# Patient Record
Sex: MALE | Race: WHITE | NOT HISPANIC OR LATINO | URBAN - METROPOLITAN AREA
[De-identification: names, ages, dates, MRNs, and addresses within clinical notes are randomized per-mention and may not be internally consistent; named-entity substitution may affect disease eponyms.]

---

## 2022-01-01 ENCOUNTER — INPATIENT (INPATIENT)
Facility: HOSPITAL | Age: 82
LOS: 15 days | End: 2022-11-08
Attending: INTERNAL MEDICINE | Admitting: INTERNAL MEDICINE

## 2022-01-01 ENCOUNTER — TRANSCRIPTION ENCOUNTER (OUTPATIENT)
Age: 82
End: 2022-01-01

## 2022-01-01 ENCOUNTER — INPATIENT (INPATIENT)
Facility: HOSPITAL | Age: 82
LOS: 51 days | Discharge: NURSING, REHAB OR LONG-TERM CARE | End: 2022-03-08
Attending: INTERNAL MEDICINE | Admitting: INTERNAL MEDICINE
Payer: MEDICARE

## 2022-01-01 VITALS
TEMPERATURE: 99 F | WEIGHT: 164.91 LBS | OXYGEN SATURATION: 100 % | DIASTOLIC BLOOD PRESSURE: 72 MMHG | SYSTOLIC BLOOD PRESSURE: 110 MMHG | HEART RATE: 100 BPM | HEIGHT: 68 IN | RESPIRATION RATE: 24 BRPM

## 2022-01-01 VITALS — SYSTOLIC BLOOD PRESSURE: 169 MMHG | HEART RATE: 113 BPM | DIASTOLIC BLOOD PRESSURE: 109 MMHG

## 2022-01-01 VITALS — OXYGEN SATURATION: 97 % | HEART RATE: 82 BPM

## 2022-01-01 DIAGNOSIS — A41.9 SEPSIS, UNSPECIFIED ORGANISM: ICD-10-CM

## 2022-01-01 DIAGNOSIS — J96.90 RESPIRATORY FAILURE, UNSPECIFIED, UNSPECIFIED WHETHER WITH HYPOXIA OR HYPERCAPNIA: ICD-10-CM

## 2022-01-01 DIAGNOSIS — J12.82 PNEUMONIA DUE TO CORONAVIRUS DISEASE 2019: ICD-10-CM

## 2022-01-01 DIAGNOSIS — Z66 DO NOT RESUSCITATE: ICD-10-CM

## 2022-01-01 DIAGNOSIS — U07.1 COVID-19: ICD-10-CM

## 2022-01-01 DIAGNOSIS — J15.1 PNEUMONIA DUE TO PSEUDOMONAS: ICD-10-CM

## 2022-01-01 DIAGNOSIS — E87.5 HYPERKALEMIA: ICD-10-CM
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
## 2022-01-01 DIAGNOSIS — G93.1 ANOXIC BRAIN DAMAGE, NOT ELSEWHERE CLASSIFIED: ICD-10-CM

## 2022-01-01 DIAGNOSIS — I21.A1 MYOCARDIAL INFARCTION TYPE 2: ICD-10-CM

## 2022-01-01 DIAGNOSIS — E43 UNSPECIFIED SEVERE PROTEIN-CALORIE MALNUTRITION: ICD-10-CM

## 2022-01-01 DIAGNOSIS — E87.2 ACIDOSIS: ICD-10-CM

## 2022-01-01 DIAGNOSIS — N40.0 BENIGN PROSTATIC HYPERPLASIA WITHOUT LOWER URINARY TRACT SYMPTOMS: ICD-10-CM

## 2022-01-01 DIAGNOSIS — R62.7 ADULT FAILURE TO THRIVE: ICD-10-CM

## 2022-01-01 DIAGNOSIS — I45.19 OTHER RIGHT BUNDLE-BRANCH BLOCK: ICD-10-CM

## 2022-01-01 DIAGNOSIS — L89.150 PRESSURE ULCER OF SACRAL REGION, UNSTAGEABLE: ICD-10-CM

## 2022-01-01 DIAGNOSIS — I46.9 CARDIAC ARREST, CAUSE UNSPECIFIED: ICD-10-CM

## 2022-01-01 DIAGNOSIS — Z51.5 ENCOUNTER FOR PALLIATIVE CARE: ICD-10-CM

## 2022-01-01 DIAGNOSIS — K29.70 GASTRITIS, UNSPECIFIED, WITHOUT BLEEDING: ICD-10-CM

## 2022-01-01 DIAGNOSIS — Z99.11 DEPENDENCE ON RESPIRATOR [VENTILATOR] STATUS: ICD-10-CM

## 2022-01-01 DIAGNOSIS — I49.3 VENTRICULAR PREMATURE DEPOLARIZATION: ICD-10-CM

## 2022-01-01 DIAGNOSIS — R06.03 ACUTE RESPIRATORY DISTRESS: ICD-10-CM

## 2022-01-01 DIAGNOSIS — Z71.89 OTHER SPECIFIED COUNSELING: ICD-10-CM

## 2022-01-01 DIAGNOSIS — J80 ACUTE RESPIRATORY DISTRESS SYNDROME: ICD-10-CM

## 2022-01-01 DIAGNOSIS — J93.9 PNEUMOTHORAX, UNSPECIFIED: ICD-10-CM

## 2022-01-01 DIAGNOSIS — N17.9 ACUTE KIDNEY FAILURE, UNSPECIFIED: ICD-10-CM

## 2022-01-01 DIAGNOSIS — E87.0 HYPEROSMOLALITY AND HYPERNATREMIA: ICD-10-CM

## 2022-01-01 DIAGNOSIS — R94.31 ABNORMAL ELECTROCARDIOGRAM [ECG] [EKG]: ICD-10-CM

## 2022-01-01 DIAGNOSIS — D64.9 ANEMIA, UNSPECIFIED: ICD-10-CM

## 2022-01-01 DIAGNOSIS — Z79.82 LONG TERM (CURRENT) USE OF ASPIRIN: ICD-10-CM

## 2022-01-01 DIAGNOSIS — R41.82 ALTERED MENTAL STATUS, UNSPECIFIED: ICD-10-CM

## 2022-01-01 DIAGNOSIS — I10 ESSENTIAL (PRIMARY) HYPERTENSION: ICD-10-CM

## 2022-01-01 DIAGNOSIS — I49.1 ATRIAL PREMATURE DEPOLARIZATION: ICD-10-CM

## 2022-01-01 DIAGNOSIS — I21.4 NON-ST ELEVATION (NSTEMI) MYOCARDIAL INFARCTION: ICD-10-CM

## 2022-01-01 LAB
-  AMIKACIN: SIGNIFICANT CHANGE UP
-  AMIKACIN: SIGNIFICANT CHANGE UP
-  AMPICILLIN/SULBACTAM: SIGNIFICANT CHANGE UP
-  AZTREONAM: SIGNIFICANT CHANGE UP
-  CEFEPIME: SIGNIFICANT CHANGE UP
-  CEFEPIME: SIGNIFICANT CHANGE UP
-  CEFIDEROCOL: SIGNIFICANT CHANGE UP
-  CEFTAZIDIME: SIGNIFICANT CHANGE UP
-  CEFTAZIDIME: SIGNIFICANT CHANGE UP
-  CEFTRIAXONE: SIGNIFICANT CHANGE UP
-  CIPROFLOXACIN: SIGNIFICANT CHANGE UP
-  CIPROFLOXACIN: SIGNIFICANT CHANGE UP
-  COAGULASE NEGATIVE STAPHYLOCOCCUS: SIGNIFICANT CHANGE UP
-  GENTAMICIN: SIGNIFICANT CHANGE UP
-  GENTAMICIN: SIGNIFICANT CHANGE UP
-  IMIPENEM: SIGNIFICANT CHANGE UP
-  IMIPENEM: SIGNIFICANT CHANGE UP
-  LEVOFLOXACIN: SIGNIFICANT CHANGE UP
-  LEVOFLOXACIN: SIGNIFICANT CHANGE UP
-  MEROPENEM: SIGNIFICANT CHANGE UP
-  MEROPENEM: SIGNIFICANT CHANGE UP
-  PIPERACILLIN/TAZOBACTAM: SIGNIFICANT CHANGE UP
-  PIPERACILLIN/TAZOBACTAM: SIGNIFICANT CHANGE UP
-  POLYMYXIN B: SIGNIFICANT CHANGE UP
-  TIGECYCLINE: <=2 — SIGNIFICANT CHANGE UP
-  TOBRAMYCIN: SIGNIFICANT CHANGE UP
-  TOBRAMYCIN: SIGNIFICANT CHANGE UP
-  TRIMETHOPRIM/SULFAMETHOXAZOLE: SIGNIFICANT CHANGE UP
A1C WITH ESTIMATED AVERAGE GLUCOSE RESULT: 4.7 % — SIGNIFICANT CHANGE UP (ref 4–5.6)
A1C WITH ESTIMATED AVERAGE GLUCOSE RESULT: 6.2 % — HIGH (ref 4–5.6)
ALBUMIN SERPL ELPH-MCNC: 1.8 G/DL — LOW (ref 3.5–5.2)
ALBUMIN SERPL ELPH-MCNC: 1.8 G/DL — LOW (ref 3.5–5.2)
ALBUMIN SERPL ELPH-MCNC: 1.9 G/DL — LOW (ref 3.5–5.2)
ALBUMIN SERPL ELPH-MCNC: 2 G/DL — LOW (ref 3.5–5.2)
ALBUMIN SERPL ELPH-MCNC: 2.3 G/DL — LOW (ref 3.5–5.2)
ALBUMIN SERPL ELPH-MCNC: 2.3 G/DL — LOW (ref 3.5–5.2)
ALBUMIN SERPL ELPH-MCNC: 2.4 G/DL — LOW (ref 3.5–5.2)
ALBUMIN SERPL ELPH-MCNC: 2.5 G/DL — LOW (ref 3.5–5.2)
ALBUMIN SERPL ELPH-MCNC: 2.6 G/DL — LOW (ref 3.5–5.2)
ALBUMIN SERPL ELPH-MCNC: 2.7 G/DL — LOW (ref 3.5–5.2)
ALBUMIN SERPL ELPH-MCNC: 2.8 G/DL — LOW (ref 3.5–5.2)
ALBUMIN SERPL ELPH-MCNC: 2.9 G/DL — LOW (ref 3.5–5.2)
ALBUMIN SERPL ELPH-MCNC: 3 G/DL — LOW (ref 3.5–5.2)
ALBUMIN SERPL ELPH-MCNC: 3.1 G/DL — LOW (ref 3.5–5.2)
ALBUMIN SERPL ELPH-MCNC: 3.2 G/DL — LOW (ref 3.5–5.2)
ALBUMIN SERPL ELPH-MCNC: 3.3 G/DL — LOW (ref 3.5–5.2)
ALP SERPL-CCNC: 101 U/L — SIGNIFICANT CHANGE UP (ref 30–115)
ALP SERPL-CCNC: 102 U/L — SIGNIFICANT CHANGE UP (ref 30–115)
ALP SERPL-CCNC: 102 U/L — SIGNIFICANT CHANGE UP (ref 30–115)
ALP SERPL-CCNC: 103 U/L — SIGNIFICANT CHANGE UP (ref 30–115)
ALP SERPL-CCNC: 104 U/L — SIGNIFICANT CHANGE UP (ref 30–115)
ALP SERPL-CCNC: 107 U/L — SIGNIFICANT CHANGE UP (ref 30–115)
ALP SERPL-CCNC: 109 U/L — SIGNIFICANT CHANGE UP (ref 30–115)
ALP SERPL-CCNC: 109 U/L — SIGNIFICANT CHANGE UP (ref 30–115)
ALP SERPL-CCNC: 110 U/L — SIGNIFICANT CHANGE UP (ref 30–115)
ALP SERPL-CCNC: 112 U/L — SIGNIFICANT CHANGE UP (ref 30–115)
ALP SERPL-CCNC: 117 U/L — HIGH (ref 30–115)
ALP SERPL-CCNC: 117 U/L — HIGH (ref 30–115)
ALP SERPL-CCNC: 118 U/L — HIGH (ref 30–115)
ALP SERPL-CCNC: 125 U/L — HIGH (ref 30–115)
ALP SERPL-CCNC: 126 U/L — HIGH (ref 30–115)
ALP SERPL-CCNC: 129 U/L — HIGH (ref 30–115)
ALP SERPL-CCNC: 51 U/L — SIGNIFICANT CHANGE UP (ref 30–115)
ALP SERPL-CCNC: 55 U/L — SIGNIFICANT CHANGE UP (ref 30–115)
ALP SERPL-CCNC: 62 U/L — SIGNIFICANT CHANGE UP (ref 30–115)
ALP SERPL-CCNC: 64 U/L — SIGNIFICANT CHANGE UP (ref 30–115)
ALP SERPL-CCNC: 66 U/L — SIGNIFICANT CHANGE UP (ref 30–115)
ALP SERPL-CCNC: 67 U/L — SIGNIFICANT CHANGE UP (ref 30–115)
ALP SERPL-CCNC: 67 U/L — SIGNIFICANT CHANGE UP (ref 30–115)
ALP SERPL-CCNC: 68 U/L — SIGNIFICANT CHANGE UP (ref 30–115)
ALP SERPL-CCNC: 68 U/L — SIGNIFICANT CHANGE UP (ref 30–115)
ALP SERPL-CCNC: 71 U/L — SIGNIFICANT CHANGE UP (ref 30–115)
ALP SERPL-CCNC: 71 U/L — SIGNIFICANT CHANGE UP (ref 30–115)
ALP SERPL-CCNC: 72 U/L — SIGNIFICANT CHANGE UP (ref 30–115)
ALP SERPL-CCNC: 72 U/L — SIGNIFICANT CHANGE UP (ref 30–115)
ALP SERPL-CCNC: 74 U/L — SIGNIFICANT CHANGE UP (ref 30–115)
ALP SERPL-CCNC: 75 U/L — SIGNIFICANT CHANGE UP (ref 30–115)
ALP SERPL-CCNC: 75 U/L — SIGNIFICANT CHANGE UP (ref 30–115)
ALP SERPL-CCNC: 76 U/L — SIGNIFICANT CHANGE UP (ref 30–115)
ALP SERPL-CCNC: 76 U/L — SIGNIFICANT CHANGE UP (ref 30–115)
ALP SERPL-CCNC: 77 U/L — SIGNIFICANT CHANGE UP (ref 30–115)
ALP SERPL-CCNC: 78 U/L — SIGNIFICANT CHANGE UP (ref 30–115)
ALP SERPL-CCNC: 79 U/L — SIGNIFICANT CHANGE UP (ref 30–115)
ALP SERPL-CCNC: 80 U/L — SIGNIFICANT CHANGE UP (ref 30–115)
ALP SERPL-CCNC: 80 U/L — SIGNIFICANT CHANGE UP (ref 30–115)
ALP SERPL-CCNC: 81 U/L — SIGNIFICANT CHANGE UP (ref 30–115)
ALP SERPL-CCNC: 82 U/L — SIGNIFICANT CHANGE UP (ref 30–115)
ALP SERPL-CCNC: 82 U/L — SIGNIFICANT CHANGE UP (ref 30–115)
ALP SERPL-CCNC: 84 U/L — SIGNIFICANT CHANGE UP (ref 30–115)
ALP SERPL-CCNC: 94 U/L — SIGNIFICANT CHANGE UP (ref 30–115)
ALP SERPL-CCNC: 97 U/L — SIGNIFICANT CHANGE UP (ref 30–115)
ALP SERPL-CCNC: 98 U/L — SIGNIFICANT CHANGE UP (ref 30–115)
ALT FLD-CCNC: 101 U/L — HIGH (ref 0–41)
ALT FLD-CCNC: 114 U/L — HIGH (ref 0–41)
ALT FLD-CCNC: 13 U/L — SIGNIFICANT CHANGE UP (ref 0–41)
ALT FLD-CCNC: 16 U/L — SIGNIFICANT CHANGE UP (ref 0–41)
ALT FLD-CCNC: 23 U/L — SIGNIFICANT CHANGE UP (ref 0–41)
ALT FLD-CCNC: 23 U/L — SIGNIFICANT CHANGE UP (ref 0–41)
ALT FLD-CCNC: 25 U/L — SIGNIFICANT CHANGE UP (ref 0–41)
ALT FLD-CCNC: 25 U/L — SIGNIFICANT CHANGE UP (ref 0–41)
ALT FLD-CCNC: 26 U/L — SIGNIFICANT CHANGE UP (ref 0–41)
ALT FLD-CCNC: 27 U/L — SIGNIFICANT CHANGE UP (ref 0–41)
ALT FLD-CCNC: 28 U/L — SIGNIFICANT CHANGE UP (ref 0–41)
ALT FLD-CCNC: 30 U/L — SIGNIFICANT CHANGE UP (ref 0–41)
ALT FLD-CCNC: 31 U/L — SIGNIFICANT CHANGE UP (ref 0–41)
ALT FLD-CCNC: 31 U/L — SIGNIFICANT CHANGE UP (ref 0–41)
ALT FLD-CCNC: 32 U/L — SIGNIFICANT CHANGE UP (ref 0–41)
ALT FLD-CCNC: 33 U/L — SIGNIFICANT CHANGE UP (ref 0–41)
ALT FLD-CCNC: 35 U/L — SIGNIFICANT CHANGE UP (ref 0–41)
ALT FLD-CCNC: 37 U/L — SIGNIFICANT CHANGE UP (ref 0–41)
ALT FLD-CCNC: 40 U/L — SIGNIFICANT CHANGE UP (ref 0–41)
ALT FLD-CCNC: 40 U/L — SIGNIFICANT CHANGE UP (ref 0–41)
ALT FLD-CCNC: 45 U/L — HIGH (ref 0–41)
ALT FLD-CCNC: 47 U/L — HIGH (ref 0–41)
ALT FLD-CCNC: 50 U/L — HIGH (ref 0–41)
ALT FLD-CCNC: 50 U/L — HIGH (ref 0–41)
ALT FLD-CCNC: 51 U/L — HIGH (ref 0–41)
ALT FLD-CCNC: 51 U/L — HIGH (ref 0–41)
ALT FLD-CCNC: 56 U/L — HIGH (ref 0–41)
ALT FLD-CCNC: 56 U/L — HIGH (ref 0–41)
ALT FLD-CCNC: 59 U/L — HIGH (ref 0–41)
ALT FLD-CCNC: 62 U/L — HIGH (ref 0–41)
ALT FLD-CCNC: 64 U/L — HIGH (ref 0–41)
ALT FLD-CCNC: 64 U/L — HIGH (ref 0–41)
ALT FLD-CCNC: 65 U/L — HIGH (ref 0–41)
ALT FLD-CCNC: 66 U/L — HIGH (ref 0–41)
ALT FLD-CCNC: 7 U/L — SIGNIFICANT CHANGE UP (ref 0–41)
ALT FLD-CCNC: 76 U/L — HIGH (ref 0–41)
ALT FLD-CCNC: 78 U/L — HIGH (ref 0–41)
ALT FLD-CCNC: 8 U/L — SIGNIFICANT CHANGE UP (ref 0–41)
ALT FLD-CCNC: 9 U/L — SIGNIFICANT CHANGE UP (ref 0–41)
ALT FLD-CCNC: 9 U/L — SIGNIFICANT CHANGE UP (ref 0–41)
AMIKACIN SERPL-MCNC: 26.6 UG/ML — HIGH (ref 0–10)
ANION GAP SERPL CALC-SCNC: 10 MMOL/L — SIGNIFICANT CHANGE UP (ref 7–14)
ANION GAP SERPL CALC-SCNC: 11 MMOL/L — SIGNIFICANT CHANGE UP (ref 7–14)
ANION GAP SERPL CALC-SCNC: 12 MMOL/L — SIGNIFICANT CHANGE UP (ref 7–14)
ANION GAP SERPL CALC-SCNC: 13 MMOL/L — SIGNIFICANT CHANGE UP (ref 7–14)
ANION GAP SERPL CALC-SCNC: 14 MMOL/L — SIGNIFICANT CHANGE UP (ref 7–14)
ANION GAP SERPL CALC-SCNC: 14 MMOL/L — SIGNIFICANT CHANGE UP (ref 7–14)
ANION GAP SERPL CALC-SCNC: 15 MMOL/L — HIGH (ref 7–14)
ANION GAP SERPL CALC-SCNC: 16 MMOL/L — HIGH (ref 7–14)
ANION GAP SERPL CALC-SCNC: 17 MMOL/L — HIGH (ref 7–14)
ANION GAP SERPL CALC-SCNC: 19 MMOL/L — HIGH (ref 7–14)
ANION GAP SERPL CALC-SCNC: 20 MMOL/L — HIGH (ref 7–14)
ANION GAP SERPL CALC-SCNC: 26 MMOL/L — HIGH (ref 7–14)
ANION GAP SERPL CALC-SCNC: 4 MMOL/L — LOW (ref 7–14)
ANION GAP SERPL CALC-SCNC: 5 MMOL/L — LOW (ref 7–14)
ANION GAP SERPL CALC-SCNC: 7 MMOL/L — SIGNIFICANT CHANGE UP (ref 7–14)
ANION GAP SERPL CALC-SCNC: 7 MMOL/L — SIGNIFICANT CHANGE UP (ref 7–14)
ANION GAP SERPL CALC-SCNC: 8 MMOL/L — SIGNIFICANT CHANGE UP (ref 7–14)
ANION GAP SERPL CALC-SCNC: 9 MMOL/L — SIGNIFICANT CHANGE UP (ref 7–14)
ANISOCYTOSIS BLD QL: SIGNIFICANT CHANGE UP
ANISOCYTOSIS BLD QL: SIGNIFICANT CHANGE UP
ANISOCYTOSIS BLD QL: SLIGHT — SIGNIFICANT CHANGE UP
APPEARANCE UR: ABNORMAL
APPEARANCE UR: ABNORMAL
APPEARANCE UR: CLEAR — SIGNIFICANT CHANGE UP
APTT BLD: 100.7 SEC — CRITICAL HIGH (ref 27–39.2)
APTT BLD: 127.5 SEC — CRITICAL HIGH (ref 27–39.2)
APTT BLD: 29 SEC — SIGNIFICANT CHANGE UP (ref 27–39.2)
APTT BLD: 29.3 SEC — SIGNIFICANT CHANGE UP (ref 27–39.2)
APTT BLD: 29.6 SEC — SIGNIFICANT CHANGE UP (ref 27–39.2)
APTT BLD: 30.5 SEC — SIGNIFICANT CHANGE UP (ref 27–39.2)
APTT BLD: 30.7 SEC — SIGNIFICANT CHANGE UP (ref 27–39.2)
APTT BLD: 33.3 SEC — SIGNIFICANT CHANGE UP (ref 27–39.2)
APTT BLD: 53.9 SEC — HIGH (ref 27–39.2)
APTT BLD: 60 SEC — HIGH (ref 27–39.2)
APTT BLD: 61.7 SEC — HIGH (ref 27–39.2)
APTT BLD: 63.1 SEC — HIGH (ref 27–39.2)
APTT BLD: 77.6 SEC — CRITICAL HIGH (ref 27–39.2)
APTT BLD: 94.6 SEC — CRITICAL HIGH (ref 27–39.2)
AST SERPL-CCNC: 11 U/L — SIGNIFICANT CHANGE UP (ref 0–41)
AST SERPL-CCNC: 12 U/L — SIGNIFICANT CHANGE UP (ref 0–41)
AST SERPL-CCNC: 12 U/L — SIGNIFICANT CHANGE UP (ref 0–41)
AST SERPL-CCNC: 15 U/L — SIGNIFICANT CHANGE UP (ref 0–41)
AST SERPL-CCNC: 16 U/L — SIGNIFICANT CHANGE UP (ref 0–41)
AST SERPL-CCNC: 17 U/L — SIGNIFICANT CHANGE UP (ref 0–41)
AST SERPL-CCNC: 18 U/L — SIGNIFICANT CHANGE UP (ref 0–41)
AST SERPL-CCNC: 19 U/L — SIGNIFICANT CHANGE UP (ref 0–41)
AST SERPL-CCNC: 19 U/L — SIGNIFICANT CHANGE UP (ref 0–41)
AST SERPL-CCNC: 20 U/L — SIGNIFICANT CHANGE UP (ref 0–41)
AST SERPL-CCNC: 21 U/L — SIGNIFICANT CHANGE UP (ref 0–41)
AST SERPL-CCNC: 22 U/L — SIGNIFICANT CHANGE UP (ref 0–41)
AST SERPL-CCNC: 23 U/L — SIGNIFICANT CHANGE UP (ref 0–41)
AST SERPL-CCNC: 23 U/L — SIGNIFICANT CHANGE UP (ref 0–41)
AST SERPL-CCNC: 24 U/L — SIGNIFICANT CHANGE UP (ref 0–41)
AST SERPL-CCNC: 25 U/L — SIGNIFICANT CHANGE UP (ref 0–41)
AST SERPL-CCNC: 26 U/L — SIGNIFICANT CHANGE UP (ref 0–41)
AST SERPL-CCNC: 27 U/L — SIGNIFICANT CHANGE UP (ref 0–41)
AST SERPL-CCNC: 28 U/L — SIGNIFICANT CHANGE UP (ref 0–41)
AST SERPL-CCNC: 29 U/L — SIGNIFICANT CHANGE UP (ref 0–41)
AST SERPL-CCNC: 32 U/L — SIGNIFICANT CHANGE UP (ref 0–41)
AST SERPL-CCNC: 33 U/L — SIGNIFICANT CHANGE UP (ref 0–41)
AST SERPL-CCNC: 34 U/L — SIGNIFICANT CHANGE UP (ref 0–41)
AST SERPL-CCNC: 34 U/L — SIGNIFICANT CHANGE UP (ref 0–41)
AST SERPL-CCNC: 35 U/L — SIGNIFICANT CHANGE UP (ref 0–41)
AST SERPL-CCNC: 45 U/L — HIGH (ref 0–41)
AST SERPL-CCNC: 46 U/L — HIGH (ref 0–41)
AST SERPL-CCNC: 46 U/L — HIGH (ref 0–41)
AST SERPL-CCNC: 53 U/L — HIGH (ref 0–41)
AST SERPL-CCNC: 60 U/L — HIGH (ref 0–41)
AST SERPL-CCNC: 94 U/L — HIGH (ref 0–41)
AST SERPL-CCNC: 95 U/L — HIGH (ref 0–41)
B-OH-BUTYR SERPL-SCNC: 0.3 MMOL/L — SIGNIFICANT CHANGE UP
BACTERIA # UR AUTO: ABNORMAL
BACTERIA # UR AUTO: ABNORMAL
BACTERIA # UR AUTO: NEGATIVE — SIGNIFICANT CHANGE UP
BASE EXCESS BLDA CALC-SCNC: -12.9 MMOL/L — LOW (ref -2–3)
BASE EXCESS BLDA CALC-SCNC: 14.9 MMOL/L — HIGH (ref -2–3)
BASE EXCESS BLDA CALC-SCNC: 22.5 MMOL/L — HIGH (ref -2–3)
BASE EXCESS BLDA CALC-SCNC: 3.6 MMOL/L — HIGH (ref -2–3)
BASE EXCESS BLDA CALC-SCNC: 5.5 MMOL/L — HIGH (ref -2–3)
BASO STIPL BLD QL SMEAR: PRESENT — SIGNIFICANT CHANGE UP
BASOPHILS # BLD AUTO: 0 K/UL — SIGNIFICANT CHANGE UP (ref 0–0.2)
BASOPHILS # BLD AUTO: 0.01 K/UL — SIGNIFICANT CHANGE UP (ref 0–0.2)
BASOPHILS # BLD AUTO: 0.02 K/UL — SIGNIFICANT CHANGE UP (ref 0–0.2)
BASOPHILS # BLD AUTO: 0.03 K/UL — SIGNIFICANT CHANGE UP (ref 0–0.2)
BASOPHILS # BLD AUTO: 0.04 K/UL — SIGNIFICANT CHANGE UP (ref 0–0.2)
BASOPHILS # BLD AUTO: 0.05 K/UL — SIGNIFICANT CHANGE UP (ref 0–0.2)
BASOPHILS # BLD AUTO: 0.06 K/UL — SIGNIFICANT CHANGE UP (ref 0–0.2)
BASOPHILS # BLD AUTO: 0.09 K/UL — SIGNIFICANT CHANGE UP (ref 0–0.2)
BASOPHILS # BLD AUTO: 0.09 K/UL — SIGNIFICANT CHANGE UP (ref 0–0.2)
BASOPHILS # BLD AUTO: 0.1 K/UL — SIGNIFICANT CHANGE UP (ref 0–0.2)
BASOPHILS # BLD AUTO: 0.53 K/UL — HIGH (ref 0–0.2)
BASOPHILS NFR BLD AUTO: 0 % — SIGNIFICANT CHANGE UP (ref 0–1)
BASOPHILS NFR BLD AUTO: 0.1 % — SIGNIFICANT CHANGE UP (ref 0–1)
BASOPHILS NFR BLD AUTO: 0.2 % — SIGNIFICANT CHANGE UP (ref 0–1)
BASOPHILS NFR BLD AUTO: 0.3 % — SIGNIFICANT CHANGE UP (ref 0–1)
BASOPHILS NFR BLD AUTO: 0.4 % — SIGNIFICANT CHANGE UP (ref 0–1)
BASOPHILS NFR BLD AUTO: 0.5 % — SIGNIFICANT CHANGE UP (ref 0–1)
BASOPHILS NFR BLD AUTO: 0.7 % — SIGNIFICANT CHANGE UP (ref 0–1)
BASOPHILS NFR BLD AUTO: 3.5 % — HIGH (ref 0–1)
BILIRUB DIRECT SERPL-MCNC: 0.2 MG/DL — SIGNIFICANT CHANGE UP (ref 0–0.3)
BILIRUB INDIRECT FLD-MCNC: 0.2 MG/DL — SIGNIFICANT CHANGE UP (ref 0.2–1.2)
BILIRUB SERPL-MCNC: 0.2 MG/DL — SIGNIFICANT CHANGE UP (ref 0.2–1.2)
BILIRUB SERPL-MCNC: 0.3 MG/DL — SIGNIFICANT CHANGE UP (ref 0.2–1.2)
BILIRUB SERPL-MCNC: 0.4 MG/DL — SIGNIFICANT CHANGE UP (ref 0.2–1.2)
BILIRUB SERPL-MCNC: 0.5 MG/DL — SIGNIFICANT CHANGE UP (ref 0.2–1.2)
BILIRUB SERPL-MCNC: 0.6 MG/DL — SIGNIFICANT CHANGE UP (ref 0.2–1.2)
BILIRUB SERPL-MCNC: 0.7 MG/DL — SIGNIFICANT CHANGE UP (ref 0.2–1.2)
BILIRUB SERPL-MCNC: 0.8 MG/DL — SIGNIFICANT CHANGE UP (ref 0.2–1.2)
BILIRUB SERPL-MCNC: 0.9 MG/DL — SIGNIFICANT CHANGE UP (ref 0.2–1.2)
BILIRUB SERPL-MCNC: 1 MG/DL — SIGNIFICANT CHANGE UP (ref 0.2–1.2)
BILIRUB SERPL-MCNC: 1.1 MG/DL — SIGNIFICANT CHANGE UP (ref 0.2–1.2)
BILIRUB SERPL-MCNC: 1.2 MG/DL — SIGNIFICANT CHANGE UP (ref 0.2–1.2)
BILIRUB SERPL-MCNC: 1.7 MG/DL — HIGH (ref 0.2–1.2)
BILIRUB UR-MCNC: NEGATIVE — SIGNIFICANT CHANGE UP
BLD GP AB SCN SERPL QL: SIGNIFICANT CHANGE UP
BUN SERPL-MCNC: 12 MG/DL — SIGNIFICANT CHANGE UP (ref 10–20)
BUN SERPL-MCNC: 12 MG/DL — SIGNIFICANT CHANGE UP (ref 10–20)
BUN SERPL-MCNC: 14 MG/DL — SIGNIFICANT CHANGE UP (ref 10–20)
BUN SERPL-MCNC: 14 MG/DL — SIGNIFICANT CHANGE UP (ref 10–20)
BUN SERPL-MCNC: 16 MG/DL — SIGNIFICANT CHANGE UP (ref 10–20)
BUN SERPL-MCNC: 17 MG/DL — SIGNIFICANT CHANGE UP (ref 10–20)
BUN SERPL-MCNC: 18 MG/DL — SIGNIFICANT CHANGE UP (ref 10–20)
BUN SERPL-MCNC: 21 MG/DL — HIGH (ref 10–20)
BUN SERPL-MCNC: 22 MG/DL — HIGH (ref 10–20)
BUN SERPL-MCNC: 22 MG/DL — HIGH (ref 10–20)
BUN SERPL-MCNC: 23 MG/DL — HIGH (ref 10–20)
BUN SERPL-MCNC: 24 MG/DL — HIGH (ref 10–20)
BUN SERPL-MCNC: 29 MG/DL — HIGH (ref 10–20)
BUN SERPL-MCNC: 29 MG/DL — HIGH (ref 10–20)
BUN SERPL-MCNC: 31 MG/DL — HIGH (ref 10–20)
BUN SERPL-MCNC: 31 MG/DL — HIGH (ref 10–20)
BUN SERPL-MCNC: 32 MG/DL — HIGH (ref 10–20)
BUN SERPL-MCNC: 32 MG/DL — HIGH (ref 10–20)
BUN SERPL-MCNC: 33 MG/DL — HIGH (ref 10–20)
BUN SERPL-MCNC: 34 MG/DL — HIGH (ref 10–20)
BUN SERPL-MCNC: 34 MG/DL — HIGH (ref 10–20)
BUN SERPL-MCNC: 35 MG/DL — HIGH (ref 10–20)
BUN SERPL-MCNC: 36 MG/DL — HIGH (ref 10–20)
BUN SERPL-MCNC: 36 MG/DL — HIGH (ref 10–20)
BUN SERPL-MCNC: 37 MG/DL — HIGH (ref 10–20)
BUN SERPL-MCNC: 37 MG/DL — HIGH (ref 10–20)
BUN SERPL-MCNC: 39 MG/DL — HIGH (ref 10–20)
BUN SERPL-MCNC: 41 MG/DL — HIGH (ref 10–20)
BUN SERPL-MCNC: 42 MG/DL — HIGH (ref 10–20)
BUN SERPL-MCNC: 43 MG/DL — HIGH (ref 10–20)
BUN SERPL-MCNC: 43 MG/DL — HIGH (ref 10–20)
BUN SERPL-MCNC: 46 MG/DL — HIGH (ref 10–20)
BUN SERPL-MCNC: 47 MG/DL — HIGH (ref 10–20)
BUN SERPL-MCNC: 49 MG/DL — HIGH (ref 10–20)
BUN SERPL-MCNC: 50 MG/DL — HIGH (ref 10–20)
BUN SERPL-MCNC: 51 MG/DL — HIGH (ref 10–20)
BUN SERPL-MCNC: 52 MG/DL — HIGH (ref 10–20)
BUN SERPL-MCNC: 54 MG/DL — HIGH (ref 10–20)
BUN SERPL-MCNC: 54 MG/DL — HIGH (ref 10–20)
BUN SERPL-MCNC: 58 MG/DL — HIGH (ref 10–20)
BUN SERPL-MCNC: 58 MG/DL — HIGH (ref 10–20)
BUN SERPL-MCNC: 59 MG/DL — HIGH (ref 10–20)
BUN SERPL-MCNC: 63 MG/DL — CRITICAL HIGH (ref 10–20)
BUN SERPL-MCNC: 63 MG/DL — CRITICAL HIGH (ref 10–20)
BUN SERPL-MCNC: 67 MG/DL — CRITICAL HIGH (ref 10–20)
BUN SERPL-MCNC: 67 MG/DL — CRITICAL HIGH (ref 10–20)
BUN SERPL-MCNC: 68 MG/DL — CRITICAL HIGH (ref 10–20)
BUN SERPL-MCNC: 70 MG/DL — CRITICAL HIGH (ref 10–20)
BUN SERPL-MCNC: 73 MG/DL — CRITICAL HIGH (ref 10–20)
BUN SERPL-MCNC: 76 MG/DL — CRITICAL HIGH (ref 10–20)
BUN SERPL-MCNC: 80 MG/DL — CRITICAL HIGH (ref 10–20)
BUN SERPL-MCNC: 81 MG/DL — CRITICAL HIGH (ref 10–20)
BUN SERPL-MCNC: 82 MG/DL — CRITICAL HIGH (ref 10–20)
BUN SERPL-MCNC: 94 MG/DL — CRITICAL HIGH (ref 10–20)
CALCIUM SERPL-MCNC: 6.9 MG/DL — LOW (ref 8.5–10.1)
CALCIUM SERPL-MCNC: 6.9 MG/DL — LOW (ref 8.5–10.1)
CALCIUM SERPL-MCNC: 7.1 MG/DL — LOW (ref 8.5–10.1)
CALCIUM SERPL-MCNC: 7.2 MG/DL — LOW (ref 8.4–10.5)
CALCIUM SERPL-MCNC: 7.2 MG/DL — LOW (ref 8.5–10.1)
CALCIUM SERPL-MCNC: 7.3 MG/DL — LOW (ref 8.5–10.1)
CALCIUM SERPL-MCNC: 7.4 MG/DL — LOW (ref 8.4–10.4)
CALCIUM SERPL-MCNC: 7.4 MG/DL — LOW (ref 8.4–10.5)
CALCIUM SERPL-MCNC: 7.4 MG/DL — LOW (ref 8.5–10.1)
CALCIUM SERPL-MCNC: 7.5 MG/DL — LOW (ref 8.4–10.5)
CALCIUM SERPL-MCNC: 7.6 MG/DL — LOW (ref 8.4–10.4)
CALCIUM SERPL-MCNC: 7.7 MG/DL — LOW (ref 8.4–10.5)
CALCIUM SERPL-MCNC: 7.7 MG/DL — LOW (ref 8.5–10.1)
CALCIUM SERPL-MCNC: 7.8 MG/DL — LOW (ref 8.4–10.5)
CALCIUM SERPL-MCNC: 7.8 MG/DL — LOW (ref 8.5–10.1)
CALCIUM SERPL-MCNC: 7.9 MG/DL — LOW (ref 8.4–10.5)
CALCIUM SERPL-MCNC: 7.9 MG/DL — LOW (ref 8.5–10.1)
CALCIUM SERPL-MCNC: 7.9 MG/DL — LOW (ref 8.5–10.1)
CALCIUM SERPL-MCNC: 8 MG/DL — LOW (ref 8.4–10.4)
CALCIUM SERPL-MCNC: 8 MG/DL — LOW (ref 8.5–10.1)
CALCIUM SERPL-MCNC: 8.1 MG/DL — LOW (ref 8.4–10.4)
CALCIUM SERPL-MCNC: 8.1 MG/DL — LOW (ref 8.5–10.1)
CALCIUM SERPL-MCNC: 8.2 MG/DL — LOW (ref 8.4–10.4)
CALCIUM SERPL-MCNC: 8.2 MG/DL — LOW (ref 8.4–10.5)
CALCIUM SERPL-MCNC: 8.2 MG/DL — LOW (ref 8.5–10.1)
CALCIUM SERPL-MCNC: 8.2 MG/DL — LOW (ref 8.5–10.1)
CALCIUM SERPL-MCNC: 8.3 MG/DL — LOW (ref 8.5–10.1)
CALCIUM SERPL-MCNC: 8.4 MG/DL — LOW (ref 8.5–10.1)
CALCIUM SERPL-MCNC: 8.5 MG/DL — SIGNIFICANT CHANGE UP (ref 8.5–10.1)
CALCIUM SERPL-MCNC: 8.6 MG/DL — SIGNIFICANT CHANGE UP (ref 8.5–10.1)
CALCIUM SERPL-MCNC: 8.7 MG/DL — SIGNIFICANT CHANGE UP (ref 8.5–10.1)
CHLORIDE SERPL-SCNC: 100 MMOL/L — SIGNIFICANT CHANGE UP (ref 98–110)
CHLORIDE SERPL-SCNC: 101 MMOL/L — SIGNIFICANT CHANGE UP (ref 98–110)
CHLORIDE SERPL-SCNC: 102 MMOL/L — SIGNIFICANT CHANGE UP (ref 98–110)
CHLORIDE SERPL-SCNC: 103 MMOL/L — SIGNIFICANT CHANGE UP (ref 98–110)
CHLORIDE SERPL-SCNC: 104 MMOL/L — SIGNIFICANT CHANGE UP (ref 98–110)
CHLORIDE SERPL-SCNC: 105 MMOL/L — SIGNIFICANT CHANGE UP (ref 98–110)
CHLORIDE SERPL-SCNC: 106 MMOL/L — SIGNIFICANT CHANGE UP (ref 98–110)
CHLORIDE SERPL-SCNC: 107 MMOL/L — SIGNIFICANT CHANGE UP (ref 98–110)
CHLORIDE SERPL-SCNC: 108 MMOL/L — SIGNIFICANT CHANGE UP (ref 98–110)
CHLORIDE SERPL-SCNC: 109 MMOL/L — SIGNIFICANT CHANGE UP (ref 98–110)
CHLORIDE SERPL-SCNC: 110 MMOL/L — SIGNIFICANT CHANGE UP (ref 98–110)
CHLORIDE SERPL-SCNC: 111 MMOL/L — HIGH (ref 98–110)
CHLORIDE SERPL-SCNC: 113 MMOL/L — HIGH (ref 98–110)
CHLORIDE SERPL-SCNC: 84 MMOL/L — LOW (ref 98–110)
CHLORIDE SERPL-SCNC: 85 MMOL/L — LOW (ref 98–110)
CHLORIDE SERPL-SCNC: 86 MMOL/L — LOW (ref 98–110)
CHLORIDE SERPL-SCNC: 87 MMOL/L — LOW (ref 98–110)
CHLORIDE SERPL-SCNC: 88 MMOL/L — LOW (ref 98–110)
CHLORIDE SERPL-SCNC: 90 MMOL/L — LOW (ref 98–110)
CHLORIDE SERPL-SCNC: 94 MMOL/L — LOW (ref 98–110)
CHLORIDE SERPL-SCNC: 95 MMOL/L — LOW (ref 98–110)
CHLORIDE SERPL-SCNC: 95 MMOL/L — LOW (ref 98–110)
CHLORIDE SERPL-SCNC: 96 MMOL/L — LOW (ref 98–110)
CHLORIDE SERPL-SCNC: 97 MMOL/L — LOW (ref 98–110)
CHLORIDE SERPL-SCNC: 97 MMOL/L — LOW (ref 98–110)
CHLORIDE SERPL-SCNC: 99 MMOL/L — SIGNIFICANT CHANGE UP (ref 98–110)
CHLORIDE UR-SCNC: 20 — SIGNIFICANT CHANGE UP
CHOLEST SERPL-MCNC: 80 MG/DL — SIGNIFICANT CHANGE UP
CK MB CFR SERPL CALC: 28.5 NG/ML — HIGH (ref 0.6–6.3)
CK MB CFR SERPL CALC: 38.1 NG/ML — HIGH (ref 0.6–6.3)
CK SERPL-CCNC: 1231 U/L — HIGH (ref 0–225)
CO2 SERPL-SCNC: 12 MMOL/L — LOW (ref 17–32)
CO2 SERPL-SCNC: 23 MMOL/L — SIGNIFICANT CHANGE UP (ref 17–32)
CO2 SERPL-SCNC: 24 MMOL/L — SIGNIFICANT CHANGE UP (ref 17–32)
CO2 SERPL-SCNC: 24 MMOL/L — SIGNIFICANT CHANGE UP (ref 17–32)
CO2 SERPL-SCNC: 25 MMOL/L — SIGNIFICANT CHANGE UP (ref 17–32)
CO2 SERPL-SCNC: 26 MMOL/L — SIGNIFICANT CHANGE UP (ref 17–32)
CO2 SERPL-SCNC: 27 MMOL/L — SIGNIFICANT CHANGE UP (ref 17–32)
CO2 SERPL-SCNC: 28 MMOL/L — SIGNIFICANT CHANGE UP (ref 17–32)
CO2 SERPL-SCNC: 29 MMOL/L — SIGNIFICANT CHANGE UP (ref 17–32)
CO2 SERPL-SCNC: 30 MMOL/L — SIGNIFICANT CHANGE UP (ref 17–32)
CO2 SERPL-SCNC: 31 MMOL/L — SIGNIFICANT CHANGE UP (ref 17–32)
CO2 SERPL-SCNC: 32 MMOL/L — SIGNIFICANT CHANGE UP (ref 17–32)
CO2 SERPL-SCNC: 33 MMOL/L — HIGH (ref 17–32)
CO2 SERPL-SCNC: 34 MMOL/L — HIGH (ref 17–32)
CO2 SERPL-SCNC: 35 MMOL/L — HIGH (ref 17–32)
CO2 SERPL-SCNC: 36 MMOL/L — HIGH (ref 17–32)
CO2 SERPL-SCNC: 37 MMOL/L — HIGH (ref 17–32)
CO2 SERPL-SCNC: 39 MMOL/L — HIGH (ref 17–32)
CO2 SERPL-SCNC: 40 MMOL/L — HIGH (ref 17–32)
CO2 SERPL-SCNC: 40 MMOL/L — HIGH (ref 17–32)
CO2 SERPL-SCNC: 43 MMOL/L — CRITICAL HIGH (ref 17–32)
COLOR SPEC: YELLOW — SIGNIFICANT CHANGE UP
CORTIS AM PEAK SERPL-MCNC: 9.9 UG/DL — SIGNIFICANT CHANGE UP (ref 6–18.4)
CREAT ?TM UR-MCNC: 147 MG/DL — SIGNIFICANT CHANGE UP
CREAT SERPL-MCNC: 0.5 MG/DL — LOW (ref 0.7–1.5)
CREAT SERPL-MCNC: 0.6 MG/DL — LOW (ref 0.7–1.5)
CREAT SERPL-MCNC: 0.7 MG/DL — SIGNIFICANT CHANGE UP (ref 0.7–1.5)
CREAT SERPL-MCNC: 0.8 MG/DL — SIGNIFICANT CHANGE UP (ref 0.7–1.5)
CREAT SERPL-MCNC: 0.9 MG/DL — SIGNIFICANT CHANGE UP (ref 0.7–1.5)
CREAT SERPL-MCNC: 1 MG/DL — SIGNIFICANT CHANGE UP (ref 0.7–1.5)
CREAT SERPL-MCNC: 1 MG/DL — SIGNIFICANT CHANGE UP (ref 0.7–1.5)
CREAT SERPL-MCNC: 1.1 MG/DL — SIGNIFICANT CHANGE UP (ref 0.7–1.5)
CREAT SERPL-MCNC: 1.1 MG/DL — SIGNIFICANT CHANGE UP (ref 0.7–1.5)
CREAT SERPL-MCNC: 1.2 MG/DL — SIGNIFICANT CHANGE UP (ref 0.7–1.5)
CREAT SERPL-MCNC: 1.3 MG/DL — SIGNIFICANT CHANGE UP (ref 0.7–1.5)
CREAT SERPL-MCNC: 1.6 MG/DL — HIGH (ref 0.7–1.5)
CREAT SERPL-MCNC: 1.6 MG/DL — HIGH (ref 0.7–1.5)
CREAT SERPL-MCNC: <0.5 MG/DL — LOW (ref 0.7–1.5)
CRP SERPL-MCNC: 50 MG/L — HIGH
CRP SERPL-MCNC: 80.3 MG/L — HIGH
CRP SERPL-MCNC: <3 MG/L — SIGNIFICANT CHANGE UP
CRP SERPL-MCNC: <3 MG/L — SIGNIFICANT CHANGE UP
CULTURE RESULTS: NO GROWTH — SIGNIFICANT CHANGE UP
CULTURE RESULTS: SIGNIFICANT CHANGE UP
D DIMER BLD IA.RAPID-MCNC: 1131 NG/ML DDU — HIGH (ref 0–230)
D DIMER BLD IA.RAPID-MCNC: 3903 NG/ML DDU — HIGH (ref 0–230)
D DIMER BLD IA.RAPID-MCNC: 612 NG/ML DDU — HIGH (ref 0–230)
D DIMER BLD IA.RAPID-MCNC: 7368 NG/ML DDU — HIGH (ref 0–230)
D DIMER BLD IA.RAPID-MCNC: 783 NG/ML DDU — HIGH (ref 0–230)
DIFF PNL FLD: ABNORMAL
EGFR: 42 ML/MIN/1.73M2 — LOW
EGFR: 42 ML/MIN/1.73M2 — LOW
EGFR: 55 ML/MIN/1.73M2 — LOW
EGFR: 60 ML/MIN/1.73M2 — SIGNIFICANT CHANGE UP
EGFR: 75 ML/MIN/1.73M2 — SIGNIFICANT CHANGE UP
EGFR: 75 ML/MIN/1.73M2 — SIGNIFICANT CHANGE UP
EGFR: 85 ML/MIN/1.73M2 — SIGNIFICANT CHANGE UP
EGFR: 85 ML/MIN/1.73M2 — SIGNIFICANT CHANGE UP
EGFR: 88 ML/MIN/1.73M2 — SIGNIFICANT CHANGE UP
EGFR: 91 ML/MIN/1.73M2 — SIGNIFICANT CHANGE UP
EOSINOPHIL # BLD AUTO: 0 K/UL — SIGNIFICANT CHANGE UP (ref 0–0.7)
EOSINOPHIL # BLD AUTO: 0.01 K/UL — SIGNIFICANT CHANGE UP (ref 0–0.7)
EOSINOPHIL # BLD AUTO: 0.02 K/UL — SIGNIFICANT CHANGE UP (ref 0–0.7)
EOSINOPHIL # BLD AUTO: 0.02 K/UL — SIGNIFICANT CHANGE UP (ref 0–0.7)
EOSINOPHIL # BLD AUTO: 0.03 K/UL — SIGNIFICANT CHANGE UP (ref 0–0.7)
EOSINOPHIL # BLD AUTO: 0.04 K/UL — SIGNIFICANT CHANGE UP (ref 0–0.7)
EOSINOPHIL # BLD AUTO: 0.04 K/UL — SIGNIFICANT CHANGE UP (ref 0–0.7)
EOSINOPHIL # BLD AUTO: 0.06 K/UL — SIGNIFICANT CHANGE UP (ref 0–0.7)
EOSINOPHIL # BLD AUTO: 0.07 K/UL — SIGNIFICANT CHANGE UP (ref 0–0.7)
EOSINOPHIL # BLD AUTO: 0.07 K/UL — SIGNIFICANT CHANGE UP (ref 0–0.7)
EOSINOPHIL # BLD AUTO: 0.08 K/UL — SIGNIFICANT CHANGE UP (ref 0–0.7)
EOSINOPHIL # BLD AUTO: 0.08 K/UL — SIGNIFICANT CHANGE UP (ref 0–0.7)
EOSINOPHIL # BLD AUTO: 0.09 K/UL — SIGNIFICANT CHANGE UP (ref 0–0.7)
EOSINOPHIL # BLD AUTO: 0.1 K/UL — SIGNIFICANT CHANGE UP (ref 0–0.7)
EOSINOPHIL # BLD AUTO: 0.1 K/UL — SIGNIFICANT CHANGE UP (ref 0–0.7)
EOSINOPHIL # BLD AUTO: 0.11 K/UL — SIGNIFICANT CHANGE UP (ref 0–0.7)
EOSINOPHIL # BLD AUTO: 0.11 K/UL — SIGNIFICANT CHANGE UP (ref 0–0.7)
EOSINOPHIL # BLD AUTO: 0.13 K/UL — SIGNIFICANT CHANGE UP (ref 0–0.7)
EOSINOPHIL # BLD AUTO: 0.13 K/UL — SIGNIFICANT CHANGE UP (ref 0–0.7)
EOSINOPHIL # BLD AUTO: 0.14 K/UL — SIGNIFICANT CHANGE UP (ref 0–0.7)
EOSINOPHIL # BLD AUTO: 0.22 K/UL — SIGNIFICANT CHANGE UP (ref 0–0.7)
EOSINOPHIL # BLD AUTO: 0.24 K/UL — SIGNIFICANT CHANGE UP (ref 0–0.7)
EOSINOPHIL # BLD AUTO: 0.31 K/UL — SIGNIFICANT CHANGE UP (ref 0–0.7)
EOSINOPHIL # BLD AUTO: 0.33 K/UL — SIGNIFICANT CHANGE UP (ref 0–0.7)
EOSINOPHIL # BLD AUTO: 0.34 K/UL — SIGNIFICANT CHANGE UP (ref 0–0.7)
EOSINOPHIL # BLD AUTO: 0.38 K/UL — SIGNIFICANT CHANGE UP (ref 0–0.7)
EOSINOPHIL # BLD AUTO: 0.39 K/UL — SIGNIFICANT CHANGE UP (ref 0–0.7)
EOSINOPHIL # BLD AUTO: 0.42 K/UL — SIGNIFICANT CHANGE UP (ref 0–0.7)
EOSINOPHIL # BLD AUTO: 0.53 K/UL — SIGNIFICANT CHANGE UP (ref 0–0.7)
EOSINOPHIL NFR BLD AUTO: 0 % — SIGNIFICANT CHANGE UP (ref 0–8)
EOSINOPHIL NFR BLD AUTO: 0.1 % — SIGNIFICANT CHANGE UP (ref 0–8)
EOSINOPHIL NFR BLD AUTO: 0.2 % — SIGNIFICANT CHANGE UP (ref 0–8)
EOSINOPHIL NFR BLD AUTO: 0.4 % — SIGNIFICANT CHANGE UP (ref 0–8)
EOSINOPHIL NFR BLD AUTO: 0.5 % — SIGNIFICANT CHANGE UP (ref 0–8)
EOSINOPHIL NFR BLD AUTO: 0.6 % — SIGNIFICANT CHANGE UP (ref 0–8)
EOSINOPHIL NFR BLD AUTO: 0.7 % — SIGNIFICANT CHANGE UP (ref 0–8)
EOSINOPHIL NFR BLD AUTO: 0.8 % — SIGNIFICANT CHANGE UP (ref 0–8)
EOSINOPHIL NFR BLD AUTO: 0.9 % — SIGNIFICANT CHANGE UP (ref 0–8)
EOSINOPHIL NFR BLD AUTO: 1 % — SIGNIFICANT CHANGE UP (ref 0–8)
EOSINOPHIL NFR BLD AUTO: 1.1 % — SIGNIFICANT CHANGE UP (ref 0–8)
EOSINOPHIL NFR BLD AUTO: 1.4 % — SIGNIFICANT CHANGE UP (ref 0–8)
EOSINOPHIL NFR BLD AUTO: 1.8 % — SIGNIFICANT CHANGE UP (ref 0–8)
EOSINOPHIL NFR BLD AUTO: 2 % — SIGNIFICANT CHANGE UP (ref 0–8)
EOSINOPHIL NFR BLD AUTO: 2.1 % — SIGNIFICANT CHANGE UP (ref 0–8)
EOSINOPHIL NFR BLD AUTO: 2.1 % — SIGNIFICANT CHANGE UP (ref 0–8)
EOSINOPHIL NFR BLD AUTO: 2.9 % — SIGNIFICANT CHANGE UP (ref 0–8)
EOSINOPHIL NFR BLD AUTO: 3.3 % — SIGNIFICANT CHANGE UP (ref 0–8)
EOSINOPHIL NFR BLD AUTO: 3.5 % — SIGNIFICANT CHANGE UP (ref 0–8)
EOSINOPHIL NFR BLD AUTO: 3.5 % — SIGNIFICANT CHANGE UP (ref 0–8)
EPI CELLS # UR: 0 /HPF — SIGNIFICANT CHANGE UP (ref 0–5)
EPI CELLS # UR: ABNORMAL /HPF
EPI CELLS # UR: ABNORMAL /HPF
ESTIMATED AVERAGE GLUCOSE: 131 MG/DL — HIGH (ref 68–114)
ESTIMATED AVERAGE GLUCOSE: 88 MG/DL — SIGNIFICANT CHANGE UP (ref 68–114)
FERRITIN SERPL-MCNC: 482 NG/ML — HIGH (ref 30–400)
FERRITIN SERPL-MCNC: 577 NG/ML — HIGH (ref 30–400)
FERRITIN SERPL-MCNC: 577 NG/ML — HIGH (ref 30–400)
FERRITIN SERPL-MCNC: 780 NG/ML — HIGH (ref 30–400)
FERRITIN SERPL-MCNC: 853 NG/ML — HIGH (ref 30–400)
FIBRINOGEN PPP-MCNC: 178 MG/DL — LOW (ref 204.4–570.6)
FOLATE SERPL-MCNC: >20 NG/ML — SIGNIFICANT CHANGE UP
GAS PNL BLDA: SIGNIFICANT CHANGE UP
GGT SERPL-CCNC: 46 U/L — HIGH (ref 1–40)
GIANT PLATELETS BLD QL SMEAR: PRESENT — SIGNIFICANT CHANGE UP
GLUCOSE BLDC GLUCOMTR-MCNC: 100 MG/DL — HIGH (ref 70–99)
GLUCOSE BLDC GLUCOMTR-MCNC: 101 MG/DL — HIGH (ref 70–99)
GLUCOSE BLDC GLUCOMTR-MCNC: 102 MG/DL — HIGH (ref 70–99)
GLUCOSE BLDC GLUCOMTR-MCNC: 102 MG/DL — HIGH (ref 70–99)
GLUCOSE BLDC GLUCOMTR-MCNC: 103 MG/DL — HIGH (ref 70–99)
GLUCOSE BLDC GLUCOMTR-MCNC: 104 MG/DL — HIGH (ref 70–99)
GLUCOSE BLDC GLUCOMTR-MCNC: 106 MG/DL — HIGH (ref 70–99)
GLUCOSE BLDC GLUCOMTR-MCNC: 106 MG/DL — HIGH (ref 70–99)
GLUCOSE BLDC GLUCOMTR-MCNC: 107 MG/DL — HIGH (ref 70–99)
GLUCOSE BLDC GLUCOMTR-MCNC: 108 MG/DL — HIGH (ref 70–99)
GLUCOSE BLDC GLUCOMTR-MCNC: 109 MG/DL — HIGH (ref 70–99)
GLUCOSE BLDC GLUCOMTR-MCNC: 110 MG/DL — HIGH (ref 70–99)
GLUCOSE BLDC GLUCOMTR-MCNC: 110 MG/DL — HIGH (ref 70–99)
GLUCOSE BLDC GLUCOMTR-MCNC: 111 MG/DL — HIGH (ref 70–99)
GLUCOSE BLDC GLUCOMTR-MCNC: 112 MG/DL — HIGH (ref 70–99)
GLUCOSE BLDC GLUCOMTR-MCNC: 113 MG/DL — HIGH (ref 70–99)
GLUCOSE BLDC GLUCOMTR-MCNC: 114 MG/DL — HIGH (ref 70–99)
GLUCOSE BLDC GLUCOMTR-MCNC: 114 MG/DL — HIGH (ref 70–99)
GLUCOSE BLDC GLUCOMTR-MCNC: 115 MG/DL — HIGH (ref 70–99)
GLUCOSE BLDC GLUCOMTR-MCNC: 115 MG/DL — HIGH (ref 70–99)
GLUCOSE BLDC GLUCOMTR-MCNC: 117 MG/DL — HIGH (ref 70–99)
GLUCOSE BLDC GLUCOMTR-MCNC: 117 MG/DL — HIGH (ref 70–99)
GLUCOSE BLDC GLUCOMTR-MCNC: 118 MG/DL — HIGH (ref 70–99)
GLUCOSE BLDC GLUCOMTR-MCNC: 119 MG/DL — HIGH (ref 70–99)
GLUCOSE BLDC GLUCOMTR-MCNC: 119 MG/DL — HIGH (ref 70–99)
GLUCOSE BLDC GLUCOMTR-MCNC: 120 MG/DL — HIGH (ref 70–99)
GLUCOSE BLDC GLUCOMTR-MCNC: 121 MG/DL — HIGH (ref 70–99)
GLUCOSE BLDC GLUCOMTR-MCNC: 122 MG/DL — HIGH (ref 70–99)
GLUCOSE BLDC GLUCOMTR-MCNC: 123 MG/DL — HIGH (ref 70–99)
GLUCOSE BLDC GLUCOMTR-MCNC: 123 MG/DL — HIGH (ref 70–99)
GLUCOSE BLDC GLUCOMTR-MCNC: 124 MG/DL — HIGH (ref 70–99)
GLUCOSE BLDC GLUCOMTR-MCNC: 124 MG/DL — HIGH (ref 70–99)
GLUCOSE BLDC GLUCOMTR-MCNC: 125 MG/DL — HIGH (ref 70–99)
GLUCOSE BLDC GLUCOMTR-MCNC: 125 MG/DL — HIGH (ref 70–99)
GLUCOSE BLDC GLUCOMTR-MCNC: 126 MG/DL — HIGH (ref 70–99)
GLUCOSE BLDC GLUCOMTR-MCNC: 126 MG/DL — HIGH (ref 70–99)
GLUCOSE BLDC GLUCOMTR-MCNC: 127 MG/DL — HIGH (ref 70–99)
GLUCOSE BLDC GLUCOMTR-MCNC: 128 MG/DL — HIGH (ref 70–99)
GLUCOSE BLDC GLUCOMTR-MCNC: 129 MG/DL — HIGH (ref 70–99)
GLUCOSE BLDC GLUCOMTR-MCNC: 130 MG/DL — HIGH (ref 70–99)
GLUCOSE BLDC GLUCOMTR-MCNC: 131 MG/DL — HIGH (ref 70–99)
GLUCOSE BLDC GLUCOMTR-MCNC: 131 MG/DL — HIGH (ref 70–99)
GLUCOSE BLDC GLUCOMTR-MCNC: 132 MG/DL — HIGH (ref 70–99)
GLUCOSE BLDC GLUCOMTR-MCNC: 133 MG/DL — HIGH (ref 70–99)
GLUCOSE BLDC GLUCOMTR-MCNC: 134 MG/DL — HIGH (ref 70–99)
GLUCOSE BLDC GLUCOMTR-MCNC: 135 MG/DL — HIGH (ref 70–99)
GLUCOSE BLDC GLUCOMTR-MCNC: 136 MG/DL — HIGH (ref 70–99)
GLUCOSE BLDC GLUCOMTR-MCNC: 136 MG/DL — HIGH (ref 70–99)
GLUCOSE BLDC GLUCOMTR-MCNC: 137 MG/DL — HIGH (ref 70–99)
GLUCOSE BLDC GLUCOMTR-MCNC: 137 MG/DL — HIGH (ref 70–99)
GLUCOSE BLDC GLUCOMTR-MCNC: 138 MG/DL — HIGH (ref 70–99)
GLUCOSE BLDC GLUCOMTR-MCNC: 138 MG/DL — HIGH (ref 70–99)
GLUCOSE BLDC GLUCOMTR-MCNC: 139 MG/DL — HIGH (ref 70–99)
GLUCOSE BLDC GLUCOMTR-MCNC: 140 MG/DL — HIGH (ref 70–99)
GLUCOSE BLDC GLUCOMTR-MCNC: 140 MG/DL — HIGH (ref 70–99)
GLUCOSE BLDC GLUCOMTR-MCNC: 141 MG/DL — HIGH (ref 70–99)
GLUCOSE BLDC GLUCOMTR-MCNC: 142 MG/DL — HIGH (ref 70–99)
GLUCOSE BLDC GLUCOMTR-MCNC: 142 MG/DL — HIGH (ref 70–99)
GLUCOSE BLDC GLUCOMTR-MCNC: 144 MG/DL — HIGH (ref 70–99)
GLUCOSE BLDC GLUCOMTR-MCNC: 145 MG/DL — HIGH (ref 70–99)
GLUCOSE BLDC GLUCOMTR-MCNC: 146 MG/DL — HIGH (ref 70–99)
GLUCOSE BLDC GLUCOMTR-MCNC: 147 MG/DL — HIGH (ref 70–99)
GLUCOSE BLDC GLUCOMTR-MCNC: 148 MG/DL — HIGH (ref 70–99)
GLUCOSE BLDC GLUCOMTR-MCNC: 149 MG/DL — HIGH (ref 70–99)
GLUCOSE BLDC GLUCOMTR-MCNC: 149 MG/DL — HIGH (ref 70–99)
GLUCOSE BLDC GLUCOMTR-MCNC: 150 MG/DL — HIGH (ref 70–99)
GLUCOSE BLDC GLUCOMTR-MCNC: 151 MG/DL — HIGH (ref 70–99)
GLUCOSE BLDC GLUCOMTR-MCNC: 151 MG/DL — HIGH (ref 70–99)
GLUCOSE BLDC GLUCOMTR-MCNC: 152 MG/DL — HIGH (ref 70–99)
GLUCOSE BLDC GLUCOMTR-MCNC: 153 MG/DL — HIGH (ref 70–99)
GLUCOSE BLDC GLUCOMTR-MCNC: 153 MG/DL — HIGH (ref 70–99)
GLUCOSE BLDC GLUCOMTR-MCNC: 154 MG/DL — HIGH (ref 70–99)
GLUCOSE BLDC GLUCOMTR-MCNC: 155 MG/DL — HIGH (ref 70–99)
GLUCOSE BLDC GLUCOMTR-MCNC: 156 MG/DL — HIGH (ref 70–99)
GLUCOSE BLDC GLUCOMTR-MCNC: 157 MG/DL — HIGH (ref 70–99)
GLUCOSE BLDC GLUCOMTR-MCNC: 158 MG/DL — HIGH (ref 70–99)
GLUCOSE BLDC GLUCOMTR-MCNC: 158 MG/DL — HIGH (ref 70–99)
GLUCOSE BLDC GLUCOMTR-MCNC: 159 MG/DL — HIGH (ref 70–99)
GLUCOSE BLDC GLUCOMTR-MCNC: 159 MG/DL — HIGH (ref 70–99)
GLUCOSE BLDC GLUCOMTR-MCNC: 160 MG/DL — HIGH (ref 70–99)
GLUCOSE BLDC GLUCOMTR-MCNC: 161 MG/DL — HIGH (ref 70–99)
GLUCOSE BLDC GLUCOMTR-MCNC: 162 MG/DL — HIGH (ref 70–99)
GLUCOSE BLDC GLUCOMTR-MCNC: 163 MG/DL — HIGH (ref 70–99)
GLUCOSE BLDC GLUCOMTR-MCNC: 163 MG/DL — HIGH (ref 70–99)
GLUCOSE BLDC GLUCOMTR-MCNC: 165 MG/DL — HIGH (ref 70–99)
GLUCOSE BLDC GLUCOMTR-MCNC: 166 MG/DL — HIGH (ref 70–99)
GLUCOSE BLDC GLUCOMTR-MCNC: 167 MG/DL — HIGH (ref 70–99)
GLUCOSE BLDC GLUCOMTR-MCNC: 168 MG/DL — HIGH (ref 70–99)
GLUCOSE BLDC GLUCOMTR-MCNC: 169 MG/DL — HIGH (ref 70–99)
GLUCOSE BLDC GLUCOMTR-MCNC: 170 MG/DL — HIGH (ref 70–99)
GLUCOSE BLDC GLUCOMTR-MCNC: 172 MG/DL — HIGH (ref 70–99)
GLUCOSE BLDC GLUCOMTR-MCNC: 173 MG/DL — HIGH (ref 70–99)
GLUCOSE BLDC GLUCOMTR-MCNC: 174 MG/DL — HIGH (ref 70–99)
GLUCOSE BLDC GLUCOMTR-MCNC: 175 MG/DL — HIGH (ref 70–99)
GLUCOSE BLDC GLUCOMTR-MCNC: 176 MG/DL — HIGH (ref 70–99)
GLUCOSE BLDC GLUCOMTR-MCNC: 176 MG/DL — HIGH (ref 70–99)
GLUCOSE BLDC GLUCOMTR-MCNC: 177 MG/DL — HIGH (ref 70–99)
GLUCOSE BLDC GLUCOMTR-MCNC: 178 MG/DL — HIGH (ref 70–99)
GLUCOSE BLDC GLUCOMTR-MCNC: 179 MG/DL — HIGH (ref 70–99)
GLUCOSE BLDC GLUCOMTR-MCNC: 179 MG/DL — HIGH (ref 70–99)
GLUCOSE BLDC GLUCOMTR-MCNC: 180 MG/DL — HIGH (ref 70–99)
GLUCOSE BLDC GLUCOMTR-MCNC: 181 MG/DL — HIGH (ref 70–99)
GLUCOSE BLDC GLUCOMTR-MCNC: 182 MG/DL — HIGH (ref 70–99)
GLUCOSE BLDC GLUCOMTR-MCNC: 182 MG/DL — HIGH (ref 70–99)
GLUCOSE BLDC GLUCOMTR-MCNC: 183 MG/DL — HIGH (ref 70–99)
GLUCOSE BLDC GLUCOMTR-MCNC: 184 MG/DL — HIGH (ref 70–99)
GLUCOSE BLDC GLUCOMTR-MCNC: 184 MG/DL — HIGH (ref 70–99)
GLUCOSE BLDC GLUCOMTR-MCNC: 185 MG/DL — HIGH (ref 70–99)
GLUCOSE BLDC GLUCOMTR-MCNC: 187 MG/DL — HIGH (ref 70–99)
GLUCOSE BLDC GLUCOMTR-MCNC: 189 MG/DL — HIGH (ref 70–99)
GLUCOSE BLDC GLUCOMTR-MCNC: 190 MG/DL — HIGH (ref 70–99)
GLUCOSE BLDC GLUCOMTR-MCNC: 190 MG/DL — HIGH (ref 70–99)
GLUCOSE BLDC GLUCOMTR-MCNC: 192 MG/DL — HIGH (ref 70–99)
GLUCOSE BLDC GLUCOMTR-MCNC: 193 MG/DL — HIGH (ref 70–99)
GLUCOSE BLDC GLUCOMTR-MCNC: 194 MG/DL — HIGH (ref 70–99)
GLUCOSE BLDC GLUCOMTR-MCNC: 195 MG/DL — HIGH (ref 70–99)
GLUCOSE BLDC GLUCOMTR-MCNC: 196 MG/DL — HIGH (ref 70–99)
GLUCOSE BLDC GLUCOMTR-MCNC: 198 MG/DL — HIGH (ref 70–99)
GLUCOSE BLDC GLUCOMTR-MCNC: 201 MG/DL — HIGH (ref 70–99)
GLUCOSE BLDC GLUCOMTR-MCNC: 203 MG/DL — HIGH (ref 70–99)
GLUCOSE BLDC GLUCOMTR-MCNC: 205 MG/DL — HIGH (ref 70–99)
GLUCOSE BLDC GLUCOMTR-MCNC: 206 MG/DL — HIGH (ref 70–99)
GLUCOSE BLDC GLUCOMTR-MCNC: 206 MG/DL — HIGH (ref 70–99)
GLUCOSE BLDC GLUCOMTR-MCNC: 207 MG/DL — HIGH (ref 70–99)
GLUCOSE BLDC GLUCOMTR-MCNC: 208 MG/DL — HIGH (ref 70–99)
GLUCOSE BLDC GLUCOMTR-MCNC: 210 MG/DL — HIGH (ref 70–99)
GLUCOSE BLDC GLUCOMTR-MCNC: 214 MG/DL — HIGH (ref 70–99)
GLUCOSE BLDC GLUCOMTR-MCNC: 216 MG/DL — HIGH (ref 70–99)
GLUCOSE BLDC GLUCOMTR-MCNC: 217 MG/DL — HIGH (ref 70–99)
GLUCOSE BLDC GLUCOMTR-MCNC: 217 MG/DL — HIGH (ref 70–99)
GLUCOSE BLDC GLUCOMTR-MCNC: 218 MG/DL — HIGH (ref 70–99)
GLUCOSE BLDC GLUCOMTR-MCNC: 221 MG/DL — HIGH (ref 70–99)
GLUCOSE BLDC GLUCOMTR-MCNC: 223 MG/DL — HIGH (ref 70–99)
GLUCOSE BLDC GLUCOMTR-MCNC: 226 MG/DL — HIGH (ref 70–99)
GLUCOSE BLDC GLUCOMTR-MCNC: 227 MG/DL — HIGH (ref 70–99)
GLUCOSE BLDC GLUCOMTR-MCNC: 228 MG/DL — HIGH (ref 70–99)
GLUCOSE BLDC GLUCOMTR-MCNC: 229 MG/DL — HIGH (ref 70–99)
GLUCOSE BLDC GLUCOMTR-MCNC: 229 MG/DL — HIGH (ref 70–99)
GLUCOSE BLDC GLUCOMTR-MCNC: 232 MG/DL — HIGH (ref 70–99)
GLUCOSE BLDC GLUCOMTR-MCNC: 233 MG/DL — HIGH (ref 70–99)
GLUCOSE BLDC GLUCOMTR-MCNC: 236 MG/DL — HIGH (ref 70–99)
GLUCOSE BLDC GLUCOMTR-MCNC: 250 MG/DL — HIGH (ref 70–99)
GLUCOSE BLDC GLUCOMTR-MCNC: 256 MG/DL — HIGH (ref 70–99)
GLUCOSE BLDC GLUCOMTR-MCNC: 265 MG/DL — HIGH (ref 70–99)
GLUCOSE BLDC GLUCOMTR-MCNC: 276 MG/DL — HIGH (ref 70–99)
GLUCOSE BLDC GLUCOMTR-MCNC: 277 MG/DL — HIGH (ref 70–99)
GLUCOSE BLDC GLUCOMTR-MCNC: 278 MG/DL — HIGH (ref 70–99)
GLUCOSE BLDC GLUCOMTR-MCNC: 282 MG/DL — HIGH (ref 70–99)
GLUCOSE BLDC GLUCOMTR-MCNC: 286 MG/DL — HIGH (ref 70–99)
GLUCOSE BLDC GLUCOMTR-MCNC: 304 MG/DL — HIGH (ref 70–99)
GLUCOSE BLDC GLUCOMTR-MCNC: 309 MG/DL — HIGH (ref 70–99)
GLUCOSE BLDC GLUCOMTR-MCNC: 318 MG/DL — HIGH (ref 70–99)
GLUCOSE BLDC GLUCOMTR-MCNC: 329 MG/DL — HIGH (ref 70–99)
GLUCOSE BLDC GLUCOMTR-MCNC: 348 MG/DL — HIGH (ref 70–99)
GLUCOSE BLDC GLUCOMTR-MCNC: 369 MG/DL — HIGH (ref 70–99)
GLUCOSE BLDC GLUCOMTR-MCNC: 385 MG/DL — HIGH (ref 70–99)
GLUCOSE BLDC GLUCOMTR-MCNC: 91 MG/DL — SIGNIFICANT CHANGE UP (ref 70–99)
GLUCOSE BLDC GLUCOMTR-MCNC: 91 MG/DL — SIGNIFICANT CHANGE UP (ref 70–99)
GLUCOSE BLDC GLUCOMTR-MCNC: 97 MG/DL — SIGNIFICANT CHANGE UP (ref 70–99)
GLUCOSE SERPL-MCNC: 102 MG/DL — HIGH (ref 70–99)
GLUCOSE SERPL-MCNC: 108 MG/DL — HIGH (ref 70–99)
GLUCOSE SERPL-MCNC: 108 MG/DL — HIGH (ref 70–99)
GLUCOSE SERPL-MCNC: 109 MG/DL — HIGH (ref 70–99)
GLUCOSE SERPL-MCNC: 110 MG/DL — HIGH (ref 70–99)
GLUCOSE SERPL-MCNC: 112 MG/DL — HIGH (ref 70–99)
GLUCOSE SERPL-MCNC: 113 MG/DL — HIGH (ref 70–99)
GLUCOSE SERPL-MCNC: 114 MG/DL — HIGH (ref 70–99)
GLUCOSE SERPL-MCNC: 114 MG/DL — HIGH (ref 70–99)
GLUCOSE SERPL-MCNC: 115 MG/DL — HIGH (ref 70–99)
GLUCOSE SERPL-MCNC: 117 MG/DL — HIGH (ref 70–99)
GLUCOSE SERPL-MCNC: 120 MG/DL — HIGH (ref 70–99)
GLUCOSE SERPL-MCNC: 120 MG/DL — HIGH (ref 70–99)
GLUCOSE SERPL-MCNC: 121 MG/DL — HIGH (ref 70–99)
GLUCOSE SERPL-MCNC: 123 MG/DL — HIGH (ref 70–99)
GLUCOSE SERPL-MCNC: 131 MG/DL — HIGH (ref 70–99)
GLUCOSE SERPL-MCNC: 131 MG/DL — HIGH (ref 70–99)
GLUCOSE SERPL-MCNC: 133 MG/DL — HIGH (ref 70–99)
GLUCOSE SERPL-MCNC: 134 MG/DL — HIGH (ref 70–99)
GLUCOSE SERPL-MCNC: 134 MG/DL — HIGH (ref 70–99)
GLUCOSE SERPL-MCNC: 139 MG/DL — HIGH (ref 70–99)
GLUCOSE SERPL-MCNC: 140 MG/DL — HIGH (ref 70–99)
GLUCOSE SERPL-MCNC: 143 MG/DL — HIGH (ref 70–99)
GLUCOSE SERPL-MCNC: 143 MG/DL — HIGH (ref 70–99)
GLUCOSE SERPL-MCNC: 144 MG/DL — HIGH (ref 70–99)
GLUCOSE SERPL-MCNC: 146 MG/DL — HIGH (ref 70–99)
GLUCOSE SERPL-MCNC: 148 MG/DL — HIGH (ref 70–99)
GLUCOSE SERPL-MCNC: 149 MG/DL — HIGH (ref 70–99)
GLUCOSE SERPL-MCNC: 151 MG/DL — HIGH (ref 70–99)
GLUCOSE SERPL-MCNC: 153 MG/DL — HIGH (ref 70–99)
GLUCOSE SERPL-MCNC: 153 MG/DL — HIGH (ref 70–99)
GLUCOSE SERPL-MCNC: 154 MG/DL — HIGH (ref 70–99)
GLUCOSE SERPL-MCNC: 154 MG/DL — HIGH (ref 70–99)
GLUCOSE SERPL-MCNC: 159 MG/DL — HIGH (ref 70–99)
GLUCOSE SERPL-MCNC: 159 MG/DL — HIGH (ref 70–99)
GLUCOSE SERPL-MCNC: 160 MG/DL — HIGH (ref 70–99)
GLUCOSE SERPL-MCNC: 163 MG/DL — HIGH (ref 70–99)
GLUCOSE SERPL-MCNC: 164 MG/DL — HIGH (ref 70–99)
GLUCOSE SERPL-MCNC: 166 MG/DL — HIGH (ref 70–99)
GLUCOSE SERPL-MCNC: 166 MG/DL — HIGH (ref 70–99)
GLUCOSE SERPL-MCNC: 167 MG/DL — HIGH (ref 70–99)
GLUCOSE SERPL-MCNC: 172 MG/DL — HIGH (ref 70–99)
GLUCOSE SERPL-MCNC: 173 MG/DL — HIGH (ref 70–99)
GLUCOSE SERPL-MCNC: 186 MG/DL — HIGH (ref 70–99)
GLUCOSE SERPL-MCNC: 188 MG/DL — HIGH (ref 70–99)
GLUCOSE SERPL-MCNC: 190 MG/DL — HIGH (ref 70–99)
GLUCOSE SERPL-MCNC: 193 MG/DL — HIGH (ref 70–99)
GLUCOSE SERPL-MCNC: 195 MG/DL — HIGH (ref 70–99)
GLUCOSE SERPL-MCNC: 206 MG/DL — HIGH (ref 70–99)
GLUCOSE SERPL-MCNC: 209 MG/DL — HIGH (ref 70–99)
GLUCOSE SERPL-MCNC: 212 MG/DL — HIGH (ref 70–99)
GLUCOSE SERPL-MCNC: 222 MG/DL — HIGH (ref 70–99)
GLUCOSE SERPL-MCNC: 224 MG/DL — HIGH (ref 70–99)
GLUCOSE SERPL-MCNC: 242 MG/DL — HIGH (ref 70–99)
GLUCOSE SERPL-MCNC: 243 MG/DL — HIGH (ref 70–99)
GLUCOSE SERPL-MCNC: 253 MG/DL — HIGH (ref 70–99)
GLUCOSE SERPL-MCNC: 259 MG/DL — HIGH (ref 70–99)
GLUCOSE SERPL-MCNC: 292 MG/DL — HIGH (ref 70–99)
GLUCOSE SERPL-MCNC: 329 MG/DL — HIGH (ref 70–99)
GLUCOSE SERPL-MCNC: 333 MG/DL — HIGH (ref 70–99)
GLUCOSE SERPL-MCNC: 383 MG/DL — HIGH (ref 70–99)
GLUCOSE SERPL-MCNC: 82 MG/DL — SIGNIFICANT CHANGE UP (ref 70–99)
GLUCOSE SERPL-MCNC: 93 MG/DL — SIGNIFICANT CHANGE UP (ref 70–99)
GLUCOSE SERPL-MCNC: 96 MG/DL — SIGNIFICANT CHANGE UP (ref 70–99)
GLUCOSE SERPL-MCNC: 96 MG/DL — SIGNIFICANT CHANGE UP (ref 70–99)
GLUCOSE SERPL-MCNC: 97 MG/DL — SIGNIFICANT CHANGE UP (ref 70–99)
GLUCOSE UR QL: >=1000 MG/DL
GLUCOSE UR QL: NEGATIVE MG/DL — SIGNIFICANT CHANGE UP
GLUCOSE UR QL: NEGATIVE — SIGNIFICANT CHANGE UP
GRAM STN FLD: SIGNIFICANT CHANGE UP
GRAM STN FLD: SIGNIFICANT CHANGE UP
HCO3 BLDA-SCNC: 16 MMOL/L — LOW (ref 21–28)
HCO3 BLDA-SCNC: 31 MMOL/L — HIGH (ref 21–28)
HCO3 BLDA-SCNC: 32 MMOL/L — HIGH (ref 21–28)
HCO3 BLDA-SCNC: 40 MMOL/L — HIGH (ref 21–28)
HCO3 BLDA-SCNC: 48 MMOL/L — CRITICAL HIGH (ref 21–28)
HCT VFR BLD CALC: 21.8 % — LOW (ref 42–52)
HCT VFR BLD CALC: 23.7 % — LOW (ref 42–52)
HCT VFR BLD CALC: 25.5 % — LOW (ref 42–52)
HCT VFR BLD CALC: 28.3 % — LOW (ref 42–52)
HCT VFR BLD CALC: 28.4 % — LOW (ref 42–52)
HCT VFR BLD CALC: 28.4 % — LOW (ref 42–52)
HCT VFR BLD CALC: 28.5 % — LOW (ref 42–52)
HCT VFR BLD CALC: 29.2 % — LOW (ref 42–52)
HCT VFR BLD CALC: 29.6 % — LOW (ref 42–52)
HCT VFR BLD CALC: 29.8 % — LOW (ref 42–52)
HCT VFR BLD CALC: 29.9 % — LOW (ref 42–52)
HCT VFR BLD CALC: 30 % — LOW (ref 42–52)
HCT VFR BLD CALC: 30.5 % — LOW (ref 42–52)
HCT VFR BLD CALC: 30.6 % — LOW (ref 42–52)
HCT VFR BLD CALC: 30.6 % — LOW (ref 42–52)
HCT VFR BLD CALC: 30.9 % — LOW (ref 42–52)
HCT VFR BLD CALC: 31.1 % — LOW (ref 42–52)
HCT VFR BLD CALC: 31.2 % — LOW (ref 42–52)
HCT VFR BLD CALC: 31.4 % — LOW (ref 42–52)
HCT VFR BLD CALC: 31.6 % — LOW (ref 42–52)
HCT VFR BLD CALC: 31.8 % — LOW (ref 42–52)
HCT VFR BLD CALC: 31.9 % — LOW (ref 42–52)
HCT VFR BLD CALC: 32.2 % — LOW (ref 42–52)
HCT VFR BLD CALC: 32.5 % — LOW (ref 42–52)
HCT VFR BLD CALC: 32.8 % — LOW (ref 42–52)
HCT VFR BLD CALC: 32.8 % — LOW (ref 42–52)
HCT VFR BLD CALC: 33 % — LOW (ref 42–52)
HCT VFR BLD CALC: 33.2 % — LOW (ref 42–52)
HCT VFR BLD CALC: 33.2 % — LOW (ref 42–52)
HCT VFR BLD CALC: 33.3 % — LOW (ref 42–52)
HCT VFR BLD CALC: 33.4 % — LOW (ref 42–52)
HCT VFR BLD CALC: 33.5 % — LOW (ref 42–52)
HCT VFR BLD CALC: 33.8 % — LOW (ref 42–52)
HCT VFR BLD CALC: 33.9 % — LOW (ref 42–52)
HCT VFR BLD CALC: 33.9 % — LOW (ref 42–52)
HCT VFR BLD CALC: 34 % — LOW (ref 42–52)
HCT VFR BLD CALC: 34 % — LOW (ref 42–52)
HCT VFR BLD CALC: 34.1 % — LOW (ref 42–52)
HCT VFR BLD CALC: 34.2 % — LOW (ref 42–52)
HCT VFR BLD CALC: 34.5 % — LOW (ref 42–52)
HCT VFR BLD CALC: 34.9 % — LOW (ref 42–52)
HCT VFR BLD CALC: 34.9 % — LOW (ref 42–52)
HCT VFR BLD CALC: 35.1 % — LOW (ref 42–52)
HCT VFR BLD CALC: 35.2 % — LOW (ref 42–52)
HCT VFR BLD CALC: 35.3 % — LOW (ref 42–52)
HCT VFR BLD CALC: 35.3 % — LOW (ref 42–52)
HCT VFR BLD CALC: 35.4 % — LOW (ref 42–52)
HCT VFR BLD CALC: 35.5 % — LOW (ref 42–52)
HCT VFR BLD CALC: 35.6 % — LOW (ref 42–52)
HCT VFR BLD CALC: 35.7 % — LOW (ref 42–52)
HCT VFR BLD CALC: 35.9 % — LOW (ref 42–52)
HCT VFR BLD CALC: 36.2 % — LOW (ref 42–52)
HCT VFR BLD CALC: 36.4 % — LOW (ref 42–52)
HCT VFR BLD CALC: 36.7 % — LOW (ref 42–52)
HCT VFR BLD CALC: 37.3 % — LOW (ref 42–52)
HCT VFR BLD CALC: 37.5 % — LOW (ref 42–52)
HCT VFR BLD CALC: 38.6 % — LOW (ref 42–52)
HCT VFR BLD CALC: 39 % — LOW (ref 42–52)
HCT VFR BLD CALC: 41.5 % — LOW (ref 42–52)
HCT VFR BLD CALC: 43.8 % — SIGNIFICANT CHANGE UP (ref 42–52)
HCT VFR BLD CALC: 50.3 % — SIGNIFICANT CHANGE UP (ref 42–52)
HDLC SERPL-MCNC: 25 MG/DL — LOW
HEPARIN-PF4 AB RESULT: <0.6 U/ML — SIGNIFICANT CHANGE UP (ref 0–0.9)
HGB BLD-MCNC: 10 G/DL — LOW (ref 14–18)
HGB BLD-MCNC: 10.1 G/DL — LOW (ref 14–18)
HGB BLD-MCNC: 10.2 G/DL — LOW (ref 14–18)
HGB BLD-MCNC: 10.3 G/DL — LOW (ref 14–18)
HGB BLD-MCNC: 10.3 G/DL — LOW (ref 14–18)
HGB BLD-MCNC: 10.4 G/DL — LOW (ref 14–18)
HGB BLD-MCNC: 10.4 G/DL — LOW (ref 14–18)
HGB BLD-MCNC: 10.5 G/DL — LOW (ref 14–18)
HGB BLD-MCNC: 10.6 G/DL — LOW (ref 14–18)
HGB BLD-MCNC: 10.7 G/DL — LOW (ref 14–18)
HGB BLD-MCNC: 10.8 G/DL — LOW (ref 14–18)
HGB BLD-MCNC: 10.9 G/DL — LOW (ref 14–18)
HGB BLD-MCNC: 11 G/DL — LOW (ref 14–18)
HGB BLD-MCNC: 11.1 G/DL — LOW (ref 14–18)
HGB BLD-MCNC: 11.3 G/DL — LOW (ref 14–18)
HGB BLD-MCNC: 11.5 G/DL — LOW (ref 14–18)
HGB BLD-MCNC: 12 G/DL — LOW (ref 14–18)
HGB BLD-MCNC: 12 G/DL — LOW (ref 14–18)
HGB BLD-MCNC: 13.1 G/DL — LOW (ref 14–18)
HGB BLD-MCNC: 14 G/DL — SIGNIFICANT CHANGE UP (ref 14–18)
HGB BLD-MCNC: 15.4 G/DL — SIGNIFICANT CHANGE UP (ref 14–18)
HGB BLD-MCNC: 6.4 G/DL — CRITICAL LOW (ref 14–18)
HGB BLD-MCNC: 6.7 G/DL — CRITICAL LOW (ref 14–18)
HGB BLD-MCNC: 7 G/DL — LOW (ref 14–18)
HGB BLD-MCNC: 8.3 G/DL — LOW (ref 14–18)
HGB BLD-MCNC: 8.6 G/DL — LOW (ref 14–18)
HGB BLD-MCNC: 8.6 G/DL — LOW (ref 14–18)
HGB BLD-MCNC: 8.8 G/DL — LOW (ref 14–18)
HGB BLD-MCNC: 9 G/DL — LOW (ref 14–18)
HGB BLD-MCNC: 9 G/DL — LOW (ref 14–18)
HGB BLD-MCNC: 9.1 G/DL — LOW (ref 14–18)
HGB BLD-MCNC: 9.2 G/DL — LOW (ref 14–18)
HGB BLD-MCNC: 9.2 G/DL — LOW (ref 14–18)
HGB BLD-MCNC: 9.3 G/DL — LOW (ref 14–18)
HGB BLD-MCNC: 9.5 G/DL — LOW (ref 14–18)
HGB BLD-MCNC: 9.6 G/DL — LOW (ref 14–18)
HGB BLD-MCNC: 9.7 G/DL — LOW (ref 14–18)
HGB BLD-MCNC: 9.8 G/DL — LOW (ref 14–18)
HOROWITZ INDEX BLDA+IHG-RTO: 45 — SIGNIFICANT CHANGE UP
HOROWITZ INDEX BLDA+IHG-RTO: 60 — SIGNIFICANT CHANGE UP
HOROWITZ INDEX BLDA+IHG-RTO: 80 — SIGNIFICANT CHANGE UP
HYALINE CASTS # UR AUTO: 1 /LPF — SIGNIFICANT CHANGE UP (ref 0–7)
HYPOCHROMIA BLD QL: SIGNIFICANT CHANGE UP
HYPOCHROMIA BLD QL: SLIGHT — SIGNIFICANT CHANGE UP
IMM GRANULOCYTES NFR BLD AUTO: 0.4 % — HIGH (ref 0.1–0.3)
IMM GRANULOCYTES NFR BLD AUTO: 0.4 % — HIGH (ref 0.1–0.3)
IMM GRANULOCYTES NFR BLD AUTO: 0.5 % — HIGH (ref 0.1–0.3)
IMM GRANULOCYTES NFR BLD AUTO: 0.6 % — HIGH (ref 0.1–0.3)
IMM GRANULOCYTES NFR BLD AUTO: 0.7 % — HIGH (ref 0.1–0.3)
IMM GRANULOCYTES NFR BLD AUTO: 0.8 % — HIGH (ref 0.1–0.3)
IMM GRANULOCYTES NFR BLD AUTO: 0.9 % — HIGH (ref 0.1–0.3)
IMM GRANULOCYTES NFR BLD AUTO: 1 % — HIGH (ref 0.1–0.3)
IMM GRANULOCYTES NFR BLD AUTO: 1 % — HIGH (ref 0.1–0.3)
IMM GRANULOCYTES NFR BLD AUTO: 1.1 % — HIGH (ref 0.1–0.3)
IMM GRANULOCYTES NFR BLD AUTO: 1.3 % — HIGH (ref 0.1–0.3)
IMM GRANULOCYTES NFR BLD AUTO: 1.4 % — HIGH (ref 0.1–0.3)
IMM GRANULOCYTES NFR BLD AUTO: 1.5 % — HIGH (ref 0.1–0.3)
IMM GRANULOCYTES NFR BLD AUTO: 1.8 % — HIGH (ref 0.1–0.3)
IMM GRANULOCYTES NFR BLD AUTO: 1.8 % — HIGH (ref 0.1–0.3)
IMM GRANULOCYTES NFR BLD AUTO: 1.9 % — HIGH (ref 0.1–0.3)
IMM GRANULOCYTES NFR BLD AUTO: 10.3 % — HIGH (ref 0.1–0.3)
IMM GRANULOCYTES NFR BLD AUTO: 2 % — HIGH (ref 0.1–0.3)
IMM GRANULOCYTES NFR BLD AUTO: 2.4 % — HIGH (ref 0.1–0.3)
IMM GRANULOCYTES NFR BLD AUTO: 2.8 % — HIGH (ref 0.1–0.3)
IMM GRANULOCYTES NFR BLD AUTO: 3.1 % — HIGH (ref 0.1–0.3)
IMM GRANULOCYTES NFR BLD AUTO: 3.7 % — HIGH (ref 0.1–0.3)
IMM GRANULOCYTES NFR BLD AUTO: 4.2 % — HIGH (ref 0.1–0.3)
IMM GRANULOCYTES NFR BLD AUTO: 4.2 % — HIGH (ref 0.1–0.3)
IMM GRANULOCYTES NFR BLD AUTO: 4.3 % — HIGH (ref 0.1–0.3)
IMM GRANULOCYTES NFR BLD AUTO: 4.4 % — HIGH (ref 0.1–0.3)
IMM GRANULOCYTES NFR BLD AUTO: 5.1 % — HIGH (ref 0.1–0.3)
IMM GRANULOCYTES NFR BLD AUTO: 5.7 % — HIGH (ref 0.1–0.3)
IMM GRANULOCYTES NFR BLD AUTO: 6.5 % — HIGH (ref 0.1–0.3)
IMM GRANULOCYTES NFR BLD AUTO: 6.8 % — HIGH (ref 0.1–0.3)
IMM GRANULOCYTES NFR BLD AUTO: 7.1 % — HIGH (ref 0.1–0.3)
IMM GRANULOCYTES NFR BLD AUTO: 7.8 % — HIGH (ref 0.1–0.3)
INR BLD: 1.11 RATIO — SIGNIFICANT CHANGE UP (ref 0.65–1.3)
INR BLD: 1.12 RATIO — SIGNIFICANT CHANGE UP (ref 0.65–1.3)
INR BLD: 1.17 RATIO — SIGNIFICANT CHANGE UP (ref 0.65–1.3)
INR BLD: 1.17 RATIO — SIGNIFICANT CHANGE UP (ref 0.65–1.3)
INR BLD: 1.18 RATIO — SIGNIFICANT CHANGE UP (ref 0.65–1.3)
INR BLD: 1.22 RATIO — SIGNIFICANT CHANGE UP (ref 0.65–1.3)
INR BLD: 1.31 RATIO — HIGH (ref 0.65–1.3)
INR BLD: 1.4 RATIO — HIGH (ref 0.65–1.3)
IRON SATN MFR SERPL: 22 % — SIGNIFICANT CHANGE UP (ref 15–50)
IRON SATN MFR SERPL: 42 UG/DL — SIGNIFICANT CHANGE UP (ref 35–150)
KETONES UR-MCNC: ABNORMAL
KETONES UR-MCNC: NEGATIVE — SIGNIFICANT CHANGE UP
KETONES UR-MCNC: NEGATIVE — SIGNIFICANT CHANGE UP
LACTATE SERPL-SCNC: 1 MMOL/L — SIGNIFICANT CHANGE UP (ref 0.7–2)
LACTATE SERPL-SCNC: 1.7 MMOL/L — SIGNIFICANT CHANGE UP (ref 0.7–2)
LEGIONELLA AG UR QL: NEGATIVE — SIGNIFICANT CHANGE UP
LEUKOCYTE ESTERASE UR-ACNC: ABNORMAL
LEUKOCYTE ESTERASE UR-ACNC: ABNORMAL
LEUKOCYTE ESTERASE UR-ACNC: NEGATIVE — SIGNIFICANT CHANGE UP
LG PLATELETS BLD QL AUTO: SLIGHT — SIGNIFICANT CHANGE UP
LIPID PNL WITH DIRECT LDL SERPL: 26 MG/DL — SIGNIFICANT CHANGE UP
LYMPHOCYTES # BLD AUTO: 0.4 K/UL — LOW (ref 1.2–3.4)
LYMPHOCYTES # BLD AUTO: 0.41 K/UL — LOW (ref 1.2–3.4)
LYMPHOCYTES # BLD AUTO: 0.47 K/UL — LOW (ref 1.2–3.4)
LYMPHOCYTES # BLD AUTO: 0.48 K/UL — LOW (ref 1.2–3.4)
LYMPHOCYTES # BLD AUTO: 0.49 K/UL — LOW (ref 1.2–3.4)
LYMPHOCYTES # BLD AUTO: 0.49 K/UL — LOW (ref 1.2–3.4)
LYMPHOCYTES # BLD AUTO: 0.5 K/UL — LOW (ref 1.2–3.4)
LYMPHOCYTES # BLD AUTO: 0.51 K/UL — LOW (ref 1.2–3.4)
LYMPHOCYTES # BLD AUTO: 0.52 K/UL — LOW (ref 1.2–3.4)
LYMPHOCYTES # BLD AUTO: 0.53 K/UL — LOW (ref 1.2–3.4)
LYMPHOCYTES # BLD AUTO: 0.54 K/UL — LOW (ref 1.2–3.4)
LYMPHOCYTES # BLD AUTO: 0.54 K/UL — LOW (ref 1.2–3.4)
LYMPHOCYTES # BLD AUTO: 0.55 K/UL — LOW (ref 1.2–3.4)
LYMPHOCYTES # BLD AUTO: 0.57 K/UL — LOW (ref 1.2–3.4)
LYMPHOCYTES # BLD AUTO: 0.58 K/UL — LOW (ref 1.2–3.4)
LYMPHOCYTES # BLD AUTO: 0.6 K/UL — LOW (ref 1.2–3.4)
LYMPHOCYTES # BLD AUTO: 0.6 K/UL — LOW (ref 1.2–3.4)
LYMPHOCYTES # BLD AUTO: 0.61 K/UL — LOW (ref 1.2–3.4)
LYMPHOCYTES # BLD AUTO: 0.61 K/UL — LOW (ref 1.2–3.4)
LYMPHOCYTES # BLD AUTO: 0.64 K/UL — LOW (ref 1.2–3.4)
LYMPHOCYTES # BLD AUTO: 0.65 K/UL — LOW (ref 1.2–3.4)
LYMPHOCYTES # BLD AUTO: 0.65 K/UL — LOW (ref 1.2–3.4)
LYMPHOCYTES # BLD AUTO: 0.66 K/UL — LOW (ref 1.2–3.4)
LYMPHOCYTES # BLD AUTO: 0.68 K/UL — LOW (ref 1.2–3.4)
LYMPHOCYTES # BLD AUTO: 0.71 K/UL — LOW (ref 1.2–3.4)
LYMPHOCYTES # BLD AUTO: 0.76 K/UL — LOW (ref 1.2–3.4)
LYMPHOCYTES # BLD AUTO: 0.84 K/UL — LOW (ref 1.2–3.4)
LYMPHOCYTES # BLD AUTO: 0.88 K/UL — LOW (ref 1.2–3.4)
LYMPHOCYTES # BLD AUTO: 1.02 K/UL — LOW (ref 1.2–3.4)
LYMPHOCYTES # BLD AUTO: 1.09 K/UL — LOW (ref 1.2–3.4)
LYMPHOCYTES # BLD AUTO: 1.11 K/UL — LOW (ref 1.2–3.4)
LYMPHOCYTES # BLD AUTO: 1.21 K/UL — SIGNIFICANT CHANGE UP (ref 1.2–3.4)
LYMPHOCYTES # BLD AUTO: 1.28 K/UL — SIGNIFICANT CHANGE UP (ref 1.2–3.4)
LYMPHOCYTES # BLD AUTO: 1.29 K/UL — SIGNIFICANT CHANGE UP (ref 1.2–3.4)
LYMPHOCYTES # BLD AUTO: 1.35 K/UL — SIGNIFICANT CHANGE UP (ref 1.2–3.4)
LYMPHOCYTES # BLD AUTO: 1.36 K/UL — SIGNIFICANT CHANGE UP (ref 1.2–3.4)
LYMPHOCYTES # BLD AUTO: 1.42 K/UL — SIGNIFICANT CHANGE UP (ref 1.2–3.4)
LYMPHOCYTES # BLD AUTO: 1.46 K/UL — SIGNIFICANT CHANGE UP (ref 1.2–3.4)
LYMPHOCYTES # BLD AUTO: 1.48 K/UL — SIGNIFICANT CHANGE UP (ref 1.2–3.4)
LYMPHOCYTES # BLD AUTO: 1.82 K/UL — SIGNIFICANT CHANGE UP (ref 1.2–3.4)
LYMPHOCYTES # BLD AUTO: 1.99 K/UL — SIGNIFICANT CHANGE UP (ref 1.2–3.4)
LYMPHOCYTES # BLD AUTO: 10.8 % — LOW (ref 20.5–51.1)
LYMPHOCYTES # BLD AUTO: 11.2 % — LOW (ref 20.5–51.1)
LYMPHOCYTES # BLD AUTO: 11.4 % — LOW (ref 20.5–51.1)
LYMPHOCYTES # BLD AUTO: 12.9 % — LOW (ref 20.5–51.1)
LYMPHOCYTES # BLD AUTO: 18.2 % — LOW (ref 20.5–51.1)
LYMPHOCYTES # BLD AUTO: 18.4 % — LOW (ref 20.5–51.1)
LYMPHOCYTES # BLD AUTO: 2.2 % — LOW (ref 20.5–51.1)
LYMPHOCYTES # BLD AUTO: 2.6 % — LOW (ref 20.5–51.1)
LYMPHOCYTES # BLD AUTO: 2.6 % — LOW (ref 20.5–51.1)
LYMPHOCYTES # BLD AUTO: 2.9 % — LOW (ref 20.5–51.1)
LYMPHOCYTES # BLD AUTO: 3.31 K/UL — SIGNIFICANT CHANGE UP (ref 1.2–3.4)
LYMPHOCYTES # BLD AUTO: 3.4 % — LOW (ref 20.5–51.1)
LYMPHOCYTES # BLD AUTO: 3.4 % — LOW (ref 20.5–51.1)
LYMPHOCYTES # BLD AUTO: 3.5 % — LOW (ref 20.5–51.1)
LYMPHOCYTES # BLD AUTO: 3.5 % — LOW (ref 20.5–51.1)
LYMPHOCYTES # BLD AUTO: 3.8 % — LOW (ref 20.5–51.1)
LYMPHOCYTES # BLD AUTO: 3.8 % — LOW (ref 20.5–51.1)
LYMPHOCYTES # BLD AUTO: 4 % — LOW (ref 20.5–51.1)
LYMPHOCYTES # BLD AUTO: 4.1 % — LOW (ref 20.5–51.1)
LYMPHOCYTES # BLD AUTO: 4.1 % — LOW (ref 20.5–51.1)
LYMPHOCYTES # BLD AUTO: 4.3 % — LOW (ref 20.5–51.1)
LYMPHOCYTES # BLD AUTO: 4.4 % — LOW (ref 20.5–51.1)
LYMPHOCYTES # BLD AUTO: 4.5 % — LOW (ref 20.5–51.1)
LYMPHOCYTES # BLD AUTO: 4.6 % — LOW (ref 20.5–51.1)
LYMPHOCYTES # BLD AUTO: 4.7 % — LOW (ref 20.5–51.1)
LYMPHOCYTES # BLD AUTO: 4.7 % — LOW (ref 20.5–51.1)
LYMPHOCYTES # BLD AUTO: 4.8 % — LOW (ref 20.5–51.1)
LYMPHOCYTES # BLD AUTO: 4.9 % — LOW (ref 20.5–51.1)
LYMPHOCYTES # BLD AUTO: 5.2 % — LOW (ref 20.5–51.1)
LYMPHOCYTES # BLD AUTO: 5.3 % — LOW (ref 20.5–51.1)
LYMPHOCYTES # BLD AUTO: 5.4 % — LOW (ref 20.5–51.1)
LYMPHOCYTES # BLD AUTO: 5.6 % — LOW (ref 20.5–51.1)
LYMPHOCYTES # BLD AUTO: 5.6 % — LOW (ref 20.5–51.1)
LYMPHOCYTES # BLD AUTO: 5.7 % — LOW (ref 20.5–51.1)
LYMPHOCYTES # BLD AUTO: 5.8 % — LOW (ref 20.5–51.1)
LYMPHOCYTES # BLD AUTO: 6 % — LOW (ref 20.5–51.1)
LYMPHOCYTES # BLD AUTO: 6.1 % — LOW (ref 20.5–51.1)
LYMPHOCYTES # BLD AUTO: 6.1 % — LOW (ref 20.5–51.1)
LYMPHOCYTES # BLD AUTO: 7.1 % — LOW (ref 20.5–51.1)
LYMPHOCYTES # BLD AUTO: 7.1 % — LOW (ref 20.5–51.1)
LYMPHOCYTES # BLD AUTO: 7.8 % — LOW (ref 20.5–51.1)
LYMPHOCYTES # BLD AUTO: 9 % — LOW (ref 20.5–51.1)
LYMPHOCYTES # BLD AUTO: 9.2 % — LOW (ref 20.5–51.1)
LYMPHOCYTES # BLD AUTO: 9.3 % — LOW (ref 20.5–51.1)
LYMPHOCYTES # BLD AUTO: 9.4 % — LOW (ref 20.5–51.1)
LYMPHOCYTES # BLD AUTO: 9.5 % — LOW (ref 20.5–51.1)
LYMPHOCYTES # BLD AUTO: 9.8 % — LOW (ref 20.5–51.1)
MACROCYTES BLD QL: SLIGHT — SIGNIFICANT CHANGE UP
MAGNESIUM SERPL-MCNC: 1.9 MG/DL — SIGNIFICANT CHANGE UP (ref 1.8–2.4)
MAGNESIUM SERPL-MCNC: 2 MG/DL — SIGNIFICANT CHANGE UP (ref 1.8–2.4)
MAGNESIUM SERPL-MCNC: 2.1 MG/DL — SIGNIFICANT CHANGE UP (ref 1.8–2.4)
MAGNESIUM SERPL-MCNC: 2.2 MG/DL — SIGNIFICANT CHANGE UP (ref 1.8–2.4)
MAGNESIUM SERPL-MCNC: 2.3 MG/DL — SIGNIFICANT CHANGE UP (ref 1.8–2.4)
MAGNESIUM SERPL-MCNC: 2.4 MG/DL — SIGNIFICANT CHANGE UP (ref 1.8–2.4)
MAGNESIUM SERPL-MCNC: 2.5 MG/DL — HIGH (ref 1.8–2.4)
MAGNESIUM SERPL-MCNC: 2.6 MG/DL — HIGH (ref 1.8–2.4)
MAGNESIUM SERPL-MCNC: 2.7 MG/DL — HIGH (ref 1.8–2.4)
MAGNESIUM SERPL-MCNC: 2.7 MG/DL — HIGH (ref 1.8–2.4)
MAGNESIUM SERPL-MCNC: 2.8 MG/DL — HIGH (ref 1.8–2.4)
MAGNESIUM SERPL-MCNC: 2.8 MG/DL — HIGH (ref 1.8–2.4)
MAGNESIUM SERPL-MCNC: 2.9 MG/DL — HIGH (ref 1.8–2.4)
MAGNESIUM SERPL-MCNC: 2.9 MG/DL — HIGH (ref 1.8–2.4)
MANUAL SMEAR VERIFICATION: SIGNIFICANT CHANGE UP
MCHC RBC-ENTMCNC: 26.8 PG — LOW (ref 27–31)
MCHC RBC-ENTMCNC: 27 G/DL — LOW (ref 32–37)
MCHC RBC-ENTMCNC: 27.5 G/DL — LOW (ref 32–37)
MCHC RBC-ENTMCNC: 27.5 PG — SIGNIFICANT CHANGE UP (ref 27–31)
MCHC RBC-ENTMCNC: 27.8 PG — SIGNIFICANT CHANGE UP (ref 27–31)
MCHC RBC-ENTMCNC: 27.9 PG — SIGNIFICANT CHANGE UP (ref 27–31)
MCHC RBC-ENTMCNC: 28 PG — SIGNIFICANT CHANGE UP (ref 27–31)
MCHC RBC-ENTMCNC: 28.1 PG — SIGNIFICANT CHANGE UP (ref 27–31)
MCHC RBC-ENTMCNC: 28.2 PG — SIGNIFICANT CHANGE UP (ref 27–31)
MCHC RBC-ENTMCNC: 28.3 PG — SIGNIFICANT CHANGE UP (ref 27–31)
MCHC RBC-ENTMCNC: 28.4 PG — SIGNIFICANT CHANGE UP (ref 27–31)
MCHC RBC-ENTMCNC: 28.5 PG — SIGNIFICANT CHANGE UP (ref 27–31)
MCHC RBC-ENTMCNC: 28.6 PG — SIGNIFICANT CHANGE UP (ref 27–31)
MCHC RBC-ENTMCNC: 28.7 G/DL — LOW (ref 32–37)
MCHC RBC-ENTMCNC: 28.7 PG — SIGNIFICANT CHANGE UP (ref 27–31)
MCHC RBC-ENTMCNC: 28.8 G/DL — LOW (ref 32–37)
MCHC RBC-ENTMCNC: 28.8 PG — SIGNIFICANT CHANGE UP (ref 27–31)
MCHC RBC-ENTMCNC: 28.9 G/DL — LOW (ref 32–37)
MCHC RBC-ENTMCNC: 28.9 G/DL — LOW (ref 32–37)
MCHC RBC-ENTMCNC: 28.9 PG — SIGNIFICANT CHANGE UP (ref 27–31)
MCHC RBC-ENTMCNC: 29 G/DL — LOW (ref 32–37)
MCHC RBC-ENTMCNC: 29 PG — SIGNIFICANT CHANGE UP (ref 27–31)
MCHC RBC-ENTMCNC: 29.1 G/DL — LOW (ref 32–37)
MCHC RBC-ENTMCNC: 29.1 PG — SIGNIFICANT CHANGE UP (ref 27–31)
MCHC RBC-ENTMCNC: 29.2 G/DL — LOW (ref 32–37)
MCHC RBC-ENTMCNC: 29.2 PG — SIGNIFICANT CHANGE UP (ref 27–31)
MCHC RBC-ENTMCNC: 29.3 G/DL — LOW (ref 32–37)
MCHC RBC-ENTMCNC: 29.3 G/DL — LOW (ref 32–37)
MCHC RBC-ENTMCNC: 29.3 PG — SIGNIFICANT CHANGE UP (ref 27–31)
MCHC RBC-ENTMCNC: 29.4 G/DL — LOW (ref 32–37)
MCHC RBC-ENTMCNC: 29.4 PG — SIGNIFICANT CHANGE UP (ref 27–31)
MCHC RBC-ENTMCNC: 29.5 G/DL — LOW (ref 32–37)
MCHC RBC-ENTMCNC: 29.5 PG — SIGNIFICANT CHANGE UP (ref 27–31)
MCHC RBC-ENTMCNC: 29.6 G/DL — LOW (ref 32–37)
MCHC RBC-ENTMCNC: 29.6 PG — SIGNIFICANT CHANGE UP (ref 27–31)
MCHC RBC-ENTMCNC: 29.7 PG — SIGNIFICANT CHANGE UP (ref 27–31)
MCHC RBC-ENTMCNC: 29.7 PG — SIGNIFICANT CHANGE UP (ref 27–31)
MCHC RBC-ENTMCNC: 29.8 G/DL — LOW (ref 32–37)
MCHC RBC-ENTMCNC: 29.8 PG — SIGNIFICANT CHANGE UP (ref 27–31)
MCHC RBC-ENTMCNC: 29.9 G/DL — LOW (ref 32–37)
MCHC RBC-ENTMCNC: 29.9 G/DL — LOW (ref 32–37)
MCHC RBC-ENTMCNC: 29.9 PG — SIGNIFICANT CHANGE UP (ref 27–31)
MCHC RBC-ENTMCNC: 30 G/DL — LOW (ref 32–37)
MCHC RBC-ENTMCNC: 30 PG — SIGNIFICANT CHANGE UP (ref 27–31)
MCHC RBC-ENTMCNC: 30.1 G/DL — LOW (ref 32–37)
MCHC RBC-ENTMCNC: 30.2 G/DL — LOW (ref 32–37)
MCHC RBC-ENTMCNC: 30.2 G/DL — LOW (ref 32–37)
MCHC RBC-ENTMCNC: 30.2 PG — SIGNIFICANT CHANGE UP (ref 27–31)
MCHC RBC-ENTMCNC: 30.2 PG — SIGNIFICANT CHANGE UP (ref 27–31)
MCHC RBC-ENTMCNC: 30.3 G/DL — LOW (ref 32–37)
MCHC RBC-ENTMCNC: 30.4 G/DL — LOW (ref 32–37)
MCHC RBC-ENTMCNC: 30.5 G/DL — LOW (ref 32–37)
MCHC RBC-ENTMCNC: 30.5 G/DL — LOW (ref 32–37)
MCHC RBC-ENTMCNC: 30.6 G/DL — LOW (ref 32–37)
MCHC RBC-ENTMCNC: 30.7 G/DL — LOW (ref 32–37)
MCHC RBC-ENTMCNC: 30.8 G/DL — LOW (ref 32–37)
MCHC RBC-ENTMCNC: 30.9 G/DL — LOW (ref 32–37)
MCHC RBC-ENTMCNC: 31.1 G/DL — LOW (ref 32–37)
MCHC RBC-ENTMCNC: 31.2 G/DL — LOW (ref 32–37)
MCHC RBC-ENTMCNC: 31.6 G/DL — LOW (ref 32–37)
MCHC RBC-ENTMCNC: 31.6 G/DL — LOW (ref 32–37)
MCHC RBC-ENTMCNC: 31.8 G/DL — LOW (ref 32–37)
MCHC RBC-ENTMCNC: 32 G/DL — SIGNIFICANT CHANGE UP (ref 32–37)
MCV RBC AUTO: 100 FL — HIGH (ref 80–94)
MCV RBC AUTO: 100.7 FL — HIGH (ref 80–94)
MCV RBC AUTO: 101.3 FL — HIGH (ref 80–94)
MCV RBC AUTO: 101.4 FL — HIGH (ref 80–94)
MCV RBC AUTO: 101.6 FL — HIGH (ref 80–94)
MCV RBC AUTO: 87.8 FL — SIGNIFICANT CHANGE UP (ref 80–94)
MCV RBC AUTO: 89.8 FL — SIGNIFICANT CHANGE UP (ref 80–94)
MCV RBC AUTO: 91 FL — SIGNIFICANT CHANGE UP (ref 80–94)
MCV RBC AUTO: 91.3 FL — SIGNIFICANT CHANGE UP (ref 80–94)
MCV RBC AUTO: 91.4 FL — SIGNIFICANT CHANGE UP (ref 80–94)
MCV RBC AUTO: 92.4 FL — SIGNIFICANT CHANGE UP (ref 80–94)
MCV RBC AUTO: 92.6 FL — SIGNIFICANT CHANGE UP (ref 80–94)
MCV RBC AUTO: 93.9 FL — SIGNIFICANT CHANGE UP (ref 80–94)
MCV RBC AUTO: 94 FL — SIGNIFICANT CHANGE UP (ref 80–94)
MCV RBC AUTO: 94.2 FL — HIGH (ref 80–94)
MCV RBC AUTO: 94.3 FL — HIGH (ref 80–94)
MCV RBC AUTO: 94.3 FL — HIGH (ref 80–94)
MCV RBC AUTO: 94.6 FL — HIGH (ref 80–94)
MCV RBC AUTO: 94.8 FL — HIGH (ref 80–94)
MCV RBC AUTO: 94.9 FL — HIGH (ref 80–94)
MCV RBC AUTO: 95.3 FL — HIGH (ref 80–94)
MCV RBC AUTO: 95.5 FL — HIGH (ref 80–94)
MCV RBC AUTO: 95.6 FL — HIGH (ref 80–94)
MCV RBC AUTO: 95.8 FL — HIGH (ref 80–94)
MCV RBC AUTO: 95.8 FL — HIGH (ref 80–94)
MCV RBC AUTO: 96 FL — HIGH (ref 80–94)
MCV RBC AUTO: 96.2 FL — HIGH (ref 80–94)
MCV RBC AUTO: 96.2 FL — HIGH (ref 80–94)
MCV RBC AUTO: 96.3 FL — HIGH (ref 80–94)
MCV RBC AUTO: 96.5 FL — HIGH (ref 80–94)
MCV RBC AUTO: 96.6 FL — HIGH (ref 80–94)
MCV RBC AUTO: 96.7 FL — HIGH (ref 80–94)
MCV RBC AUTO: 96.8 FL — HIGH (ref 80–94)
MCV RBC AUTO: 96.8 FL — HIGH (ref 80–94)
MCV RBC AUTO: 97 FL — HIGH (ref 80–94)
MCV RBC AUTO: 97.1 FL — HIGH (ref 80–94)
MCV RBC AUTO: 97.2 FL — HIGH (ref 80–94)
MCV RBC AUTO: 97.3 FL — HIGH (ref 80–94)
MCV RBC AUTO: 97.4 FL — HIGH (ref 80–94)
MCV RBC AUTO: 97.5 FL — HIGH (ref 80–94)
MCV RBC AUTO: 97.5 FL — HIGH (ref 80–94)
MCV RBC AUTO: 97.8 FL — HIGH (ref 80–94)
MCV RBC AUTO: 97.8 FL — HIGH (ref 80–94)
MCV RBC AUTO: 98 FL — HIGH (ref 80–94)
MCV RBC AUTO: 98 FL — HIGH (ref 80–94)
MCV RBC AUTO: 98.1 FL — HIGH (ref 80–94)
MCV RBC AUTO: 98.1 FL — HIGH (ref 80–94)
MCV RBC AUTO: 98.2 FL — HIGH (ref 80–94)
MCV RBC AUTO: 98.3 FL — HIGH (ref 80–94)
MCV RBC AUTO: 98.3 FL — HIGH (ref 80–94)
MCV RBC AUTO: 98.5 FL — HIGH (ref 80–94)
MCV RBC AUTO: 98.6 FL — HIGH (ref 80–94)
MCV RBC AUTO: 98.9 FL — HIGH (ref 80–94)
MCV RBC AUTO: 98.9 FL — HIGH (ref 80–94)
MCV RBC AUTO: 99.1 FL — HIGH (ref 80–94)
MCV RBC AUTO: 99.2 FL — HIGH (ref 80–94)
MCV RBC AUTO: 99.4 FL — HIGH (ref 80–94)
MCV RBC AUTO: 99.4 FL — HIGH (ref 80–94)
MCV RBC AUTO: 99.7 FL — HIGH (ref 80–94)
MCV RBC AUTO: 99.7 FL — HIGH (ref 80–94)
METAMYELOCYTES # FLD: 1 % — HIGH (ref 0–0)
METAMYELOCYTES # FLD: 2 % — HIGH (ref 0–0)
METAMYELOCYTES # FLD: 3 % — HIGH (ref 0–0)
METHOD TYPE: SIGNIFICANT CHANGE UP
MICROCYTES BLD QL: SIGNIFICANT CHANGE UP
MICROCYTES BLD QL: SLIGHT — SIGNIFICANT CHANGE UP
MICROCYTES BLD QL: SLIGHT — SIGNIFICANT CHANGE UP
MONOCYTES # BLD AUTO: 0.48 K/UL — SIGNIFICANT CHANGE UP (ref 0.1–0.6)
MONOCYTES # BLD AUTO: 0.48 K/UL — SIGNIFICANT CHANGE UP (ref 0.1–0.6)
MONOCYTES # BLD AUTO: 0.49 K/UL — SIGNIFICANT CHANGE UP (ref 0.1–0.6)
MONOCYTES # BLD AUTO: 0.5 K/UL — SIGNIFICANT CHANGE UP (ref 0.1–0.6)
MONOCYTES # BLD AUTO: 0.52 K/UL — SIGNIFICANT CHANGE UP (ref 0.1–0.6)
MONOCYTES # BLD AUTO: 0.55 K/UL — SIGNIFICANT CHANGE UP (ref 0.1–0.6)
MONOCYTES # BLD AUTO: 0.55 K/UL — SIGNIFICANT CHANGE UP (ref 0.1–0.6)
MONOCYTES # BLD AUTO: 0.56 K/UL — SIGNIFICANT CHANGE UP (ref 0.1–0.6)
MONOCYTES # BLD AUTO: 0.57 K/UL — SIGNIFICANT CHANGE UP (ref 0.1–0.6)
MONOCYTES # BLD AUTO: 0.58 K/UL — SIGNIFICANT CHANGE UP (ref 0.1–0.6)
MONOCYTES # BLD AUTO: 0.6 K/UL — SIGNIFICANT CHANGE UP (ref 0.1–0.6)
MONOCYTES # BLD AUTO: 0.62 K/UL — HIGH (ref 0.1–0.6)
MONOCYTES # BLD AUTO: 0.63 K/UL — HIGH (ref 0.1–0.6)
MONOCYTES # BLD AUTO: 0.64 K/UL — HIGH (ref 0.1–0.6)
MONOCYTES # BLD AUTO: 0.64 K/UL — HIGH (ref 0.1–0.6)
MONOCYTES # BLD AUTO: 0.65 K/UL — HIGH (ref 0.1–0.6)
MONOCYTES # BLD AUTO: 0.66 K/UL — HIGH (ref 0.1–0.6)
MONOCYTES # BLD AUTO: 0.66 K/UL — HIGH (ref 0.1–0.6)
MONOCYTES # BLD AUTO: 0.68 K/UL — HIGH (ref 0.1–0.6)
MONOCYTES # BLD AUTO: 0.69 K/UL — HIGH (ref 0.1–0.6)
MONOCYTES # BLD AUTO: 0.69 K/UL — HIGH (ref 0.1–0.6)
MONOCYTES # BLD AUTO: 0.71 K/UL — HIGH (ref 0.1–0.6)
MONOCYTES # BLD AUTO: 0.71 K/UL — HIGH (ref 0.1–0.6)
MONOCYTES # BLD AUTO: 0.73 K/UL — HIGH (ref 0.1–0.6)
MONOCYTES # BLD AUTO: 0.75 K/UL — HIGH (ref 0.1–0.6)
MONOCYTES # BLD AUTO: 0.75 K/UL — HIGH (ref 0.1–0.6)
MONOCYTES # BLD AUTO: 0.77 K/UL — HIGH (ref 0.1–0.6)
MONOCYTES # BLD AUTO: 0.8 K/UL — HIGH (ref 0.1–0.6)
MONOCYTES # BLD AUTO: 0.82 K/UL — HIGH (ref 0.1–0.6)
MONOCYTES # BLD AUTO: 0.85 K/UL — HIGH (ref 0.1–0.6)
MONOCYTES # BLD AUTO: 0.86 K/UL — HIGH (ref 0.1–0.6)
MONOCYTES # BLD AUTO: 0.86 K/UL — HIGH (ref 0.1–0.6)
MONOCYTES # BLD AUTO: 0.9 K/UL — HIGH (ref 0.1–0.6)
MONOCYTES # BLD AUTO: 0.91 K/UL — HIGH (ref 0.1–0.6)
MONOCYTES # BLD AUTO: 0.92 K/UL — HIGH (ref 0.1–0.6)
MONOCYTES # BLD AUTO: 0.96 K/UL — HIGH (ref 0.1–0.6)
MONOCYTES # BLD AUTO: 0.97 K/UL — HIGH (ref 0.1–0.6)
MONOCYTES # BLD AUTO: 0.99 K/UL — HIGH (ref 0.1–0.6)
MONOCYTES # BLD AUTO: 1.01 K/UL — HIGH (ref 0.1–0.6)
MONOCYTES # BLD AUTO: 1.04 K/UL — HIGH (ref 0.1–0.6)
MONOCYTES # BLD AUTO: 1.24 K/UL — HIGH (ref 0.1–0.6)
MONOCYTES # BLD AUTO: 1.27 K/UL — HIGH (ref 0.1–0.6)
MONOCYTES # BLD AUTO: 1.3 K/UL — HIGH (ref 0.1–0.6)
MONOCYTES NFR BLD AUTO: 3.1 % — SIGNIFICANT CHANGE UP (ref 1.7–9.3)
MONOCYTES NFR BLD AUTO: 3.3 % — SIGNIFICANT CHANGE UP (ref 1.7–9.3)
MONOCYTES NFR BLD AUTO: 4 % — SIGNIFICANT CHANGE UP (ref 1.7–9.3)
MONOCYTES NFR BLD AUTO: 4.3 % — SIGNIFICANT CHANGE UP (ref 1.7–9.3)
MONOCYTES NFR BLD AUTO: 4.3 % — SIGNIFICANT CHANGE UP (ref 1.7–9.3)
MONOCYTES NFR BLD AUTO: 4.4 % — SIGNIFICANT CHANGE UP (ref 1.7–9.3)
MONOCYTES NFR BLD AUTO: 4.5 % — SIGNIFICANT CHANGE UP (ref 1.7–9.3)
MONOCYTES NFR BLD AUTO: 4.7 % — SIGNIFICANT CHANGE UP (ref 1.7–9.3)
MONOCYTES NFR BLD AUTO: 4.9 % — SIGNIFICANT CHANGE UP (ref 1.7–9.3)
MONOCYTES NFR BLD AUTO: 4.9 % — SIGNIFICANT CHANGE UP (ref 1.7–9.3)
MONOCYTES NFR BLD AUTO: 5 % — SIGNIFICANT CHANGE UP (ref 1.7–9.3)
MONOCYTES NFR BLD AUTO: 5.1 % — SIGNIFICANT CHANGE UP (ref 1.7–9.3)
MONOCYTES NFR BLD AUTO: 5.2 % — SIGNIFICANT CHANGE UP (ref 1.7–9.3)
MONOCYTES NFR BLD AUTO: 5.3 % — SIGNIFICANT CHANGE UP (ref 1.7–9.3)
MONOCYTES NFR BLD AUTO: 5.3 % — SIGNIFICANT CHANGE UP (ref 1.7–9.3)
MONOCYTES NFR BLD AUTO: 5.4 % — SIGNIFICANT CHANGE UP (ref 1.7–9.3)
MONOCYTES NFR BLD AUTO: 5.4 % — SIGNIFICANT CHANGE UP (ref 1.7–9.3)
MONOCYTES NFR BLD AUTO: 5.5 % — SIGNIFICANT CHANGE UP (ref 1.7–9.3)
MONOCYTES NFR BLD AUTO: 5.5 % — SIGNIFICANT CHANGE UP (ref 1.7–9.3)
MONOCYTES NFR BLD AUTO: 5.6 % — SIGNIFICANT CHANGE UP (ref 1.7–9.3)
MONOCYTES NFR BLD AUTO: 5.7 % — SIGNIFICANT CHANGE UP (ref 1.7–9.3)
MONOCYTES NFR BLD AUTO: 5.9 % — SIGNIFICANT CHANGE UP (ref 1.7–9.3)
MONOCYTES NFR BLD AUTO: 5.9 % — SIGNIFICANT CHANGE UP (ref 1.7–9.3)
MONOCYTES NFR BLD AUTO: 6 % — SIGNIFICANT CHANGE UP (ref 1.7–9.3)
MONOCYTES NFR BLD AUTO: 6.1 % — SIGNIFICANT CHANGE UP (ref 1.7–9.3)
MONOCYTES NFR BLD AUTO: 6.3 % — SIGNIFICANT CHANGE UP (ref 1.7–9.3)
MONOCYTES NFR BLD AUTO: 6.4 % — SIGNIFICANT CHANGE UP (ref 1.7–9.3)
MONOCYTES NFR BLD AUTO: 6.6 % — SIGNIFICANT CHANGE UP (ref 1.7–9.3)
MONOCYTES NFR BLD AUTO: 6.7 % — SIGNIFICANT CHANGE UP (ref 1.7–9.3)
MONOCYTES NFR BLD AUTO: 6.8 % — SIGNIFICANT CHANGE UP (ref 1.7–9.3)
MONOCYTES NFR BLD AUTO: 6.9 % — SIGNIFICANT CHANGE UP (ref 1.7–9.3)
MONOCYTES NFR BLD AUTO: 7 % — SIGNIFICANT CHANGE UP (ref 1.7–9.3)
MONOCYTES NFR BLD AUTO: 7.2 % — SIGNIFICANT CHANGE UP (ref 1.7–9.3)
MONOCYTES NFR BLD AUTO: 9.2 % — SIGNIFICANT CHANGE UP (ref 1.7–9.3)
MRSA PCR RESULT.: NEGATIVE — SIGNIFICANT CHANGE UP
MRSA PCR RESULT.: NEGATIVE — SIGNIFICANT CHANGE UP
MYELOCYTES NFR BLD: 1 % — HIGH (ref 0–0)
MYELOCYTES NFR BLD: 1 % — HIGH (ref 0–0)
MYELOCYTES NFR BLD: 2 % — HIGH (ref 0–0)
NEUTROPHILS # BLD AUTO: 10.07 K/UL — HIGH (ref 1.4–6.5)
NEUTROPHILS # BLD AUTO: 10.16 K/UL — HIGH (ref 1.4–6.5)
NEUTROPHILS # BLD AUTO: 10.28 K/UL — HIGH (ref 1.4–6.5)
NEUTROPHILS # BLD AUTO: 10.35 K/UL — HIGH (ref 1.4–6.5)
NEUTROPHILS # BLD AUTO: 10.46 K/UL — HIGH (ref 1.4–6.5)
NEUTROPHILS # BLD AUTO: 10.46 K/UL — HIGH (ref 1.4–6.5)
NEUTROPHILS # BLD AUTO: 10.47 K/UL — HIGH (ref 1.4–6.5)
NEUTROPHILS # BLD AUTO: 10.48 K/UL — HIGH (ref 1.4–6.5)
NEUTROPHILS # BLD AUTO: 10.6 K/UL — HIGH (ref 1.4–6.5)
NEUTROPHILS # BLD AUTO: 10.65 K/UL — HIGH (ref 1.4–6.5)
NEUTROPHILS # BLD AUTO: 10.68 K/UL — HIGH (ref 1.4–6.5)
NEUTROPHILS # BLD AUTO: 10.95 K/UL — HIGH (ref 1.4–6.5)
NEUTROPHILS # BLD AUTO: 11.02 K/UL — HIGH (ref 1.4–6.5)
NEUTROPHILS # BLD AUTO: 11.03 K/UL — HIGH (ref 1.4–6.5)
NEUTROPHILS # BLD AUTO: 11.77 K/UL — HIGH (ref 1.4–6.5)
NEUTROPHILS # BLD AUTO: 11.86 K/UL — HIGH (ref 1.4–6.5)
NEUTROPHILS # BLD AUTO: 12.18 K/UL — HIGH (ref 1.4–6.5)
NEUTROPHILS # BLD AUTO: 12.19 K/UL — HIGH (ref 1.4–6.5)
NEUTROPHILS # BLD AUTO: 12.37 K/UL — HIGH (ref 1.4–6.5)
NEUTROPHILS # BLD AUTO: 12.78 K/UL — HIGH (ref 1.4–6.5)
NEUTROPHILS # BLD AUTO: 13.14 K/UL — HIGH (ref 1.4–6.5)
NEUTROPHILS # BLD AUTO: 13.31 K/UL — HIGH (ref 1.4–6.5)
NEUTROPHILS # BLD AUTO: 13.4 K/UL — HIGH (ref 1.4–6.5)
NEUTROPHILS # BLD AUTO: 13.59 K/UL — HIGH (ref 1.4–6.5)
NEUTROPHILS # BLD AUTO: 13.84 K/UL — HIGH (ref 1.4–6.5)
NEUTROPHILS # BLD AUTO: 13.87 K/UL — HIGH (ref 1.4–6.5)
NEUTROPHILS # BLD AUTO: 14.11 K/UL — HIGH (ref 1.4–6.5)
NEUTROPHILS # BLD AUTO: 15.47 K/UL — HIGH (ref 1.4–6.5)
NEUTROPHILS # BLD AUTO: 15.71 K/UL — HIGH (ref 1.4–6.5)
NEUTROPHILS # BLD AUTO: 16.53 K/UL — HIGH (ref 1.4–6.5)
NEUTROPHILS # BLD AUTO: 17.92 K/UL — HIGH (ref 1.4–6.5)
NEUTROPHILS # BLD AUTO: 21.59 K/UL — HIGH (ref 1.4–6.5)
NEUTROPHILS # BLD AUTO: 7.35 K/UL — HIGH (ref 1.4–6.5)
NEUTROPHILS # BLD AUTO: 7.82 K/UL — HIGH (ref 1.4–6.5)
NEUTROPHILS # BLD AUTO: 8.35 K/UL — HIGH (ref 1.4–6.5)
NEUTROPHILS # BLD AUTO: 8.48 K/UL — HIGH (ref 1.4–6.5)
NEUTROPHILS # BLD AUTO: 8.7 K/UL — HIGH (ref 1.4–6.5)
NEUTROPHILS # BLD AUTO: 8.91 K/UL — HIGH (ref 1.4–6.5)
NEUTROPHILS # BLD AUTO: 8.92 K/UL — HIGH (ref 1.4–6.5)
NEUTROPHILS # BLD AUTO: 8.92 K/UL — HIGH (ref 1.4–6.5)
NEUTROPHILS # BLD AUTO: 9.02 K/UL — HIGH (ref 1.4–6.5)
NEUTROPHILS # BLD AUTO: 9.17 K/UL — HIGH (ref 1.4–6.5)
NEUTROPHILS # BLD AUTO: 9.39 K/UL — HIGH (ref 1.4–6.5)
NEUTROPHILS # BLD AUTO: 9.43 K/UL — HIGH (ref 1.4–6.5)
NEUTROPHILS # BLD AUTO: 9.65 K/UL — HIGH (ref 1.4–6.5)
NEUTROPHILS # BLD AUTO: 9.67 K/UL — HIGH (ref 1.4–6.5)
NEUTROPHILS # BLD AUTO: 9.72 K/UL — HIGH (ref 1.4–6.5)
NEUTROPHILS # BLD AUTO: 9.8 K/UL — HIGH (ref 1.4–6.5)
NEUTROPHILS # BLD AUTO: 9.81 K/UL — HIGH (ref 1.4–6.5)
NEUTROPHILS # BLD AUTO: 9.83 K/UL — HIGH (ref 1.4–6.5)
NEUTROPHILS # BLD AUTO: 9.93 K/UL — HIGH (ref 1.4–6.5)
NEUTROPHILS NFR BLD AUTO: 67.3 % — SIGNIFICANT CHANGE UP (ref 42.2–75.2)
NEUTROPHILS NFR BLD AUTO: 68.4 % — SIGNIFICANT CHANGE UP (ref 42.2–75.2)
NEUTROPHILS NFR BLD AUTO: 71.2 % — SIGNIFICANT CHANGE UP (ref 42.2–75.2)
NEUTROPHILS NFR BLD AUTO: 73.8 % — SIGNIFICANT CHANGE UP (ref 42.2–75.2)
NEUTROPHILS NFR BLD AUTO: 74.6 % — SIGNIFICANT CHANGE UP (ref 42.2–75.2)
NEUTROPHILS NFR BLD AUTO: 77.3 % — HIGH (ref 42.2–75.2)
NEUTROPHILS NFR BLD AUTO: 79.1 % — HIGH (ref 42.2–75.2)
NEUTROPHILS NFR BLD AUTO: 80.4 % — HIGH (ref 42.2–75.2)
NEUTROPHILS NFR BLD AUTO: 81.3 % — HIGH (ref 42.2–75.2)
NEUTROPHILS NFR BLD AUTO: 82.1 % — HIGH (ref 42.2–75.2)
NEUTROPHILS NFR BLD AUTO: 82.1 % — HIGH (ref 42.2–75.2)
NEUTROPHILS NFR BLD AUTO: 82.4 % — HIGH (ref 42.2–75.2)
NEUTROPHILS NFR BLD AUTO: 82.7 % — HIGH (ref 42.2–75.2)
NEUTROPHILS NFR BLD AUTO: 83.7 % — HIGH (ref 42.2–75.2)
NEUTROPHILS NFR BLD AUTO: 84 % — HIGH (ref 42.2–75.2)
NEUTROPHILS NFR BLD AUTO: 84.1 % — HIGH (ref 42.2–75.2)
NEUTROPHILS NFR BLD AUTO: 84.6 % — HIGH (ref 42.2–75.2)
NEUTROPHILS NFR BLD AUTO: 84.8 % — HIGH (ref 42.2–75.2)
NEUTROPHILS NFR BLD AUTO: 84.9 % — HIGH (ref 42.2–75.2)
NEUTROPHILS NFR BLD AUTO: 85.4 % — HIGH (ref 42.2–75.2)
NEUTROPHILS NFR BLD AUTO: 86.1 % — HIGH (ref 42.2–75.2)
NEUTROPHILS NFR BLD AUTO: 86.5 % — HIGH (ref 42.2–75.2)
NEUTROPHILS NFR BLD AUTO: 86.5 % — HIGH (ref 42.2–75.2)
NEUTROPHILS NFR BLD AUTO: 86.7 % — HIGH (ref 42.2–75.2)
NEUTROPHILS NFR BLD AUTO: 87 % — HIGH (ref 42.2–75.2)
NEUTROPHILS NFR BLD AUTO: 87 % — HIGH (ref 42.2–75.2)
NEUTROPHILS NFR BLD AUTO: 87.1 % — HIGH (ref 42.2–75.2)
NEUTROPHILS NFR BLD AUTO: 87.3 % — HIGH (ref 42.2–75.2)
NEUTROPHILS NFR BLD AUTO: 87.6 % — HIGH (ref 42.2–75.2)
NEUTROPHILS NFR BLD AUTO: 87.7 % — HIGH (ref 42.2–75.2)
NEUTROPHILS NFR BLD AUTO: 87.7 % — HIGH (ref 42.2–75.2)
NEUTROPHILS NFR BLD AUTO: 88 % — HIGH (ref 42.2–75.2)
NEUTROPHILS NFR BLD AUTO: 88 % — HIGH (ref 42.2–75.2)
NEUTROPHILS NFR BLD AUTO: 88.2 % — HIGH (ref 42.2–75.2)
NEUTROPHILS NFR BLD AUTO: 88.2 % — HIGH (ref 42.2–75.2)
NEUTROPHILS NFR BLD AUTO: 88.3 % — HIGH (ref 42.2–75.2)
NEUTROPHILS NFR BLD AUTO: 88.4 % — HIGH (ref 42.2–75.2)
NEUTROPHILS NFR BLD AUTO: 88.5 % — HIGH (ref 42.2–75.2)
NEUTROPHILS NFR BLD AUTO: 88.6 % — HIGH (ref 42.2–75.2)
NEUTROPHILS NFR BLD AUTO: 88.8 % — HIGH (ref 42.2–75.2)
NEUTROPHILS NFR BLD AUTO: 88.9 % — HIGH (ref 42.2–75.2)
NEUTROPHILS NFR BLD AUTO: 89.2 % — HIGH (ref 42.2–75.2)
NEUTROPHILS NFR BLD AUTO: 89.4 % — HIGH (ref 42.2–75.2)
NEUTROPHILS NFR BLD AUTO: 89.4 % — HIGH (ref 42.2–75.2)
NEUTROPHILS NFR BLD AUTO: 90.3 % — HIGH (ref 42.2–75.2)
NEUTROPHILS NFR BLD AUTO: 90.5 % — HIGH (ref 42.2–75.2)
NEUTROPHILS NFR BLD AUTO: 90.6 % — HIGH (ref 42.2–75.2)
NEUTROPHILS NFR BLD AUTO: 91 % — HIGH (ref 42.2–75.2)
NEUTROPHILS NFR BLD AUTO: 92 % — HIGH (ref 42.2–75.2)
NEUTS BAND # BLD: 11 % — HIGH (ref 0–6)
NEUTS BAND # BLD: 3 % — SIGNIFICANT CHANGE UP (ref 0–6)
NEUTS BAND # BLD: 5 % — SIGNIFICANT CHANGE UP (ref 0–6)
NIGHT BLUE STAIN TISS: SIGNIFICANT CHANGE UP
NITRITE UR-MCNC: NEGATIVE — SIGNIFICANT CHANGE UP
NITRITE UR-MCNC: NEGATIVE — SIGNIFICANT CHANGE UP
NITRITE UR-MCNC: POSITIVE
NON HDL CHOLESTEROL: 55 MG/DL — SIGNIFICANT CHANGE UP
NRBC # BLD: 0 /100 WBCS — SIGNIFICANT CHANGE UP (ref 0–0)
NRBC # BLD: 0 /100 — SIGNIFICANT CHANGE UP (ref 0–0)
NRBC # BLD: 0 /100 — SIGNIFICANT CHANGE UP (ref 0–0)
NRBC # BLD: 2 /100 — HIGH (ref 0–0)
NRBC # BLD: 2 /100 — HIGH (ref 0–0)
NRBC # BLD: SIGNIFICANT CHANGE UP /100 WBCS (ref 0–0)
NSE FLD-MCNC: 26.1 NG/ML — HIGH (ref 0–17.6)
NT-PROBNP SERPL-SCNC: 5499 PG/ML — HIGH (ref 0–300)
NT-PROBNP SERPL-SCNC: 8071 PG/ML — HIGH (ref 0–300)
NT-PROBNP SERPL-SCNC: HIGH PG/ML (ref 0–300)
ORGANISM # SPEC MICROSCOPIC CNT: SIGNIFICANT CHANGE UP
OSMOLALITY SERPL: 337 MOS/KG — HIGH (ref 280–301)
OSMOLALITY UR: 500 MOS/KG — SIGNIFICANT CHANGE UP (ref 50–1200)
OVALOCYTES BLD QL SMEAR: SLIGHT — SIGNIFICANT CHANGE UP
PCO2 BLDA: 45 MMHG — SIGNIFICANT CHANGE UP (ref 35–48)
PCO2 BLDA: 49 MMHG — HIGH (ref 35–48)
PCO2 BLDA: 54 MMHG — HIGH (ref 35–48)
PCO2 BLDA: 56 MMHG — HIGH (ref 35–48)
PCO2 BLDA: 59 MMHG — HIGH (ref 35–48)
PF4 HEPARIN CMPLX AB SER-ACNC: NEGATIVE — SIGNIFICANT CHANGE UP
PH BLDA: 7.15 — CRITICAL LOW (ref 7.35–7.45)
PH BLDA: 7.33 — LOW (ref 7.35–7.45)
PH BLDA: 7.37 — SIGNIFICANT CHANGE UP (ref 7.35–7.45)
PH BLDA: 7.52 — HIGH (ref 7.35–7.45)
PH BLDA: 7.56 — HIGH (ref 7.35–7.45)
PH UR: 5.5 — SIGNIFICANT CHANGE UP (ref 5–8)
PH UR: 6 — SIGNIFICANT CHANGE UP (ref 5–8)
PH UR: 7 — SIGNIFICANT CHANGE UP (ref 5–8)
PHOSPHATE SERPL-MCNC: 3.2 MG/DL — SIGNIFICANT CHANGE UP (ref 2.1–4.9)
PHOSPHATE SERPL-MCNC: 3.6 MG/DL — SIGNIFICANT CHANGE UP (ref 2.1–4.9)
PHOSPHATE SERPL-MCNC: 3.8 MG/DL — SIGNIFICANT CHANGE UP (ref 2.1–4.9)
PHOSPHATE SERPL-MCNC: 3.8 MG/DL — SIGNIFICANT CHANGE UP (ref 2.1–4.9)
PLAT MORPH BLD: NORMAL — SIGNIFICANT CHANGE UP
PLATELET # BLD AUTO: 100 K/UL — LOW (ref 130–400)
PLATELET # BLD AUTO: 100 K/UL — LOW (ref 130–400)
PLATELET # BLD AUTO: 102 K/UL — LOW (ref 130–400)
PLATELET # BLD AUTO: 104 K/UL — LOW (ref 130–400)
PLATELET # BLD AUTO: 106 K/UL — LOW (ref 130–400)
PLATELET # BLD AUTO: 107 K/UL — LOW (ref 130–400)
PLATELET # BLD AUTO: 107 K/UL — LOW (ref 130–400)
PLATELET # BLD AUTO: 110 K/UL — LOW (ref 130–400)
PLATELET # BLD AUTO: 110 K/UL — LOW (ref 130–400)
PLATELET # BLD AUTO: 120 K/UL — LOW (ref 130–400)
PLATELET # BLD AUTO: 121 K/UL — LOW (ref 130–400)
PLATELET # BLD AUTO: 123 K/UL — LOW (ref 130–400)
PLATELET # BLD AUTO: 124 K/UL — LOW (ref 130–400)
PLATELET # BLD AUTO: 129 K/UL — LOW (ref 130–400)
PLATELET # BLD AUTO: 143 K/UL — SIGNIFICANT CHANGE UP (ref 130–400)
PLATELET # BLD AUTO: 143 K/UL — SIGNIFICANT CHANGE UP (ref 130–400)
PLATELET # BLD AUTO: 148 K/UL — SIGNIFICANT CHANGE UP (ref 130–400)
PLATELET # BLD AUTO: 149 K/UL — SIGNIFICANT CHANGE UP (ref 130–400)
PLATELET # BLD AUTO: 149 K/UL — SIGNIFICANT CHANGE UP (ref 130–400)
PLATELET # BLD AUTO: 152 K/UL — SIGNIFICANT CHANGE UP (ref 130–400)
PLATELET # BLD AUTO: 155 K/UL — SIGNIFICANT CHANGE UP (ref 130–400)
PLATELET # BLD AUTO: 156 K/UL — SIGNIFICANT CHANGE UP (ref 130–400)
PLATELET # BLD AUTO: 157 K/UL — SIGNIFICANT CHANGE UP (ref 130–400)
PLATELET # BLD AUTO: 158 K/UL — SIGNIFICANT CHANGE UP (ref 130–400)
PLATELET # BLD AUTO: 162 K/UL — SIGNIFICANT CHANGE UP (ref 130–400)
PLATELET # BLD AUTO: 166 K/UL — SIGNIFICANT CHANGE UP (ref 130–400)
PLATELET # BLD AUTO: 168 K/UL — SIGNIFICANT CHANGE UP (ref 130–400)
PLATELET # BLD AUTO: 168 K/UL — SIGNIFICANT CHANGE UP (ref 130–400)
PLATELET # BLD AUTO: 171 K/UL — SIGNIFICANT CHANGE UP (ref 130–400)
PLATELET # BLD AUTO: 171 K/UL — SIGNIFICANT CHANGE UP (ref 130–400)
PLATELET # BLD AUTO: 172 K/UL — SIGNIFICANT CHANGE UP (ref 130–400)
PLATELET # BLD AUTO: 173 K/UL — SIGNIFICANT CHANGE UP (ref 130–400)
PLATELET # BLD AUTO: 175 K/UL — SIGNIFICANT CHANGE UP (ref 130–400)
PLATELET # BLD AUTO: 175 K/UL — SIGNIFICANT CHANGE UP (ref 130–400)
PLATELET # BLD AUTO: 176 K/UL — SIGNIFICANT CHANGE UP (ref 130–400)
PLATELET # BLD AUTO: 177 K/UL — SIGNIFICANT CHANGE UP (ref 130–400)
PLATELET # BLD AUTO: 177 K/UL — SIGNIFICANT CHANGE UP (ref 130–400)
PLATELET # BLD AUTO: 183 K/UL — SIGNIFICANT CHANGE UP (ref 130–400)
PLATELET # BLD AUTO: 185 K/UL — SIGNIFICANT CHANGE UP (ref 130–400)
PLATELET # BLD AUTO: 189 K/UL — SIGNIFICANT CHANGE UP (ref 130–400)
PLATELET # BLD AUTO: 191 K/UL — SIGNIFICANT CHANGE UP (ref 130–400)
PLATELET # BLD AUTO: 192 K/UL — SIGNIFICANT CHANGE UP (ref 130–400)
PLATELET # BLD AUTO: 195 K/UL — SIGNIFICANT CHANGE UP (ref 130–400)
PLATELET # BLD AUTO: 196 K/UL — SIGNIFICANT CHANGE UP (ref 130–400)
PLATELET # BLD AUTO: 201 K/UL — SIGNIFICANT CHANGE UP (ref 130–400)
PLATELET # BLD AUTO: 202 K/UL — SIGNIFICANT CHANGE UP (ref 130–400)
PLATELET # BLD AUTO: 204 K/UL — SIGNIFICANT CHANGE UP (ref 130–400)
PLATELET # BLD AUTO: 206 K/UL — SIGNIFICANT CHANGE UP (ref 130–400)
PLATELET # BLD AUTO: 211 K/UL — SIGNIFICANT CHANGE UP (ref 130–400)
PLATELET # BLD AUTO: 218 K/UL — SIGNIFICANT CHANGE UP (ref 130–400)
PLATELET # BLD AUTO: 223 K/UL — SIGNIFICANT CHANGE UP (ref 130–400)
PLATELET # BLD AUTO: 225 K/UL — SIGNIFICANT CHANGE UP (ref 130–400)
PLATELET # BLD AUTO: 232 K/UL — SIGNIFICANT CHANGE UP (ref 130–400)
PLATELET # BLD AUTO: 245 K/UL — SIGNIFICANT CHANGE UP (ref 130–400)
PLATELET # BLD AUTO: 249 K/UL — SIGNIFICANT CHANGE UP (ref 130–400)
PLATELET # BLD AUTO: 257 K/UL — SIGNIFICANT CHANGE UP (ref 130–400)
PLATELET # BLD AUTO: 259 K/UL — SIGNIFICANT CHANGE UP (ref 130–400)
PLATELET # BLD AUTO: 296 K/UL — SIGNIFICANT CHANGE UP (ref 130–400)
PLATELET # BLD AUTO: 78 K/UL — LOW (ref 130–400)
PLATELET # BLD AUTO: 80 K/UL — LOW (ref 130–400)
PLATELET # BLD AUTO: 81 K/UL — LOW (ref 130–400)
PLATELET # BLD AUTO: 84 K/UL — LOW (ref 130–400)
PLATELET # BLD AUTO: 95 K/UL — LOW (ref 130–400)
PLATELET CLUMP BLD QL SMEAR: ABNORMAL
PLATELET CLUMP BLD QL SMEAR: SLIGHT
PLATELET COUNT - ESTIMATE: ABNORMAL
PO2 BLDA: 106 MMHG — SIGNIFICANT CHANGE UP (ref 83–108)
PO2 BLDA: 109 MMHG — HIGH (ref 83–108)
PO2 BLDA: 132 MMHG — HIGH (ref 83–108)
PO2 BLDA: 173 MMHG — HIGH (ref 83–108)
PO2 BLDA: 66 MMHG — LOW (ref 83–108)
POIKILOCYTOSIS BLD QL AUTO: SLIGHT — SIGNIFICANT CHANGE UP
POIKILOCYTOSIS BLD QL AUTO: SLIGHT — SIGNIFICANT CHANGE UP
POLYCHROMASIA BLD QL SMEAR: SIGNIFICANT CHANGE UP
POLYCHROMASIA BLD QL SMEAR: SLIGHT — SIGNIFICANT CHANGE UP
POLYCHROMASIA BLD QL SMEAR: SLIGHT — SIGNIFICANT CHANGE UP
POTASSIUM SERPL-MCNC: 3.4 MMOL/L — LOW (ref 3.5–5)
POTASSIUM SERPL-MCNC: 3.4 MMOL/L — LOW (ref 3.5–5)
POTASSIUM SERPL-MCNC: 3.5 MMOL/L — SIGNIFICANT CHANGE UP (ref 3.5–5)
POTASSIUM SERPL-MCNC: 3.6 MMOL/L — SIGNIFICANT CHANGE UP (ref 3.5–5)
POTASSIUM SERPL-MCNC: 3.6 MMOL/L — SIGNIFICANT CHANGE UP (ref 3.5–5)
POTASSIUM SERPL-MCNC: 3.7 MMOL/L — SIGNIFICANT CHANGE UP (ref 3.5–5)
POTASSIUM SERPL-MCNC: 3.8 MMOL/L — SIGNIFICANT CHANGE UP (ref 3.5–5)
POTASSIUM SERPL-MCNC: 3.9 MMOL/L — SIGNIFICANT CHANGE UP (ref 3.5–5)
POTASSIUM SERPL-MCNC: 4 MMOL/L — SIGNIFICANT CHANGE UP (ref 3.5–5)
POTASSIUM SERPL-MCNC: 4.1 MMOL/L — SIGNIFICANT CHANGE UP (ref 3.5–5)
POTASSIUM SERPL-MCNC: 4.2 MMOL/L — SIGNIFICANT CHANGE UP (ref 3.5–5)
POTASSIUM SERPL-MCNC: 4.3 MMOL/L — SIGNIFICANT CHANGE UP (ref 3.5–5)
POTASSIUM SERPL-MCNC: 4.3 MMOL/L — SIGNIFICANT CHANGE UP (ref 3.5–5)
POTASSIUM SERPL-MCNC: 4.4 MMOL/L — SIGNIFICANT CHANGE UP (ref 3.5–5)
POTASSIUM SERPL-MCNC: 4.5 MMOL/L — SIGNIFICANT CHANGE UP (ref 3.5–5)
POTASSIUM SERPL-MCNC: 4.6 MMOL/L — SIGNIFICANT CHANGE UP (ref 3.5–5)
POTASSIUM SERPL-MCNC: 4.7 MMOL/L — SIGNIFICANT CHANGE UP (ref 3.5–5)
POTASSIUM SERPL-MCNC: 4.7 MMOL/L — SIGNIFICANT CHANGE UP (ref 3.5–5)
POTASSIUM SERPL-MCNC: 4.8 MMOL/L — SIGNIFICANT CHANGE UP (ref 3.5–5)
POTASSIUM SERPL-MCNC: 4.8 MMOL/L — SIGNIFICANT CHANGE UP (ref 3.5–5)
POTASSIUM SERPL-MCNC: 4.9 MMOL/L — SIGNIFICANT CHANGE UP (ref 3.5–5)
POTASSIUM SERPL-MCNC: 5 MMOL/L — SIGNIFICANT CHANGE UP (ref 3.5–5)
POTASSIUM SERPL-MCNC: 5.1 MMOL/L — HIGH (ref 3.5–5)
POTASSIUM SERPL-MCNC: 5.2 MMOL/L — HIGH (ref 3.5–5)
POTASSIUM SERPL-MCNC: 5.3 MMOL/L — HIGH (ref 3.5–5)
POTASSIUM SERPL-MCNC: 5.4 MMOL/L — HIGH (ref 3.5–5)
POTASSIUM SERPL-MCNC: 5.7 MMOL/L — HIGH (ref 3.5–5)
POTASSIUM SERPL-MCNC: 5.8 MMOL/L — HIGH (ref 3.5–5)
POTASSIUM SERPL-MCNC: 5.9 MMOL/L — HIGH (ref 3.5–5)
POTASSIUM SERPL-SCNC: 3.4 MMOL/L — LOW (ref 3.5–5)
POTASSIUM SERPL-SCNC: 3.4 MMOL/L — LOW (ref 3.5–5)
POTASSIUM SERPL-SCNC: 3.5 MMOL/L — SIGNIFICANT CHANGE UP (ref 3.5–5)
POTASSIUM SERPL-SCNC: 3.6 MMOL/L — SIGNIFICANT CHANGE UP (ref 3.5–5)
POTASSIUM SERPL-SCNC: 3.6 MMOL/L — SIGNIFICANT CHANGE UP (ref 3.5–5)
POTASSIUM SERPL-SCNC: 3.7 MMOL/L — SIGNIFICANT CHANGE UP (ref 3.5–5)
POTASSIUM SERPL-SCNC: 3.8 MMOL/L — SIGNIFICANT CHANGE UP (ref 3.5–5)
POTASSIUM SERPL-SCNC: 3.9 MMOL/L — SIGNIFICANT CHANGE UP (ref 3.5–5)
POTASSIUM SERPL-SCNC: 4 MMOL/L — SIGNIFICANT CHANGE UP (ref 3.5–5)
POTASSIUM SERPL-SCNC: 4.1 MMOL/L — SIGNIFICANT CHANGE UP (ref 3.5–5)
POTASSIUM SERPL-SCNC: 4.2 MMOL/L — SIGNIFICANT CHANGE UP (ref 3.5–5)
POTASSIUM SERPL-SCNC: 4.3 MMOL/L — SIGNIFICANT CHANGE UP (ref 3.5–5)
POTASSIUM SERPL-SCNC: 4.3 MMOL/L — SIGNIFICANT CHANGE UP (ref 3.5–5)
POTASSIUM SERPL-SCNC: 4.4 MMOL/L — SIGNIFICANT CHANGE UP (ref 3.5–5)
POTASSIUM SERPL-SCNC: 4.5 MMOL/L — SIGNIFICANT CHANGE UP (ref 3.5–5)
POTASSIUM SERPL-SCNC: 4.6 MMOL/L — SIGNIFICANT CHANGE UP (ref 3.5–5)
POTASSIUM SERPL-SCNC: 4.7 MMOL/L — SIGNIFICANT CHANGE UP (ref 3.5–5)
POTASSIUM SERPL-SCNC: 4.7 MMOL/L — SIGNIFICANT CHANGE UP (ref 3.5–5)
POTASSIUM SERPL-SCNC: 4.8 MMOL/L — SIGNIFICANT CHANGE UP (ref 3.5–5)
POTASSIUM SERPL-SCNC: 4.8 MMOL/L — SIGNIFICANT CHANGE UP (ref 3.5–5)
POTASSIUM SERPL-SCNC: 4.9 MMOL/L — SIGNIFICANT CHANGE UP (ref 3.5–5)
POTASSIUM SERPL-SCNC: 5 MMOL/L — SIGNIFICANT CHANGE UP (ref 3.5–5)
POTASSIUM SERPL-SCNC: 5.1 MMOL/L — HIGH (ref 3.5–5)
POTASSIUM SERPL-SCNC: 5.2 MMOL/L — HIGH (ref 3.5–5)
POTASSIUM SERPL-SCNC: 5.3 MMOL/L — HIGH (ref 3.5–5)
POTASSIUM SERPL-SCNC: 5.4 MMOL/L — HIGH (ref 3.5–5)
POTASSIUM SERPL-SCNC: 5.7 MMOL/L — HIGH (ref 3.5–5)
POTASSIUM SERPL-SCNC: 5.8 MMOL/L — HIGH (ref 3.5–5)
POTASSIUM SERPL-SCNC: 5.9 MMOL/L — HIGH (ref 3.5–5)
POTASSIUM UR-SCNC: 61 MMOL/L — SIGNIFICANT CHANGE UP
PROCALCITONIN SERPL-MCNC: 0.03 NG/ML — SIGNIFICANT CHANGE UP (ref 0.02–0.1)
PROCALCITONIN SERPL-MCNC: 0.04 NG/ML — SIGNIFICANT CHANGE UP (ref 0.02–0.1)
PROCALCITONIN SERPL-MCNC: 0.07 NG/ML — SIGNIFICANT CHANGE UP (ref 0.02–0.1)
PROCALCITONIN SERPL-MCNC: 0.08 NG/ML — SIGNIFICANT CHANGE UP (ref 0.02–0.1)
PROCALCITONIN SERPL-MCNC: 0.09 NG/ML — SIGNIFICANT CHANGE UP (ref 0.02–0.1)
PROCALCITONIN SERPL-MCNC: 0.09 NG/ML — SIGNIFICANT CHANGE UP (ref 0.02–0.1)
PROCALCITONIN SERPL-MCNC: 0.1 NG/ML — SIGNIFICANT CHANGE UP (ref 0.02–0.1)
PROCALCITONIN SERPL-MCNC: 0.1 NG/ML — SIGNIFICANT CHANGE UP (ref 0.02–0.1)
PROCALCITONIN SERPL-MCNC: 0.11 NG/ML — HIGH (ref 0.02–0.1)
PROCALCITONIN SERPL-MCNC: 0.11 NG/ML — HIGH (ref 0.02–0.1)
PROCALCITONIN SERPL-MCNC: 0.27 NG/ML — HIGH (ref 0.02–0.1)
PROCALCITONIN SERPL-MCNC: 0.44 NG/ML — HIGH (ref 0.02–0.1)
PROCALCITONIN SERPL-MCNC: 0.51 NG/ML — HIGH (ref 0.02–0.1)
PROCALCITONIN SERPL-MCNC: 2.32 NG/ML — HIGH (ref 0.02–0.1)
PROCALCITONIN SERPL-MCNC: 5.93 NG/ML — HIGH (ref 0.02–0.1)
PROMYELOCYTES # FLD: 1 % — HIGH (ref 0–0)
PROMYELOCYTES # FLD: 1 % — HIGH (ref 0–0)
PROT ?TM UR-MCNC: 121 MG/DLG/24H — SIGNIFICANT CHANGE UP
PROT SERPL-MCNC: 3.9 G/DL — LOW (ref 6–8)
PROT SERPL-MCNC: 4.3 G/DL — LOW (ref 6–8)
PROT SERPL-MCNC: 4.4 G/DL — LOW (ref 6–8)
PROT SERPL-MCNC: 4.5 G/DL — LOW (ref 6–8)
PROT SERPL-MCNC: 4.6 G/DL — LOW (ref 6–8)
PROT SERPL-MCNC: 4.7 G/DL — LOW (ref 6–8)
PROT SERPL-MCNC: 4.8 G/DL — LOW (ref 6–8)
PROT SERPL-MCNC: 4.9 G/DL — LOW (ref 6–8)
PROT SERPL-MCNC: 5 G/DL — LOW (ref 6–8)
PROT SERPL-MCNC: 5.1 G/DL — LOW (ref 6–8)
PROT SERPL-MCNC: 5.3 G/DL — LOW (ref 6–8)
PROT SERPL-MCNC: 5.5 G/DL — LOW (ref 6–8)
PROT SERPL-MCNC: 5.6 G/DL — LOW (ref 6–8)
PROT SERPL-MCNC: 6.5 G/DL — SIGNIFICANT CHANGE UP (ref 6–8)
PROT SERPL-MCNC: 6.6 G/DL — SIGNIFICANT CHANGE UP (ref 6–8)
PROT UR-MCNC: >=300 MG/DL
PROT UR-MCNC: ABNORMAL
PROT UR-MCNC: NEGATIVE MG/DL — SIGNIFICANT CHANGE UP
PROT/CREAT UR-RTO: 0.8 RATIO — HIGH (ref 0–0.2)
PROTHROM AB SERPL-ACNC: 12.8 SEC — SIGNIFICANT CHANGE UP (ref 9.95–12.87)
PROTHROM AB SERPL-ACNC: 12.9 SEC — HIGH (ref 9.95–12.87)
PROTHROM AB SERPL-ACNC: 13.4 SEC — HIGH (ref 9.95–12.87)
PROTHROM AB SERPL-ACNC: 13.4 SEC — HIGH (ref 9.95–12.87)
PROTHROM AB SERPL-ACNC: 13.5 SEC — HIGH (ref 9.95–12.87)
PROTHROM AB SERPL-ACNC: 14 SEC — HIGH (ref 9.95–12.87)
PROTHROM AB SERPL-ACNC: 15 SEC — HIGH (ref 9.95–12.87)
PROTHROM AB SERPL-ACNC: 16.1 SEC — HIGH (ref 9.95–12.87)
RAPID RVP RESULT: DETECTED
RBC # BLD: 2.36 M/UL — LOW (ref 4.7–6.1)
RBC # BLD: 2.39 M/UL — LOW (ref 4.7–6.1)
RBC # BLD: 2.55 M/UL — LOW (ref 4.7–6.1)
RBC # BLD: 2.83 M/UL — LOW (ref 4.7–6.1)
RBC # BLD: 2.86 M/UL — LOW (ref 4.7–6.1)
RBC # BLD: 2.93 M/UL — LOW (ref 4.7–6.1)
RBC # BLD: 2.94 M/UL — LOW (ref 4.7–6.1)
RBC # BLD: 2.97 M/UL — LOW (ref 4.7–6.1)
RBC # BLD: 2.97 M/UL — LOW (ref 4.7–6.1)
RBC # BLD: 3 M/UL — LOW (ref 4.7–6.1)
RBC # BLD: 3.01 M/UL — LOW (ref 4.7–6.1)
RBC # BLD: 3.07 M/UL — LOW (ref 4.7–6.1)
RBC # BLD: 3.08 M/UL — LOW (ref 4.7–6.1)
RBC # BLD: 3.13 M/UL — LOW (ref 4.7–6.1)
RBC # BLD: 3.15 M/UL — LOW (ref 4.7–6.1)
RBC # BLD: 3.15 M/UL — LOW (ref 4.7–6.1)
RBC # BLD: 3.18 M/UL — LOW (ref 4.7–6.1)
RBC # BLD: 3.21 M/UL — LOW (ref 4.7–6.1)
RBC # BLD: 3.26 M/UL — LOW (ref 4.7–6.1)
RBC # BLD: 3.3 M/UL — LOW (ref 4.7–6.1)
RBC # BLD: 3.34 M/UL — LOW (ref 4.7–6.1)
RBC # BLD: 3.35 M/UL — LOW (ref 4.7–6.1)
RBC # BLD: 3.35 M/UL — LOW (ref 4.7–6.1)
RBC # BLD: 3.36 M/UL — LOW (ref 4.7–6.1)
RBC # BLD: 3.43 M/UL — LOW (ref 4.7–6.1)
RBC # BLD: 3.43 M/UL — LOW (ref 4.7–6.1)
RBC # BLD: 3.45 M/UL — LOW (ref 4.7–6.1)
RBC # BLD: 3.45 M/UL — LOW (ref 4.7–6.1)
RBC # BLD: 3.46 M/UL — LOW (ref 4.7–6.1)
RBC # BLD: 3.47 M/UL — LOW (ref 4.7–6.1)
RBC # BLD: 3.47 M/UL — LOW (ref 4.7–6.1)
RBC # BLD: 3.48 M/UL — LOW (ref 4.7–6.1)
RBC # BLD: 3.48 M/UL — LOW (ref 4.7–6.1)
RBC # BLD: 3.49 M/UL — LOW (ref 4.7–6.1)
RBC # BLD: 3.5 M/UL — LOW (ref 4.7–6.1)
RBC # BLD: 3.51 M/UL — LOW (ref 4.7–6.1)
RBC # BLD: 3.54 M/UL — LOW (ref 4.7–6.1)
RBC # BLD: 3.54 M/UL — LOW (ref 4.7–6.1)
RBC # BLD: 3.55 M/UL — LOW (ref 4.7–6.1)
RBC # BLD: 3.56 M/UL — LOW (ref 4.7–6.1)
RBC # BLD: 3.57 M/UL — LOW (ref 4.7–6.1)
RBC # BLD: 3.59 M/UL — LOW (ref 4.7–6.1)
RBC # BLD: 3.59 M/UL — LOW (ref 4.7–6.1)
RBC # BLD: 3.6 M/UL — LOW (ref 4.7–6.1)
RBC # BLD: 3.63 M/UL — LOW (ref 4.7–6.1)
RBC # BLD: 3.64 M/UL — LOW (ref 4.7–6.1)
RBC # BLD: 3.64 M/UL — LOW (ref 4.7–6.1)
RBC # BLD: 3.65 M/UL — LOW (ref 4.7–6.1)
RBC # BLD: 3.66 M/UL — LOW (ref 4.7–6.1)
RBC # BLD: 3.67 M/UL — LOW (ref 4.7–6.1)
RBC # BLD: 3.68 M/UL — LOW (ref 4.7–6.1)
RBC # BLD: 3.7 M/UL — LOW (ref 4.7–6.1)
RBC # BLD: 3.75 M/UL — LOW (ref 4.7–6.1)
RBC # BLD: 3.79 M/UL — LOW (ref 4.7–6.1)
RBC # BLD: 3.79 M/UL — LOW (ref 4.7–6.1)
RBC # BLD: 3.8 M/UL — LOW (ref 4.7–6.1)
RBC # BLD: 4 M/UL — LOW (ref 4.7–6.1)
RBC # BLD: 4.02 M/UL — LOW (ref 4.7–6.1)
RBC # BLD: 4.08 M/UL — LOW (ref 4.7–6.1)
RBC # BLD: 4.62 M/UL — LOW (ref 4.7–6.1)
RBC # BLD: 4.99 M/UL — SIGNIFICANT CHANGE UP (ref 4.7–6.1)
RBC # BLD: 5.51 M/UL — SIGNIFICANT CHANGE UP (ref 4.7–6.1)
RBC # FLD: 14.3 % — SIGNIFICANT CHANGE UP (ref 11.5–14.5)
RBC # FLD: 14.6 % — HIGH (ref 11.5–14.5)
RBC # FLD: 14.7 % — HIGH (ref 11.5–14.5)
RBC # FLD: 14.8 % — HIGH (ref 11.5–14.5)
RBC # FLD: 14.9 % — HIGH (ref 11.5–14.5)
RBC # FLD: 15.4 % — HIGH (ref 11.5–14.5)
RBC # FLD: 16.7 % — HIGH (ref 11.5–14.5)
RBC # FLD: 16.8 % — HIGH (ref 11.5–14.5)
RBC # FLD: 17.1 % — HIGH (ref 11.5–14.5)
RBC # FLD: 17.1 % — HIGH (ref 11.5–14.5)
RBC # FLD: 17.2 % — HIGH (ref 11.5–14.5)
RBC # FLD: 17.6 % — HIGH (ref 11.5–14.5)
RBC # FLD: 18 % — HIGH (ref 11.5–14.5)
RBC # FLD: 18.4 % — HIGH (ref 11.5–14.5)
RBC # FLD: 18.6 % — HIGH (ref 11.5–14.5)
RBC # FLD: 19.1 % — HIGH (ref 11.5–14.5)
RBC # FLD: 19.2 % — HIGH (ref 11.5–14.5)
RBC # FLD: 19.5 % — HIGH (ref 11.5–14.5)
RBC # FLD: 19.6 % — HIGH (ref 11.5–14.5)
RBC # FLD: 19.7 % — HIGH (ref 11.5–14.5)
RBC # FLD: 19.8 % — HIGH (ref 11.5–14.5)
RBC # FLD: 19.8 % — HIGH (ref 11.5–14.5)
RBC # FLD: 19.9 % — HIGH (ref 11.5–14.5)
RBC # FLD: 20 % — HIGH (ref 11.5–14.5)
RBC # FLD: 20.1 % — HIGH (ref 11.5–14.5)
RBC # FLD: 20.2 % — HIGH (ref 11.5–14.5)
RBC # FLD: 20.2 % — HIGH (ref 11.5–14.5)
RBC # FLD: 20.3 % — HIGH (ref 11.5–14.5)
RBC # FLD: 20.3 % — HIGH (ref 11.5–14.5)
RBC # FLD: 20.4 % — HIGH (ref 11.5–14.5)
RBC # FLD: 20.7 % — HIGH (ref 11.5–14.5)
RBC BLD AUTO: ABNORMAL
RBC BLD AUTO: NORMAL — SIGNIFICANT CHANGE UP
RBC CASTS # UR COMP ASSIST: 2 /HPF — SIGNIFICANT CHANGE UP (ref 0–4)
RBC CASTS # UR COMP ASSIST: ABNORMAL /HPF
RBC CASTS # UR COMP ASSIST: ABNORMAL /HPF
S PNEUM AG UR QL: NEGATIVE — SIGNIFICANT CHANGE UP
SAO2 % BLDA: 100 % — HIGH (ref 94–98)
SAO2 % BLDA: 86.9 % — LOW (ref 94–98)
SAO2 % BLDA: 97.5 % — SIGNIFICANT CHANGE UP (ref 94–98)
SAO2 % BLDA: 97.7 % — SIGNIFICANT CHANGE UP (ref 94–98)
SAO2 % BLDA: 98 % — SIGNIFICANT CHANGE UP (ref 94–98)
SARS-COV-2 RNA SPEC QL NAA+PROBE: DETECTED
SARS-COV-2 RNA SPEC QL NAA+PROBE: SIGNIFICANT CHANGE UP
SMUDGE CELLS # BLD: PRESENT — SIGNIFICANT CHANGE UP
SODIUM SERPL-SCNC: 132 MMOL/L — LOW (ref 135–146)
SODIUM SERPL-SCNC: 133 MMOL/L — LOW (ref 135–146)
SODIUM SERPL-SCNC: 134 MMOL/L — LOW (ref 135–146)
SODIUM SERPL-SCNC: 134 MMOL/L — LOW (ref 135–146)
SODIUM SERPL-SCNC: 135 MMOL/L — SIGNIFICANT CHANGE UP (ref 135–146)
SODIUM SERPL-SCNC: 137 MMOL/L — SIGNIFICANT CHANGE UP (ref 135–146)
SODIUM SERPL-SCNC: 138 MMOL/L — SIGNIFICANT CHANGE UP (ref 135–146)
SODIUM SERPL-SCNC: 139 MMOL/L — SIGNIFICANT CHANGE UP (ref 135–146)
SODIUM SERPL-SCNC: 140 MMOL/L — SIGNIFICANT CHANGE UP (ref 135–146)
SODIUM SERPL-SCNC: 141 MMOL/L — SIGNIFICANT CHANGE UP (ref 135–146)
SODIUM SERPL-SCNC: 142 MMOL/L — SIGNIFICANT CHANGE UP (ref 135–146)
SODIUM SERPL-SCNC: 143 MMOL/L — SIGNIFICANT CHANGE UP (ref 135–146)
SODIUM SERPL-SCNC: 143 MMOL/L — SIGNIFICANT CHANGE UP (ref 135–146)
SODIUM SERPL-SCNC: 144 MMOL/L — SIGNIFICANT CHANGE UP (ref 135–146)
SODIUM SERPL-SCNC: 145 MMOL/L — SIGNIFICANT CHANGE UP (ref 135–146)
SODIUM SERPL-SCNC: 146 MMOL/L — SIGNIFICANT CHANGE UP (ref 135–146)
SODIUM SERPL-SCNC: 146 MMOL/L — SIGNIFICANT CHANGE UP (ref 135–146)
SODIUM SERPL-SCNC: 147 MMOL/L — HIGH (ref 135–146)
SODIUM SERPL-SCNC: 148 MMOL/L — HIGH (ref 135–146)
SODIUM SERPL-SCNC: 149 MMOL/L — HIGH (ref 135–146)
SODIUM SERPL-SCNC: 150 MMOL/L — HIGH (ref 135–146)
SODIUM SERPL-SCNC: 151 MMOL/L — HIGH (ref 135–146)
SODIUM SERPL-SCNC: 152 MMOL/L — HIGH (ref 135–146)
SODIUM SERPL-SCNC: 153 MMOL/L — HIGH (ref 135–146)
SODIUM SERPL-SCNC: 155 MMOL/L — HIGH (ref 135–146)
SODIUM UR-SCNC: 34 MMOL/L — SIGNIFICANT CHANGE UP
SODIUM UR-SCNC: <20 MMOL/L — SIGNIFICANT CHANGE UP
SP GR SPEC: 1.02 — SIGNIFICANT CHANGE UP (ref 1.01–1.03)
SP GR SPEC: >=1.03 (ref 1.01–1.03)
SP GR SPEC: >=1.03 (ref 1.01–1.03)
SPECIMEN SOURCE: SIGNIFICANT CHANGE UP
SPHEROCYTES BLD QL SMEAR: SLIGHT — SIGNIFICANT CHANGE UP
STOMATOCYTES BLD QL SMEAR: SIGNIFICANT CHANGE UP
SURGICAL PATHOLOGY STUDY: SIGNIFICANT CHANGE UP
T4 FREE+ TSH PNL SERPL: 2.6 UIU/ML — SIGNIFICANT CHANGE UP (ref 0.27–4.2)
TIBC SERPL-MCNC: 190 UG/DL — LOW (ref 220–430)
TRIGL SERPL-MCNC: 146 MG/DL — SIGNIFICANT CHANGE UP
TROPONIN T SERPL-MCNC: 0.1 NG/ML — CRITICAL HIGH
TROPONIN T SERPL-MCNC: 0.26 NG/ML — CRITICAL HIGH
TROPONIN T SERPL-MCNC: 0.3 NG/ML — CRITICAL HIGH
TROPONIN T SERPL-MCNC: 0.38 NG/ML — CRITICAL HIGH
TROPONIN T SERPL-MCNC: 0.41 NG/ML — CRITICAL HIGH
TROPONIN T SERPL-MCNC: 0.43 NG/ML — CRITICAL HIGH
TROPONIN T SERPL-MCNC: 0.45 NG/ML — CRITICAL HIGH
TROPONIN T SERPL-MCNC: 0.68 NG/ML — CRITICAL HIGH
TROPONIN T SERPL-MCNC: 0.73 NG/ML — CRITICAL HIGH
UIBC SERPL-MCNC: 148 UG/DL — SIGNIFICANT CHANGE UP (ref 110–370)
UROBILINOGEN FLD QL: 0.2 MG/DL — SIGNIFICANT CHANGE UP
UROBILINOGEN FLD QL: 0.2 MG/DL — SIGNIFICANT CHANGE UP
UROBILINOGEN FLD QL: SIGNIFICANT CHANGE UP
VANCOMYCIN TROUGH SERPL-MCNC: 14.6 UG/ML — HIGH (ref 5–10)
VANCOMYCIN TROUGH SERPL-MCNC: 15.3 UG/ML — HIGH (ref 5–10)
VANCOMYCIN TROUGH SERPL-MCNC: 21.2 UG/ML — HIGH (ref 5–10)
VANCOMYCIN TROUGH SERPL-MCNC: 6 UG/ML — SIGNIFICANT CHANGE UP (ref 5–10)
VARIANT LYMPHS # BLD: 3 % — SIGNIFICANT CHANGE UP (ref 0–5)
VIT B12 SERPL-MCNC: 612 PG/ML — SIGNIFICANT CHANGE UP (ref 232–1245)
WBC # BLD: 10.03 K/UL — SIGNIFICANT CHANGE UP (ref 4.8–10.8)
WBC # BLD: 10.09 K/UL — SIGNIFICANT CHANGE UP (ref 4.8–10.8)
WBC # BLD: 10.22 K/UL — SIGNIFICANT CHANGE UP (ref 4.8–10.8)
WBC # BLD: 10.28 K/UL — SIGNIFICANT CHANGE UP (ref 4.8–10.8)
WBC # BLD: 10.31 K/UL — SIGNIFICANT CHANGE UP (ref 4.8–10.8)
WBC # BLD: 10.35 K/UL — SIGNIFICANT CHANGE UP (ref 4.8–10.8)
WBC # BLD: 10.52 K/UL — SIGNIFICANT CHANGE UP (ref 4.8–10.8)
WBC # BLD: 10.74 K/UL — SIGNIFICANT CHANGE UP (ref 4.8–10.8)
WBC # BLD: 10.77 K/UL — SIGNIFICANT CHANGE UP (ref 4.8–10.8)
WBC # BLD: 10.86 K/UL — HIGH (ref 4.8–10.8)
WBC # BLD: 10.93 K/UL — HIGH (ref 4.8–10.8)
WBC # BLD: 10.96 K/UL — HIGH (ref 4.8–10.8)
WBC # BLD: 11.01 K/UL — HIGH (ref 4.8–10.8)
WBC # BLD: 11.02 K/UL — HIGH (ref 4.8–10.8)
WBC # BLD: 11.26 K/UL — HIGH (ref 4.8–10.8)
WBC # BLD: 11.41 K/UL — HIGH (ref 4.8–10.8)
WBC # BLD: 11.44 K/UL — HIGH (ref 4.8–10.8)
WBC # BLD: 11.45 K/UL — HIGH (ref 4.8–10.8)
WBC # BLD: 11.63 K/UL — HIGH (ref 4.8–10.8)
WBC # BLD: 11.68 K/UL — HIGH (ref 4.8–10.8)
WBC # BLD: 11.73 K/UL — HIGH (ref 4.8–10.8)
WBC # BLD: 11.75 K/UL — HIGH (ref 4.8–10.8)
WBC # BLD: 11.76 K/UL — HIGH (ref 4.8–10.8)
WBC # BLD: 11.86 K/UL — HIGH (ref 4.8–10.8)
WBC # BLD: 12.03 K/UL — HIGH (ref 4.8–10.8)
WBC # BLD: 12.25 K/UL — HIGH (ref 4.8–10.8)
WBC # BLD: 12.37 K/UL — HIGH (ref 4.8–10.8)
WBC # BLD: 12.46 K/UL — HIGH (ref 4.8–10.8)
WBC # BLD: 12.56 K/UL — HIGH (ref 4.8–10.8)
WBC # BLD: 12.72 K/UL — HIGH (ref 4.8–10.8)
WBC # BLD: 12.86 K/UL — HIGH (ref 4.8–10.8)
WBC # BLD: 12.95 K/UL — HIGH (ref 4.8–10.8)
WBC # BLD: 13 K/UL — HIGH (ref 4.8–10.8)
WBC # BLD: 13.04 K/UL — HIGH (ref 4.8–10.8)
WBC # BLD: 13.14 K/UL — HIGH (ref 4.8–10.8)
WBC # BLD: 13.17 K/UL — HIGH (ref 4.8–10.8)
WBC # BLD: 13.28 K/UL — HIGH (ref 4.8–10.8)
WBC # BLD: 13.53 K/UL — HIGH (ref 4.8–10.8)
WBC # BLD: 13.63 K/UL — HIGH (ref 4.8–10.8)
WBC # BLD: 13.88 K/UL — HIGH (ref 4.8–10.8)
WBC # BLD: 13.93 K/UL — HIGH (ref 4.8–10.8)
WBC # BLD: 14.11 K/UL — HIGH (ref 4.8–10.8)
WBC # BLD: 14.18 K/UL — HIGH (ref 4.8–10.8)
WBC # BLD: 14.35 K/UL — HIGH (ref 4.8–10.8)
WBC # BLD: 14.46 K/UL — HIGH (ref 4.8–10.8)
WBC # BLD: 14.67 K/UL — HIGH (ref 4.8–10.8)
WBC # BLD: 14.74 K/UL — HIGH (ref 4.8–10.8)
WBC # BLD: 15.04 K/UL — HIGH (ref 4.8–10.8)
WBC # BLD: 15.26 K/UL — HIGH (ref 4.8–10.8)
WBC # BLD: 15.3 K/UL — HIGH (ref 4.8–10.8)
WBC # BLD: 15.46 K/UL — HIGH (ref 4.8–10.8)
WBC # BLD: 15.72 K/UL — HIGH (ref 4.8–10.8)
WBC # BLD: 16.24 K/UL — HIGH (ref 4.8–10.8)
WBC # BLD: 16.49 K/UL — HIGH (ref 4.8–10.8)
WBC # BLD: 16.63 K/UL — HIGH (ref 4.8–10.8)
WBC # BLD: 16.7 K/UL — HIGH (ref 4.8–10.8)
WBC # BLD: 17.76 K/UL — HIGH (ref 4.8–10.8)
WBC # BLD: 17.78 K/UL — HIGH (ref 4.8–10.8)
WBC # BLD: 17.96 K/UL — HIGH (ref 4.8–10.8)
WBC # BLD: 18.08 K/UL — HIGH (ref 4.8–10.8)
WBC # BLD: 18.15 K/UL — HIGH (ref 4.8–10.8)
WBC # BLD: 20.07 K/UL — HIGH (ref 4.8–10.8)
WBC # BLD: 21.13 K/UL — HIGH (ref 4.8–10.8)
WBC # BLD: 24.47 K/UL — HIGH (ref 4.8–10.8)
WBC # BLD: 9.03 K/UL — SIGNIFICANT CHANGE UP (ref 4.8–10.8)
WBC # BLD: 9.47 K/UL — SIGNIFICANT CHANGE UP (ref 4.8–10.8)
WBC # BLD: 9.68 K/UL — SIGNIFICANT CHANGE UP (ref 4.8–10.8)
WBC # FLD AUTO: 10.03 K/UL — SIGNIFICANT CHANGE UP (ref 4.8–10.8)
WBC # FLD AUTO: 10.09 K/UL — SIGNIFICANT CHANGE UP (ref 4.8–10.8)
WBC # FLD AUTO: 10.22 K/UL — SIGNIFICANT CHANGE UP (ref 4.8–10.8)
WBC # FLD AUTO: 10.28 K/UL — SIGNIFICANT CHANGE UP (ref 4.8–10.8)
WBC # FLD AUTO: 10.31 K/UL — SIGNIFICANT CHANGE UP (ref 4.8–10.8)
WBC # FLD AUTO: 10.35 K/UL — SIGNIFICANT CHANGE UP (ref 4.8–10.8)
WBC # FLD AUTO: 10.52 K/UL — SIGNIFICANT CHANGE UP (ref 4.8–10.8)
WBC # FLD AUTO: 10.74 K/UL — SIGNIFICANT CHANGE UP (ref 4.8–10.8)
WBC # FLD AUTO: 10.77 K/UL — SIGNIFICANT CHANGE UP (ref 4.8–10.8)
WBC # FLD AUTO: 10.86 K/UL — HIGH (ref 4.8–10.8)
WBC # FLD AUTO: 10.93 K/UL — HIGH (ref 4.8–10.8)
WBC # FLD AUTO: 10.96 K/UL — HIGH (ref 4.8–10.8)
WBC # FLD AUTO: 11.01 K/UL — HIGH (ref 4.8–10.8)
WBC # FLD AUTO: 11.02 K/UL — HIGH (ref 4.8–10.8)
WBC # FLD AUTO: 11.26 K/UL — HIGH (ref 4.8–10.8)
WBC # FLD AUTO: 11.41 K/UL — HIGH (ref 4.8–10.8)
WBC # FLD AUTO: 11.44 K/UL — HIGH (ref 4.8–10.8)
WBC # FLD AUTO: 11.45 K/UL — HIGH (ref 4.8–10.8)
WBC # FLD AUTO: 11.63 K/UL — HIGH (ref 4.8–10.8)
WBC # FLD AUTO: 11.68 K/UL — HIGH (ref 4.8–10.8)
WBC # FLD AUTO: 11.73 K/UL — HIGH (ref 4.8–10.8)
WBC # FLD AUTO: 11.75 K/UL — HIGH (ref 4.8–10.8)
WBC # FLD AUTO: 11.76 K/UL — HIGH (ref 4.8–10.8)
WBC # FLD AUTO: 11.86 K/UL — HIGH (ref 4.8–10.8)
WBC # FLD AUTO: 12.03 K/UL — HIGH (ref 4.8–10.8)
WBC # FLD AUTO: 12.25 K/UL — HIGH (ref 4.8–10.8)
WBC # FLD AUTO: 12.37 K/UL — HIGH (ref 4.8–10.8)
WBC # FLD AUTO: 12.46 K/UL — HIGH (ref 4.8–10.8)
WBC # FLD AUTO: 12.56 K/UL — HIGH (ref 4.8–10.8)
WBC # FLD AUTO: 12.72 K/UL — HIGH (ref 4.8–10.8)
WBC # FLD AUTO: 12.86 K/UL — HIGH (ref 4.8–10.8)
WBC # FLD AUTO: 12.95 K/UL — HIGH (ref 4.8–10.8)
WBC # FLD AUTO: 13 K/UL — HIGH (ref 4.8–10.8)
WBC # FLD AUTO: 13.04 K/UL — HIGH (ref 4.8–10.8)
WBC # FLD AUTO: 13.14 K/UL — HIGH (ref 4.8–10.8)
WBC # FLD AUTO: 13.17 K/UL — HIGH (ref 4.8–10.8)
WBC # FLD AUTO: 13.28 K/UL — HIGH (ref 4.8–10.8)
WBC # FLD AUTO: 13.53 K/UL — HIGH (ref 4.8–10.8)
WBC # FLD AUTO: 13.63 K/UL — HIGH (ref 4.8–10.8)
WBC # FLD AUTO: 13.88 K/UL — HIGH (ref 4.8–10.8)
WBC # FLD AUTO: 13.93 K/UL — HIGH (ref 4.8–10.8)
WBC # FLD AUTO: 14.11 K/UL — HIGH (ref 4.8–10.8)
WBC # FLD AUTO: 14.18 K/UL — HIGH (ref 4.8–10.8)
WBC # FLD AUTO: 14.35 K/UL — HIGH (ref 4.8–10.8)
WBC # FLD AUTO: 14.46 K/UL — HIGH (ref 4.8–10.8)
WBC # FLD AUTO: 14.67 K/UL — HIGH (ref 4.8–10.8)
WBC # FLD AUTO: 14.74 K/UL — HIGH (ref 4.8–10.8)
WBC # FLD AUTO: 15.04 K/UL — HIGH (ref 4.8–10.8)
WBC # FLD AUTO: 15.26 K/UL — HIGH (ref 4.8–10.8)
WBC # FLD AUTO: 15.3 K/UL — HIGH (ref 4.8–10.8)
WBC # FLD AUTO: 15.46 K/UL — HIGH (ref 4.8–10.8)
WBC # FLD AUTO: 15.72 K/UL — HIGH (ref 4.8–10.8)
WBC # FLD AUTO: 16.24 K/UL — HIGH (ref 4.8–10.8)
WBC # FLD AUTO: 16.49 K/UL — HIGH (ref 4.8–10.8)
WBC # FLD AUTO: 16.63 K/UL — HIGH (ref 4.8–10.8)
WBC # FLD AUTO: 16.7 K/UL — HIGH (ref 4.8–10.8)
WBC # FLD AUTO: 17.76 K/UL — HIGH (ref 4.8–10.8)
WBC # FLD AUTO: 17.78 K/UL — HIGH (ref 4.8–10.8)
WBC # FLD AUTO: 17.96 K/UL — HIGH (ref 4.8–10.8)
WBC # FLD AUTO: 18.08 K/UL — HIGH (ref 4.8–10.8)
WBC # FLD AUTO: 18.15 K/UL — HIGH (ref 4.8–10.8)
WBC # FLD AUTO: 20.07 K/UL — HIGH (ref 4.8–10.8)
WBC # FLD AUTO: 21.13 K/UL — HIGH (ref 4.8–10.8)
WBC # FLD AUTO: 24.47 K/UL — HIGH (ref 4.8–10.8)
WBC # FLD AUTO: 9.03 K/UL — SIGNIFICANT CHANGE UP (ref 4.8–10.8)
WBC # FLD AUTO: 9.47 K/UL — SIGNIFICANT CHANGE UP (ref 4.8–10.8)
WBC # FLD AUTO: 9.68 K/UL — SIGNIFICANT CHANGE UP (ref 4.8–10.8)
WBC NRBC COR # BLD: 10.7 K/UL — SIGNIFICANT CHANGE UP
WBC UR QL: 342 /HPF — HIGH (ref 0–5)
WBC UR QL: ABNORMAL /HPF
WBC UR QL: SIGNIFICANT CHANGE UP /HPF

## 2022-01-01 PROCEDURE — 71045 X-RAY EXAM CHEST 1 VIEW: CPT | Mod: 26

## 2022-01-01 PROCEDURE — 99233 SBSQ HOSP IP/OBS HIGH 50: CPT

## 2022-01-01 PROCEDURE — 99232 SBSQ HOSP IP/OBS MODERATE 35: CPT

## 2022-01-01 PROCEDURE — 99291 CRITICAL CARE FIRST HOUR: CPT

## 2022-01-01 PROCEDURE — 74018 RADEX ABDOMEN 1 VIEW: CPT | Mod: 26

## 2022-01-01 PROCEDURE — 99231 SBSQ HOSP IP/OBS SF/LOW 25: CPT | Mod: FS

## 2022-01-01 PROCEDURE — 73620 X-RAY EXAM OF FOOT: CPT | Mod: 26,RT

## 2022-01-01 PROCEDURE — 99222 1ST HOSP IP/OBS MODERATE 55: CPT

## 2022-01-01 PROCEDURE — 71045 X-RAY EXAM CHEST 1 VIEW: CPT | Mod: 26,76

## 2022-01-01 PROCEDURE — 93010 ELECTROCARDIOGRAM REPORT: CPT

## 2022-01-01 PROCEDURE — 88305 TISSUE EXAM BY PATHOLOGIST: CPT | Mod: 26

## 2022-01-01 PROCEDURE — 31600 PLANNED TRACHEOSTOMY: CPT

## 2022-01-01 PROCEDURE — 99221 1ST HOSP IP/OBS SF/LOW 40: CPT

## 2022-01-01 PROCEDURE — 74177 CT ABD & PELVIS W/CONTRAST: CPT | Mod: 26

## 2022-01-01 PROCEDURE — 93970 EXTREMITY STUDY: CPT | Mod: 26

## 2022-01-01 PROCEDURE — 43246 EGD PLACE GASTROSTOMY TUBE: CPT

## 2022-01-01 PROCEDURE — 99223 1ST HOSP IP/OBS HIGH 75: CPT

## 2022-01-01 PROCEDURE — 32557 INSERT CATH PLEURA W/ IMAGE: CPT

## 2022-01-01 PROCEDURE — 36556 INSERT NON-TUNNEL CV CATH: CPT

## 2022-01-01 PROCEDURE — 99222 1ST HOSP IP/OBS MODERATE 55: CPT | Mod: 25,57

## 2022-01-01 PROCEDURE — 93306 TTE W/DOPPLER COMPLETE: CPT | Mod: 26

## 2022-01-01 PROCEDURE — 93925 LOWER EXTREMITY STUDY: CPT | Mod: 26

## 2022-01-01 PROCEDURE — 76705 ECHO EXAM OF ABDOMEN: CPT | Mod: 26

## 2022-01-01 PROCEDURE — 71045 X-RAY EXAM CHEST 1 VIEW: CPT | Mod: 26,77

## 2022-01-01 PROCEDURE — 99291 CRITICAL CARE FIRST HOUR: CPT | Mod: FS,CS,25

## 2022-01-01 PROCEDURE — 70450 CT HEAD/BRAIN W/O DYE: CPT | Mod: 26

## 2022-01-01 PROCEDURE — 99285 EMERGENCY DEPT VISIT HI MDM: CPT

## 2022-01-01 PROCEDURE — 99497 ADVNCD CARE PLAN 30 MIN: CPT | Mod: 25

## 2022-01-01 PROCEDURE — 99231 SBSQ HOSP IP/OBS SF/LOW 25: CPT

## 2022-01-01 PROCEDURE — 99498 ADVNCD CARE PLAN ADDL 30 MIN: CPT

## 2022-01-01 PROCEDURE — 32551 INSERTION OF CHEST TUBE: CPT

## 2022-01-01 PROCEDURE — 99232 SBSQ HOSP IP/OBS MODERATE 35: CPT | Mod: GC

## 2022-01-01 RX ORDER — ERYTHROPOIETIN 10000 [IU]/ML
1 INJECTION, SOLUTION INTRAVENOUS; SUBCUTANEOUS
Qty: 0 | Refills: 0 | DISCHARGE

## 2022-01-01 RX ORDER — POTASSIUM CHLORIDE 20 MEQ
40 PACKET (EA) ORAL ONCE
Refills: 0 | Status: DISCONTINUED | OUTPATIENT
Start: 2022-01-01 | End: 2022-01-01

## 2022-01-01 RX ORDER — DEXTROSE 50 % IN WATER 50 %
25 SYRINGE (ML) INTRAVENOUS ONCE
Refills: 0 | Status: DISCONTINUED | OUTPATIENT
Start: 2022-01-01 | End: 2022-11-09

## 2022-01-01 RX ORDER — ASCORBIC ACID 60 MG
1 TABLET,CHEWABLE ORAL
Qty: 0 | Refills: 0 | DISCHARGE

## 2022-01-01 RX ORDER — SODIUM CHLORIDE 9 MG/ML
500 INJECTION, SOLUTION INTRAVENOUS ONCE
Refills: 0 | Status: COMPLETED | OUTPATIENT
Start: 2022-01-01 | End: 2022-01-01

## 2022-01-01 RX ORDER — FUROSEMIDE 40 MG
60 TABLET ORAL EVERY 12 HOURS
Refills: 0 | Status: DISCONTINUED | OUTPATIENT
Start: 2022-01-01 | End: 2022-01-01

## 2022-01-01 RX ORDER — ACETAMINOPHEN 500 MG
650 TABLET ORAL EVERY 6 HOURS
Refills: 0 | Status: DISCONTINUED | OUTPATIENT
Start: 2022-01-01 | End: 2022-01-01

## 2022-01-01 RX ORDER — POTASSIUM CHLORIDE 20 MEQ
40 PACKET (EA) ORAL ONCE
Refills: 0 | Status: COMPLETED | OUTPATIENT
Start: 2022-01-01 | End: 2022-01-01

## 2022-01-01 RX ORDER — VANCOMYCIN HCL 1 G
1000 VIAL (EA) INTRAVENOUS EVERY 12 HOURS
Refills: 0 | Status: DISCONTINUED | OUTPATIENT
Start: 2022-01-01 | End: 2022-01-01

## 2022-01-01 RX ORDER — AMPICILLIN SODIUM AND SULBACTAM SODIUM 250; 125 MG/ML; MG/ML
9 INJECTION, POWDER, FOR SUSPENSION INTRAMUSCULAR; INTRAVENOUS EVERY 8 HOURS
Refills: 0 | Status: DISCONTINUED | OUTPATIENT
Start: 2022-01-01 | End: 2022-01-01

## 2022-01-01 RX ORDER — SODIUM ZIRCONIUM CYCLOSILICATE 10 G/10G
10 POWDER, FOR SUSPENSION ORAL ONCE
Refills: 0 | Status: COMPLETED | OUTPATIENT
Start: 2022-01-01 | End: 2022-01-01

## 2022-01-01 RX ORDER — DEXTROSE 50 % IN WATER 50 %
25 SYRINGE (ML) INTRAVENOUS ONCE
Refills: 0 | Status: DISCONTINUED | OUTPATIENT
Start: 2022-01-01 | End: 2022-01-01

## 2022-01-01 RX ORDER — OFLOXACIN 0.3 %
1 DROPS OPHTHALMIC (EYE)
Refills: 0 | Status: DISCONTINUED | OUTPATIENT
Start: 2022-01-01 | End: 2022-11-09

## 2022-01-01 RX ORDER — VANCOMYCIN HCL 1 G
VIAL (EA) INTRAVENOUS
Refills: 0 | Status: DISCONTINUED | OUTPATIENT
Start: 2022-01-01 | End: 2022-01-01

## 2022-01-01 RX ORDER — HYDROMORPHONE HYDROCHLORIDE 2 MG/ML
0.5 INJECTION INTRAMUSCULAR; INTRAVENOUS; SUBCUTANEOUS EVERY 6 HOURS
Refills: 0 | Status: DISCONTINUED | OUTPATIENT
Start: 2022-01-01 | End: 2022-11-09

## 2022-01-01 RX ORDER — ASCORBIC ACID 60 MG
500 TABLET,CHEWABLE ORAL DAILY
Refills: 0 | Status: DISCONTINUED | OUTPATIENT
Start: 2022-01-01 | End: 2022-11-09

## 2022-01-01 RX ORDER — DEXAMETHASONE 0.5 MG/5ML
6 ELIXIR ORAL EVERY 12 HOURS
Refills: 0 | Status: DISCONTINUED | OUTPATIENT
Start: 2022-01-01 | End: 2022-01-01

## 2022-01-01 RX ORDER — INSULIN HUMAN 100 [IU]/ML
10 INJECTION, SOLUTION SUBCUTANEOUS ONCE
Refills: 0 | Status: COMPLETED | OUTPATIENT
Start: 2022-01-01 | End: 2022-01-01

## 2022-01-01 RX ORDER — SODIUM CHLORIDE 9 MG/ML
1000 INJECTION, SOLUTION INTRAVENOUS
Refills: 0 | Status: DISCONTINUED | OUTPATIENT
Start: 2022-01-01 | End: 2022-01-01

## 2022-01-01 RX ORDER — PANTOPRAZOLE SODIUM 20 MG/1
40 TABLET, DELAYED RELEASE ORAL
Refills: 0 | Status: DISCONTINUED | OUTPATIENT
Start: 2022-01-01 | End: 2022-01-01

## 2022-01-01 RX ORDER — SODIUM CHLORIDE 9 MG/ML
500 INJECTION INTRAMUSCULAR; INTRAVENOUS; SUBCUTANEOUS ONCE
Refills: 0 | Status: COMPLETED | OUTPATIENT
Start: 2022-01-01 | End: 2022-01-01

## 2022-01-01 RX ORDER — PREGABALIN 225 MG/1
1 CAPSULE ORAL
Qty: 0 | Refills: 0 | DISCHARGE

## 2022-01-01 RX ORDER — MORPHINE SULFATE 50 MG/1
2 CAPSULE, EXTENDED RELEASE ORAL EVERY 6 HOURS
Refills: 0 | Status: ACTIVE | OUTPATIENT
Start: 2022-01-01 | End: 2022-01-01

## 2022-01-01 RX ORDER — FUROSEMIDE 40 MG
40 TABLET ORAL DAILY
Refills: 0 | Status: DISCONTINUED | OUTPATIENT
Start: 2022-01-01 | End: 2022-01-01

## 2022-01-01 RX ORDER — FUROSEMIDE 40 MG
40 TABLET ORAL ONCE
Refills: 0 | Status: COMPLETED | OUTPATIENT
Start: 2022-01-01 | End: 2022-01-01

## 2022-01-01 RX ORDER — FENTANYL CITRATE 50 UG/ML
0.5 INJECTION INTRAVENOUS
Qty: 2500 | Refills: 0 | Status: DISCONTINUED | OUTPATIENT
Start: 2022-01-01 | End: 2022-01-01

## 2022-01-01 RX ORDER — MORPHINE SULFATE 50 MG/1
2 CAPSULE, EXTENDED RELEASE ORAL EVERY 6 HOURS
Refills: 0 | Status: DISCONTINUED | OUTPATIENT
Start: 2022-01-01 | End: 2022-01-01

## 2022-01-01 RX ORDER — MEROPENEM 1 G/30ML
1000 INJECTION INTRAVENOUS EVERY 8 HOURS
Refills: 0 | Status: COMPLETED | OUTPATIENT
Start: 2022-01-01 | End: 2022-01-01

## 2022-01-01 RX ORDER — ALBUTEROL 90 UG/1
4 AEROSOL, METERED ORAL EVERY 6 HOURS
Refills: 0 | Status: COMPLETED | OUTPATIENT
Start: 2022-01-01 | End: 2023-09-28

## 2022-01-01 RX ORDER — MIDAZOLAM HYDROCHLORIDE 1 MG/ML
1 INJECTION, SOLUTION INTRAMUSCULAR; INTRAVENOUS ONCE
Refills: 0 | Status: DISCONTINUED | OUTPATIENT
Start: 2022-01-01 | End: 2022-01-01

## 2022-01-01 RX ORDER — SODIUM,POTASSIUM PHOSPHATES 278-250MG
1 POWDER IN PACKET (EA) ORAL
Qty: 0 | Refills: 0 | DISCHARGE

## 2022-01-01 RX ORDER — ALBUTEROL 90 UG/1
4 AEROSOL, METERED ORAL EVERY 6 HOURS
Refills: 0 | Status: DISCONTINUED | OUTPATIENT
Start: 2022-01-01 | End: 2022-11-09

## 2022-01-01 RX ORDER — SODIUM ZIRCONIUM CYCLOSILICATE 10 G/10G
10 POWDER, FOR SUSPENSION ORAL
Refills: 0 | Status: COMPLETED | OUTPATIENT
Start: 2022-01-01 | End: 2022-01-01

## 2022-01-01 RX ORDER — PANTOPRAZOLE SODIUM 20 MG/1
40 TABLET, DELAYED RELEASE ORAL EVERY 12 HOURS
Refills: 0 | Status: DISCONTINUED | OUTPATIENT
Start: 2022-01-01 | End: 2022-11-09

## 2022-01-01 RX ORDER — ZINC SULFATE TAB 220 MG (50 MG ZINC EQUIVALENT) 220 (50 ZN) MG
1 TAB ORAL
Qty: 0 | Refills: 0 | DISCHARGE

## 2022-01-01 RX ORDER — ACETAZOLAMIDE 250 MG/1
500 TABLET ORAL ONCE
Refills: 0 | Status: COMPLETED | OUTPATIENT
Start: 2022-01-01 | End: 2022-01-01

## 2022-01-01 RX ORDER — FOLIC ACID 0.8 MG
1 TABLET ORAL DAILY
Refills: 0 | Status: DISCONTINUED | OUTPATIENT
Start: 2022-01-01 | End: 2022-01-01

## 2022-01-01 RX ORDER — FENTANYL CITRATE 50 UG/ML
0.5 INJECTION INTRAVENOUS
Qty: 5000 | Refills: 0 | Status: DISCONTINUED | OUTPATIENT
Start: 2022-01-01 | End: 2022-01-01

## 2022-01-01 RX ORDER — CHLORHEXIDINE GLUCONATE 213 G/1000ML
1 SOLUTION TOPICAL DAILY
Refills: 0 | Status: DISCONTINUED | OUTPATIENT
Start: 2022-01-01 | End: 2022-11-09

## 2022-01-01 RX ORDER — FENTANYL CITRATE 50 UG/ML
50 INJECTION INTRAVENOUS ONCE
Refills: 0 | Status: DISCONTINUED | OUTPATIENT
Start: 2022-01-01 | End: 2022-01-01

## 2022-01-01 RX ORDER — CHLORHEXIDINE GLUCONATE 213 G/1000ML
0 SOLUTION TOPICAL
Qty: 0 | Refills: 0 | DISCHARGE

## 2022-01-01 RX ORDER — CHLORHEXIDINE GLUCONATE 213 G/1000ML
1 SOLUTION TOPICAL
Refills: 0 | Status: DISCONTINUED | OUTPATIENT
Start: 2022-01-01 | End: 2022-01-01

## 2022-01-01 RX ORDER — VANCOMYCIN HCL 1 G
1000 VIAL (EA) INTRAVENOUS ONCE
Refills: 0 | Status: COMPLETED | OUTPATIENT
Start: 2022-01-01 | End: 2022-01-01

## 2022-01-01 RX ORDER — MIDODRINE HYDROCHLORIDE 2.5 MG/1
10 TABLET ORAL THREE TIMES A DAY
Refills: 0 | Status: DISCONTINUED | OUTPATIENT
Start: 2022-01-01 | End: 2022-01-01

## 2022-01-01 RX ORDER — DEXAMETHASONE 0.5 MG/5ML
4 ELIXIR ORAL DAILY
Refills: 0 | Status: DISCONTINUED | OUTPATIENT
Start: 2022-01-01 | End: 2022-01-01

## 2022-01-01 RX ORDER — MEROPENEM 1 G/30ML
1 INJECTION INTRAVENOUS
Qty: 0 | Refills: 0 | DISCHARGE

## 2022-01-01 RX ORDER — BUMETANIDE 0.25 MG/ML
1 INJECTION INTRAMUSCULAR; INTRAVENOUS
Qty: 0 | Refills: 0 | DISCHARGE

## 2022-01-01 RX ORDER — ATORVASTATIN CALCIUM 80 MG/1
1 TABLET, FILM COATED ORAL
Qty: 0 | Refills: 0 | DISCHARGE

## 2022-01-01 RX ORDER — DEXTROSE 50 % IN WATER 50 %
15 SYRINGE (ML) INTRAVENOUS ONCE
Refills: 0 | Status: DISCONTINUED | OUTPATIENT
Start: 2022-01-01 | End: 2022-11-09

## 2022-01-01 RX ORDER — CALCIUM CARBONATE 500(1250)
750 TABLET ORAL THREE TIMES A DAY
Refills: 0 | Status: DISCONTINUED | OUTPATIENT
Start: 2022-01-01 | End: 2022-11-09

## 2022-01-01 RX ORDER — SODIUM ZIRCONIUM CYCLOSILICATE 10 G/10G
5 POWDER, FOR SUSPENSION ORAL EVERY 12 HOURS
Refills: 0 | Status: COMPLETED | OUTPATIENT
Start: 2022-01-01 | End: 2022-01-01

## 2022-01-01 RX ORDER — MIDODRINE HYDROCHLORIDE 2.5 MG/1
1 TABLET ORAL
Qty: 0 | Refills: 0 | DISCHARGE

## 2022-01-01 RX ORDER — ACETAMINOPHEN 500 MG
2 TABLET ORAL
Qty: 0 | Refills: 0 | DISCHARGE

## 2022-01-01 RX ORDER — SODIUM CHLORIDE 9 MG/ML
1000 INJECTION, SOLUTION INTRAVENOUS
Refills: 0 | Status: DISCONTINUED | OUTPATIENT
Start: 2022-01-01 | End: 2022-11-09

## 2022-01-01 RX ORDER — ASPIRIN/CALCIUM CARB/MAGNESIUM 324 MG
81 TABLET ORAL DAILY
Refills: 0 | Status: DISCONTINUED | OUTPATIENT
Start: 2022-01-01 | End: 2022-01-01

## 2022-01-01 RX ORDER — LEVOTHYROXINE SODIUM 125 MCG
1 TABLET ORAL
Qty: 0 | Refills: 0 | DISCHARGE

## 2022-01-01 RX ORDER — ASPIRIN/CALCIUM CARB/MAGNESIUM 324 MG
1 TABLET ORAL
Qty: 0 | Refills: 0 | DISCHARGE

## 2022-01-01 RX ORDER — SODIUM ZIRCONIUM CYCLOSILICATE 10 G/10G
10 POWDER, FOR SUSPENSION ORAL
Refills: 0 | Status: DISCONTINUED | OUTPATIENT
Start: 2022-01-01 | End: 2022-01-01

## 2022-01-01 RX ORDER — TOCILIZUMAB 20 MG/ML
600 INJECTION, SOLUTION, CONCENTRATE INTRAVENOUS ONCE
Refills: 0 | Status: COMPLETED | OUTPATIENT
Start: 2022-01-01 | End: 2022-01-01

## 2022-01-01 RX ORDER — HEPARIN SODIUM 5000 [USP'U]/ML
1000 INJECTION INTRAVENOUS; SUBCUTANEOUS
Qty: 25000 | Refills: 0 | Status: DISCONTINUED | OUTPATIENT
Start: 2022-01-01 | End: 2022-01-01

## 2022-01-01 RX ORDER — CEFEPIME 1 G/1
INJECTION, POWDER, FOR SOLUTION INTRAMUSCULAR; INTRAVENOUS
Refills: 0 | Status: DISCONTINUED | OUTPATIENT
Start: 2022-01-01 | End: 2022-01-01

## 2022-01-01 RX ORDER — TAMSULOSIN HYDROCHLORIDE 0.4 MG/1
1 CAPSULE ORAL
Qty: 0 | Refills: 0 | DISCHARGE

## 2022-01-01 RX ORDER — HEPARIN SODIUM 5000 [USP'U]/ML
5000 INJECTION INTRAVENOUS; SUBCUTANEOUS EVERY 12 HOURS
Refills: 0 | Status: DISCONTINUED | OUTPATIENT
Start: 2022-01-01 | End: 2022-11-09

## 2022-01-01 RX ORDER — FOLIC ACID 0.8 MG
1 TABLET ORAL
Qty: 0 | Refills: 0 | DISCHARGE

## 2022-01-01 RX ORDER — CISATRACURIUM BESYLATE 2 MG/ML
3 INJECTION INTRAVENOUS
Qty: 200 | Refills: 0 | Status: DISCONTINUED | OUTPATIENT
Start: 2022-01-01 | End: 2022-01-01

## 2022-01-01 RX ORDER — SODIUM ZIRCONIUM CYCLOSILICATE 10 G/10G
5 POWDER, FOR SUSPENSION ORAL
Refills: 0 | Status: DISCONTINUED | OUTPATIENT
Start: 2022-01-01 | End: 2022-01-01

## 2022-01-01 RX ORDER — SODIUM ZIRCONIUM CYCLOSILICATE 10 G/10G
10 POWDER, FOR SUSPENSION ORAL ONCE
Refills: 0 | Status: DISCONTINUED | OUTPATIENT
Start: 2022-01-01 | End: 2022-01-01

## 2022-01-01 RX ORDER — DILTIAZEM HCL 120 MG
10 CAPSULE, EXT RELEASE 24 HR ORAL ONCE
Refills: 0 | Status: COMPLETED | OUTPATIENT
Start: 2022-01-01 | End: 2022-01-01

## 2022-01-01 RX ORDER — SODIUM CHLORIDE 9 MG/ML
1000 INJECTION, SOLUTION INTRAVENOUS ONCE
Refills: 0 | Status: COMPLETED | OUTPATIENT
Start: 2022-01-01 | End: 2022-01-01

## 2022-01-01 RX ORDER — MEROPENEM 1 G/30ML
1000 INJECTION INTRAVENOUS EVERY 8 HOURS
Refills: 0 | Status: DISCONTINUED | OUTPATIENT
Start: 2022-01-01 | End: 2022-01-01

## 2022-01-01 RX ORDER — OMEPRAZOLE 10 MG/1
1 CAPSULE, DELAYED RELEASE ORAL
Qty: 0 | Refills: 0 | DISCHARGE

## 2022-01-01 RX ORDER — MIDAZOLAM HYDROCHLORIDE 1 MG/ML
10 INJECTION, SOLUTION INTRAMUSCULAR; INTRAVENOUS ONCE
Refills: 0 | Status: DISCONTINUED | OUTPATIENT
Start: 2022-01-01 | End: 2022-01-01

## 2022-01-01 RX ORDER — CHLORHEXIDINE GLUCONATE 213 G/1000ML
15 SOLUTION TOPICAL EVERY 12 HOURS
Refills: 0 | Status: DISCONTINUED | OUTPATIENT
Start: 2022-01-01 | End: 2022-01-01

## 2022-01-01 RX ORDER — ENOXAPARIN SODIUM 100 MG/ML
40 INJECTION SUBCUTANEOUS AT BEDTIME
Refills: 0 | Status: DISCONTINUED | OUTPATIENT
Start: 2022-01-01 | End: 2022-01-01

## 2022-01-01 RX ORDER — MEROPENEM 1 G/30ML
INJECTION INTRAVENOUS
Refills: 0 | Status: DISCONTINUED | OUTPATIENT
Start: 2022-01-01 | End: 2022-01-01

## 2022-01-01 RX ORDER — APIXABAN 2.5 MG/1
2.5 TABLET, FILM COATED ORAL EVERY 12 HOURS
Refills: 0 | Status: DISCONTINUED | OUTPATIENT
Start: 2022-01-01 | End: 2022-01-01

## 2022-01-01 RX ORDER — CEFEPIME 1 G/1
1000 INJECTION, POWDER, FOR SOLUTION INTRAMUSCULAR; INTRAVENOUS ONCE
Refills: 0 | Status: COMPLETED | OUTPATIENT
Start: 2022-01-01 | End: 2022-01-01

## 2022-01-01 RX ORDER — SODIUM CHLORIDE 9 MG/ML
1000 INJECTION INTRAMUSCULAR; INTRAVENOUS; SUBCUTANEOUS
Refills: 0 | Status: DISCONTINUED | OUTPATIENT
Start: 2022-01-01 | End: 2022-01-01

## 2022-01-01 RX ORDER — MORPHINE SULFATE 50 MG/1
0.15 CAPSULE, EXTENDED RELEASE ORAL
Qty: 0 | Refills: 0 | DISCHARGE
Start: 2022-01-01

## 2022-01-01 RX ORDER — SODIUM CHLORIDE 9 MG/ML
250 INJECTION INTRAMUSCULAR; INTRAVENOUS; SUBCUTANEOUS ONCE
Refills: 0 | Status: COMPLETED | OUTPATIENT
Start: 2022-01-01 | End: 2022-01-01

## 2022-01-01 RX ORDER — NIFEDIPINE 30 MG
1 TABLET, EXTENDED RELEASE 24 HR ORAL
Qty: 0 | Refills: 0 | DISCHARGE

## 2022-01-01 RX ORDER — ERYTHROPOIETIN 10000 [IU]/ML
20000 INJECTION, SOLUTION INTRAVENOUS; SUBCUTANEOUS
Refills: 0 | Status: DISCONTINUED | OUTPATIENT
Start: 2022-01-01 | End: 2022-11-09

## 2022-01-01 RX ORDER — MORPHINE SULFATE 50 MG/1
0.5 CAPSULE, EXTENDED RELEASE ORAL
Qty: 100 | Refills: 0 | Status: DISCONTINUED | OUTPATIENT
Start: 2022-01-01 | End: 2022-01-01

## 2022-01-01 RX ORDER — PIPERACILLIN AND TAZOBACTAM 4; .5 G/20ML; G/20ML
3.38 INJECTION, POWDER, LYOPHILIZED, FOR SOLUTION INTRAVENOUS ONCE
Refills: 0 | Status: COMPLETED | OUTPATIENT
Start: 2022-01-01 | End: 2022-01-01

## 2022-01-01 RX ORDER — VANCOMYCIN HCL 1 G
1500 VIAL (EA) INTRAVENOUS EVERY 12 HOURS
Refills: 0 | Status: DISCONTINUED | OUTPATIENT
Start: 2022-01-01 | End: 2022-01-01

## 2022-01-01 RX ORDER — CISATRACURIUM BESYLATE 2 MG/ML
10 INJECTION INTRAVENOUS ONCE
Refills: 0 | Status: COMPLETED | OUTPATIENT
Start: 2022-01-01 | End: 2022-01-01

## 2022-01-01 RX ORDER — VECURONIUM BROMIDE 20 MG/1
10 INJECTION, POWDER, FOR SOLUTION INTRAVENOUS ONCE
Refills: 0 | Status: COMPLETED | OUTPATIENT
Start: 2022-01-01 | End: 2022-01-01

## 2022-01-01 RX ORDER — IPRATROPIUM/ALBUTEROL SULFATE 18-103MCG
3 AEROSOL WITH ADAPTER (GRAM) INHALATION
Qty: 0 | Refills: 0 | DISCHARGE
Start: 2022-01-01

## 2022-01-01 RX ORDER — FENTANYL CITRATE 50 UG/ML
1 INJECTION INTRAVENOUS
Refills: 0 | Status: DISCONTINUED | OUTPATIENT
Start: 2022-01-01 | End: 2022-01-01

## 2022-01-01 RX ORDER — BUMETANIDE 0.25 MG/ML
2 INJECTION INTRAMUSCULAR; INTRAVENOUS DAILY
Refills: 0 | Status: DISCONTINUED | OUTPATIENT
Start: 2022-01-01 | End: 2022-01-01

## 2022-01-01 RX ORDER — DEXMEDETOMIDINE HYDROCHLORIDE IN 0.9% SODIUM CHLORIDE 4 UG/ML
0.2 INJECTION INTRAVENOUS
Qty: 400 | Refills: 0 | Status: DISCONTINUED | OUTPATIENT
Start: 2022-01-01 | End: 2022-01-01

## 2022-01-01 RX ORDER — AMANTADINE HCL 100 MG
100 CAPSULE ORAL EVERY 12 HOURS
Refills: 0 | Status: DISCONTINUED | OUTPATIENT
Start: 2022-01-01 | End: 2022-01-01

## 2022-01-01 RX ORDER — FUROSEMIDE 40 MG
40 TABLET ORAL
Refills: 0 | Status: DISCONTINUED | OUTPATIENT
Start: 2022-01-01 | End: 2022-01-01

## 2022-01-01 RX ORDER — FUROSEMIDE 40 MG
60 TABLET ORAL
Refills: 0 | Status: DISCONTINUED | OUTPATIENT
Start: 2022-01-01 | End: 2022-01-01

## 2022-01-01 RX ORDER — MIDODRINE HYDROCHLORIDE 2.5 MG/1
5 TABLET ORAL EVERY 8 HOURS
Refills: 0 | Status: DISCONTINUED | OUTPATIENT
Start: 2022-01-01 | End: 2022-01-01

## 2022-01-01 RX ORDER — LEVOTHYROXINE SODIUM 125 MCG
125 TABLET ORAL DAILY
Refills: 0 | Status: DISCONTINUED | OUTPATIENT
Start: 2022-01-01 | End: 2022-11-09

## 2022-01-01 RX ORDER — PANTOPRAZOLE SODIUM 20 MG/1
40 TABLET, DELAYED RELEASE ORAL DAILY
Refills: 0 | Status: DISCONTINUED | OUTPATIENT
Start: 2022-01-01 | End: 2022-01-01

## 2022-01-01 RX ORDER — MORPHINE SULFATE 50 MG/1
2 CAPSULE, EXTENDED RELEASE ORAL EVERY 4 HOURS
Refills: 0 | Status: DISCONTINUED | OUTPATIENT
Start: 2022-01-01 | End: 2022-01-01

## 2022-01-01 RX ORDER — ACETAZOLAMIDE 250 MG/1
250 TABLET ORAL ONCE
Refills: 0 | Status: COMPLETED | OUTPATIENT
Start: 2022-01-01 | End: 2022-01-01

## 2022-01-01 RX ORDER — ALBUTEROL 90 UG/1
2 AEROSOL, METERED ORAL EVERY 6 HOURS
Refills: 0 | Status: DISCONTINUED | OUTPATIENT
Start: 2022-01-01 | End: 2022-01-01

## 2022-01-01 RX ORDER — SCOPALAMINE 1 MG/3D
1 PATCH, EXTENDED RELEASE TRANSDERMAL
Refills: 0 | Status: DISCONTINUED | OUTPATIENT
Start: 2022-01-01 | End: 2022-11-09

## 2022-01-01 RX ORDER — PIPERACILLIN AND TAZOBACTAM 4; .5 G/20ML; G/20ML
3.38 INJECTION, POWDER, LYOPHILIZED, FOR SOLUTION INTRAVENOUS EVERY 8 HOURS
Refills: 0 | Status: DISCONTINUED | OUTPATIENT
Start: 2022-01-01 | End: 2022-01-01

## 2022-01-01 RX ORDER — HYDRALAZINE HCL 50 MG
10 TABLET ORAL ONCE
Refills: 0 | Status: DISCONTINUED | OUTPATIENT
Start: 2022-01-01 | End: 2022-01-01

## 2022-01-01 RX ORDER — ACETAMINOPHEN 500 MG
650 TABLET ORAL EVERY 6 HOURS
Refills: 0 | Status: DISCONTINUED | OUTPATIENT
Start: 2022-01-01 | End: 2022-11-09

## 2022-01-01 RX ORDER — MIDAZOLAM HYDROCHLORIDE 1 MG/ML
4 INJECTION, SOLUTION INTRAMUSCULAR; INTRAVENOUS ONCE
Refills: 0 | Status: DISCONTINUED | OUTPATIENT
Start: 2022-01-01 | End: 2022-01-01

## 2022-01-01 RX ORDER — AMANTADINE HCL 100 MG
10 CAPSULE ORAL
Qty: 0 | Refills: 0 | DISCHARGE

## 2022-01-01 RX ORDER — MORPHINE SULFATE 50 MG/1
1 CAPSULE, EXTENDED RELEASE ORAL
Qty: 100 | Refills: 0 | Status: DISCONTINUED | OUTPATIENT
Start: 2022-01-01 | End: 2022-01-01

## 2022-01-01 RX ORDER — METOPROLOL TARTRATE 50 MG
12.5 TABLET ORAL EVERY 12 HOURS
Refills: 0 | Status: DISCONTINUED | OUTPATIENT
Start: 2022-01-01 | End: 2022-11-09

## 2022-01-01 RX ORDER — PREGABALIN 225 MG/1
1000 CAPSULE ORAL DAILY
Refills: 0 | Status: DISCONTINUED | OUTPATIENT
Start: 2022-01-01 | End: 2022-01-01

## 2022-01-01 RX ORDER — DEXTROSE 50 % IN WATER 50 %
50 SYRINGE (ML) INTRAVENOUS ONCE
Refills: 0 | Status: COMPLETED | OUTPATIENT
Start: 2022-01-01 | End: 2022-01-01

## 2022-01-01 RX ORDER — SODIUM BICARBONATE 1 MEQ/ML
0.12 SYRINGE (ML) INTRAVENOUS
Qty: 150 | Refills: 0 | Status: DISCONTINUED | OUTPATIENT
Start: 2022-01-01 | End: 2022-01-01

## 2022-01-01 RX ORDER — AMIKACIN SULFATE 250 MG/ML
1250 INJECTION, SOLUTION INTRAMUSCULAR; INTRAVENOUS
Refills: 0 | Status: DISCONTINUED | OUTPATIENT
Start: 2022-01-01 | End: 2022-01-01

## 2022-01-01 RX ORDER — DEXAMETHASONE 0.5 MG/5ML
1.5 ELIXIR ORAL
Qty: 0 | Refills: 0 | DISCHARGE

## 2022-01-01 RX ORDER — METOPROLOL TARTRATE 50 MG
1 TABLET ORAL
Qty: 0 | Refills: 0 | DISCHARGE

## 2022-01-01 RX ORDER — ATORVASTATIN CALCIUM 80 MG/1
10 TABLET, FILM COATED ORAL AT BEDTIME
Refills: 0 | Status: DISCONTINUED | OUTPATIENT
Start: 2022-01-01 | End: 2022-11-09

## 2022-01-01 RX ORDER — FENTANYL CITRATE 50 UG/ML
100 INJECTION INTRAVENOUS ONCE
Refills: 0 | Status: DISCONTINUED | OUTPATIENT
Start: 2022-01-01 | End: 2022-01-01

## 2022-01-01 RX ORDER — INSULIN HUMAN 100 [IU]/ML
0 INJECTION, SOLUTION SUBCUTANEOUS
Qty: 0 | Refills: 0 | DISCHARGE

## 2022-01-01 RX ORDER — DEXTROSE 10 % IN WATER 10 %
1000 INTRAVENOUS SOLUTION INTRAVENOUS
Refills: 0 | Status: COMPLETED | OUTPATIENT
Start: 2022-01-01 | End: 2022-01-01

## 2022-01-01 RX ORDER — APIXABAN 2.5 MG/1
1 TABLET, FILM COATED ORAL
Qty: 0 | Refills: 0 | DISCHARGE

## 2022-01-01 RX ORDER — MEROPENEM 1 G/30ML
1000 INJECTION INTRAVENOUS ONCE
Refills: 0 | Status: COMPLETED | OUTPATIENT
Start: 2022-01-01 | End: 2022-01-01

## 2022-01-01 RX ORDER — PROPOFOL 10 MG/ML
0.05 INJECTION, EMULSION INTRAVENOUS
Qty: 1000 | Refills: 0 | Status: DISCONTINUED | OUTPATIENT
Start: 2022-01-01 | End: 2022-01-01

## 2022-01-01 RX ORDER — MORPHINE SULFATE 50 MG/1
2 CAPSULE, EXTENDED RELEASE ORAL ONCE
Refills: 0 | Status: DISCONTINUED | OUTPATIENT
Start: 2022-01-01 | End: 2022-01-01

## 2022-01-01 RX ORDER — IPRATROPIUM/ALBUTEROL SULFATE 18-103MCG
3 AEROSOL WITH ADAPTER (GRAM) INHALATION EVERY 6 HOURS
Refills: 0 | Status: DISCONTINUED | OUTPATIENT
Start: 2022-01-01 | End: 2022-11-09

## 2022-01-01 RX ORDER — SODIUM CHLORIDE 9 MG/ML
250 INJECTION INTRAMUSCULAR; INTRAVENOUS; SUBCUTANEOUS ONCE
Refills: 0 | Status: DISCONTINUED | OUTPATIENT
Start: 2022-01-01 | End: 2022-01-01

## 2022-01-01 RX ORDER — SODIUM POLYSTYRENE SULFONATE 4.1 MEQ/G
30 POWDER, FOR SUSPENSION ORAL ONCE
Refills: 0 | Status: COMPLETED | OUTPATIENT
Start: 2022-01-01 | End: 2022-01-01

## 2022-01-01 RX ORDER — SODIUM ZIRCONIUM CYCLOSILICATE 10 G/10G
5 POWDER, FOR SUSPENSION ORAL ONCE
Refills: 0 | Status: COMPLETED | OUTPATIENT
Start: 2022-01-01 | End: 2022-01-01

## 2022-01-01 RX ORDER — DEXAMETHASONE 0.5 MG/5ML
6 ELIXIR ORAL DAILY
Refills: 0 | Status: DISCONTINUED | OUTPATIENT
Start: 2022-01-01 | End: 2022-01-01

## 2022-01-01 RX ORDER — IPRATROPIUM/ALBUTEROL SULFATE 18-103MCG
3 AEROSOL WITH ADAPTER (GRAM) INHALATION
Qty: 0 | Refills: 0 | DISCHARGE

## 2022-01-01 RX ORDER — FERROUS SULFATE 325(65) MG
325 TABLET ORAL DAILY
Refills: 0 | Status: DISCONTINUED | OUTPATIENT
Start: 2022-01-01 | End: 2022-01-01

## 2022-01-01 RX ORDER — PROPOFOL 10 MG/ML
25 INJECTION, EMULSION INTRAVENOUS ONCE
Refills: 0 | Status: COMPLETED | OUTPATIENT
Start: 2022-01-01 | End: 2022-01-01

## 2022-01-01 RX ORDER — NOREPINEPHRINE BITARTRATE/D5W 8 MG/250ML
0.05 PLASTIC BAG, INJECTION (ML) INTRAVENOUS
Qty: 16 | Refills: 0 | Status: DISCONTINUED | OUTPATIENT
Start: 2022-01-01 | End: 2022-01-01

## 2022-01-01 RX ORDER — DEXTROSE 50 % IN WATER 50 %
12.5 SYRINGE (ML) INTRAVENOUS ONCE
Refills: 0 | Status: DISCONTINUED | OUTPATIENT
Start: 2022-01-01 | End: 2022-11-09

## 2022-01-01 RX ORDER — METOPROLOL TARTRATE 50 MG
0.5 TABLET ORAL
Qty: 0 | Refills: 0 | DISCHARGE

## 2022-01-01 RX ORDER — FERROUS SULFATE 325(65) MG
7.5 TABLET ORAL
Qty: 0 | Refills: 0 | DISCHARGE

## 2022-01-01 RX ORDER — SODIUM CHLORIDE 9 MG/ML
500 INJECTION, SOLUTION INTRAVENOUS
Refills: 0 | Status: COMPLETED | OUTPATIENT
Start: 2022-01-01 | End: 2022-01-01

## 2022-01-01 RX ORDER — CEFIDEROCOL SULFATE TOSYLATE 1 G/10ML
1500 INJECTION, POWDER, FOR SOLUTION INTRAVENOUS EVERY 8 HOURS
Refills: 0 | Status: COMPLETED | OUTPATIENT
Start: 2022-01-01 | End: 2022-11-09

## 2022-01-01 RX ORDER — VANCOMYCIN HCL 1 G
1000 VIAL (EA) INTRAVENOUS ONCE
Refills: 0 | Status: DISCONTINUED | OUTPATIENT
Start: 2022-01-01 | End: 2022-01-01

## 2022-01-01 RX ORDER — ENOXAPARIN SODIUM 100 MG/ML
40 INJECTION SUBCUTANEOUS DAILY
Refills: 0 | Status: DISCONTINUED | OUTPATIENT
Start: 2022-01-01 | End: 2022-01-01

## 2022-01-01 RX ORDER — MORPHINE SULFATE 50 MG/1
3 CAPSULE, EXTENDED RELEASE ORAL EVERY 4 HOURS
Refills: 0 | Status: DISCONTINUED | OUTPATIENT
Start: 2022-01-01 | End: 2022-01-01

## 2022-01-01 RX ORDER — MIDODRINE HYDROCHLORIDE 2.5 MG/1
5 TABLET ORAL ONCE
Refills: 0 | Status: COMPLETED | OUTPATIENT
Start: 2022-01-01 | End: 2022-01-01

## 2022-01-01 RX ORDER — CEFEPIME 1 G/1
2000 INJECTION, POWDER, FOR SOLUTION INTRAMUSCULAR; INTRAVENOUS EVERY 8 HOURS
Refills: 0 | Status: DISCONTINUED | OUTPATIENT
Start: 2022-01-01 | End: 2022-01-01

## 2022-01-01 RX ORDER — CHLORHEXIDINE GLUCONATE 213 G/1000ML
15 SOLUTION TOPICAL EVERY 12 HOURS
Refills: 0 | Status: DISCONTINUED | OUTPATIENT
Start: 2022-01-01 | End: 2022-11-09

## 2022-01-01 RX ORDER — TELMISARTAN 20 MG/1
1 TABLET ORAL
Qty: 0 | Refills: 0 | DISCHARGE

## 2022-01-01 RX ORDER — TAMSULOSIN HYDROCHLORIDE 0.4 MG/1
0.4 CAPSULE ORAL AT BEDTIME
Refills: 0 | Status: DISCONTINUED | OUTPATIENT
Start: 2022-01-01 | End: 2022-01-01

## 2022-01-01 RX ORDER — POTASSIUM CHLORIDE 20 MEQ
20 PACKET (EA) ORAL
Refills: 0 | Status: COMPLETED | OUTPATIENT
Start: 2022-01-01 | End: 2022-01-01

## 2022-01-01 RX ORDER — HYDRALAZINE HCL 50 MG
10 TABLET ORAL ONCE
Refills: 0 | Status: COMPLETED | OUTPATIENT
Start: 2022-01-01 | End: 2022-01-01

## 2022-01-01 RX ORDER — COLLAGENASE CLOSTRIDIUM HIST. 250 UNIT/G
1 OINTMENT (GRAM) TOPICAL
Qty: 0 | Refills: 0 | DISCHARGE
Start: 2022-01-01

## 2022-01-01 RX ORDER — GLUCAGON INJECTION, SOLUTION 0.5 MG/.1ML
1 INJECTION, SOLUTION SUBCUTANEOUS ONCE
Refills: 0 | Status: DISCONTINUED | OUTPATIENT
Start: 2022-01-01 | End: 2022-11-09

## 2022-01-01 RX ORDER — MIDODRINE HYDROCHLORIDE 2.5 MG/1
5 TABLET ORAL THREE TIMES A DAY
Refills: 0 | Status: DISCONTINUED | OUTPATIENT
Start: 2022-01-01 | End: 2022-01-01

## 2022-01-01 RX ORDER — HEPARIN SODIUM 5000 [USP'U]/ML
1200 INJECTION INTRAVENOUS; SUBCUTANEOUS
Qty: 25000 | Refills: 0 | Status: DISCONTINUED | OUTPATIENT
Start: 2022-01-01 | End: 2022-01-01

## 2022-01-01 RX ORDER — CEFEPIME 1 G/1
1000 INJECTION, POWDER, FOR SOLUTION INTRAMUSCULAR; INTRAVENOUS EVERY 8 HOURS
Refills: 0 | Status: DISCONTINUED | OUTPATIENT
Start: 2022-01-01 | End: 2022-01-01

## 2022-01-01 RX ORDER — AMIKACIN SULFATE 250 MG/ML
1500 INJECTION, SOLUTION INTRAMUSCULAR; INTRAVENOUS ONCE
Refills: 0 | Status: COMPLETED | OUTPATIENT
Start: 2022-01-01 | End: 2022-01-01

## 2022-01-01 RX ORDER — ERYTHROPOIETIN 10000 [IU]/ML
20000 INJECTION, SOLUTION INTRAVENOUS; SUBCUTANEOUS
Refills: 0 | Status: DISCONTINUED | OUTPATIENT
Start: 2022-01-01 | End: 2022-01-01

## 2022-01-01 RX ORDER — POTASSIUM CHLORIDE 20 MEQ
20 PACKET (EA) ORAL ONCE
Refills: 0 | Status: COMPLETED | OUTPATIENT
Start: 2022-01-01 | End: 2022-01-01

## 2022-01-01 RX ORDER — DEXTROSE 50 % IN WATER 50 %
12.5 SYRINGE (ML) INTRAVENOUS ONCE
Refills: 0 | Status: DISCONTINUED | OUTPATIENT
Start: 2022-01-01 | End: 2022-01-01

## 2022-01-01 RX ORDER — NOREPINEPHRINE BITARTRATE/D5W 8 MG/250ML
0.05 PLASTIC BAG, INJECTION (ML) INTRAVENOUS
Qty: 32 | Refills: 0 | Status: DISCONTINUED | OUTPATIENT
Start: 2022-01-01 | End: 2022-01-01

## 2022-01-01 RX ORDER — GLUCAGON INJECTION, SOLUTION 0.5 MG/.1ML
1 INJECTION, SOLUTION SUBCUTANEOUS ONCE
Refills: 0 | Status: DISCONTINUED | OUTPATIENT
Start: 2022-01-01 | End: 2022-01-01

## 2022-01-01 RX ORDER — METOPROLOL TARTRATE 50 MG
12.5 TABLET ORAL EVERY 12 HOURS
Refills: 0 | Status: DISCONTINUED | OUTPATIENT
Start: 2022-01-01 | End: 2022-01-01

## 2022-01-01 RX ORDER — DILTIAZEM HCL 120 MG
30 CAPSULE, EXT RELEASE 24 HR ORAL EVERY 6 HOURS
Refills: 0 | Status: DISCONTINUED | OUTPATIENT
Start: 2022-01-01 | End: 2022-01-01

## 2022-01-01 RX ORDER — ENOXAPARIN SODIUM 100 MG/ML
100 INJECTION SUBCUTANEOUS EVERY 12 HOURS
Refills: 0 | Status: DISCONTINUED | OUTPATIENT
Start: 2022-01-01 | End: 2022-01-01

## 2022-01-01 RX ORDER — CEFIDEROCOL SULFATE TOSYLATE 1 G/10ML
1500 INJECTION, POWDER, FOR SOLUTION INTRAVENOUS EVERY 8 HOURS
Refills: 0 | Status: ACTIVE | OUTPATIENT
Start: 2022-01-01 | End: 2023-10-05

## 2022-01-01 RX ORDER — INSULIN LISPRO 100/ML
VIAL (ML) SUBCUTANEOUS EVERY 6 HOURS
Refills: 0 | Status: DISCONTINUED | OUTPATIENT
Start: 2022-01-01 | End: 2022-01-01

## 2022-01-01 RX ORDER — OXYCODONE HYDROCHLORIDE 5 MG/1
10 TABLET ORAL
Refills: 0 | Status: DISCONTINUED | OUTPATIENT
Start: 2022-01-01 | End: 2022-01-01

## 2022-01-01 RX ORDER — DEXAMETHASONE 0.5 MG/5ML
2 ELIXIR ORAL DAILY
Refills: 0 | Status: DISCONTINUED | OUTPATIENT
Start: 2022-01-01 | End: 2022-01-01

## 2022-01-01 RX ORDER — HEPARIN SODIUM 5000 [USP'U]/ML
1400 INJECTION INTRAVENOUS; SUBCUTANEOUS
Qty: 25000 | Refills: 0 | Status: DISCONTINUED | OUTPATIENT
Start: 2022-01-01 | End: 2022-01-01

## 2022-01-01 RX ORDER — OXYCODONE HYDROCHLORIDE 5 MG/1
10 TABLET ORAL EVERY 6 HOURS
Refills: 0 | Status: DISCONTINUED | OUTPATIENT
Start: 2022-01-01 | End: 2022-01-01

## 2022-01-01 RX ORDER — CISATRACURIUM BESYLATE 2 MG/ML
2 INJECTION INTRAVENOUS ONCE
Refills: 0 | Status: COMPLETED | OUTPATIENT
Start: 2022-01-01 | End: 2022-01-01

## 2022-01-01 RX ORDER — ENOXAPARIN SODIUM 100 MG/ML
100 INJECTION SUBCUTANEOUS ONCE
Refills: 0 | Status: COMPLETED | OUTPATIENT
Start: 2022-01-01 | End: 2022-01-01

## 2022-01-01 RX ORDER — MIDAZOLAM HYDROCHLORIDE 1 MG/ML
0.02 INJECTION, SOLUTION INTRAMUSCULAR; INTRAVENOUS
Qty: 100 | Refills: 0 | Status: DISCONTINUED | OUTPATIENT
Start: 2022-01-01 | End: 2022-01-01

## 2022-01-01 RX ORDER — CEFTRIAXONE 500 MG/1
1000 INJECTION, POWDER, FOR SOLUTION INTRAMUSCULAR; INTRAVENOUS ONCE
Refills: 0 | Status: COMPLETED | OUTPATIENT
Start: 2022-01-01 | End: 2022-01-01

## 2022-01-01 RX ORDER — PANTOPRAZOLE SODIUM 20 MG/1
40 TABLET, DELAYED RELEASE ORAL
Qty: 0 | Refills: 0 | DISCHARGE
Start: 2022-01-01

## 2022-01-01 RX ORDER — FENTANYL CITRATE 50 UG/ML
50 INJECTION INTRAVENOUS EVERY 4 HOURS
Refills: 0 | Status: DISCONTINUED | OUTPATIENT
Start: 2022-01-01 | End: 2022-01-01

## 2022-01-01 RX ORDER — MIDODRINE HYDROCHLORIDE 2.5 MG/1
1 TABLET ORAL
Qty: 0 | Refills: 0 | DISCHARGE
Start: 2022-01-01

## 2022-01-01 RX ORDER — VANCOMYCIN HCL 1 G
1500 VIAL (EA) INTRAVENOUS ONCE
Refills: 0 | Status: COMPLETED | OUTPATIENT
Start: 2022-01-01 | End: 2022-01-01

## 2022-01-01 RX ORDER — MIDODRINE HYDROCHLORIDE 2.5 MG/1
10 TABLET ORAL EVERY 8 HOURS
Refills: 0 | Status: DISCONTINUED | OUTPATIENT
Start: 2022-01-01 | End: 2022-11-09

## 2022-01-01 RX ORDER — POTASSIUM CHLORIDE 20 MEQ
7.5 PACKET (EA) ORAL
Qty: 0 | Refills: 0 | DISCHARGE

## 2022-01-01 RX ORDER — POLYETHYLENE GLYCOL 3350 17 G/17G
17 POWDER, FOR SOLUTION ORAL DAILY
Refills: 0 | Status: DISCONTINUED | OUTPATIENT
Start: 2022-01-01 | End: 2022-01-01

## 2022-01-01 RX ORDER — ALBUTEROL 90 UG/1
2 AEROSOL, METERED ORAL EVERY 12 HOURS
Refills: 0 | Status: DISCONTINUED | OUTPATIENT
Start: 2022-01-01 | End: 2022-11-09

## 2022-01-01 RX ORDER — HEPARIN SODIUM 5000 [USP'U]/ML
5000 INJECTION INTRAVENOUS; SUBCUTANEOUS EVERY 8 HOURS
Refills: 0 | Status: DISCONTINUED | OUTPATIENT
Start: 2022-01-01 | End: 2022-01-01

## 2022-01-01 RX ORDER — ZINC SULFATE TAB 220 MG (50 MG ZINC EQUIVALENT) 220 (50 ZN) MG
220 TAB ORAL DAILY
Refills: 0 | Status: DISCONTINUED | OUTPATIENT
Start: 2022-01-01 | End: 2022-11-09

## 2022-01-01 RX ORDER — SODIUM HYPOCHLORITE 0.125 %
1 SOLUTION, NON-ORAL MISCELLANEOUS
Refills: 0 | Status: DISCONTINUED | OUTPATIENT
Start: 2022-01-01 | End: 2022-11-09

## 2022-01-01 RX ORDER — ACETAMINOPHEN 500 MG
650 TABLET ORAL ONCE
Refills: 0 | Status: COMPLETED | OUTPATIENT
Start: 2022-01-01 | End: 2022-01-01

## 2022-01-01 RX ORDER — POLYETHYLENE GLYCOL 3350 17 G/17G
17 POWDER, FOR SOLUTION ORAL
Qty: 0 | Refills: 0 | DISCHARGE
Start: 2022-01-01

## 2022-01-01 RX ORDER — DILTIAZEM HCL 120 MG
5 CAPSULE, EXT RELEASE 24 HR ORAL
Qty: 125 | Refills: 0 | Status: DISCONTINUED | OUTPATIENT
Start: 2022-01-01 | End: 2022-01-01

## 2022-01-01 RX ORDER — COLLAGENASE CLOSTRIDIUM HIST. 250 UNIT/G
1 OINTMENT (GRAM) TOPICAL
Refills: 0 | Status: DISCONTINUED | OUTPATIENT
Start: 2022-01-01 | End: 2022-01-01

## 2022-01-01 RX ORDER — INSULIN LISPRO 100/ML
VIAL (ML) SUBCUTANEOUS
Refills: 0 | Status: DISCONTINUED | OUTPATIENT
Start: 2022-01-01 | End: 2022-11-09

## 2022-01-01 RX ORDER — IPRATROPIUM/ALBUTEROL SULFATE 18-103MCG
3 AEROSOL WITH ADAPTER (GRAM) INHALATION EVERY 6 HOURS
Refills: 0 | Status: DISCONTINUED | OUTPATIENT
Start: 2022-01-01 | End: 2022-01-01

## 2022-01-01 RX ORDER — CALCIUM GLUCONATE 100 MG/ML
2 VIAL (ML) INTRAVENOUS ONCE
Refills: 0 | Status: COMPLETED | OUTPATIENT
Start: 2022-01-01 | End: 2022-01-01

## 2022-01-01 RX ORDER — KETOROLAC TROMETHAMINE 30 MG/ML
15 SYRINGE (ML) INJECTION EVERY 6 HOURS
Refills: 0 | Status: DISCONTINUED | OUTPATIENT
Start: 2022-01-01 | End: 2022-01-01

## 2022-01-01 RX ORDER — METOPROLOL TARTRATE 50 MG
25 TABLET ORAL
Refills: 0 | Status: DISCONTINUED | OUTPATIENT
Start: 2022-01-01 | End: 2022-01-01

## 2022-01-01 RX ORDER — COLLAGENASE CLOSTRIDIUM HIST. 250 UNIT/G
1 OINTMENT (GRAM) TOPICAL
Refills: 0 | Status: DISCONTINUED | OUTPATIENT
Start: 2022-01-01 | End: 2022-11-09

## 2022-01-01 RX ADMIN — ALBUTEROL 2 PUFF(S): 90 AEROSOL, METERED ORAL at 19:33

## 2022-01-01 RX ADMIN — CHLORHEXIDINE GLUCONATE 15 MILLILITER(S): 213 SOLUTION TOPICAL at 06:17

## 2022-01-01 RX ADMIN — CEFEPIME 100 MILLIGRAM(S): 1 INJECTION, POWDER, FOR SOLUTION INTRAMUSCULAR; INTRAVENOUS at 05:03

## 2022-01-01 RX ADMIN — CHLORHEXIDINE GLUCONATE 15 MILLILITER(S): 213 SOLUTION TOPICAL at 17:35

## 2022-01-01 RX ADMIN — OXYCODONE HYDROCHLORIDE 10 MILLIGRAM(S): 5 TABLET ORAL at 05:53

## 2022-01-01 RX ADMIN — Medication 60 MILLIGRAM(S): at 17:36

## 2022-01-01 RX ADMIN — PANTOPRAZOLE SODIUM 40 MILLIGRAM(S): 20 TABLET, DELAYED RELEASE ORAL at 11:25

## 2022-01-01 RX ADMIN — POLYETHYLENE GLYCOL 3350 17 GRAM(S): 17 POWDER, FOR SOLUTION ORAL at 12:23

## 2022-01-01 RX ADMIN — Medication 25 MILLIGRAM(S): at 05:33

## 2022-01-01 RX ADMIN — Medication 2: at 17:39

## 2022-01-01 RX ADMIN — AMPICILLIN SODIUM AND SULBACTAM SODIUM 250 GRAM(S): 250; 125 INJECTION, POWDER, FOR SUSPENSION INTRAMUSCULAR; INTRAVENOUS at 13:52

## 2022-01-01 RX ADMIN — Medication 81 MILLIGRAM(S): at 13:37

## 2022-01-01 RX ADMIN — ZINC SULFATE TAB 220 MG (50 MG ZINC EQUIVALENT) 220 MILLIGRAM(S): 220 (50 ZN) TAB at 14:13

## 2022-01-01 RX ADMIN — PANTOPRAZOLE SODIUM 40 MILLIGRAM(S): 20 TABLET, DELAYED RELEASE ORAL at 11:01

## 2022-01-01 RX ADMIN — Medication 81 MILLIGRAM(S): at 11:34

## 2022-01-01 RX ADMIN — Medication 2 MILLIGRAM(S): at 06:11

## 2022-01-01 RX ADMIN — Medication 12.5 MILLIGRAM(S): at 17:09

## 2022-01-01 RX ADMIN — Medication 100 MILLIGRAM(S): at 10:56

## 2022-01-01 RX ADMIN — Medication 750 MILLIGRAM(S): at 21:16

## 2022-01-01 RX ADMIN — ENOXAPARIN SODIUM 40 MILLIGRAM(S): 100 INJECTION SUBCUTANEOUS at 12:19

## 2022-01-01 RX ADMIN — AMPICILLIN SODIUM AND SULBACTAM SODIUM 250 GRAM(S): 250; 125 INJECTION, POWDER, FOR SUSPENSION INTRAMUSCULAR; INTRAVENOUS at 18:23

## 2022-01-01 RX ADMIN — MIDAZOLAM HYDROCHLORIDE 1.54 MG/KG/HR: 1 INJECTION, SOLUTION INTRAMUSCULAR; INTRAVENOUS at 10:44

## 2022-01-01 RX ADMIN — MORPHINE SULFATE 3 MILLIGRAM(S): 50 CAPSULE, EXTENDED RELEASE ORAL at 13:42

## 2022-01-01 RX ADMIN — Medication 125 MICROGRAM(S): at 05:57

## 2022-01-01 RX ADMIN — Medication 1 APPLICATION(S): at 05:24

## 2022-01-01 RX ADMIN — Medication 10 MILLIGRAM(S): at 01:22

## 2022-01-01 RX ADMIN — ALBUTEROL 2 PUFF(S): 90 AEROSOL, METERED ORAL at 21:14

## 2022-01-01 RX ADMIN — ZINC SULFATE TAB 220 MG (50 MG ZINC EQUIVALENT) 220 MILLIGRAM(S): 220 (50 ZN) TAB at 11:39

## 2022-01-01 RX ADMIN — Medication 4 MILLIGRAM(S): at 08:55

## 2022-01-01 RX ADMIN — Medication 4: at 18:09

## 2022-01-01 RX ADMIN — FENTANYL CITRATE 50 MICROGRAM(S): 50 INJECTION INTRAVENOUS at 09:09

## 2022-01-01 RX ADMIN — MIDAZOLAM HYDROCHLORIDE 1.54 MG/KG/HR: 1 INJECTION, SOLUTION INTRAMUSCULAR; INTRAVENOUS at 22:11

## 2022-01-01 RX ADMIN — CHLORHEXIDINE GLUCONATE 15 MILLILITER(S): 213 SOLUTION TOPICAL at 17:40

## 2022-01-01 RX ADMIN — Medication 4 MILLIGRAM(S): at 06:48

## 2022-01-01 RX ADMIN — CHLORHEXIDINE GLUCONATE 1 APPLICATION(S): 213 SOLUTION TOPICAL at 11:09

## 2022-01-01 RX ADMIN — Medication 100 MILLIGRAM(S): at 06:10

## 2022-01-01 RX ADMIN — CHLORHEXIDINE GLUCONATE 15 MILLILITER(S): 213 SOLUTION TOPICAL at 17:06

## 2022-01-01 RX ADMIN — MORPHINE SULFATE 3 MILLIGRAM(S): 50 CAPSULE, EXTENDED RELEASE ORAL at 14:00

## 2022-01-01 RX ADMIN — HEPARIN SODIUM 5000 UNIT(S): 5000 INJECTION INTRAVENOUS; SUBCUTANEOUS at 05:09

## 2022-01-01 RX ADMIN — MIDODRINE HYDROCHLORIDE 5 MILLIGRAM(S): 2.5 TABLET ORAL at 16:00

## 2022-01-01 RX ADMIN — Medication 40 MILLIGRAM(S): at 05:12

## 2022-01-01 RX ADMIN — MORPHINE SULFATE 3 MILLIGRAM(S): 50 CAPSULE, EXTENDED RELEASE ORAL at 10:56

## 2022-01-01 RX ADMIN — CEFEPIME 100 MILLIGRAM(S): 1 INJECTION, POWDER, FOR SOLUTION INTRAMUSCULAR; INTRAVENOUS at 05:33

## 2022-01-01 RX ADMIN — CHLORHEXIDINE GLUCONATE 1 APPLICATION(S): 213 SOLUTION TOPICAL at 05:28

## 2022-01-01 RX ADMIN — Medication 1 MILLIGRAM(S): at 04:16

## 2022-01-01 RX ADMIN — AMPICILLIN SODIUM AND SULBACTAM SODIUM 250 GRAM(S): 250; 125 INJECTION, POWDER, FOR SUSPENSION INTRAMUSCULAR; INTRAVENOUS at 14:52

## 2022-01-01 RX ADMIN — Medication 25 MILLIGRAM(S): at 17:07

## 2022-01-01 RX ADMIN — MIDAZOLAM HYDROCHLORIDE 1.54 MG/KG/HR: 1 INJECTION, SOLUTION INTRAMUSCULAR; INTRAVENOUS at 23:49

## 2022-01-01 RX ADMIN — CEFEPIME 100 MILLIGRAM(S): 1 INJECTION, POWDER, FOR SOLUTION INTRAMUSCULAR; INTRAVENOUS at 22:17

## 2022-01-01 RX ADMIN — Medication 2 MILLIGRAM(S): at 05:03

## 2022-01-01 RX ADMIN — Medication 650 MILLIGRAM(S): at 22:29

## 2022-01-01 RX ADMIN — CHLORHEXIDINE GLUCONATE 15 MILLILITER(S): 213 SOLUTION TOPICAL at 06:46

## 2022-01-01 RX ADMIN — Medication 1 MILLIGRAM(S): at 09:33

## 2022-01-01 RX ADMIN — Medication 81 MILLIGRAM(S): at 12:22

## 2022-01-01 RX ADMIN — PANTOPRAZOLE SODIUM 40 MILLIGRAM(S): 20 TABLET, DELAYED RELEASE ORAL at 17:33

## 2022-01-01 RX ADMIN — CEFEPIME 100 MILLIGRAM(S): 1 INJECTION, POWDER, FOR SOLUTION INTRAMUSCULAR; INTRAVENOUS at 06:58

## 2022-01-01 RX ADMIN — Medication 2 MILLIGRAM(S): at 17:53

## 2022-01-01 RX ADMIN — MIDODRINE HYDROCHLORIDE 10 MILLIGRAM(S): 2.5 TABLET ORAL at 07:48

## 2022-01-01 RX ADMIN — Medication 4: at 17:28

## 2022-01-01 RX ADMIN — Medication 1 APPLICATION(S): at 17:12

## 2022-01-01 RX ADMIN — MIDODRINE HYDROCHLORIDE 10 MILLIGRAM(S): 2.5 TABLET ORAL at 00:12

## 2022-01-01 RX ADMIN — CHLORHEXIDINE GLUCONATE 15 MILLILITER(S): 213 SOLUTION TOPICAL at 06:44

## 2022-01-01 RX ADMIN — Medication 2: at 17:29

## 2022-01-01 RX ADMIN — FENTANYL CITRATE 4.5 MICROGRAM(S)/KG/HR: 50 INJECTION INTRAVENOUS at 05:12

## 2022-01-01 RX ADMIN — CHLORHEXIDINE GLUCONATE 1 APPLICATION(S): 213 SOLUTION TOPICAL at 05:27

## 2022-01-01 RX ADMIN — MORPHINE SULFATE 0.5 MG/HR: 50 CAPSULE, EXTENDED RELEASE ORAL at 12:01

## 2022-01-01 RX ADMIN — Medication 1 APPLICATION(S): at 05:28

## 2022-01-01 RX ADMIN — CHLORHEXIDINE GLUCONATE 15 MILLILITER(S): 213 SOLUTION TOPICAL at 05:23

## 2022-01-01 RX ADMIN — Medication 500 MILLIGRAM(S): at 11:05

## 2022-01-01 RX ADMIN — CEFEPIME 100 MILLIGRAM(S): 1 INJECTION, POWDER, FOR SOLUTION INTRAMUSCULAR; INTRAVENOUS at 14:59

## 2022-01-01 RX ADMIN — Medication 81 MILLIGRAM(S): at 11:43

## 2022-01-01 RX ADMIN — PROPOFOL 0.02 MICROGRAM(S)/KG/MIN: 10 INJECTION, EMULSION INTRAVENOUS at 05:12

## 2022-01-01 RX ADMIN — FENTANYL CITRATE 50 MICROGRAM(S): 50 INJECTION INTRAVENOUS at 14:53

## 2022-01-01 RX ADMIN — Medication 100 MILLIGRAM(S): at 05:56

## 2022-01-01 RX ADMIN — SODIUM CHLORIDE 75 MILLILITER(S): 9 INJECTION, SOLUTION INTRAVENOUS at 12:15

## 2022-01-01 RX ADMIN — MORPHINE SULFATE 3 MILLIGRAM(S): 50 CAPSULE, EXTENDED RELEASE ORAL at 10:58

## 2022-01-01 RX ADMIN — Medication 1 APPLICATION(S): at 05:17

## 2022-01-01 RX ADMIN — ENOXAPARIN SODIUM 40 MILLIGRAM(S): 100 INJECTION SUBCUTANEOUS at 22:00

## 2022-01-01 RX ADMIN — CHLORHEXIDINE GLUCONATE 1 APPLICATION(S): 213 SOLUTION TOPICAL at 05:05

## 2022-01-01 RX ADMIN — ALBUTEROL 2 PUFF(S): 90 AEROSOL, METERED ORAL at 02:46

## 2022-01-01 RX ADMIN — SODIUM ZIRCONIUM CYCLOSILICATE 5 GRAM(S): 10 POWDER, FOR SUSPENSION ORAL at 06:43

## 2022-01-01 RX ADMIN — Medication 1 DROP(S): at 05:18

## 2022-01-01 RX ADMIN — CEFEPIME 100 MILLIGRAM(S): 1 INJECTION, POWDER, FOR SOLUTION INTRAMUSCULAR; INTRAVENOUS at 21:40

## 2022-01-01 RX ADMIN — CEFEPIME 100 MILLIGRAM(S): 1 INJECTION, POWDER, FOR SOLUTION INTRAMUSCULAR; INTRAVENOUS at 13:12

## 2022-01-01 RX ADMIN — MORPHINE SULFATE 3 MILLIGRAM(S): 50 CAPSULE, EXTENDED RELEASE ORAL at 05:31

## 2022-01-01 RX ADMIN — Medication 100 MILLIGRAM(S): at 22:00

## 2022-01-01 RX ADMIN — CHLORHEXIDINE GLUCONATE 1 APPLICATION(S): 213 SOLUTION TOPICAL at 11:24

## 2022-01-01 RX ADMIN — OXYCODONE HYDROCHLORIDE 10 MILLIGRAM(S): 5 TABLET ORAL at 07:00

## 2022-01-01 RX ADMIN — Medication 1 DROP(S): at 18:00

## 2022-01-01 RX ADMIN — ALBUTEROL 2 PUFF(S): 90 AEROSOL, METERED ORAL at 01:33

## 2022-01-01 RX ADMIN — CHLORHEXIDINE GLUCONATE 1 APPLICATION(S): 213 SOLUTION TOPICAL at 06:28

## 2022-01-01 RX ADMIN — POLYETHYLENE GLYCOL 3350 17 GRAM(S): 17 POWDER, FOR SOLUTION ORAL at 11:53

## 2022-01-01 RX ADMIN — CEFEPIME 100 MILLIGRAM(S): 1 INJECTION, POWDER, FOR SOLUTION INTRAMUSCULAR; INTRAVENOUS at 22:32

## 2022-01-01 RX ADMIN — PANTOPRAZOLE SODIUM 40 MILLIGRAM(S): 20 TABLET, DELAYED RELEASE ORAL at 11:31

## 2022-01-01 RX ADMIN — CHLORHEXIDINE GLUCONATE 1 APPLICATION(S): 213 SOLUTION TOPICAL at 05:15

## 2022-01-01 RX ADMIN — Medication 750 MILLIGRAM(S): at 05:15

## 2022-01-01 RX ADMIN — Medication 25 MILLIGRAM(S): at 17:36

## 2022-01-01 RX ADMIN — Medication 500 MILLIGRAM(S): at 11:23

## 2022-01-01 RX ADMIN — Medication 1 DROP(S): at 17:35

## 2022-01-01 RX ADMIN — Medication 750 MILLIGRAM(S): at 21:54

## 2022-01-01 RX ADMIN — CHLORHEXIDINE GLUCONATE 15 MILLILITER(S): 213 SOLUTION TOPICAL at 17:03

## 2022-01-01 RX ADMIN — MORPHINE SULFATE 3 MILLIGRAM(S): 50 CAPSULE, EXTENDED RELEASE ORAL at 10:15

## 2022-01-01 RX ADMIN — Medication 2: at 11:50

## 2022-01-01 RX ADMIN — Medication 750 MILLIGRAM(S): at 21:26

## 2022-01-01 RX ADMIN — POLYETHYLENE GLYCOL 3350 17 GRAM(S): 17 POWDER, FOR SOLUTION ORAL at 13:58

## 2022-01-01 RX ADMIN — SODIUM CHLORIDE 50 MILLILITER(S): 9 INJECTION, SOLUTION INTRAVENOUS at 20:30

## 2022-01-01 RX ADMIN — MEROPENEM 100 MILLIGRAM(S): 1 INJECTION INTRAVENOUS at 23:10

## 2022-01-01 RX ADMIN — PANTOPRAZOLE SODIUM 40 MILLIGRAM(S): 20 TABLET, DELAYED RELEASE ORAL at 12:03

## 2022-01-01 RX ADMIN — CHLORHEXIDINE GLUCONATE 15 MILLILITER(S): 213 SOLUTION TOPICAL at 05:32

## 2022-01-01 RX ADMIN — CHLORHEXIDINE GLUCONATE 1 APPLICATION(S): 213 SOLUTION TOPICAL at 08:43

## 2022-01-01 RX ADMIN — Medication 30 MILLIGRAM(S): at 06:00

## 2022-01-01 RX ADMIN — ACETAZOLAMIDE 110 MILLIGRAM(S): 250 TABLET ORAL at 11:34

## 2022-01-01 RX ADMIN — DEXMEDETOMIDINE HYDROCHLORIDE IN 0.9% SODIUM CHLORIDE 3.85 MICROGRAM(S)/KG/HR: 4 INJECTION INTRAVENOUS at 23:21

## 2022-01-01 RX ADMIN — Medication 125 MICROGRAM(S): at 05:33

## 2022-01-01 RX ADMIN — Medication 2 MILLIGRAM(S): at 06:27

## 2022-01-01 RX ADMIN — Medication 2: at 01:42

## 2022-01-01 RX ADMIN — PROPOFOL 0.02 MICROGRAM(S)/KG/MIN: 10 INJECTION, EMULSION INTRAVENOUS at 17:34

## 2022-01-01 RX ADMIN — Medication 100 MILLIGRAM(S): at 06:17

## 2022-01-01 RX ADMIN — FENTANYL CITRATE 4.5 MICROGRAM(S)/KG/HR: 50 INJECTION INTRAVENOUS at 05:43

## 2022-01-01 RX ADMIN — Medication 100 MILLIGRAM(S): at 05:43

## 2022-01-01 RX ADMIN — CEFEPIME 100 MILLIGRAM(S): 1 INJECTION, POWDER, FOR SOLUTION INTRAMUSCULAR; INTRAVENOUS at 14:25

## 2022-01-01 RX ADMIN — Medication 75 MEQ/KG/HR: at 05:31

## 2022-01-01 RX ADMIN — Medication 2: at 05:47

## 2022-01-01 RX ADMIN — MIDODRINE HYDROCHLORIDE 5 MILLIGRAM(S): 2.5 TABLET ORAL at 06:18

## 2022-01-01 RX ADMIN — Medication 1 DROP(S): at 05:59

## 2022-01-01 RX ADMIN — CEFEPIME 100 MILLIGRAM(S): 1 INJECTION, POWDER, FOR SOLUTION INTRAMUSCULAR; INTRAVENOUS at 05:49

## 2022-01-01 RX ADMIN — Medication 81 MILLIGRAM(S): at 12:30

## 2022-01-01 RX ADMIN — CHLORHEXIDINE GLUCONATE 15 MILLILITER(S): 213 SOLUTION TOPICAL at 05:17

## 2022-01-01 RX ADMIN — MIDODRINE HYDROCHLORIDE 5 MILLIGRAM(S): 2.5 TABLET ORAL at 22:33

## 2022-01-01 RX ADMIN — CHLORHEXIDINE GLUCONATE 15 MILLILITER(S): 213 SOLUTION TOPICAL at 05:40

## 2022-01-01 RX ADMIN — Medication 100 MILLIGRAM(S): at 17:45

## 2022-01-01 RX ADMIN — ATORVASTATIN CALCIUM 10 MILLIGRAM(S): 80 TABLET, FILM COATED ORAL at 21:18

## 2022-01-01 RX ADMIN — ENOXAPARIN SODIUM 40 MILLIGRAM(S): 100 INJECTION SUBCUTANEOUS at 22:41

## 2022-01-01 RX ADMIN — ENOXAPARIN SODIUM 40 MILLIGRAM(S): 100 INJECTION SUBCUTANEOUS at 12:30

## 2022-01-01 RX ADMIN — Medication 25 MILLIGRAM(S): at 17:50

## 2022-01-01 RX ADMIN — Medication 100 MILLIGRAM(S): at 18:10

## 2022-01-01 RX ADMIN — PANTOPRAZOLE SODIUM 40 MILLIGRAM(S): 20 TABLET, DELAYED RELEASE ORAL at 05:11

## 2022-01-01 RX ADMIN — SODIUM ZIRCONIUM CYCLOSILICATE 10 GRAM(S): 10 POWDER, FOR SUSPENSION ORAL at 06:18

## 2022-01-01 RX ADMIN — CEFEPIME 100 MILLIGRAM(S): 1 INJECTION, POWDER, FOR SOLUTION INTRAMUSCULAR; INTRAVENOUS at 05:50

## 2022-01-01 RX ADMIN — Medication 4: at 11:32

## 2022-01-01 RX ADMIN — ZINC SULFATE TAB 220 MG (50 MG ZINC EQUIVALENT) 220 MILLIGRAM(S): 220 (50 ZN) TAB at 11:05

## 2022-01-01 RX ADMIN — AMPICILLIN SODIUM AND SULBACTAM SODIUM 62.5 GRAM(S): 250; 125 INJECTION, POWDER, FOR SUSPENSION INTRAMUSCULAR; INTRAVENOUS at 00:22

## 2022-01-01 RX ADMIN — CHLORHEXIDINE GLUCONATE 15 MILLILITER(S): 213 SOLUTION TOPICAL at 06:39

## 2022-01-01 RX ADMIN — Medication 60 MILLIGRAM(S): at 05:15

## 2022-01-01 RX ADMIN — ALBUTEROL 2 PUFF(S): 90 AEROSOL, METERED ORAL at 22:31

## 2022-01-01 RX ADMIN — ENOXAPARIN SODIUM 40 MILLIGRAM(S): 100 INJECTION SUBCUTANEOUS at 11:31

## 2022-01-01 RX ADMIN — Medication 750 MILLIGRAM(S): at 05:02

## 2022-01-01 RX ADMIN — MORPHINE SULFATE 2 MILLIGRAM(S): 50 CAPSULE, EXTENDED RELEASE ORAL at 09:45

## 2022-01-01 RX ADMIN — Medication 6 MILLIGRAM(S): at 17:40

## 2022-01-01 RX ADMIN — ALBUTEROL 2 PUFF(S): 90 AEROSOL, METERED ORAL at 15:15

## 2022-01-01 RX ADMIN — Medication 2: at 18:13

## 2022-01-01 RX ADMIN — MIDODRINE HYDROCHLORIDE 10 MILLIGRAM(S): 2.5 TABLET ORAL at 17:50

## 2022-01-01 RX ADMIN — HEPARIN SODIUM 5000 UNIT(S): 5000 INJECTION INTRAVENOUS; SUBCUTANEOUS at 17:38

## 2022-01-01 RX ADMIN — HEPARIN SODIUM 5000 UNIT(S): 5000 INJECTION INTRAVENOUS; SUBCUTANEOUS at 17:49

## 2022-01-01 RX ADMIN — MIDAZOLAM HYDROCHLORIDE 1.54 MG/KG/HR: 1 INJECTION, SOLUTION INTRAMUSCULAR; INTRAVENOUS at 16:22

## 2022-01-01 RX ADMIN — Medication 8: at 23:31

## 2022-01-01 RX ADMIN — CHLORHEXIDINE GLUCONATE 15 MILLILITER(S): 213 SOLUTION TOPICAL at 05:43

## 2022-01-01 RX ADMIN — Medication 30 MILLIGRAM(S): at 17:14

## 2022-01-01 RX ADMIN — ENOXAPARIN SODIUM 40 MILLIGRAM(S): 100 INJECTION SUBCUTANEOUS at 12:10

## 2022-01-01 RX ADMIN — Medication 250 MILLIGRAM(S): at 05:16

## 2022-01-01 RX ADMIN — Medication 100 MILLIGRAM(S): at 05:08

## 2022-01-01 RX ADMIN — MIDODRINE HYDROCHLORIDE 10 MILLIGRAM(S): 2.5 TABLET ORAL at 16:16

## 2022-01-01 RX ADMIN — ALBUTEROL 2 PUFF(S): 90 AEROSOL, METERED ORAL at 07:44

## 2022-01-01 RX ADMIN — MORPHINE SULFATE 3 MILLIGRAM(S): 50 CAPSULE, EXTENDED RELEASE ORAL at 01:41

## 2022-01-01 RX ADMIN — Medication 1: at 06:00

## 2022-01-01 RX ADMIN — AMPICILLIN SODIUM AND SULBACTAM SODIUM 250 GRAM(S): 250; 125 INJECTION, POWDER, FOR SUSPENSION INTRAMUSCULAR; INTRAVENOUS at 23:04

## 2022-01-01 RX ADMIN — Medication 25 MILLIGRAM(S): at 06:27

## 2022-01-01 RX ADMIN — Medication 60 MILLIGRAM(S): at 05:08

## 2022-01-01 RX ADMIN — Medication 1 APPLICATION(S): at 05:16

## 2022-01-01 RX ADMIN — ATORVASTATIN CALCIUM 10 MILLIGRAM(S): 80 TABLET, FILM COATED ORAL at 21:54

## 2022-01-01 RX ADMIN — Medication 1 APPLICATION(S): at 17:17

## 2022-01-01 RX ADMIN — Medication 1 APPLICATION(S): at 06:45

## 2022-01-01 RX ADMIN — CHLORHEXIDINE GLUCONATE 15 MILLILITER(S): 213 SOLUTION TOPICAL at 05:34

## 2022-01-01 RX ADMIN — PANTOPRAZOLE SODIUM 40 MILLIGRAM(S): 20 TABLET, DELAYED RELEASE ORAL at 05:58

## 2022-01-01 RX ADMIN — POLYETHYLENE GLYCOL 3350 17 GRAM(S): 17 POWDER, FOR SOLUTION ORAL at 11:01

## 2022-01-01 RX ADMIN — CHLORHEXIDINE GLUCONATE 15 MILLILITER(S): 213 SOLUTION TOPICAL at 05:22

## 2022-01-01 RX ADMIN — FENTANYL CITRATE 4.5 MICROGRAM(S)/KG/HR: 50 INJECTION INTRAVENOUS at 22:42

## 2022-01-01 RX ADMIN — Medication 40 MILLIGRAM(S): at 17:37

## 2022-01-01 RX ADMIN — CHLORHEXIDINE GLUCONATE 1 APPLICATION(S): 213 SOLUTION TOPICAL at 05:08

## 2022-01-01 RX ADMIN — Medication 650 MILLIGRAM(S): at 04:49

## 2022-01-01 RX ADMIN — POLYETHYLENE GLYCOL 3350 17 GRAM(S): 17 POWDER, FOR SOLUTION ORAL at 12:00

## 2022-01-01 RX ADMIN — OXYCODONE HYDROCHLORIDE 10 MILLIGRAM(S): 5 TABLET ORAL at 18:42

## 2022-01-01 RX ADMIN — CHLORHEXIDINE GLUCONATE 1 APPLICATION(S): 213 SOLUTION TOPICAL at 12:41

## 2022-01-01 RX ADMIN — Medication 2: at 23:59

## 2022-01-01 RX ADMIN — Medication 30 MILLIGRAM(S): at 00:52

## 2022-01-01 RX ADMIN — Medication 6: at 01:38

## 2022-01-01 RX ADMIN — PANTOPRAZOLE SODIUM 40 MILLIGRAM(S): 20 TABLET, DELAYED RELEASE ORAL at 11:46

## 2022-01-01 RX ADMIN — Medication 2: at 05:13

## 2022-01-01 RX ADMIN — FENTANYL CITRATE 50 MICROGRAM(S): 50 INJECTION INTRAVENOUS at 15:33

## 2022-01-01 RX ADMIN — POLYETHYLENE GLYCOL 3350 17 GRAM(S): 17 POWDER, FOR SOLUTION ORAL at 11:23

## 2022-01-01 RX ADMIN — CHLORHEXIDINE GLUCONATE 1 APPLICATION(S): 213 SOLUTION TOPICAL at 11:57

## 2022-01-01 RX ADMIN — CHLORHEXIDINE GLUCONATE 1 APPLICATION(S): 213 SOLUTION TOPICAL at 11:49

## 2022-01-01 RX ADMIN — Medication 50 MILLIEQUIVALENT(S): at 13:36

## 2022-01-01 RX ADMIN — Medication 100 MILLIGRAM(S): at 17:25

## 2022-01-01 RX ADMIN — Medication 20 MILLIEQUIVALENT(S): at 12:32

## 2022-01-01 RX ADMIN — POLYETHYLENE GLYCOL 3350 17 GRAM(S): 17 POWDER, FOR SOLUTION ORAL at 12:08

## 2022-01-01 RX ADMIN — Medication 30 MILLIGRAM(S): at 05:21

## 2022-01-01 RX ADMIN — Medication 60 MILLIGRAM(S): at 05:34

## 2022-01-01 RX ADMIN — MIDODRINE HYDROCHLORIDE 10 MILLIGRAM(S): 2.5 TABLET ORAL at 17:16

## 2022-01-01 RX ADMIN — Medication 60 MILLIGRAM(S): at 13:48

## 2022-01-01 RX ADMIN — CEFEPIME 100 MILLIGRAM(S): 1 INJECTION, POWDER, FOR SOLUTION INTRAMUSCULAR; INTRAVENOUS at 23:16

## 2022-01-01 RX ADMIN — Medication 81 MILLIGRAM(S): at 11:56

## 2022-01-01 RX ADMIN — Medication 1 APPLICATION(S): at 17:48

## 2022-01-01 RX ADMIN — ENOXAPARIN SODIUM 40 MILLIGRAM(S): 100 INJECTION SUBCUTANEOUS at 22:34

## 2022-01-01 RX ADMIN — Medication 2: at 06:29

## 2022-01-01 RX ADMIN — Medication 4: at 00:29

## 2022-01-01 RX ADMIN — Medication 12.5 MILLIGRAM(S): at 17:59

## 2022-01-01 RX ADMIN — CEFEPIME 100 MILLIGRAM(S): 1 INJECTION, POWDER, FOR SOLUTION INTRAMUSCULAR; INTRAVENOUS at 14:33

## 2022-01-01 RX ADMIN — FENTANYL CITRATE 50 MICROGRAM(S): 50 INJECTION INTRAVENOUS at 01:30

## 2022-01-01 RX ADMIN — ALBUTEROL 2 PUFF(S): 90 AEROSOL, METERED ORAL at 15:04

## 2022-01-01 RX ADMIN — OXYCODONE HYDROCHLORIDE 10 MILLIGRAM(S): 5 TABLET ORAL at 00:05

## 2022-01-01 RX ADMIN — Medication 1 APPLICATION(S): at 06:44

## 2022-01-01 RX ADMIN — Medication 40 MILLIGRAM(S): at 08:27

## 2022-01-01 RX ADMIN — Medication 2 MILLIGRAM(S): at 06:18

## 2022-01-01 RX ADMIN — Medication 12.5 MILLIGRAM(S): at 17:36

## 2022-01-01 RX ADMIN — MORPHINE SULFATE 2 MILLIGRAM(S): 50 CAPSULE, EXTENDED RELEASE ORAL at 10:30

## 2022-01-01 RX ADMIN — FENTANYL CITRATE 4.5 MICROGRAM(S)/KG/HR: 50 INJECTION INTRAVENOUS at 20:04

## 2022-01-01 RX ADMIN — Medication 1 APPLICATION(S): at 17:18

## 2022-01-01 RX ADMIN — Medication 1 APPLICATION(S): at 17:19

## 2022-01-01 RX ADMIN — HEPARIN SODIUM 5000 UNIT(S): 5000 INJECTION INTRAVENOUS; SUBCUTANEOUS at 17:24

## 2022-01-01 RX ADMIN — MIDODRINE HYDROCHLORIDE 5 MILLIGRAM(S): 2.5 TABLET ORAL at 21:05

## 2022-01-01 RX ADMIN — PANTOPRAZOLE SODIUM 40 MILLIGRAM(S): 20 TABLET, DELAYED RELEASE ORAL at 12:04

## 2022-01-01 RX ADMIN — ALBUTEROL 2 PUFF(S): 90 AEROSOL, METERED ORAL at 20:23

## 2022-01-01 RX ADMIN — Medication 60 MILLIGRAM(S): at 17:33

## 2022-01-01 RX ADMIN — Medication 2: at 00:20

## 2022-01-01 RX ADMIN — Medication 25 MILLIGRAM(S): at 17:25

## 2022-01-01 RX ADMIN — Medication 1 DROP(S): at 07:01

## 2022-01-01 RX ADMIN — Medication 12.5 MILLIGRAM(S): at 17:43

## 2022-01-01 RX ADMIN — CEFEPIME 100 MILLIGRAM(S): 1 INJECTION, POWDER, FOR SOLUTION INTRAMUSCULAR; INTRAVENOUS at 21:38

## 2022-01-01 RX ADMIN — PANTOPRAZOLE SODIUM 40 MILLIGRAM(S): 20 TABLET, DELAYED RELEASE ORAL at 12:18

## 2022-01-01 RX ADMIN — Medication 25 MILLIGRAM(S): at 06:37

## 2022-01-01 RX ADMIN — Medication 6 MILLIGRAM(S): at 17:06

## 2022-01-01 RX ADMIN — Medication 25 MILLIGRAM(S): at 17:45

## 2022-01-01 RX ADMIN — Medication 81 MILLIGRAM(S): at 11:54

## 2022-01-01 RX ADMIN — Medication 6 MILLIGRAM(S): at 05:20

## 2022-01-01 RX ADMIN — Medication 2: at 00:04

## 2022-01-01 RX ADMIN — SODIUM CHLORIDE 500 MILLILITER(S): 9 INJECTION, SOLUTION INTRAVENOUS at 06:17

## 2022-01-01 RX ADMIN — Medication 12.5 MILLIGRAM(S): at 17:19

## 2022-01-01 RX ADMIN — Medication 6 MILLIGRAM(S): at 06:20

## 2022-01-01 RX ADMIN — CEFEPIME 100 MILLIGRAM(S): 1 INJECTION, POWDER, FOR SOLUTION INTRAMUSCULAR; INTRAVENOUS at 05:20

## 2022-01-01 RX ADMIN — Medication 81 MILLIGRAM(S): at 11:46

## 2022-01-01 RX ADMIN — Medication 1 DROP(S): at 05:16

## 2022-01-01 RX ADMIN — POLYETHYLENE GLYCOL 3350 17 GRAM(S): 17 POWDER, FOR SOLUTION ORAL at 11:58

## 2022-01-01 RX ADMIN — MIDAZOLAM HYDROCHLORIDE 1.54 MG/KG/HR: 1 INJECTION, SOLUTION INTRAMUSCULAR; INTRAVENOUS at 04:19

## 2022-01-01 RX ADMIN — PROPOFOL 25 MILLIGRAM(S): 10 INJECTION, EMULSION INTRAVENOUS at 12:40

## 2022-01-01 RX ADMIN — Medication 650 MILLIGRAM(S): at 11:45

## 2022-01-01 RX ADMIN — PIPERACILLIN AND TAZOBACTAM 200 GRAM(S): 4; .5 INJECTION, POWDER, LYOPHILIZED, FOR SOLUTION INTRAVENOUS at 06:35

## 2022-01-01 RX ADMIN — SODIUM CHLORIDE 50 MILLILITER(S): 9 INJECTION, SOLUTION INTRAVENOUS at 05:07

## 2022-01-01 RX ADMIN — ALBUTEROL 2 PUFF(S): 90 AEROSOL, METERED ORAL at 03:13

## 2022-01-01 RX ADMIN — PANTOPRAZOLE SODIUM 40 MILLIGRAM(S): 20 TABLET, DELAYED RELEASE ORAL at 11:53

## 2022-01-01 RX ADMIN — FENTANYL CITRATE 4.5 MICROGRAM(S)/KG/HR: 50 INJECTION INTRAVENOUS at 09:55

## 2022-01-01 RX ADMIN — Medication 30 MILLIGRAM(S): at 05:55

## 2022-01-01 RX ADMIN — Medication 25 MILLIGRAM(S): at 17:22

## 2022-01-01 RX ADMIN — DEXMEDETOMIDINE HYDROCHLORIDE IN 0.9% SODIUM CHLORIDE 3.85 MICROGRAM(S)/KG/HR: 4 INJECTION INTRAVENOUS at 10:47

## 2022-01-01 RX ADMIN — Medication 1 APPLICATION(S): at 17:34

## 2022-01-01 RX ADMIN — Medication 250 MILLIGRAM(S): at 06:44

## 2022-01-01 RX ADMIN — Medication 250 MILLIGRAM(S): at 07:11

## 2022-01-01 RX ADMIN — Medication 100 MILLIGRAM(S): at 18:20

## 2022-01-01 RX ADMIN — HEPARIN SODIUM 5000 UNIT(S): 5000 INJECTION INTRAVENOUS; SUBCUTANEOUS at 05:27

## 2022-01-01 RX ADMIN — Medication 1 APPLICATION(S): at 05:08

## 2022-01-01 RX ADMIN — Medication 0: at 23:54

## 2022-01-01 RX ADMIN — Medication 81 MILLIGRAM(S): at 11:30

## 2022-01-01 RX ADMIN — Medication 81 MILLIGRAM(S): at 11:38

## 2022-01-01 RX ADMIN — MIDODRINE HYDROCHLORIDE 10 MILLIGRAM(S): 2.5 TABLET ORAL at 00:50

## 2022-01-01 RX ADMIN — ATORVASTATIN CALCIUM 10 MILLIGRAM(S): 80 TABLET, FILM COATED ORAL at 21:47

## 2022-01-01 RX ADMIN — Medication 25 MILLIGRAM(S): at 17:13

## 2022-01-01 RX ADMIN — Medication 2: at 01:22

## 2022-01-01 RX ADMIN — PANTOPRAZOLE SODIUM 40 MILLIGRAM(S): 20 TABLET, DELAYED RELEASE ORAL at 11:29

## 2022-01-01 RX ADMIN — ALBUTEROL 2 PUFF(S): 90 AEROSOL, METERED ORAL at 01:45

## 2022-01-01 RX ADMIN — PANTOPRAZOLE SODIUM 40 MILLIGRAM(S): 20 TABLET, DELAYED RELEASE ORAL at 12:00

## 2022-01-01 RX ADMIN — CHLORHEXIDINE GLUCONATE 15 MILLILITER(S): 213 SOLUTION TOPICAL at 17:51

## 2022-01-01 RX ADMIN — ERYTHROPOIETIN 20000 UNIT(S): 10000 INJECTION, SOLUTION INTRAVENOUS; SUBCUTANEOUS at 17:13

## 2022-01-01 RX ADMIN — MEROPENEM 100 MILLIGRAM(S): 1 INJECTION INTRAVENOUS at 18:14

## 2022-01-01 RX ADMIN — Medication 650 MILLIGRAM(S): at 17:38

## 2022-01-01 RX ADMIN — PANTOPRAZOLE SODIUM 40 MILLIGRAM(S): 20 TABLET, DELAYED RELEASE ORAL at 05:03

## 2022-01-01 RX ADMIN — CHLORHEXIDINE GLUCONATE 15 MILLILITER(S): 213 SOLUTION TOPICAL at 17:11

## 2022-01-01 RX ADMIN — Medication 81 MILLIGRAM(S): at 11:11

## 2022-01-01 RX ADMIN — ALBUTEROL 2 PUFF(S): 90 AEROSOL, METERED ORAL at 20:14

## 2022-01-01 RX ADMIN — ALBUTEROL 2 PUFF(S): 90 AEROSOL, METERED ORAL at 19:56

## 2022-01-01 RX ADMIN — PANTOPRAZOLE SODIUM 40 MILLIGRAM(S): 20 TABLET, DELAYED RELEASE ORAL at 05:16

## 2022-01-01 RX ADMIN — Medication 1 APPLICATION(S): at 17:16

## 2022-01-01 RX ADMIN — Medication 2: at 05:46

## 2022-01-01 RX ADMIN — FENTANYL CITRATE 3.85 MICROGRAM(S)/KG/HR: 50 INJECTION INTRAVENOUS at 01:54

## 2022-01-01 RX ADMIN — Medication 2: at 17:51

## 2022-01-01 RX ADMIN — Medication 75 MEQ/KG/HR: at 21:18

## 2022-01-01 RX ADMIN — ALBUTEROL 2 PUFF(S): 90 AEROSOL, METERED ORAL at 01:39

## 2022-01-01 RX ADMIN — CHLORHEXIDINE GLUCONATE 1 APPLICATION(S): 213 SOLUTION TOPICAL at 11:05

## 2022-01-01 RX ADMIN — PROPOFOL 0.02 MICROGRAM(S)/KG/MIN: 10 INJECTION, EMULSION INTRAVENOUS at 12:16

## 2022-01-01 RX ADMIN — SODIUM ZIRCONIUM CYCLOSILICATE 10 GRAM(S): 10 POWDER, FOR SUSPENSION ORAL at 21:33

## 2022-01-01 RX ADMIN — CHLORHEXIDINE GLUCONATE 1 APPLICATION(S): 213 SOLUTION TOPICAL at 06:07

## 2022-01-01 RX ADMIN — Medication 25 MILLIGRAM(S): at 06:06

## 2022-01-01 RX ADMIN — CEFEPIME 100 MILLIGRAM(S): 1 INJECTION, POWDER, FOR SOLUTION INTRAMUSCULAR; INTRAVENOUS at 13:27

## 2022-01-01 RX ADMIN — Medication 30 MILLIGRAM(S): at 05:34

## 2022-01-01 RX ADMIN — Medication 60 MILLIGRAM(S): at 17:24

## 2022-01-01 RX ADMIN — ALBUTEROL 2 PUFF(S): 90 AEROSOL, METERED ORAL at 07:46

## 2022-01-01 RX ADMIN — Medication 81 MILLIGRAM(S): at 12:00

## 2022-01-01 RX ADMIN — ENOXAPARIN SODIUM 40 MILLIGRAM(S): 100 INJECTION SUBCUTANEOUS at 11:42

## 2022-01-01 RX ADMIN — CHLORHEXIDINE GLUCONATE 15 MILLILITER(S): 213 SOLUTION TOPICAL at 17:28

## 2022-01-01 RX ADMIN — CISATRACURIUM BESYLATE 13.8 MICROGRAM(S)/KG/MIN: 2 INJECTION INTRAVENOUS at 01:23

## 2022-01-01 RX ADMIN — Medication 2: at 06:35

## 2022-01-01 RX ADMIN — CEFEPIME 100 MILLIGRAM(S): 1 INJECTION, POWDER, FOR SOLUTION INTRAMUSCULAR; INTRAVENOUS at 05:08

## 2022-01-01 RX ADMIN — Medication 81 MILLIGRAM(S): at 12:43

## 2022-01-01 RX ADMIN — Medication 40 MILLIGRAM(S): at 06:30

## 2022-01-01 RX ADMIN — MORPHINE SULFATE 2 MILLIGRAM(S): 50 CAPSULE, EXTENDED RELEASE ORAL at 14:10

## 2022-01-01 RX ADMIN — MORPHINE SULFATE 2 MILLIGRAM(S): 50 CAPSULE, EXTENDED RELEASE ORAL at 22:53

## 2022-01-01 RX ADMIN — AMIKACIN SULFATE 255 MILLIGRAM(S): 250 INJECTION, SOLUTION INTRAMUSCULAR; INTRAVENOUS at 21:15

## 2022-01-01 RX ADMIN — Medication 750 MILLIGRAM(S): at 13:49

## 2022-01-01 RX ADMIN — POLYETHYLENE GLYCOL 3350 17 GRAM(S): 17 POWDER, FOR SOLUTION ORAL at 11:28

## 2022-01-01 RX ADMIN — ALBUTEROL 2 PUFF(S): 90 AEROSOL, METERED ORAL at 20:26

## 2022-01-01 RX ADMIN — CHLORHEXIDINE GLUCONATE 15 MILLILITER(S): 213 SOLUTION TOPICAL at 17:36

## 2022-01-01 RX ADMIN — PANTOPRAZOLE SODIUM 40 MILLIGRAM(S): 20 TABLET, DELAYED RELEASE ORAL at 17:26

## 2022-01-01 RX ADMIN — Medication 6 MILLIGRAM(S): at 05:11

## 2022-01-01 RX ADMIN — Medication 25 MILLIGRAM(S): at 19:01

## 2022-01-01 RX ADMIN — FENTANYL CITRATE 4.5 MICROGRAM(S)/KG/HR: 50 INJECTION INTRAVENOUS at 19:56

## 2022-01-01 RX ADMIN — CEFEPIME 100 MILLIGRAM(S): 1 INJECTION, POWDER, FOR SOLUTION INTRAMUSCULAR; INTRAVENOUS at 22:00

## 2022-01-01 RX ADMIN — AMPICILLIN SODIUM AND SULBACTAM SODIUM 250 GRAM(S): 250; 125 INJECTION, POWDER, FOR SUSPENSION INTRAMUSCULAR; INTRAVENOUS at 21:49

## 2022-01-01 RX ADMIN — POLYETHYLENE GLYCOL 3350 17 GRAM(S): 17 POWDER, FOR SOLUTION ORAL at 11:05

## 2022-01-01 RX ADMIN — OXYCODONE HYDROCHLORIDE 10 MILLIGRAM(S): 5 TABLET ORAL at 11:54

## 2022-01-01 RX ADMIN — Medication 60 MILLIGRAM(S): at 17:53

## 2022-01-01 RX ADMIN — Medication 6 MILLIGRAM(S): at 05:31

## 2022-01-01 RX ADMIN — CHLORHEXIDINE GLUCONATE 15 MILLILITER(S): 213 SOLUTION TOPICAL at 21:58

## 2022-01-01 RX ADMIN — Medication 125 MICROGRAM(S): at 05:07

## 2022-01-01 RX ADMIN — CHLORHEXIDINE GLUCONATE 15 MILLILITER(S): 213 SOLUTION TOPICAL at 17:39

## 2022-01-01 RX ADMIN — PANTOPRAZOLE SODIUM 40 MILLIGRAM(S): 20 TABLET, DELAYED RELEASE ORAL at 05:07

## 2022-01-01 RX ADMIN — Medication 4: at 12:14

## 2022-01-01 RX ADMIN — PANTOPRAZOLE SODIUM 40 MILLIGRAM(S): 20 TABLET, DELAYED RELEASE ORAL at 12:05

## 2022-01-01 RX ADMIN — SODIUM ZIRCONIUM CYCLOSILICATE 10 GRAM(S): 10 POWDER, FOR SUSPENSION ORAL at 12:00

## 2022-01-01 RX ADMIN — ALBUTEROL 2 PUFF(S): 90 AEROSOL, METERED ORAL at 08:48

## 2022-01-01 RX ADMIN — Medication 1 APPLICATION(S): at 17:38

## 2022-01-01 RX ADMIN — Medication 1 APPLICATION(S): at 05:02

## 2022-01-01 RX ADMIN — Medication 100 MILLIGRAM(S): at 18:04

## 2022-01-01 RX ADMIN — CEFEPIME 100 MILLIGRAM(S): 1 INJECTION, POWDER, FOR SOLUTION INTRAMUSCULAR; INTRAVENOUS at 21:33

## 2022-01-01 RX ADMIN — Medication 75 MEQ/KG/HR: at 16:16

## 2022-01-01 RX ADMIN — ENOXAPARIN SODIUM 100 MILLIGRAM(S): 100 INJECTION SUBCUTANEOUS at 05:30

## 2022-01-01 RX ADMIN — Medication 25 MILLIGRAM(S): at 06:09

## 2022-01-01 RX ADMIN — PANTOPRAZOLE SODIUM 40 MILLIGRAM(S): 20 TABLET, DELAYED RELEASE ORAL at 17:16

## 2022-01-01 RX ADMIN — Medication 4: at 23:36

## 2022-01-01 RX ADMIN — FENTANYL CITRATE 4.5 MICROGRAM(S)/KG/HR: 50 INJECTION INTRAVENOUS at 21:23

## 2022-01-01 RX ADMIN — PANTOPRAZOLE SODIUM 40 MILLIGRAM(S): 20 TABLET, DELAYED RELEASE ORAL at 11:44

## 2022-01-01 RX ADMIN — CHLORHEXIDINE GLUCONATE 15 MILLILITER(S): 213 SOLUTION TOPICAL at 06:52

## 2022-01-01 RX ADMIN — MIDAZOLAM HYDROCHLORIDE 1.54 MG/KG/HR: 1 INJECTION, SOLUTION INTRAMUSCULAR; INTRAVENOUS at 16:17

## 2022-01-01 RX ADMIN — MIDODRINE HYDROCHLORIDE 5 MILLIGRAM(S): 2.5 TABLET ORAL at 13:05

## 2022-01-01 RX ADMIN — Medication 40 MILLIGRAM(S): at 17:32

## 2022-01-01 RX ADMIN — CHLORHEXIDINE GLUCONATE 15 MILLILITER(S): 213 SOLUTION TOPICAL at 19:01

## 2022-01-01 RX ADMIN — Medication 81 MILLIGRAM(S): at 13:05

## 2022-01-01 RX ADMIN — PANTOPRAZOLE SODIUM 40 MILLIGRAM(S): 20 TABLET, DELAYED RELEASE ORAL at 12:58

## 2022-01-01 RX ADMIN — MORPHINE SULFATE 3 MILLIGRAM(S): 50 CAPSULE, EXTENDED RELEASE ORAL at 17:22

## 2022-01-01 RX ADMIN — Medication 650 MILLIGRAM(S): at 10:31

## 2022-01-01 RX ADMIN — Medication 2: at 00:06

## 2022-01-01 RX ADMIN — CHLORHEXIDINE GLUCONATE 15 MILLILITER(S): 213 SOLUTION TOPICAL at 17:33

## 2022-01-01 RX ADMIN — Medication 2: at 05:56

## 2022-01-01 RX ADMIN — CHLORHEXIDINE GLUCONATE 15 MILLILITER(S): 213 SOLUTION TOPICAL at 17:15

## 2022-01-01 RX ADMIN — Medication 1 APPLICATION(S): at 17:37

## 2022-01-01 RX ADMIN — Medication 650 MILLIGRAM(S): at 09:02

## 2022-01-01 RX ADMIN — Medication 650 MILLIGRAM(S): at 23:02

## 2022-01-01 RX ADMIN — CEFEPIME 100 MILLIGRAM(S): 1 INJECTION, POWDER, FOR SOLUTION INTRAMUSCULAR; INTRAVENOUS at 12:04

## 2022-01-01 RX ADMIN — INSULIN HUMAN 10 UNIT(S): 100 INJECTION, SOLUTION SUBCUTANEOUS at 13:18

## 2022-01-01 RX ADMIN — Medication 1 APPLICATION(S): at 05:58

## 2022-01-01 RX ADMIN — Medication 6 MILLIGRAM(S): at 17:31

## 2022-01-01 RX ADMIN — AMPICILLIN SODIUM AND SULBACTAM SODIUM 250 GRAM(S): 250; 125 INJECTION, POWDER, FOR SUSPENSION INTRAMUSCULAR; INTRAVENOUS at 05:46

## 2022-01-01 RX ADMIN — CHLORHEXIDINE GLUCONATE 1 APPLICATION(S): 213 SOLUTION TOPICAL at 05:02

## 2022-01-01 RX ADMIN — CEFEPIME 100 MILLIGRAM(S): 1 INJECTION, POWDER, FOR SOLUTION INTRAMUSCULAR; INTRAVENOUS at 06:19

## 2022-01-01 RX ADMIN — MORPHINE SULFATE 2 MILLIGRAM(S): 50 CAPSULE, EXTENDED RELEASE ORAL at 09:02

## 2022-01-01 RX ADMIN — ALBUTEROL 2 PUFF(S): 90 AEROSOL, METERED ORAL at 07:38

## 2022-01-01 RX ADMIN — MORPHINE SULFATE 2 MILLIGRAM(S): 50 CAPSULE, EXTENDED RELEASE ORAL at 18:06

## 2022-01-01 RX ADMIN — Medication 81 MILLIGRAM(S): at 12:51

## 2022-01-01 RX ADMIN — CEFEPIME 100 MILLIGRAM(S): 1 INJECTION, POWDER, FOR SOLUTION INTRAMUSCULAR; INTRAVENOUS at 13:36

## 2022-01-01 RX ADMIN — ATORVASTATIN CALCIUM 10 MILLIGRAM(S): 80 TABLET, FILM COATED ORAL at 21:51

## 2022-01-01 RX ADMIN — FENTANYL CITRATE 50 MICROGRAM(S): 50 INJECTION INTRAVENOUS at 08:46

## 2022-01-01 RX ADMIN — CEFEPIME 100 MILLIGRAM(S): 1 INJECTION, POWDER, FOR SOLUTION INTRAMUSCULAR; INTRAVENOUS at 06:49

## 2022-01-01 RX ADMIN — Medication 50 MILLILITER(S): at 16:24

## 2022-01-01 RX ADMIN — Medication 6: at 12:16

## 2022-01-01 RX ADMIN — PANTOPRAZOLE SODIUM 40 MILLIGRAM(S): 20 TABLET, DELAYED RELEASE ORAL at 13:37

## 2022-01-01 RX ADMIN — FENTANYL CITRATE 50 MICROGRAM(S): 50 INJECTION INTRAVENOUS at 13:44

## 2022-01-01 RX ADMIN — DEXMEDETOMIDINE HYDROCHLORIDE IN 0.9% SODIUM CHLORIDE 3.85 MICROGRAM(S)/KG/HR: 4 INJECTION INTRAVENOUS at 06:41

## 2022-01-01 RX ADMIN — MIDODRINE HYDROCHLORIDE 5 MILLIGRAM(S): 2.5 TABLET ORAL at 05:50

## 2022-01-01 RX ADMIN — Medication 25 MILLIGRAM(S): at 06:45

## 2022-01-01 RX ADMIN — Medication 1 APPLICATION(S): at 17:41

## 2022-01-01 RX ADMIN — POLYETHYLENE GLYCOL 3350 17 GRAM(S): 17 POWDER, FOR SOLUTION ORAL at 12:53

## 2022-01-01 RX ADMIN — Medication 81 MILLIGRAM(S): at 12:04

## 2022-01-01 RX ADMIN — Medication 25 MILLIGRAM(S): at 05:02

## 2022-01-01 RX ADMIN — Medication 6 MILLIGRAM(S): at 18:09

## 2022-01-01 RX ADMIN — Medication 125 MICROGRAM(S): at 05:15

## 2022-01-01 RX ADMIN — CEFEPIME 100 MILLIGRAM(S): 1 INJECTION, POWDER, FOR SOLUTION INTRAMUSCULAR; INTRAVENOUS at 13:29

## 2022-01-01 RX ADMIN — Medication 1: at 11:28

## 2022-01-01 RX ADMIN — MIDAZOLAM HYDROCHLORIDE 1.54 MG/KG/HR: 1 INJECTION, SOLUTION INTRAMUSCULAR; INTRAVENOUS at 13:15

## 2022-01-01 RX ADMIN — Medication 1 DROP(S): at 05:04

## 2022-01-01 RX ADMIN — Medication 750 MILLIGRAM(S): at 14:13

## 2022-01-01 RX ADMIN — Medication 81 MILLIGRAM(S): at 12:05

## 2022-01-01 RX ADMIN — Medication 100 MILLIGRAM(S): at 18:13

## 2022-01-01 RX ADMIN — ENOXAPARIN SODIUM 40 MILLIGRAM(S): 100 INJECTION SUBCUTANEOUS at 12:52

## 2022-01-01 RX ADMIN — FENTANYL CITRATE 50 MICROGRAM(S): 50 INJECTION INTRAVENOUS at 21:51

## 2022-01-01 RX ADMIN — CHLORHEXIDINE GLUCONATE 1 APPLICATION(S): 213 SOLUTION TOPICAL at 13:29

## 2022-01-01 RX ADMIN — Medication 2: at 06:38

## 2022-01-01 RX ADMIN — Medication 6 MILLIGRAM(S): at 17:03

## 2022-01-01 RX ADMIN — ENOXAPARIN SODIUM 40 MILLIGRAM(S): 100 INJECTION SUBCUTANEOUS at 12:41

## 2022-01-01 RX ADMIN — Medication 2: at 12:41

## 2022-01-01 RX ADMIN — Medication 250 MILLIGRAM(S): at 05:03

## 2022-01-01 RX ADMIN — Medication 25 MILLIGRAM(S): at 06:40

## 2022-01-01 RX ADMIN — PANTOPRAZOLE SODIUM 40 MILLIGRAM(S): 20 TABLET, DELAYED RELEASE ORAL at 11:13

## 2022-01-01 RX ADMIN — MORPHINE SULFATE 2 MILLIGRAM(S): 50 CAPSULE, EXTENDED RELEASE ORAL at 09:41

## 2022-01-01 RX ADMIN — CEFEPIME 100 MILLIGRAM(S): 1 INJECTION, POWDER, FOR SOLUTION INTRAMUSCULAR; INTRAVENOUS at 15:23

## 2022-01-01 RX ADMIN — Medication 81 MILLIGRAM(S): at 12:58

## 2022-01-01 RX ADMIN — Medication 5 MG/HR: at 17:34

## 2022-01-01 RX ADMIN — Medication 1: at 17:15

## 2022-01-01 RX ADMIN — Medication 30 MILLIGRAM(S): at 23:10

## 2022-01-01 RX ADMIN — Medication 81 MILLIGRAM(S): at 11:25

## 2022-01-01 RX ADMIN — Medication 2: at 06:21

## 2022-01-01 RX ADMIN — PANTOPRAZOLE SODIUM 40 MILLIGRAM(S): 20 TABLET, DELAYED RELEASE ORAL at 11:43

## 2022-01-01 RX ADMIN — Medication 81 MILLIGRAM(S): at 11:00

## 2022-01-01 RX ADMIN — ATORVASTATIN CALCIUM 10 MILLIGRAM(S): 80 TABLET, FILM COATED ORAL at 21:04

## 2022-01-01 RX ADMIN — CHLORHEXIDINE GLUCONATE 15 MILLILITER(S): 213 SOLUTION TOPICAL at 06:59

## 2022-01-01 RX ADMIN — AMPICILLIN SODIUM AND SULBACTAM SODIUM 62.5 GRAM(S): 250; 125 INJECTION, POWDER, FOR SUSPENSION INTRAMUSCULAR; INTRAVENOUS at 13:21

## 2022-01-01 RX ADMIN — CHLORHEXIDINE GLUCONATE 15 MILLILITER(S): 213 SOLUTION TOPICAL at 17:38

## 2022-01-01 RX ADMIN — Medication 81 MILLIGRAM(S): at 11:22

## 2022-01-01 RX ADMIN — Medication 60 MILLIGRAM(S): at 05:36

## 2022-01-01 RX ADMIN — Medication 1 APPLICATION(S): at 17:13

## 2022-01-01 RX ADMIN — Medication 650 MILLIGRAM(S): at 17:36

## 2022-01-01 RX ADMIN — Medication 12.5 MILLIGRAM(S): at 17:34

## 2022-01-01 RX ADMIN — Medication 1 APPLICATION(S): at 09:19

## 2022-01-01 RX ADMIN — Medication 2: at 17:12

## 2022-01-01 RX ADMIN — AMPICILLIN SODIUM AND SULBACTAM SODIUM 250 GRAM(S): 250; 125 INJECTION, POWDER, FOR SUSPENSION INTRAMUSCULAR; INTRAVENOUS at 13:58

## 2022-01-01 RX ADMIN — Medication 4: at 11:20

## 2022-01-01 RX ADMIN — OXYCODONE HYDROCHLORIDE 10 MILLIGRAM(S): 5 TABLET ORAL at 01:46

## 2022-01-01 RX ADMIN — FENTANYL CITRATE 50 MICROGRAM(S): 50 INJECTION INTRAVENOUS at 09:15

## 2022-01-01 RX ADMIN — MIDODRINE HYDROCHLORIDE 5 MILLIGRAM(S): 2.5 TABLET ORAL at 21:39

## 2022-01-01 RX ADMIN — POLYETHYLENE GLYCOL 3350 17 GRAM(S): 17 POWDER, FOR SOLUTION ORAL at 12:09

## 2022-01-01 RX ADMIN — MIDODRINE HYDROCHLORIDE 5 MILLIGRAM(S): 2.5 TABLET ORAL at 17:02

## 2022-01-01 RX ADMIN — Medication 1 APPLICATION(S): at 18:54

## 2022-01-01 RX ADMIN — Medication 30 MILLIGRAM(S): at 06:18

## 2022-01-01 RX ADMIN — HEPARIN SODIUM 5000 UNIT(S): 5000 INJECTION INTRAVENOUS; SUBCUTANEOUS at 05:07

## 2022-01-01 RX ADMIN — ALBUTEROL 2 PUFF(S): 90 AEROSOL, METERED ORAL at 13:30

## 2022-01-01 RX ADMIN — MIDAZOLAM HYDROCHLORIDE 10 MILLIGRAM(S): 1 INJECTION, SOLUTION INTRAMUSCULAR; INTRAVENOUS at 01:33

## 2022-01-01 RX ADMIN — Medication 6 MILLIGRAM(S): at 17:12

## 2022-01-01 RX ADMIN — PANTOPRAZOLE SODIUM 40 MILLIGRAM(S): 20 TABLET, DELAYED RELEASE ORAL at 12:22

## 2022-01-01 RX ADMIN — Medication 750 MILLIGRAM(S): at 21:49

## 2022-01-01 RX ADMIN — OXYCODONE HYDROCHLORIDE 10 MILLIGRAM(S): 5 TABLET ORAL at 17:35

## 2022-01-01 RX ADMIN — CEFEPIME 100 MILLIGRAM(S): 1 INJECTION, POWDER, FOR SOLUTION INTRAMUSCULAR; INTRAVENOUS at 21:08

## 2022-01-01 RX ADMIN — Medication 1 DROP(S): at 17:37

## 2022-01-01 RX ADMIN — AMPICILLIN SODIUM AND SULBACTAM SODIUM 250 GRAM(S): 250; 125 INJECTION, POWDER, FOR SUSPENSION INTRAMUSCULAR; INTRAVENOUS at 22:46

## 2022-01-01 RX ADMIN — Medication 750 MILLIGRAM(S): at 21:22

## 2022-01-01 RX ADMIN — Medication 100 MILLIGRAM(S): at 17:22

## 2022-01-01 RX ADMIN — Medication 81 MILLIGRAM(S): at 12:25

## 2022-01-01 RX ADMIN — CEFTRIAXONE 100 MILLIGRAM(S): 500 INJECTION, POWDER, FOR SOLUTION INTRAMUSCULAR; INTRAVENOUS at 02:58

## 2022-01-01 RX ADMIN — CHLORHEXIDINE GLUCONATE 15 MILLILITER(S): 213 SOLUTION TOPICAL at 17:56

## 2022-01-01 RX ADMIN — MORPHINE SULFATE 1 MG/HR: 50 CAPSULE, EXTENDED RELEASE ORAL at 14:50

## 2022-01-01 RX ADMIN — PANTOPRAZOLE SODIUM 40 MILLIGRAM(S): 20 TABLET, DELAYED RELEASE ORAL at 13:20

## 2022-01-01 RX ADMIN — MEROPENEM 100 MILLIGRAM(S): 1 INJECTION INTRAVENOUS at 22:48

## 2022-01-01 RX ADMIN — MIDAZOLAM HYDROCHLORIDE 1.54 MG/KG/HR: 1 INJECTION, SOLUTION INTRAMUSCULAR; INTRAVENOUS at 21:21

## 2022-01-01 RX ADMIN — MIDODRINE HYDROCHLORIDE 5 MILLIGRAM(S): 2.5 TABLET ORAL at 06:40

## 2022-01-01 RX ADMIN — CEFIDEROCOL SULFATE TOSYLATE 33.33 MILLIGRAM(S): 1 INJECTION, POWDER, FOR SOLUTION INTRAVENOUS at 21:26

## 2022-01-01 RX ADMIN — Medication 1: at 07:49

## 2022-01-01 RX ADMIN — CEFEPIME 100 MILLIGRAM(S): 1 INJECTION, POWDER, FOR SOLUTION INTRAMUSCULAR; INTRAVENOUS at 22:30

## 2022-01-01 RX ADMIN — Medication 1 APPLICATION(S): at 17:06

## 2022-01-01 RX ADMIN — OXYCODONE HYDROCHLORIDE 10 MILLIGRAM(S): 5 TABLET ORAL at 00:22

## 2022-01-01 RX ADMIN — SODIUM CHLORIDE 60 MILLILITER(S): 9 INJECTION, SOLUTION INTRAVENOUS at 09:35

## 2022-01-01 RX ADMIN — POLYETHYLENE GLYCOL 3350 17 GRAM(S): 17 POWDER, FOR SOLUTION ORAL at 11:37

## 2022-01-01 RX ADMIN — Medication 1 APPLICATION(S): at 17:09

## 2022-01-01 RX ADMIN — Medication 4: at 06:27

## 2022-01-01 RX ADMIN — Medication 12.5 MILLIGRAM(S): at 05:37

## 2022-01-01 RX ADMIN — Medication 6 MILLIGRAM(S): at 18:34

## 2022-01-01 RX ADMIN — MIDODRINE HYDROCHLORIDE 10 MILLIGRAM(S): 2.5 TABLET ORAL at 07:45

## 2022-01-01 RX ADMIN — ZINC SULFATE TAB 220 MG (50 MG ZINC EQUIVALENT) 220 MILLIGRAM(S): 220 (50 ZN) TAB at 12:43

## 2022-01-01 RX ADMIN — CHLORHEXIDINE GLUCONATE 15 MILLILITER(S): 213 SOLUTION TOPICAL at 17:31

## 2022-01-01 RX ADMIN — Medication 100 MILLIGRAM(S): at 18:31

## 2022-01-01 RX ADMIN — Medication 125 MICROGRAM(S): at 05:21

## 2022-01-01 RX ADMIN — CEFEPIME 100 MILLIGRAM(S): 1 INJECTION, POWDER, FOR SOLUTION INTRAMUSCULAR; INTRAVENOUS at 21:21

## 2022-01-01 RX ADMIN — MIDODRINE HYDROCHLORIDE 5 MILLIGRAM(S): 2.5 TABLET ORAL at 06:43

## 2022-01-01 RX ADMIN — Medication 1 APPLICATION(S): at 05:18

## 2022-01-01 RX ADMIN — Medication 8: at 12:15

## 2022-01-01 RX ADMIN — PANTOPRAZOLE SODIUM 40 MILLIGRAM(S): 20 TABLET, DELAYED RELEASE ORAL at 05:34

## 2022-01-01 RX ADMIN — PANTOPRAZOLE SODIUM 40 MILLIGRAM(S): 20 TABLET, DELAYED RELEASE ORAL at 17:19

## 2022-01-01 RX ADMIN — Medication 100 MILLIGRAM(S): at 19:00

## 2022-01-01 RX ADMIN — MIDODRINE HYDROCHLORIDE 5 MILLIGRAM(S): 2.5 TABLET ORAL at 14:45

## 2022-01-01 RX ADMIN — Medication 25 MILLIGRAM(S): at 06:18

## 2022-01-01 RX ADMIN — Medication 30 MILLIGRAM(S): at 13:36

## 2022-01-01 RX ADMIN — Medication 81 MILLIGRAM(S): at 14:28

## 2022-01-01 RX ADMIN — CHLORHEXIDINE GLUCONATE 15 MILLILITER(S): 213 SOLUTION TOPICAL at 05:56

## 2022-01-01 RX ADMIN — CHLORHEXIDINE GLUCONATE 1 APPLICATION(S): 213 SOLUTION TOPICAL at 05:22

## 2022-01-01 RX ADMIN — Medication 750 MILLIGRAM(S): at 21:47

## 2022-01-01 RX ADMIN — MORPHINE SULFATE 2 MILLIGRAM(S): 50 CAPSULE, EXTENDED RELEASE ORAL at 07:00

## 2022-01-01 RX ADMIN — PANTOPRAZOLE SODIUM 40 MILLIGRAM(S): 20 TABLET, DELAYED RELEASE ORAL at 11:05

## 2022-01-01 RX ADMIN — CHLORHEXIDINE GLUCONATE 15 MILLILITER(S): 213 SOLUTION TOPICAL at 09:17

## 2022-01-01 RX ADMIN — CHLORHEXIDINE GLUCONATE 15 MILLILITER(S): 213 SOLUTION TOPICAL at 17:22

## 2022-01-01 RX ADMIN — Medication 2: at 12:21

## 2022-01-01 RX ADMIN — MIDODRINE HYDROCHLORIDE 10 MILLIGRAM(S): 2.5 TABLET ORAL at 15:03

## 2022-01-01 RX ADMIN — CHLORHEXIDINE GLUCONATE 1 APPLICATION(S): 213 SOLUTION TOPICAL at 06:06

## 2022-01-01 RX ADMIN — CHLORHEXIDINE GLUCONATE 15 MILLILITER(S): 213 SOLUTION TOPICAL at 05:21

## 2022-01-01 RX ADMIN — Medication 1 APPLICATION(S): at 17:43

## 2022-01-01 RX ADMIN — FENTANYL CITRATE 100 MICROGRAM(S): 50 INJECTION INTRAVENOUS at 01:26

## 2022-01-01 RX ADMIN — CEFEPIME 100 MILLIGRAM(S): 1 INJECTION, POWDER, FOR SOLUTION INTRAMUSCULAR; INTRAVENOUS at 06:43

## 2022-01-01 RX ADMIN — Medication 1 APPLICATION(S): at 18:03

## 2022-01-01 RX ADMIN — Medication 2: at 17:13

## 2022-01-01 RX ADMIN — MEROPENEM 100 MILLIGRAM(S): 1 INJECTION INTRAVENOUS at 05:44

## 2022-01-01 RX ADMIN — ALBUTEROL 2 PUFF(S): 90 AEROSOL, METERED ORAL at 02:16

## 2022-01-01 RX ADMIN — OXYCODONE HYDROCHLORIDE 10 MILLIGRAM(S): 5 TABLET ORAL at 11:30

## 2022-01-01 RX ADMIN — Medication 750 MILLIGRAM(S): at 22:03

## 2022-01-01 RX ADMIN — FENTANYL CITRATE 4.5 MICROGRAM(S)/KG/HR: 50 INJECTION INTRAVENOUS at 01:52

## 2022-01-01 RX ADMIN — Medication 1 APPLICATION(S): at 05:27

## 2022-01-01 RX ADMIN — FENTANYL CITRATE 50 MICROGRAM(S): 50 INJECTION INTRAVENOUS at 00:45

## 2022-01-01 RX ADMIN — AMPICILLIN SODIUM AND SULBACTAM SODIUM 250 GRAM(S): 250; 125 INJECTION, POWDER, FOR SUSPENSION INTRAMUSCULAR; INTRAVENOUS at 21:51

## 2022-01-01 RX ADMIN — Medication 750 MILLIGRAM(S): at 13:58

## 2022-01-01 RX ADMIN — Medication 1 DROP(S): at 20:41

## 2022-01-01 RX ADMIN — Medication 750 MILLIGRAM(S): at 16:14

## 2022-01-01 RX ADMIN — Medication 100 MILLIGRAM(S): at 05:49

## 2022-01-01 RX ADMIN — Medication 12.5 MILLIGRAM(S): at 17:50

## 2022-01-01 RX ADMIN — CHLORHEXIDINE GLUCONATE 15 MILLILITER(S): 213 SOLUTION TOPICAL at 05:02

## 2022-01-01 RX ADMIN — PIPERACILLIN AND TAZOBACTAM 25 GRAM(S): 4; .5 INJECTION, POWDER, LYOPHILIZED, FOR SOLUTION INTRAVENOUS at 15:09

## 2022-01-01 RX ADMIN — MORPHINE SULFATE 3 MILLIGRAM(S): 50 CAPSULE, EXTENDED RELEASE ORAL at 17:53

## 2022-01-01 RX ADMIN — Medication 1 APPLICATION(S): at 17:44

## 2022-01-01 RX ADMIN — Medication 1 MILLIGRAM(S): at 14:25

## 2022-01-01 RX ADMIN — Medication 1: at 08:00

## 2022-01-01 RX ADMIN — Medication 6 MILLIGRAM(S): at 05:07

## 2022-01-01 RX ADMIN — MIDODRINE HYDROCHLORIDE 10 MILLIGRAM(S): 2.5 TABLET ORAL at 14:14

## 2022-01-01 RX ADMIN — Medication 100 MILLIGRAM(S): at 17:39

## 2022-01-01 RX ADMIN — CHLORHEXIDINE GLUCONATE 1 APPLICATION(S): 213 SOLUTION TOPICAL at 05:16

## 2022-01-01 RX ADMIN — Medication 2 MILLIGRAM(S): at 05:01

## 2022-01-01 RX ADMIN — SODIUM ZIRCONIUM CYCLOSILICATE 10 GRAM(S): 10 POWDER, FOR SUSPENSION ORAL at 07:32

## 2022-01-01 RX ADMIN — ALBUTEROL 2 PUFF(S): 90 AEROSOL, METERED ORAL at 20:06

## 2022-01-01 RX ADMIN — Medication 1 APPLICATION(S): at 17:54

## 2022-01-01 RX ADMIN — CHLORHEXIDINE GLUCONATE 15 MILLILITER(S): 213 SOLUTION TOPICAL at 17:07

## 2022-01-01 RX ADMIN — Medication 1: at 13:19

## 2022-01-01 RX ADMIN — CHLORHEXIDINE GLUCONATE 15 MILLILITER(S): 213 SOLUTION TOPICAL at 17:12

## 2022-01-01 RX ADMIN — PANTOPRAZOLE SODIUM 40 MILLIGRAM(S): 20 TABLET, DELAYED RELEASE ORAL at 05:17

## 2022-01-01 RX ADMIN — POLYETHYLENE GLYCOL 3350 17 GRAM(S): 17 POWDER, FOR SOLUTION ORAL at 11:57

## 2022-01-01 RX ADMIN — AMPICILLIN SODIUM AND SULBACTAM SODIUM 62.5 GRAM(S): 250; 125 INJECTION, POWDER, FOR SUSPENSION INTRAMUSCULAR; INTRAVENOUS at 08:09

## 2022-01-01 RX ADMIN — Medication 125 MICROGRAM(S): at 05:17

## 2022-01-01 RX ADMIN — Medication 25 MILLIGRAM(S): at 17:38

## 2022-01-01 RX ADMIN — Medication 2: at 17:10

## 2022-01-01 RX ADMIN — SODIUM ZIRCONIUM CYCLOSILICATE 5 GRAM(S): 10 POWDER, FOR SUSPENSION ORAL at 09:56

## 2022-01-01 RX ADMIN — FENTANYL CITRATE 50 MICROGRAM(S): 50 INJECTION INTRAVENOUS at 23:35

## 2022-01-01 RX ADMIN — CEFEPIME 100 MILLIGRAM(S): 1 INJECTION, POWDER, FOR SOLUTION INTRAMUSCULAR; INTRAVENOUS at 05:36

## 2022-01-01 RX ADMIN — Medication 4: at 05:11

## 2022-01-01 RX ADMIN — Medication 2: at 17:49

## 2022-01-01 RX ADMIN — PANTOPRAZOLE SODIUM 40 MILLIGRAM(S): 20 TABLET, DELAYED RELEASE ORAL at 13:06

## 2022-01-01 RX ADMIN — Medication 50 MILLILITER(S): at 13:20

## 2022-01-01 RX ADMIN — Medication 1 DROP(S): at 05:27

## 2022-01-01 RX ADMIN — FENTANYL CITRATE 50 MICROGRAM(S): 50 INJECTION INTRAVENOUS at 23:08

## 2022-01-01 RX ADMIN — MIDODRINE HYDROCHLORIDE 5 MILLIGRAM(S): 2.5 TABLET ORAL at 05:22

## 2022-01-01 RX ADMIN — MIDODRINE HYDROCHLORIDE 5 MILLIGRAM(S): 2.5 TABLET ORAL at 22:11

## 2022-01-01 RX ADMIN — Medication 100 MILLIGRAM(S): at 18:06

## 2022-01-01 RX ADMIN — Medication 30 MILLIGRAM(S): at 23:52

## 2022-01-01 RX ADMIN — PANTOPRAZOLE SODIUM 40 MILLIGRAM(S): 20 TABLET, DELAYED RELEASE ORAL at 12:25

## 2022-01-01 RX ADMIN — CHLORHEXIDINE GLUCONATE 1 APPLICATION(S): 213 SOLUTION TOPICAL at 05:56

## 2022-01-01 RX ADMIN — CHLORHEXIDINE GLUCONATE 15 MILLILITER(S): 213 SOLUTION TOPICAL at 17:48

## 2022-01-01 RX ADMIN — CHLORHEXIDINE GLUCONATE 15 MILLILITER(S): 213 SOLUTION TOPICAL at 05:14

## 2022-01-01 RX ADMIN — MIDODRINE HYDROCHLORIDE 10 MILLIGRAM(S): 2.5 TABLET ORAL at 00:16

## 2022-01-01 RX ADMIN — Medication 1 APPLICATION(S): at 05:11

## 2022-01-01 RX ADMIN — CHLORHEXIDINE GLUCONATE 15 MILLILITER(S): 213 SOLUTION TOPICAL at 05:27

## 2022-01-01 RX ADMIN — Medication 2: at 18:37

## 2022-01-01 RX ADMIN — MIDAZOLAM HYDROCHLORIDE 1.54 MG/KG/HR: 1 INJECTION, SOLUTION INTRAMUSCULAR; INTRAVENOUS at 01:30

## 2022-01-01 RX ADMIN — CHLORHEXIDINE GLUCONATE 15 MILLILITER(S): 213 SOLUTION TOPICAL at 17:43

## 2022-01-01 RX ADMIN — Medication 30 MILLIGRAM(S): at 17:11

## 2022-01-01 RX ADMIN — Medication 650 MILLIGRAM(S): at 02:54

## 2022-01-01 RX ADMIN — Medication 100 MILLIGRAM(S): at 05:44

## 2022-01-01 RX ADMIN — FENTANYL CITRATE 50 MICROGRAM(S): 50 INJECTION INTRAVENOUS at 21:21

## 2022-01-01 RX ADMIN — Medication 100 MILLIGRAM(S): at 05:02

## 2022-01-01 RX ADMIN — Medication 25 MILLIGRAM(S): at 18:21

## 2022-01-01 RX ADMIN — POLYETHYLENE GLYCOL 3350 17 GRAM(S): 17 POWDER, FOR SOLUTION ORAL at 12:17

## 2022-01-01 RX ADMIN — Medication 81 MILLIGRAM(S): at 11:49

## 2022-01-01 RX ADMIN — ENOXAPARIN SODIUM 40 MILLIGRAM(S): 100 INJECTION SUBCUTANEOUS at 11:39

## 2022-01-01 RX ADMIN — TAMSULOSIN HYDROCHLORIDE 0.4 MILLIGRAM(S): 0.4 CAPSULE ORAL at 22:49

## 2022-01-01 RX ADMIN — Medication 30 MILLIGRAM(S): at 00:15

## 2022-01-01 RX ADMIN — Medication 75 MEQ/KG/HR: at 19:56

## 2022-01-01 RX ADMIN — MORPHINE SULFATE 2 MILLIGRAM(S): 50 CAPSULE, EXTENDED RELEASE ORAL at 08:57

## 2022-01-01 RX ADMIN — ALBUTEROL 2 PUFF(S): 90 AEROSOL, METERED ORAL at 15:10

## 2022-01-01 RX ADMIN — POLYETHYLENE GLYCOL 3350 17 GRAM(S): 17 POWDER, FOR SOLUTION ORAL at 13:13

## 2022-01-01 RX ADMIN — Medication 1 APPLICATION(S): at 18:25

## 2022-01-01 RX ADMIN — PROPOFOL 0.02 MICROGRAM(S)/KG/MIN: 10 INJECTION, EMULSION INTRAVENOUS at 05:45

## 2022-01-01 RX ADMIN — AMPICILLIN SODIUM AND SULBACTAM SODIUM 250 GRAM(S): 250; 125 INJECTION, POWDER, FOR SUSPENSION INTRAMUSCULAR; INTRAVENOUS at 22:04

## 2022-01-01 RX ADMIN — Medication 125 MICROGRAM(S): at 05:43

## 2022-01-01 RX ADMIN — CHLORHEXIDINE GLUCONATE 1 APPLICATION(S): 213 SOLUTION TOPICAL at 05:00

## 2022-01-01 RX ADMIN — MIDODRINE HYDROCHLORIDE 5 MILLIGRAM(S): 2.5 TABLET ORAL at 21:18

## 2022-01-01 RX ADMIN — Medication 30 MILLIGRAM(S): at 11:44

## 2022-01-01 RX ADMIN — PROPOFOL 0.02 MICROGRAM(S)/KG/MIN: 10 INJECTION, EMULSION INTRAVENOUS at 12:52

## 2022-01-01 RX ADMIN — ATORVASTATIN CALCIUM 10 MILLIGRAM(S): 80 TABLET, FILM COATED ORAL at 21:26

## 2022-01-01 RX ADMIN — CHLORHEXIDINE GLUCONATE 15 MILLILITER(S): 213 SOLUTION TOPICAL at 17:09

## 2022-01-01 RX ADMIN — AMPICILLIN SODIUM AND SULBACTAM SODIUM 250 GRAM(S): 250; 125 INJECTION, POWDER, FOR SUSPENSION INTRAMUSCULAR; INTRAVENOUS at 05:40

## 2022-01-01 RX ADMIN — Medication 40 MILLIGRAM(S): at 05:04

## 2022-01-01 RX ADMIN — CHLORHEXIDINE GLUCONATE 1 APPLICATION(S): 213 SOLUTION TOPICAL at 06:43

## 2022-01-01 RX ADMIN — PROPOFOL 0.02 MICROGRAM(S)/KG/MIN: 10 INJECTION, EMULSION INTRAVENOUS at 19:36

## 2022-01-01 RX ADMIN — Medication 2 MILLIGRAM(S): at 05:22

## 2022-01-01 RX ADMIN — Medication 750 MILLIGRAM(S): at 21:17

## 2022-01-01 RX ADMIN — FENTANYL CITRATE 4.5 MICROGRAM(S)/KG/HR: 50 INJECTION INTRAVENOUS at 05:02

## 2022-01-01 RX ADMIN — MORPHINE SULFATE 3 MILLIGRAM(S): 50 CAPSULE, EXTENDED RELEASE ORAL at 05:01

## 2022-01-01 RX ADMIN — CEFEPIME 100 MILLIGRAM(S): 1 INJECTION, POWDER, FOR SOLUTION INTRAMUSCULAR; INTRAVENOUS at 05:45

## 2022-01-01 RX ADMIN — Medication 2: at 07:05

## 2022-01-01 RX ADMIN — Medication 5 MG/HR: at 03:22

## 2022-01-01 RX ADMIN — CHLORHEXIDINE GLUCONATE 15 MILLILITER(S): 213 SOLUTION TOPICAL at 17:54

## 2022-01-01 RX ADMIN — AMPICILLIN SODIUM AND SULBACTAM SODIUM 250 GRAM(S): 250; 125 INJECTION, POWDER, FOR SUSPENSION INTRAMUSCULAR; INTRAVENOUS at 05:16

## 2022-01-01 RX ADMIN — CHLORHEXIDINE GLUCONATE 15 MILLILITER(S): 213 SOLUTION TOPICAL at 17:23

## 2022-01-01 RX ADMIN — SODIUM ZIRCONIUM CYCLOSILICATE 5 GRAM(S): 10 POWDER, FOR SUSPENSION ORAL at 17:13

## 2022-01-01 RX ADMIN — Medication 650 MILLIGRAM(S): at 00:21

## 2022-01-01 RX ADMIN — CHLORHEXIDINE GLUCONATE 15 MILLILITER(S): 213 SOLUTION TOPICAL at 17:42

## 2022-01-01 RX ADMIN — MORPHINE SULFATE 2 MILLIGRAM(S): 50 CAPSULE, EXTENDED RELEASE ORAL at 09:56

## 2022-01-01 RX ADMIN — HEPARIN SODIUM 11 UNIT(S)/HR: 5000 INJECTION INTRAVENOUS; SUBCUTANEOUS at 21:23

## 2022-01-01 RX ADMIN — Medication 4: at 11:44

## 2022-01-01 RX ADMIN — PROPOFOL 0.02 MICROGRAM(S)/KG/MIN: 10 INJECTION, EMULSION INTRAVENOUS at 23:16

## 2022-01-01 RX ADMIN — FENTANYL CITRATE 50 MICROGRAM(S): 50 INJECTION INTRAVENOUS at 09:02

## 2022-01-01 RX ADMIN — PROPOFOL 0.02 MICROGRAM(S)/KG/MIN: 10 INJECTION, EMULSION INTRAVENOUS at 20:29

## 2022-01-01 RX ADMIN — Medication 30 MILLIGRAM(S): at 17:06

## 2022-01-01 RX ADMIN — Medication 1: at 11:43

## 2022-01-01 RX ADMIN — MIDODRINE HYDROCHLORIDE 10 MILLIGRAM(S): 2.5 TABLET ORAL at 23:04

## 2022-01-01 RX ADMIN — CEFEPIME 100 MILLIGRAM(S): 1 INJECTION, POWDER, FOR SOLUTION INTRAMUSCULAR; INTRAVENOUS at 14:09

## 2022-01-01 RX ADMIN — Medication 2: at 18:34

## 2022-01-01 RX ADMIN — MORPHINE SULFATE 2 MILLIGRAM(S): 50 CAPSULE, EXTENDED RELEASE ORAL at 01:56

## 2022-01-01 RX ADMIN — CHLORHEXIDINE GLUCONATE 1 APPLICATION(S): 213 SOLUTION TOPICAL at 06:15

## 2022-01-01 RX ADMIN — POLYETHYLENE GLYCOL 3350 17 GRAM(S): 17 POWDER, FOR SOLUTION ORAL at 12:31

## 2022-01-01 RX ADMIN — PROPOFOL 0.02 MICROGRAM(S)/KG/MIN: 10 INJECTION, EMULSION INTRAVENOUS at 00:31

## 2022-01-01 RX ADMIN — ENOXAPARIN SODIUM 40 MILLIGRAM(S): 100 INJECTION SUBCUTANEOUS at 21:27

## 2022-01-01 RX ADMIN — PANTOPRAZOLE SODIUM 40 MILLIGRAM(S): 20 TABLET, DELAYED RELEASE ORAL at 11:58

## 2022-01-01 RX ADMIN — Medication 4: at 12:57

## 2022-01-01 RX ADMIN — Medication 4: at 17:15

## 2022-01-01 RX ADMIN — CEFIDEROCOL SULFATE TOSYLATE 33.33 MILLIGRAM(S): 1 INJECTION, POWDER, FOR SOLUTION INTRAVENOUS at 00:50

## 2022-01-01 RX ADMIN — Medication 25 MILLIGRAM(S): at 05:44

## 2022-01-01 RX ADMIN — Medication 125 MICROGRAM(S): at 05:14

## 2022-01-01 RX ADMIN — CEFEPIME 100 MILLIGRAM(S): 1 INJECTION, POWDER, FOR SOLUTION INTRAMUSCULAR; INTRAVENOUS at 21:12

## 2022-01-01 RX ADMIN — Medication 2 MILLIGRAM(S): at 17:20

## 2022-01-01 RX ADMIN — Medication 650 MILLIGRAM(S): at 18:43

## 2022-01-01 RX ADMIN — CHLORHEXIDINE GLUCONATE 15 MILLILITER(S): 213 SOLUTION TOPICAL at 05:01

## 2022-01-01 RX ADMIN — Medication 25 MILLIGRAM(S): at 18:23

## 2022-01-01 RX ADMIN — Medication 125 MICROGRAM(S): at 05:10

## 2022-01-01 RX ADMIN — POLYETHYLENE GLYCOL 3350 17 GRAM(S): 17 POWDER, FOR SOLUTION ORAL at 11:30

## 2022-01-01 RX ADMIN — Medication 500 MILLIGRAM(S): at 13:29

## 2022-01-01 RX ADMIN — MORPHINE SULFATE 3 MILLIGRAM(S): 50 CAPSULE, EXTENDED RELEASE ORAL at 21:21

## 2022-01-01 RX ADMIN — Medication 40 MILLIGRAM(S): at 12:41

## 2022-01-01 RX ADMIN — Medication 1 APPLICATION(S): at 17:25

## 2022-01-01 RX ADMIN — Medication 1: at 17:49

## 2022-01-01 RX ADMIN — Medication 1 DROP(S): at 17:36

## 2022-01-01 RX ADMIN — Medication 750 MILLIGRAM(S): at 13:13

## 2022-01-01 RX ADMIN — Medication 1 APPLICATION(S): at 06:11

## 2022-01-01 RX ADMIN — Medication 650 MILLIGRAM(S): at 05:33

## 2022-01-01 RX ADMIN — Medication 81 MILLIGRAM(S): at 11:53

## 2022-01-01 RX ADMIN — Medication 81 MILLIGRAM(S): at 12:07

## 2022-01-01 RX ADMIN — Medication 100 MILLIGRAM(S): at 06:16

## 2022-01-01 RX ADMIN — Medication 40 MILLIGRAM(S): at 11:48

## 2022-01-01 RX ADMIN — Medication 125 MICROGRAM(S): at 07:00

## 2022-01-01 RX ADMIN — AMPICILLIN SODIUM AND SULBACTAM SODIUM 250 GRAM(S): 250; 125 INJECTION, POWDER, FOR SUSPENSION INTRAMUSCULAR; INTRAVENOUS at 15:03

## 2022-01-01 RX ADMIN — ZINC SULFATE TAB 220 MG (50 MG ZINC EQUIVALENT) 220 MILLIGRAM(S): 220 (50 ZN) TAB at 11:45

## 2022-01-01 RX ADMIN — Medication 12.5 MILLIGRAM(S): at 09:18

## 2022-01-01 RX ADMIN — PANTOPRAZOLE SODIUM 40 MILLIGRAM(S): 20 TABLET, DELAYED RELEASE ORAL at 05:14

## 2022-01-01 RX ADMIN — PROPOFOL 0.02 MICROGRAM(S)/KG/MIN: 10 INJECTION, EMULSION INTRAVENOUS at 08:28

## 2022-01-01 RX ADMIN — MIDODRINE HYDROCHLORIDE 5 MILLIGRAM(S): 2.5 TABLET ORAL at 05:02

## 2022-01-01 RX ADMIN — Medication 30 MILLIGRAM(S): at 11:37

## 2022-01-01 RX ADMIN — OXYCODONE HYDROCHLORIDE 10 MILLIGRAM(S): 5 TABLET ORAL at 14:16

## 2022-01-01 RX ADMIN — PANTOPRAZOLE SODIUM 40 MILLIGRAM(S): 20 TABLET, DELAYED RELEASE ORAL at 12:07

## 2022-01-01 RX ADMIN — Medication 750 MILLIGRAM(S): at 05:09

## 2022-01-01 RX ADMIN — SCOPALAMINE 1 PATCH: 1 PATCH, EXTENDED RELEASE TRANSDERMAL at 19:18

## 2022-01-01 RX ADMIN — Medication 750 MILLIGRAM(S): at 14:08

## 2022-01-01 RX ADMIN — Medication 1 APPLICATION(S): at 17:36

## 2022-01-01 RX ADMIN — Medication 2: at 06:39

## 2022-01-01 RX ADMIN — Medication 10: at 23:27

## 2022-01-01 RX ADMIN — Medication 60 MILLIGRAM(S): at 05:43

## 2022-01-01 RX ADMIN — Medication 81 MILLIGRAM(S): at 11:40

## 2022-01-01 RX ADMIN — AMIKACIN SULFATE 255 MILLIGRAM(S): 250 INJECTION, SOLUTION INTRAMUSCULAR; INTRAVENOUS at 00:40

## 2022-01-01 RX ADMIN — Medication 30 MILLIGRAM(S): at 17:07

## 2022-01-01 RX ADMIN — Medication 2: at 17:23

## 2022-01-01 RX ADMIN — Medication 1 DROP(S): at 05:42

## 2022-01-01 RX ADMIN — Medication 2: at 11:51

## 2022-01-01 RX ADMIN — Medication 2: at 13:59

## 2022-01-01 RX ADMIN — Medication 1 DROP(S): at 05:10

## 2022-01-01 RX ADMIN — PANTOPRAZOLE SODIUM 40 MILLIGRAM(S): 20 TABLET, DELAYED RELEASE ORAL at 11:50

## 2022-01-01 RX ADMIN — Medication 1 MILLIGRAM(S): at 17:48

## 2022-01-01 RX ADMIN — Medication 1 APPLICATION(S): at 05:49

## 2022-01-01 RX ADMIN — AMPICILLIN SODIUM AND SULBACTAM SODIUM 250 GRAM(S): 250; 125 INJECTION, POWDER, FOR SUSPENSION INTRAMUSCULAR; INTRAVENOUS at 21:17

## 2022-01-01 RX ADMIN — ATORVASTATIN CALCIUM 10 MILLIGRAM(S): 80 TABLET, FILM COATED ORAL at 22:01

## 2022-01-01 RX ADMIN — Medication 1 APPLICATION(S): at 05:33

## 2022-01-01 RX ADMIN — FENTANYL CITRATE 50 MICROGRAM(S): 50 INJECTION INTRAVENOUS at 10:30

## 2022-01-01 RX ADMIN — PANTOPRAZOLE SODIUM 40 MILLIGRAM(S): 20 TABLET, DELAYED RELEASE ORAL at 05:42

## 2022-01-01 RX ADMIN — MIDODRINE HYDROCHLORIDE 10 MILLIGRAM(S): 2.5 TABLET ORAL at 17:31

## 2022-01-01 RX ADMIN — CHLORHEXIDINE GLUCONATE 15 MILLILITER(S): 213 SOLUTION TOPICAL at 05:15

## 2022-01-01 RX ADMIN — ENOXAPARIN SODIUM 40 MILLIGRAM(S): 100 INJECTION SUBCUTANEOUS at 12:04

## 2022-01-01 RX ADMIN — AMPICILLIN SODIUM AND SULBACTAM SODIUM 250 GRAM(S): 250; 125 INJECTION, POWDER, FOR SUSPENSION INTRAMUSCULAR; INTRAVENOUS at 13:01

## 2022-01-01 RX ADMIN — Medication 250 MILLIGRAM(S): at 05:26

## 2022-01-01 RX ADMIN — CHLORHEXIDINE GLUCONATE 15 MILLILITER(S): 213 SOLUTION TOPICAL at 05:19

## 2022-01-01 RX ADMIN — FENTANYL CITRATE 4.5 MICROGRAM(S)/KG/HR: 50 INJECTION INTRAVENOUS at 22:06

## 2022-01-01 RX ADMIN — Medication 12.5 MILLIGRAM(S): at 05:13

## 2022-01-01 RX ADMIN — POLYETHYLENE GLYCOL 3350 17 GRAM(S): 17 POWDER, FOR SOLUTION ORAL at 12:05

## 2022-01-01 RX ADMIN — Medication 4: at 23:51

## 2022-01-01 RX ADMIN — CEFEPIME 100 MILLIGRAM(S): 1 INJECTION, POWDER, FOR SOLUTION INTRAMUSCULAR; INTRAVENOUS at 21:59

## 2022-01-01 RX ADMIN — Medication 2: at 17:21

## 2022-01-01 RX ADMIN — Medication 100 MILLIGRAM(S): at 17:23

## 2022-01-01 RX ADMIN — MIDODRINE HYDROCHLORIDE 5 MILLIGRAM(S): 2.5 TABLET ORAL at 13:59

## 2022-01-01 RX ADMIN — CHLORHEXIDINE GLUCONATE 1 APPLICATION(S): 213 SOLUTION TOPICAL at 05:09

## 2022-01-01 RX ADMIN — Medication 100 MILLIGRAM(S): at 17:34

## 2022-01-01 RX ADMIN — VECURONIUM BROMIDE 10 MILLIGRAM(S): 20 INJECTION, POWDER, FOR SOLUTION INTRAVENOUS at 10:49

## 2022-01-01 RX ADMIN — Medication 2 MILLIGRAM(S): at 17:45

## 2022-01-01 RX ADMIN — TAMSULOSIN HYDROCHLORIDE 0.4 MILLIGRAM(S): 0.4 CAPSULE ORAL at 23:05

## 2022-01-01 RX ADMIN — MORPHINE SULFATE 3 MILLIGRAM(S): 50 CAPSULE, EXTENDED RELEASE ORAL at 06:27

## 2022-01-01 RX ADMIN — Medication 750 MILLIGRAM(S): at 05:28

## 2022-01-01 RX ADMIN — Medication 12.5 MILLIGRAM(S): at 17:31

## 2022-01-01 RX ADMIN — ALBUTEROL 2 PUFF(S): 90 AEROSOL, METERED ORAL at 07:55

## 2022-01-01 RX ADMIN — OXYCODONE HYDROCHLORIDE 10 MILLIGRAM(S): 5 TABLET ORAL at 17:38

## 2022-01-01 RX ADMIN — HEPARIN SODIUM 5000 UNIT(S): 5000 INJECTION INTRAVENOUS; SUBCUTANEOUS at 05:36

## 2022-01-01 RX ADMIN — CHLORHEXIDINE GLUCONATE 15 MILLILITER(S): 213 SOLUTION TOPICAL at 17:32

## 2022-01-01 RX ADMIN — INSULIN HUMAN 10 UNIT(S): 100 INJECTION, SOLUTION SUBCUTANEOUS at 16:24

## 2022-01-01 RX ADMIN — Medication 6 MILLIGRAM(S): at 05:04

## 2022-01-01 RX ADMIN — PIPERACILLIN AND TAZOBACTAM 25 GRAM(S): 4; .5 INJECTION, POWDER, LYOPHILIZED, FOR SOLUTION INTRAVENOUS at 14:32

## 2022-01-01 RX ADMIN — Medication 100 MILLIGRAM(S): at 17:24

## 2022-01-01 RX ADMIN — POLYETHYLENE GLYCOL 3350 17 GRAM(S): 17 POWDER, FOR SOLUTION ORAL at 12:03

## 2022-01-01 RX ADMIN — CHLORHEXIDINE GLUCONATE 15 MILLILITER(S): 213 SOLUTION TOPICAL at 05:59

## 2022-01-01 RX ADMIN — POLYETHYLENE GLYCOL 3350 17 GRAM(S): 17 POWDER, FOR SOLUTION ORAL at 11:26

## 2022-01-01 RX ADMIN — SODIUM CHLORIDE 500 MILLILITER(S): 9 INJECTION INTRAMUSCULAR; INTRAVENOUS; SUBCUTANEOUS at 15:48

## 2022-01-01 RX ADMIN — MORPHINE SULFATE 2 MILLIGRAM(S): 50 CAPSULE, EXTENDED RELEASE ORAL at 06:40

## 2022-01-01 RX ADMIN — CHLORHEXIDINE GLUCONATE 1 APPLICATION(S): 213 SOLUTION TOPICAL at 05:10

## 2022-01-01 RX ADMIN — POLYETHYLENE GLYCOL 3350 17 GRAM(S): 17 POWDER, FOR SOLUTION ORAL at 13:46

## 2022-01-01 RX ADMIN — POLYETHYLENE GLYCOL 3350 17 GRAM(S): 17 POWDER, FOR SOLUTION ORAL at 11:33

## 2022-01-01 RX ADMIN — CHLORHEXIDINE GLUCONATE 15 MILLILITER(S): 213 SOLUTION TOPICAL at 17:13

## 2022-01-01 RX ADMIN — FENTANYL CITRATE 3.85 MICROGRAM(S)/KG/HR: 50 INJECTION INTRAVENOUS at 20:29

## 2022-01-01 RX ADMIN — CHLORHEXIDINE GLUCONATE 1 APPLICATION(S): 213 SOLUTION TOPICAL at 05:19

## 2022-01-01 RX ADMIN — MORPHINE SULFATE 3 MILLIGRAM(S): 50 CAPSULE, EXTENDED RELEASE ORAL at 17:46

## 2022-01-01 RX ADMIN — FENTANYL CITRATE 50 MICROGRAM(S): 50 INJECTION INTRAVENOUS at 09:49

## 2022-01-01 RX ADMIN — HEPARIN SODIUM 5000 UNIT(S): 5000 INJECTION INTRAVENOUS; SUBCUTANEOUS at 05:15

## 2022-01-01 RX ADMIN — MORPHINE SULFATE 3 MILLIGRAM(S): 50 CAPSULE, EXTENDED RELEASE ORAL at 06:45

## 2022-01-01 RX ADMIN — Medication 81 MILLIGRAM(S): at 11:26

## 2022-01-01 RX ADMIN — HEPARIN SODIUM 5000 UNIT(S): 5000 INJECTION INTRAVENOUS; SUBCUTANEOUS at 17:42

## 2022-01-01 RX ADMIN — CHLORHEXIDINE GLUCONATE 1 APPLICATION(S): 213 SOLUTION TOPICAL at 05:59

## 2022-01-01 RX ADMIN — AMPICILLIN SODIUM AND SULBACTAM SODIUM 250 GRAM(S): 250; 125 INJECTION, POWDER, FOR SUSPENSION INTRAMUSCULAR; INTRAVENOUS at 06:33

## 2022-01-01 RX ADMIN — POLYETHYLENE GLYCOL 3350 17 GRAM(S): 17 POWDER, FOR SOLUTION ORAL at 11:47

## 2022-01-01 RX ADMIN — AMIKACIN SULFATE 255 MILLIGRAM(S): 250 INJECTION, SOLUTION INTRAMUSCULAR; INTRAVENOUS at 21:51

## 2022-01-01 RX ADMIN — POLYETHYLENE GLYCOL 3350 17 GRAM(S): 17 POWDER, FOR SOLUTION ORAL at 12:19

## 2022-01-01 RX ADMIN — MEROPENEM 100 MILLIGRAM(S): 1 INJECTION INTRAVENOUS at 15:24

## 2022-01-01 RX ADMIN — MIDODRINE HYDROCHLORIDE 10 MILLIGRAM(S): 2.5 TABLET ORAL at 23:06

## 2022-01-01 RX ADMIN — PANTOPRAZOLE SODIUM 40 MILLIGRAM(S): 20 TABLET, DELAYED RELEASE ORAL at 22:01

## 2022-01-01 RX ADMIN — PANTOPRAZOLE SODIUM 40 MILLIGRAM(S): 20 TABLET, DELAYED RELEASE ORAL at 07:00

## 2022-01-01 RX ADMIN — Medication 6 MILLIGRAM(S): at 17:11

## 2022-01-01 RX ADMIN — Medication 4: at 12:25

## 2022-01-01 RX ADMIN — ENOXAPARIN SODIUM 40 MILLIGRAM(S): 100 INJECTION SUBCUTANEOUS at 12:47

## 2022-01-01 RX ADMIN — Medication 12.5 MILLIGRAM(S): at 05:14

## 2022-01-01 RX ADMIN — VECURONIUM BROMIDE 10 MILLIGRAM(S): 20 INJECTION, POWDER, FOR SOLUTION INTRAVENOUS at 01:48

## 2022-01-01 RX ADMIN — SCOPALAMINE 1 PATCH: 1 PATCH, EXTENDED RELEASE TRANSDERMAL at 18:11

## 2022-01-01 RX ADMIN — Medication 30 MILLIGRAM(S): at 05:42

## 2022-01-01 RX ADMIN — SCOPALAMINE 1 PATCH: 1 PATCH, EXTENDED RELEASE TRANSDERMAL at 19:00

## 2022-01-01 RX ADMIN — CEFEPIME 100 MILLIGRAM(S): 1 INJECTION, POWDER, FOR SOLUTION INTRAMUSCULAR; INTRAVENOUS at 21:35

## 2022-01-01 RX ADMIN — MORPHINE SULFATE 2 MILLIGRAM(S): 50 CAPSULE, EXTENDED RELEASE ORAL at 11:02

## 2022-01-01 RX ADMIN — AMPICILLIN SODIUM AND SULBACTAM SODIUM 250 GRAM(S): 250; 125 INJECTION, POWDER, FOR SUSPENSION INTRAMUSCULAR; INTRAVENOUS at 05:02

## 2022-01-01 RX ADMIN — MIDODRINE HYDROCHLORIDE 10 MILLIGRAM(S): 2.5 TABLET ORAL at 16:02

## 2022-01-01 RX ADMIN — Medication 250 MILLIGRAM(S): at 06:49

## 2022-01-01 RX ADMIN — Medication 1 DROP(S): at 17:28

## 2022-01-01 RX ADMIN — FENTANYL CITRATE 50 MICROGRAM(S): 50 INJECTION INTRAVENOUS at 05:44

## 2022-01-01 RX ADMIN — ALBUTEROL 2 PUFF(S): 90 AEROSOL, METERED ORAL at 02:42

## 2022-01-01 RX ADMIN — Medication 6 MILLIGRAM(S): at 17:58

## 2022-01-01 RX ADMIN — CEFEPIME 100 MILLIGRAM(S): 1 INJECTION, POWDER, FOR SOLUTION INTRAMUSCULAR; INTRAVENOUS at 14:47

## 2022-01-01 RX ADMIN — Medication 6 MILLIGRAM(S): at 05:42

## 2022-01-01 RX ADMIN — FENTANYL CITRATE 3.85 MICROGRAM(S)/KG/HR: 50 INJECTION INTRAVENOUS at 08:42

## 2022-01-01 RX ADMIN — Medication 6 MILLIGRAM(S): at 05:35

## 2022-01-01 RX ADMIN — Medication 6: at 12:03

## 2022-01-01 RX ADMIN — CHLORHEXIDINE GLUCONATE 15 MILLILITER(S): 213 SOLUTION TOPICAL at 06:40

## 2022-01-01 RX ADMIN — ENOXAPARIN SODIUM 40 MILLIGRAM(S): 100 INJECTION SUBCUTANEOUS at 11:01

## 2022-01-01 RX ADMIN — ALBUTEROL 2 PUFF(S): 90 AEROSOL, METERED ORAL at 07:27

## 2022-01-01 RX ADMIN — CHLORHEXIDINE GLUCONATE 15 MILLILITER(S): 213 SOLUTION TOPICAL at 17:19

## 2022-01-01 RX ADMIN — CHLORHEXIDINE GLUCONATE 15 MILLILITER(S): 213 SOLUTION TOPICAL at 05:55

## 2022-01-01 RX ADMIN — Medication 250 MILLIGRAM(S): at 16:48

## 2022-01-01 RX ADMIN — Medication 81 MILLIGRAM(S): at 12:28

## 2022-01-01 RX ADMIN — MIDODRINE HYDROCHLORIDE 5 MILLIGRAM(S): 2.5 TABLET ORAL at 05:29

## 2022-01-01 RX ADMIN — Medication 2: at 12:44

## 2022-01-01 RX ADMIN — Medication 2: at 00:03

## 2022-01-01 RX ADMIN — Medication 60 MILLIGRAM(S): at 13:58

## 2022-01-01 RX ADMIN — BUMETANIDE 2 MILLIGRAM(S): 0.25 INJECTION INTRAMUSCULAR; INTRAVENOUS at 05:43

## 2022-01-01 RX ADMIN — Medication 100 MILLIGRAM(S): at 17:13

## 2022-01-01 RX ADMIN — CHLORHEXIDINE GLUCONATE 15 MILLILITER(S): 213 SOLUTION TOPICAL at 05:10

## 2022-01-01 RX ADMIN — Medication 2: at 12:39

## 2022-01-01 RX ADMIN — CHLORHEXIDINE GLUCONATE 1 APPLICATION(S): 213 SOLUTION TOPICAL at 06:31

## 2022-01-01 RX ADMIN — MIDAZOLAM HYDROCHLORIDE 4 MILLIGRAM(S): 1 INJECTION, SOLUTION INTRAMUSCULAR; INTRAVENOUS at 23:49

## 2022-01-01 RX ADMIN — CHLORHEXIDINE GLUCONATE 1 APPLICATION(S): 213 SOLUTION TOPICAL at 05:38

## 2022-01-01 RX ADMIN — Medication 25 MILLIGRAM(S): at 06:44

## 2022-01-01 RX ADMIN — Medication 81 MILLIGRAM(S): at 11:42

## 2022-01-01 RX ADMIN — Medication 1 APPLICATION(S): at 17:08

## 2022-01-01 RX ADMIN — ENOXAPARIN SODIUM 40 MILLIGRAM(S): 100 INJECTION SUBCUTANEOUS at 22:49

## 2022-01-01 RX ADMIN — POLYETHYLENE GLYCOL 3350 17 GRAM(S): 17 POWDER, FOR SOLUTION ORAL at 12:57

## 2022-01-01 RX ADMIN — Medication 1 MILLIGRAM(S): at 15:52

## 2022-01-01 RX ADMIN — Medication 500 MILLIGRAM(S): at 14:14

## 2022-01-01 RX ADMIN — ENOXAPARIN SODIUM 40 MILLIGRAM(S): 100 INJECTION SUBCUTANEOUS at 22:08

## 2022-01-01 RX ADMIN — HEPARIN SODIUM 14 UNIT(S)/HR: 5000 INJECTION INTRAVENOUS; SUBCUTANEOUS at 15:44

## 2022-01-01 RX ADMIN — CHLORHEXIDINE GLUCONATE 15 MILLILITER(S): 213 SOLUTION TOPICAL at 17:20

## 2022-01-01 RX ADMIN — Medication 10: at 12:56

## 2022-01-01 RX ADMIN — Medication 25 MILLIGRAM(S): at 17:40

## 2022-01-01 RX ADMIN — Medication 6: at 12:04

## 2022-01-01 RX ADMIN — Medication 6 MILLIGRAM(S): at 05:50

## 2022-01-01 RX ADMIN — CEFEPIME 100 MILLIGRAM(S): 1 INJECTION, POWDER, FOR SOLUTION INTRAMUSCULAR; INTRAVENOUS at 13:56

## 2022-01-01 RX ADMIN — Medication 650 MILLIGRAM(S): at 17:35

## 2022-01-01 RX ADMIN — Medication 25 MILLIGRAM(S): at 05:22

## 2022-01-01 RX ADMIN — Medication 30 MILLIGRAM(S): at 00:51

## 2022-01-01 RX ADMIN — POLYETHYLENE GLYCOL 3350 17 GRAM(S): 17 POWDER, FOR SOLUTION ORAL at 11:11

## 2022-01-01 RX ADMIN — ALBUTEROL 2 PUFF(S): 90 AEROSOL, METERED ORAL at 15:01

## 2022-01-01 RX ADMIN — PANTOPRAZOLE SODIUM 40 MILLIGRAM(S): 20 TABLET, DELAYED RELEASE ORAL at 12:09

## 2022-01-01 RX ADMIN — MIDODRINE HYDROCHLORIDE 5 MILLIGRAM(S): 2.5 TABLET ORAL at 15:47

## 2022-01-01 RX ADMIN — OXYCODONE HYDROCHLORIDE 10 MILLIGRAM(S): 5 TABLET ORAL at 09:02

## 2022-01-01 RX ADMIN — Medication 25 MILLIGRAM(S): at 18:08

## 2022-01-01 RX ADMIN — PANTOPRAZOLE SODIUM 40 MILLIGRAM(S): 20 TABLET, DELAYED RELEASE ORAL at 17:36

## 2022-01-01 RX ADMIN — Medication 2: at 11:53

## 2022-01-01 RX ADMIN — Medication 25 MILLIGRAM(S): at 17:19

## 2022-01-01 RX ADMIN — Medication 81 MILLIGRAM(S): at 11:05

## 2022-01-01 RX ADMIN — Medication 650 MILLIGRAM(S): at 14:46

## 2022-01-01 RX ADMIN — MIDODRINE HYDROCHLORIDE 5 MILLIGRAM(S): 2.5 TABLET ORAL at 21:27

## 2022-01-01 RX ADMIN — BUMETANIDE 2 MILLIGRAM(S): 0.25 INJECTION INTRAMUSCULAR; INTRAVENOUS at 06:59

## 2022-01-01 RX ADMIN — CHLORHEXIDINE GLUCONATE 15 MILLILITER(S): 213 SOLUTION TOPICAL at 05:44

## 2022-01-01 RX ADMIN — ENOXAPARIN SODIUM 40 MILLIGRAM(S): 100 INJECTION SUBCUTANEOUS at 22:26

## 2022-01-01 RX ADMIN — PANTOPRAZOLE SODIUM 40 MILLIGRAM(S): 20 TABLET, DELAYED RELEASE ORAL at 12:52

## 2022-01-01 RX ADMIN — Medication 1 APPLICATION(S): at 17:23

## 2022-01-01 RX ADMIN — Medication 1 APPLICATION(S): at 05:34

## 2022-01-01 RX ADMIN — Medication 2: at 17:32

## 2022-01-01 RX ADMIN — MIDODRINE HYDROCHLORIDE 10 MILLIGRAM(S): 2.5 TABLET ORAL at 17:34

## 2022-01-01 RX ADMIN — Medication 12.5 MILLIGRAM(S): at 05:24

## 2022-01-01 RX ADMIN — CHLORHEXIDINE GLUCONATE 15 MILLILITER(S): 213 SOLUTION TOPICAL at 05:06

## 2022-01-01 RX ADMIN — Medication 2: at 06:16

## 2022-01-01 RX ADMIN — ATORVASTATIN CALCIUM 10 MILLIGRAM(S): 80 TABLET, FILM COATED ORAL at 22:48

## 2022-01-01 RX ADMIN — TAMSULOSIN HYDROCHLORIDE 0.4 MILLIGRAM(S): 0.4 CAPSULE ORAL at 21:49

## 2022-01-01 RX ADMIN — CHLORHEXIDINE GLUCONATE 15 MILLILITER(S): 213 SOLUTION TOPICAL at 05:33

## 2022-01-01 RX ADMIN — Medication 30 MILLIGRAM(S): at 00:06

## 2022-01-01 RX ADMIN — Medication 650 MILLIGRAM(S): at 06:09

## 2022-01-01 RX ADMIN — Medication 300 MILLIGRAM(S): at 17:37

## 2022-01-01 RX ADMIN — Medication 6 MILLIGRAM(S): at 17:33

## 2022-01-01 RX ADMIN — Medication 100 MILLIGRAM(S): at 17:52

## 2022-01-01 RX ADMIN — AMPICILLIN SODIUM AND SULBACTAM SODIUM 250 GRAM(S): 250; 125 INJECTION, POWDER, FOR SUSPENSION INTRAMUSCULAR; INTRAVENOUS at 00:06

## 2022-01-01 RX ADMIN — MIDODRINE HYDROCHLORIDE 10 MILLIGRAM(S): 2.5 TABLET ORAL at 23:01

## 2022-01-01 RX ADMIN — MORPHINE SULFATE 3 MILLIGRAM(S): 50 CAPSULE, EXTENDED RELEASE ORAL at 10:00

## 2022-01-01 RX ADMIN — ENOXAPARIN SODIUM 40 MILLIGRAM(S): 100 INJECTION SUBCUTANEOUS at 21:46

## 2022-01-01 RX ADMIN — HEPARIN SODIUM 5000 UNIT(S): 5000 INJECTION INTRAVENOUS; SUBCUTANEOUS at 17:33

## 2022-01-01 RX ADMIN — ENOXAPARIN SODIUM 40 MILLIGRAM(S): 100 INJECTION SUBCUTANEOUS at 11:53

## 2022-01-01 RX ADMIN — ATORVASTATIN CALCIUM 10 MILLIGRAM(S): 80 TABLET, FILM COATED ORAL at 21:16

## 2022-01-01 RX ADMIN — Medication 2 MILLIGRAM(S): at 18:25

## 2022-01-01 RX ADMIN — Medication 125 MICROGRAM(S): at 05:08

## 2022-01-01 RX ADMIN — Medication 1: at 06:02

## 2022-01-01 RX ADMIN — Medication 100 MILLIGRAM(S): at 06:06

## 2022-01-01 RX ADMIN — ALBUTEROL 2 PUFF(S): 90 AEROSOL, METERED ORAL at 13:51

## 2022-01-01 RX ADMIN — CHLORHEXIDINE GLUCONATE 1 APPLICATION(S): 213 SOLUTION TOPICAL at 06:57

## 2022-01-01 RX ADMIN — CHLORHEXIDINE GLUCONATE 15 MILLILITER(S): 213 SOLUTION TOPICAL at 06:20

## 2022-01-01 RX ADMIN — MORPHINE SULFATE 2 MILLIGRAM(S): 50 CAPSULE, EXTENDED RELEASE ORAL at 03:27

## 2022-01-01 RX ADMIN — Medication 650 MILLIGRAM(S): at 01:46

## 2022-01-01 RX ADMIN — ZINC SULFATE TAB 220 MG (50 MG ZINC EQUIVALENT) 220 MILLIGRAM(S): 220 (50 ZN) TAB at 11:24

## 2022-01-01 RX ADMIN — Medication 650 MILLIGRAM(S): at 13:47

## 2022-01-01 RX ADMIN — Medication 25 MILLIGRAM(S): at 05:08

## 2022-01-01 RX ADMIN — Medication 100 MILLIGRAM(S): at 17:07

## 2022-01-01 RX ADMIN — Medication 81 MILLIGRAM(S): at 11:04

## 2022-01-01 RX ADMIN — Medication 1: at 11:41

## 2022-01-01 RX ADMIN — Medication 1 APPLICATION(S): at 05:44

## 2022-01-01 RX ADMIN — Medication 4: at 06:16

## 2022-01-01 RX ADMIN — MIDODRINE HYDROCHLORIDE 5 MILLIGRAM(S): 2.5 TABLET ORAL at 05:43

## 2022-01-01 RX ADMIN — TAMSULOSIN HYDROCHLORIDE 0.4 MILLIGRAM(S): 0.4 CAPSULE ORAL at 21:52

## 2022-01-01 RX ADMIN — ALBUTEROL 2 PUFF(S): 90 AEROSOL, METERED ORAL at 07:45

## 2022-01-01 RX ADMIN — MIDODRINE HYDROCHLORIDE 5 MILLIGRAM(S): 2.5 TABLET ORAL at 14:41

## 2022-01-01 RX ADMIN — Medication 1 APPLICATION(S): at 18:16

## 2022-01-01 RX ADMIN — CHLORHEXIDINE GLUCONATE 1 APPLICATION(S): 213 SOLUTION TOPICAL at 05:45

## 2022-01-01 RX ADMIN — Medication 2: at 17:24

## 2022-01-01 RX ADMIN — CHLORHEXIDINE GLUCONATE 1 APPLICATION(S): 213 SOLUTION TOPICAL at 06:20

## 2022-01-01 RX ADMIN — MORPHINE SULFATE 2 MILLIGRAM(S): 50 CAPSULE, EXTENDED RELEASE ORAL at 14:44

## 2022-01-01 RX ADMIN — Medication 100 MILLIGRAM(S): at 05:01

## 2022-01-01 RX ADMIN — SCOPALAMINE 1 PATCH: 1 PATCH, EXTENDED RELEASE TRANSDERMAL at 21:24

## 2022-01-01 RX ADMIN — Medication 25 MILLIGRAM(S): at 05:56

## 2022-01-01 RX ADMIN — CHLORHEXIDINE GLUCONATE 15 MILLILITER(S): 213 SOLUTION TOPICAL at 17:14

## 2022-01-01 RX ADMIN — Medication 650 MILLIGRAM(S): at 05:51

## 2022-01-01 RX ADMIN — Medication 100 MILLIGRAM(S): at 17:05

## 2022-01-01 RX ADMIN — MORPHINE SULFATE 2 MILLIGRAM(S): 50 CAPSULE, EXTENDED RELEASE ORAL at 20:59

## 2022-01-01 RX ADMIN — CHLORHEXIDINE GLUCONATE 15 MILLILITER(S): 213 SOLUTION TOPICAL at 17:24

## 2022-01-01 RX ADMIN — Medication 40 MILLIGRAM(S): at 09:44

## 2022-01-01 RX ADMIN — PROPOFOL 0.02 MICROGRAM(S)/KG/MIN: 10 INJECTION, EMULSION INTRAVENOUS at 22:04

## 2022-01-01 RX ADMIN — AMIKACIN SULFATE 255 MILLIGRAM(S): 250 INJECTION, SOLUTION INTRAMUSCULAR; INTRAVENOUS at 21:17

## 2022-01-01 RX ADMIN — Medication 100 MILLIGRAM(S): at 06:12

## 2022-01-01 RX ADMIN — CHLORHEXIDINE GLUCONATE 1 APPLICATION(S): 213 SOLUTION TOPICAL at 11:03

## 2022-01-01 RX ADMIN — SODIUM ZIRCONIUM CYCLOSILICATE 10 GRAM(S): 10 POWDER, FOR SUSPENSION ORAL at 20:03

## 2022-01-01 RX ADMIN — ACETAZOLAMIDE 105 MILLIGRAM(S): 250 TABLET ORAL at 11:40

## 2022-01-01 RX ADMIN — POLYETHYLENE GLYCOL 3350 17 GRAM(S): 17 POWDER, FOR SOLUTION ORAL at 11:35

## 2022-01-01 RX ADMIN — ALBUTEROL 2 PUFF(S): 90 AEROSOL, METERED ORAL at 15:47

## 2022-01-01 RX ADMIN — Medication 1 DROP(S): at 05:33

## 2022-01-01 RX ADMIN — Medication 325 MILLIGRAM(S): at 16:01

## 2022-01-01 RX ADMIN — Medication 4 MILLIGRAM(S): at 05:56

## 2022-01-01 RX ADMIN — Medication 81 MILLIGRAM(S): at 11:37

## 2022-01-01 RX ADMIN — Medication 1 APPLICATION(S): at 18:05

## 2022-01-01 RX ADMIN — FENTANYL CITRATE 50 MICROGRAM(S): 50 INJECTION INTRAVENOUS at 02:01

## 2022-01-01 RX ADMIN — SODIUM ZIRCONIUM CYCLOSILICATE 5 GRAM(S): 10 POWDER, FOR SUSPENSION ORAL at 11:15

## 2022-01-01 RX ADMIN — Medication 100 MILLIGRAM(S): at 05:33

## 2022-01-01 RX ADMIN — Medication 2: at 17:05

## 2022-01-01 RX ADMIN — FENTANYL CITRATE 1 PATCH: 50 INJECTION INTRAVENOUS at 09:49

## 2022-01-01 RX ADMIN — OXYCODONE HYDROCHLORIDE 10 MILLIGRAM(S): 5 TABLET ORAL at 17:37

## 2022-01-01 RX ADMIN — ALBUTEROL 2 PUFF(S): 90 AEROSOL, METERED ORAL at 02:34

## 2022-01-01 RX ADMIN — MIDODRINE HYDROCHLORIDE 10 MILLIGRAM(S): 2.5 TABLET ORAL at 17:28

## 2022-01-01 RX ADMIN — Medication 250 MILLIGRAM(S): at 17:13

## 2022-01-01 RX ADMIN — Medication 4: at 11:49

## 2022-01-01 RX ADMIN — CHLORHEXIDINE GLUCONATE 15 MILLILITER(S): 213 SOLUTION TOPICAL at 17:45

## 2022-01-01 RX ADMIN — Medication 40 MILLIGRAM(S): at 05:15

## 2022-01-01 RX ADMIN — PROPOFOL 0.02 MICROGRAM(S)/KG/MIN: 10 INJECTION, EMULSION INTRAVENOUS at 15:51

## 2022-01-01 RX ADMIN — SODIUM ZIRCONIUM CYCLOSILICATE 10 GRAM(S): 10 POWDER, FOR SUSPENSION ORAL at 13:06

## 2022-01-01 RX ADMIN — Medication 1 APPLICATION(S): at 06:59

## 2022-01-01 RX ADMIN — Medication 750 MILLIGRAM(S): at 13:24

## 2022-01-01 RX ADMIN — Medication 100 MILLIGRAM(S): at 05:04

## 2022-01-01 RX ADMIN — PROPOFOL 0.02 MICROGRAM(S)/KG/MIN: 10 INJECTION, EMULSION INTRAVENOUS at 00:52

## 2022-01-01 RX ADMIN — CHLORHEXIDINE GLUCONATE 15 MILLILITER(S): 213 SOLUTION TOPICAL at 18:10

## 2022-01-01 RX ADMIN — PROPOFOL 0.02 MICROGRAM(S)/KG/MIN: 10 INJECTION, EMULSION INTRAVENOUS at 14:47

## 2022-01-01 RX ADMIN — FENTANYL CITRATE 4.5 MICROGRAM(S)/KG/HR: 50 INJECTION INTRAVENOUS at 14:39

## 2022-01-01 RX ADMIN — CHLORHEXIDINE GLUCONATE 15 MILLILITER(S): 213 SOLUTION TOPICAL at 17:59

## 2022-01-01 RX ADMIN — Medication 1 MILLIGRAM(S): at 05:12

## 2022-01-01 RX ADMIN — ENOXAPARIN SODIUM 40 MILLIGRAM(S): 100 INJECTION SUBCUTANEOUS at 11:34

## 2022-01-01 RX ADMIN — MIDODRINE HYDROCHLORIDE 5 MILLIGRAM(S): 2.5 TABLET ORAL at 12:00

## 2022-01-01 RX ADMIN — Medication 1 DROP(S): at 17:34

## 2022-01-01 RX ADMIN — ALBUTEROL 2 PUFF(S): 90 AEROSOL, METERED ORAL at 03:49

## 2022-01-01 RX ADMIN — CHLORHEXIDINE GLUCONATE 1 APPLICATION(S): 213 SOLUTION TOPICAL at 11:44

## 2022-01-01 RX ADMIN — Medication 125 MICROGRAM(S): at 05:27

## 2022-01-01 RX ADMIN — Medication 12.5 MILLIGRAM(S): at 05:42

## 2022-01-01 RX ADMIN — Medication 500 MILLIGRAM(S): at 11:39

## 2022-01-01 RX ADMIN — FENTANYL CITRATE 3.85 MICROGRAM(S)/KG/HR: 50 INJECTION INTRAVENOUS at 05:15

## 2022-01-01 RX ADMIN — Medication 1 DROP(S): at 17:16

## 2022-01-01 RX ADMIN — MORPHINE SULFATE 2 MILLIGRAM(S): 50 CAPSULE, EXTENDED RELEASE ORAL at 14:41

## 2022-01-01 RX ADMIN — CHLORHEXIDINE GLUCONATE 15 MILLILITER(S): 213 SOLUTION TOPICAL at 18:15

## 2022-01-01 RX ADMIN — HEPARIN SODIUM 10 UNIT(S)/HR: 5000 INJECTION INTRAVENOUS; SUBCUTANEOUS at 04:19

## 2022-01-01 RX ADMIN — Medication 30 MILLIGRAM(S): at 11:31

## 2022-01-01 RX ADMIN — Medication 250 MILLIGRAM(S): at 17:01

## 2022-01-01 RX ADMIN — MORPHINE SULFATE 3 MILLIGRAM(S): 50 CAPSULE, EXTENDED RELEASE ORAL at 17:45

## 2022-01-01 RX ADMIN — Medication 30 MILLIGRAM(S): at 18:14

## 2022-01-01 RX ADMIN — Medication 30 MILLIGRAM(S): at 14:47

## 2022-01-01 RX ADMIN — Medication 81 MILLIGRAM(S): at 12:52

## 2022-01-01 RX ADMIN — CHLORHEXIDINE GLUCONATE 15 MILLILITER(S): 213 SOLUTION TOPICAL at 05:04

## 2022-01-01 RX ADMIN — PIPERACILLIN AND TAZOBACTAM 25 GRAM(S): 4; .5 INJECTION, POWDER, LYOPHILIZED, FOR SOLUTION INTRAVENOUS at 05:26

## 2022-01-01 RX ADMIN — MIDODRINE HYDROCHLORIDE 5 MILLIGRAM(S): 2.5 TABLET ORAL at 07:00

## 2022-01-01 RX ADMIN — Medication 40 MILLIGRAM(S): at 13:38

## 2022-01-01 RX ADMIN — POLYETHYLENE GLYCOL 3350 17 GRAM(S): 17 POWDER, FOR SOLUTION ORAL at 11:50

## 2022-01-01 RX ADMIN — Medication 12.5 MILLIGRAM(S): at 05:44

## 2022-01-01 RX ADMIN — POLYETHYLENE GLYCOL 3350 17 GRAM(S): 17 POWDER, FOR SOLUTION ORAL at 13:36

## 2022-01-01 RX ADMIN — Medication 1 APPLICATION(S): at 05:30

## 2022-01-01 RX ADMIN — ENOXAPARIN SODIUM 40 MILLIGRAM(S): 100 INJECTION SUBCUTANEOUS at 11:46

## 2022-01-01 RX ADMIN — PANTOPRAZOLE SODIUM 40 MILLIGRAM(S): 20 TABLET, DELAYED RELEASE ORAL at 11:42

## 2022-01-01 RX ADMIN — PIPERACILLIN AND TAZOBACTAM 25 GRAM(S): 4; .5 INJECTION, POWDER, LYOPHILIZED, FOR SOLUTION INTRAVENOUS at 21:52

## 2022-01-01 RX ADMIN — Medication 4: at 06:05

## 2022-01-01 RX ADMIN — MIDODRINE HYDROCHLORIDE 5 MILLIGRAM(S): 2.5 TABLET ORAL at 08:09

## 2022-01-01 RX ADMIN — POLYETHYLENE GLYCOL 3350 17 GRAM(S): 17 POWDER, FOR SOLUTION ORAL at 11:39

## 2022-01-01 RX ADMIN — Medication 2: at 06:40

## 2022-01-01 RX ADMIN — CEFEPIME 100 MILLIGRAM(S): 1 INJECTION, POWDER, FOR SOLUTION INTRAMUSCULAR; INTRAVENOUS at 13:53

## 2022-01-01 RX ADMIN — SODIUM POLYSTYRENE SULFONATE 30 GRAM(S): 4.1 POWDER, FOR SUSPENSION ORAL at 11:26

## 2022-01-01 RX ADMIN — FENTANYL CITRATE 3.85 MICROGRAM(S)/KG/HR: 50 INJECTION INTRAVENOUS at 07:09

## 2022-01-01 RX ADMIN — MIDODRINE HYDROCHLORIDE 10 MILLIGRAM(S): 2.5 TABLET ORAL at 07:50

## 2022-01-01 RX ADMIN — PANTOPRAZOLE SODIUM 40 MILLIGRAM(S): 20 TABLET, DELAYED RELEASE ORAL at 11:55

## 2022-01-01 RX ADMIN — Medication 1 APPLICATION(S): at 05:01

## 2022-01-01 RX ADMIN — FENTANYL CITRATE 50 MICROGRAM(S): 50 INJECTION INTRAVENOUS at 02:30

## 2022-01-01 RX ADMIN — Medication 12.5 MILLIGRAM(S): at 17:16

## 2022-01-01 RX ADMIN — CISATRACURIUM BESYLATE 13.8 MICROGRAM(S)/KG/MIN: 2 INJECTION INTRAVENOUS at 21:22

## 2022-01-01 RX ADMIN — Medication 40 MILLIGRAM(S): at 05:50

## 2022-01-01 RX ADMIN — PROPOFOL 0.02 MICROGRAM(S)/KG/MIN: 10 INJECTION, EMULSION INTRAVENOUS at 07:09

## 2022-01-01 RX ADMIN — HEPARIN SODIUM 12 UNIT(S)/HR: 5000 INJECTION INTRAVENOUS; SUBCUTANEOUS at 11:13

## 2022-01-01 RX ADMIN — PANTOPRAZOLE SODIUM 40 MILLIGRAM(S): 20 TABLET, DELAYED RELEASE ORAL at 11:40

## 2022-01-01 RX ADMIN — CEFEPIME 100 MILLIGRAM(S): 1 INJECTION, POWDER, FOR SOLUTION INTRAMUSCULAR; INTRAVENOUS at 06:20

## 2022-01-01 RX ADMIN — PANTOPRAZOLE SODIUM 40 MILLIGRAM(S): 20 TABLET, DELAYED RELEASE ORAL at 05:08

## 2022-01-01 RX ADMIN — CEFEPIME 100 MILLIGRAM(S): 1 INJECTION, POWDER, FOR SOLUTION INTRAMUSCULAR; INTRAVENOUS at 21:52

## 2022-01-01 RX ADMIN — TAMSULOSIN HYDROCHLORIDE 0.4 MILLIGRAM(S): 0.4 CAPSULE ORAL at 22:25

## 2022-01-01 RX ADMIN — CHLORHEXIDINE GLUCONATE 15 MILLILITER(S): 213 SOLUTION TOPICAL at 06:27

## 2022-01-01 RX ADMIN — ALBUTEROL 2 PUFF(S): 90 AEROSOL, METERED ORAL at 08:56

## 2022-01-01 RX ADMIN — MIDODRINE HYDROCHLORIDE 5 MILLIGRAM(S): 2.5 TABLET ORAL at 21:21

## 2022-01-01 RX ADMIN — ERYTHROPOIETIN 20000 UNIT(S): 10000 INJECTION, SOLUTION INTRAVENOUS; SUBCUTANEOUS at 17:48

## 2022-01-01 RX ADMIN — DEXMEDETOMIDINE HYDROCHLORIDE IN 0.9% SODIUM CHLORIDE 3.85 MICROGRAM(S)/KG/HR: 4 INJECTION INTRAVENOUS at 02:27

## 2022-01-01 RX ADMIN — CISATRACURIUM BESYLATE 13.8 MICROGRAM(S)/KG/MIN: 2 INJECTION INTRAVENOUS at 17:01

## 2022-01-01 RX ADMIN — VECURONIUM BROMIDE 10 MILLIGRAM(S): 20 INJECTION, POWDER, FOR SOLUTION INTRAVENOUS at 09:55

## 2022-01-01 RX ADMIN — CHLORHEXIDINE GLUCONATE 1 APPLICATION(S): 213 SOLUTION TOPICAL at 06:35

## 2022-01-01 RX ADMIN — ENOXAPARIN SODIUM 40 MILLIGRAM(S): 100 INJECTION SUBCUTANEOUS at 12:00

## 2022-01-01 RX ADMIN — Medication 750 MILLIGRAM(S): at 05:07

## 2022-01-01 RX ADMIN — SODIUM CHLORIDE 75 MILLILITER(S): 9 INJECTION INTRAMUSCULAR; INTRAVENOUS; SUBCUTANEOUS at 05:54

## 2022-01-01 RX ADMIN — Medication 30 MILLIGRAM(S): at 23:43

## 2022-01-01 RX ADMIN — Medication 1 APPLICATION(S): at 18:15

## 2022-01-01 RX ADMIN — ALBUTEROL 2 PUFF(S): 90 AEROSOL, METERED ORAL at 13:36

## 2022-01-01 RX ADMIN — PANTOPRAZOLE SODIUM 40 MILLIGRAM(S): 20 TABLET, DELAYED RELEASE ORAL at 11:37

## 2022-01-01 RX ADMIN — FENTANYL CITRATE 1 PATCH: 50 INJECTION INTRAVENOUS at 07:31

## 2022-01-01 RX ADMIN — Medication 1 APPLICATION(S): at 17:58

## 2022-01-01 RX ADMIN — POLYETHYLENE GLYCOL 3350 17 GRAM(S): 17 POWDER, FOR SOLUTION ORAL at 12:43

## 2022-01-01 RX ADMIN — Medication 1: at 06:25

## 2022-01-01 RX ADMIN — Medication 60 MILLIGRAM(S): at 05:10

## 2022-01-01 RX ADMIN — Medication 650 MILLIGRAM(S): at 00:51

## 2022-01-01 RX ADMIN — MIDODRINE HYDROCHLORIDE 5 MILLIGRAM(S): 2.5 TABLET ORAL at 06:08

## 2022-01-01 RX ADMIN — CHLORHEXIDINE GLUCONATE 1 APPLICATION(S): 213 SOLUTION TOPICAL at 05:35

## 2022-01-01 RX ADMIN — Medication 100 MILLIGRAM(S): at 06:44

## 2022-01-01 RX ADMIN — Medication 81 MILLIGRAM(S): at 12:08

## 2022-01-01 RX ADMIN — Medication 12.5 MILLIGRAM(S): at 05:29

## 2022-01-01 RX ADMIN — SODIUM ZIRCONIUM CYCLOSILICATE 5 GRAM(S): 10 POWDER, FOR SUSPENSION ORAL at 17:28

## 2022-01-01 RX ADMIN — Medication 1 DROP(S): at 06:18

## 2022-01-01 RX ADMIN — PANTOPRAZOLE SODIUM 40 MILLIGRAM(S): 20 TABLET, DELAYED RELEASE ORAL at 17:51

## 2022-01-01 RX ADMIN — CEFIDEROCOL SULFATE TOSYLATE 33.33 MILLIGRAM(S): 1 INJECTION, POWDER, FOR SOLUTION INTRAVENOUS at 17:42

## 2022-01-01 RX ADMIN — Medication 125 MICROGRAM(S): at 05:04

## 2022-01-01 RX ADMIN — Medication 500 MILLIGRAM(S): at 11:48

## 2022-01-01 RX ADMIN — DEXMEDETOMIDINE HYDROCHLORIDE IN 0.9% SODIUM CHLORIDE 3.85 MICROGRAM(S)/KG/HR: 4 INJECTION INTRAVENOUS at 02:56

## 2022-01-01 RX ADMIN — Medication 1 APPLICATION(S): at 07:20

## 2022-01-01 RX ADMIN — MORPHINE SULFATE 3 MILLIGRAM(S): 50 CAPSULE, EXTENDED RELEASE ORAL at 14:15

## 2022-01-01 RX ADMIN — CEFEPIME 100 MILLIGRAM(S): 1 INJECTION, POWDER, FOR SOLUTION INTRAMUSCULAR; INTRAVENOUS at 05:15

## 2022-01-01 RX ADMIN — CHLORHEXIDINE GLUCONATE 15 MILLILITER(S): 213 SOLUTION TOPICAL at 17:29

## 2022-01-01 RX ADMIN — Medication 4: at 00:05

## 2022-01-01 RX ADMIN — Medication 1 DROP(S): at 18:15

## 2022-01-01 RX ADMIN — PANTOPRAZOLE SODIUM 40 MILLIGRAM(S): 20 TABLET, DELAYED RELEASE ORAL at 11:22

## 2022-01-01 RX ADMIN — MIDODRINE HYDROCHLORIDE 5 MILLIGRAM(S): 2.5 TABLET ORAL at 16:02

## 2022-01-01 RX ADMIN — Medication 650 MILLIGRAM(S): at 14:15

## 2022-01-01 RX ADMIN — Medication 100 MILLIGRAM(S): at 05:54

## 2022-01-01 RX ADMIN — Medication 2: at 11:23

## 2022-01-01 RX ADMIN — POLYETHYLENE GLYCOL 3350 17 GRAM(S): 17 POWDER, FOR SOLUTION ORAL at 13:06

## 2022-01-01 RX ADMIN — CHLORHEXIDINE GLUCONATE 15 MILLILITER(S): 213 SOLUTION TOPICAL at 17:16

## 2022-01-01 RX ADMIN — MORPHINE SULFATE 2 MILLIGRAM(S): 50 CAPSULE, EXTENDED RELEASE ORAL at 13:19

## 2022-01-01 RX ADMIN — PANTOPRAZOLE SODIUM 40 MILLIGRAM(S): 20 TABLET, DELAYED RELEASE ORAL at 11:11

## 2022-01-01 RX ADMIN — CEFEPIME 100 MILLIGRAM(S): 1 INJECTION, POWDER, FOR SOLUTION INTRAMUSCULAR; INTRAVENOUS at 21:26

## 2022-01-01 RX ADMIN — HEPARIN SODIUM 14 UNIT(S)/HR: 5000 INJECTION INTRAVENOUS; SUBCUTANEOUS at 05:09

## 2022-01-01 RX ADMIN — HEPARIN SODIUM 5000 UNIT(S): 5000 INJECTION INTRAVENOUS; SUBCUTANEOUS at 05:21

## 2022-01-01 RX ADMIN — AMPICILLIN SODIUM AND SULBACTAM SODIUM 250 GRAM(S): 250; 125 INJECTION, POWDER, FOR SUSPENSION INTRAMUSCULAR; INTRAVENOUS at 22:30

## 2022-01-01 RX ADMIN — Medication 6 MILLIGRAM(S): at 17:20

## 2022-01-01 RX ADMIN — Medication 6 MILLIGRAM(S): at 06:00

## 2022-01-01 RX ADMIN — Medication 1 APPLICATION(S): at 17:33

## 2022-01-01 RX ADMIN — POLYETHYLENE GLYCOL 3350 17 GRAM(S): 17 POWDER, FOR SOLUTION ORAL at 11:43

## 2022-01-01 RX ADMIN — SODIUM CHLORIDE 1000 MILLILITER(S): 9 INJECTION, SOLUTION INTRAVENOUS at 06:25

## 2022-01-01 RX ADMIN — Medication 100 MILLIGRAM(S): at 17:16

## 2022-01-01 RX ADMIN — ENOXAPARIN SODIUM 40 MILLIGRAM(S): 100 INJECTION SUBCUTANEOUS at 22:12

## 2022-01-01 RX ADMIN — PANTOPRAZOLE SODIUM 40 MILLIGRAM(S): 20 TABLET, DELAYED RELEASE ORAL at 14:18

## 2022-01-01 RX ADMIN — ENOXAPARIN SODIUM 40 MILLIGRAM(S): 100 INJECTION SUBCUTANEOUS at 22:01

## 2022-01-01 RX ADMIN — SODIUM CHLORIDE 50 MILLILITER(S): 9 INJECTION, SOLUTION INTRAVENOUS at 12:25

## 2022-01-01 RX ADMIN — SODIUM ZIRCONIUM CYCLOSILICATE 10 GRAM(S): 10 POWDER, FOR SUSPENSION ORAL at 18:14

## 2022-01-01 RX ADMIN — ENOXAPARIN SODIUM 40 MILLIGRAM(S): 100 INJECTION SUBCUTANEOUS at 21:18

## 2022-01-01 RX ADMIN — Medication 81 MILLIGRAM(S): at 11:28

## 2022-01-01 RX ADMIN — PANTOPRAZOLE SODIUM 40 MILLIGRAM(S): 20 TABLET, DELAYED RELEASE ORAL at 12:26

## 2022-01-01 RX ADMIN — Medication 6: at 00:37

## 2022-01-01 RX ADMIN — PANTOPRAZOLE SODIUM 40 MILLIGRAM(S): 20 TABLET, DELAYED RELEASE ORAL at 17:17

## 2022-01-01 RX ADMIN — Medication 25 MILLIGRAM(S): at 06:51

## 2022-01-01 RX ADMIN — Medication 1 DROP(S): at 05:17

## 2022-01-01 RX ADMIN — CEFIDEROCOL SULFATE TOSYLATE 33.33 MILLIGRAM(S): 1 INJECTION, POWDER, FOR SOLUTION INTRAVENOUS at 17:27

## 2022-01-01 RX ADMIN — Medication 650 MILLIGRAM(S): at 17:43

## 2022-01-01 RX ADMIN — Medication 1 DROP(S): at 18:01

## 2022-01-01 RX ADMIN — PANTOPRAZOLE SODIUM 40 MILLIGRAM(S): 20 TABLET, DELAYED RELEASE ORAL at 11:26

## 2022-01-01 RX ADMIN — HEPARIN SODIUM 14 UNIT(S)/HR: 5000 INJECTION INTRAVENOUS; SUBCUTANEOUS at 19:56

## 2022-01-01 RX ADMIN — OXYCODONE HYDROCHLORIDE 10 MILLIGRAM(S): 5 TABLET ORAL at 11:47

## 2022-01-01 RX ADMIN — MIDODRINE HYDROCHLORIDE 10 MILLIGRAM(S): 2.5 TABLET ORAL at 00:55

## 2022-01-01 RX ADMIN — CHLORHEXIDINE GLUCONATE 15 MILLILITER(S): 213 SOLUTION TOPICAL at 17:25

## 2022-01-01 RX ADMIN — ATORVASTATIN CALCIUM 10 MILLIGRAM(S): 80 TABLET, FILM COATED ORAL at 22:03

## 2022-01-01 RX ADMIN — CHLORHEXIDINE GLUCONATE 1 APPLICATION(S): 213 SOLUTION TOPICAL at 05:40

## 2022-01-01 RX ADMIN — CHLORHEXIDINE GLUCONATE 15 MILLILITER(S): 213 SOLUTION TOPICAL at 17:47

## 2022-01-01 RX ADMIN — CHLORHEXIDINE GLUCONATE 15 MILLILITER(S): 213 SOLUTION TOPICAL at 18:27

## 2022-01-01 RX ADMIN — Medication 650 MILLIGRAM(S): at 15:46

## 2022-01-01 RX ADMIN — Medication 12.5 MILLIGRAM(S): at 05:03

## 2022-01-01 RX ADMIN — CHLORHEXIDINE GLUCONATE 1 APPLICATION(S): 213 SOLUTION TOPICAL at 05:34

## 2022-01-01 RX ADMIN — MIDODRINE HYDROCHLORIDE 10 MILLIGRAM(S): 2.5 TABLET ORAL at 11:40

## 2022-01-01 RX ADMIN — MIDODRINE HYDROCHLORIDE 10 MILLIGRAM(S): 2.5 TABLET ORAL at 09:11

## 2022-01-01 RX ADMIN — CHLORHEXIDINE GLUCONATE 15 MILLILITER(S): 213 SOLUTION TOPICAL at 17:05

## 2022-01-01 RX ADMIN — FENTANYL CITRATE 50 MICROGRAM(S): 50 INJECTION INTRAVENOUS at 11:58

## 2022-01-01 RX ADMIN — MORPHINE SULFATE 3 MILLIGRAM(S): 50 CAPSULE, EXTENDED RELEASE ORAL at 06:43

## 2022-01-01 RX ADMIN — POLYETHYLENE GLYCOL 3350 17 GRAM(S): 17 POWDER, FOR SOLUTION ORAL at 12:51

## 2022-01-01 RX ADMIN — ALBUTEROL 2 PUFF(S): 90 AEROSOL, METERED ORAL at 20:28

## 2022-01-01 RX ADMIN — PANTOPRAZOLE SODIUM 40 MILLIGRAM(S): 20 TABLET, DELAYED RELEASE ORAL at 17:59

## 2022-01-01 RX ADMIN — PROPOFOL 0.02 MICROGRAM(S)/KG/MIN: 10 INJECTION, EMULSION INTRAVENOUS at 15:05

## 2022-01-01 RX ADMIN — PROPOFOL 0.02 MICROGRAM(S)/KG/MIN: 10 INJECTION, EMULSION INTRAVENOUS at 05:11

## 2022-01-01 RX ADMIN — Medication 1 APPLICATION(S): at 05:10

## 2022-01-01 RX ADMIN — FENTANYL CITRATE 4.5 MICROGRAM(S)/KG/HR: 50 INJECTION INTRAVENOUS at 22:39

## 2022-01-01 RX ADMIN — MORPHINE SULFATE 3 MILLIGRAM(S): 50 CAPSULE, EXTENDED RELEASE ORAL at 21:04

## 2022-01-01 RX ADMIN — Medication 25 MILLIGRAM(S): at 17:24

## 2022-01-01 RX ADMIN — Medication 40 MILLIGRAM(S): at 12:42

## 2022-01-01 RX ADMIN — CHLORHEXIDINE GLUCONATE 15 MILLILITER(S): 213 SOLUTION TOPICAL at 06:43

## 2022-01-01 RX ADMIN — Medication 100 MILLIGRAM(S): at 18:22

## 2022-01-01 RX ADMIN — Medication 4: at 11:36

## 2022-01-01 RX ADMIN — CEFEPIME 100 MILLIGRAM(S): 1 INJECTION, POWDER, FOR SOLUTION INTRAMUSCULAR; INTRAVENOUS at 06:45

## 2022-01-01 RX ADMIN — MIDODRINE HYDROCHLORIDE 10 MILLIGRAM(S): 2.5 TABLET ORAL at 09:14

## 2022-01-01 RX ADMIN — Medication 30 MILLIGRAM(S): at 17:16

## 2022-01-01 RX ADMIN — Medication 2: at 00:25

## 2022-01-01 RX ADMIN — Medication 50 MILLIEQUIVALENT(S): at 10:41

## 2022-01-01 RX ADMIN — ZINC SULFATE TAB 220 MG (50 MG ZINC EQUIVALENT) 220 MILLIGRAM(S): 220 (50 ZN) TAB at 12:42

## 2022-01-01 RX ADMIN — Medication 100 MILLIGRAM(S): at 06:27

## 2022-01-01 RX ADMIN — Medication 1 DROP(S): at 07:32

## 2022-01-01 RX ADMIN — SCOPALAMINE 1 PATCH: 1 PATCH, EXTENDED RELEASE TRANSDERMAL at 21:14

## 2022-01-01 RX ADMIN — CEFEPIME 100 MILLIGRAM(S): 1 INJECTION, POWDER, FOR SOLUTION INTRAMUSCULAR; INTRAVENOUS at 13:13

## 2022-01-01 RX ADMIN — ALBUTEROL 2 PUFF(S): 90 AEROSOL, METERED ORAL at 15:44

## 2022-01-01 RX ADMIN — CEFEPIME 100 MILLIGRAM(S): 1 INJECTION, POWDER, FOR SOLUTION INTRAMUSCULAR; INTRAVENOUS at 21:11

## 2022-01-01 RX ADMIN — AMPICILLIN SODIUM AND SULBACTAM SODIUM 250 GRAM(S): 250; 125 INJECTION, POWDER, FOR SUSPENSION INTRAMUSCULAR; INTRAVENOUS at 22:01

## 2022-01-01 RX ADMIN — AMPICILLIN SODIUM AND SULBACTAM SODIUM 250 GRAM(S): 250; 125 INJECTION, POWDER, FOR SUSPENSION INTRAMUSCULAR; INTRAVENOUS at 05:57

## 2022-01-01 RX ADMIN — MIDODRINE HYDROCHLORIDE 10 MILLIGRAM(S): 2.5 TABLET ORAL at 17:17

## 2022-01-01 RX ADMIN — Medication 250 MILLIGRAM(S): at 17:35

## 2022-01-01 RX ADMIN — AMPICILLIN SODIUM AND SULBACTAM SODIUM 250 GRAM(S): 250; 125 INJECTION, POWDER, FOR SUSPENSION INTRAMUSCULAR; INTRAVENOUS at 05:08

## 2022-01-01 RX ADMIN — Medication 100 MILLIGRAM(S): at 05:22

## 2022-01-01 RX ADMIN — PANTOPRAZOLE SODIUM 40 MILLIGRAM(S): 20 TABLET, DELAYED RELEASE ORAL at 12:30

## 2022-01-01 RX ADMIN — Medication 1: at 17:59

## 2022-01-01 RX ADMIN — ATORVASTATIN CALCIUM 10 MILLIGRAM(S): 80 TABLET, FILM COATED ORAL at 22:16

## 2022-01-01 RX ADMIN — Medication 25 MILLIGRAM(S): at 05:50

## 2022-01-01 RX ADMIN — Medication 750 MILLIGRAM(S): at 05:34

## 2022-01-01 RX ADMIN — MIDODRINE HYDROCHLORIDE 5 MILLIGRAM(S): 2.5 TABLET ORAL at 11:30

## 2022-01-01 RX ADMIN — Medication 4 MILLIGRAM(S): at 05:42

## 2022-01-01 RX ADMIN — PANTOPRAZOLE SODIUM 40 MILLIGRAM(S): 20 TABLET, DELAYED RELEASE ORAL at 12:19

## 2022-01-01 RX ADMIN — Medication 1 APPLICATION(S): at 18:19

## 2022-01-01 RX ADMIN — PANTOPRAZOLE SODIUM 40 MILLIGRAM(S): 20 TABLET, DELAYED RELEASE ORAL at 09:19

## 2022-01-01 RX ADMIN — Medication 25 MILLIGRAM(S): at 17:11

## 2022-01-01 RX ADMIN — Medication 12.5 MILLIGRAM(S): at 05:08

## 2022-01-01 RX ADMIN — HEPARIN SODIUM 5000 UNIT(S): 5000 INJECTION INTRAVENOUS; SUBCUTANEOUS at 17:15

## 2022-01-01 RX ADMIN — Medication 30 MILLIGRAM(S): at 06:47

## 2022-01-01 RX ADMIN — MORPHINE SULFATE 2 MILLIGRAM(S): 50 CAPSULE, EXTENDED RELEASE ORAL at 08:58

## 2022-01-01 RX ADMIN — SODIUM CHLORIDE 250 MILLILITER(S): 9 INJECTION INTRAMUSCULAR; INTRAVENOUS; SUBCUTANEOUS at 02:58

## 2022-01-01 RX ADMIN — ZINC SULFATE TAB 220 MG (50 MG ZINC EQUIVALENT) 220 MILLIGRAM(S): 220 (50 ZN) TAB at 11:04

## 2022-01-01 RX ADMIN — INSULIN HUMAN 10 UNIT(S): 100 INJECTION, SOLUTION SUBCUTANEOUS at 06:44

## 2022-01-01 RX ADMIN — OXYCODONE HYDROCHLORIDE 10 MILLIGRAM(S): 5 TABLET ORAL at 00:35

## 2022-01-01 RX ADMIN — Medication 1 DROP(S): at 17:33

## 2022-01-01 RX ADMIN — AMPICILLIN SODIUM AND SULBACTAM SODIUM 250 GRAM(S): 250; 125 INJECTION, POWDER, FOR SUSPENSION INTRAMUSCULAR; INTRAVENOUS at 15:22

## 2022-01-01 RX ADMIN — OXYCODONE HYDROCHLORIDE 10 MILLIGRAM(S): 5 TABLET ORAL at 06:23

## 2022-01-01 RX ADMIN — Medication 1 DROP(S): at 17:20

## 2022-01-01 RX ADMIN — Medication 6 MILLIGRAM(S): at 05:22

## 2022-01-01 RX ADMIN — FENTANYL CITRATE 4.5 MICROGRAM(S)/KG/HR: 50 INJECTION INTRAVENOUS at 14:47

## 2022-01-01 RX ADMIN — ATORVASTATIN CALCIUM 10 MILLIGRAM(S): 80 TABLET, FILM COATED ORAL at 22:06

## 2022-01-01 RX ADMIN — Medication 40 MILLIGRAM(S): at 14:10

## 2022-01-01 RX ADMIN — Medication 12.5 MILLIGRAM(S): at 05:09

## 2022-01-01 RX ADMIN — Medication 1 APPLICATION(S): at 07:40

## 2022-01-01 RX ADMIN — Medication 2: at 11:45

## 2022-01-01 RX ADMIN — CHLORHEXIDINE GLUCONATE 15 MILLILITER(S): 213 SOLUTION TOPICAL at 05:08

## 2022-01-01 RX ADMIN — PIPERACILLIN AND TAZOBACTAM 25 GRAM(S): 4; .5 INJECTION, POWDER, LYOPHILIZED, FOR SOLUTION INTRAVENOUS at 05:16

## 2022-01-01 RX ADMIN — Medication 4: at 12:53

## 2022-01-01 RX ADMIN — CHLORHEXIDINE GLUCONATE 15 MILLILITER(S): 213 SOLUTION TOPICAL at 06:16

## 2022-01-01 RX ADMIN — Medication 1000 MILLILITER(S): at 06:19

## 2022-01-01 RX ADMIN — MIDODRINE HYDROCHLORIDE 5 MILLIGRAM(S): 2.5 TABLET ORAL at 21:08

## 2022-01-01 RX ADMIN — CEFEPIME 100 MILLIGRAM(S): 1 INJECTION, POWDER, FOR SOLUTION INTRAMUSCULAR; INTRAVENOUS at 13:00

## 2022-01-01 RX ADMIN — MIDODRINE HYDROCHLORIDE 10 MILLIGRAM(S): 2.5 TABLET ORAL at 16:44

## 2022-01-01 RX ADMIN — ENOXAPARIN SODIUM 40 MILLIGRAM(S): 100 INJECTION SUBCUTANEOUS at 21:47

## 2022-01-01 RX ADMIN — Medication 81 MILLIGRAM(S): at 11:31

## 2022-01-01 RX ADMIN — Medication 81 MILLIGRAM(S): at 11:58

## 2022-01-01 RX ADMIN — Medication 6 MILLIGRAM(S): at 17:29

## 2022-01-01 RX ADMIN — Medication 100 MILLIGRAM(S): at 06:43

## 2022-01-01 RX ADMIN — PANTOPRAZOLE SODIUM 40 MILLIGRAM(S): 20 TABLET, DELAYED RELEASE ORAL at 17:09

## 2022-01-01 RX ADMIN — POLYETHYLENE GLYCOL 3350 17 GRAM(S): 17 POWDER, FOR SOLUTION ORAL at 11:46

## 2022-01-01 RX ADMIN — Medication 650 MILLIGRAM(S): at 23:32

## 2022-01-01 RX ADMIN — CEFEPIME 100 MILLIGRAM(S): 1 INJECTION, POWDER, FOR SOLUTION INTRAMUSCULAR; INTRAVENOUS at 22:20

## 2022-01-01 RX ADMIN — Medication 12.5 MILLIGRAM(S): at 07:00

## 2022-01-01 RX ADMIN — HEPARIN SODIUM 5000 UNIT(S): 5000 INJECTION INTRAVENOUS; SUBCUTANEOUS at 05:10

## 2022-01-01 RX ADMIN — Medication 100 MILLIGRAM(S): at 06:38

## 2022-01-01 RX ADMIN — PANTOPRAZOLE SODIUM 40 MILLIGRAM(S): 20 TABLET, DELAYED RELEASE ORAL at 05:27

## 2022-01-01 RX ADMIN — CHLORHEXIDINE GLUCONATE 15 MILLILITER(S): 213 SOLUTION TOPICAL at 18:08

## 2022-01-01 RX ADMIN — Medication 500 MILLIGRAM(S): at 12:43

## 2022-01-01 RX ADMIN — CHLORHEXIDINE GLUCONATE 1 APPLICATION(S): 213 SOLUTION TOPICAL at 12:44

## 2022-01-01 RX ADMIN — Medication 100 MILLIGRAM(S): at 18:30

## 2022-01-01 RX ADMIN — Medication 100 MILLIGRAM(S): at 17:32

## 2022-01-01 RX ADMIN — Medication 30 MILLIGRAM(S): at 11:22

## 2022-01-01 RX ADMIN — MEROPENEM 100 MILLIGRAM(S): 1 INJECTION INTRAVENOUS at 15:59

## 2022-01-01 RX ADMIN — CHLORHEXIDINE GLUCONATE 15 MILLILITER(S): 213 SOLUTION TOPICAL at 05:05

## 2022-01-01 RX ADMIN — Medication 650 MILLIGRAM(S): at 23:20

## 2022-01-01 RX ADMIN — Medication 81 MILLIGRAM(S): at 12:16

## 2022-01-01 RX ADMIN — CHLORHEXIDINE GLUCONATE 15 MILLILITER(S): 213 SOLUTION TOPICAL at 06:08

## 2022-01-01 RX ADMIN — MIDODRINE HYDROCHLORIDE 5 MILLIGRAM(S): 2.5 TABLET ORAL at 14:17

## 2022-01-01 RX ADMIN — Medication 100 MILLIGRAM(S): at 17:11

## 2022-01-01 RX ADMIN — Medication 6 MILLIGRAM(S): at 05:28

## 2022-01-01 RX ADMIN — PANTOPRAZOLE SODIUM 40 MILLIGRAM(S): 20 TABLET, DELAYED RELEASE ORAL at 17:37

## 2022-01-01 RX ADMIN — MIDODRINE HYDROCHLORIDE 5 MILLIGRAM(S): 2.5 TABLET ORAL at 22:51

## 2022-01-01 RX ADMIN — FENTANYL CITRATE 50 MICROGRAM(S): 50 INJECTION INTRAVENOUS at 12:35

## 2022-01-01 RX ADMIN — Medication 6 MILLIGRAM(S): at 05:34

## 2022-01-01 RX ADMIN — CHLORHEXIDINE GLUCONATE 1 APPLICATION(S): 213 SOLUTION TOPICAL at 11:41

## 2022-01-01 RX ADMIN — OXYCODONE HYDROCHLORIDE 10 MILLIGRAM(S): 5 TABLET ORAL at 06:38

## 2022-01-01 RX ADMIN — HEPARIN SODIUM 5000 UNIT(S): 5000 INJECTION INTRAVENOUS; SUBCUTANEOUS at 05:04

## 2022-01-01 RX ADMIN — ENOXAPARIN SODIUM 40 MILLIGRAM(S): 100 INJECTION SUBCUTANEOUS at 11:49

## 2022-01-01 RX ADMIN — MIDODRINE HYDROCHLORIDE 5 MILLIGRAM(S): 2.5 TABLET ORAL at 14:23

## 2022-01-01 RX ADMIN — OXYCODONE HYDROCHLORIDE 10 MILLIGRAM(S): 5 TABLET ORAL at 02:54

## 2022-01-01 RX ADMIN — AMPICILLIN SODIUM AND SULBACTAM SODIUM 250 GRAM(S): 250; 125 INJECTION, POWDER, FOR SUSPENSION INTRAMUSCULAR; INTRAVENOUS at 13:18

## 2022-01-01 RX ADMIN — MORPHINE SULFATE 3 MILLIGRAM(S): 50 CAPSULE, EXTENDED RELEASE ORAL at 13:59

## 2022-01-01 RX ADMIN — HEPARIN SODIUM 5000 UNIT(S): 5000 INJECTION INTRAVENOUS; SUBCUTANEOUS at 22:06

## 2022-01-01 RX ADMIN — CEFIDEROCOL SULFATE TOSYLATE 33.33 MILLIGRAM(S): 1 INJECTION, POWDER, FOR SOLUTION INTRAVENOUS at 05:05

## 2022-01-01 RX ADMIN — CEFEPIME 100 MILLIGRAM(S): 1 INJECTION, POWDER, FOR SOLUTION INTRAMUSCULAR; INTRAVENOUS at 05:58

## 2022-01-01 RX ADMIN — CHLORHEXIDINE GLUCONATE 15 MILLILITER(S): 213 SOLUTION TOPICAL at 06:36

## 2022-01-01 RX ADMIN — SODIUM CHLORIDE 1000 MILLILITER(S): 9 INJECTION, SOLUTION INTRAVENOUS at 11:10

## 2022-01-01 RX ADMIN — Medication 81 MILLIGRAM(S): at 11:39

## 2022-01-01 RX ADMIN — ERYTHROPOIETIN 20000 UNIT(S): 10000 INJECTION, SOLUTION INTRAVENOUS; SUBCUTANEOUS at 22:46

## 2022-01-01 RX ADMIN — FENTANYL CITRATE 50 MICROGRAM(S): 50 INJECTION INTRAVENOUS at 15:06

## 2022-01-01 RX ADMIN — MIDODRINE HYDROCHLORIDE 10 MILLIGRAM(S): 2.5 TABLET ORAL at 17:18

## 2022-01-01 RX ADMIN — ENOXAPARIN SODIUM 40 MILLIGRAM(S): 100 INJECTION SUBCUTANEOUS at 21:10

## 2022-01-01 RX ADMIN — CHLORHEXIDINE GLUCONATE 1 APPLICATION(S): 213 SOLUTION TOPICAL at 07:00

## 2022-01-01 RX ADMIN — CEFEPIME 100 MILLIGRAM(S): 1 INJECTION, POWDER, FOR SOLUTION INTRAMUSCULAR; INTRAVENOUS at 14:28

## 2022-01-01 RX ADMIN — ENOXAPARIN SODIUM 40 MILLIGRAM(S): 100 INJECTION SUBCUTANEOUS at 13:05

## 2022-01-01 RX ADMIN — ATORVASTATIN CALCIUM 10 MILLIGRAM(S): 80 TABLET, FILM COATED ORAL at 21:48

## 2022-01-01 RX ADMIN — Medication 6: at 17:57

## 2022-01-01 RX ADMIN — CHLORHEXIDINE GLUCONATE 1 APPLICATION(S): 213 SOLUTION TOPICAL at 06:10

## 2022-01-01 RX ADMIN — PANTOPRAZOLE SODIUM 40 MILLIGRAM(S): 20 TABLET, DELAYED RELEASE ORAL at 05:26

## 2022-01-01 RX ADMIN — DEXMEDETOMIDINE HYDROCHLORIDE IN 0.9% SODIUM CHLORIDE 3.85 MICROGRAM(S)/KG/HR: 4 INJECTION INTRAVENOUS at 20:44

## 2022-01-01 RX ADMIN — MORPHINE SULFATE 2 MILLIGRAM(S): 50 CAPSULE, EXTENDED RELEASE ORAL at 08:32

## 2022-01-01 RX ADMIN — ALBUTEROL 2 PUFF(S): 90 AEROSOL, METERED ORAL at 08:37

## 2022-01-01 RX ADMIN — Medication 1 DROP(S): at 05:07

## 2022-01-01 RX ADMIN — AMPICILLIN SODIUM AND SULBACTAM SODIUM 250 GRAM(S): 250; 125 INJECTION, POWDER, FOR SUSPENSION INTRAMUSCULAR; INTRAVENOUS at 16:05

## 2022-01-01 RX ADMIN — FENTANYL CITRATE 4.5 MICROGRAM(S)/KG/HR: 50 INJECTION INTRAVENOUS at 04:19

## 2022-01-01 RX ADMIN — Medication 6 MILLIGRAM(S): at 05:15

## 2022-01-01 RX ADMIN — Medication 40 MILLIGRAM(S): at 14:13

## 2022-01-01 RX ADMIN — CEFEPIME 100 MILLIGRAM(S): 1 INJECTION, POWDER, FOR SOLUTION INTRAMUSCULAR; INTRAVENOUS at 05:43

## 2022-01-01 RX ADMIN — ATORVASTATIN CALCIUM 10 MILLIGRAM(S): 80 TABLET, FILM COATED ORAL at 22:25

## 2022-01-01 RX ADMIN — MORPHINE SULFATE 3 MILLIGRAM(S): 50 CAPSULE, EXTENDED RELEASE ORAL at 02:11

## 2022-01-01 RX ADMIN — CEFEPIME 100 MILLIGRAM(S): 1 INJECTION, POWDER, FOR SOLUTION INTRAMUSCULAR; INTRAVENOUS at 06:37

## 2022-01-01 RX ADMIN — Medication 200 GRAM(S): at 06:43

## 2022-01-01 RX ADMIN — ENOXAPARIN SODIUM 40 MILLIGRAM(S): 100 INJECTION SUBCUTANEOUS at 21:52

## 2022-01-01 RX ADMIN — Medication 100 MILLIGRAM(S): at 07:00

## 2022-01-01 RX ADMIN — Medication 40 MILLIGRAM(S): at 05:42

## 2022-01-01 RX ADMIN — Medication 30 MILLIGRAM(S): at 12:58

## 2022-01-01 RX ADMIN — Medication 650 MILLIGRAM(S): at 21:21

## 2022-01-01 RX ADMIN — AMPICILLIN SODIUM AND SULBACTAM SODIUM 250 GRAM(S): 250; 125 INJECTION, POWDER, FOR SUSPENSION INTRAMUSCULAR; INTRAVENOUS at 22:55

## 2022-01-01 RX ADMIN — HEPARIN SODIUM 5000 UNIT(S): 5000 INJECTION INTRAVENOUS; SUBCUTANEOUS at 05:57

## 2022-01-01 RX ADMIN — Medication 60 MILLIGRAM(S): at 13:23

## 2022-01-01 RX ADMIN — Medication 2: at 05:51

## 2022-01-01 RX ADMIN — Medication 100 MILLIGRAM(S): at 06:41

## 2022-01-01 RX ADMIN — Medication 25 MILLIGRAM(S): at 06:57

## 2022-01-01 RX ADMIN — MIDODRINE HYDROCHLORIDE 5 MILLIGRAM(S): 2.5 TABLET ORAL at 11:13

## 2022-01-01 RX ADMIN — Medication 25 MILLIGRAM(S): at 05:29

## 2022-01-01 RX ADMIN — Medication 1 MILLIGRAM(S): at 16:00

## 2022-01-01 RX ADMIN — ERYTHROPOIETIN 20000 UNIT(S): 10000 INJECTION, SOLUTION INTRAVENOUS; SUBCUTANEOUS at 17:11

## 2022-01-01 RX ADMIN — Medication 2: at 00:59

## 2022-01-01 RX ADMIN — ALBUTEROL 2 PUFF(S): 90 AEROSOL, METERED ORAL at 08:01

## 2022-01-01 RX ADMIN — CEFEPIME 100 MILLIGRAM(S): 1 INJECTION, POWDER, FOR SOLUTION INTRAMUSCULAR; INTRAVENOUS at 06:39

## 2022-01-01 RX ADMIN — Medication 250 MILLIGRAM(S): at 17:11

## 2022-01-01 RX ADMIN — Medication 12.5 MILLIGRAM(S): at 17:29

## 2022-01-01 RX ADMIN — CHLORHEXIDINE GLUCONATE 15 MILLILITER(S): 213 SOLUTION TOPICAL at 18:33

## 2022-01-01 RX ADMIN — ENOXAPARIN SODIUM 40 MILLIGRAM(S): 100 INJECTION SUBCUTANEOUS at 21:19

## 2022-01-01 RX ADMIN — Medication 750 MILLIGRAM(S): at 05:03

## 2022-01-01 RX ADMIN — Medication 100 MILLIGRAM(S): at 07:15

## 2022-01-01 RX ADMIN — DEXMEDETOMIDINE HYDROCHLORIDE IN 0.9% SODIUM CHLORIDE 3.85 MICROGRAM(S)/KG/HR: 4 INJECTION INTRAVENOUS at 22:11

## 2022-01-01 RX ADMIN — CHLORHEXIDINE GLUCONATE 1 APPLICATION(S): 213 SOLUTION TOPICAL at 11:38

## 2022-01-01 RX ADMIN — Medication 30 MILLIGRAM(S): at 12:07

## 2022-01-01 RX ADMIN — HEPARIN SODIUM 5000 UNIT(S): 5000 INJECTION INTRAVENOUS; SUBCUTANEOUS at 17:19

## 2022-01-01 RX ADMIN — ATORVASTATIN CALCIUM 10 MILLIGRAM(S): 80 TABLET, FILM COATED ORAL at 21:21

## 2022-01-01 RX ADMIN — ERYTHROPOIETIN 20000 UNIT(S): 10000 INJECTION, SOLUTION INTRAVENOUS; SUBCUTANEOUS at 16:43

## 2022-01-01 RX ADMIN — CEFEPIME 100 MILLIGRAM(S): 1 INJECTION, POWDER, FOR SOLUTION INTRAMUSCULAR; INTRAVENOUS at 22:52

## 2022-01-01 RX ADMIN — Medication 40 MILLIGRAM(S): at 17:24

## 2022-01-01 RX ADMIN — MIDODRINE HYDROCHLORIDE 10 MILLIGRAM(S): 2.5 TABLET ORAL at 05:43

## 2022-01-01 RX ADMIN — Medication 40 MILLIEQUIVALENT(S): at 13:20

## 2022-01-01 RX ADMIN — MORPHINE SULFATE 2 MILLIGRAM(S): 50 CAPSULE, EXTENDED RELEASE ORAL at 18:46

## 2022-01-01 RX ADMIN — AMIKACIN SULFATE 255 MILLIGRAM(S): 250 INJECTION, SOLUTION INTRAMUSCULAR; INTRAVENOUS at 21:22

## 2022-01-01 RX ADMIN — ENOXAPARIN SODIUM 40 MILLIGRAM(S): 100 INJECTION SUBCUTANEOUS at 11:06

## 2022-01-01 RX ADMIN — Medication 1 DROP(S): at 05:32

## 2022-01-01 RX ADMIN — FENTANYL CITRATE 50 MICROGRAM(S): 50 INJECTION INTRAVENOUS at 08:15

## 2022-01-01 RX ADMIN — Medication 81 MILLIGRAM(S): at 11:55

## 2022-01-01 RX ADMIN — POLYETHYLENE GLYCOL 3350 17 GRAM(S): 17 POWDER, FOR SOLUTION ORAL at 11:27

## 2022-01-01 RX ADMIN — CHLORHEXIDINE GLUCONATE 1 APPLICATION(S): 213 SOLUTION TOPICAL at 06:39

## 2022-01-01 RX ADMIN — FENTANYL CITRATE 3.85 MICROGRAM(S)/KG/HR: 50 INJECTION INTRAVENOUS at 15:51

## 2022-01-01 RX ADMIN — TOCILIZUMAB 100 MILLIGRAM(S): 20 INJECTION, SOLUTION, CONCENTRATE INTRAVENOUS at 18:26

## 2022-01-01 RX ADMIN — Medication 4 MILLIGRAM(S): at 05:34

## 2022-01-01 RX ADMIN — SCOPALAMINE 1 PATCH: 1 PATCH, EXTENDED RELEASE TRANSDERMAL at 07:02

## 2022-01-01 RX ADMIN — HEPARIN SODIUM 5000 UNIT(S): 5000 INJECTION INTRAVENOUS; SUBCUTANEOUS at 05:19

## 2022-01-01 RX ADMIN — Medication 2: at 00:34

## 2022-01-01 RX ADMIN — Medication 25 MILLIGRAM(S): at 17:44

## 2022-01-01 RX ADMIN — ENOXAPARIN SODIUM 40 MILLIGRAM(S): 100 INJECTION SUBCUTANEOUS at 22:53

## 2022-01-01 RX ADMIN — Medication 4: at 11:33

## 2022-01-01 RX ADMIN — PROPOFOL 0.02 MICROGRAM(S)/KG/MIN: 10 INJECTION, EMULSION INTRAVENOUS at 22:40

## 2022-01-01 RX ADMIN — FENTANYL CITRATE 50 MICROGRAM(S): 50 INJECTION INTRAVENOUS at 02:08

## 2022-01-01 RX ADMIN — FENTANYL CITRATE 3.85 MICROGRAM(S)/KG/HR: 50 INJECTION INTRAVENOUS at 20:28

## 2022-01-01 RX ADMIN — Medication 1 DROP(S): at 18:19

## 2022-01-01 RX ADMIN — MIDODRINE HYDROCHLORIDE 5 MILLIGRAM(S): 2.5 TABLET ORAL at 13:07

## 2022-01-01 RX ADMIN — CHLORHEXIDINE GLUCONATE 1 APPLICATION(S): 213 SOLUTION TOPICAL at 06:19

## 2022-01-01 RX ADMIN — CHLORHEXIDINE GLUCONATE 1 APPLICATION(S): 213 SOLUTION TOPICAL at 06:48

## 2022-01-01 RX ADMIN — Medication 25 MILLIGRAM(S): at 17:42

## 2022-01-01 RX ADMIN — ACETAZOLAMIDE 110 MILLIGRAM(S): 250 TABLET ORAL at 17:13

## 2022-01-01 RX ADMIN — Medication 300 MILLIGRAM(S): at 06:39

## 2022-01-01 RX ADMIN — Medication 1 DROP(S): at 17:44

## 2022-01-01 RX ADMIN — Medication 3.61 MICROGRAM(S)/KG/MIN: at 05:23

## 2022-01-01 RX ADMIN — Medication 650 MILLIGRAM(S): at 21:51

## 2022-01-01 RX ADMIN — Medication 650 MILLIGRAM(S): at 11:01

## 2022-01-01 RX ADMIN — Medication 2: at 12:25

## 2022-01-01 RX ADMIN — HEPARIN SODIUM 5000 UNIT(S): 5000 INJECTION INTRAVENOUS; SUBCUTANEOUS at 05:42

## 2022-01-01 RX ADMIN — Medication 750 MILLIGRAM(S): at 21:04

## 2022-01-01 RX ADMIN — MORPHINE SULFATE 2 MILLIGRAM(S): 50 CAPSULE, EXTENDED RELEASE ORAL at 02:30

## 2022-01-01 RX ADMIN — CHLORHEXIDINE GLUCONATE 15 MILLILITER(S): 213 SOLUTION TOPICAL at 05:42

## 2022-01-01 RX ADMIN — Medication 650 MILLIGRAM(S): at 06:32

## 2022-01-01 RX ADMIN — CHLORHEXIDINE GLUCONATE 15 MILLILITER(S): 213 SOLUTION TOPICAL at 18:07

## 2022-01-01 RX ADMIN — OXYCODONE HYDROCHLORIDE 10 MILLIGRAM(S): 5 TABLET ORAL at 06:28

## 2022-01-01 RX ADMIN — PANTOPRAZOLE SODIUM 40 MILLIGRAM(S): 20 TABLET, DELAYED RELEASE ORAL at 17:15

## 2022-01-01 RX ADMIN — DEXMEDETOMIDINE HYDROCHLORIDE IN 0.9% SODIUM CHLORIDE 3.85 MICROGRAM(S)/KG/HR: 4 INJECTION INTRAVENOUS at 15:39

## 2022-01-01 RX ADMIN — ENOXAPARIN SODIUM 40 MILLIGRAM(S): 100 INJECTION SUBCUTANEOUS at 22:59

## 2022-01-01 RX ADMIN — Medication 2: at 12:49

## 2022-01-01 RX ADMIN — OXYCODONE HYDROCHLORIDE 10 MILLIGRAM(S): 5 TABLET ORAL at 17:29

## 2022-01-01 RX ADMIN — Medication 100 MILLIGRAM(S): at 17:38

## 2022-01-01 RX ADMIN — Medication 3.61 MICROGRAM(S)/KG/MIN: at 14:26

## 2022-01-01 RX ADMIN — CHLORHEXIDINE GLUCONATE 15 MILLILITER(S): 213 SOLUTION TOPICAL at 05:57

## 2022-01-01 RX ADMIN — Medication 81 MILLIGRAM(S): at 12:19

## 2022-01-01 RX ADMIN — MIDODRINE HYDROCHLORIDE 10 MILLIGRAM(S): 2.5 TABLET ORAL at 08:59

## 2022-01-01 RX ADMIN — PANTOPRAZOLE SODIUM 40 MILLIGRAM(S): 20 TABLET, DELAYED RELEASE ORAL at 11:35

## 2022-01-01 RX ADMIN — Medication 2: at 17:37

## 2022-01-01 RX ADMIN — ATORVASTATIN CALCIUM 10 MILLIGRAM(S): 80 TABLET, FILM COATED ORAL at 23:05

## 2022-01-01 RX ADMIN — Medication 6 MILLIGRAM(S): at 17:36

## 2022-01-01 RX ADMIN — Medication 81 MILLIGRAM(S): at 13:12

## 2022-01-01 RX ADMIN — Medication 30 MILLIGRAM(S): at 05:04

## 2022-01-01 RX ADMIN — Medication 500 MILLIGRAM(S): at 12:44

## 2022-01-01 RX ADMIN — Medication 750 MILLIGRAM(S): at 22:02

## 2022-01-01 RX ADMIN — CEFEPIME 100 MILLIGRAM(S): 1 INJECTION, POWDER, FOR SOLUTION INTRAMUSCULAR; INTRAVENOUS at 13:17

## 2022-01-01 RX ADMIN — FENTANYL CITRATE 50 MICROGRAM(S): 50 INJECTION INTRAVENOUS at 00:20

## 2022-01-01 RX ADMIN — MIDAZOLAM HYDROCHLORIDE 1.54 MG/KG/HR: 1 INJECTION, SOLUTION INTRAMUSCULAR; INTRAVENOUS at 05:24

## 2022-01-01 RX ADMIN — FENTANYL CITRATE 1 PATCH: 50 INJECTION INTRAVENOUS at 20:59

## 2022-01-01 RX ADMIN — Medication 6 MILLIGRAM(S): at 06:39

## 2022-01-01 RX ADMIN — CHLORHEXIDINE GLUCONATE 15 MILLILITER(S): 213 SOLUTION TOPICAL at 05:28

## 2022-01-01 RX ADMIN — PIPERACILLIN AND TAZOBACTAM 25 GRAM(S): 4; .5 INJECTION, POWDER, LYOPHILIZED, FOR SOLUTION INTRAVENOUS at 21:43

## 2022-01-01 RX ADMIN — CHLORHEXIDINE GLUCONATE 15 MILLILITER(S): 213 SOLUTION TOPICAL at 06:06

## 2022-01-01 RX ADMIN — POLYETHYLENE GLYCOL 3350 17 GRAM(S): 17 POWDER, FOR SOLUTION ORAL at 11:54

## 2022-01-01 RX ADMIN — OXYCODONE HYDROCHLORIDE 10 MILLIGRAM(S): 5 TABLET ORAL at 11:32

## 2022-01-01 RX ADMIN — MORPHINE SULFATE 3 MILLIGRAM(S): 50 CAPSULE, EXTENDED RELEASE ORAL at 10:37

## 2022-01-01 RX ADMIN — Medication 2: at 11:42

## 2022-01-01 RX ADMIN — MORPHINE SULFATE 3 MILLIGRAM(S): 50 CAPSULE, EXTENDED RELEASE ORAL at 21:34

## 2022-01-01 RX ADMIN — CHLORHEXIDINE GLUCONATE 15 MILLILITER(S): 213 SOLUTION TOPICAL at 05:49

## 2022-01-01 RX ADMIN — MIDODRINE HYDROCHLORIDE 10 MILLIGRAM(S): 2.5 TABLET ORAL at 09:45

## 2022-01-01 RX ADMIN — CHLORHEXIDINE GLUCONATE 15 MILLILITER(S): 213 SOLUTION TOPICAL at 18:22

## 2022-01-01 RX ADMIN — ENOXAPARIN SODIUM 40 MILLIGRAM(S): 100 INJECTION SUBCUTANEOUS at 21:35

## 2022-01-01 RX ADMIN — MIDODRINE HYDROCHLORIDE 5 MILLIGRAM(S): 2.5 TABLET ORAL at 18:14

## 2022-01-01 RX ADMIN — FENTANYL CITRATE 3.85 MICROGRAM(S)/KG/HR: 50 INJECTION INTRAVENOUS at 03:36

## 2022-01-01 RX ADMIN — MIDODRINE HYDROCHLORIDE 10 MILLIGRAM(S): 2.5 TABLET ORAL at 17:10

## 2022-01-01 RX ADMIN — Medication 6 MILLIGRAM(S): at 05:08

## 2022-01-01 RX ADMIN — POLYETHYLENE GLYCOL 3350 17 GRAM(S): 17 POWDER, FOR SOLUTION ORAL at 12:04

## 2022-01-01 RX ADMIN — Medication 100 MILLIGRAM(S): at 17:50

## 2022-01-01 RX ADMIN — Medication 4: at 18:08

## 2022-01-01 RX ADMIN — Medication 2: at 11:40

## 2022-01-01 RX ADMIN — Medication 500 MILLIGRAM(S): at 11:45

## 2022-01-01 RX ADMIN — CHLORHEXIDINE GLUCONATE 15 MILLILITER(S): 213 SOLUTION TOPICAL at 17:10

## 2022-01-01 RX ADMIN — Medication 12.5 MILLIGRAM(S): at 17:17

## 2022-01-01 RX ADMIN — ZINC SULFATE TAB 220 MG (50 MG ZINC EQUIVALENT) 220 MILLIGRAM(S): 220 (50 ZN) TAB at 11:48

## 2022-01-01 RX ADMIN — MIDODRINE HYDROCHLORIDE 5 MILLIGRAM(S): 2.5 TABLET ORAL at 21:54

## 2022-01-01 RX ADMIN — PROPOFOL 0.02 MICROGRAM(S)/KG/MIN: 10 INJECTION, EMULSION INTRAVENOUS at 12:09

## 2022-01-01 RX ADMIN — ENOXAPARIN SODIUM 40 MILLIGRAM(S): 100 INJECTION SUBCUTANEOUS at 21:48

## 2022-01-01 RX ADMIN — Medication 1 DROP(S): at 05:15

## 2022-01-01 RX ADMIN — Medication 60 MILLIGRAM(S): at 17:17

## 2022-01-01 RX ADMIN — Medication 2: at 18:04

## 2022-01-01 RX ADMIN — Medication 100 MILLIGRAM(S): at 05:59

## 2022-01-01 RX ADMIN — PANTOPRAZOLE SODIUM 40 MILLIGRAM(S): 20 TABLET, DELAYED RELEASE ORAL at 11:39

## 2022-01-01 RX ADMIN — Medication 100 MILLIGRAM(S): at 05:28

## 2022-01-01 RX ADMIN — FENTANYL CITRATE 50 MICROGRAM(S): 50 INJECTION INTRAVENOUS at 01:26

## 2022-01-01 RX ADMIN — DEXMEDETOMIDINE HYDROCHLORIDE IN 0.9% SODIUM CHLORIDE 3.85 MICROGRAM(S)/KG/HR: 4 INJECTION INTRAVENOUS at 10:30

## 2022-01-01 RX ADMIN — Medication 4: at 00:23

## 2022-01-01 RX ADMIN — CHLORHEXIDINE GLUCONATE 1 APPLICATION(S): 213 SOLUTION TOPICAL at 05:21

## 2022-01-01 RX ADMIN — Medication 1 APPLICATION(S): at 17:28

## 2022-01-01 RX ADMIN — Medication 1 APPLICATION(S): at 06:16

## 2022-01-01 RX ADMIN — CHLORHEXIDINE GLUCONATE 1 APPLICATION(S): 213 SOLUTION TOPICAL at 05:42

## 2022-01-01 RX ADMIN — MIDODRINE HYDROCHLORIDE 10 MILLIGRAM(S): 2.5 TABLET ORAL at 09:33

## 2022-01-01 RX ADMIN — Medication 750 MILLIGRAM(S): at 05:43

## 2022-01-01 RX ADMIN — Medication 30 MILLIGRAM(S): at 23:49

## 2022-01-01 RX ADMIN — CHLORHEXIDINE GLUCONATE 1 APPLICATION(S): 213 SOLUTION TOPICAL at 05:03

## 2022-01-01 RX ADMIN — PANTOPRAZOLE SODIUM 40 MILLIGRAM(S): 20 TABLET, DELAYED RELEASE ORAL at 11:04

## 2022-01-01 RX ADMIN — PANTOPRAZOLE SODIUM 40 MILLIGRAM(S): 20 TABLET, DELAYED RELEASE ORAL at 17:29

## 2022-01-01 RX ADMIN — CEFEPIME 100 MILLIGRAM(S): 1 INJECTION, POWDER, FOR SOLUTION INTRAMUSCULAR; INTRAVENOUS at 22:13

## 2022-01-01 RX ADMIN — AMIKACIN SULFATE 256 MILLIGRAM(S): 250 INJECTION, SOLUTION INTRAMUSCULAR; INTRAVENOUS at 22:17

## 2022-01-01 RX ADMIN — CEFEPIME 100 MILLIGRAM(S): 1 INJECTION, POWDER, FOR SOLUTION INTRAMUSCULAR; INTRAVENOUS at 21:17

## 2022-01-01 RX ADMIN — ZINC SULFATE TAB 220 MG (50 MG ZINC EQUIVALENT) 220 MILLIGRAM(S): 220 (50 ZN) TAB at 13:31

## 2022-01-01 RX ADMIN — Medication 6 MILLIGRAM(S): at 05:27

## 2022-01-01 RX ADMIN — Medication 1 APPLICATION(S): at 05:05

## 2022-01-01 RX ADMIN — Medication 8: at 05:28

## 2022-01-01 RX ADMIN — Medication 250 MILLIGRAM(S): at 17:40

## 2022-01-01 RX ADMIN — CHLORHEXIDINE GLUCONATE 1 APPLICATION(S): 213 SOLUTION TOPICAL at 05:04

## 2022-01-01 RX ADMIN — CEFIDEROCOL SULFATE TOSYLATE 33.33 MILLIGRAM(S): 1 INJECTION, POWDER, FOR SOLUTION INTRAVENOUS at 13:48

## 2022-01-01 RX ADMIN — Medication 12.5 MILLIGRAM(S): at 17:01

## 2022-01-01 RX ADMIN — CHLORHEXIDINE GLUCONATE 1 APPLICATION(S): 213 SOLUTION TOPICAL at 05:55

## 2022-01-01 RX ADMIN — OXYCODONE HYDROCHLORIDE 10 MILLIGRAM(S): 5 TABLET ORAL at 05:02

## 2022-01-01 RX ADMIN — Medication 2 MILLIGRAM(S): at 06:42

## 2022-01-01 RX ADMIN — CHLORHEXIDINE GLUCONATE 15 MILLILITER(S): 213 SOLUTION TOPICAL at 17:26

## 2022-01-01 RX ADMIN — Medication 125 MICROGRAM(S): at 05:03

## 2022-01-01 RX ADMIN — ALBUTEROL 2 PUFF(S): 90 AEROSOL, METERED ORAL at 14:59

## 2022-01-01 RX ADMIN — CHLORHEXIDINE GLUCONATE 1 APPLICATION(S): 213 SOLUTION TOPICAL at 05:44

## 2022-01-01 RX ADMIN — TAMSULOSIN HYDROCHLORIDE 0.4 MILLIGRAM(S): 0.4 CAPSULE ORAL at 22:27

## 2022-01-01 RX ADMIN — ATORVASTATIN CALCIUM 10 MILLIGRAM(S): 80 TABLET, FILM COATED ORAL at 21:49

## 2022-01-01 RX ADMIN — MORPHINE SULFATE 2 MILLIGRAM(S): 50 CAPSULE, EXTENDED RELEASE ORAL at 07:20

## 2022-01-01 RX ADMIN — MIDODRINE HYDROCHLORIDE 10 MILLIGRAM(S): 2.5 TABLET ORAL at 00:41

## 2022-01-01 RX ADMIN — Medication 25 MILLIGRAM(S): at 05:38

## 2022-01-01 RX ADMIN — HEPARIN SODIUM 5000 UNIT(S): 5000 INJECTION INTRAVENOUS; SUBCUTANEOUS at 17:29

## 2022-01-01 RX ADMIN — Medication 40 MILLIGRAM(S): at 07:50

## 2022-01-01 RX ADMIN — PANTOPRAZOLE SODIUM 40 MILLIGRAM(S): 20 TABLET, DELAYED RELEASE ORAL at 11:38

## 2022-01-01 RX ADMIN — Medication 12.5 MILLIGRAM(S): at 17:44

## 2022-01-01 RX ADMIN — MORPHINE SULFATE 3 MILLIGRAM(S): 50 CAPSULE, EXTENDED RELEASE ORAL at 02:56

## 2022-01-01 RX ADMIN — MIDODRINE HYDROCHLORIDE 10 MILLIGRAM(S): 2.5 TABLET ORAL at 23:15

## 2022-01-01 RX ADMIN — Medication 100 MILLIGRAM(S): at 16:21

## 2022-01-01 RX ADMIN — OXYCODONE HYDROCHLORIDE 10 MILLIGRAM(S): 5 TABLET ORAL at 13:03

## 2022-01-01 RX ADMIN — Medication 125 MICROGRAM(S): at 05:42

## 2022-01-01 RX ADMIN — Medication 8: at 18:23

## 2022-01-01 RX ADMIN — MORPHINE SULFATE 2 MILLIGRAM(S): 50 CAPSULE, EXTENDED RELEASE ORAL at 04:54

## 2022-01-01 RX ADMIN — Medication 100 MILLIGRAM(S): at 06:51

## 2022-01-01 RX ADMIN — ENOXAPARIN SODIUM 40 MILLIGRAM(S): 100 INJECTION SUBCUTANEOUS at 22:38

## 2022-01-01 RX ADMIN — Medication 1 DROP(S): at 17:08

## 2022-01-01 RX ADMIN — Medication 30 MILLIGRAM(S): at 23:54

## 2022-01-01 RX ADMIN — CEFEPIME 100 MILLIGRAM(S): 1 INJECTION, POWDER, FOR SOLUTION INTRAMUSCULAR; INTRAVENOUS at 14:17

## 2022-01-01 RX ADMIN — CHLORHEXIDINE GLUCONATE 15 MILLILITER(S): 213 SOLUTION TOPICAL at 05:39

## 2022-01-01 RX ADMIN — Medication 10 MILLIGRAM(S): at 18:46

## 2022-01-01 RX ADMIN — Medication 1 DROP(S): at 17:39

## 2022-01-01 RX ADMIN — FENTANYL CITRATE 4.5 MICROGRAM(S)/KG/HR: 50 INJECTION INTRAVENOUS at 22:11

## 2022-01-01 RX ADMIN — MORPHINE SULFATE 2 MILLIGRAM(S): 50 CAPSULE, EXTENDED RELEASE ORAL at 21:00

## 2022-01-01 RX ADMIN — SODIUM CHLORIDE 75 MILLILITER(S): 9 INJECTION, SOLUTION INTRAVENOUS at 20:03

## 2022-01-01 RX ADMIN — HEPARIN SODIUM 5000 UNIT(S): 5000 INJECTION INTRAVENOUS; SUBCUTANEOUS at 17:09

## 2022-01-01 RX ADMIN — CHLORHEXIDINE GLUCONATE 1 APPLICATION(S): 213 SOLUTION TOPICAL at 14:14

## 2022-01-01 RX ADMIN — Medication 1 APPLICATION(S): at 17:51

## 2022-01-01 RX ADMIN — Medication 100 MILLIGRAM(S): at 05:24

## 2022-01-01 RX ADMIN — CHLORHEXIDINE GLUCONATE 1 APPLICATION(S): 213 SOLUTION TOPICAL at 05:33

## 2022-01-01 RX ADMIN — ENOXAPARIN SODIUM 40 MILLIGRAM(S): 100 INJECTION SUBCUTANEOUS at 21:08

## 2022-01-01 RX ADMIN — Medication 30 MILLIGRAM(S): at 17:32

## 2022-01-01 RX ADMIN — Medication 2: at 06:36

## 2022-01-01 RX ADMIN — Medication 2: at 08:53

## 2022-01-01 RX ADMIN — PROPOFOL 0.02 MICROGRAM(S)/KG/MIN: 10 INJECTION, EMULSION INTRAVENOUS at 08:42

## 2022-01-01 RX ADMIN — Medication 650 MILLIGRAM(S): at 12:43

## 2022-01-01 RX ADMIN — FENTANYL CITRATE 50 MICROGRAM(S): 50 INJECTION INTRAVENOUS at 23:50

## 2022-01-01 RX ADMIN — MIDODRINE HYDROCHLORIDE 5 MILLIGRAM(S): 2.5 TABLET ORAL at 13:37

## 2022-01-01 RX ADMIN — SODIUM ZIRCONIUM CYCLOSILICATE 5 GRAM(S): 10 POWDER, FOR SUSPENSION ORAL at 06:40

## 2022-01-01 RX ADMIN — FENTANYL CITRATE 50 MICROGRAM(S): 50 INJECTION INTRAVENOUS at 23:59

## 2022-01-01 RX ADMIN — Medication 12.5 MILLIGRAM(S): at 05:16

## 2022-01-01 RX ADMIN — ENOXAPARIN SODIUM 40 MILLIGRAM(S): 100 INJECTION SUBCUTANEOUS at 12:05

## 2022-01-01 RX ADMIN — ENOXAPARIN SODIUM 40 MILLIGRAM(S): 100 INJECTION SUBCUTANEOUS at 11:04

## 2022-01-01 RX ADMIN — MIDODRINE HYDROCHLORIDE 5 MILLIGRAM(S): 2.5 TABLET ORAL at 06:36

## 2022-01-01 RX ADMIN — SCOPALAMINE 1 PATCH: 1 PATCH, EXTENDED RELEASE TRANSDERMAL at 17:24

## 2022-01-01 RX ADMIN — Medication 60 MILLIGRAM(S): at 05:13

## 2022-01-01 RX ADMIN — MORPHINE SULFATE 3 MILLIGRAM(S): 50 CAPSULE, EXTENDED RELEASE ORAL at 22:14

## 2022-01-01 RX ADMIN — PREGABALIN 1000 MICROGRAM(S): 225 CAPSULE ORAL at 16:00

## 2022-01-01 RX ADMIN — FENTANYL CITRATE 4.5 MICROGRAM(S)/KG/HR: 50 INJECTION INTRAVENOUS at 17:55

## 2022-01-01 RX ADMIN — Medication 750 MILLIGRAM(S): at 13:29

## 2022-01-01 RX ADMIN — Medication 1 APPLICATION(S): at 05:07

## 2022-01-01 RX ADMIN — SODIUM CHLORIDE 100 MILLILITER(S): 9 INJECTION, SOLUTION INTRAVENOUS at 13:35

## 2022-01-01 RX ADMIN — AMPICILLIN SODIUM AND SULBACTAM SODIUM 250 GRAM(S): 250; 125 INJECTION, POWDER, FOR SUSPENSION INTRAMUSCULAR; INTRAVENOUS at 05:27

## 2022-01-01 RX ADMIN — ENOXAPARIN SODIUM 40 MILLIGRAM(S): 100 INJECTION SUBCUTANEOUS at 12:32

## 2022-01-01 RX ADMIN — MIDODRINE HYDROCHLORIDE 5 MILLIGRAM(S): 2.5 TABLET ORAL at 06:27

## 2022-01-01 RX ADMIN — ALBUTEROL 2 PUFF(S): 90 AEROSOL, METERED ORAL at 02:00

## 2022-01-01 RX ADMIN — CHLORHEXIDINE GLUCONATE 15 MILLILITER(S): 213 SOLUTION TOPICAL at 06:57

## 2022-01-01 RX ADMIN — HEPARIN SODIUM 5000 UNIT(S): 5000 INJECTION INTRAVENOUS; SUBCUTANEOUS at 17:59

## 2022-01-01 RX ADMIN — ALBUTEROL 2 PUFF(S): 90 AEROSOL, METERED ORAL at 13:40

## 2022-01-01 RX ADMIN — Medication 3.61 MICROGRAM(S)/KG/MIN: at 22:53

## 2022-01-01 RX ADMIN — CHLORHEXIDINE GLUCONATE 15 MILLILITER(S): 213 SOLUTION TOPICAL at 06:35

## 2022-01-01 RX ADMIN — ENOXAPARIN SODIUM 40 MILLIGRAM(S): 100 INJECTION SUBCUTANEOUS at 21:58

## 2022-01-01 RX ADMIN — Medication 100 MILLIGRAM(S): at 07:25

## 2022-01-01 RX ADMIN — Medication 4: at 00:55

## 2022-01-01 RX ADMIN — Medication 750 MILLIGRAM(S): at 13:55

## 2022-01-01 RX ADMIN — PANTOPRAZOLE SODIUM 40 MILLIGRAM(S): 20 TABLET, DELAYED RELEASE ORAL at 11:27

## 2022-01-01 RX ADMIN — Medication 50 MILLIEQUIVALENT(S): at 16:15

## 2022-01-01 RX ADMIN — PANTOPRAZOLE SODIUM 40 MILLIGRAM(S): 20 TABLET, DELAYED RELEASE ORAL at 17:44

## 2022-01-01 RX ADMIN — Medication 4: at 01:11

## 2022-01-15 NOTE — ED PROVIDER NOTE - CARE PLAN
1 Principal Discharge DX:	Cardiac arrest  Secondary Diagnosis:	Pneumonia due to COVID-19 virus  Secondary Diagnosis:	Acute respiratory distress syndrome (ARDS) due to COVID-19 virus  Secondary Diagnosis:	Pneumothorax, left

## 2022-01-15 NOTE — PATIENT PROFILE ADULT - FALL HARM RISK - HARM RISK INTERVENTIONS

## 2022-01-15 NOTE — ED PROVIDER NOTE - SECONDARY DIAGNOSIS.
Pneumothorax, left Pneumonia due to COVID-19 virus Acute respiratory distress syndrome (ARDS) due to COVID-19 virus

## 2022-01-15 NOTE — ED PROVIDER NOTE - CRITICAL CARE ATTENDING CONTRIBUTION TO CARE
SEEN WITH PA  81y male PMH HTN unvaccinated nonsmoker PMH HTN diagnosed with covid 2d ago on decadron, zpak and zinc with worsening SOB, on EMS arrival pt had respiratory arrest followed by asystole, intubated, had ROSC after 2 rounds CPR/epi, on arrival breathing over the vent but not following commands, pupils 2mm nr, normotensive, tachycardic, afebrile, head nc/at, ETT in place, cor tachy, reg, lungs with b/l bs R>L abd soft no c/c/e GCS 3T, CXR with anterior ptx on left, saturations and BP dropping left pigtail emergently placed, BP improved, sat in high 80s on 100% PEEP 5, PEEP increased to 7 with improvement, labs and studies reviewed and d/w Dr Beebe, will anticoagulate, admit

## 2022-01-15 NOTE — CONSULT NOTE ADULT - ASSESSMENT
Patient with covid pneumonia. Not vaccinated. He had probable pulmonary arrest . His troponin is elevated. Probable type 2 mi. Need trend. Rx pneumonia. Agree try fluid. Watch for chf. Check ekg. Check QT because he was on z pack. ? anoxic braain injury. DVT GI prophylaxis. Prognosis guarded

## 2022-01-15 NOTE — PROGRESS NOTE ADULT - SUBJECTIVE AND OBJECTIVE BOX
Patient is a 81y old  Male who presents with a chief complaint of       HPI:  81 year old male with a hx of HTN, BPH, recent diagnosis of COVID 19 2 days ago (unvaccinated, prescribed decadron/ zpack/ zinc) presenting to the ED S/P cardiac arrest. Patient intubated/ sedated so history obtained from chart (son left and attempted to reach). Patient began to have worsening dyspnea at home, EMS was called. Found to be in respiratory arrest by EMS and followed by EMS. ROSC was achieved after 2 rounds of CPR and epi, downtime 7 mins. Was intubated in the field.   In ED central line was placed. ABG: PH 7.15, PaO2 66, PaCO2 45, HCO3- 16. Troponins .1, BNP > 8000, D-Dimer 7368. COVID 19 positive. Patient being admitted s/p cardiac arrest to ICU.      (15 Aquiles 2022 04:09)      Pt evaluated on rounds.  I reviewed the radiology tests and hospital record.    I reviewed previous notes on this patient.    Interval Events: No overnight events.      CAM:+    SAT:n    SBT:n      REVIEW OF SYSTEMS:   see HPI      OBJECTIVE:  ICU Vital Signs Last 24 Hrs  T(C): 36.4 (15 Aquiles 2022 05:05), Max: 36.4 (15 Aquiles 2022 05:05)  T(F): 97.6 (15 Aquiles 2022 05:05), Max: 97.6 (15 Aquiles 2022 05:05)  HR: 85 (15 Aquiles 2022 05:56) (85 - 122)  BP: 93/56 (15 Aquiles 2022 05:45) (93/56 - 170/82)  BP(mean): 68 (15 Aquiles 2022 05:45) (68 - 91)  RR: 24 (15 Aquiles 2022 05:56) (15 - 27)  SpO2: 100% (15 Aquiles 2022 05:56) (65% - 100%)        01-14 @ 07:01  -  01-15 @ 06:05  --------------------------------------------------------  IN: 143.5 mL / OUT: 300 mL / NET: -156.5 mL      CAPILLARY BLOOD GLUCOSE          PHYSICAL EXAM:     · CONSTITUTIONAL:   NAD    · ENMT:   Airway patent,   Nasal mucosa clear.  Mouth with normal mucosa.   No thrush    · EYES:   Clear bilaterally,   pupils equal,   round and reactive to light.    · CARDIAC:   Normal rate,   regular rhythm.    Heart sounds S1, S2.   No murmurs, no rubs or gallops on auscultation  no edema        CAROTID:   normal systolic impulse  no bruits    · RESPIRATORY:   rales  normal chest expansion  no retractions or use of accessory muscles  palpation of chest is normal with no fremitus  percussion of chest demonstrates no hyperresonance or dullness    · GASTROINTESTINAL:  Abdomen soft,   non-tender,   + BS  liver/spleen not palpable    · MUSCULOSKELETAL:   no clubbing, cyanosis      · SKIN:   Skin normal color for race,   warm, dry   No evidence of rash.        · HEME LYMPH:   no splenomegaly.  No cervical  lymphadenopathy.  no inguinal lymphadenopathy    HOSPITAL MEDICATIONS:  MEDICATIONS  (STANDING):  aspirin enteric coated 81 milliGRAM(s) Oral daily  chlorhexidine 4% Liquid 1 Application(s) Topical <User Schedule>  dexAMETHasone  Injectable 6 milliGRAM(s) IV Push daily  enoxaparin Injectable 100 milliGRAM(s) SubCutaneous every 12 hours  fentaNYL   Infusion. 0.5 MICROgram(s)/kG/Hr (4.5 mL/Hr) IV Continuous <Continuous>  pantoprazole    Tablet 40 milliGRAM(s) Oral before breakfast  sodium bicarbonate  Infusion 0.125 mEq/kG/Hr (75 mL/Hr) IV Continuous <Continuous>    MEDICATIONS  (PRN):  albuterol/ipratropium for Nebulization 3 milliLiter(s) Nebulizer every 6 hours PRN Shortness of Breath and/or Wheezing    sodium chloride 0.9% Bolus:   250 milliLiter(s), IV Bolus, once, infuse over 60 Minute(s), Stop After 1 Doses  Provider's Contact #: [phone]      LABS:                        15.4   10.93 )-----------( 296      ( 15 Aquiles 2022 01:25 )             50.3     01-15    137  |  99  |  29<H>  ----------------------------<  333<H>  4.4   |  12<L>  |  1.3    Ca    7.9<L>      15 Aquiles 2022 01:25    TPro  5.6<L>  /  Alb  3.3<L>  /  TBili  0.7  /  DBili  x   /  AST  94<H>  /  ALT  51<H>  /  AlkPhos  81  01-15        Arterial Blood Gas:  01-15 @ 02:21  7.15/45/66/16/86.9/-12.9  ABG lactate: --        CARDIAC MARKERS ( 15 Aquiles 2022 01:25 )  x     / 0.10 ng/mL / x     / x     / x          SARS-CoV-2: Detected (15 Aquiles 2022 01:25)        ABG - ( 15 Aquiles 2022 02:21 )  pH, Arterial: 7.15  pH, Blood: x     /  pCO2: 45    /  pO2: 66    / HCO3: 16    / Base Excess: -12.9 /  SaO2: 86.9          RADIOLOGY: Today I personally interpreted the latest CXR and other pertinent films.

## 2022-01-15 NOTE — CONSULT NOTE ADULT - ASSESSMENT
ASSESSMENT  81 year old male with a hx of HTN, BPH, recent diagnosis of COVID 19 2 days ago (unvaccinated, prescribed decadron/ zpack/ zinc) presenting to the ED S/P cardiac arrest. Intubated in the field    IMPRESSION  #COVID19 PNA, Severe (O2 < or = 94% on RA and requiring supplemental O2) with Cardiac arrest    CXR diffuse bilateral opacities   D-Dimer Assay, Quantitative: 7368 ng/mL DDU (01-15-22 @ 01:25)  #Lactic acidosis  #Transaminitis   #Cardiac arrest- ROSC was achieved after 2 rounds of CPR and epi, downtime 7 mins  #Prolonged QTC Calculation(Bazett) 527 ms  Creatinine, Serum: 1.3 (01-15-22 @ 01:25)      RECOMMENDATIONS  - Tocilizumab x1 600 mg  - Dexamethasone 6mg x 10 days or until Discharge (whichever is shorter), any taper per Pulm  - A/C per primary team  - Prone positioning if possible  - Monitor off Abx   - Grave prognosis, GOC    Please contact ID if worsening oxygenation or clinical status    If any questions, please call or send a message on [employer] Teams  Spectra [phone]

## 2022-01-15 NOTE — CONSULT NOTE ADULT - SUBJECTIVE AND OBJECTIVE BOX
CARDIOLOGY CONSULT NOTE     CHIEF COMPLAINT/REASON FOR CONSULT:    HPI:  81 year old male with a hx of HTN, BPH, recent diagnosis of COVID 19 2 days ago (unvaccinated, prescribed decadron/ zpack/ zinc) presenting to the ED S/P cardiac arrest. Patient intubated/ sedated so history obtained from chart and resident. Patient began to have worsening dyspnea at home, EMS was called. Found to be in respiratory arrest by EMS and followed by EMS. ROSC was achieved after 2 rounds of CPR and epi, downtime 7 mins. Was intubated in the field.   In ED central line was placed. ABG: PH 7.15, PaO2 66, PaCO2 45, HCO3- 16. Troponins .1, BNP > 8000, D-Dimer 7368. COVID 19 positive. Patient being admitted s/p cardiac arrest to ICU.      (15 Aquiles 2022 04:09)      PAST MEDICAL & SURGICAL HISTORY:  BPH (benign prostatic hyperplasia)    Mild HTN        Cardiac Risks:   [x ]HTN, [ ] DM, [ ] Smoking, [ ] FH,  [ ] Lipids        MEDICATIONS:  MEDICATIONS  (STANDING):  aspirin enteric coated 81 milliGRAM(s) Oral daily  chlorhexidine 4% Liquid 1 Application(s) Topical <User Schedule>  dexAMETHasone  Injectable 6 milliGRAM(s) IV Push daily  enoxaparin Injectable 100 milliGRAM(s) SubCutaneous every 12 hours  fentaNYL   Infusion. 0.5 MICROgram(s)/kG/Hr (4.5 mL/Hr) IV Continuous <Continuous>  pantoprazole    Tablet 40 milliGRAM(s) Oral before breakfast  sodium bicarbonate  Infusion 0.125 mEq/kG/Hr (75 mL/Hr) IV Continuous <Continuous>      FAMILY HISTORY:      SOCIAL HISTORY:      [ ] Marital status    Allergies    Allergy Status Unknown        	    REVIEW OF SYSTEMS:  Sedated on vent.        PHYSICAL EXAM:  T(C): 36.4 (01-15-22 @ 05:05), Max: 36.4 (01-15-22 @ 05:05)  HR: 81 (01-15-22 @ 06:45) (81 - 122)  BP: 90/51 (01-15-22 @ 06:45) (79/51 - 170/82)  RR: 24 (01-15-22 @ 06:45) (15 - 27)  SpO2: 100% (01-15-22 @ 06:45) (65% - 100%)  Wt(kg): --  I&O's Summary    14 Jan 2022 07:01  -  15 Jan 2022 07:00  --------------------------------------------------------  IN: 1257 mL / OUT: 300 mL / NET: 957 mL        Appearance: Normal	  Psychiatry: A & O x 3, Mood & affect appropriate  HEENT:   Normal oral mucosa, PERRL, EOMI	  Lymphatic: No lymphadenopathy  Cardiovascular: Normal S1 S2,RRR, No JVD, No murmurs  Respiratory: Lungs clear to auscultation	decreased bs  Gastrointestinal:  Soft, Non-tender, + BS	  Skin: No rashes, No ecchymoses, No cyanosis	  Neurologic: Non-focal  Extremities: Normal range of motion, No clubbing, cyanosis or edema  Vascular: Peripheral pulses palpable 2+ bilaterally      ECG:  	not available    	  LABS:	 	    CARDIAC MARKERS:          Serum Pro-Brain Natriuretic Peptide: 8071 pg/mL (01-15 @ 01:25)                            15.4   10.93 )-----------( 296      ( 15 Aquiles 2022 01:25 )             50.3     01-15    137  |  99  |  29<H>  ----------------------------<  333<H>  4.4   |  12<L>  |  1.3    Ca    7.9<L>      15 Aquiles 2022 01:25    TPro  5.6<L>  /  Alb  3.3<L>  /  TBili  0.7  /  DBili  x   /  AST  94<H>  /  ALT  51<H>  /  AlkPhos  81  01-15      proBNP: Serum Pro-Brain Natriuretic Peptide: 8071 pg/mL (01-15 @ 01:25)

## 2022-01-15 NOTE — ED PROVIDER NOTE - OBJECTIVE STATEMENT
81 year old male, past medical history htn, unvaccinated COVID, BIB EMS s/p cardiac arrest. patient covid+ 2 days ago started on z-kb. patient with worsening shortness of breath prompting call to EMS, on EMS arrival patient had witnessed collapse. patient had respiratory arrest s/p asystole, intubated, ROSC in the field. patient arrived to ED intubated, tachycardiac, normotensive, no signs of trauma.

## 2022-01-15 NOTE — ED ADULT NURSE NOTE - NSIMPLEMENTINTERV_GEN_ALL_ED
Implemented All Fall Risk Interventions:  [place] to call system. Call bell, personal items and telephone within reach. Instruct patient to call for assistance. Room bathroom lighting operational. Non-slip footwear when patient is off stretcher. Physically safe environment: no spills, clutter or unnecessary equipment. Stretcher in lowest position, wheels locked, appropriate side rails in place. Provide visual cue, wrist band, yellow gown, etc. Monitor gait and stability. Monitor for mental status changes and reorient to person, place, and time. Review medications for side effects contributing to fall risk. Reinforce activity limits and safety measures with patient and family.

## 2022-01-15 NOTE — H&P ADULT - ATTENDING COMMENTS
Assessment and Plan:    Assessment:  · Assessment	  IMPRESSION:  #S/P Cardiac Arrest   # Hypoxic Respiratory Failure   #COVID 19 PNA   #Elevated Troponins  #anemia   #HAGMA   #[place]     PLAN:    CNS: avoid sedatives    HEENT: oral care     PULMONARY: on Mechanical Ventilation. Titrate oxygen to keep SpO2 > 88-92%. Duonebs prn. repeat abg in am.     CARDIOVASCULAR: I =O. Troponin 0.1, f/u repeat x2. ECHO. avoid fluid overload     GI: GI prophylaxis.  Feeding     RENAL: monitor lytes replete as needed. send urine lytes, pr/ cr ratio. gentle hydration. bicarb drip.      INFECTIOUS DISEASE: COVID 19 +. decadron 6mg QD. f/u inflammatory markers. ID consult     HEMATOLOGICAL:  DVT prophylaxis. lovenox 100mg BID. trend cbc.  VA duplex LE    ENDOCRINE:  Follow up FS.  Insulin protocol if needed.    MUSCULOSKELETAL: activity as tolerated     DISPO: acute

## 2022-01-15 NOTE — H&P ADULT - HISTORY OF PRESENT ILLNESS
81 year old male with a hx of HTN, BPH, recent diagnosis of COVID 19 2 days ago (unvaccinated, prescribed decadron/ zpack/ zinc) presenting to the ED S/P cardiac arrest. Patient intubated/ sedated so history obtained from chart (son left and attempted to reach). Patient began to have worsening dyspnea at home, EMS was called. Found to be in respiratory arrest by EMS and followed by EMS. ROSC was achieved after 2 rounds of CPR and epi, downtime 7 mins. Was intubated in the field.   In ED central line was placed. ABG: PH 7.15, PaO2 66, PaCO2 45, HCO3- 16. Troponins .1, BNP > 8000, D-Dimer 7368. COVID 19 positive. Patient being admitted s/p cardiac arrest to ICU.

## 2022-01-15 NOTE — ED PROVIDER NOTE - PHYSICAL EXAMINATION
CONSTITUTIONAL: intubated patient   SKIN: skin exam is warm and dry  HEAD: Normocephalic; atraumatic.  EYES:  2mm bilaterally, non-reactive  ENT: MMM  NECK: No deformity  CARD: tachycardiac  RESP: breath sounds bilaterally, R>L  ABD: soft; non-distended; non-tender.    EXT: No chest wall skin tenting or skin changes. no midline deformities.

## 2022-01-15 NOTE — CONSULT NOTE ADULT - SUBJECTIVE AND OBJECTIVE BOX
CHADWICK VENCES  81y, Male  Allergy: Allergy Status Unknown      LOS      CHIEF COMPLAINT:       HPI  HPI:  81 year old male with a hx of HTN, BPH, recent diagnosis of COVID 19 2 days ago (unvaccinated, prescribed decadron/ zpack/ zinc) presenting to the ED S/P cardiac arrest. Patient intubated/ sedated so history obtained from chart (son left and attempted to reach). Patient began to have worsening dyspnea at home, EMS was called. Found to be in respiratory arrest by EMS and followed by EMS. ROSC was achieved after 2 rounds of CPR and epi, downtime 7 mins. Was intubated in the field.   In ED central line was placed. ABG: PH 7.15, PaO2 66, PaCO2 45, HCO3- 16. Troponins .1, BNP > 8000, D-Dimer 7368. COVID 19 positive. Patient being admitted s/p cardiac arrest to ICU.      (15 Aquiles 2022 04:09)      INFECTIOUS DISEASE HISTORY:  vented  CXR diffuse bilateral opacities   D-Dimer Assay, Quantitative: 7368 ng/mL DDU (01-15-22 @ 01:25)        ROS  unable to obtain history secondary to patient's mental status and/or sedation     PMH  PAST MEDICAL & SURGICAL HISTORY:  BPH (benign prostatic hyperplasia)    Mild HTN        FAMILY HISTORY  non-contributory     SOCIAL HISTORY  Social History:  unable to assess (15 Aquiles 2022 04:09)        VITALS:  T(F): 97.6, Max: 97.6 (01-15-22 @ 05:05)  HR: 68  BP: 100/57  RR: 24Vital Signs Last 24 Hrs  T(C): 36.4 (15 Aquiles 2022 05:05), Max: 36.4 (15 Aquiles 2022 05:05)  T(F): 97.6 (15 Aquiles 2022 05:05), Max: 97.6 (15 Aquiles 2022 05:05)  HR: 68 (15 Aquiles 2022 08:47) (68 - 122)  BP: 100/57 (15 Aquiles 2022 07:45) (79/51 - 170/82)  BP(mean): 76 (15 Aquiles 2022 07:45) (61 - 91)  RR: 24 (15 Aquiles 2022 07:45) (15 - 27)  SpO2: 99% (15 Aquiles 2022 08:47) (65% - 100%)    General: intubated  HEENT: NCAT  CV: RRR  Lungs: symmetric chest expansion, decreased BS at bases  Abd: Soft  Skin: no rash  Neuro: sedated  Lines: no phlebitis     TESTS & MEASUREMENTS:                        15.4   10.93 )-----------( 296      ( 15 Aquiles 2022 01:25 )             50.3     01-15    137  |  99  |  29<H>  ----------------------------<  333<H>  4.4   |  12<L>  |  1.3    Ca    7.9<L>      15 Aquiles 2022 01:25    TPro  5.6<L>  /  Alb  3.3<L>  /  TBili  0.7  /  DBili  x   /  AST  94<H>  /  ALT  51<H>  /  AlkPhos  81  01-15    eGFR if Non African American: 51 mL/min/1.73M2 (01-15-22 @ 01:25)  eGFR if African American: 59 mL/min/1.73M2 (01-15-22 @ 01:25)    LIVER FUNCTIONS - ( 15 Aquiles 2022 01:25 )  Alb: 3.3 g/dL / Pro: 5.6 g/dL / ALK PHOS: 81 U/L / ALT: 51 U/L / AST: 94 U/L / GGT: x                                   INFECTIOUS DISEASES TESTING  Rapid RVP Result: Detected (01-15-22 @ 01:25)      INFLAMMATORY MARKERS      RADIOLOGY & ADDITIONAL TESTS:  I have personally reviewed the last Chest xray  CXR      CT      CARDIOLOGY TESTING  12 Lead ECG:   Ventricular Rate 118 BPM    Atrial Rate 118 BPM    P-R Interval 174 ms    QRS Duration 118 ms    Q-T Interval 376 ms    QTC Calculation(Bazett) 527 ms    P Axis 30 degrees    R Axis -35 degrees    T Axis 125 degrees    Diagnosis Line Sinus tachycardia with frequent and consecutive Premature ventricular  complexes and Fusion complexes  Left axis deviation  Left ventricular hypertrophy with QRS widening and repolarization abnormality      Confirmed by JESSICA EDMONDSON MD ([id number]) on 1/15/2022 8:22:22 AM (01-15-22 @ 01:27)      MEDICATIONS  aspirin enteric coated 81 Oral daily  chlorhexidine 4% Liquid 1 Topical <User Schedule>  dexAMETHasone  Injectable 6 IV Push daily  enoxaparin Injectable 100 SubCutaneous every 12 hours  fentaNYL   Infusion. 0.5 IV Continuous <Continuous>  pantoprazole    Tablet 40 Oral before breakfast  sodium bicarbonate  Infusion 0.125 IV Continuous <Continuous>      Weight  Weight (kg): 76.929 (01-15-22 @ 05:05)    ANTIBIOTICS:      ALLERGIES:  Allergy Status Unknown

## 2022-01-15 NOTE — H&P ADULT - NSHPPHYSICALEXAM_GEN_ALL_CORE
PHYSICAL EXAM:  GENERAL: intubated and sedated   HEAD: Atraumatic  NECK: Supple  CHEST/LUNG: Clear to auscultation bilaterally  HEART: S1, S2; RRR; No murmurs, rubs, or gallops  ABDOMEN: BS+; Soft, Non-tender, Non-distended  EXTREMITIES:  2+ Peripheral Pulses  PSYCH: unable to assess  NEUROLOGY: unable to assess   SKIN: No rashes or lesions

## 2022-01-15 NOTE — H&P ADULT - NSHPLABSRESULTS_GEN_ALL_CORE
VITALS:   T(F): --  HR: 96  BP: 144/67  RR: 16  SpO2: 100%    LABS:                        15.4   10.93 )-----------( 296      ( 15 Aquiles 2022 01:25 )             50.3     01-15    137  |  99  |  29<H>  ----------------------------<  333<H>  4.4   |  12<L>  |  1.3    Ca    7.9<L>      15 Aquiles 2022 01:25    TPro  5.6<L>  /  Alb  3.3<L>  /  TBili  0.7  /  DBili  x   /  AST  94<H>  /  ALT  51<H>  /  AlkPhos  81  01-15        ABG - ( 15 Aquiles 2022 02:21 )  pH, Arterial: 7.15  pH, Blood: x     /  pCO2: 45    /  pO2: 66    / HCO3: 16    / Base Excess: -12.9 /  SaO2: 86.9              Troponin T, Serum: 0.10 ng/mL *HH* (01-15-22 @ 01:25)      CARDIAC MARKERS ( 15 Aquiles 2022 01:25 )  x     / 0.10 ng/mL / x     / x     / x

## 2022-01-15 NOTE — CHART NOTE - NSCHARTNOTEFT_GEN_A_CORE
pt s/p cardiac arrest due to severe covid pneumonia and hypoxia  troponin trends up  EKG showed TWI leads III, II, V5,V6  pt is on AC

## 2022-01-15 NOTE — PROGRESS NOTE ADULT - ASSESSMENT
IMPRESSION:  Acute hypoxic respiratory failure  cardiac arrest  HAGMA  multi lobar pneumonia due to covid 19   Sepsis/ Septic   L PTX      SUGGEST:      CNS: sedation      HEENT: Oral care    PULMONARY:  HOB @ 45 degrees.   continue O2 as necessary to maintain sats > 90%<94  CT to suction  daily CXR  Dexamethasone 6mg QD for 10 days  monitor driving pressure  decrease   increase RR to compensate    CARDIOVASCULAR:  maint IV LR    GI: GI prophylaxis.    diet    RENAL:  Follow up lytes.    Correct as needed  daily labs    INFECTIOUS DISEASE:   ID consult  inflammatory markers CRP, d-dimer, fibrinogen  Follow up cultures.   Repeat Blood culture.   antivirals per ID  trend procal    HEMATOLOGICAL:  DVT prophylaxis.    ENDOCRINE:  Follow up FS.  Insulin protocol if needed.    MUSCULOSKELETAL: bedrest      monitor in ICU    The patient is critically ill with a high probability of deterioration.

## 2022-01-15 NOTE — ED ADULT TRIAGE NOTE - CHIEF COMPLAINT QUOTE
BIBA post arrest.  2 epi given in field ROSC obtained. ET tube 7.5 lip 23.  R hand 20g IV.  downtime 9 minuets

## 2022-01-15 NOTE — PATIENT PROFILE ADULT - DO YOU LACK THE NECESSARY SUPPORT TO HELP YOU COPE WITH LIFE CHALLENGES?
Consent: The patient's consent was obtained including but not limited to risks of crusting, scabbing, blistering, scarring, darker or lighter pigmentary change, recurrence, incomplete removal and infection. Render Post-Care Instructions In Note?: yes Render Note In Bullet Format When Appropriate: No Post-Care Instructions: I reviewed with the patient in detail post-care instructions. Patient is to wear sunprotection, and avoid picking at any of the treated lesions. Pt may apply Vaseline to crusted or scabbing areas. Detail Level: Detailed Number Of Freeze-Thaw Cycles: 2 freeze-thaw cycles Duration Of Freeze Thaw-Cycle (Seconds): 3 no

## 2022-01-15 NOTE — H&P ADULT - ASSESSMENT
IMPRESSION:  #S/P Cardiac Arrest   # Hypoxic Respiratory Failure   #COVID 19 PNA   #Elevated Troponins  #anemia   #HAGMA   #[place]     PLAN:    CNS: avoid sedatives    HEENT: oral care     PULMONARY: on Mechanical Ventilation. Titrate oxygen to keep SpO2 > 88-92%. Duonebs prn. repeat abg in am.     CARDIOVASCULAR: I =O. Troponin 0.1, f/u repeat x2. ECHO. avoid fluid overload     GI: GI prophylaxis.  Feeding     RENAL: monitor lytes replete as needed. send urine lytes, pr/ cr ratio. gentle hydration. bicarb drip.      INFECTIOUS DISEASE: COVID 19 +. decadron 6mg QD. f/u inflammatory markers. ID consult     HEMATOLOGICAL:  DVT prophylaxis. lovenox 40mg BID. trend cbc.     ENDOCRINE:  Follow up FS.  Insulin protocol if needed.    MUSCULOSKELETAL: activity as tolerated     DISPO: acute      IMPRESSION:  #S/P Cardiac Arrest   # Hypoxic Respiratory Failure   #COVID 19 PNA   #Elevated Troponins  #anemia   #[place]AGMA   #[place]     PLAN:    CNS: avoid sedatives    HEENT: oral care     PULMONARY: on Mechanical Ventilation. Titrate oxygen to keep SpO2 > 88-92%. Duonebs prn. repeat abg in am.     CARDIOVASCULAR: I =O. Troponin 0.1, f/u repeat x2. ECHO. avoid fluid overload     GI: GI prophylaxis.  Feeding     RENAL: monitor lytes replete as needed. send urine lytes, pr/ cr ratio. gentle hydration. bicarb drip.      INFECTIOUS DISEASE: COVID 19 +. decadron 6mg QD. f/u inflammatory markers. ID consult     HEMATOLOGICAL:  DVT prophylaxis. lovenox 100mg BID. trend cbc.     ENDOCRINE:  Follow up FS.  Insulin protocol if needed.    MUSCULOSKELETAL: activity as tolerated     DISPO: acute      IMPRESSION:  #S/P Cardiac Arrest   # Hypoxic Respiratory Failure   #COVID 19 PNA   #Elevated Troponins  #anemia   #HAGMA   #[place]     PLAN:    CNS: avoid sedatives    HEENT: oral care     PULMONARY: on Mechanical Ventilation. Titrate oxygen to keep SpO2 > 88-92%. Duonebs prn. repeat abg in am.     CARDIOVASCULAR: I =O. Troponin 0.1, f/u repeat x2. ECHO. avoid fluid overload     GI: GI prophylaxis.  Feeding     RENAL: monitor lytes replete as needed. send urine lytes, pr/ cr ratio. gentle hydration. bicarb drip.      INFECTIOUS DISEASE: COVID 19 +. decadron 6mg QD. f/u inflammatory markers. ID consult     HEMATOLOGICAL:  DVT prophylaxis. lovenox 100mg BID. trend cbc.  VA duplex LE    ENDOCRINE:  Follow up FS.  Insulin protocol if needed.    MUSCULOSKELETAL: activity as tolerated     DISPO: acute

## 2022-01-15 NOTE — ED PROVIDER NOTE - CLINICAL SUMMARY MEDICAL DECISION MAKING FREE TEXT BOX
81y male PMH HTN unvaccinated nonsmoker PMH HTN diagnosed with covid 2d ago on decadron, zpak and zinc with worsening SOB, on EMS arrival pt had respiratory arrest followed by asystole, intubated, had ROSC after 2 rounds CPR/epi, on arrival breathing over the vent but not following commands, pupils 2mm nr, normotensive, tachycardic, afebrile, head nc/at, ETT in place, cor tachy, reg, lungs with b/l bs R>L abd soft no c/c/e GCS 3T, CXR with anterior ptx on left, saturations and BP dropping left pigtail emergently placed, BP improved, sat in high 80s on 100% PEEP 5, PEEP increased to 7 with improvement, labs and studies reviewed and d/w Dr Beebe, will anticoagulate, admit

## 2022-01-16 NOTE — PROGRESS NOTE ADULT - ASSESSMENT
IMPRESSION:  Acute hypoxic respiratory failure  cardiac arrest  HAGMA  multi lobar pneumonia due to covid 19   Sepsis/ Septic   L PTX      SUGGEST:      CNS: sedation      HEENT: Oral care    PULMONARY:  HOB @ 45 degrees.   continue O2 as necessary to maintain sats > 90%<94  CT to suction  daily CXR  Dexamethasone 6mg QD for 10 days  monitor driving pressure  keep   cont current MVe    CARDIOVASCULAR:  hold int IV LR    GI: GI prophylaxis.    diet    RENAL:  Follow up lytes.    Correct as needed  daily labs    INFECTIOUS DISEASE:   ID consult  inflammatory markers CRP, d-dimer, fibrinogen  Follow up cultures.   Repeat Blood culture.   antivirals per ID  trend procal    HEMATOLOGICAL:  DVT prophylaxis.    ENDOCRINE:  Follow up FS.  Insulin protocol if needed.    MUSCULOSKELETAL: bedrest      monitor in ICU    The patient is critically ill with a high probability of deterioration.

## 2022-01-16 NOTE — PROGRESS NOTE ADULT - SUBJECTIVE AND OBJECTIVE BOX
Patient is a 81y old  Male who presents with a chief complaint of       HPI:  81 year old male with a hx of HTN, BPH, recent diagnosis of COVID 19 2 days ago (unvaccinated, prescribed decadron/ zpack/ zinc) presenting to the ED S/P cardiac arrest. Patient intubated/ sedated so history obtained from chart (son left and attempted to reach). Patient began to have worsening dyspnea at home, EMS was called. Found to be in respiratory arrest by EMS and followed by EMS. ROSC was achieved after 2 rounds of CPR and epi, downtime 7 mins. Was intubated in the field.   In ED central line was placed. ABG: PH 7.15, PaO2 66, PaCO2 45, HCO3- 16. Troponins .1, BNP > 8000, D-Dimer 7368. COVID 19 positive. Patient being admitted s/p cardiac arrest to ICU.      (15 Aquiles 2022 04:09)      Pt evaluated on rounds.  I reviewed the radiology tests and hospital record.    I reviewed previous notes on this patient.    Interval Events: No overnight events.      CAM:+    SAT:+    SBT:n      REVIEW OF SYSTEMS:   see HPI      OBJECTIVE:  ICU Vital Signs Last 24 Hrs  T(C): 35.7 (16 Jan 2022 03:15), Max: 37.1 (15 Aquiles 2022 23:15)  T(F): 96.3 (16 Jan 2022 03:15), Max: 98.8 (15 Aquiles 2022 23:15)  HR: 62 (16 Jan 2022 05:15) (53 - 85)  BP: 98/56 (16 Jan 2022 05:15) (79/51 - 147/79)  BP(mean): 71 (16 Jan 2022 05:15) (59 - 103)  -  RR: 24 (16 Jan 2022 05:15) (16 - 25)  SpO2: 95% (16 Jan 2022 05:15) (92% - 100%)    Mode: AC/ CMV (Assist Control/ Continuous Mandatory Ventilation), RR (machine): 24, TV (machine): 451, FiO2: 100, PEEP: 10, MAP: 17, PIP: 28    01-14 @ 07:01  -  01-15 @ 07:00  --------------------------------------------------------  IN: 1257 mL / OUT: 300 mL / NET: 957 mL    01-15 @ 07:01  - 01-16 @ 06:01  --------------------------------------------------------  IN: 3238.8 mL / OUT: 495 mL / NET: 2743.8 mL      CAPILLARY BLOOD GLUCOSE            PHYSICAL EXAM:     · CONSTITUTIONAL:       · ENMT:   Airway patent,   Nasal mucosa clear.  Mouth with normal mucosa.   No thrush    · EYES:   Clear bilaterally,   pupils equal,   round and reactive to light.    · CARDIAC:   Normal rate,   regular rhythm.    Heart sounds S1, S2.   No murmurs, no rubs or gallops on auscultation  no edema        CAROTID:   normal systolic impulse  no bruits    · RESPIRATORY:   rales  normal chest expansion  no retractions or use of accessory muscles  palpation of chest is normal with no fremitus  percussion of chest demonstrates no hyperresonance or dullness    · GASTROINTESTINAL:  Abdomen soft,   non-tender,   + BS  liver/spleen not palpable    · MUSCULOSKELETAL:   no clubbing, cyanosis      · SKIN:   Skin normal color for race,   warm, dry   No evidence of rash.        · HEME LYMPH:   no splenomegaly.  No cervical  lymphadenopathy.  no inguinal lymphadenopathy    HOSPITAL MEDICATIONS:  MEDICATIONS  (STANDING):  aspirin  chewable 81 milliGRAM(s) Oral daily  chlorhexidine 4% Liquid 1 Application(s) Topical <User Schedule>  dexAMETHasone  Injectable 6 milliGRAM(s) IV Push daily  fentaNYL   Infusion. 0.5 MICROgram(s)/kG/Hr (4.5 mL/Hr) IV Continuous <Continuous>  heparin  Infusion 1400 Unit(s)/Hr (14 mL/Hr) IV Continuous <Continuous>  midazolam Infusion 0.02 mG/kG/Hr (1.54 mL/Hr) IV Continuous <Continuous>  norepinephrine Infusion 0.05 MICROgram(s)/kG/Min (3.61 mL/Hr) IV Continuous <Continuous>  pantoprazole   Suspension 40 milliGRAM(s) Oral daily  sodium bicarbonate  Infusion 0.125 mEq/kG/Hr (75 mL/Hr) IV Continuous <Continuous>    MEDICATIONS  (PRN):  albuterol/ipratropium for Nebulization 3 milliLiter(s) Nebulizer every 6 hours PRN Shortness of Breath and/or Wheezing    sodium chloride 0.9% Bolus:   250 milliLiter(s), IV Bolus, once, infuse over 60 Minute(s), Stop After 1 Doses  lactated ringers.: Solution, 1000 milliLiter(s) infuse at 1000 mL/Hr, Stop After 1 Hours  lactated ringers.: Solution, 500 milliLiter(s) infuse at 60 mL/Hr, Stop After 1 Doses  lactated ringers Bolus:   1000 milliLiter(s), IV Bolus, once, infuse over 60 Minute(s), Stop After 1 Doses  sodium chloride 0.9% Bolus:   250 milliLiter(s), IV Bolus, once, infuse over 60 Minute(s), Stop After 1 Doses  Provider's Contact #: [phone]      LABS:                        14.0   10.31 )-----------( 211      ( 15 Aquiles 2022 12:47 )             43.8     01-15    141  |  104  |  37<H>  ----------------------------<  190<H>  4.4   |  25  |  1.2    Ca    7.3<L>      15 Aquiles 2022 12:47  Mg     2.6     01-15    TPro  5.1<L>  /  Alb  2.9<L>  /  TBili  0.4  /  DBili  x   /  AST  95<H>  /  ALT  47<H>  /  AlkPhos  75  01-15    PT/INR - ( 15 Aquiles 2022 19:00 )   PT: 13.40 sec;   INR: 1.17 ratio         PTT - ( 15 Aquiles 2022 19:00 )  PTT:77.6 sec    Arterial Blood Gas:  01-16 @ 04:00  7.42/42/74/27/93.7/2.4  ABG lactate: --  Arterial Blood Gas:  01-15 @ 16:31  7.40/41/126/25/97.7/0.5  ABG lactate: --  Arterial Blood Gas:  01-15 @ 06:33  7.33/36/220/19/97.8/-6.2  ABG lactate: --  Arterial Blood Gas:  01-15 @ 02:21  7.15/45/66/16/86.9/-12.9  ABG lactate: --      Mode: AC/ CMV (Assist Control/ Continuous Mandatory Ventilation), RR (machine): 24, TV (machine): 451, FiO2: 100, PEEP: 10, MAP: 17, PIP: 28  CARDIAC MARKERS ( 15 Aquiles 2022 12:49 )  x     / 0.73 ng/mL / x     / x     / x      CARDIAC MARKERS ( 15 Aquiles 2022 01:25 )  x     / 0.10 ng/mL / x     / x     / x          SARS-CoV-2: Detected (15 Aquiles 2022 01:25)    Mode: AC/ CMV (Assist Control/ Continuous Mandatory Ventilation)  RR (machine): 24  TV (machine): 451  FiO2: 100  PEEP: 10  MAP: 17  PIP: 28      ABG - ( 16 Jan 2022 04:00 )  pH, Arterial: 7.42  pH, Blood: x     /  pCO2: 42    /  pO2: 74    / HCO3: 27    / Base Excess: 2.4   /  SaO2: 93.7                RADIOLOGY: Today I personally interpreted the latest CXR and other pertinent films.    The patient is critically ill with a high probability of imminent or life threatening deterioration.

## 2022-01-16 NOTE — PROGRESS NOTE ADULT - ASSESSMENT
Patient on vent sedated. Not on pressors. Check cxr lytes. Troponin elevated . Check CPK mb. Support. prognosis poor

## 2022-01-16 NOTE — PROGRESS NOTE ADULT - SUBJECTIVE AND OBJECTIVE BOX
Patient is a 81y old  Male who presents with a chief complaint of     T(F): 96.3 (01-16-22 @ 03:15), Max: 98.8 (01-15-22 @ 23:15)  HR: 62 (01-16-22 @ 06:15)  BP: 100/56 (01-16-22 @ 06:15)  RR: 24 (01-16-22 @ 06:15)  SpO2: 93% (01-16-22 @ 06:15) (92% - 100%)    PHYSICAL EXAM:  GENERAL: NAD, well-groomed, well-developed  HEAD:  Atraumatic, Normocephalic  EYES: EOMI, PERRLA, conjunctiva and sclera clear  ENMT: No tonsillar erythema, exudates, or enlargement; Moist mucous membranes, Good dentition, No lesions  NECK: Supple, No JVD, Normal thyroid  NERVOUS SYSTEM:  Alert & Oriented X3,  Motor Strength 5/5 B/L upper and lower extremities  CHEST/LUNG: Clear to percussion bilaterally; No rales, rhonchi, wheezing, or rubs  HEART: Regular rate and rhythm; No murmurs, rubs, or gallops  ABDOMEN: Soft, Nontender, Nondistended; Bowel sounds present  EXTREMITIES:   No clubbing, cyanosis, or edema  LYMPH: No lymphadenopathy noted  SKIN: No rashes or lesions    labs  01-15    141  |  104  |  37<H>  ----------------------------<  190<H>  4.4   |  25  |  1.2    Ca    7.3<L>      15 Aquiles 2022 12:47  Mg     2.6     01-15    TPro  5.1<L>  /  Alb  2.9<L>  /  TBili  0.4  /  DBili  x   /  AST  95<H>  /  ALT  47<H>  /  AlkPhos  75  01-15                          14.0   10.31 )-----------( 211      ( 15 Aquiles 2022 12:47 )             43.8       PT/INR - ( 15 Aquiles 2022 19:00 )   PT: 13.40 sec;   INR: 1.17 ratio         PTT - ( 15 Aquiles 2022 19:00 )  PTT:77.6 sec  Mode: AC/ CMV (Assist Control/ Continuous Mandatory Ventilation)  RR (machine): 24  TV (machine): 451  FiO2: 100  PEEP: 10  MAP: 17  PIP: 28    Troponin T, Serum: 0.73 ng/mL *HH* (01-15-22 @ 12:49)      albuterol/ipratropium for Nebulization 3 milliLiter(s) Nebulizer every 6 hours PRN  aspirin  chewable 81 milliGRAM(s) Oral daily  chlorhexidine 4% Liquid 1 Application(s) Topical <User Schedule>  dexAMETHasone  Injectable 6 milliGRAM(s) IV Push daily  fentaNYL   Infusion. 0.5 MICROgram(s)/kG/Hr IV Continuous <Continuous>  heparin  Infusion 1400 Unit(s)/Hr IV Continuous <Continuous>  midazolam Infusion 0.02 mG/kG/Hr IV Continuous <Continuous>  norepinephrine Infusion 0.05 MICROgram(s)/kG/Min IV Continuous <Continuous>  pantoprazole   Suspension 40 milliGRAM(s) Oral daily  sodium bicarbonate  Infusion 0.125 mEq/kG/Hr IV Continuous <Continuous>

## 2022-01-16 NOTE — CHART NOTE - NSCHARTNOTEFT_GEN_A_CORE
Patient's healthcare proxy and primary point of contact is his daughter Karen Oreilly at [phone]

## 2022-01-17 NOTE — PROGRESS NOTE ADULT - SUBJECTIVE AND OBJECTIVE BOX
Patient is a 81y old  Male who presents with a chief complaint of     T(F): 97.1 (01-17-22 @ 03:00), Max: 97.7 (01-16-22 @ 19:05)  HR: 82 (01-17-22 @ 06:45)  BP: 94/52 (01-17-22 @ 06:45)  RR: 24 (01-17-22 @ 06:45)  SpO2: 98% (01-17-22 @ 06:45) (76% - 100%)    PHYSICAL EXAM:  GENERAL: NAD, well-groomed, well-developed  HEAD:  Atraumatic, Normocephalic  EYES: EOMI, PERRLA, conjunctiva and sclera clear  ENMT: No tonsillar erythema, exudates, or enlargement; Moist mucous membranes, Good dentition, No lesions  NECK: Supple, No JVD, Normal thyroid  NERVOUS SYSTEM:  Alert & Oriented X3,  Motor Strength 5/5 B/L upper and lower extremities  CHEST/LUNG: Clear to percussion bilaterally; No rales, rhonchi, wheezing, or rubs  HEART: Regular rate and rhythm; No murmurs, rubs, or gallops  ABDOMEN: Soft, Nontender, Nondistended; Bowel sounds present  EXTREMITIES:   No clubbing, cyanosis, or edema  LYMPH: No lymphadenopathy noted  SKIN: No rashes or lesions    labs  01-16    142  |  101  |  42<H>  ----------------------------<  140<H>  3.7   |  27  |  1.2    Ca    7.1<L>      16 Jan 2022 06:56  Mg     2.6     01-16    TPro  4.9<L>  /  Alb  2.9<L>  /  TBili  0.3  /  DBili  x   /  AST  60<H>  /  ALT  40  /  AlkPhos  66  01-16                          11.5   13.28 )-----------( 175      ( 17 Jan 2022 06:12 )             37.3       PT/INR - ( 16 Jan 2022 06:56 )   PT: 13.50 sec;   INR: 1.18 ratio         PTT - ( 16 Jan 2022 19:50 )  PTT:94.6 sec  Mode: AC/ CMV (Assist Control/ Continuous Mandatory Ventilation)  RR (machine): 24  TV (machine): 500  FiO2: 100  PEEP: 15  MAP: 21  PIP: 32        albuterol/ipratropium for Nebulization 3 milliLiter(s) Nebulizer every 6 hours PRN  aspirin  chewable 81 milliGRAM(s) Oral daily  chlorhexidine 4% Liquid 1 Application(s) Topical <User Schedule>  cisatracurium Infusion 3 MICROgram(s)/kG/Min IV Continuous <Continuous>  dexAMETHasone  Injectable 6 milliGRAM(s) IV Push daily  fentaNYL   Infusion. 0.5 MICROgram(s)/kG/Hr IV Continuous <Continuous>  heparin  Infusion 1000 Unit(s)/Hr IV Continuous <Continuous>  midazolam Infusion 0.02 mG/kG/Hr IV Continuous <Continuous>  norepinephrine Infusion 0.05 MICROgram(s)/kG/Min IV Continuous <Continuous>  pantoprazole   Suspension 40 milliGRAM(s) Oral daily  sodium bicarbonate  Infusion 0.125 mEq/kG/Hr IV Continuous <Continuous>

## 2022-01-17 NOTE — DIETITIAN INITIAL EVALUATION ADULT. - ENTERAL
recommend to change feeds to osmolite 1.5 at 20 ml/hr increase as tolerated to goal rate of 50ml/hr will provide 1800 kcals, 75g protein, 1200 ml total volume

## 2022-01-17 NOTE — PROGRESS NOTE ADULT - ASSESSMENT
IMPRESSION:  Acute hypoxic respiratory failure  cardiac arrest  HAGMA  multi lobar pneumonia due to covid 19   Sepsis/ Septic   L PTX      SUGGEST:      CNS: sedation  d/c paralytic      HEENT: Oral care    PULMONARY:  HOB @ 45 degrees.   continue O2 as necessary to maintain sats > 90%<94  CT to suction   daily CXR  Dexamethasone 6mg QD for 10 days  monitor driving pressure  keep   increase  MVe RR to 26  decrease PEEP    CARDIOVASCULAR:   IV LR    GI: GI prophylaxis.    diet    RENAL:  Follow up lytes.    Correct as needed  daily labs    INFECTIOUS DISEASE:   ID management  inflammatory markers CRP, d-dimer, fibrinogen  Follow up cultures.   antivirals per ID  trend procal    HEMATOLOGICAL:  DVT prophylaxis.    ENDOCRINE:  Follow up FS.  Insulin protocol if needed.    MUSCULOSKELETAL: bedrest      monitor in ICU    The patient is critically ill with a high probability of deterioration.

## 2022-01-17 NOTE — CHART NOTE - NSCHARTNOTEFT_GEN_A_CORE
Spoke with patient's wife, Judy, updated on patient's condition. Spoke about [facility], she wants to wait until tomorrow to make decision regarding code status.

## 2022-01-17 NOTE — DIETITIAN INITIAL EVALUATION ADULT. - PERTINENT MEDS FT
MEDICATIONS  (STANDING):  aspirin  chewable 81 milliGRAM(s) Oral daily  dexAMETHasone  Injectable 6 milliGRAM(s) IV Push daily  fentaNYL   Infusion. 0.5 MICROgram(s)/kG/Hr (4.5 mL/Hr) IV Continuous <Continuous>  heparin  Infusion 1000 Unit(s)/Hr (10 mL/Hr) IV Continuous <Continuous>  midazolam Infusion 0.02 mG/kG/Hr (1.54 mL/Hr) IV Continuous <Continuous>  norepinephrine Infusion 0.05 MICROgram(s)/kG/Min (3.61 mL/Hr) IV Continuous <Continuous>  pantoprazole  Injectable 40 milliGRAM(s) IV Push daily  polyethylene glycol 3350 17 Gram(s) Oral daily  sodium bicarbonate  Infusion 0.125 mEq/kG/Hr (75 mL/Hr) IV Continuous <Continuous>  tocilizumab IVPB 600 milliGRAM(s) IV Intermittent once    MEDICATIONS  (PRN):  albuterol/ipratropium for Nebulization 3 milliLiter(s) Nebulizer every 6 hours PRN Shortness of Breath and/or Wheezing

## 2022-01-17 NOTE — PROGRESS NOTE ADULT - SUBJECTIVE AND OBJECTIVE BOX
Patient is a 81y old  Male who presents with a chief complaint of       HPI:  81 year old male with a hx of HTN, BPH, recent diagnosis of COVID 19 2 days ago (unvaccinated, prescribed decadron/ zpack/ zinc) presenting to the ED S/P cardiac arrest. Patient intubated/ sedated so history obtained from chart (son left and attempted to reach). Patient began to have worsening dyspnea at home, EMS was called. Found to be in respiratory arrest by EMS and followed by EMS. ROSC was achieved after 2 rounds of CPR and epi, downtime 7 mins. Was intubated in the field.   In ED central line was placed. ABG: PH 7.15, PaO2 66, PaCO2 45, HCO3- 16. Troponins .1, BNP > 8000, D-Dimer 7368. COVID 19 positive. Patient being admitted s/p cardiac arrest to ICU.      (15 Aquiles 2022 04:09)      Pt evaluated on rounds.  I reviewed the radiology tests and hospital record.    I reviewed previous notes on this patient.    Interval Events: No overnight events.      CAM:+    SAT:n    SBT:n      REVIEW OF SYSTEMS:   see HPI      OBJECTIVE:  ICU Vital Signs Last 24 Hrs  T(C): 36.2 (17 Jan 2022 07:10), Max: 36.5 (16 Jan 2022 19:05)  T(F): 97.2 (17 Jan 2022 07:10), Max: 97.7 (16 Jan 2022 19:05)  HR: 82 (17 Jan 2022 07:15) (60 - 82)  BP: 94/51 (17 Jan 2022 07:15) (88/49 - 134/71)  BP(mean): 66 (17 Jan 2022 07:15) (63 - 96)    RR: 24 (17 Jan 2022 07:15) (24 - 36)  SpO2: 98% (17 Jan 2022 07:15) (76% - 100%)    Mode: AC/ CMV (Assist Control/ Continuous Mandatory Ventilation), RR (machine): 24, TV (machine): 500, FiO2: 100, PEEP: 15, MAP: 21, PIP: 32    01-16 @ 07:01  -  01-17 @ 07:00  --------------------------------------------------------  IN: 4048.5 mL / OUT: 700 mL / NET: 3348.5 mL    01-17 @ 07:01  - 01-17 @ 09:44  --------------------------------------------------------  IN: 331 mL / OUT: 15 mL / NET: 316 mL      CAPILLARY BLOOD GLUCOSE            PHYSICAL EXAM:       · ENMT:   Airway patent,   Nasal mucosa clear.  Mouth with normal mucosa.   No thrush    · EYES:   Clear bilaterally,   pupils equal,   round and reactive to light.    · CARDIAC:   Normal rate,   regular rhythm.    Heart sounds S1, S2.   No murmurs, no rubs or gallops on auscultation  no edema        CAROTID:   normal systolic impulse  no bruits    · RESPIRATORY:   rales  normal chest expansion  no retractions or use of accessory muscles  palpation of chest is normal with no fremitus  percussion of chest demonstrates no hyperresonance or dullness    · GASTROINTESTINAL:  Abdomen soft,   non-tender,   + BS  liver/spleen not palpable    · MUSCULOSKELETAL:   no clubbing, cyanosis      · SKIN:   Skin normal color for race,   warm, dry   No evidence of rash.        · HEME LYMPH:   no splenomegaly.  No cervical  lymphadenopathy.  no inguinal lymphadenopathy    HOSPITAL MEDICATIONS:  MEDICATIONS  (STANDING):  aspirin  chewable 81 milliGRAM(s) Oral daily  chlorhexidine 0.12% Liquid 15 milliLiter(s) Oral Mucosa every 12 hours  chlorhexidine 4% Liquid 1 Application(s) Topical <User Schedule>  cisatracurium Infusion 3 MICROgram(s)/kG/Min (13.8 mL/Hr) IV Continuous <Continuous>  dexAMETHasone  Injectable 6 milliGRAM(s) IV Push daily  fentaNYL   Infusion. 0.5 MICROgram(s)/kG/Hr (4.5 mL/Hr) IV Continuous <Continuous>  heparin  Infusion 1000 Unit(s)/Hr (10 mL/Hr) IV Continuous <Continuous>  midazolam Infusion 0.02 mG/kG/Hr (1.54 mL/Hr) IV Continuous <Continuous>  norepinephrine Infusion 0.05 MICROgram(s)/kG/Min (3.61 mL/Hr) IV Continuous <Continuous>  pantoprazole  Injectable 40 milliGRAM(s) IV Push daily  polyethylene glycol 3350 17 Gram(s) Oral daily  sodium bicarbonate  Infusion 0.125 mEq/kG/Hr (75 mL/Hr) IV Continuous <Continuous>    MEDICATIONS  (PRN):  albuterol/ipratropium for Nebulization 3 milliLiter(s) Nebulizer every 6 hours PRN Shortness of Breath and/or Wheezing    sodium chloride 0.9% Bolus:   250 milliLiter(s), IV Bolus, once, infuse over 60 Minute(s), Stop After 1 Doses  lactated ringers.: Solution, 1000 milliLiter(s) infuse at 1000 mL/Hr, Stop After 1 Hours  lactated ringers.: Solution, 500 milliLiter(s) infuse at 60 mL/Hr, Stop After 1 Doses  lactated ringers Bolus:   1000 milliLiter(s), IV Bolus, once, infuse over 60 Minute(s), Stop After 1 Doses  sodium chloride 0.9% Bolus:   250 milliLiter(s), IV Bolus, once, infuse over 60 Minute(s), Stop After 1 Doses  Provider's Contact #: [phone]      LABS:                        11.5   13.28 )-----------( 175      ( 17 Jan 2022 06:12 )             37.3     01-17    142  |  97<L>  |  51<H>  ----------------------------<  143<H>  3.7   |  33<H>  |  1.3    Ca    6.9<L>      17 Jan 2022 06:12  Mg     2.6     01-16    TPro  4.9<L>  /  Alb  2.9<L>  /  TBili  0.3  /  DBili  x   /  AST  60<H>  /  ALT  40  /  AlkPhos  66  01-16    PT/INR - ( 16 Jan 2022 06:56 )   PT: 13.50 sec;   INR: 1.18 ratio         PTT - ( 17 Jan 2022 06:12 )  PTT:61.7 sec    Arterial Blood Gas:  01-17 @ 05:01  7.45/51/173/35/98.0/9.7  ABG lactate: --  Arterial Blood Gas:  01-16 @ 04:00  7.42/42/74/27/93.7/2.4  ABG lactate: --  Arterial Blood Gas:  01-15 @ 16:31  7.40/41/126/25/97.7/0.5  ABG lactate: --      Mode: AC/ CMV (Assist Control/ Continuous Mandatory Ventilation), RR (machine): 24, TV (machine): 500, FiO2: 100, PEEP: 15, MAP: 21, PIP: 32  CARDIAC MARKERS ( 16 Jan 2022 11:01 )  x     / x     / x     / x     / 28.5 ng/mL  CARDIAC MARKERS ( 16 Jan 2022 06:56 )  x     / 0.68 ng/mL / 1231 U/L / x     / 38.1 ng/mL  CARDIAC MARKERS ( 15 Aquiles 2022 12:49 )  x     / 0.73 ng/mL / x     / x     / x          SARS-CoV-2: Detected (15 Aquiles 2022 01:25)    Mode: AC/ CMV (Assist Control/ Continuous Mandatory Ventilation)  RR (machine): 24  TV (machine): 500  FiO2: 100  PEEP: 15  MAP: 21  PIP: 32      ABG - ( 17 Jan 2022 05:01 )  pH, Arterial: 7.45  pH, Blood: x     /  pCO2: 51    /  pO2: 173   / HCO3: 35    / Base Excess: 9.7   /  SaO2: 98.0        RADIOLOGY: Today I personally interpreted the latest CXR and other pertinent films.

## 2022-01-17 NOTE — DIETITIAN INITIAL EVALUATION ADULT. - OTHER INFO
pt is 81 year old male with hx of HTN, BPH, unvaccinated, tested + for COVID 12 days ago, BIBA 2/2 respiratory distress s/p cardiac arrest downtime of 7 minutes, s/p intubation.

## 2022-01-18 NOTE — PROGRESS NOTE ADULT - ASSESSMENT
ASSESSMENT  81 year old male with a hx of HTN, BPH, recent diagnosis of COVID 19 2 days ago (unvaccinated, prescribed decadron/ zpack/ zinc) presenting to the ED S/P cardiac arrest. Intubated in the field    IMPRESSION  #COVID19 PNA, Severe (O2 < or = 94% on RA and requiring supplemental O2) with Cardiac arrest    CXR diffuse bilateral opacities     D-Dimer Assay, Quantitative: 7368 ng/mL DDU (01-15-22 @ 01:25)    s/p Toci 1/17  #Lactic acidosis  #Transaminitis   #Cardiac arrest- ROSC was achieved after 2 rounds of CPR and epi, downtime 7 mins  #Prolonged QTC Calculation(Bazett) 527 ms  Creatinine, Serum: 1.3 (01-15-22 @ 01:25)      RECOMMENDATIONS  This is a preliminary incomplete pended note, all final recommendations to follow after interview and examination of the patient.    Please call or message on [employer] Teams if with any questions.  Spectra [phone]   ASSESSMENT  81 year old male with a hx of HTN, BPH, recent diagnosis of COVID 19 2 days ago (unvaccinated, prescribed decadron/ zpack/ zinc) presenting to the ED S/P cardiac arrest. Intubated in the field    IMPRESSION  #COVID19 PNA, Severe (O2 < or = 94% on RA and requiring supplemental O2) with Cardiac arrest    CXR diffuse bilateral opacities     D-Dimer Assay, Quantitative: 7368 ng/mL DDU (01-15-22 @ 01:25)    s/p Toci 1/17    #Lactic acidosis  #Transaminitis   #Cardiac arrest- ROSC was achieved after 2 rounds of CPR and epi, downtime 7 mins  #Prolonged QTC Calculation(Bazett) 527 ms  Creatinine, Serum: 1.3 (01-15-22 @ 01:25)      RECOMMENDATIONS  - Dexamethasone 6mg x 10 days or until Discharge (whichever is shorter), any taper per Pulm  - A/C per primary team  - Prone positioning if possible  - Monitor off Abx   - Grave prognosis, GOC    Please call or message on [employer] Teams if with any questions.  Spectra [phone]

## 2022-01-18 NOTE — GOALS OF CARE CONVERSATION - ADVANCED CARE PLANNING - CONVERSATION DETAILS
Spoke to patient's daughter Christie regarding goals ([phone]), given medical update on current prognosis, medical management and at this time she would like for the patient to be DNR and agreeable to pursue palliative care for possible comfort measures within the next 24 hours or so.

## 2022-01-18 NOTE — PROGRESS NOTE ADULT - SUBJECTIVE AND OBJECTIVE BOX
Patient seen and evaluated this am, sedated on ventilator on fentanyl and versed    T(F): 96.6 (01-18-22 @ 15:05), Max: 98.6 (01-18-22 @ 03:01)  HR: 59 (01-18-22 @ 17:01)  BP: 117/57 (01-18-22 @ 17:01)  RR: 20  SpO2: 99% (01-18-22 @ 17:01) (93% - 100%)    PHYSICAL EXAM:  GENERAL: NAD  HEAD:  Atraumatic, Normocephalic  EYES: EOMI, PERRLA, conjunctiva and sclera clear  NERVOUS SYSTEM: positive gag  CHEST/LUNG:  bilateral rhonchi  HEART: Regular rate and rhythm; No murmurs, rubs, or gallops  ABDOMEN: Soft, Nontender, Nondistended; Bowel sounds present  EXTREMITIES:  2+ Peripheral Pulses, No clubbing, cyanosis, or edema    LABS  01-18    141  |  97<L>  |  47<H>  ----------------------------<  292<H>  3.8   |  33<H>  |  1.1    Ca    7.2<L>      18 Jan 2022 15:00    TPro  4.3<L>  /  Alb  2.4<L>  /  TBili  0.4  /  DBili  x   /  AST  29  /  ALT  23  /  AlkPhos  55  01-18                          10.0   16.24 )-----------( 245      ( 18 Jan 2022 06:15 )             32.2     PTT - ( 18 Jan 2022 06:15 )  PTT:53.9 sec    Respiratory Viral Panel with COVID-19 by JEN (01.15.22 @ 01:25)   Rapid RVP Result: Detected   SARS-CoV-2: Detected  Mode: AC/ CMV (Assist Control/ Continuous Mandatory Ventilation)  RR (machine): 24  TV (machine): 450  FiO2: 100  PEEP: 12    CARDIAC ENZYMES  Creatine Kinase, Serum: 1231 (01-16 @ 06:56)    CKMB Units: 28.5 (01-16 @ 11:01)  CKMB Units: 38.1 (01-16 @ 06:56)    Troponin T, Serum: 0.68 ng/mL (01-16-22 @ 06:56)    < from: 12 Lead ECG (01.18.22 @ 09:02) >  Diagnosis Line Sinus rhythm with frequent Premature ventricular complexes  Left axis deviation  Incomplete left bundle branch block  ST & T wave abnormality, consider lateral ischemia    < end of copied text >      RADIOLOGY  < from: Xray Chest 1 View- PORTABLE-Routine (Xray Chest 1 View- PORTABLE-Routine in AM.) (01.18.22 @ 07:43) >  Impression:    Low lung volume.    Bilateral opacities, unchanged.    < end of copied text >    MEDICATIONS  (STANDING):  aspirin  chewable 81 milliGRAM(s) Oral daily  chlorhexidine 0.12% Liquid 15 milliLiter(s) Oral Mucosa every 12 hours  chlorhexidine 4% Liquid 1 Application(s) Topical <User Schedule>  dexAMETHasone  Injectable 6 milliGRAM(s) IV Push daily  fentaNYL   Infusion. 0.5 MICROgram(s)/kG/Hr (4.5 mL/Hr) IV Continuous <Continuous>  heparin   Injectable 5000 Unit(s) SubCutaneous every 8 hours  midazolam Infusion 0.02 mG/kG/Hr (1.54 mL/Hr) IV Continuous <Continuous>  norepinephrine Infusion 0.05 MICROgram(s)/kG/Min (3.61 mL/Hr) IV Continuous <Continuous>  pantoprazole  Injectable 40 milliGRAM(s) IV Push daily  polyethylene glycol 3350 17 Gram(s) Oral daily    MEDICATIONS  (PRN):  albuterol/ipratropium for Nebulization 3 milliLiter(s) Nebulizer every 6 hours PRN Shortness of Breath and/or Wheezing

## 2022-01-18 NOTE — PROGRESS NOTE ADULT - SUBJECTIVE AND OBJECTIVE BOX
Patient is a 81y old  Male who presents with a chief complaint of     T(F): 98.6 (01-18-22 @ 03:01), Max: 98.6 (01-17-22 @ 16:23)  HR: 55 (01-18-22 @ 06:32)  BP: 83/54 (01-18-22 @ 06:32)  RR: 24 (01-18-22 @ 06:32)  SpO2: 98% (01-18-22 @ 06:32) (86% - 99%)    PHYSICAL EXAM:  GENERAL: NAD, well-groomed, well-developed  HEAD:  Atraumatic, Normocephalic  EYES: EOMI, PERRLA, conjunctiva and sclera clear  ENMT: No tonsillar erythema, exudates, or enlargement; Moist mucous membranes, Good dentition, No lesions  NECK: Supple, No JVD, Normal thyroid  NERVOUS SYSTEM:  Alert & Oriented X3,  Motor Strength 5/5 B/L upper and lower extremities  CHEST/LUNG: Clear to percussion bilaterally; No rales, rhonchi, wheezing, or rubs decreased bs  HEART: Regular rate and rhythm; No murmurs, rubs, or gallops  ABDOMEN: Soft, Nontender, Nondistended; Bowel sounds present  EXTREMITIES:   No clubbing, cyanosis, or edema  LYMPH: No lymphadenopathy noted  SKIN: No rashes or lesions    labs  01-17    142  |  97<L>  |  51<H>  ----------------------------<  143<H>  3.7   |  33<H>  |  1.3    Ca    6.9<L>      17 Jan 2022 06:12                            10.0   16.24 )-----------( x        ( 18 Jan 2022 06:15 )             32.2       PTT - ( 17 Jan 2022 21:42 )  PTT:63.1 sec  Mode: AC/ CMV (Assist Control/ Continuous Mandatory Ventilation)  RR (machine): 24  TV (machine): 500  FiO2: 100  PEEP: 12  MAP: 18  PIP: 28        albuterol/ipratropium for Nebulization 3 milliLiter(s) Nebulizer every 6 hours PRN  aspirin  chewable 81 milliGRAM(s) Oral daily  chlorhexidine 0.12% Liquid 15 milliLiter(s) Oral Mucosa every 12 hours  chlorhexidine 4% Liquid 1 Application(s) Topical <User Schedule>  dexAMETHasone  Injectable 6 milliGRAM(s) IV Push daily  fentaNYL   Infusion. 0.5 MICROgram(s)/kG/Hr IV Continuous <Continuous>  heparin  Infusion 1000 Unit(s)/Hr IV Continuous <Continuous>  midazolam Infusion 0.02 mG/kG/Hr IV Continuous <Continuous>  norepinephrine Infusion 0.05 MICROgram(s)/kG/Min IV Continuous <Continuous>  pantoprazole  Injectable 40 milliGRAM(s) IV Push daily  polyethylene glycol 3350 17 Gram(s) Oral daily  sodium chloride 0.9%. 1000 milliLiter(s) IV Continuous <Continuous>

## 2022-01-18 NOTE — PROGRESS NOTE ADULT - ASSESSMENT
IMPRESSION:  Acute hypoxic respiratory failure  cardiac arrest  HAGMA  multi lobar pneumonia due to covid 19   Sepsis/ Septic   L PTX      SUGGEST:      CNS: Continue sedation  no SAT today      HEENT: Oral care    PULMONARY:  HOB @ 45 degrees.   continue O2 as necessary to maintain sats > 90%<94  CT to suction   daily CXR  Dexamethasone 6mg QD for 10 days  monitor driving pressure  keep       CARDIOVASCULAR:  DC IV FLuids, x1 dose of diamox     GI: GI prophylaxis.    diet    RENAL:  Follow up lytes.    Correct as needed  daily labs    INFECTIOUS DISEASE:   ID management  inflammatory markers CRP, d-dimer, fibrinogen  Follow up cultures.   antivirals per ID  trend procal    HEMATOLOGICAL:  DVT prophylaxis.    ENDOCRINE:  Follow up FS.  Insulin protocol if needed.    MUSCULOSKELETAL: bedrest      monitor in ICU    The patient is critically ill with a high probability of deterioration.

## 2022-01-18 NOTE — PROGRESS NOTE ADULT - SUBJECTIVE AND OBJECTIVE BOX
CHADWICK VENCES  81y, Male  Allergy: Allergy Status Unknown      LOS  3d    CHIEF COMPLAINT:     INTERVAL EVENTS/HPI  - No acute events overnight  - T(F): , Max: 98.6 (01-17-22 @ 16:23)  - Denies any worsening symptoms  - Tolerating medication  - WBC Count: 16.24 (01-18-22 @ 06:15)  WBC Count: 13.28 (01-17-22 @ 06:12)     - Creatinine, Serum: 1.1 (01-18-22 @ 06:15)  Creatinine, Serum: 1.3 (01-17-22 @ 06:12)       ROS  unable to obtain history secondary to patient's mental status and/or sedation    VITALS:  T(F): 97, Max: 98.6 (01-17-22 @ 16:23)  HR: 68  BP: 151/67  RR: 24Vital Signs Last 24 Hrs  T(C): 36.1 (18 Jan 2022 07:05), Max: 37 (17 Jan 2022 16:23)  T(F): 97 (18 Jan 2022 07:05), Max: 98.6 (17 Jan 2022 16:23)  HR: 68 (18 Jan 2022 07:27) (55 - 80)  BP: 151/67 (18 Jan 2022 07:27) (78/48 - 217/102)  BP(mean): 96 (18 Jan 2022 07:27) (58 - 147)  RR: 24 (18 Jan 2022 07:27) (17 - 28)  SpO2: 99% (18 Jan 2022 07:27) (86% - 99%)    PHYSICAL EXAM:  Gen: intubated  HEENT: Normocephalic, atraumatic  Neck: supple, no lymphadenopathy  CV: Regular rate & regular rhythm  Lungs: decreased BS at bases, no fremitus  Abdomen: Soft, BS present  Ext: Warm, well perfused  Neuro: non focal, awake  Skin: no rash, no erythema  Lines: no phlebitis    FH: Non-contributory  Social Hx: Non-contributory    TESTS & MEASUREMENTS:                        10.0   16.24 )-----------( 245      ( 18 Jan 2022 06:15 )             32.2     01-18    142  |  95<L>  |  51<H>  ----------------------------<  159<H>  3.8   |  32  |  1.1    Ca    6.9<L>      18 Jan 2022 06:15    TPro  4.3<L>  /  Alb  2.4<L>  /  TBili  0.4  /  DBili  x   /  AST  29  /  ALT  23  /  AlkPhos  55  01-18    eGFR if Non African American: 62 mL/min/1.73M2 (01-18-22 @ 06:15)  eGFR if African American: 72 mL/min/1.73M2 (01-18-22 @ 06:15)    LIVER FUNCTIONS - ( 18 Jan 2022 06:15 )  Alb: 2.4 g/dL / Pro: 4.3 g/dL / ALK PHOS: 55 U/L / ALT: 23 U/L / AST: 29 U/L / GGT: x                     INFECTIOUS DISEASES TESTING  Procalcitonin, Serum: 2.32 (01-16-22 @ 06:56)  Rapid RVP Result: Detected (01-15-22 @ 01:25)  Procalcitonin, Serum: 0.10 (01-15-22 @ 01:25)      INFLAMMATORY MARKERS  C-Reactive Protein, Serum: 50 mg/L (01-16-22 @ 06:56)  C-Reactive Protein, Serum: 76 mg/L (01-15-22 @ 01:25)      RADIOLOGY & ADDITIONAL TESTS:  I have personally reviewed the last available Chest xray  CXR      CT      CARDIOLOGY TESTING  12 Lead ECG:   Ventricular Rate 79 BPM    Atrial Rate 79 BPM    P-R Interval 154 ms    QRS Duration 104 ms    Q-T Interval 488 ms    QTC Calculation(Bazett) 559 ms    P Axis 42 degrees    R Axis -31 degrees    T Axis 256 degrees    Diagnosis Line Sinus rhythm with Premature atrial complexes  Left axis deviation  ST & T wave abnormality, consider inferior ischemia  Prolonged QT  Abnormal ECG    Confirmed by JESSICA EDMONDSON MD ([id number]) on 1/17/2022 11:36:35 AM (01-17-22 @ 08:24)  12 Lead ECG:   Ventricular Rate 66 BPM    Atrial Rate 57 BPM    P-R Interval 166 ms    QRS Duration 114 ms    Q-T Interval 612 ms    QTC Calculation(Bazett) 641 ms    P Axis 34 degrees    R Axis -32 degrees    T Axis 261 degrees    Diagnosis Line Sinus bradycardia with frequent Premature ventricular complexes  Left axis deviation  Moderate voltage criteria for LVH, may be normal variant  Marked ST abnormality, possible inferior subendocardial injury  Anteroseptal ischemia  Prolonged QT  Abnormal ECG    Confirmed by JESSICA EDMONDSON MD [id number]) on 1/17/2022 11:36:25 AM (01-16-22 @ 07:27)      MEDICATIONS  aspirin  chewable 81 Oral daily  chlorhexidine 0.12% Liquid 15 Oral Mucosa every 12 hours  chlorhexidine 4% Liquid 1 Topical <User Schedule>  dexAMETHasone  Injectable 6 IV Push daily  fentaNYL   Infusion. 0.5 IV Continuous <Continuous>  heparin   Injectable 5000 SubCutaneous every 8 hours  midazolam Infusion 0.02 IV Continuous <Continuous>  norepinephrine Infusion 0.05 IV Continuous <Continuous>  pantoprazole  Injectable 40 IV Push daily  polyethylene glycol 3350 17 Oral daily  sodium chloride 0.9%. 1000 IV Continuous <Continuous>      WEIGHT  Weight (kg): 76.929 (01-15-22 @ 05:05)  Creatinine, Serum: 1.1 mg/dL (01-18-22 @ 06:15)      ANTIBIOTICS:      All available historical records have been reviewed       CHADWICK VENCES  81y, Male  Allergy: Allergy Status Unknown      LOS  3d    CHIEF COMPLAINT:     INTERVAL EVENTS/HPI  - No acute events overnight  - T(F): , Max: 98.6 (01-17-22 @ 16:23)  - on levo   - WBC Count: 16.24 (01-18-22 @ 06:15)  WBC Count: 13.28 (01-17-22 @ 06:12)     - Creatinine, Serum: 1.1 (01-18-22 @ 06:15)  Creatinine, Serum: 1.3 (01-17-22 @ 06:12)       ROS  unable to obtain history secondary to patient's mental status and/or sedation    VITALS:  T(F): 97, Max: 98.6 (01-17-22 @ 16:23)  HR: 68  BP: 151/67  RR: 24Vital Signs Last 24 Hrs  T(C): 36.1 (18 Jan 2022 07:05), Max: 37 (17 Jan 2022 16:23)  T(F): 97 (18 Jan 2022 07:05), Max: 98.6 (17 Jan 2022 16:23)  HR: 68 (18 Jan 2022 07:27) (55 - 80)  BP: 151/67 (18 Jan 2022 07:27) (78/48 - 217/102)  BP(mean): 96 (18 Jan 2022 07:27) (58 - 147)  RR: 24 (18 Jan 2022 07:27) (17 - 28)  SpO2: 99% (18 Jan 2022 07:27) (86% - 99%)    PHYSICAL EXAM:  Gen: intubated  HEENT: Normocephalic, atraumatic  Neck: supple, no lymphadenopathy  CV: Regular rate & regular rhythm  Lungs: decreased BS at bases, no fremitus  Abdomen: Soft, BS present  Ext: Warm, well perfused  Neuro: non focal, awake  Skin: no rash, no erythema  Lines: no phlebitis    FH: Non-contributory  Social Hx: Non-contributory    TESTS & MEASUREMENTS:                        10.0   16.24 )-----------( 245      ( 18 Jan 2022 06:15 )             32.2     01-18    142  |  95<L>  |  51<H>  ----------------------------<  159<H>  3.8   |  32  |  1.1    Ca    6.9<L>      18 Jan 2022 06:15    TPro  4.3<L>  /  Alb  2.4<L>  /  TBili  0.4  /  DBili  x   /  AST  29  /  ALT  23  /  AlkPhos  55  01-18    eGFR if Non African American: 62 mL/min/1.73M2 (01-18-22 @ 06:15)  eGFR if African American: 72 mL/min/1.73M2 (01-18-22 @ 06:15)    LIVER FUNCTIONS - ( 18 Jan 2022 06:15 )  Alb: 2.4 g/dL / Pro: 4.3 g/dL / ALK PHOS: 55 U/L / ALT: 23 U/L / AST: 29 U/L / GGT: x                     INFECTIOUS DISEASES TESTING  Procalcitonin, Serum: 2.32 (01-16-22 @ 06:56)  Rapid RVP Result: Detected (01-15-22 @ 01:25)  Procalcitonin, Serum: 0.10 (01-15-22 @ 01:25)      INFLAMMATORY MARKERS  C-Reactive Protein, Serum: 50 mg/L (01-16-22 @ 06:56)  C-Reactive Protein, Serum: 76 mg/L (01-15-22 @ 01:25)      RADIOLOGY & ADDITIONAL TESTS:  I have personally reviewed the last available Chest xray  CXR      CT      CARDIOLOGY TESTING  12 Lead ECG:   Ventricular Rate 79 BPM    Atrial Rate 79 BPM    P-R Interval 154 ms    QRS Duration 104 ms    Q-T Interval 488 ms    QTC Calculation(Bazett) 559 ms    P Axis 42 degrees    R Axis -31 degrees    T Axis 256 degrees    Diagnosis Line Sinus rhythm with Premature atrial complexes  Left axis deviation  ST & T wave abnormality, consider inferior ischemia  Prolonged QT  Abnormal ECG    Confirmed by JESSICA EDMONDSON MD ([id number]) on 1/17/2022 11:36:35 AM (01-17-22 @ 08:24)  12 Lead ECG:   Ventricular Rate 66 BPM    Atrial Rate 57 BPM    P-R Interval 166 ms    QRS Duration 114 ms    Q-T Interval 612 ms    QTC Calculation(Bazett) 641 ms    P Axis 34 degrees    R Axis -32 degrees    T Axis 261 degrees    Diagnosis Line Sinus bradycardia with frequent Premature ventricular complexes  Left axis deviation  Moderate voltage criteria for LVH, may be normal variant  Marked ST abnormality, possible inferior subendocardial injury  Anteroseptal ischemia  Prolonged QT  Abnormal ECG    Confirmed by JESSICA EDMONDSON MD ([id number]) on 1/17/2022 11:36:25 AM (01-16-22 @ 07:27)      MEDICATIONS  aspirin  chewable 81 Oral daily  chlorhexidine 0.12% Liquid 15 Oral Mucosa every 12 hours  chlorhexidine 4% Liquid 1 Topical <User Schedule>  dexAMETHasone  Injectable 6 IV Push daily  fentaNYL   Infusion. 0.5 IV Continuous <Continuous>  heparin   Injectable 5000 SubCutaneous every 8 hours  midazolam Infusion 0.02 IV Continuous <Continuous>  norepinephrine Infusion 0.05 IV Continuous <Continuous>  pantoprazole  Injectable 40 IV Push daily  polyethylene glycol 3350 17 Oral daily  sodium chloride 0.9%. 1000 IV Continuous <Continuous>      WEIGHT  Weight (kg): 76.929 (01-15-22 @ 05:05)  Creatinine, Serum: 1.1 mg/dL (01-18-22 @ 06:15)      ANTIBIOTICS:      All available historical records have been reviewed

## 2022-01-18 NOTE — DIETITIAN INITIAL EVALUATION ADULT. - ORAL INTAKE PTA/DIET HISTORY
Duoneb tx given via mask. Tolerated well, O 2 titrated to 3 lpm/NC. SpO2 96 %, RT to monitor and titrate O2 as tolerated.    unable to assess pt intubated to vent, no family at bedside

## 2022-01-18 NOTE — PROGRESS NOTE ADULT - SUBJECTIVE AND OBJECTIVE BOX
Over Night Events:  fentanyl 20cc/hr, versed 8cc/hr, levo 3. NS at 75cc/hr.      ROS:  See HPI    PHYSICAL EXAM    ICU Vital Signs Last 24 Hrs  T(C): 36.1 (18 Jan 2022 07:05), Max: 37 (17 Jan 2022 16:23)  T(F): 97 (18 Jan 2022 07:05), Max: 98.6 (17 Jan 2022 16:23)  HR: 52 (18 Jan 2022 10:17) (52 - 80)  BP: 96/61 (18 Jan 2022 09:02) (78/48 - 217/102)  BP(mean): 74 (18 Jan 2022 09:02) (58 - 147)  RR: 24 (18 Jan 2022 09:02) (17 - 28)  SpO2: 100% (18 Jan 2022 10:17) (86% - 100%)      General: intubated/sedated  HEENT: sluggish            Lymph Nodes: No cervical LN   Lungs: Bilateral crackles  Cardiovascular: Regular   Abdomen: Soft, Positive BS  Extremities: No edema  Skin: Warm  Neurological: sedated      01-17-22 @ 07:01 - 01-18-22 @ 07:00  --------------------------------------------------------  IN:    Cisatracurium: 23 mL    Enteral Tube Flush: 180 mL    FentaNYL: 455.4 mL    Glucerna: 520 mL    Heparin: 240 mL    IV PiggyBack: 100 mL    Lactated Ringers Bolus: 1000 mL    Midazolam: 131 mL    Norepinephrine: 36 mL    Osmolite: 500 mL    Sodium Bicarbonate: 1650 mL    sodium chloride 0.9%: 150 mL  Total IN: 4985.4 mL    OUT:    Indwelling Catheter - Urethral (mL): 1165 mL  Total OUT: 1165 mL    Total NET: 3820.4 mL      01-18-22 @ 07:01 - 01-18-22 @ 10:24  --------------------------------------------------------  IN:    FentaNYL: 80 mL    Heparin: 20 mL    Midazolam: 32 mL    Norepinephrine: 12 mL    Osmolite: 200 mL    sodium chloride 0.9%: 300 mL  Total IN: 644 mL    OUT:    Indwelling Catheter - Urethral (mL): 360 mL  Total OUT: 360 mL    Total NET: 284 mL          LABS:                          10.0   16.24 )-----------( 245      ( 18 Jan 2022 06:15 )             32.2                                               01-18    142  |  95<L>  |  51<H>  ----------------------------<  159<H>  3.8   |  32  |  1.1    Ca    6.9<L>      18 Jan 2022 06:15    TPro  4.3<L>  /  Alb  2.4<L>  /  TBili  0.4  /  DBili  x   /  AST  29  /  ALT  23  /  AlkPhos  55  01-18      PTT - ( 18 Jan 2022 06:15 )  PTT:53.9 sec                                           CARDIAC MARKERS ( 16 Jan 2022 11:01 )  x     / x     / x     / x     / 28.5 ng/mL                                            LIVER FUNCTIONS - ( 18 Jan 2022 06:15 )  Alb: 2.4 g/dL / Pro: 4.3 g/dL / ALK PHOS: 55 U/L / ALT: 23 U/L / AST: 29 U/L / GGT: x                                                                                               Mode: AC/ CMV (Assist Control/ Continuous Mandatory Ventilation)  RR (machine): 24  TV (machine): 500  FiO2: 100  PEEP: 12  MAP: 24  PIP: 27                                      ABG - ( 18 Jan 2022 04:25 )  pH, Arterial: 7.52  pH, Blood: x     /  pCO2: 50    /  pO2: 83    / HCO3: 41    / Base Excess: 16.0  /  SaO2: 96.1                MEDICATIONS  (STANDING):  aspirin  chewable 81 milliGRAM(s) Oral daily  chlorhexidine 0.12% Liquid 15 milliLiter(s) Oral Mucosa every 12 hours  chlorhexidine 4% Liquid 1 Application(s) Topical <User Schedule>  dexAMETHasone  Injectable 6 milliGRAM(s) IV Push daily  fentaNYL   Infusion. 0.5 MICROgram(s)/kG/Hr (4.5 mL/Hr) IV Continuous <Continuous>  heparin   Injectable 5000 Unit(s) SubCutaneous every 8 hours  midazolam Infusion 0.02 mG/kG/Hr (1.54 mL/Hr) IV Continuous <Continuous>  norepinephrine Infusion 0.05 MICROgram(s)/kG/Min (3.61 mL/Hr) IV Continuous <Continuous>  pantoprazole  Injectable 40 milliGRAM(s) IV Push daily  polyethylene glycol 3350 17 Gram(s) Oral daily  sodium chloride 0.9%. 1000 milliLiter(s) (75 mL/Hr) IV Continuous <Continuous>    MEDICATIONS  (PRN):  albuterol/ipratropium for Nebulization 3 milliLiter(s) Nebulizer every 6 hours PRN Shortness of Breath and/or Wheezing      Xrays:     Et/OG ok.                                                                                ECHO

## 2022-01-18 NOTE — PROGRESS NOTE ADULT - ASSESSMENT
81 year old male with a hx of HTN, BPH, recent diagnosis of COVID 19 2 days ago prior to admission -> presented to the ED S/P cardiac arrest.  Patient began to have worsening dyspnea at home, EMS was called. Found to be in cardiac arrest. ROSC was achieved after 2 rounds of CPR and epi, downtime 7 mins. Was intubated in the field.     acute respiratory failure / Cardiac arrest / COVID-19 pneumonia / probable NSTEMI / L sided pneumothorax     - very poor prognosis   - now DNR   - IV Dexamethasone   - titrate pressors as tolerated   - cardio and pulmonary following   - DVT prophylaxis

## 2022-01-18 NOTE — PROGRESS NOTE ADULT - ASSESSMENT
Patient on vent .   On pressors. Troponin MB  elevated . MI but probably secondary resp arrest. Not able to echo. Sedated. But not responsive with decreased sedation. Define goals care, Continue current rx. Grave prognosis.

## 2022-01-19 NOTE — PROGRESS NOTE ADULT - ASSESSMENT
IMPRESSION:  Acute hypoxic respiratory failure  cardiac arrest  HAGMA  multi lobar pneumonia due to covid 19   Sepsis/ Septic   L PTX      SUGGEST:      CNS: Continue sedation, when fentanyl is done switch to precedex and morphine prn.  CT head non contr. Check EEG, neurology f/u.       HEENT: Oral care    PULMONARY:  HOB @ 45 degrees.   continue O2 as necessary to maintain sats > 90%<94  CT to suction   daily CXR  Dexamethasone 6mg Q12 for 10 days  monitor driving pressure  increase PEEP to 14.      CARDIOVASCULAR:  avoid overload Keep i>=Os    GI: GI prophylaxis.    OG feeding     RENAL:  Follow up lytes.    Correct as needed  daily labs    INFECTIOUS DISEASE:   ID management  inflammatory markers CRP, d-dimer, fibrinogen  Follow up cultures.   antivirals per ID  trend procal    HEMATOLOGICAL: Lovenox.    ENDOCRINE:  Follow up FS.  Insulin protocol if needed.    MUSCULOSKELETAL: bedrest      monitor in ICU    The patient is critically ill with a high probability of deterioration.

## 2022-01-19 NOTE — PROGRESS NOTE ADULT - ASSESSMENT
81 year old male with a hx of HTN, BPH, recent diagnosis of COVID 19 2 days ago prior to admission -> presented to the ED S/P cardiac arrest.  Patient began to have worsening dyspnea at home, EMS was called. Found to be in cardiac arrest. ROSC was achieved after 2 rounds of CPR and epi, downtime 7 mins. Was intubated in the field.     acute respiratory failure / Cardiac arrest / COVID-19 pneumonia / probable NSTEMI / L sided pneumothorax     - very poor prognosis   - now DNR - family is considering comfort measures - If they don't change  femoral line today   - IV Dexamethasone   - titrate pressors as tolerated   - cardio and pulmonary following   - DVT prophylaxis

## 2022-01-19 NOTE — PROGRESS NOTE ADULT - SUBJECTIVE AND OBJECTIVE BOX
Patient is sedated on fentanyl    T(F): 96 (01-19-22 @ 07:01), Max: 97.9 (01-19-22 @ 04:00)  HR: 54 (01-19-22 @ 07:01)  BP: 111/66 (01-19-22 @ 07:01)  RR: 20 (01-19-22 @ 07:01)  SpO2: 97% (01-19-22 @ 07:01) (93% - 100%)    PHYSICAL EXAM:  GENERAL: NAD  HEAD:  Atraumatic, Normocephalic  EYES: EOMI, PERRLA, conjunctiva and sclera clear  NERVOUS SYSTEM:   no focal deficits   CHEST/LUNG:  bilateral rhonchi  HEART: Regular rate and rhythm; No murmurs, rubs, or gallops  ABDOMEN: Soft, Nontender, Nondistended; Bowel sounds present  EXTREMITIES:  2+ Peripheral Pulses, No clubbing, cyanosis, or edema    LABS  01-19    143  |  102  |  35<H>  ----------------------------<  242<H>  3.9   |  31  |  0.8    Ca    7.4<L>      19 Jan 2022 06:30  Phos  3.2     01-19  Mg     2.9     01-19    TPro  4.6<L>  /  Alb  2.7<L>  /  TBili  0.4  /  DBili  x   /  AST  18  /  ALT  23  /  AlkPhos  51  01-19                          9.3    13.00 )-----------( 223      ( 19 Jan 2022 06:30 )             30.6     PTT - ( 18 Jan 2022 06:15 )  PTT:53.9 sec    Mode: AC/ CMV (Assist Control/ Continuous Mandatory Ventilation)  RR (machine): 24  TV (machine): 450  FiO2: 100  PEEP: 12      CARDIAC ENZYMES    CKMB Units: 28.5 (01-16 @ 11:  Troponin T, Serum (01.15.22 @ 12:49)   Troponin T, Serum: 0.73: Critical value: ng/mL   RADIOLOGY  < from: Xray Chest 1 View- PORTABLE-Routine (Xray Chest 1 View- PORTABLE-Routine in AM.) (01.18.22 @ 07:43) >  Impression:    Low lung volume.    Bilateral opacities, unchanged.    < end of copied text >    MEDICATIONS  (STANDING):  aspirin  chewable 81 milliGRAM(s) Oral daily  chlorhexidine 0.12% Liquid 15 milliLiter(s) Oral Mucosa every 12 hours  chlorhexidine 4% Liquid 1 Application(s) Topical <User Schedule>  dexAMETHasone  Injectable 6 milliGRAM(s) IV Push daily  fentaNYL   Infusion. 0.5 MICROgram(s)/kG/Hr (4.5 mL/Hr) IV Continuous <Continuous>  heparin   Injectable 5000 Unit(s) SubCutaneous every 8 hours  midazolam Infusion 0.02 mG/kG/Hr (1.54 mL/Hr) IV Continuous <Continuous>  norepinephrine Infusion 0.05 MICROgram(s)/kG/Min (3.61 mL/Hr) IV Continuous <Continuous>  pantoprazole  Injectable 40 milliGRAM(s) IV Push daily  polyethylene glycol 3350 17 Gram(s) Oral daily    MEDICATIONS  (PRN):  albuterol/ipratropium for Nebulization 3 milliLiter(s) Nebulizer every 6 hours PRN Shortness of Breath and/or Wheezing

## 2022-01-19 NOTE — PROGRESS NOTE ADULT - SUBJECTIVE AND OBJECTIVE BOX
Over Night Events:  remains critically ill, tachypneic off sedation, breathing over the vent  drips, versed and fentanyl, levo.       ROS:  See HPI    PHYSICAL EXAM    ICU Vital Signs Last 24 Hrs  T(C): 35.6 (19 Jan 2022 07:01), Max: 36.6 (19 Jan 2022 04:00)  T(F): 96 (19 Jan 2022 07:01), Max: 97.9 (19 Jan 2022 04:00)  HR: 55 (19 Jan 2022 09:42) (47 - 65)  BP: 111/66 (19 Jan 2022 07:01) (105/60 - 150/66)  BP(mean): 83 (19 Jan 2022 07:01) (78 - 97)  RR: 20 (19 Jan 2022 07:01) (19 - 30)  SpO2: 98% (19 Jan 2022 09:42) (93% - 100%)      General: sedated  HEENT: SAÚL             Lymph Nodes: No cervical LN   Lungs: Bilateral BS  Cardiovascular: Regular   Abdomen: Soft, Positive BS  Extremities: No clubbing   Skin: Warm  Neurological: doesnt wakeup even off sedation, +ve gag, breathing over the vent.      01-18-22 @ 07:01  -  01-19-22 @ 07:00  --------------------------------------------------------  IN:    Enteral Tube Flush: 60 mL    FentaNYL: 418 mL    Heparin: 20 mL    IV PiggyBack: 50 mL    Midazolam: 176 mL    Norepinephrine: 58 mL    Osmolite: 1100 mL    sodium chloride 0.9%: 300 mL  Total IN: 2182 mL    OUT:    Chest Tube (mL): 5 mL    Indwelling Catheter - Urethral (mL): 2975 mL  Total OUT: 2980 mL    Total NET: -798 mL      01-19-22 @ 07:01  -  01-19-22 @ 10:24  --------------------------------------------------------  IN:    FentaNYL: 72 mL    Midazolam: 32 mL    Norepinephrine: 3 mL    Osmolite: 200 mL  Total IN: 307 mL    OUT:    Indwelling Catheter - Urethral (mL): 350 mL  Total OUT: 350 mL    Total NET: -43 mL          LABS:                          9.3    13.00 )-----------( 223      ( 19 Jan 2022 06:30 )             30.6                                               01-19    143  |  102  |  35<H>  ----------------------------<  242<H>  3.9   |  31  |  0.8    Ca    7.4<L>      19 Jan 2022 06:30  Phos  3.2     01-19  Mg     2.9     01-19    TPro  4.6<L>  /  Alb  2.7<L>  /  TBili  0.4  /  DBili  x   /  AST  18  /  ALT  23  /  AlkPhos  51  01-19      PTT - ( 18 Jan 2022 06:15 )  PTT:53.9 sec                                                                                     LIVER FUNCTIONS - ( 19 Jan 2022 06:30 )  Alb: 2.7 g/dL / Pro: 4.6 g/dL / ALK PHOS: 51 U/L / ALT: 23 U/L / AST: 18 U/L / GGT: x                                                                                               Mode: AC/ CMV (Assist Control/ Continuous Mandatory Ventilation)  RR (machine): 24  TV (machine): 450  FiO2: 100  PEEP: 14  MAP: 18  PIP: 28                                      ABG - ( 19 Jan 2022 04:30 )  pH, Arterial: 7.43  pH, Blood: x     /  pCO2: 51    /  pO2: 99    / HCO3: 34    / Base Excess: 8.2   /  SaO2: 96.8                MEDICATIONS  (STANDING):  aspirin  chewable 81 milliGRAM(s) Oral daily  chlorhexidine 0.12% Liquid 15 milliLiter(s) Oral Mucosa every 12 hours  chlorhexidine 4% Liquid 1 Application(s) Topical <User Schedule>  dexAMETHasone  Injectable 6 milliGRAM(s) IV Push daily  fentaNYL   Infusion. 0.5 MICROgram(s)/kG/Hr (4.5 mL/Hr) IV Continuous <Continuous>  heparin   Injectable 5000 Unit(s) SubCutaneous every 8 hours  midazolam Infusion 0.02 mG/kG/Hr (1.54 mL/Hr) IV Continuous <Continuous>  norepinephrine Infusion 0.05 MICROgram(s)/kG/Min (3.61 mL/Hr) IV Continuous <Continuous>  pantoprazole  Injectable 40 milliGRAM(s) IV Push daily  polyethylene glycol 3350 17 Gram(s) Oral daily    MEDICATIONS  (PRN):  albuterol/ipratropium for Nebulization 3 milliLiter(s) Nebulizer every 6 hours PRN Shortness of Breath and/or Wheezing      Xrays:   ET/OG ok.      B/l infiltrates                                                                            ECHO

## 2022-01-20 NOTE — PROGRESS NOTE ADULT - ASSESSMENT
81 year old male with a hx of HTN, BPH, recent diagnosis of COVID 19 2 days ago prior to admission -> presented to the ED S/P cardiac arrest.  Patient began to have worsening dyspnea at home, EMS was called. Found to be in cardiac arrest. ROSC was achieved after 2 rounds of CPR and epi, downtime 7 mins. Was intubated in the field.     acute respiratory failure / Cardiac arrest / COVID-19 pneumonia / probable NSTEMI / L sided pneumothorax     - very poor prognosis   - now DNR    - check ct head   - IV Dexamethasone   - titrate pressors as tolerated   - cardio and pulmonary following   - DC femoral line today   - DVT prophylaxis 81 year old male with a hx of HTN, BPH, recent diagnosis of COVID 19 2 days ago prior to admission -> presented to the ED S/P cardiac arrest.  Patient began to have worsening dyspnea at home, EMS was called. Found to be in cardiac arrest. ROSC was achieved after 2 rounds of CPR and epi, downtime 7 mins. Was intubated in the field.     acute respiratory failure / Cardiac arrest / COVID-19 pneumonia / probable NSTEMI / L sided pneumothorax     - very poor prognosis   - now DNR    - check ct head   - IV Dexamethasone   - titrate pressors as tolerated   - cardio and pulmonary following   - DC femoral line today   - pneumothorax resolved - try repeat 2DECHO   - DVT prophylaxis

## 2022-01-20 NOTE — PROGRESS NOTE ADULT - ASSESSMENT
IMPRESSION:  Acute hypoxic respiratory failure  cardiac arrest  HAGMA  multi lobar pneumonia due to covid 19   Sepsis/ Septic   L PTX      SUGGEST:      CNS: Hold sedation to assess MS,  Precedex and morphine prn.  CT head non contr. Check EEG, neurology f/u.       HEENT: Oral care    PULMONARY:  HOB @ 45 degrees.   Decrease Fio2 as necessary to maintain sats > 90%<94  CT to suction   daily CXR  Dexamethasone 6mg Q12  monitor driving pressure  Keep PEEP at 14.      CARDIOVASCULAR:  avoid overload Keep i<=Os    GI: GI prophylaxis.    OG feeding     RENAL:  Follow up lytes.    Correct as needed  daily labs    INFECTIOUS DISEASE:   ID F/u  inflammatory markers CRP, d-dimer, fibrinogen  Follow up cultures.   antivirals per ID  trend procamoniotr off abx    HEMATOLOGICAL: Lovenox.    ENDOCRINE:  Follow up FS.  Insulin protocol if needed.    MUSCULOSKELETAL: bedrest    Change femoral to IJ  monitor in ICU  DNR/DNI  poor prognosis  The patient is critically ill with a high probability of deterioration.

## 2022-01-20 NOTE — PROGRESS NOTE ADULT - SUBJECTIVE AND OBJECTIVE BOX
Over Night Events:  remainds critically ill, on versed and precedex.      ROS:  See HPI    PHYSICAL EXAM    ICU Vital Signs Last 24 Hrs  T(C): 37.5 (20 Jan 2022 07:01), Max: 37.5 (20 Jan 2022 07:01)  T(F): 99.5 (20 Jan 2022 07:01), Max: 99.5 (20 Jan 2022 07:01)  HR: 56 (20 Jan 2022 07:00) (50 - 56)  BP: 142/72 (20 Jan 2022 07:00) (92/55 - 146/71)  BP(mean): 100 (20 Jan 2022 07:00) (68 - 102)  RR: 24 (20 Jan 2022 07:01) (12 - 36)  SpO2: 100% (20 Jan 2022 07:00) (98% - 100%)      General: intubated  HEENT: SAÚL             Lymph Nodes: No cervical LN   Lungs: Bilateral crackles  Cardiovascular: Regular   Abdomen: Soft, Positive BS  Extremities: No LE edema  Skin: Warm  Neurological:sedated      01-19-22 @ 07:01  -  01-20-22 @ 07:00  --------------------------------------------------------  IN:    Dexmedetomidine: 383 mL    Enteral Tube Flush: 60 mL    FentaNYL: 324 mL    Midazolam: 188 mL    Norepinephrine: 5 mL    Osmolite: 1250 mL  Total IN: 2210 mL    OUT:    Chest Tube (mL): 5 mL    Indwelling Catheter - Urethral (mL): 1970 mL  Total OUT: 1975 mL    Total NET: 235 mL      01-20-22 @ 07:01  -  01-20-22 @ 09:48  --------------------------------------------------------  IN:    Dexmedetomidine: 36 mL    FentaNYL: 8 mL    Midazolam: 4 mL    Osmolite: 100 mL  Total IN: 148 mL    OUT:    Indwelling Catheter - Urethral (mL): 185 mL  Total OUT: 185 mL    Total NET: -37 mL          LABS:                          9.6    12.46 )-----------( 201      ( 20 Jan 2022 06:15 )             31.6                                               01-20    144  |  107  |  34<H>  ----------------------------<  259<H>  4.8   |  27  |  0.8    Ca    8.0<L>      20 Jan 2022 06:15  Phos  3.2     01-19  Mg     2.9     01-20    TPro  4.9<L>  /  Alb  3.0<L>  /  TBili  0.5  /  DBili  x   /  AST  18  /  ALT  28  /  AlkPhos  55  01-20                                                                                           LIVER FUNCTIONS - ( 20 Jan 2022 06:15 )  Alb: 3.0 g/dL / Pro: 4.9 g/dL / ALK PHOS: 55 U/L / ALT: 28 U/L / AST: 18 U/L / GGT: x                                                                                               Mode: AC/ CMV (Assist Control/ Continuous Mandatory Ventilation)  RR (machine): 24  FiO2: 90  PEEP: 14  MAP: 20  PC: 14  PIP: 30                                      ABG - ( 20 Jan 2022 03:51 )  pH, Arterial: 7.38  pH, Blood: x     /  pCO2: 47    /  pO2: 126   / HCO3: 28    / Base Excess: 2.1   /  SaO2: 97.5                MEDICATIONS  (STANDING):  aspirin  chewable 81 milliGRAM(s) Oral daily  chlorhexidine 0.12% Liquid 15 milliLiter(s) Oral Mucosa every 12 hours  chlorhexidine 4% Liquid 1 Application(s) Topical <User Schedule>  dexAMETHasone  Injectable 6 milliGRAM(s) IV Push every 12 hours  dexMEDEtomidine Infusion 0.2 MICROgram(s)/kG/Hr (3.85 mL/Hr) IV Continuous <Continuous>  enoxaparin Injectable 40 milliGRAM(s) SubCutaneous at bedtime  fentaNYL   Infusion. 0.5 MICROgram(s)/kG/Hr (4.5 mL/Hr) IV Continuous <Continuous>  midazolam Infusion 0.02 mG/kG/Hr (1.54 mL/Hr) IV Continuous <Continuous>  norepinephrine Infusion 0.05 MICROgram(s)/kG/Min (3.61 mL/Hr) IV Continuous <Continuous>  pantoprazole  Injectable 40 milliGRAM(s) IV Push daily  polyethylene glycol 3350 17 Gram(s) Oral daily    MEDICATIONS  (PRN):  albuterol/ipratropium for Nebulization 3 milliLiter(s) Nebulizer every 6 hours PRN Shortness of Breath and/or Wheezing  morphine  - Injectable 2 milliGRAM(s) IV Push every 6 hours PRN for severe pain/sedartion      Xrays:  B/l infilatres ET/OG ok                                                                                    ECHO

## 2022-01-20 NOTE — PROGRESS NOTE ADULT - SUBJECTIVE AND OBJECTIVE BOX
Patient seen and evaluated this am, sedated on versed, fentanyl and Precedex    T(F): 97.9 (01-20-22 @ 11:00), Max: 99.5 (01-20-22 @ 07:01)  HR: 51 (01-20-22 @ 10:28)  BP: 142/72 (01-20-22 @ 07:00)  RR: 24 (01-20-22 @ 11:00)  SpO2: 100% (01-20-22 @ 10:28) (98% - 100%)    PHYSICAL EXAM:  GENERAL: NAD  HEAD:  Atraumatic, Normocephalic  NERVOUS SYSTEM:  positive gag   CHEST/LUNG:  bilateral rhonchi  HEART: Regular rate and rhythm; No murmurs, rubs, or gallops  ABDOMEN: Soft, Nontender, Nondistended; Bowel sounds present  EXTREMITIES:  2+ Peripheral Pulses, No clubbing, cyanosis, or edema    LABS  01-20    144  |  107  |  34<H>  ----------------------------<  259<H>  4.8   |  27  |  0.8    Ca    8.0<L>      20 Jan 2022 06:15  Phos  3.2     01-19  Mg     2.9     01-20    TPro  4.9<L>  /  Alb  3.0<L>  /  TBili  0.5  /  DBili  x   /  AST  18  /  ALT  28  /  AlkPhos  55  01-20                          9.6    12.46 )-----------( 201      ( 20 Jan 2022 06:15 )             31.6       Mode: AC/ CMV (Assist Control/ Continuous Mandatory Ventilation)  RR (machine): 24  FiO2: 90  PEEP: 14      CARDIAC ENZYMES    Troponin T, Serum (01.15.22 @ 12:49)   Troponin T, Serum: 0.73: Critical value: ng/mL     < from: 12 Lead ECG (01.19.22 @ 08:44) >  Ventricular Rate 68 BPM    Atrial Rate 74 BPM    P-R Interval 162 ms    QRS Duration 110 ms    Q-T Interval 554 ms    QTC Calculation(Bazett) 589 ms    P Axis 35 degrees    R Axis -18 degrees    T Axis 226 degrees    Diagnosis Line Sinus rhythm with Premature atrial complexes with Aberrant conduction  Incomplete right bundle branch block  Minimal voltage criteria for LVH, may be normal variant  ST & Marked T-wave abnormality, consider inferolateral ischemia  Prolonged QT  Abnormal ECG    < end of copied text >    RADIOLOGY  < from: Xray Chest 1 View- PORTABLE-Routine (Xray Chest 1 View- PORTABLE-Routine in AM.) (01.19.22 @ 06:54) >  Support devices: Stable ET tube and enteric tube. Stable left-sided   pleural pigtail drainage catheter.    Cardiac/mediastinum/hilum: Unchanged.    Lung parenchyma/Pleura: Unchanged patchy bilateral airspace opacities. No   pneumothorax.    Skeleton/soft tissues: Unchanged.    Impression:    Unchanged patchy bilateral opacities.  Stable support devices.    < end of copied text >    MEDICATIONS  (STANDING):  aspirin  chewable 81 milliGRAM(s) Oral daily  chlorhexidine 0.12% Liquid 15 milliLiter(s) Oral Mucosa every 12 hours  chlorhexidine 4% Liquid 1 Application(s) Topical <User Schedule>  dexAMETHasone  Injectable 6 milliGRAM(s) IV Push every 12 hours  dexMEDEtomidine Infusion 0.2 MICROgram(s)/kG/Hr (3.85 mL/Hr) IV Continuous <Continuous>  enoxaparin Injectable 40 milliGRAM(s) SubCutaneous at bedtime  fentaNYL   Infusion. 0.5 MICROgram(s)/kG/Hr (4.5 mL/Hr) IV Continuous <Continuous>  midazolam Infusion 0.02 mG/kG/Hr (1.54 mL/Hr) IV Continuous <Continuous>  norepinephrine Infusion 0.05 MICROgram(s)/kG/Min (3.61 mL/Hr) IV Continuous <Continuous>  pantoprazole  Injectable 40 milliGRAM(s) IV Push daily  polyethylene glycol 3350 17 Gram(s) Oral daily    MEDICATIONS  (PRN):  albuterol/ipratropium for Nebulization 3 milliLiter(s) Nebulizer every 6 hours PRN Shortness of Breath and/or Wheezing  morphine  - Injectable 2 milliGRAM(s) IV Push every 6 hours PRN for severe pain/sedartion

## 2022-01-21 NOTE — PROCEDURAL SAFETY CHECKLIST WITH OR WITHOUT SEDATION - NSTEAMNURSEFT_GEN_ALL_CORE
Impression: Dermatochalasis of left upper eyelid: H02.834.  Plan: See A92.215
theodore
Jacklyn Singleton
Molly Marsh
RIA Snell

## 2022-01-21 NOTE — CHART NOTE - NSCHARTNOTEFT_GEN_A_CORE
DATE OF PROCEDURE: 1/21/2022    PREOPERATIVE DIAGNOSIS: mucus plug, high airway pressures    POSTOPERATIVE DIAGNOSES: mucus plug      PROCEDURE PERFORMED:  Bronchoscopy,  brushing and washing.         Attending: Dr [email or web address]         ASSISTANT:  CHRISTOPHER         ANESTHESIA:    CONSENT: The procedure was emergent , consent was implied, attempted to call the family after the procedure(left a voice mail).       The patient had been previously intubated and was on mechanical ventilatory support. He was on sedation, which was continued and adjusted during the procedure. His FiO2 was increased to 100% during the procedure. The  fiberoptic bronchoscope was introduced through an endotracheal tube adaptor and the tip of the endotracheal tube was noted to be in good position above the claudette, with mucus attached to the tip of the tube, the mucus was suctioned.    The Right tracheobronchial tree was inspected closely to the level of the subsegmental bronchi. All bronchi are patent with no endobronchial lesions and no mucosal lesions noted.   The Left tracheobronchial tree was also patent and intact with the normal mucosa, mucus were cleared from the left mainstem.       After adequate clearing of secretions was accomplished, the bronchoscope was removed from the patient and the procedure was ended.   The patient tolerated the procedure well and there were no complications. DATE OF PROCEDURE: 1/21/2022    PREOPERATIVE DIAGNOSIS: mucus plug, high airway pressures    POSTOPERATIVE DIAGNOSES: mucus plug      PROCEDURE PERFORMED:  Bronchoscopy,   washing.         Attending: Dr Wagner         ASSISTANT:  CHRISTOPHER         ANESTHESIA:    CONSENT: The procedure was emergent , consent was implied, attempted to call the family after the procedure (left a voice mail).   The procedure was performed to complete ETT obstruction from adherent secretions.      The patient had been previously intubated and was on mechanical ventilatory support. He was on sedation, which was continued and adjusted during the procedure. His FiO2 was increased to 100% during the procedure. The  fiberoptic bronchoscope was introduced through an endotracheal tube adaptor and the tip of the endotracheal tube was noted to be in good position above the claudette, with mucus attached to the tip of the tube, the mucus was suctioned. The secretions were at the distal end of the ETT and partially obstructing the tube It was all suctioned free.    The Right tracheobronchial tree was inspected closely to the level of the subsegmental bronchi. All bronchi are patent with no endobronchial lesions and no mucosal lesions noted.   The Left tracheobronchial tree was also patent and intact with the normal mucosa, mucus were cleared from the left mainstem.       After adequate clearing of secretions was accomplished, the bronchoscope was removed from the patient and the procedure was ended.   The patient tolerated the procedure well and there were no complications. Vitals and O2 saturations remained stable throughout.

## 2022-01-21 NOTE — PROGRESS NOTE ADULT - SUBJECTIVE AND OBJECTIVE BOX
Over Night Events:  difficult to ventilate, patient had thick tenaCIOUS SECRETIONS , S/P BRONCHOSCOPY  paw pressure improved  on precedex     ROS:  See HPI    PHYSICAL EXAM    ICU Vital Signs Last 24 Hrs  T(C): 36.1 (21 Jan 2022 07:10), Max: 36.6 (20 Jan 2022 11:00)  T(F): 97 (21 Jan 2022 07:10), Max: 97.9 (20 Jan 2022 11:00)  HR: 75 (21 Jan 2022 08:29) (51 - 95)  BP: 109/61 (21 Jan 2022 08:00) (109/61 - 165/78)  BP(mean): 80 (21 Jan 2022 08:00) (80 - 112)  RR: 30 (21 Jan 2022 09:03) (21 - 30)  SpO2: 97% (21 Jan 2022 08:29) (97% - 100%)      General: ill appearing  HEENT: SAÚL             Lungs: Bilateral BS  Cardiovascular: Regular   Abdomen: Soft, Positive BS  Extremities: No clubbing   Skin: Warm  Neurological: sedated      01-20-22 @ 07:01  -  01-21-22 @ 07:00  --------------------------------------------------------  IN:    Dexmedetomidine: 581 mL    Enteral Tube Flush: 60 mL    FentaNYL: 24 mL    Midazolam: 16 mL    Osmolite: 1250 mL  Total IN: 1931 mL    OUT:    Chest Tube (mL): 40 mL    Indwelling Catheter - Urethral (mL): 1835 mL    Norepinephrine: 0 mL  Total OUT: 1875 mL    Total NET: 56 mL      01-21-22 @ 07:01  -  01-21-22 @ 09:37  --------------------------------------------------------  IN:    Dexmedetomidine: 75 mL    Osmolite: 150 mL  Total IN: 225 mL    OUT:    Indwelling Catheter - Urethral (mL): 160 mL  Total OUT: 160 mL    Total NET: 65 mL          LABS:                          10.2   14.46 )-----------( 225      ( 21 Jan 2022 05:57 )             33.8                                               01-21    146  |  109  |  35<H>  ----------------------------<  212<H>  5.2<H>   |  25  |  0.7    Ca    8.5      21 Jan 2022 05:57  Mg     2.7     01-21    TPro  5.0<L>  /  Alb  3.0<L>  /  TBili  0.6  /  DBili  x   /  AST  53<H>  /  ALT  62<H>  /  AlkPhos  67  01-21                                                                                           LIVER FUNCTIONS - ( 21 Jan 2022 05:57 )  Alb: 3.0 g/dL / Pro: 5.0 g/dL / ALK PHOS: 67 U/L / ALT: 62 U/L / AST: 53 U/L / GGT: x                                                                                               Mode: AC/ CMV (Assist Control/ Continuous Mandatory Ventilation)  RR (machine): 24  TV (machine): 450  FiO2: 90  PEEP: 14  MAP: 14  PC: 14  PIP: 28                                      ABG - ( 21 Jan 2022 03:58 )  pH, Arterial: 7.40  pH, Blood: x     /  pCO2: 40    /  pO2: 125   / HCO3: 25    / Base Excess: x     /  SaO2: 97.1                MEDICATIONS  (STANDING):  aspirin  chewable 81 milliGRAM(s) Oral daily  chlorhexidine 0.12% Liquid 15 milliLiter(s) Oral Mucosa every 12 hours  chlorhexidine 4% Liquid 1 Application(s) Topical <User Schedule>  dexAMETHasone  Injectable 6 milliGRAM(s) IV Push every 12 hours  dexMEDEtomidine Infusion 0.2 MICROgram(s)/kG/Hr (3.85 mL/Hr) IV Continuous <Continuous>  enoxaparin Injectable 40 milliGRAM(s) SubCutaneous at bedtime  fentaNYL   Infusion. 0.5 MICROgram(s)/kG/Hr (4.5 mL/Hr) IV Continuous <Continuous>  midazolam Infusion 0.02 mG/kG/Hr (1.54 mL/Hr) IV Continuous <Continuous>  norepinephrine Infusion 0.05 MICROgram(s)/kG/Min (3.61 mL/Hr) IV Continuous <Continuous>  pantoprazole  Injectable 40 milliGRAM(s) IV Push daily  polyethylene glycol 3350 17 Gram(s) Oral daily    MEDICATIONS  (PRN):  albuterol/ipratropium for Nebulization 3 milliLiter(s) Nebulizer every 6 hours PRN Shortness of Breath and/or Wheezing  morphine  - Injectable 2 milliGRAM(s) IV Push every 6 hours PRN for severe pain/sedartion      Xrays:   B/l opacities, CT(left), left IJ                                                                                  ECHO

## 2022-01-21 NOTE — PROGRESS NOTE ADULT - SUBJECTIVE AND OBJECTIVE BOX
Patient seen and evaluated this am, sedated - mucous plugging this am - sp bronch - with improvement   ICU Vital Signs Last 24 Hrs  T(C): 37.1 (21 Jan 2022 15:05), Max: 38.3 (21 Jan 2022 11:00)  T(F): 98.7 (21 Jan 2022 15:05), Max: 100.9 (21 Jan 2022 11:00)  HR: 59 (21 Jan 2022 15:05) (52 - 88)  BP: 106/59 (21 Jan 2022 15:05) (87/50 - 165/79)  BP(mean): 78 (21 Jan 2022 15:05) (63 - 112)  ABP: --  ABP(mean): --  RR: 26 (21 Jan 2022 15:05) (24 - 37)  SpO2: 100% (21 Jan 2022 15:05) (97% - 100%)    PHYSICAL EXAM:  GENERAL: NAD  HEAD:  Atraumatic, Normocephalic  NERVOUS SYSTEM:  positive gag   CHEST/LUNG:  bilateral rhonchi  HEART: Regular rate and rhythm; No murmurs, rubs, or gallops  ABDOMEN: Soft, Nontender, Nondistended; Bowel sounds present  EXTREMITIES:  2+ Peripheral Pulses, No clubbing, cyanosis, or edema  01-21    146  |  109  |  35<H>  ----------------------------<  212<H>  5.2<H>   |  25  |  0.7    Ca    8.5      21 Jan 2022 05:57  Mg     2.7     01-21    TPro  5.0<L>  /  Alb  3.0<L>  /  TBili  0.6  /  DBili  x   /  AST  53<H>  /  ALT  62<H>  /  AlkPhos  67  01-21                          10.2   14.46 )-----------( 225      ( 21 Jan 2022 05:57 )             33.8       < from: 12 Lead ECG (01.19.22 @ 08:44) >  Ventricular Rate 68 BPM    Atrial Rate 74 BPM    P-R Interval 162 ms    QRS Duration 110 ms    Q-T Interval 554 ms    QTC Calculation(Bazett) 589 ms    P Axis 35 degrees    R Axis -18 degrees    T Axis 226 degrees    Diagnosis Line Sinus rhythm with Premature atrial complexes with Aberrant conduction  Incomplete right bundle branch block  Minimal voltage criteria for LVH, may be normal variant  ST & Marked T-wave abnormality, consider inferolateral ischemia  Prolonged QT  Abnormal ECG    < end of copied text >    RADIOLOGY  < from: Xray Chest 1 View- PORTABLE-Routine (Xray Chest 1 View- PORTABLE-Routine in AM.) (01.19.22 @ 06:54) >  Support devices: Stable ET tube and enteric tube. Stable left-sided   pleural pigtail drainage catheter.    Cardiac/mediastinum/hilum: Unchanged.    Lung parenchyma/Pleura: Unchanged patchy bilateral airspace opacities. No   pneumothorax.    Skeleton/soft tissues: Unchanged.    Impression:    Unchanged patchy bilateral opacities.  Stable support devices.    < end of copied text >    MEDICATIONS  (STANDING):  aspirin  chewable 81 milliGRAM(s) Oral daily  chlorhexidine 0.12% Liquid 15 milliLiter(s) Oral Mucosa every 12 hours  chlorhexidine 4% Liquid 1 Application(s) Topical <User Schedule>  dexAMETHasone  Injectable 6 milliGRAM(s) IV Push every 12 hours  dexMEDEtomidine Infusion 0.2 MICROgram(s)/kG/Hr (3.85 mL/Hr) IV Continuous <Continuous>  enoxaparin Injectable 40 milliGRAM(s) SubCutaneous at bedtime  fentaNYL   Infusion. 0.5 MICROgram(s)/kG/Hr (4.5 mL/Hr) IV Continuous <Continuous>  midazolam Infusion 0.02 mG/kG/Hr (1.54 mL/Hr) IV Continuous <Continuous>  norepinephrine Infusion 0.05 MICROgram(s)/kG/Min (3.61 mL/Hr) IV Continuous <Continuous>  pantoprazole  Injectable 40 milliGRAM(s) IV Push daily  polyethylene glycol 3350 17 Gram(s) Oral daily    MEDICATIONS  (PRN):  albuterol/ipratropium for Nebulization 3 milliLiter(s) Nebulizer every 6 hours PRN Shortness of Breath and/or Wheezing  morphine  - Injectable 2 milliGRAM(s) IV Push every 6 hours PRN for severe pain/sedartion

## 2022-01-21 NOTE — PROGRESS NOTE ADULT - ASSESSMENT
IMPRESSION:  Acute hypoxic respiratory failure  cardiac arrest  HAGMA  multi lobar pneumonia due to covid 19   Sepsis/ Septic   L PTX      SUGGEST:      CNS: Restart fentanyl, continue precedex  F/u rEEG, neurology f/u.       HEENT: Oral care    PULMONARY:  HOB @ 45 degrees. S/p bronch this am   Decrease Fio2 as necessary to maintain sats > 90%<94  CT to suction   daily CXR  Dexamethasone 6mg Q12  monitor driving pressure  Keep PEEP at 14.      CARDIOVASCULAR:  avoid overload Keep i<=Os    GI: GI prophylaxis.    OG feeding     RENAL:  Follow up lytes.    Correct as needed  daily labs    INFECTIOUS DISEASE:   ID F/u  inflammatory markers CRP, d-dimer, fibrinogen  Follow up cultures.   antivirals per ID  trend procal, moniotr off abx    HEMATOLOGICAL: Lovenox.    ENDOCRINE:  Follow up FS.  Insulin protocol if needed.    MUSCULOSKELETAL: bedrest    Left IJ  monitor in ICU  DNR/DNI  poor prognosis  The patient is critically ill with a high probability of deterioration.

## 2022-01-21 NOTE — CHART NOTE - NSCHARTNOTEFT_GEN_A_CORE
Registered Dietitian Follow-Up     Patient Profile Reviewed                           Yes [X]   No []     Nutrition History Previously Obtained        Yes []  No [X]       Pertinent  Information: pt is 81 year old male with hx of HTN, BPH, unvaccinated, tested + for COVID 12 days ago, BIBA 2/2 respiratory distress s/p cardiac arrest downtime of 7 minutes, s/p intubation.  + PNA, L sided pnemothorax, s/p chest tube. pt became difficult to ventilate overnight s/p bronchoscopy today 2/2 thick secretions. Poor prognosis.        Diet order:  Diet, NPO with Tube Feed:   Tube Feeding Modality: Orogastric  Osmolite 1.5 Haroldo  Total Volume for 24 Hours (mL): 1200  Continuous  Starting Tube Feed Rate {mL per Hour}: 20  Until Goal Tube Feed Rate (mL per Hour): 50  Tube Feed Duration (in Hours): 24  Tube Feed Start Time: 18:00 (01-17-22 @ 17:46) [Active]           Anthropometrics:  - Ht. 175.3 cm  - Wt. 79.6 kg 1/16 vs 83.7 kgs today  - %wt change  - BMI   - IBW      Pertinent Lab Data: 1/21/22  wbc 14.46H  hgb 10.2L  hct 33.8L  k+ 5.2H  BUn 35H  gluc 212H  ast 53H  alt 62H  Mg2+ 2.7H     Pertinent Meds:  MEDICATIONS  (STANDING):  aspirin  chewable 81 milliGRAM(s) Oral daily  dexAMETHasone  Injectable 6 milliGRAM(s) IV Push every 12 hours  dexMEDEtomidine Infusion 0.2 MICROgram(s)/kG/Hr (3.85 mL/Hr) IV Continuous <Continuous>  enoxaparin Injectable 40 milliGRAM(s) SubCutaneous at bedtime  fentaNYL   Infusion. 0.5 MICROgram(s)/kG/Hr (4.5 mL/Hr) IV Continuous <Continuous>  midazolam Infusion 0.02 mG/kG/Hr (1.54 mL/Hr) IV Continuous <Continuous>  norepinephrine Infusion 0.05 MICROgram(s)/kG/Min (3.61 mL/Hr) IV Continuous <Continuous>  pantoprazole  Injectable 40 milliGRAM(s) IV Push daily  polyethylene glycol 3350 17 Gram(s) Oral daily    MEDICATIONS  (PRN):  acetaminophen     Tablet .. 650 milliGRAM(s) Oral every 6 hours PRN Temp greater or equal to 38C (100.4F), Mild Pain (1 - 3)  albuterol/ipratropium for Nebulization 3 milliLiter(s) Nebulizer every 6 hours PRN Shortness of Breath and/or Wheezing  morphine  - Injectable 2 milliGRAM(s) IV Push every 6 hours PRN for severe pain/sedartion       Physical Findings:  - Appearance: sedated, intubated to vent  - GI function: + BS. no BM noted   - Tubes: OGT  - Oral/Mouth cavity:  - Skin:      Nutrition Requirements  Weight Used:  79.6 kgs      Estimated Energy Needs:          Nutrient Intake present feeds provide 1800 kcals, 75g protein, 1200 ml total volume        [] Previous Nutrition Diagnosis:            [] Ongoing          [] Resolved    [] No active nutrition diagnosis identified at this time     Nutrition Diagnostic #1  Problem:   Etiology:   Statement:      Nutrition Diagnostic #2  Problem:  Etiology:  Statement:     Nutrition Intervention:     Goal/Expected Outcome:     Indicator/Monitoring: Registered Dietitian Follow-Up     Patient Profile Reviewed                           Yes [X]   No []     Nutrition History Previously Obtained        Yes []  No [X]       Pertinent  Information: pt is 81 year old male with hx of HTN, BPH, unvaccinated, tested + for COVID 12 days ago, BIBA 2/2 respiratory distress s/p cardiac arrest downtime of 7 minutes, s/p intubation.  + PNA, L sided pnemothorax, s/p chest tube. pt became difficult to ventilate overnight s/p bronchoscopy today 2/2 thick secretions. Poor prognosis.        Diet order:  Diet, NPO with Tube Feed:   Tube Feeding Modality: Orogastric  Osmolite 1.5 Haroldo  Total Volume for 24 Hours (mL): 1200  Continuous  Starting Tube Feed Rate {mL per Hour}: 20  Until Goal Tube Feed Rate (mL per Hour): 50  Tube Feed Duration (in Hours): 24  Tube Feed Start Time: 18:00 (01-17-22 @ 17:46) [Active]           Anthropometrics:  - Ht. 175.3 cm  - Wt. 79.6 kg 1/16 vs 83.7 kgs today  - %wt change  - BMI   - IBW      Pertinent Lab Data: 1/21/22  wbc 14.46H  hgb 10.2L  hct 33.8L  k+ 5.2H  BUn 35H  gluc 212H  ast 53H  alt 62H  Mg2+ 2.7H     Pertinent Meds:  MEDICATIONS  (STANDING):  aspirin  chewable 81 milliGRAM(s) Oral daily  dexAMETHasone  Injectable 6 milliGRAM(s) IV Push every 12 hours  dexMEDEtomidine Infusion 0.2 MICROgram(s)/kG/Hr (3.85 mL/Hr) IV Continuous <Continuous>  enoxaparin Injectable 40 milliGRAM(s) SubCutaneous at bedtime  fentaNYL   Infusion. 0.5 MICROgram(s)/kG/Hr (4.5 mL/Hr) IV Continuous <Continuous>  midazolam Infusion 0.02 mG/kG/Hr (1.54 mL/Hr) IV Continuous <Continuous>  norepinephrine Infusion 0.05 MICROgram(s)/kG/Min (3.61 mL/Hr) IV Continuous <Continuous>  pantoprazole  Injectable 40 milliGRAM(s) IV Push daily  polyethylene glycol 3350 17 Gram(s) Oral daily    MEDICATIONS  (PRN):  acetaminophen     Tablet .. 650 milliGRAM(s) Oral every 6 hours PRN Temp greater or equal to 38C (100.4F), Mild Pain (1 - 3)  albuterol/ipratropium for Nebulization 3 milliLiter(s) Nebulizer every 6 hours PRN Shortness of Breath and/or Wheezing  morphine  - Injectable 2 milliGRAM(s) IV Push every 6 hours PRN for severe pain/sedartion       Physical Findings:  - Appearance: sedated, intubated to vent  - GI function: + BS. no BM noted   - Tubes: OGT  - Oral/Mouth cavity:  - Skin:      Nutrition Requirements  Weight Used:  79.6 kgs      Estimated Energy Needs:     1940 kcals ( [place] state), 79.6-96g pro (1.0-1.2g/kg/BW) 1;1 kcal for estimated fluid needs     Nutrient Intake present feeds provide 1800 kcals, 75g protein, 1200 ml total volume        [] Previous Nutrition Diagnosis:            [X] Ongoing          [] Resolved    Nutrition Intervention: maintain on present feeds, add prosource no carb 1 pkt daily for additional 60 kcals, 15g protein.      Goal/Expected Outcome: pt to tolerate EN and meet >85% of estimated nutrient needs     Indicator/Monitoring: RD to monitor tolerance to EN, labs/meds, NFPF and f/u as needed within 2-4 days

## 2022-01-21 NOTE — PROGRESS NOTE ADULT - ASSESSMENT
81 year old male with a hx of HTN, BPH, recent diagnosis of COVID 19 2 days ago prior to admission -> presented to the ED S/P cardiac arrest.  Patient began to have worsening dyspnea at home, EMS was called. Found to be in cardiac arrest. ROSC was achieved after 2 rounds of CPR and epi, downtime 7 mins. Was intubated in the field.     acute respiratory failure / Cardiac arrest / COVID-19 pneumonia / probable NSTEMI / L sided pneumothorax / mucous plug sp bronch      - very poor prognosis   - now DNR  - resident spoke with family today - they are considering CMO - palliative cs    - IV Dexamethasone   - titrate pressors as tolerated   - cardio and pulmonary following   - pneumothorax resolved - cont rigfht chest tube - subq emphysema  on CXR - cont to monitor    - DVT prophylaxis  - discussed with team

## 2022-01-21 NOTE — CHART NOTE - NSCHARTNOTEFT_GEN_A_CORE
Spoke to patient's granddaugher enriqueta (currently acting proxy), explained in detail patient's current prognosis and ongoing care, she stated that she will discuss comfort measures with rest of family and follow up with medical team

## 2022-01-21 NOTE — PROCEDURAL SAFETY CHECKLIST WITH OR WITHOUT SEDATION - NSPOSTCOMMENTFT_GEN_ALL_CORE
Chest tube in place. VSS.
Left IJ TLC. No complications noted. XRAY complete.
Ativan 4mg IVP given before procedure. Vecuronium 10mg IVP given once. Procedure tolerated well.

## 2022-01-22 NOTE — PROGRESS NOTE ADULT - ASSESSMENT
a/p:   s/p cardiac arrest  ?anoxic brain injury  COVID  acute hypoxic respiratory failure    Pt remains intubated and vent dependent, poor mental status, remains on pressors.  titrate to MAP >60. Continue decadron.  Pt is DNR/DNI.  continue DVT ppx, GI ppx, tube feeds. Overall prognosis is poor. Time spent on critical care services >30 minutes.

## 2022-01-22 NOTE — CHART NOTE - NSCHARTNOTEFT_GEN_A_CORE
Spoke with Angela over the phone about her Grandfather prognosis and status today. We spoke about the need for sedation as her grandfather is non-synchronous with the vent. I also informed her about patient inability to follow commands and that may indicate an anoxic brain injury. She understood that patient is not a candidate to undergo SBT due to maximal ventilator support. I also explained to her that even in the event of improvement of ventilator settings, patient may eventually require a trach and peg. However she informed me that the decision for now is to continue medical management and as a family they do not wish to proceed with comfort measures.

## 2022-01-22 NOTE — PROGRESS NOTE ADULT - SUBJECTIVE AND OBJECTIVE BOX
LENGTH OF HOSPITAL STAY: 7d    CHIEF COMPLAINT:   Patient is a 81y old  Male who presents with a chief complaint of respiratory failure, s/p cardiac arrest (22 Jan 2022 13:21)      HISTORY OF PRESENTING ILLNESS:    HPI:  81 year old male with a hx of HTN, BPH, recent diagnosis of COVID 19 2 days ago (unvaccinated, prescribed decadron/ zpack/ zinc) presenting to the ED S/P cardiac arrest. Patient intubated/ sedated so history obtained from chart (son left and attempted to reach). Patient began to have worsening dyspnea at home, EMS was called. Found to be in respiratory arrest by EMS and followed by EMS. ROSC was achieved after 2 rounds of CPR and epi, downtime 7 mins. Was intubated in the field.   In ED central line was placed. ABG: PH 7.15, PaO2 66, PaCO2 45, HCO3- 16. Troponins .1, BNP > 8000, D-Dimer 7368. COVID 19 positive. Patient being admitted s/p cardiac arrest to ICU.      (15 Aquiles 2022 04:09)    Pt remains intubated not waking s/p cardiac arrest    PAST MEDICAL & SURGICAL HISTORY  PAST MEDICAL & SURGICAL HISTORY:  BPH (benign prostatic hyperplasia)    Mild HTN      SOCIAL HISTORY:    ALLERGIES:  Allergy Status Unknown    MEDICATIONS:  STANDING MEDICATIONS  aspirin  chewable 81 milliGRAM(s) Oral daily  chlorhexidine 0.12% Liquid 15 milliLiter(s) Oral Mucosa every 12 hours  chlorhexidine 4% Liquid 1 Application(s) Topical <User Schedule>  dexAMETHasone  Injectable 6 milliGRAM(s) IV Push every 12 hours  dexMEDEtomidine Infusion 0.2 MICROgram(s)/kG/Hr IV Continuous <Continuous>  enoxaparin Injectable 40 milliGRAM(s) SubCutaneous at bedtime  fentaNYL   Infusion. 0.5 MICROgram(s)/kG/Hr IV Continuous <Continuous>  midazolam Infusion 0.02 mG/kG/Hr IV Continuous <Continuous>  norepinephrine Infusion 0.05 MICROgram(s)/kG/Min IV Continuous <Continuous>  pantoprazole  Injectable 40 milliGRAM(s) IV Push daily  polyethylene glycol 3350 17 Gram(s) Oral daily  propofol Infusion 0.05 MICROgram(s)/kG/Min IV Continuous <Continuous>  propofol Injectable 25 milliGRAM(s) IV Push once  sodium zirconium cyclosilicate 5 Gram(s) Oral two times a day    PRN MEDICATIONS  acetaminophen     Tablet .. 650 milliGRAM(s) Oral every 6 hours PRN  albuterol/ipratropium for Nebulization 3 milliLiter(s) Nebulizer every 6 hours PRN  morphine  - Injectable 2 milliGRAM(s) IV Push every 6 hours PRN    VITALS:   T(F): 100  HR: 68  BP: 136/65  RR: 27  SpO2: 100%    LABS:                        9.8    16.63 )-----------( 202      ( 22 Jan 2022 05:56 )             33.2     01-22    145  |  108  |  36<H>  ----------------------------<  222<H>  5.3<H>   |  25  |  0.7    Ca    8.1<L>      22 Jan 2022 05:56  Mg     2.7     01-22    TPro  4.8<L>  /  Alb  2.9<L>  /  TBili  0.6  /  DBili  x   /  AST  45<H>  /  ALT  101<H>  /  AlkPhos  64  01-22        ABG - ( 22 Jan 2022 04:26 )  pH, Arterial: 7.40  pH, Blood: x     /  pCO2: 44    /  pO2: 236   / HCO3: 27    / Base Excess: 2.2   /  SaO2: 97.9                          PHYSICAL EXAM:  GEN: sedated  HEENT: ett  LUNGS: Clear to auscultation bilaterally   HEART: S1/S2 present. RRR.   ABD: Soft, non-tender, non-distended. Bowel sounds present  EXT:-c/c/e  NEURO: minimally responsive

## 2022-01-22 NOTE — PROGRESS NOTE ADULT - SUBJECTIVE AND OBJECTIVE BOX
CHADWICK VENCES  [address] ([address])      Patient was evaluated and examined  by bedside, remains intubated       REVIEW OF SYSTEMS:  unable to obtain, pt. is unresponsive       T(C): , Max: 38.5 (01-22-22 @ 03:00)  HR: 68 (01-22-22 @ 13:01)  BP: 136/65 (01-22-22 @ 12:01)  RR: 27 (01-22-22 @ 13:01)  SpO2: 100% (01-22-22 @ 13:01)  CAPILLARY BLOOD GLUCOSE          PHYSICAL EXAM:  General: NAD, unresponsive, patient is laying comfortably in bed  HEENT: AT, NC, Supple, NO JVD, NO CB, ET+, OGT+  Lungs: mild decreased breath sounds  B/L, no wheezing, no rhonchi  CVS: normal S1, S2, RRR, NO M/G/R  Abdomen: soft, bowel sounds present, non-tender, non-distended  Extremities: b/l upper ext. distal pitting  edema present , no clubbing, no cyanosis, positive peripheral pulses b/l  Neuro: unresponsive   Skin: no rash, no ecchymosis      LAB  CBC  Date: 01-22-22 @ 05:56  Mean cell Ekihwajkxe82.6  Mean cell Hemoglobin Conc29.5  Mean cell Volum 96.8  Platelet count-Automate 202  RBC Count 3.43  Red Cell Distrib Width15.4  WBC Count16.63  % Albumin, Urine--  Hematocrit 33.2  Hemoglobin 9.8  CBC  Date: 01-21-22 @ 05:57  Mean cell Dwpfifmbld86.4  Mean cell Hemoglobin Conc30.2  Mean cell Volum 94.2  Platelet count-Automate 225  RBC Count 3.59  Red Cell Distrib Width14.8  WBC Count14.46  % Albumin, Urine--  Hematocrit 33.8  Hemoglobin 10.2  CBC  Date: 01-20-22 @ 06:15  Mean cell Ylmazuziea64.6  Mean cell Hemoglobin Conc30.4  Mean cell Volum 94.0  Platelet count-Automate 201  RBC Count 3.36  Red Cell Distrib Width14.6  WBC Count12.46  % Albumin, Urine--  Hematocrit 31.6  Hemoglobin 9.6  CBC  Date: 01-19-22 @ 06:30  Mean cell Jilwkdwnba41.5  Mean cell Hemoglobin Conc30.4  Mean cell Volum 93.9  Platelet count-Automate 223  RBC Count 3.26  Red Cell Distrib Width14.9  WBC Count13.00  % Albumin, Urine--  Hematocrit 30.6  Hemoglobin 9.3  CBC  Date: 01-18-22 @ 06:15  Mean cell Clzznvgmoo94.2  Mean cell Hemoglobin Conc31.1  Mean cell Volum 91.0  Platelet count-Automate 245  RBC Count 3.54  Red Cell Distrib Width14.6  WBC Count16.24  % Albumin, Urine--  Hematocrit 32.2  Hemoglobin 10.0  CBC  Date: 01-17-22 @ 06:12  Mean cell Lovhbycpsl05.2  Mean cell Hemoglobin Conc30.8  Mean cell Volum 91.4  Platelet count-Automate 175  RBC Count 4.08  Red Cell Distrib Width14.6  WBC Count13.28  % Albumin, Urine--  Hematocrit 37.3  Hemoglobin 11.5  CBC  Date: 01-16-22 @ 06:56  Mean cell Fzrvkmyqyi54.4  Mean cell Hemoglobin Conc31.6  Mean cell Volum 89.8  Platelet count-Automate 191  RBC Count 4.62  Red Cell Distrib Width14.7  WBC Count10.74  % Albumin, Urine--  Hematocrit 41.5  Hemoglobin 13.1    [facility]  01-22-22 @ 05:56  Blood Gas Arterial-Calcium,Ionized--  Blood Urea Nitrogen, Serum 36 mg/dL<H> [10 - 20]  Carbon Dioxide, Serum25 mmol/L [17 - 32]  Chloride, Ylaam365 mmol/L [98 - 110]  Creatinie, Serum0.7 mg/dL [0.7 - 1.5]  Glucose, Qyzfj600 mg/dL<H> [70 - 99]  Potassium, Serum5.3 mmol/L<H> [3.5 - 5.0]  Sodium, Serum 145 mmol/L [135 - 146]  [facility]  01-22-22 @ 04:26  Blood Gas Arterial-Calcium,Ionized1.21 mmol/L [1.15 - 1.33] [450/24/100%/+14]  Blood Urea Nitrogen, Serum --  Carbon Dioxide, Serum--  Chloride, Serum--  Creatinie, Serum--  Glucose, Serum--  Potassium, Serum--  Sodium, Serum --  BMP  01-21-22 @ 15:56  Blood Gas Arterial-Calcium,Ionized1.21 mmol/L [1.15 - 1.33] [FIO2:100  MODEVC:_   VT:_450   RATE:_ 24  PEEP:14_]  Blood Urea Nitrogen, Serum --  Carbon Dioxide, Serum--  Chloride, Serum--  Creatinie, Serum--  Glucose, Serum--  Potassium, Serum--  Sodium, Serum --  [facility]  01-21-22 @ 05:57  Blood Gas Arterial-Calcium,Ionized--  Blood Urea Nitrogen, Serum 35 mg/dL<H> [10 - 20]  Carbon Dioxide, Serum25 mmol/L [17 - 32]  Chloride, Bqpxv362 mmol/L [98 - 110]  Creatinie, Serum0.7 mg/dL [0.7 - 1.5]  Glucose, Yqihq491 mg/dL<H> [70 - 99]  Potassium, Serum5.2 mmol/L<H> [3.5 - 5.0]  Sodium, Serum 146 mmol/L [135 - 146]  [facility]  01-21-22 @ 03:58  Blood Gas Arterial-Calcium,Ionized1.24 mmol/L [1.15 - 1.33]  Blood Urea Nitrogen, Serum --  Carbon Dioxide, Serum--  Chloride, Serum--  Creatinie, Serum--  Glucose, Serum--  Potassium, Serum--  Sodium, Serum --  [facility]  01-20-22 @ 06:15  Blood Gas Arterial-Calcium,Ionized--  Blood Urea Nitrogen, Serum 34 mg/dL<H> [10 - 20]  Carbon Dioxide, Serum27 mmol/L [17 - 32]  Chloride, Fbagi838 mmol/L [98 - 110]  Creatinie, Serum0.8 mg/dL [0.7 - 1.5]  Glucose, Faenh884 mg/dL<H> [70 - 99]  Potassium, Serum4.8 mmol/L [3.5 - 5.0]  Sodium, Serum 144 mmol/L [135 - 146]  [facility]  01-20-22 @ 03:51  Blood Gas Arterial-Calcium,Ionized1.17 mmol/L [1.15 - 1.33]  Blood Urea Nitrogen, Serum --  Carbon Dioxide, Serum--  Chloride, Serum--  Creatinie, Serum--  Glucose, Serum--  Potassium, Serum--  Sodium, Serum --  [facility]  01-19-22 @ 14:52  Blood Gas Arterial-Calcium,Ionized1.10 mmol/L<L> [1.15 - 1.33] [FIO2:100  MODE:VC_   VT:_450   RATE:24_   PEEP:_14  Comment:_]  Blood Urea Nitrogen, Serum --  Carbon Dioxide, Serum--  Chloride, Serum--  Creatinie, Serum--  Glucose, Serum--  Potassium, Serum--  Sodium, Serum --  [facility]  01-19-22 @ 06:30  Blood Gas Arterial-Calcium,Ionized--  Blood Urea Nitrogen, Serum 35 mg/dL<H> [10 - 20]  Carbon Dioxide, Serum31 mmol/L [17 - 32]  Chloride, Gkrmp504 mmol/L [98 - 110]  Creatinie, Serum0.8 mg/dL [0.7 - 1.5]  Glucose, Hserq078 mg/dL<H> [70 - 99]  Potassium, Serum3.9 mmol/L [3.5 - 5.0]  Sodium, Serum 143 mmol/L [135 - 146]  [facility]  01-19-22 @ 04:30  Blood Gas Arterial-Calcium,Ionized1.08 mmol/L<L> [1.15 - 1.33]  Blood Urea Nitrogen, Serum --  Carbon Dioxide, Serum--  Chloride, Serum--  Creatinie, Serum--  Glucose, Serum--  Potassium, Serum--  Sodium, Serum --  [facility]  01-18-22 @ 15:00  Blood Gas Arterial-Calcium,Ionized--  Blood Urea Nitrogen, Serum 47 mg/dL<H> [10 - 20]  Carbon Dioxide, Serum33 mmol/L<H> [17 - 32]  Chloride, Serum97 mmol/L<L> [98 - 110]  Creatinie, Serum1.1 mg/dL [0.7 - 1.5]  Glucose, Jtksw063 mg/dL<H> [70 - 99]  Potassium, Serum3.8 mmol/L [3.5 - 5.0]  Sodium, Serum 141 mmol/L [135 - 146]  [facility]  01-18-22 @ 13:47  Blood Gas Arterial-Calcium,Ionized0.99 mmol/L<L> [1.15 - 1.33]  Blood Urea Nitrogen, Serum --  Carbon Dioxide, Serum--  Chloride, Serum--  Creatinie, Serum--  Glucose, Serum--  Potassium, Serum--  Sodium, Serum --      Medications:  acetaminophen     Tablet .. 650 milliGRAM(s) Oral every 6 hours PRN  albuterol/ipratropium for Nebulization 3 milliLiter(s) Nebulizer every 6 hours PRN  aspirin  chewable 81 milliGRAM(s) Oral daily  chlorhexidine 0.12% Liquid 15 milliLiter(s) Oral Mucosa every 12 hours  chlorhexidine 4% Liquid 1 Application(s) Topical <User Schedule>  dexAMETHasone  Injectable 6 milliGRAM(s) IV Push every 12 hours  dexMEDEtomidine Infusion 0.2 MICROgram(s)/kG/Hr IV Continuous <Continuous>  enoxaparin Injectable 40 milliGRAM(s) SubCutaneous at bedtime  fentaNYL   Infusion. 0.5 MICROgram(s)/kG/Hr IV Continuous <Continuous>  midazolam Infusion 0.02 mG/kG/Hr IV Continuous <Continuous>  morphine  - Injectable 2 milliGRAM(s) IV Push every 6 hours PRN  norepinephrine Infusion 0.05 MICROgram(s)/kG/Min IV Continuous <Continuous>  pantoprazole  Injectable 40 milliGRAM(s) IV Push daily  polyethylene glycol 3350 17 Gram(s) Oral daily  propofol Infusion 0.05 MICROgram(s)/kG/Min IV Continuous <Continuous>  propofol Injectable 25 milliGRAM(s) IV Push once  sodium zirconium cyclosilicate 5 Gram(s) Oral two times a day        Assessment and Plan:  Patient is an 81 year old male with a hx of HTN, BPH, recent diagnosis of COVID 19 ,  S/P cardiac arrest.  Patient began to have worsening dyspnea at home, EMS was called. Found to be in cardiac arrest. ROSC was achieved after 2 rounds of CPR and epi, downtime 7 mins. Was intubated in the field.     acute respiratory failure / Cardiac arrest / COVID-19 pneumonia / probable NSTEMI / L sided pneumothorax / mucous plug sp bronch      - very poor prognosis   - now DNR  - post family discussion  - they are considering CMO - palliative cs    - IV Dexamethasone   - titrate pressors as tolerated   - cardio and pulmonary following   - pneumothorax resolved - cont rigfht chest tube - subq emphysema  on CXR - cont to monitor    - DVT prophylaxis    #Progress Note Handoff: f/up family for CMO decision, further course of tx. as per intensivist recommendations  Family discussion: yes, medical team Disposition: to be determined    Total time spent to complete patient's bedside assessment, review medical chart, discuss medical plan of care with covering medical team was more than 35 minutes

## 2022-01-23 NOTE — PROGRESS NOTE ADULT - SUBJECTIVE AND OBJECTIVE BOX
· Subjective and Objective:     CHADWICK VENCES  [facility] 3C- 1 ([address]-CCU)      Patient was evaluated and examined  by bedside, remains sedated, intubated, on pressors        REVIEW OF SYSTEMS:  unable to obtain , pt. is intubated , sedated      T(C): , Max: 37.8 (01-22-22 @ 11:00)  HR: 97 (01-23-22 @ 09:35)  BP: 140/70 (01-23-22 @ 09:00)  RR: 27 (01-23-22 @ 09:00)  SpO2: 100% (01-23-22 @ 09:35)  CAPILLARY BLOOD GLUCOSE      POCT Blood Glucose.: 348 mg/dL (23 Jan 2022 06:38)      PHYSICAL EXAM:  General: NAD, sedated, patient is laying comfortably in bed  HEENT: AT, NC, Supple, NO JVD, NO CB, ET+, OGT+,  Lungs: decreased breath sounds B/L , no wheezing, no rhonchi  CVS: normal S1, S2, RRR, NO M/G/R  Abdomen: soft, bowel sounds present, non-tender, non-distended  : cardoso present, draining clear yellow urine  Extremities: dependent distal upper and lower ext. pitting edema present, no clubbing, no cyanosis, positive peripheral pulses b/l  Neuro: sedated   Skin: no rash, no ecchymosis      LAB  CBC  Date: 01-23-22 @ 06:35  Mean cell Gefjqirwnq33.9  Mean cell Hemoglobin Conc29.5  Mean cell Volum 98.0  Platelet count-Automate 218  RBC Count 3.56  Red Cell Distrib Width16.8  WBC Count24.47  % Albumin, Urine--  Hematocrit 34.9  Hemoglobin 10.3  CBC  Date: 01-22-22 @ 05:56  Mean cell Lmfuygsfyc30.6  Mean cell Hemoglobin Conc29.5  Mean cell Volum 96.8  Platelet count-Automate 202  RBC Count 3.43  Red Cell Distrib Width15.4  WBC Count16.63  % Albumin, Urine--  Hematocrit 33.2  Hemoglobin 9.8      BMP  01-23-22 @ 06:35  Blood Gas Arterial-Calcium,Ionized--  Blood Urea Nitrogen, Serum 54 mg/dL<H> [10 - 20]  Carbon Dioxide, Serum25 mmol/L [17 - 32]  Chloride, Hgimh392 mmol/L [98 - 110]  Creatinie, Serum0.9 mg/dL [0.7 - 1.5]  Glucose, Rxcft495 mg/dL<H> [70 - 99]  Potassium, Serum5.7 mmol/L<H> [3.5 - 5.0]  Sodium, Serum 147 mmol/L<H> [135 - 146]  [facility]  01-23-22 @ 05:20  Blood Gas Arterial-Calcium,Ionized1.21 mmol/L [1.15 - 1.33]  Blood Urea Nitrogen, Serum --  Carbon Dioxide, Serum--  Chloride, Serum--  Creatinie, Serum--  Glucose, Serum--  Potassium, Serum--  Sodium, Serum --  [facility]  01-22-22 @ 14:51  Blood Gas Arterial-Calcium,Ionized1.20 mmol/L [1.15 - 1.33] [FIO2:100  MODE:_VC   VT:450_   RATE:_24   PEEP:_14  Comment:_MD. Ahuja notified with results.]  Blood Urea Nitrogen, Serum --  Carbon Dioxide, Serum--  Chloride, Serum--  Creatinie, Serum--  Glucose, Serum--  Potassium, Serum--  Sodium, Serum --  BMP  01-22-22 @ 05:56  Blood Gas Arterial-Calcium,Ionized--  Blood Urea Nitrogen, Serum 36 mg/dL<H> [10 - 20]  Carbon Dioxide, Serum25 mmol/L [17 - 32]  Chloride, Vtptx866 mmol/L [98 - 110]  Creatinie, Serum0.7 mg/dL [0.7 - 1.5]  Glucose, Xqzwy481 mg/dL<H> [70 - 99]  Potassium, Serum5.3 mmol/L<H> [3.5 - 5.0]  Sodium, Serum 145 mmol/L [135 - 146]          Medications:  acetaminophen     Tablet .. 650 milliGRAM(s) Oral every 6 hours PRN  albuterol/ipratropium for Nebulization 3 milliLiter(s) Nebulizer every 6 hours PRN  aspirin  chewable 81 milliGRAM(s) Oral daily  chlorhexidine 0.12% Liquid 15 milliLiter(s) Oral Mucosa every 12 hours  chlorhexidine 4% Liquid 1 Application(s) Topical <User Schedule>  dexAMETHasone  Injectable 6 milliGRAM(s) IV Push every 12 hours  dexMEDEtomidine Infusion 0.2 MICROgram(s)/kG/Hr IV Continuous <Continuous>  dextrose 5%. 1000 milliLiter(s) IV Continuous <Continuous>  dextrose 5%. 1000 milliLiter(s) IV Continuous <Continuous>  dextrose 50% Injectable 25 Gram(s) IV Push once  dextrose 50% Injectable 12.5 Gram(s) IV Push once  dextrose 50% Injectable 25 Gram(s) IV Push once  enoxaparin Injectable 40 milliGRAM(s) SubCutaneous at bedtime  fentaNYL   Infusion. 0.5 MICROgram(s)/kG/Hr IV Continuous <Continuous>  glucagon  Injectable 1 milliGRAM(s) IntraMuscular once  insulin lispro (ADMELOG) corrective regimen sliding scale   SubCutaneous every 6 hours  morphine  - Injectable 2 milliGRAM(s) IV Push every 6 hours PRN  norepinephrine Infusion 0.05 MICROgram(s)/kG/Min IV Continuous <Continuous>  pantoprazole  Injectable 40 milliGRAM(s) IV Push daily  piperacillin/tazobactam IVPB.. 3.375 Gram(s) IV Intermittent every 8 hours  polyethylene glycol 3350 17 Gram(s) Oral daily  propofol Infusion 0.05 MICROgram(s)/kG/Min IV Continuous <Continuous>  sodium polystyrene sulfonate Suspension 30 Gram(s) Enteral Tube once  vancomycin  IVPB 1000 milliGRAM(s) IV Intermittent every 12 hours        Assessment and Plan:  Patient is an 81 year old male with a hx of HTN, BPH, recent diagnosis of COVID 19 ,  S/P cardiac arrest.  Patient began to have worsening dyspnea at home, EMS was called. Found to be in cardiac arrest. ROSC was achieved after 2 rounds of CPR and epi, downtime 7 mins. Was intubated in the field.     acute hypoxic  respiratory failure / s/p Cardiac arrest / severe COVID-19 viral pneumonia / type 2 NSTEMI / L sided pneumothorax / mucous plug sp bronch   -suspected bacterial superimposed pneumonia now with worsening leukocytosis  -1/23- started on IV Zosyn, IV Vanco, consulted ID , f/up rec  -obtain blood cxs, urine analysis with urine cxs, spu cxs, spu MRSA swab, repeat procal level  -as per CCM- continue  IV Dexamethasone 6 mg twice daily  -Vent management as per Pulm/CCM, freq. suctioning , pulm. hygiene   - Cardio on board- continue asa, no statins due to rhabdo   - pneumothorax resolved - cont rigfht chest tube - subq emphysema  on CXR - cont to monitor   -monitor strict I / O, maintain cardoso.   - ordered CT head w/o co  to assess post cardiac arrest suspected anoxic encephalopathy  1/23- patient remains on 2 sedation drips  and 1 pressor to maintain map above 65.    # Hypernatremia- started on free water flushes via OGT, f/up BMP at 4 pm    # Hyperkalemia - s/p kayexalate dose today, f/up BMP     # Hyperglycemia- on dexa, f/up hga1c, bsfs , started in insulin sliding scale co.    Diet: OGT feedings with osmolyte     # DVT proph- lovenox 40 mg subc. once daily   # GI proph - PPI via OGT once daily     Patient has  very poor prognosis, Code status: DNR

## 2022-01-23 NOTE — PROGRESS NOTE ADULT - SUBJECTIVE AND OBJECTIVE BOX
CHADWICK VENCES  [facility] 3C- 1 ([address]-CCU)      Patient was evaluated and examined  by bedside, remains sedated, intubated, on pressors        REVIEW OF SYSTEMS:  unable to obtain , pt. is intubated , sedated      T(C): , Max: 37.8 (01-22-22 @ 11:00)  HR: 97 (01-23-22 @ 09:35)  BP: 140/70 (01-23-22 @ 09:00)  RR: 27 (01-23-22 @ 09:00)  SpO2: 100% (01-23-22 @ 09:35)  CAPILLARY BLOOD GLUCOSE      POCT Blood Glucose.: 348 mg/dL (23 Jan 2022 06:38)      PHYSICAL EXAM:  General: NAD, sedated, patient is laying comfortably in bed  HEENT: AT, NC, Supple, NO JVD, NO CB, ET+, OGT+,  Lungs: decreased breath sounds B/L , no wheezing, no rhonchi  CVS: normal S1, S2, RRR, NO M/G/R  Abdomen: soft, bowel sounds present, non-tender, non-distended  : cardoso present, draining clear yellow urine  Extremities: dependent distal upper and lower ext. pitting edema present, no clubbing, no cyanosis, positive peripheral pulses b/l  Neuro: sedated   Skin: no rash, no ecchymosis      LAB  CBC  Date: 01-23-22 @ 06:35  Mean cell Fuobuiamdx82.9  Mean cell Hemoglobin Conc29.5  Mean cell Volum 98.0  Platelet count-Automate 218  RBC Count 3.56  Red Cell Distrib Width16.8  WBC Count24.47  % Albumin, Urine--  Hematocrit 34.9  Hemoglobin 10.3  CBC  Date: 01-22-22 @ 05:56  Mean cell Hpyjyepamw07.6  Mean cell Hemoglobin Conc29.5  Mean cell Volum 96.8  Platelet count-Automate 202  RBC Count 3.43  Red Cell Distrib Width15.4  WBC Count16.63  % Albumin, Urine--  Hematocrit 33.2  Hemoglobin 9.8      BMP  01-23-22 @ 06:35  Blood Gas Arterial-Calcium,Ionized--  Blood Urea Nitrogen, Serum 54 mg/dL<H> [10 - 20]  Carbon Dioxide, Serum25 mmol/L [17 - 32]  Chloride, Bvcte118 mmol/L [98 - 110]  Creatinie, Serum0.9 mg/dL [0.7 - 1.5]  Glucose, Sxziw185 mg/dL<H> [70 - 99]  Potassium, Serum5.7 mmol/L<H> [3.5 - 5.0]  Sodium, Serum 147 mmol/L<H> [135 - 146]  [facility]  01-23-22 @ 05:20  Blood Gas Arterial-Calcium,Ionized1.21 mmol/L [1.15 - 1.33]  Blood Urea Nitrogen, Serum --  Carbon Dioxide, Serum--  Chloride, Serum--  Creatinie, Serum--  Glucose, Serum--  Potassium, Serum--  Sodium, Serum --  [facility]  01-22-22 @ 14:51  Blood Gas Arterial-Calcium,Ionized1.20 mmol/L [1.15 - 1.33] [FIO2:100  MODE:_VC   VT:450_   RATE:_24   PEEP:_14  Comment:ROBE Ahuja notified with results.]  Blood Urea Nitrogen, Serum --  Carbon Dioxide, Serum--  Chloride, Serum--  Creatinie, Serum--  Glucose, Serum--  Potassium, Serum--  Sodium, Serum --  BMP  01-22-22 @ 05:56  Blood Gas Arterial-Calcium,Ionized--  Blood Urea Nitrogen, Serum 36 mg/dL<H> [10 - 20]  Carbon Dioxide, Serum25 mmol/L [17 - 32]  Chloride, Bnipb628 mmol/L [98 - 110]  Creatinie, Serum0.7 mg/dL [0.7 - 1.5]  Glucose, Igayn033 mg/dL<H> [70 - 99]  Potassium, Serum5.3 mmol/L<H> [3.5 - 5.0]  Sodium, Serum 145 mmol/L [135 - 146]          Medications:  acetaminophen     Tablet .. 650 milliGRAM(s) Oral every 6 hours PRN  albuterol/ipratropium for Nebulization 3 milliLiter(s) Nebulizer every 6 hours PRN  aspirin  chewable 81 milliGRAM(s) Oral daily  chlorhexidine 0.12% Liquid 15 milliLiter(s) Oral Mucosa every 12 hours  chlorhexidine 4% Liquid 1 Application(s) Topical <User Schedule>  dexAMETHasone  Injectable 6 milliGRAM(s) IV Push every 12 hours  dexMEDEtomidine Infusion 0.2 MICROgram(s)/kG/Hr IV Continuous <Continuous>  dextrose 5%. 1000 milliLiter(s) IV Continuous <Continuous>  dextrose 5%. 1000 milliLiter(s) IV Continuous <Continuous>  dextrose 50% Injectable 25 Gram(s) IV Push once  dextrose 50% Injectable 12.5 Gram(s) IV Push once  dextrose 50% Injectable 25 Gram(s) IV Push once  enoxaparin Injectable 40 milliGRAM(s) SubCutaneous at bedtime  fentaNYL   Infusion. 0.5 MICROgram(s)/kG/Hr IV Continuous <Continuous>  glucagon  Injectable 1 milliGRAM(s) IntraMuscular once  insulin lispro (ADMELOG) corrective regimen sliding scale   SubCutaneous every 6 hours  morphine  - Injectable 2 milliGRAM(s) IV Push every 6 hours PRN  norepinephrine Infusion 0.05 MICROgram(s)/kG/Min IV Continuous <Continuous>  pantoprazole  Injectable 40 milliGRAM(s) IV Push daily  piperacillin/tazobactam IVPB.. 3.375 Gram(s) IV Intermittent every 8 hours  polyethylene glycol 3350 17 Gram(s) Oral daily  propofol Infusion 0.05 MICROgram(s)/kG/Min IV Continuous <Continuous>  sodium polystyrene sulfonate Suspension 30 Gram(s) Enteral Tube once  vancomycin  IVPB 1000 milliGRAM(s) IV Intermittent every 12 hours        Assessment and Plan:  Patient is an 81 year old male with a hx of HTN, BPH, recent diagnosis of COVID 19 ,  S/P cardiac arrest.  Patient began to have worsening dyspnea at home, EMS was called. Found to be in cardiac arrest. ROSC was achieved after 2 rounds of CPR and epi, downtime 7 mins. Was intubated in the field.     acute hypoxic  respiratory failure / s/p Cardiac arrest / severe COVID-19 viral pneumonia / type 2 NSTEMI / L sided pneumothorax / mucous plug sp bronch   -suspected bacterial superimposed pneumonia now with worsening leukocytosis  -1/23- started on IV Zosyn, IV Vanco, consulted ID , f/up rec  -obtain blood cxs, urine analysis with urine cxs, spu cxs, spu MRSA swab, repeat procal level  -as per CCM- continue  IV Dexamethasone 6 mg twice daily  -Vent management as per Pulm/CCM, freq. suctioning , pulm. hygiene   - Cardio on board- continue asa, no statins due to rhabdo   - pneumothorax resolved - cont rigfht chest tube - subq emphysema  on CXR - cont to monitor   -monitor strict I / O, maintain cardoso.   - ordered CT head w/o co  to assess post cardiac arrest suspected anoxic encephalopathy  1/23- patient remains on 2 sedation drips  and 1 pressor to maintain map above 65.    # Hypernatremia- started on free water flushes via OGT, f/up BMP at 4 pm    # Hyperkalemia - s/p kayexalate dose today, f/up BMP     # Hyperglycemia- on dexa, f/up hga1c, bsfs , started in insulin sliding scale co.    Diet: OGT feedings with osmolyte     # DVT proph- lovenox 40 mg subc. once daily   # GI proph - PPI via OGT once daily     Patient has  very poor prognosis, Code status: DNR    #Progress Note Handoff: will try to obtain CT head today, for anoxic brain injury work up, complete septic work up , started on IV abx, f/up ID, monitor electrolytes, vent management as per CCM  Family discussion: current patient's medical condition and plan of care d/w Judy and Angela in details , they both in agreement with tx. plan. They both want patient to continue on aggressive medical tx. at present time , open for discussion of possible CMO if anoxic brain injury will be confirmed on CT head . Disposition: to be determined    Total time spent to complete patient's bedside assessment, review medical chart, discuss medical plan of care with covering medical team was more than 35 minutes

## 2022-01-23 NOTE — PROGRESS NOTE ADULT - SUBJECTIVE AND OBJECTIVE BOX
Patient  is afebrile  and  remains intubated/vented.  The Procalcitonin level trended down to 0.44 from 2.32, which is 80 % decrease from the initial value. The WBC  count is elevated, most likely due to steroid.       REVIEW OF SYSTEMS: Unable  to obtain due to mental status       Allergy Status Unknown      PHYSICAL EXAM:  ICU Vital Signs Last 24 Hrs  T(C): 36.5 (23 Jan 2022 15:00), Max: 37.2 (22 Jan 2022 19:00)  T(F): 97.7 (23 Jan 2022 15:00), Max: 98.9 (22 Jan 2022 19:00)  HR: 100 (23 Jan 2022 14:26) (86 - 102)  BP: 119/71 (23 Jan 2022 13:00) (100/59 - 150/67)  BP(mean): 91 (23 Jan 2022 13:00) (73 - 100)  ABP: --  ABP(mean): --  RR: 25 (23 Jan 2022 13:00) (24 - 32)  SpO2: 98% (23 Jan 2022 14:26) (98% - 100%)        LABS:                        10.3   24.47 )-----------( 218      ( 23 Jan 2022 06:35 )             34.9       01-23    147<H>  |  109  |  54<H>  ----------------------------<  383<H>  5.7<H>   |  25  |  0.9    Ca    8.0<L>      23 Jan 2022 06:35  Mg     2.8     01-23    TPro  5.0<L>  /  Alb  3.2<L>  /  TBili  0.6  /  DBili  x   /  AST  24  /  ALT  114<H>  /  AlkPhos  75  01-23        CAPILLARY BLOOD GLUCOSE  POCT Blood Glucose.: 369 mg/dL (23 Jan 2022 12:49)  POCT Blood Glucose.: 348 mg/dL (23 Jan 2022 06:38)      ABG - ( 23 Jan 2022 05:20 )  pH, Arterial: 7.30  pH, Blood: x     /  pCO2: 58    /  pO2: 268   / HCO3: 28    / Base Excess: 1.5   /  SaO2: 96.3          Urinalysis Basic - ( 23 Jan 2022 11:00 )  Color: Yellow / Appearance: Clear / SG: >=1.030 / pH: x  Gluc: x / Ketone: Negative  / Bili: Negative / Urobili: 0.2 mg/dL   Blood: x / Protein: Negative mg/dL / Nitrite: Negative   Leuk Esterase: Negative / RBC: 3-5 /HPF / WBC 1-2 /HPF   Sq Epi: x / Non Sq Epi: Occasional /HPF / Bacteria: Few      Procalcitonin, Serum (01.19.22 @ 06:30) : 0.44  Procalcitonin, Serum (01.16.22 @ 06:56) : 2.32    MEDICATIONS  (STANDING):    aspirin  chewable 81 milliGRAM(s) Oral daily  chlorhexidine 0.12% Liquid 15 milliLiter(s) Oral Mucosa every 12 hours  chlorhexidine 4% Liquid 1 Application(s) Topical <User Schedule>  dexAMETHasone  Injectable 6 milliGRAM(s) IV Push every 12 hours  dexMEDEtomidine Infusion 0.2 MICROgram(s)/kG/Hr (3.85 mL/Hr) IV Continuous <Continuous>  enoxaparin Injectable 40 milliGRAM(s) SubCutaneous at bedtime  fentaNYL   Infusion. 0.5 MICROgram(s)/kG/Hr (3.85 mL/Hr) IV Continuous <Continuous>  glucagon  Injectable 1 milliGRAM(s) IntraMuscular once  insulin lispro (ADMELOG) corrective regimen sliding scale   SubCutaneous every 6 hours  norepinephrine Infusion 0.05 MICROgram(s)/kG/Min (3.61 mL/Hr) IV Continuous <Continuous>  pantoprazole  Injectable 40 milliGRAM(s) IV Push daily  piperacillin/tazobactam IVPB.. 3.375 Gram(s) IV Intermittent every 8 hours  polyethylene glycol 3350 17 Gram(s) Oral daily  propofol Infusion 0.05 MICROgram(s)/kG/Min (0.02 mL/Hr) IV Continuous <Continuous>  vancomycin  IVPB 1000 milliGRAM(s) IV Intermittent every 12 hours    MEDICATIONS  (PRN):  acetaminophen     Tablet .. 650 milliGRAM(s) Oral every 6 hours PRN Temp greater or equal to 38C (100.4F), Mild Pain (1 - 3)  albuterol/ipratropium for Nebulization 3 milliLiter(s) Nebulizer every 6 hours PRN Shortness of Breath and/or Wheezing  morphine  - Injectable 2 milliGRAM(s) IV Push every 6 hours PRN for severe pain/sedartion        RADIOLOGY & ADDITIONAL TESTS:    r< from: Xray Chest 1 View- PORTABLE-Routine (Xray Chest 1 View- PORTABLE-Routine in AM.) (01.23.22 @ 07:52) >  Bilateral opacities similar to prior  Left-sided chest tube unchanged in position.      < from: Xray Chest 1 View- PORTABLE-Routine (Xray Chest 1 View- PORTABLE-Routine in AM.) (01.22.22 @ 06:34) >  Support devices: ET tube mid trachea central venous line superior vena  cava NG tube in stomach    Cardiac/mediastinum/hilum: Unremarkable.    Lung parenchyma/Pleura: Decreased previous bilateral opacities. No  pleural effusion or air leak    Skeleton/soft tissues: Unremarkable.        MICROBIOLOGY DATA:    MRSA/MSSA PCR (01.23.22 @ 11:00)   MRSA PCR Result.: Negative    Respiratory Viral Panel with COVID-19 by JEN (01.15.22 @ 01:25)   Rapid RVP Result: Detected   SARS-CoV-2: Detected:

## 2022-01-23 NOTE — PROGRESS NOTE ADULT - ASSESSMENT
81 year old male with a hx of HTN, BPH, recent diagnosis of COVID 19 2 days ago (unvaccinated, prescribed decadron/ zpack/ zinc) presenting to the ED S/P cardiac arrest. Intubated in the field    IMPRESSION  #COVID19 PNA, Severe (O2 < or = 94% on RA and requiring supplemental O2) with Cardiac arrest    CXR diffuse bilateral opacities     D-Dimer Assay, Quantitative: 7368 ng/mL DDU (01-15-22 @ 01:25)    s/p Toci 1/17    #Lactic acidosis  #Transaminitis   #Cardiac arrest- ROSC was achieved after 2 rounds of CPR and epi, downtime 7 mins  #Prolonged QTC Calculation(Bazett) 527 ms      would recommend:    1. Less likely need Abx since Procalcitonin level decrease 80% from Initial value  and MRSA PCR is negative , if still suspect strongly bacterial pneumonia then obtain sputum culture   2. Dexamethasone 6mg x 10 days or until Discharge (whichever is shorter), any taper per Pulm  3. Supportive care including AC    4. Aspiration precaution  5. COVID precautions     - D/w DR. Ortega       Attending Attestation:    Spent more than 45 minutes on total encounter, more than 50 % of the visit was spent counseling and/or coordinating care by the Attending physician.

## 2022-01-24 NOTE — CHART NOTE - NSCHARTNOTEFT_GEN_A_CORE
Registered Dietitian Follow-Up     Patient Profile Reviewed                           Yes [X]   No []     Nutrition History Previously Obtained        Yes []  No [X]       Pertinent Subjective Information:   pt is 81 year old male with hx of HTN, BPH, unvaccinated, tested + for COVID 12 days ago, BIBA 2/2 respiratory distress s/p cardiac arrest downtime of 7 minutes, s/p intubation.  + PNA, L sided pnemothorax, s/p chest tube. pt became difficult to ventilate 1/21/22 s/p bronchoscopy  2/2 thick secretions. Poor prognosis. DNR. Family to decide on comfort care. presently sedated with propofol at 23 ml/hr providing an additional 607 kcals.        Pertinent Medical Interventions: MEDICATIONS  (STANDING):  aspirin  chewable 81 milliGRAM(s) Oral daily  dexAMETHasone  Injectable 6 milliGRAM(s) IV Push every 12 hours  dexMEDEtomidine Infusion 0.2 MICROgram(s)/kG/Hr (3.85 mL/Hr) IV Continuous <Continuous>  dextrose 5%. 1000 milliLiter(s) (100 mL/Hr) IV Continuous <Continuous>  fentaNYL   Infusion. 0.5 MICROgram(s)/kG/Hr (3.85 mL/Hr) IV Continuous <Continuous>  glucagon  Injectable 1 milliGRAM(s) IntraMuscular once  insulin lispro (ADMELOG) corrective regimen sliding scale   SubCutaneous every 6 hours  norepinephrine Infusion 0.05 MICROgram(s)/kG/Min (3.61 mL/Hr) IV Continuous <Continuous>  pantoprazole  Injectable 40 milliGRAM(s) IV Push daily  piperacillin/tazobactam IVPB.. 3.375 Gram(s) IV Intermittent every 8 hours  polyethylene glycol 3350 17 Gram(s) Oral daily  propofol Infusion 0.05 MICROgram(s)/kG/Min (0.02 mL/Hr) IV Continuous <Continuous>  vancomycin  IVPB 1000 milliGRAM(s) IV Intermittent every 12 hours    MEDICATIONS  (PRN):  acetaminophen     Tablet .. 650 milliGRAM(s) Oral every 6 hours PRN Temp greater or equal to 38C (100.4F), Mild Pain (1 - 3)  albuterol/ipratropium for Nebulization 3 milliLiter(s) Nebulizer every 6 hours PRN Shortness of Breath and/or Wheezing  morphine  - Injectable 2 milliGRAM(s) IV Push every 6 hours PRN for severe pain/sedartion       Diet order:  Diet, NPO with Tube Feed:   Tube Feeding Modality: Orogastric  Osmolite 1.5 Haroldo  Total Volume for 24 Hours (mL): 1200  Continuous  Starting Tube Feed Rate {mL per Hour}: 20  Until Goal Tube Feed Rate (mL per Hour): 50  Tube Feed Duration (in Hours): 24  Tube Feed Start Time: 18:00 (01-17-22 @ 17:46) [Active]    Anthropometrics:  - Ht. 175.3 cm  - Wt. 79.6 kg 1/16 vs 84.3 kgs today  - %wt change  - BMI   - IBW             Pertinent Lab Data: 1/24/22  wbc 17.76H  hgb 9.5L  hct 32.5L  Na 148H  k+ 5.3H  BUN 50H  gluc 329H  ALT 78H  Mg2+ 2.8H  POCT 329-385H           Physical Findings:  - Appearance: sedated, intubated to vent   - GI function: +BS. + BM x3 noted on 1/22/22   - Tubes: OGT   - Oral/Mouth cavity:  - Skin:      Nutrition Requirements  Weight Used: 79.6 kgs      Estimated Nutrient Needs:    1647 kcals MARIA [place],  79.6-96g pro (1.0-1.2g/kg/BW) 1;1 kcal for estimated fluid needs       Nutrient Intake: present feeds provide 1800 kcals,+607 from propofol,  75g protein, 1200 ml total volume meeting >100% of estimated kcal needs and >95% of estimated protein needs        [] Previous Nutrition Diagnosis:            [] Ongoing          [X Resolved    new dx: excessive kcal intake 2/2 high infusion rate of propofol.       Nutrition Intervention: change feeds to Vital HP at 20 ml/hr increase as tolerated to goal rate of 40 ml/hr will provide: 948 kcals + 607 from propofol, 83g protein and 960 ml total volume.  continue H20 flushes as per MD of 250 ml q 4 hrs.  will provide 94% of estimated kcal needs and 100% of estimated protein.       Goal/Expected Outcome: pt to continue to tolerate EN and meet at least 90% of estimated nutrient needs      Indicator/Monitoring: RD to monitor tolerance to EN, labs/meds, NFPF and f/u as needed within 2-4 days

## 2022-01-24 NOTE — PROGRESS NOTE ADULT - SUBJECTIVE AND OBJECTIVE BOX
Over Night Events:  no major events overnight      ROS:  See HPI    PHYSICAL EXAM    ICU Vital Signs Last 24 Hrs  T(C): 36.4 (24 Jan 2022 07:01), Max: 36.8 (23 Jan 2022 11:00)  T(F): 97.5 (24 Jan 2022 07:01), Max: 98.3 (23 Jan 2022 11:00)  HR: 88 (24 Jan 2022 07:02) (87 - 103)  BP: 123/56 (24 Jan 2022 07:02) (114/61 - 165/72)  BP(mean): 81 (24 Jan 2022 07:02) (80 - 103)  ABP: --  ABP(mean): --  RR: 25 (24 Jan 2022 07:02) (23 - 31)  SpO2: 99% (24 Jan 2022 07:02) (98% - 100%)      General: ill appearing  HEENT: SAÚL             Lungs: Bilateral crackles  Cardiovascular: Regular   Abdomen: Soft, Positive BS  Extremities: No clubbing   Skin: Warm  Neurological: sedated       01-23-22 @ 07:01  -  01-24-22 @ 07:00  --------------------------------------------------------  IN:    Enteral Tube Flush: 500 mL    FentaNYL: 310 mL    IV PiggyBack: 800 mL    Osmolite: 900 mL    Propofol: 408 mL  Total IN: 2918 mL    OUT:    Chest Tube (mL): 40 mL    Indwelling Catheter - Urethral (mL): 2080 mL  Total OUT: 2120 mL    Total NET: 798 mL      01-24-22 @ 07:01  -  01-24-22 @ 08:32  --------------------------------------------------------  IN:  Total IN: 0 mL    OUT:    Indwelling Catheter - Urethral (mL): 40 mL  Total OUT: 40 mL    Total NET: -40 mL          LABS:                          9.5    17.76 )-----------( 185      ( 24 Jan 2022 06:25 )             32.5                                               01-24    148<H>  |  111<H>  |  50<H>  ----------------------------<  329<H>  5.3<H>   |  26  |  0.8    Ca    8.1<L>      24 Jan 2022 06:25  Mg     2.8     01-24    TPro  4.7<L>  /  Alb  2.9<L>  /  TBili  0.5  /  DBili  x   /  AST  16  /  ALT  78<H>  /  AlkPhos  68  01-24                                             Urinalysis Basic - ( 23 Jan 2022 11:00 )    Color: Yellow / Appearance: Clear / SG: >=1.030 / pH: x  Gluc: x / Ketone: Negative  / Bili: Negative / Urobili: 0.2 mg/dL   Blood: x / Protein: Negative mg/dL / Nitrite: Negative   Leuk Esterase: Negative / RBC: 3-5 /HPF / WBC 1-2 /HPF   Sq Epi: x / Non Sq Epi: Occasional /HPF / Bacteria: Few                                                  LIVER FUNCTIONS - ( 24 Jan 2022 06:25 )  Alb: 2.9 g/dL / Pro: 4.7 g/dL / ALK PHOS: 68 U/L / ALT: 78 U/L / AST: 16 U/L / GGT: x                                                                                               Mode: AC/ CMV (Assist Control/ Continuous Mandatory Ventilation)  RR (machine): 24  TV (machine): 450  FiO2: 60  PEEP: 14  MAP: 23  PIP: 32                                      ABG - ( 24 Jan 2022 05:34 )  pH, Arterial: 7.35  pH, Blood: x     /  pCO2: 53    /  pO2: 134   / HCO3: 29    / Base Excess: 2.8   /  SaO2: 97.7                MEDICATIONS  (STANDING):  aspirin  chewable 81 milliGRAM(s) Oral daily  chlorhexidine 0.12% Liquid 15 milliLiter(s) Oral Mucosa every 12 hours  chlorhexidine 4% Liquid 1 Application(s) Topical <User Schedule>  dexAMETHasone  Injectable 6 milliGRAM(s) IV Push every 12 hours  dexMEDEtomidine Infusion 0.2 MICROgram(s)/kG/Hr (3.85 mL/Hr) IV Continuous <Continuous>  dextrose 5%. 1000 milliLiter(s) (50 mL/Hr) IV Continuous <Continuous>  dextrose 5%. 1000 milliLiter(s) (100 mL/Hr) IV Continuous <Continuous>  dextrose 50% Injectable 25 Gram(s) IV Push once  dextrose 50% Injectable 12.5 Gram(s) IV Push once  dextrose 50% Injectable 25 Gram(s) IV Push once  enoxaparin Injectable 40 milliGRAM(s) SubCutaneous at bedtime  fentaNYL   Infusion. 0.5 MICROgram(s)/kG/Hr (3.85 mL/Hr) IV Continuous <Continuous>  glucagon  Injectable 1 milliGRAM(s) IntraMuscular once  insulin lispro (ADMELOG) corrective regimen sliding scale   SubCutaneous every 6 hours  norepinephrine Infusion 0.05 MICROgram(s)/kG/Min (3.61 mL/Hr) IV Continuous <Continuous>  pantoprazole  Injectable 40 milliGRAM(s) IV Push daily  piperacillin/tazobactam IVPB.. 3.375 Gram(s) IV Intermittent every 8 hours  polyethylene glycol 3350 17 Gram(s) Oral daily  propofol Infusion 0.05 MICROgram(s)/kG/Min (0.02 mL/Hr) IV Continuous <Continuous>  vancomycin  IVPB 1000 milliGRAM(s) IV Intermittent every 12 hours    MEDICATIONS  (PRN):  acetaminophen     Tablet .. 650 milliGRAM(s) Oral every 6 hours PRN Temp greater or equal to 38C (100.4F), Mild Pain (1 - 3)  albuterol/ipratropium for Nebulization 3 milliLiter(s) Nebulizer every 6 hours PRN Shortness of Breath and/or Wheezing  morphine  - Injectable 2 milliGRAM(s) IV Push every 6 hours PRN for severe pain/sedartion      Xrays:    ET/OG/TLC ok                                                                                 ECHO     Over Night Events:  no major events overnight  DNR    ROS:  See HPI    PHYSICAL EXAM    ICU Vital Signs Last 24 Hrs  T(C): 36.4 (24 Jan 2022 07:01), Max: 36.8 (23 Jan 2022 11:00)  T(F): 97.5 (24 Jan 2022 07:01), Max: 98.3 (23 Jan 2022 11:00)  HR: 88 (24 Jan 2022 07:02) (87 - 103)  BP: 123/56 (24 Jan 2022 07:02) (114/61 - 165/72)  BP(mean): 81 (24 Jan 2022 07:02) (80 - 103)  ABP: --  ABP(mean): --  RR: 25 (24 Jan 2022 07:02) (23 - 31)  SpO2: 99% (24 Jan 2022 07:02) (98% - 100%)      General: ill appearing  HEENT: SAÚL             Lungs: Bilateral crackles  Cardiovascular: Regular   Abdomen: Soft, Positive BS  Extremities: No clubbing   Skin: Warm  Neurological: sedated       01-23-22 @ 07:01  -  01-24-22 @ 07:00  --------------------------------------------------------  IN:    Enteral Tube Flush: 500 mL    FentaNYL: 310 mL    IV PiggyBack: 800 mL    Osmolite: 900 mL    Propofol: 408 mL  Total IN: 2918 mL    OUT:    Chest Tube (mL): 40 mL    Indwelling Catheter - Urethral (mL): 2080 mL  Total OUT: 2120 mL    Total NET: 798 mL      01-24-22 @ 07:01  -  01-24-22 @ 08:32  --------------------------------------------------------  IN:  Total IN: 0 mL    OUT:    Indwelling Catheter - Urethral (mL): 40 mL  Total OUT: 40 mL    Total NET: -40 mL          LABS:                          9.5    17.76 )-----------( 185      ( 24 Jan 2022 06:25 )             32.5                                               01-24    148<H>  |  111<H>  |  50<H>  ----------------------------<  329<H>  5.3<H>   |  26  |  0.8    Ca    8.1<L>      24 Jan 2022 06:25  Mg     2.8     01-24    TPro  4.7<L>  /  Alb  2.9<L>  /  TBili  0.5  /  DBili  x   /  AST  16  /  ALT  78<H>  /  AlkPhos  68  01-24                                             Urinalysis Basic - ( 23 Jan 2022 11:00 )    Color: Yellow / Appearance: Clear / SG: >=1.030 / pH: x  Gluc: x / Ketone: Negative  / Bili: Negative / Urobili: 0.2 mg/dL   Blood: x / Protein: Negative mg/dL / Nitrite: Negative   Leuk Esterase: Negative / RBC: 3-5 /HPF / WBC 1-2 /HPF   Sq Epi: x / Non Sq Epi: Occasional /HPF / Bacteria: Few                                                  LIVER FUNCTIONS - ( 24 Jan 2022 06:25 )  Alb: 2.9 g/dL / Pro: 4.7 g/dL / ALK PHOS: 68 U/L / ALT: 78 U/L / AST: 16 U/L / GGT: x                                                                                               Mode: AC/ CMV (Assist Control/ Continuous Mandatory Ventilation)  RR (machine): 24  TV (machine): 450  FiO2: 60  PEEP: 14  MAP: 23  PIP: 32                                      ABG - ( 24 Jan 2022 05:34 )  pH, Arterial: 7.35  pH, Blood: x     /  pCO2: 53    /  pO2: 134   / HCO3: 29    / Base Excess: 2.8   /  SaO2: 97.7                MEDICATIONS  (STANDING):  aspirin  chewable 81 milliGRAM(s) Oral daily  chlorhexidine 0.12% Liquid 15 milliLiter(s) Oral Mucosa every 12 hours  chlorhexidine 4% Liquid 1 Application(s) Topical <User Schedule>  dexAMETHasone  Injectable 6 milliGRAM(s) IV Push every 12 hours  dexMEDEtomidine Infusion 0.2 MICROgram(s)/kG/Hr (3.85 mL/Hr) IV Continuous <Continuous>  dextrose 5%. 1000 milliLiter(s) (50 mL/Hr) IV Continuous <Continuous>  dextrose 5%. 1000 milliLiter(s) (100 mL/Hr) IV Continuous <Continuous>  dextrose 50% Injectable 25 Gram(s) IV Push once  dextrose 50% Injectable 12.5 Gram(s) IV Push once  dextrose 50% Injectable 25 Gram(s) IV Push once  enoxaparin Injectable 40 milliGRAM(s) SubCutaneous at bedtime  fentaNYL   Infusion. 0.5 MICROgram(s)/kG/Hr (3.85 mL/Hr) IV Continuous <Continuous>  glucagon  Injectable 1 milliGRAM(s) IntraMuscular once  insulin lispro (ADMELOG) corrective regimen sliding scale   SubCutaneous every 6 hours  norepinephrine Infusion 0.05 MICROgram(s)/kG/Min (3.61 mL/Hr) IV Continuous <Continuous>  pantoprazole  Injectable 40 milliGRAM(s) IV Push daily  piperacillin/tazobactam IVPB.. 3.375 Gram(s) IV Intermittent every 8 hours  polyethylene glycol 3350 17 Gram(s) Oral daily  propofol Infusion 0.05 MICROgram(s)/kG/Min (0.02 mL/Hr) IV Continuous <Continuous>  vancomycin  IVPB 1000 milliGRAM(s) IV Intermittent every 12 hours    MEDICATIONS  (PRN):  acetaminophen     Tablet .. 650 milliGRAM(s) Oral every 6 hours PRN Temp greater or equal to 38C (100.4F), Mild Pain (1 - 3)  albuterol/ipratropium for Nebulization 3 milliLiter(s) Nebulizer every 6 hours PRN Shortness of Breath and/or Wheezing  morphine  - Injectable 2 milliGRAM(s) IV Push every 6 hours PRN for severe pain/sedartion      Xrays:    ET/OG/TLC ok                                                                                 ECHO  small apical pneumothorax left

## 2022-01-24 NOTE — PROGRESS NOTE ADULT - SUBJECTIVE AND OBJECTIVE BOX
Hospital Day:  9d    Subjective:    Patient is a 81y old  Male who presents with a chief complaint of s/p cardiac arrest, covid (23 Jan 2022 10:27)    Patient is sedated and intubated. No overnight events.      Past Medical Hx:   BPH (benign prostatic hyperplasia)    Mild HTN      Past Sx:    Allergies:  Allergy Status Unknown    Current Meds:   Standng Meds:  aspirin  chewable 81 milliGRAM(s) Oral daily  chlorhexidine 0.12% Liquid 15 milliLiter(s) Oral Mucosa every 12 hours  chlorhexidine 4% Liquid 1 Application(s) Topical <User Schedule>  dexAMETHasone  Injectable 6 milliGRAM(s) IV Push every 12 hours  dexMEDEtomidine Infusion 0.2 MICROgram(s)/kG/Hr (3.85 mL/Hr) IV Continuous <Continuous>  dextrose 5%. 1000 milliLiter(s) (100 mL/Hr) IV Continuous <Continuous>  dextrose 5%. 1000 milliLiter(s) (50 mL/Hr) IV Continuous <Continuous>  dextrose 50% Injectable 25 Gram(s) IV Push once  dextrose 50% Injectable 12.5 Gram(s) IV Push once  dextrose 50% Injectable 25 Gram(s) IV Push once  enoxaparin Injectable 40 milliGRAM(s) SubCutaneous at bedtime  fentaNYL   Infusion. 0.5 MICROgram(s)/kG/Hr (3.85 mL/Hr) IV Continuous <Continuous>  glucagon  Injectable 1 milliGRAM(s) IntraMuscular once  insulin lispro (ADMELOG) corrective regimen sliding scale   SubCutaneous every 6 hours  norepinephrine Infusion 0.05 MICROgram(s)/kG/Min (3.61 mL/Hr) IV Continuous <Continuous>  pantoprazole  Injectable 40 milliGRAM(s) IV Push daily  piperacillin/tazobactam IVPB.. 3.375 Gram(s) IV Intermittent every 8 hours  polyethylene glycol 3350 17 Gram(s) Oral daily  propofol Infusion 0.05 MICROgram(s)/kG/Min (0.02 mL/Hr) IV Continuous <Continuous>  vancomycin  IVPB 1000 milliGRAM(s) IV Intermittent every 12 hours    PRN Meds:  acetaminophen     Tablet .. 650 milliGRAM(s) Oral every 6 hours PRN Temp greater or equal to 38C (100.4F), Mild Pain (1 - 3)  albuterol/ipratropium for Nebulization 3 milliLiter(s) Nebulizer every 6 hours PRN Shortness of Breath and/or Wheezing  morphine  - Injectable 2 milliGRAM(s) IV Push every 6 hours PRN for severe pain/sedartion    HOME MEDICATIONS:  Aspir 81 oral delayed release tablet: 1 tab(s) orally once a day  benzonatate 100 mg oral capsule: 2 cap(s) orally 3 times a day  dexamethasone 4 mg oral tablet: 1.5 tab(s) orally once a day  metoprolol succinate 25 mg oral tablet, extended release: 1 tab(s) orally once a day  NIFEdipine 60 mg oral tablet, extended release: 1 tab(s) orally once a day  tamsulosin 0.4 mg oral capsule: 1 cap(s) orally once a day  telmisartan 80 mg oral tablet: 1 tab(s) orally once a day  Vitamin B12 1000 mcg oral tablet: 1 tab(s) orally once a day      Vital Signs:   T(F): 97.3 (01-24-22 @ 11:00), Max: 98.2 (01-23-22 @ 19:00)  HR: 98 (01-24-22 @ 14:01) (86 - 103)  BP: 120/57 (01-24-22 @ 14:01) (108/67 - 165/72)  RR: 26 (01-24-22 @ 14:01) (22 - 30)  SpO2: 98% (01-24-22 @ 14:01) (98% - 100%)      01-23-22 @ 07:01  -  01-24-22 @ 07:00  --------------------------------------------------------  IN: 2918 mL / OUT: 2120 mL / NET: 798 mL    01-24-22 @ 07:01  -  01-24-22 @ 15:34  --------------------------------------------------------  IN: 1124 mL / OUT: 605 mL / NET: 519 mL        Physical Exam:   GENERAL: NAD  HEENT: NCAT  CHEST/LUNG: CTAB  HEART: Regular rate and rhythm; s1 s2 appreciated, No murmurs, rubs, or gallops  ABDOMEN: Soft, Nontender, Nondistended; Bowel sounds present  EXTREMITIES: No LE edema b/l  SKIN: no rashes, no new lesions  NERVOUS SYSTEM: Sedated        Labs:                         9.5    17.76 )-----------( 185      ( 24 Jan 2022 06:25 )             32.5     Neutophil% 88.5, Lymphocyte% 2.6, Monocyte% 5.1, Bands% 3.7 01-24-22 @ 06:25    24 Jan 2022 06:25    148    |  111    |  50     ----------------------------<  329    5.3     |  26     |  0.8      Ca    8.1        24 Jan 2022 06:25  Mg     2.8       24 Jan 2022 06:25    TPro  4.7    /  Alb  2.9    /  TBili  0.5    /  DBili  x      /  AST  16     /  ALT  78     /  AlkPhos  68     24 Jan 2022 06:25            Serum Pro-Brain Natriuretic Peptide: 5499 pg/mL (01-16-22 @ 06:56)  Serum Pro-Brain Natriuretic Peptide: 8071 pg/mL (01-15-22 @ 01:25)          Urinalysis Basic - ( 23 Jan 2022 11:00 )    Color: Yellow / Appearance: Clear / SG: >=1.030 / pH: x  Gluc: x / Ketone: Negative  / Bili: Negative / Urobili: 0.2 mg/dL   Blood: x / Protein: Negative mg/dL / Nitrite: Negative   Leuk Esterase: Negative / RBC: 3-5 /HPF / WBC 1-2 /HPF   Sq Epi: x / Non Sq Epi: Occasional /HPF / Bacteria: Few                Assessment and Plan:

## 2022-01-24 NOTE — PROGRESS NOTE ADULT - ASSESSMENT
IMPRESSION:  Acute hypoxic respiratory failure  cardiac arrest  HAGMA  multi lobar pneumonia due to covid 19   Sepsis/ Septic   L PTX  S/p bronchoscopy on 1/21/2022          CNS: propofol 43mcg, fentanyl 1.3. no SAT today      HEENT: Oral care    PULMONARY: No SBT, HOB @ 45 degrees. S/p bronch last week  Decrease Fio2 as necessary to maintain sats > 90%<94  CT to suction   Dexamethasone 6mg Q12    CARDIOVASCULAR:  avoid overload Keep i<=Os    GI: GI prophylaxis.    OG feeding     RENAL:  Follow up lytes.    Correct as needed  daily labs    INFECTIOUS DISEASE:   ID F/u  inflammatory markers CRP, d-dimer, fibrinogen  Follow up cultures.   antivirals per ID  trend procal, moniotr off abx    HEMATOLOGICAL: Lovenox.    ENDOCRINE:  Follow up FS.  Insulin protocol if needed.    MUSCULOSKELETAL: bedrest      DNR/DNI      Tracy Miller [phone] updated. She requests to be the new point of contact.

## 2022-01-25 NOTE — PROGRESS NOTE ADULT - ASSESSMENT
IMPRESSION:  Acute hypoxic respiratory failure  cardiac arrest  HAGMA  multi lobar pneumonia due to covid 19   Sepsis/ Septic   L PTX  S/p bronchoscopy on 1/21/2022    SUGGEST:      CNS: propofol 43mcg, fentanyl 1.3. no SAT today      HEENT: Oral care    PULMONARY: No SBT, HOB @ 45 degrees.   Decrease Fio2 as necessary to maintain sats > 90%<94  CT to suction   Dexamethasone 6mg Q12  monitor driving pressure  decrease PEEP to 12. decerease PEEP to 10 in 12  hrs if tolerated. Keep fio2 at 60%        CARDIOVASCULAR:  avoid overload Keep i<=Os    GI: GI prophylaxis.    OG feeding     RENAL:  Follow up lytes.    Correct as needed  daily labs    INFECTIOUS DISEASE:   ID F/u  inflammatory markers CRP, d-dimer, fibrinogen  Follow up cultures.   antivirals per ID  trend procal, moniotr off abx    HEMATOLOGICAL: Lovenox.    ENDOCRINE:  Follow up FS.  Insulin protocol if needed.    MUSCULOSKELETAL: bedrest    Left IJ  monitor in ICU  DNR/DNI  poor prognosis  The patient is critically ill with a high probability of deterioration. IMPRESSION:  Acute hypoxic respiratory failure  cardiac arrest  HAGMA  multi lobar pneumonia due to covid 19   Sepsis/ Septic   L PTX  S/p bronchoscopy on 1/21/2022    SUGGEST:      CNS: propofol 43mcg, fentanyl 1.3. no SAT today      HEENT: Oral care    PULMONARY: No SBT, HOB @ 45 degrees.   Decrease Fio2 as necessary to maintain sats > 90%<94  CT to suction   Dexamethasone 6mg Q12  monitor driving pressure  decrease PEEP to 12.       CARDIOVASCULAR:  avoid overload Keep i<=Os    GI: GI prophylaxis.    OG feeding     RENAL:  Follow up lytes.    Correct as needed  daily labs    INFECTIOUS DISEASE:   ID F/u  inflammatory markers CRP, d-dimer, fibrinogen  Follow up cultures.   antivirals per ID  trend procal, moniotr off abx    HEMATOLOGICAL: Lovenox.    ENDOCRINE:  Follow up FS.  Insulin protocol if needed.    MUSCULOSKELETAL: bedrest    Left IJ  monitor in ICU  DNR/DNI  poor prognosis  The patient is critically ill with a high probability of deterioration.

## 2022-01-25 NOTE — PROGRESS NOTE ADULT - SUBJECTIVE AND OBJECTIVE BOX
Hospital Day:  10d    Subjective:    Patient is a 81y old  Male who presents with a chief complaint of s/p cardiac arrest, covid (23 Jan 2022 10:27)    This morning patient is lying in bed intubated and sedated. No cough or gag reflex, but does breathe over the vent. No overnight events.    Past Medical Hx:   BPH (benign prostatic hyperplasia)    Mild HTN      Past Sx:    Allergies:  Allergy Status Unknown    Current Meds:   Standng Meds:  aspirin  chewable 81 milliGRAM(s) Oral daily  chlorhexidine 0.12% Liquid 15 milliLiter(s) Oral Mucosa every 12 hours  chlorhexidine 4% Liquid 1 Application(s) Topical <User Schedule>  dexAMETHasone  Injectable 6 milliGRAM(s) IV Push every 12 hours  dexMEDEtomidine Infusion 0.2 MICROgram(s)/kG/Hr (3.85 mL/Hr) IV Continuous <Continuous>  dextrose 5%. 1000 milliLiter(s) (50 mL/Hr) IV Continuous <Continuous>  dextrose 5%. 1000 milliLiter(s) (100 mL/Hr) IV Continuous <Continuous>  dextrose 50% Injectable 25 Gram(s) IV Push once  dextrose 50% Injectable 12.5 Gram(s) IV Push once  dextrose 50% Injectable 25 Gram(s) IV Push once  enoxaparin Injectable 40 milliGRAM(s) SubCutaneous at bedtime  fentaNYL   Infusion. 0.5 MICROgram(s)/kG/Hr (3.85 mL/Hr) IV Continuous <Continuous>  glucagon  Injectable 1 milliGRAM(s) IntraMuscular once  insulin lispro (ADMELOG) corrective regimen sliding scale   SubCutaneous every 6 hours  norepinephrine Infusion 0.05 MICROgram(s)/kG/Min (3.61 mL/Hr) IV Continuous <Continuous>  pantoprazole  Injectable 40 milliGRAM(s) IV Push daily  polyethylene glycol 3350 17 Gram(s) Oral daily  propofol Infusion 0.05 MICROgram(s)/kG/Min (0.02 mL/Hr) IV Continuous <Continuous>    PRN Meds:  acetaminophen     Tablet .. 650 milliGRAM(s) Oral every 6 hours PRN Temp greater or equal to 38C (100.4F), Mild Pain (1 - 3)  albuterol/ipratropium for Nebulization 3 milliLiter(s) Nebulizer every 6 hours PRN Shortness of Breath and/or Wheezing  morphine  - Injectable 2 milliGRAM(s) IV Push every 6 hours PRN for severe pain/sedartion    HOME MEDICATIONS:  Aspir 81 oral delayed release tablet: 1 tab(s) orally once a day  benzonatate 100 mg oral capsule: 2 cap(s) orally 3 times a day  dexamethasone 4 mg oral tablet: 1.5 tab(s) orally once a day  metoprolol succinate 25 mg oral tablet, extended release: 1 tab(s) orally once a day  NIFEdipine 60 mg oral tablet, extended release: 1 tab(s) orally once a day  tamsulosin 0.4 mg oral capsule: 1 cap(s) orally once a day  telmisartan 80 mg oral tablet: 1 tab(s) orally once a day  Vitamin B12 1000 mcg oral tablet: 1 tab(s) orally once a day      Vital Signs:   T(F): 97.8 (01-25-22 @ 11:07), Max: 99 (01-25-22 @ 03:00)  HR: 94 (01-25-22 @ 12:25) (83 - 104)  BP: 113/67 (01-25-22 @ 12:25) (91/58 - 141/67)  RR: 27 (01-25-22 @ 13:00) (19 - 33)  SpO2: 93% (01-25-22 @ 12:25) (92% - 100%)      01-24-22 @ 07:01 - 01-25-22 @ 07:00  --------------------------------------------------------  IN: 3717 mL / OUT: 1805 mL / NET: 1912 mL    01-25-22 @ 07:01 - 01-25-22 @ 14:39  --------------------------------------------------------  IN: 928 mL / OUT: 415 mL / NET: 513 mL        Physical Exam:   GENERAL: NAD  HEENT: NCAT  CHEST/LUNG: Decreased breath sounds  HEART: Regular rate and rhythm; s1 s2 appreciated, No murmurs, rubs, or gallops  ABDOMEN: Soft, Nontender, Nondistended; Bowel sounds present  EXTREMITIES: No LE edema b/l  SKIN: no rashes, no new lesions  NERVOUS SYSTEM:  Alert & Oriented X3  LINES/CATHETERS:        Labs:                         9.3    17.78 )-----------( 157      ( 25 Jan 2022 05:58 )             31.1     Neutophil% 87.0, Lymphocyte% 3.4, Monocyte% 5.2, Bands% 4.2 01-25-22 @ 05:58    25 Jan 2022 05:58    145    |  108    |  51     ----------------------------<  243    5.0     |  28     |  0.8      Ca    8.1        25 Jan 2022 05:58  Mg     2.8       24 Jan 2022 06:25    TPro  4.5    /  Alb  2.6    /  TBili  0.5    /  DBili  x      /  AST  21     /  ALT  66     /  AlkPhos  62     25 Jan 2022 05:58            Serum Pro-Brain Natriuretic Peptide: 5499 pg/mL (01-16-22 @ 06:56)          Urinalysis Basic - ( 23 Jan 2022 11:00 )    Color: Yellow / Appearance: Clear / SG: >=1.030 / pH: x  Gluc: x / Ketone: Negative  / Bili: Negative / Urobili: 0.2 mg/dL   Blood: x / Protein: Negative mg/dL / Nitrite: Negative   Leuk Esterase: Negative / RBC: 3-5 /HPF / WBC 1-2 /HPF   Sq Epi: x / Non Sq Epi: Occasional /HPF / Bacteria: Few          Culture - Blood (collected 01-23-22 @ 11:17)  Source: .Blood Blood-Peripheral  Preliminary Report (01-24-22 @ 23:02):    No growth to date.            Assessment and Plan:

## 2022-01-25 NOTE — PROGRESS NOTE ADULT - SUBJECTIVE AND OBJECTIVE BOX
Over Night Events:  remians ciritcally ill, intubated,sedated  remains on fentanyl and propofol.    ROS:  See HPI    PHYSICAL EXAM    ICU Vital Signs Last 24 Hrs  T(C): 36.2 (25 Jan 2022 07:10), Max: 37.2 (25 Jan 2022 03:00)  T(F): 97.2 (25 Jan 2022 07:10), Max: 99 (25 Jan 2022 03:00)  HR: 103 (25 Jan 2022 08:25) (83 - 103)  BP: 141/67 (25 Jan 2022 08:25) (91/58 - 141/67)  BP(mean): 97 (25 Jan 2022 08:25) (70 - 97)  RR: 25 (25 Jan 2022 08:59) (19 - 33)  SpO2: 94% (25 Jan 2022 08:25) (94% - 100%)      General: ill appearing.  HEENT: SAÚL             Lungs: Bilateral crackles ,  Cardiovascular: Regular   Abdomen: Soft, Positive BS  Extremities: No clubbing   Skin: Warm  Neurological: sedated      01-24-22 @ 07:01 - 01-25-22 @ 07:00  --------------------------------------------------------  IN:    Enteral Tube Flush: 1050 mL    FentaNYL: 404 mL    IV PiggyBack: 550 mL    Osmolite: 500 mL    Propofol: 643 mL    Vital High Protein: 570 mL  Total IN: 3717 mL    OUT:    Chest Tube (mL): 70 mL    Indwelling Catheter - Urethral (mL): 1735 mL  Total OUT: 1805 mL    Total NET: 1912 mL      01-25-22 @ 07:01 - 01-25-22 @ 10:06  --------------------------------------------------------  IN:    FentaNYL: 63 mL    Propofol: 90 mL    Vital High Protein: 120 mL  Total IN: 273 mL    OUT:    Indwelling Catheter - Urethral (mL): 155 mL  Total OUT: 155 mL    Total NET: 118 mL          LABS:                          9.3    17.78 )-----------( 157      ( 25 Jan 2022 05:58 )             31.1                                               01-25    145  |  108  |  51<H>  ----------------------------<  243<H>  5.0   |  28  |  0.8    Ca    8.1<L>      25 Jan 2022 05:58  Mg     2.8     01-24    TPro  4.5<L>  /  Alb  2.6<L>  /  TBili  0.5  /  DBili  x   /  AST  21  /  ALT  66<H>  /  AlkPhos  62  01-25                                             Urinalysis Basic - ( 23 Jan 2022 11:00 )    Color: Yellow / Appearance: Clear / SG: >=1.030 / pH: x  Gluc: x / Ketone: Negative  / Bili: Negative / Urobili: 0.2 mg/dL   Blood: x / Protein: Negative mg/dL / Nitrite: Negative   Leuk Esterase: Negative / RBC: 3-5 /HPF / WBC 1-2 /HPF   Sq Epi: x / Non Sq Epi: Occasional /HPF / Bacteria: Few                                                  LIVER FUNCTIONS - ( 25 Jan 2022 05:58 )  Alb: 2.6 g/dL / Pro: 4.5 g/dL / ALK PHOS: 62 U/L / ALT: 66 U/L / AST: 21 U/L / GGT: x                                                  Culture - Blood (collected 23 Jan 2022 11:17)  Source: .Blood Blood-Peripheral  Preliminary Report (24 Jan 2022 23:02):    No growth to date.    Culture - Urine (collected 23 Jan 2022 11:00)  Source: Catheterized Catheterized  Final Report (24 Jan 2022 16:25):    No growth                                                   Mode: AC/ CMV (Assist Control/ Continuous Mandatory Ventilation)  RR (machine): 24  TV (machine): 450  FiO2: 60  PEEP: 14  MAP: 19  PIP: 28                                      ABG - ( 25 Jan 2022 04:30 )  pH, Arterial: 7.37  pH, Blood: x     /  pCO2: 56    /  pO2: 106   / HCO3: 32    / Base Excess: 5.5   /  SaO2: 98.0                MEDICATIONS  (STANDING):  aspirin  chewable 81 milliGRAM(s) Oral daily  chlorhexidine 0.12% Liquid 15 milliLiter(s) Oral Mucosa every 12 hours  chlorhexidine 4% Liquid 1 Application(s) Topical <User Schedule>  dexAMETHasone  Injectable 6 milliGRAM(s) IV Push every 12 hours  dexMEDEtomidine Infusion 0.2 MICROgram(s)/kG/Hr (3.85 mL/Hr) IV Continuous <Continuous>  dextrose 5%. 1000 milliLiter(s) (100 mL/Hr) IV Continuous <Continuous>  dextrose 5%. 1000 milliLiter(s) (50 mL/Hr) IV Continuous <Continuous>  dextrose 50% Injectable 25 Gram(s) IV Push once  dextrose 50% Injectable 12.5 Gram(s) IV Push once  dextrose 50% Injectable 25 Gram(s) IV Push once  enoxaparin Injectable 40 milliGRAM(s) SubCutaneous at bedtime  fentaNYL   Infusion. 0.5 MICROgram(s)/kG/Hr (3.85 mL/Hr) IV Continuous <Continuous>  glucagon  Injectable 1 milliGRAM(s) IntraMuscular once  insulin lispro (ADMELOG) corrective regimen sliding scale   SubCutaneous every 6 hours  norepinephrine Infusion 0.05 MICROgram(s)/kG/Min (3.61 mL/Hr) IV Continuous <Continuous>  pantoprazole  Injectable 40 milliGRAM(s) IV Push daily  piperacillin/tazobactam IVPB.. 3.375 Gram(s) IV Intermittent every 8 hours  polyethylene glycol 3350 17 Gram(s) Oral daily  propofol Infusion 0.05 MICROgram(s)/kG/Min (0.02 mL/Hr) IV Continuous <Continuous>  vancomycin  IVPB 1000 milliGRAM(s) IV Intermittent every 12 hours    MEDICATIONS  (PRN):  acetaminophen     Tablet .. 650 milliGRAM(s) Oral every 6 hours PRN Temp greater or equal to 38C (100.4F), Mild Pain (1 - 3)  albuterol/ipratropium for Nebulization 3 milliLiter(s) Nebulizer every 6 hours PRN Shortness of Breath and/or Wheezing  morphine  - Injectable 2 milliGRAM(s) IV Push every 6 hours PRN for severe pain/sedartion      Xrays:    rotated Et/Og/TLC ok.                                                                                 ECHO     Over Night Events:  remians ciritcally ill, intubated,sedated  remains on fentanyl and propofol.    ROS:  See HPI    PHYSICAL EXAM    ICU Vital Signs Last 24 Hrs  T(C): 36.2 (25 Jan 2022 07:10), Max: 37.2 (25 Jan 2022 03:00)  T(F): 97.2 (25 Jan 2022 07:10), Max: 99 (25 Jan 2022 03:00)  HR: 103 (25 Jan 2022 08:25) (83 - 103)  BP: 141/67 (25 Jan 2022 08:25) (91/58 - 141/67)  BP(mean): 97 (25 Jan 2022 08:25) (70 - 97)  RR: 25 (25 Jan 2022 08:59) (19 - 33)  SpO2: 94% (25 Jan 2022 08:25) (94% - 100%)      General: ill appearing.  HEENT ET            Lungs: Bilateral crackles ,  Cardiovascular: Regular   Abdomen: Soft, Positive BS  Extremities: No clubbing   Skin: Warm  Neurological: sedated      01-24-22 @ 07:01 - 01-25-22 @ 07:00  --------------------------------------------------------  IN:    Enteral Tube Flush: 1050 mL    FentaNYL: 404 mL    IV PiggyBack: 550 mL    Osmolite: 500 mL    Propofol: 643 mL    Vital High Protein: 570 mL  Total IN: 3717 mL    OUT:    Chest Tube (mL): 70 mL    Indwelling Catheter - Urethral (mL): 1735 mL  Total OUT: 1805 mL    Total NET: 1912 mL      01-25-22 @ 07:01 - 01-25-22 @ 10:06  --------------------------------------------------------  IN:    FentaNYL: 63 mL    Propofol: 90 mL    Vital High Protein: 120 mL  Total IN: 273 mL    OUT:    Indwelling Catheter - Urethral (mL): 155 mL  Total OUT: 155 mL    Total NET: 118 mL          LABS:                          9.3    17.78 )-----------( 157      ( 25 Jan 2022 05:58 )             31.1                                               01-25    145  |  108  |  51<H>  ----------------------------<  243<H>  5.0   |  28  |  0.8    Ca    8.1<L>      25 Jan 2022 05:58  Mg     2.8     01-24    TPro  4.5<L>  /  Alb  2.6<L>  /  TBili  0.5  /  DBili  x   /  AST  21  /  ALT  66<H>  /  AlkPhos  62  01-25                                             Urinalysis Basic - ( 23 Jan 2022 11:00 )    Color: Yellow / Appearance: Clear / SG: >=1.030 / pH: x  Gluc: x / Ketone: Negative  / Bili: Negative / Urobili: 0.2 mg/dL   Blood: x / Protein: Negative mg/dL / Nitrite: Negative   Leuk Esterase: Negative / RBC: 3-5 /HPF / WBC 1-2 /HPF   Sq Epi: x / Non Sq Epi: Occasional /HPF / Bacteria: Few                                                  LIVER FUNCTIONS - ( 25 Jan 2022 05:58 )  Alb: 2.6 g/dL / Pro: 4.5 g/dL / ALK PHOS: 62 U/L / ALT: 66 U/L / AST: 21 U/L / GGT: x                                                  Culture - Blood (collected 23 Jan 2022 11:17)  Source: .Blood Blood-Peripheral  Preliminary Report (24 Jan 2022 23:02):    No growth to date.    Culture - Urine (collected 23 Jan 2022 11:00)  Source: Catheterized Catheterized  Final Report (24 Jan 2022 16:25):    No growth                                                   Mode: AC/ CMV (Assist Control/ Continuous Mandatory Ventilation)  RR (machine): 24  TV (machine): 450  FiO2: 60  PEEP: 14  MAP: 19  PIP: 28                                      ABG - ( 25 Jan 2022 04:30 )  pH, Arterial: 7.37  pH, Blood: x     /  pCO2: 56    /  pO2: 106   / HCO3: 32    / Base Excess: 5.5   /  SaO2: 98.0                MEDICATIONS  (STANDING):  aspirin  chewable 81 milliGRAM(s) Oral daily  chlorhexidine 0.12% Liquid 15 milliLiter(s) Oral Mucosa every 12 hours  chlorhexidine 4% Liquid 1 Application(s) Topical <User Schedule>  dexAMETHasone  Injectable 6 milliGRAM(s) IV Push every 12 hours  dexMEDEtomidine Infusion 0.2 MICROgram(s)/kG/Hr (3.85 mL/Hr) IV Continuous <Continuous>  dextrose 5%. 1000 milliLiter(s) (100 mL/Hr) IV Continuous <Continuous>  dextrose 5%. 1000 milliLiter(s) (50 mL/Hr) IV Continuous <Continuous>  dextrose 50% Injectable 25 Gram(s) IV Push once  dextrose 50% Injectable 12.5 Gram(s) IV Push once  dextrose 50% Injectable 25 Gram(s) IV Push once  enoxaparin Injectable 40 milliGRAM(s) SubCutaneous at bedtime  fentaNYL   Infusion. 0.5 MICROgram(s)/kG/Hr (3.85 mL/Hr) IV Continuous <Continuous>  glucagon  Injectable 1 milliGRAM(s) IntraMuscular once  insulin lispro (ADMELOG) corrective regimen sliding scale   SubCutaneous every 6 hours  norepinephrine Infusion 0.05 MICROgram(s)/kG/Min (3.61 mL/Hr) IV Continuous <Continuous>  pantoprazole  Injectable 40 milliGRAM(s) IV Push daily  piperacillin/tazobactam IVPB.. 3.375 Gram(s) IV Intermittent every 8 hours  polyethylene glycol 3350 17 Gram(s) Oral daily  propofol Infusion 0.05 MICROgram(s)/kG/Min (0.02 mL/Hr) IV Continuous <Continuous>  vancomycin  IVPB 1000 milliGRAM(s) IV Intermittent every 12 hours    MEDICATIONS  (PRN):  acetaminophen     Tablet .. 650 milliGRAM(s) Oral every 6 hours PRN Temp greater or equal to 38C (100.4F), Mild Pain (1 - 3)  albuterol/ipratropium for Nebulization 3 milliLiter(s) Nebulizer every 6 hours PRN Shortness of Breath and/or Wheezing  morphine  - Injectable 2 milliGRAM(s) IV Push every 6 hours PRN for severe pain/sedartion      Xrays:    rotated Et/Og/TLC ok.                                                                                 ECHO  et tube almost on claudette

## 2022-01-25 NOTE — PROGRESS NOTE ADULT - ASSESSMENT
Acute hypoxic respiratory failure  cardiac arrest  HAGMA  multi lobar pneumonia due to covid 19   Sepsis/ Septic   L PTX s/p chest tube  S/p bronchoscopy on 1/21/2022          CNS: propofol 43mcg, fentanyl 1.3. no SAT today    HEENT: Oral care    PULMONARY: No SBT, HOB @ 45 degrees. S/p bronch last week  Decrease Fio2 as necessary to maintain sats > 90%<94  CT to suction   Dexamethasone 6mg Q12    CARDIOVASCULAR:  avoid overload Keep i<=Os    GI: GI prophylaxis.    OG feeding     RENAL:  Follow up lytes.    Correct as needed  daily labs    INFECTIOUS DISEASE:   ID F/u  Trend inflammatory markers  Follow up cultures.   antivirals per ID  trend procal, moniotr off abx    HEMATOLOGICAL: Lovenox.    ENDOCRINE:  Follow up FS.  Insulin protocol if needed.    MUSCULOSKELETAL: bedrest      DNR/DNI      Tracy Miller [phone] updated.

## 2022-01-25 NOTE — PHARMACOTHERAPY INTERVENTION NOTE - COMMENTS
Glu 240 range prob from steroids. Rec to consider long-acting regimen to control blood glucose better
Rec to recheck CK and TG while on prolonged propofol. Last CK was 1231 on 1/16
Rec to consider dc abx. No acute signs of infection. MRSA nasal neg
Spoke with Dr. Joseph, patient weight in chart at time of order was 90kg. Lovenox 100mg SQ x 1dose was ordered. Recommended rounding dose to 90mg. As per MD, patient weighs closer to 100kg, thus 100mg SQ x1dose.

## 2022-01-26 NOTE — PROGRESS NOTE ADULT - ASSESSMENT
Acute hypoxic respiratory failure  S/p cardiac arrest  Hyopernatremia  multi lobar pneumonia due to covid 19    L PTX s/p chest tube  S/p bronchoscopy on 1/21/2022 for mucous plug          CNS: propofol 43mcg, fentanyl 1.3. no SAT today    HEENT: Oral care    PULMONARY: No SBT, HOB @ 45 degrees. S/p bronch last week  Decrease Fio2 as necessary to maintain sats > 90%<94  CT to suction   Dexamethasone 6mg Q12    CARDIOVASCULAR:  avoid overload Keep i<=Os    GI: GI prophylaxis.    OG feeding     RENAL:  Follow up lytes.    Increase free water  daily labs    INFECTIOUS DISEASE:   ID F/u  Trend inflammatory markers  Follow up cultures.   antivirals per ID  trend procal, moniotr off abx    HEMATOLOGICAL: Lovenox.    ENDOCRINE:  Follow up FS.  Insulin protocol if needed.    MUSCULOSKELETAL: bedrest      DNR/DNI      Tracy Miller [phone] updated.

## 2022-01-26 NOTE — PROGRESS NOTE ADULT - SUBJECTIVE AND OBJECTIVE BOX
Hospital Day:  11d    Subjective:    Patient is a 81y old  Male who presents with a chief complaint of s/p cardiac arrest, covid (23 Jan 2022 10:27)    This morning patient is lying in bed intubated and sedated. No cough or gag reflex, but does breathe over the vent. No overnight events.      Past Medical Hx:   BPH (benign prostatic hyperplasia)    Mild HTN      Past Sx:    Allergies:  Allergy Status Unknown    Current Meds:   Standng Meds:  aspirin  chewable 81 milliGRAM(s) Oral daily  chlorhexidine 0.12% Liquid 15 milliLiter(s) Oral Mucosa every 12 hours  chlorhexidine 4% Liquid 1 Application(s) Topical <User Schedule>  dexAMETHasone  Injectable 6 milliGRAM(s) IV Push every 12 hours  dexMEDEtomidine Infusion 0.2 MICROgram(s)/kG/Hr (3.85 mL/Hr) IV Continuous <Continuous>  dextrose 5%. 1000 milliLiter(s) (50 mL/Hr) IV Continuous <Continuous>  dextrose 5%. 1000 milliLiter(s) (50 mL/Hr) IV Continuous <Continuous>  dextrose 5%. 1000 milliLiter(s) (100 mL/Hr) IV Continuous <Continuous>  dextrose 50% Injectable 25 Gram(s) IV Push once  dextrose 50% Injectable 12.5 Gram(s) IV Push once  dextrose 50% Injectable 25 Gram(s) IV Push once  enoxaparin Injectable 40 milliGRAM(s) SubCutaneous at bedtime  fentaNYL   Infusion. 0.5 MICROgram(s)/kG/Hr (3.85 mL/Hr) IV Continuous <Continuous>  glucagon  Injectable 1 milliGRAM(s) IntraMuscular once  insulin lispro (ADMELOG) corrective regimen sliding scale   SubCutaneous every 6 hours  norepinephrine Infusion 0.05 MICROgram(s)/kG/Min (3.61 mL/Hr) IV Continuous <Continuous>  pantoprazole  Injectable 40 milliGRAM(s) IV Push daily  polyethylene glycol 3350 17 Gram(s) Oral daily  propofol Infusion 0.05 MICROgram(s)/kG/Min (0.02 mL/Hr) IV Continuous <Continuous>    PRN Meds:  acetaminophen     Tablet .. 650 milliGRAM(s) Oral every 6 hours PRN Temp greater or equal to 38C (100.4F), Mild Pain (1 - 3)  albuterol/ipratropium for Nebulization 3 milliLiter(s) Nebulizer every 6 hours PRN Shortness of Breath and/or Wheezing  morphine  - Injectable 2 milliGRAM(s) IV Push every 6 hours PRN for severe pain/sedartion    HOME MEDICATIONS:  Aspir 81 oral delayed release tablet: 1 tab(s) orally once a day  benzonatate 100 mg oral capsule: 2 cap(s) orally 3 times a day  dexamethasone 4 mg oral tablet: 1.5 tab(s) orally once a day  metoprolol succinate 25 mg oral tablet, extended release: 1 tab(s) orally once a day  NIFEdipine 60 mg oral tablet, extended release: 1 tab(s) orally once a day  tamsulosin 0.4 mg oral capsule: 1 cap(s) orally once a day  telmisartan 80 mg oral tablet: 1 tab(s) orally once a day  Vitamin B12 1000 mcg oral tablet: 1 tab(s) orally once a day      Vital Signs:   T(F): 98.3 (01-26-22 @ 15:00), Max: 99.5 (01-25-22 @ 20:15)  HR: 83 (01-26-22 @ 15:00) (75 - 88)  BP: 121/69 (01-26-22 @ 15:00) (107/60 - 130/89)  RR: 28 (01-26-22 @ 15:00) (24 - 28)  SpO2: 98% (01-26-22 @ 15:00) (97% - 100%)      01-25-22 @ 07:01  -  01-26-22 @ 07:00  --------------------------------------------------------  IN: 3218 mL / OUT: 1550 mL / NET: 1668 mL    01-26-22 @ 07:01 - 01-26-22 @ 17:33  --------------------------------------------------------  IN: 1650 mL / OUT: 825 mL / NET: 825 mL        Physical Exam:   GENERAL: NAD  HEENT: NCAT  CHEST/LUNG: Decreased breath sounds  HEART: Regular rate and rhythm; s1 s2 appreciated, No murmurs, rubs, or gallops  ABDOMEN: Soft, Nontender, Nondistended; Bowel sounds present  EXTREMITIES: No LE edema b/l  SKIN: no rashes, no new lesions  NERVOUS SYSTEM:  Sedated          Labs:                         8.3    15.46 )-----------( 121      ( 26 Jan 2022 06:15 )             28.5     Neutophil% 86.7, Lymphocyte% 3.8, Monocyte% 5.0, Bands% 4.3 01-26-22 @ 06:15    26 Jan 2022 06:15    151    |  113    |  47     ----------------------------<  206    4.7     |  27     |  0.6      Ca    7.1        26 Jan 2022 06:15    TPro  3.9    /  Alb  2.5    /  TBili  0.4    /  DBili  x      /  AST  17     /  ALT  56     /  AlkPhos  55     26 Jan 2022 06:15                    Urinalysis Basic - ( 23 Jan 2022 11:00 )    Color: Yellow / Appearance: Clear / SG: >=1.030 / pH: x  Gluc: x / Ketone: Negative  / Bili: Negative / Urobili: 0.2 mg/dL   Blood: x / Protein: Negative mg/dL / Nitrite: Negative   Leuk Esterase: Negative / RBC: 3-5 /HPF / WBC 1-2 /HPF   Sq Epi: x / Non Sq Epi: Occasional /HPF / Bacteria: Few          Culture - Blood (collected 01-23-22 @ 11:17)  Source: .Blood Blood-Peripheral  Preliminary Report (01-24-22 @ 23:02):    No growth to date.            Assessment and Plan:

## 2022-01-26 NOTE — PROGRESS NOTE ADULT - ASSESSMENT
IMPRESSION:  Acute hypoxic respiratory failure  cardiac arrest  HAGMA  multi lobar pneumonia due to covid 19   Sepsis/ Septic   L PTX  S/p bronchoscopy on 1/21/2022    SUGGEST:      CNS: no SAT today      HEENT: Oral care    PULMONARY: No SBT, HOB @ 45 degrees.   Decrease Fio2 to 70%, as necessary to maintain sats > 90%<94  CT to suction   Dexamethasone 6mg Q12  monitor driving pressure  Keep PEEP at 12.       CARDIOVASCULAR:  avoid overload Keep i<=Os  increase free water intake.    GI: GI prophylaxis.    OG feeding     RENAL:  Follow up lytes.    Correct as needed  daily labs    INFECTIOUS DISEASE:   ID F/u, send DTA   inflammatory markers CRP, d-dimer, fibrinogen  Follow up cultures.   antivirals per ID  repeat procal, moniotr off abx    HEMATOLOGICAL: Lovenox.    ENDOCRINE:  Follow up FS.  Insulin protocol if needed.    MUSCULOSKELETAL: bedrest    Left IJ  monitor in ICU  DNRdu  poor prognosis  The patient is critically ill with a high probability of deterioration.

## 2022-01-26 NOTE — PROGRESS NOTE ADULT - SUBJECTIVE AND OBJECTIVE BOX
Patient seen and evaluated this am, sedated on Propofol and fentanyl      T(F): 98.6 (01-26-22 @ 11:00), Max: 99.5 (01-25-22 @ 20:15)  HR: 83 (01-26-22 @ 14:00)  BP: 116/57 (01-26-22 @ 14:00)  RR: 20  SpO2: 100% (01-26-22 @ 14:00) (87% - 100%)    PHYSICAL EXAM:  GENERAL: NAD  HEAD:  Atraumatic, Normocephalic  EYES: EOMI, PERRLA, conjunctiva and sclera clear  NERVOUS SYSTEM:   no focal deficits   CHEST/LUNG:  bilateral rales  HEART: Regular rate and rhythm; No murmurs, rubs, or gallops  ABDOMEN: Soft, Nontender, Nondistended; Bowel sounds present  EXTREMITIES:  b/l  edema    LABS  01-26    151<H>  |  113<H>  |  47<H>  ----------------------------<  206<H>  4.7   |  27  |  0.6<L>    Ca    7.1<L>      26 Jan 2022 06:15    TPro  3.9<L>  /  Alb  2.5<L>  /  TBili  0.4  /  DBili  x   /  AST  17  /  ALT  56<H>  /  AlkPhos  55  01-26                          8.3    15.46 )-----------( 121      ( 26 Jan 2022 06:15 )             28.5       Mode: AC/ CMV (Assist Control/ Continuous Mandatory Ventilation)  RR (machine): 24  TV (machine): 450  FiO2: 80  PEEP: 12      Culture Results:   No growth to date. (01-23-22)  Culture Results:   No growth (01-23-22)    RADIOLOGY  < from: Xray Chest 1 View- PORTABLE-Routine (Xray Chest 1 View- PORTABLE-Routine in AM.) (01.26.22 @ 06:42) >  Impression:    Stable to increased bilateral opacities. Previously seen trace left   apical pneumothorax not definitely identified on current x-ray.  Stable support devices.    < end of copied text >    MEDICATIONS  (STANDING):  aspirin  chewable 81 milliGRAM(s) Oral daily  chlorhexidine 0.12% Liquid 15 milliLiter(s) Oral Mucosa every 12 hours  chlorhexidine 4% Liquid 1 Application(s) Topical <User Schedule>  dexAMETHasone  Injectable 6 milliGRAM(s) IV Push every 12 hours  dexMEDEtomidine Infusion 0.2 MICROgram(s)/kG/Hr (3.85 mL/Hr) IV Continuous <Continuous>  enoxaparin Injectable 40 milliGRAM(s) SubCutaneous at bedtime  fentaNYL   Infusion. 0.5 MICROgram(s)/kG/Hr (3.85 mL/Hr) IV Continuous <Continuous>  glucagon  Injectable 1 milliGRAM(s) IntraMuscular once  insulin lispro (ADMELOG) corrective regimen sliding scale   SubCutaneous every 6 hours  pantoprazole  Injectable 40 milliGRAM(s) IV Push daily  polyethylene glycol 3350 17 Gram(s) Oral daily  propofol Infusion 0.05 MICROgram(s)/kG/Min (0.02 mL/Hr) IV Continuous <Continuous>    MEDICATIONS  (PRN):  acetaminophen     Tablet .. 650 milliGRAM(s) Oral every 6 hours PRN Temp greater or equal to 38C (100.4F), Mild Pain (1 - 3)  albuterol/ipratropium for Nebulization 3 milliLiter(s) Nebulizer every 6 hours PRN Shortness of Breath and/or Wheezing  morphine  - Injectable 2 milliGRAM(s) IV Push every 6 hours PRN for severe pain/sedartion

## 2022-01-26 NOTE — PROGRESS NOTE ADULT - ASSESSMENT
81 year old male with a hx of HTN, BPH, recent diagnosis of COVID 19 2 days ago prior to admission -> presented to the ED S/P cardiac arrest.  Patient began to have worsening dyspnea at home, EMS was called. Found to be in cardiac arrest. ROSC was achieved after 2 rounds of CPR and epi, downtime 7 mins. Was intubated in the field.     acute respiratory failure / Cardiac arrest / COVID-19 pneumonia / probable NSTEMI / L sided pneumothorax - resolved s/p CT      - very poor prognosis   - check ct head   - IV Dexamethasone   - no echo as per cardiology   - cardio and pulmonary following   - pneumothorax resolved    - DVT prophylaxis

## 2022-01-27 NOTE — PROGRESS NOTE ADULT - SUBJECTIVE AND OBJECTIVE BOX
Over Night Events:  remains critically ill, vented,sedated with fentanyl and propofol.      ROS:  See HPI    PHYSICAL EXAM    ICU Vital Signs Last 24 Hrs  T(C): 36.5 (27 Jan 2022 07:10), Max: 37 (26 Jan 2022 11:00)  T(F): 97.7 (27 Jan 2022 07:10), Max: 98.6 (26 Jan 2022 11:00)  HR: 68 (27 Jan 2022 07:17) (68 - 90)  BP: 119/62 (27 Jan 2022 07:17) (107/62 - 160/74)  BP(mean): 85 (27 Jan 2022 07:17) (78 - 107)  RR: 24 (27 Jan 2022 07:17) (24 - 29)  SpO2: 100% (27 Jan 2022 07:17) (94% - 100%)      General: ill appearing  HEENT: SAÚL             Lungs: Bilateral crackles  Cardiovascular: Regular   Abdomen: Soft, Positive BS  Extremities: edema  Skin: Warm  Neurological: sedated      01-26-22 @ 07:01 - 01-27-22 @ 07:00  --------------------------------------------------------  IN:    dextrose 5%: 950 mL    Enteral Tube Flush: 2050 mL    FentaNYL: 575 mL    Propofol: 484 mL    Vital High Protein: 920 mL  Total IN: 4979 mL    OUT:    Chest Tube (mL): 190 mL    Indwelling Catheter - Urethral (mL): 1775 mL  Total OUT: 1965 mL    Total NET: 3014 mL      01-27-22 @ 07:01 - 01-27-22 @ 08:44  --------------------------------------------------------  IN:  Total IN: 0 mL    OUT:    Indwelling Catheter - Urethral (mL): 20 mL  Total OUT: 20 mL    Total NET: -20 mL          LABS:                          8.3    15.46 )-----------( 121      ( 26 Jan 2022 06:15 )             28.5                                               01-26    151<H>  |  113<H>  |  47<H>  ----------------------------<  206<H>  4.7   |  27  |  0.6<L>    Ca    7.1<L>      26 Jan 2022 06:15    TPro  3.9<L>  /  Alb  2.5<L>  /  TBili  0.4  /  DBili  x   /  AST  17  /  ALT  56<H>  /  AlkPhos  55  01-26                                                                                           LIVER FUNCTIONS - ( 26 Jan 2022 06:15 )  Alb: 2.5 g/dL / Pro: 3.9 g/dL / ALK PHOS: 55 U/L / ALT: 56 U/L / AST: 17 U/L / GGT: x                                                  Culture - Acid Fast - Sputum w/Smear (collected 25 Jan 2022 10:00)  Source: .Sputum Sputum                                                   Mode: AC/ CMV (Assist Control/ Continuous Mandatory Ventilation)  RR (machine): 24  TV (machine): 450  FiO2: 100  PEEP: 12  MAP: 17  PIP: 32                                      ABG - ( 27 Jan 2022 05:53 )  pH, Arterial: 7.32  pH, Blood: x     /  pCO2: 63    /  pO2: 298   / HCO3: 32    / Base Excess: 5.2   /  SaO2: 98.3                MEDICATIONS  (STANDING):  aspirin  chewable 81 milliGRAM(s) Oral daily  chlorhexidine 0.12% Liquid 15 milliLiter(s) Oral Mucosa every 12 hours  chlorhexidine 4% Liquid 1 Application(s) Topical <User Schedule>  dexAMETHasone  Injectable 6 milliGRAM(s) IV Push every 12 hours  dexMEDEtomidine Infusion 0.2 MICROgram(s)/kG/Hr (3.85 mL/Hr) IV Continuous <Continuous>  dextrose 5%. 1000 milliLiter(s) (100 mL/Hr) IV Continuous <Continuous>  dextrose 5%. 1000 milliLiter(s) (50 mL/Hr) IV Continuous <Continuous>  dextrose 5%. 1000 milliLiter(s) (50 mL/Hr) IV Continuous <Continuous>  dextrose 50% Injectable 25 Gram(s) IV Push once  dextrose 50% Injectable 12.5 Gram(s) IV Push once  dextrose 50% Injectable 25 Gram(s) IV Push once  enoxaparin Injectable 40 milliGRAM(s) SubCutaneous at bedtime  fentaNYL   Infusion. 0.5 MICROgram(s)/kG/Hr (3.85 mL/Hr) IV Continuous <Continuous>  glucagon  Injectable 1 milliGRAM(s) IntraMuscular once  insulin lispro (ADMELOG) corrective regimen sliding scale   SubCutaneous every 6 hours  norepinephrine Infusion 0.05 MICROgram(s)/kG/Min (3.61 mL/Hr) IV Continuous <Continuous>  pantoprazole  Injectable 40 milliGRAM(s) IV Push daily  polyethylene glycol 3350 17 Gram(s) Oral daily  propofol Infusion 0.05 MICROgram(s)/kG/Min (0.02 mL/Hr) IV Continuous <Continuous>    MEDICATIONS  (PRN):  acetaminophen     Tablet .. 650 milliGRAM(s) Oral every 6 hours PRN Temp greater or equal to 38C (100.4F), Mild Pain (1 - 3)  albuterol/ipratropium for Nebulization 3 milliLiter(s) Nebulizer every 6 hours PRN Shortness of Breath and/or Wheezing      Xrays:   B/l opacities improved, left TLC, OG ok, ET ok                                                                                  ECHO

## 2022-01-27 NOTE — PROGRESS NOTE ADULT - SUBJECTIVE AND OBJECTIVE BOX
Hospital Day:  12d    Subjective:    Patient is a 81y old  Male who presents with a chief complaint of s/p cardiac arrest, covid (23 Jan 2022 10:27)    This morning patient is lying in bed intubated and sedated. No cough or gag reflex, but does breathe over the vent. No overnight events.      Past Medical Hx:   BPH (benign prostatic hyperplasia)    Mild HTN      Past Sx:    Allergies:  Allergy Status Unknown    Current Meds:   Standng Meds:  aspirin  chewable 81 milliGRAM(s) Oral daily  chlorhexidine 0.12% Liquid 15 milliLiter(s) Oral Mucosa every 12 hours  chlorhexidine 4% Liquid 1 Application(s) Topical <User Schedule>  dexAMETHasone  Injectable 6 milliGRAM(s) IV Push every 12 hours  dexMEDEtomidine Infusion 0.2 MICROgram(s)/kG/Hr (3.85 mL/Hr) IV Continuous <Continuous>  dextrose 5%. 1000 milliLiter(s) (100 mL/Hr) IV Continuous <Continuous>  dextrose 5%. 1000 milliLiter(s) (50 mL/Hr) IV Continuous <Continuous>  dextrose 50% Injectable 25 Gram(s) IV Push once  dextrose 50% Injectable 12.5 Gram(s) IV Push once  dextrose 50% Injectable 25 Gram(s) IV Push once  enoxaparin Injectable 40 milliGRAM(s) SubCutaneous at bedtime  fentaNYL   Infusion. 0.5 MICROgram(s)/kG/Hr (3.85 mL/Hr) IV Continuous <Continuous>  glucagon  Injectable 1 milliGRAM(s) IntraMuscular once  insulin lispro (ADMELOG) corrective regimen sliding scale   SubCutaneous every 6 hours  norepinephrine Infusion 0.05 MICROgram(s)/kG/Min (3.61 mL/Hr) IV Continuous <Continuous>  pantoprazole  Injectable 40 milliGRAM(s) IV Push daily  polyethylene glycol 3350 17 Gram(s) Oral daily  propofol Infusion 0.05 MICROgram(s)/kG/Min (0.02 mL/Hr) IV Continuous <Continuous>    PRN Meds:  acetaminophen     Tablet .. 650 milliGRAM(s) Oral every 6 hours PRN Temp greater or equal to 38C (100.4F), Mild Pain (1 - 3)  albuterol/ipratropium for Nebulization 3 milliLiter(s) Nebulizer every 6 hours PRN Shortness of Breath and/or Wheezing    HOME MEDICATIONS:  Aspir 81 oral delayed release tablet: 1 tab(s) orally once a day  benzonatate 100 mg oral capsule: 2 cap(s) orally 3 times a day  dexamethasone 4 mg oral tablet: 1.5 tab(s) orally once a day  metoprolol succinate 25 mg oral tablet, extended release: 1 tab(s) orally once a day  NIFEdipine 60 mg oral tablet, extended release: 1 tab(s) orally once a day  tamsulosin 0.4 mg oral capsule: 1 cap(s) orally once a day  telmisartan 80 mg oral tablet: 1 tab(s) orally once a day  Vitamin B12 1000 mcg oral tablet: 1 tab(s) orally once a day      Vital Signs:   T(F): 96 (01-27-22 @ 15:00), Max: 98.6 (01-26-22 @ 19:00)  HR: 84 (01-27-22 @ 15:01) (67 - 90)  BP: 177/78 (01-27-22 @ 15:01) (107/62 - 177/78)  RR: 24 (01-27-22 @ 15:01) (24 - 29)  SpO2: 100% (01-27-22 @ 15:01) (94% - 100%)      01-26-22 @ 07:01  -  01-27-22 @ 07:00  --------------------------------------------------------  IN: 4979 mL / OUT: 1965 mL / NET: 3014 mL    01-27-22 @ 07:01  -  01-27-22 @ 16:33  --------------------------------------------------------  IN: 1549.5 mL / OUT: 835 mL / NET: 714.5 mL        Physical Exam:   GENERAL: NAD  HEENT: NCAT  CHEST/LUNG: Decreased breath sounds  HEART: Regular rate and rhythm; s1 s2 appreciated, No murmurs, rubs, or gallops  ABDOMEN: Soft, Nontender, Nondistended; Bowel sounds present  EXTREMITIES: No LE edema b/l  SKIN: no rashes, no new lesions  NERVOUS SYSTEM:  Sedated      Labs:                         10.1   18.15 )-----------( 120      ( 27 Jan 2022 13:50 )             34.2     Neutophil% 91.0, Lymphocyte% 2.9, Monocyte% 3.1, Bands% 2.8 01-27-22 @ 13:50    27 Jan 2022 13:50    139    |  102    |  39     ----------------------------<  253    5.2     |  28     |  0.5      Ca    7.9        27 Jan 2022 13:50    TPro  4.6    /  Alb  3.0    /  TBili  0.6    /  DBili  x      /  AST  25     /  ALT  64     /  AlkPhos  68     27 Jan 2022 13:50                          Culture - Blood (collected 01-23-22 @ 11:17)  Source: .Blood Blood-Peripheral  Preliminary Report (01-24-22 @ 23:02):    No growth to date.            Assessment and Plan:

## 2022-01-27 NOTE — PROGRESS NOTE ADULT - SUBJECTIVE AND OBJECTIVE BOX
Patient is sedated on propofol and fentanyl       T(F): 97.7 (01-27-22 @ 07:10), Max: 98.6 (01-26-22 @ 19:00)  HR: 69 (01-27-22 @ 08:39)  BP: 119/62 (01-27-22 @ 07:17)  RR: 24 (01-27-22 @ 09:01)  SpO2: 99% (01-27-22 @ 08:39) (94% - 100%)    PHYSICAL EXAM:  GENERAL: NAD  HEAD:  Atraumatic, Normocephalic  EYES: EOMI, PERRLA, conjunctiva and sclera clear  NERVOUS SYSTEM:   no focal deficits   CHEST/LUNG:  bilateral rhonchi  HEART: Regular rate and rhythm; No murmurs, rubs, or gallops  ABDOMEN: Soft, Nontender, Nondistended; Bowel sounds present  EXTREMITIES: b/l  edema    LABS  01-26    151<H>  |  113<H>  |  47<H>  ----------------------------<  206<H>  4.7   |  27  |  0.6<L>    Ca    7.1<L>      26 Jan 2022 06:15    TPro  3.9<L>  /  Alb  2.5<L>  /  TBili  0.4  /  DBili  x   /  AST  17  /  ALT  56<H>  /  AlkPhos  55  01-26                          8.3    15.46 )-----------( 121      ( 26 Jan 2022 06:15 )             28.5       Mode: AC/ CMV (Assist Control/ Continuous Mandatory Ventilation)  RR (machine): 24  TV (machine): 450  FiO2: 80  PEEP: 12    Culture Results:   No growth to date. (01-23-22)  Culture Results:   No growth (01-23-22)    RADIOLOGY  < from: Xray Chest 1 View-PORTABLE IMMEDIATE (Xray Chest 1 View-PORTABLE IMMEDIATE .) (01.27.22 @ 07:38) >  Impression:    Bilateral pulmonary opacities, not significantly changed.      < end of copied text >    MEDICATIONS  (STANDING):  aspirin  chewable 81 milliGRAM(s) Oral daily  chlorhexidine 0.12% Liquid 15 milliLiter(s) Oral Mucosa every 12 hours  chlorhexidine 4% Liquid 1 Application(s) Topical <User Schedule>  dexAMETHasone  Injectable 6 milliGRAM(s) IV Push every 12 hours  dexMEDEtomidine Infusion 0.2 MICROgram(s)/kG/Hr (3.85 mL/Hr) IV Continuous <Continuous>  enoxaparin Injectable 40 milliGRAM(s) SubCutaneous at bedtime  fentaNYL   Infusion. 0.5 MICROgram(s)/kG/Hr (3.85 mL/Hr) IV Continuous <Continuous>  glucagon  Injectable 1 milliGRAM(s) IntraMuscular once  insulin lispro (ADMELOG) corrective regimen sliding scale   SubCutaneous every 6 hours  norepinephrine Infusion 0.05 MICROgram(s)/kG/Min (3.61 mL/Hr) IV Continuous <Continuous>  pantoprazole  Injectable 40 milliGRAM(s) IV Push daily  polyethylene glycol 3350 17 Gram(s) Oral daily  propofol Infusion 0.05 MICROgram(s)/kG/Min (0.02 mL/Hr) IV Continuous <Continuous>    MEDICATIONS  (PRN):  acetaminophen     Tablet .. 650 milliGRAM(s) Oral every 6 hours PRN Temp greater or equal to 38C (100.4F), Mild Pain (1 - 3)  albuterol/ipratropium for Nebulization 3 milliLiter(s) Nebulizer every 6 hours PRN Shortness of Breath and/or Wheezing      Patient is sedated on propofol and fentanyl       T(F): 97.7 (01-27-22 @ 07:10), Max: 98.6 (01-26-22 @ 19:00)  HR: 69 (01-27-22 @ 08:39)  BP: 119/62 (01-27-22 @ 07:17)  RR: 24 (01-27-22 @ 09:01)  SpO2: 99% (01-27-22 @ 08:39) (94% - 100%)    PHYSICAL EXAM:  GENERAL: NAD  HEAD:  Atraumatic, Normocephalic  EYES: EOMI, PERRLA, conjunctiva and sclera clear  NERVOUS SYSTEM:   no focal deficits   CHEST/LUNG:  bilateral rhonchi  HEART: Regular rate and rhythm; No murmurs, rubs, or gallops  ABDOMEN: Soft, Nontender, Nondistended; Bowel sounds present  EXTREMITIES: b/l  edema    LABS  01-26    151<H>  |  113<H>  |  47<H>  ----------------------------<  206<H>  4.7   |  27  |  0.6<L>    Ca    7.1<L>      26 Jan 2022 06:15    TPro  3.9<L>  /  Alb  2.5<L>  /  TBili  0.4  /  DBili  x   /  AST  17  /  ALT  56<H>  /  AlkPhos  55  01-26                          8.3    15.46 )-----------( 121      ( 26 Jan 2022 06:15 )             28.5       Mode: AC/ CMV (Assist Control/ Continuous Mandatory Ventilation)  RR (machine): 24  TV (machine): 450  FiO2: 80  PEEP: 12    Culture Results:   No growth to date. (01-23-22)  Culture Results:   No growth (01-23-22)    RADIOLOGY  < from: Xray Chest 1 View-PORTABLE IMMEDIATE (Xray Chest 1 View-PORTABLE IMMEDIATE .) (01.27.22 @ 07:38) >  Impression:    Bilateral pulmonary opacities, not significantly changed.      < end of copied text >    MEDICATIONS  (STANDING):  aspirin  chewable 81 milliGRAM(s) Oral daily  chlorhexidine 0.12% Liquid 15 milliLiter(s) Oral Mucosa every 12 hours  chlorhexidine 4% Liquid 1 Application(s) Topical <User Schedule>  dexAMETHasone  Injectable 6 milliGRAM(s) IV Push every 12 hours  dexMEDEtomidine Infusion 0.2 MICROgram(s)/kG/Hr (3.85 mL/Hr) IV Continuous <Continuous>  enoxaparin Injectable 40 milliGRAM(s) SubCutaneous at bedtime  fentaNYL   Infusion. 0.5 MICROgram(s)/kG/Hr (3.85 mL/Hr) IV Continuous <Continuou  insulin lispro (ADMELOG) corrective regimen sliding scale   SubCutaneous every 6 hours  norepinephrine Infusion 0.05 MICROgram(s)/kG/Min (3.61 mL/Hr) IV Continuous <Continuous>  pantoprazole  Injectable 40 milliGRAM(s) IV Push daily  polyethylene glycol 3350 17 Gram(s) Oral daily  propofol Infusion 0.05 MICROgram(s)/kG/Min (0.02 mL/Hr) IV Continuous <Continuous>    MEDICATIONS  (PRN):  acetaminophen     Tablet .. 650 milliGRAM(s) Oral every 6 hours PRN Temp greater or equal to 38C (100.4F), Mild Pain (1 - 3)  albuterol/ipratropium for Nebulization 3 milliLiter(s) Nebulizer every 6 hours PRN Shortness of Breath and/or Wheezing

## 2022-01-27 NOTE — PROGRESS NOTE ADULT - SUBJECTIVE AND OBJECTIVE BOX
Patient  is afebrile  and  remains intubated/vented.  The WBC  count started trending down,       REVIEW OF SYSTEMS: Unable  to obtain due to mental status       Allergy Status Unknown      PHYSICAL EXAM   ICU Vital Signs Last 24 Hrs  T(C): 36.5 (27 Jan 2022 19:02), Max: 36.5 (27 Jan 2022 07:10)  T(F): 97.7 (27 Jan 2022 19:02), Max: 97.7 (27 Jan 2022 07:10)  HR: 72 (27 Jan 2022 19:07) (67 - 99)  BP: 197/72 (27 Jan 2022 19:02) (107/62 - 197/72)  BP(mean): 104 (27 Jan 2022 19:02) (78 - 112)  ABP: --  ABP(mean): --  RR: 26 (27 Jan 2022 19:02) (24 - 29)  SpO2: 98% (27 Jan 2022 19:07) (94% - 100%)        LABS:                        10.1   18.15 )-----------( 120      ( 27 Jan 2022 13:50 )             34.2                           10.3   24.47 )-----------( 218      ( 23 Jan 2022 06:35 )             34.9       01-27    139  |  102  |  39<H>  ----------------------------<  253<H>  5.2<H>   |  28  |  0.5<L>    Ca    7.9<L>      27 Jan 2022 13:50    TPro  4.6<L>  /  Alb  3.0<L>  /  TBili  0.6  /  DBili  x   /  AST  25  /  ALT  64<H>  /  AlkPhos  68  01-27 01-23    147<H>  |  109  |  54<H>  ----------------------------<  383<H>  5.7<H>   |  25  |  0.9    Ca    8.0<L>      23 Jan 2022 06:35  Mg     2.8     01-23    TPro  5.0<L>  /  Alb  3.2<L>  /  TBili  0.6  /  DBili  x   /  AST  24  /  ALT  114<H>  /  AlkPhos  75  01-23        CAPILLARY BLOOD GLUCOSE  POCT Blood Glucose.: 369 mg/dL (23 Jan 2022 12:49)  POCT Blood Glucose.: 348 mg/dL (23 Jan 2022 06:38)      ABG - ( 23 Jan 2022 05:20 )  pH, Arterial: 7.30  pH, Blood: x     /  pCO2: 58    /  pO2: 268   / HCO3: 28    / Base Excess: 1.5   /  SaO2: 96.3          Urinalysis Basic - ( 23 Jan 2022 11:00 )  Color: Yellow / Appearance: Clear / SG: >=1.030 / pH: x  Gluc: x / Ketone: Negative  / Bili: Negative / Urobili: 0.2 mg/dL   Blood: x / Protein: Negative mg/dL / Nitrite: Negative   Leuk Esterase: Negative / RBC: 3-5 /HPF / WBC 1-2 /HPF   Sq Epi: x / Non Sq Epi: Occasional /HPF / Bacteria: Few      Procalcitonin, Serum (01.19.22 @ 06:30) : 0.44  Procalcitonin, Serum (01.16.22 @ 06:56) : 2.32    MEDICATIONS  (STANDING):    aspirin  chewable 81 milliGRAM(s) Oral daily  chlorhexidine 0.12% Liquid 15 milliLiter(s) Oral Mucosa every 12 hours  chlorhexidine 4% Liquid 1 Application(s) Topical <User Schedule>  dexAMETHasone  Injectable 6 milliGRAM(s) IV Push every 12 hours  dexMEDEtomidine Infusion 0.2 MICROgram(s)/kG/Hr (3.85 mL/Hr) IV Continuous <Continuous>  enoxaparin Injectable 40 milliGRAM(s) SubCutaneous at bedtime  fentaNYL   Infusion. 0.5 MICROgram(s)/kG/Hr (3.85 mL/Hr) IV Continuous <Continuous>  glucagon  Injectable 1 milliGRAM(s) IntraMuscular once  insulin lispro (ADMELOG) corrective regimen sliding scale   SubCutaneous every 6 hours  norepinephrine Infusion 0.05 MICROgram(s)/kG/Min (3.61 mL/Hr) IV Continuous <Continuous>  pantoprazole  Injectable 40 milliGRAM(s) IV Push daily  polyethylene glycol 3350 17 Gram(s) Oral daily  propofol Infusion 0.05 MICROgram(s)/kG/Min (0.02 mL/Hr) IV Continuous <Continuous>        RADIOLOGY & ADDITIONAL TESTS:    r< from: Xray Chest 1 View- PORTABLE-Routine (Xray Chest 1 View- PORTABLE-Routine in AM.) (01.23.22 @ 07:52) >  Bilateral opacities similar to prior  Left-sided chest tube unchanged in position.      < from: Xray Chest 1 View- PORTABLE-Routine (Xray Chest 1 View- PORTABLE-Routine in AM.) (01.22.22 @ 06:34) >  Support devices: ET tube mid trachea central venous line superior vena  cava NG tube in stomach    Cardiac/mediastinum/hilum: Unremarkable.    Lung parenchyma/Pleura: Decreased previous bilateral opacities. No  pleural effusion or air leak    Skeleton/soft tissues: Unremarkable.        MICROBIOLOGY DATA:    MRSA/MSSA PCR (01.23.22 @ 11:00)   MRSA PCR Result.: Negative    Respiratory Viral Panel with COVID-19 by JEN (01.15.22 @ 01:25)   Rapid RVP Result: Detected   SARS-CoV-2: Detected:

## 2022-01-27 NOTE — PROGRESS NOTE ADULT - ASSESSMENT
81 year old male with a hx of HTN, BPH, recent diagnosis of COVID 19 2 days ago prior to admission -> presented to the ED S/P cardiac arrest.  Patient began to have worsening dyspnea at home, EMS was called. Found to be in cardiac arrest. ROSC was achieved after 2 rounds of CPR and epi, downtime 7 mins. Was intubated in the field.     acute respiratory failure / Cardiac arrest / COVID-19 pneumonia / probable NSTEMI / L sided pneumothorax - resolved s/p CT      - very poor prognosis   - check ct head   - dc sq insulin   - start feeds and IV insulin CC protocol   - free water for hypernatremia   - IV Dexamethasone   - no echo as per cardiology   - cardio and pulmonary following   - pneumothorax resolved    - DVT prophylaxis

## 2022-01-27 NOTE — PROGRESS NOTE ADULT - ASSESSMENT
Acute hypoxic respiratory failure  S/p cardiac arrest  Hyopernatremia  multi lobar pneumonia due to covid 19    L PTX s/p chest tube  S/p bronchoscopy on 1/21/2022 for mucous plug          CNS: propofol 43mcg, fentanyl 1.3. no SAT today    HEENT: Oral care    PULMONARY: No SBT, HOB @ 45 degrees. S/p bronch last week  Decrease Fio2 as necessary to maintain sats > 90%<94  CT to suction   Dexamethasone 6mg Q12    CARDIOVASCULAR:  avoid overload Keep i<=Os    GI: GI prophylaxis.    OG feeding     RENAL:  Follow up lytes.    Increase free water  daily labs    INFECTIOUS DISEASE:   ID F/u  Trend inflammatory markers  Follow up cultures.   antivirals per ID  trend procal, moniotr off abx    HEMATOLOGICAL: Lovenox.    ENDOCRINE:  Follow up FS.  Insulin protocol if needed.    MUSCULOSKELETAL: bedrest      DNR/DNI      Tracy Miller [phone] updated.

## 2022-01-27 NOTE — PROGRESS NOTE ADULT - ASSESSMENT
81 year old male with a hx of HTN, BPH, recent diagnosis of COVID 19 2 days ago (unvaccinated, prescribed decadron/ zpack/ zinc) presenting to the ED S/P cardiac arrest. Intubated in the field    IMPRESSION  #COVID19 PNA, Severe (O2 < or = 94% on RA and requiring supplemental O2) with Cardiac arrest    CXR diffuse bilateral opacities     D-Dimer Assay, Quantitative: 7368 ng/mL DDU (01-15-22 @ 01:25)    s/p Toci 1/17    #Lactic acidosis  #Transaminitis   #Cardiac arrest- ROSC was achieved after 2 rounds of CPR and epi, downtime 7 mins  #Prolonged QTC Calculation(Bazett) 527 ms      would recommend:    1. Monitor WBC count, started trending down  2. Continue Dexamethasone to complete the course  3. Supportive care including AC    4. Aspiration precaution  5. COVID precautions       Attending Attestation:    Spent more than 35 minutes on total encounter, more than 50 % of the visit was spent counseling and/or coordinating care by the Attending physician.

## 2022-01-28 NOTE — PROGRESS NOTE ADULT - ASSESSMENT
IMPRESSION:  Acute hypoxic respiratory failure  cardiac arrest  HAGMA  multi lobar pneumonia due to covid 19   Sepsis/ Septic   L PTX  S/p bronchoscopy on 1/21/2022    SUGGEST:      CNS:   Continue sedation, add fentanyl.  no SAT  HEENT: Oral care    PULMONARY: No SBT, HOB @ 45 degrees.   Decrease Fio2 to 60%, as necessary to maintain sats > 90%<94  CT to suction   Dexamethasone 6mg Q12  monitor driving pressure  Keep PEEP at 12.       CARDIOVASCULAR:  avoid overload Keep i<=Os      GI: GI prophylaxis.    OG feeding     RENAL:  Follow up lytes.    Correct as needed  decrease free water intake.    INFECTIOUS DISEASE:   ID F/u, send DTA , blood cx . start cedepime and vanc follow up ID  inflammatory markers CRP, d-dimer, fibrinogen  Follow up cultures.   antivirals per ID  repeat procal    HEMATOLOGICAL: Lovenox.     ENDOCRINE:  Follow up FS.  Insulin protocol if needed.    MUSCULOSKELETAL: bedrest    Left IJ  monitor in ICU  DNR  poor prognosis  The patient is critically ill with a high probability of deterioration. IMPRESSION:  Acute hypoxic respiratory failure  cardiac arrest  HAGMA  multi lobar pneumonia due to covid 19   Sepsis/ Septic   L PTX  S/p bronchoscopy on 1/21/2022    SUGGEST:      CNS:   Continue sedation, add fentanyl.  no SAT  HEENT: Oral care    PULMONARY: No SBT, HOB @ 45 degrees.   Decrease Fio2 to 60%, as necessary to maintain sats > 90%<94  adjust pig tial need repositioning recall Ct surgery   CT to suction   Dexamethasone 6mg Q12  monitor driving pressure  Keep PEEP at 12.       CARDIOVASCULAR:  avoid overload Keep i<=Os      GI: GI prophylaxis.    OG feeding     RENAL:  Follow up lytes.    Correct as needed  decrease free water intake.    INFECTIOUS DISEASE:   ID F/u, send DTA , blood cx . start cedepime and vanc follow up ID  inflammatory markers CRP, d-dimer, fibrinogen  Follow up cultures.   antivirals per ID  repeat procal    HEMATOLOGICAL: Lovenox.     ENDOCRINE:  Follow up FS.  Insulin protocol if needed.    MUSCULOSKELETAL: bedrest    Left IJ  monitor in ICU  DNR  poor prognosis  The patient is critically ill with a high probability of deterioration.

## 2022-01-28 NOTE — CONSULT NOTE ADULT - ASSESSMENT
Assessment  81M presenting s/p cardiac arrest, ROSC attained after 7min, intubated in the field found to be COVID (+), L lung PTX on presentation s/p pigtail placement and most likely malpositioning 1/28 AM noticed on CXR    Plan  - remove pigtail catheter  - f/u repeat CXR  - if need will replace with chest tube per family GOC discussion  - CT surgery team to follow  - d/w Dr. Ilan Owens

## 2022-01-28 NOTE — PROGRESS NOTE ADULT - SUBJECTIVE AND OBJECTIVE BOX
Patient is sedated on propofol and fentanyl       ICU Vital Signs Last 24 Hrs  T(C): 36.1 (28 Jan 2022 15:02), Max: 37.8 (27 Jan 2022 20:00)  T(F): 97 (28 Jan 2022 15:02), Max: 100.1 (27 Jan 2022 20:00)  HR: 101 (28 Jan 2022 15:01) (72 - 113)  BP: 138/62 (28 Jan 2022 15:01) (113/55 - 197/94)  BP(mean): 89 (28 Jan 2022 15:01) (78 - 135)  ABP: --  ABP(mean): --  RR: 33 (28 Jan 2022 15:01) (26 - 35)  SpO2: 97% (28 Jan 2022 15:01) (93% - 100%)      PHYSICAL EXAM:  GENERAL: NAD intubated , sedated   HEAD:  Atraumatic, Normocephalic  EYES: EOMI, PERRLA, conjunctiva and sclera clear  NERVOUS SYSTEM:   no focal deficits   CHEST/LUNG:  bilateral rhonchi  HEART: Regular rate and rhythm; No murmurs, rubs, or gallops  ABDOMEN: Soft, Nontender, Nondistended; Bowel sounds present  EXTREMITIES: b/l  edema    LABS  01-28    141  |  103  |  34<H>  ----------------------------<  154<H>  4.8   |  29  |  <0.5<L>    Ca    8.3<L>      28 Jan 2022 06:11    TPro  4.6<L>  /  Alb  2.9<L>  /  TBili  0.5  /  DBili  x   /  AST  32  /  ALT  65<H>  /  AlkPhos  74  01-28                          10.6   20.07 )-----------( 124      ( 28 Jan 2022 06:11 )             35.3     01-26    151<H>  |  113<H>  |  47<H>  ----------------------------<  206<H>  4.7   |  27  |  0.6<L>    Ca    7.1<L>      26 Jan 2022 06:15    TPro  3.9<L>  /  Alb  2.5<L>  /  TBili  0.4  /  DBili  x   /  AST  17  /  ALT  56<H>  /  AlkPhos  55  01-26                          8.3    15.46 )-----------( 121      ( 26 Jan 2022 06:15 )             28.5       Mode: AC/ CMV (Assist Control/ Continuous Mandatory Ventilation)  RR (machine): 24  TV (machine): 450  FiO2: 80  PEEP: 12    Culture Results:   No growth to date. (01-23-22)  Culture Results:   No growth (01-23-22)    RADIOLOGY  < from: Xray Chest 1 View-PORTABLE IMMEDIATE (Xray Chest 1 View-PORTABLE IMMEDIATE .) (01.27.22 @ 07:38) >  Impression:    Bilateral pulmonary opacities, not significantly changed.      < end of copied text >  MEDICATIONS  (STANDING):  aspirin  chewable 81 milliGRAM(s) Oral daily  cefepime   IVPB      cefepime   IVPB 1000 milliGRAM(s) IV Intermittent every 8 hours  chlorhexidine 0.12% Liquid 15 milliLiter(s) Oral Mucosa every 12 hours  chlorhexidine 4% Liquid 1 Application(s) Topical <User Schedule>  dexAMETHasone  Injectable 6 milliGRAM(s) IV Push every 12 hours  dexMEDEtomidine Infusion 0.2 MICROgram(s)/kG/Hr (3.85 mL/Hr) IV Continuous <Continuous>  dextrose 5%. 1000 milliLiter(s) (50 mL/Hr) IV Continuous <Continuous>  dextrose 5%. 1000 milliLiter(s) (100 mL/Hr) IV Continuous <Continuous>  dextrose 50% Injectable 25 Gram(s) IV Push once  dextrose 50% Injectable 12.5 Gram(s) IV Push once  dextrose 50% Injectable 25 Gram(s) IV Push once  enoxaparin Injectable 40 milliGRAM(s) SubCutaneous at bedtime  fentaNYL   Infusion. 0.5 MICROgram(s)/kG/Hr (3.85 mL/Hr) IV Continuous <Continuous>  glucagon  Injectable 1 milliGRAM(s) IntraMuscular once  insulin lispro (ADMELOG) corrective regimen sliding scale   SubCutaneous every 6 hours  norepinephrine Infusion 0.05 MICROgram(s)/kG/Min (3.61 mL/Hr) IV Continuous <Continuous>  oxyCODONE    IR 10 milliGRAM(s) Oral four times a day  pantoprazole  Injectable 40 milliGRAM(s) IV Push daily  polyethylene glycol 3350 17 Gram(s) Oral daily  propofol Infusion 0.05 MICROgram(s)/kG/Min (0.02 mL/Hr) IV Continuous <Continuous>  vancomycin  IVPB 1500 milliGRAM(s) IV Intermittent every 12 hours    MEDICATIONS  (PRN):  acetaminophen     Tablet .. 650 milliGRAM(s) Oral every 6 hours PRN Temp greater or equal to 38C (100.4F), Mild Pain (1 - 3)  albuterol/ipratropium for Nebulization 3 milliLiter(s) Nebulizer every 6 hours PRN Shortness of Breath and/or Wheezing

## 2022-01-28 NOTE — PROGRESS NOTE ADULT - SUBJECTIVE AND OBJECTIVE BOX
Over Night Events:  remains critcally ill, on mx drips propofol 33cc      ROS:  See HPI    PHYSICAL EXAM    ICU Vital Signs Last 24 Hrs  T(C): 36.9 (28 Jan 2022 07:46), Max: 37.8 (27 Jan 2022 20:00)  T(F): 98.5 (28 Jan 2022 07:46), Max: 100.1 (27 Jan 2022 20:00)  HR: 93 (28 Jan 2022 07:46) (67 - 113)  BP: 116/60 (28 Jan 2022 06:58) (113/55 - 197/94)  BP(mean): 82 (28 Jan 2022 06:58) (78 - 135)  RR: 29 (28 Jan 2022 06:58) (24 - 35)  SpO2: 99% (28 Jan 2022 06:58) (93% - 100%)      General: Sedated  HEENT: Et tube             Lungs: Bilateral BS  Cardiovascular: Regular   Abdomen: Soft, Positive BS  Extremities: No clubbing   Skin: Warm  Neurological: sedated      01-27-22 @ 07:01 - 01-28-22 @ 07:00  --------------------------------------------------------  IN:    dextrose 5%: 150 mL    Enteral Tube Flush: 2000 mL    FentaNYL: 162.5 mL    IV PiggyBack: 30 mL    Propofol: 346.5 mL    Vital High Protein: 1000 mL  Total IN: 3689 mL    OUT:    Chest Tube (mL): 370 mL    Indwelling Catheter - Urethral (mL): 1645 mL  Total OUT: 2015 mL    Total NET: 1674 mL      01-28-22 @ 07:01 - 01-28-22 @ 09:37  --------------------------------------------------------  IN:  Total IN: 0 mL    OUT:    Indwelling Catheter - Urethral (mL): 120 mL  Total OUT: 120 mL    Total NET: -120 mL          LABS:                          10.6   20.07 )-----------( 124      ( 28 Jan 2022 06:11 )             35.3                                               01-28    141  |  103  |  34<H>  ----------------------------<  154<H>  4.8   |  29  |  <0.5<L>    Ca    8.3<L>      28 Jan 2022 06:11    TPro  4.6<L>  /  Alb  2.9<L>  /  TBili  0.5  /  DBili  x   /  AST  32  /  ALT  65<H>  /  AlkPhos  74  01-28                                                                                           LIVER FUNCTIONS - ( 28 Jan 2022 06:11 )  Alb: 2.9 g/dL / Pro: 4.6 g/dL / ALK PHOS: 74 U/L / ALT: 65 U/L / AST: 32 U/L / GGT: x                                                  Culture - Acid Fast - Sputum w/Smear (collected 25 Jan 2022 10:00)  Source: .Sputum Sputum                                                   Mode: AC/ CMV (Assist Control/ Continuous Mandatory Ventilation)  RR (machine): 24  TV (machine): 450  FiO2: 80  PEEP: 12  MAP: 17  PIP: 33                                      ABG - ( 27 Jan 2022 05:53 )  pH, Arterial: 7.32  pH, Blood: x     /  pCO2: 63    /  pO2: 298   / HCO3: 32    / Base Excess: 5.2   /  SaO2: 98.3                MEDICATIONS  (STANDING):  aspirin  chewable 81 milliGRAM(s) Oral daily  chlorhexidine 0.12% Liquid 15 milliLiter(s) Oral Mucosa every 12 hours  chlorhexidine 4% Liquid 1 Application(s) Topical <User Schedule>  dexAMETHasone  Injectable 6 milliGRAM(s) IV Push every 12 hours  dexMEDEtomidine Infusion 0.2 MICROgram(s)/kG/Hr (3.85 mL/Hr) IV Continuous <Continuous>  dextrose 5%. 1000 milliLiter(s) (50 mL/Hr) IV Continuous <Continuous>  dextrose 5%. 1000 milliLiter(s) (100 mL/Hr) IV Continuous <Continuous>  dextrose 50% Injectable 25 Gram(s) IV Push once  dextrose 50% Injectable 12.5 Gram(s) IV Push once  dextrose 50% Injectable 25 Gram(s) IV Push once  enoxaparin Injectable 40 milliGRAM(s) SubCutaneous at bedtime  fentaNYL   Infusion. 0.5 MICROgram(s)/kG/Hr (3.85 mL/Hr) IV Continuous <Continuous>  glucagon  Injectable 1 milliGRAM(s) IntraMuscular once  insulin lispro (ADMELOG) corrective regimen sliding scale   SubCutaneous every 6 hours  norepinephrine Infusion 0.05 MICROgram(s)/kG/Min (3.61 mL/Hr) IV Continuous <Continuous>  pantoprazole  Injectable 40 milliGRAM(s) IV Push daily  polyethylene glycol 3350 17 Gram(s) Oral daily  propofol Infusion 0.05 MICROgram(s)/kG/Min (0.02 mL/Hr) IV Continuous <Continuous>    MEDICATIONS  (PRN):  acetaminophen     Tablet .. 650 milliGRAM(s) Oral every 6 hours PRN Temp greater or equal to 38C (100.4F), Mild Pain (1 - 3)  albuterol/ipratropium for Nebulization 3 milliLiter(s) Nebulizer every 6 hours PRN Shortness of Breath and/or Wheezing      Xrays:   ET ok, OG ok, TLC pushed, repeat CXR  PIGtail                                                                              ECHO

## 2022-01-28 NOTE — CONSULT NOTE ADULT - SUBJECTIVE AND OBJECTIVE BOX
CHADWICK VENCES [id number]  81y Male  13d    HPI:  81 year old male with a hx of HTN, BPH, recent diagnosis of COVID 19 2 days ago (unvaccinated, prescribed decadron/ zpack/ zinc) presenting to the ED S/P cardiac arrest. Patient intubated/ sedated so history obtained from chart (son left and attempted to reach). Patient began to have worsening dyspnea at home, EMS was called. Found to be in respiratory arrest by EMS and followed by EMS. ROSC was achieved after 2 rounds of CPR and epi, downtime 7 mins. Was intubated in the field.   In ED central line was placed. ABG: PH 7.15, PaO2 66, PaCO2 45, HCO3- 16. Troponins .1, BNP > 8000, D-Dimer 7368. COVID 19 positive. Patient being admitted s/p cardiac arrest to ICU.     On admission patient found to have PTX L lung, s/p pigtail catheter placement by ED with resolution of PTX. Since that point CT has been to suction with no PTX seen on daily CXR and approx 200-300 cc of serosang discharge. 1/28 AM pigtail catheter noticed to be malpositioned with majority of catheter outside of thoracic cavity. Otherwise no acute events, patient remains on high ventilatory support.     PAST MEDICAL & SURGICAL HISTORY:  BPH (benign prostatic hyperplasia)  Mild HTN    MEDICATIONS  (STANDING):  aspirin  chewable 81 milliGRAM(s) Oral daily  cefepime   IVPB      cefepime   IVPB 1000 milliGRAM(s) IV Intermittent every 8 hours  chlorhexidine 0.12% Liquid 15 milliLiter(s) Oral Mucosa every 12 hours  chlorhexidine 4% Liquid 1 Application(s) Topical <User Schedule>  dexAMETHasone  Injectable 6 milliGRAM(s) IV Push every 12 hours  dexMEDEtomidine Infusion 0.2 MICROgram(s)/kG/Hr (3.85 mL/Hr) IV Continuous <Continuous>  dextrose 5%. 1000 milliLiter(s) (50 mL/Hr) IV Continuous <Continuous>  dextrose 5%. 1000 milliLiter(s) (100 mL/Hr) IV Continuous <Continuous>  dextrose 50% Injectable 25 Gram(s) IV Push once  dextrose 50% Injectable 12.5 Gram(s) IV Push once  dextrose 50% Injectable 25 Gram(s) IV Push once  enoxaparin Injectable 40 milliGRAM(s) SubCutaneous at bedtime  fentaNYL   Infusion. 0.5 MICROgram(s)/kG/Hr (3.85 mL/Hr) IV Continuous <Continuous>  glucagon  Injectable 1 milliGRAM(s) IntraMuscular once  insulin lispro (ADMELOG) corrective regimen sliding scale   SubCutaneous every 6 hours  norepinephrine Infusion 0.05 MICROgram(s)/kG/Min (3.61 mL/Hr) IV Continuous <Continuous>  oxyCODONE    IR 10 milliGRAM(s) Oral four times a day  pantoprazole  Injectable 40 milliGRAM(s) IV Push daily  polyethylene glycol 3350 17 Gram(s) Oral daily  propofol Infusion 0.05 MICROgram(s)/kG/Min (0.02 mL/Hr) IV Continuous <Continuous>  vancomycin  IVPB 1500 milliGRAM(s) IV Intermittent every 12 hours    MEDICATIONS  (PRN):  acetaminophen     Tablet .. 650 milliGRAM(s) Oral every 6 hours PRN Temp greater or equal to 38C (100.4F), Mild Pain (1 - 3)  albuterol/ipratropium for Nebulization 3 milliLiter(s) Nebulizer every 6 hours PRN Shortness of Breath and/or Wheezing    Allergies  Allergy Status Unknown  Intolerances    REVIEW OF SYSTEMS  [x ] A ten-point review of systems was otherwise negative except as noted.  [ ] Due to altered mental status/intubation, subjective information were not able to be obtained from the patient. History was obtained, to the extent possible, from review of the chart and collateral sources of information.    Vital Signs Last 24 Hrs  T(C): 37 (28 Jan 2022 11:01), Max: 37.8 (27 Jan 2022 20:00)  T(F): 98.6 (28 Jan 2022 11:01), Max: 100.1 (27 Jan 2022 20:00)  HR: 98 (28 Jan 2022 11:53) (72 - 113)  BP: 117/56 (28 Jan 2022 11:01) (113/55 - 197/94)  BP(mean): 80 (28 Jan 2022 11:01) (78 - 135)  RR: 33 (28 Jan 2022 11:01) (24 - 35)  SpO2: 98% (28 Jan 2022 11:53) (93% - 100%)    PHYSICAL EXAM:  GENERAL: NAD, well-appearing  CHEST/LUNG: Clear to auscultation bilaterally  HEART: Regular rate and rhythm  ABDOMEN: Soft, Nontender, Nondistended;     Labs:  CAPILLARY BLOOD GLUCOSE    POCT Blood Glucose.: 189 mg/dL (28 Jan 2022 11:24)  POCT Blood Glucose.: 165 mg/dL (28 Jan 2022 05:40)  POCT Blood Glucose.: 201 mg/dL (28 Jan 2022 00:36)  POCT Blood Glucose.: 172 mg/dL (27 Jan 2022 17:56)                          10.6   20.07 )-----------( 124      ( 28 Jan 2022 06:11 )             35.3       Auto Neutrophil %: 89.4 % (01-28-22 @ 06:11)  Auto Immature Granulocyte %: 2.4 % (01-28-22 @ 06:11)  Auto Neutrophil %: 91.0 % (01-27-22 @ 13:50)  Auto Immature Granulocyte %: 2.8 % (01-27-22 @ 13:50)    01-28    141  |  103  |  34<H>  ----------------------------<  154<H>  4.8   |  29  |  <0.5<L>      Calcium, Total Serum: 8.3 mg/dL (01-28-22 @ 06:11)      LFTs:             4.6  | 0.5  | 32       ------------------[74      ( 28 Jan 2022 06:11 )  2.9  | x    | 65          Lipase:x      Amylase:x         Blood Gas Arterial, Lactate: 1.90 mmol/L (01-27-22 @ 05:53)  Blood Gas Arterial, Lactate: 2.30 mmol/L (01-26-22 @ 03:20)    ABG - ( 27 Jan 2022 05:53 )  pH: 7.32  /  pCO2: 63    /  pO2: 298   / HCO3: 32    / Base Excess: 5.2   /  SaO2: 98.3      ABG - ( 26 Jan 2022 03:20 )  pH: 7.33  /  pCO2: 59    /  pO2: 132   / HCO3: 31    / Base Excess: 3.6   /  SaO2: 97.7      ABG - ( 25 Jan 2022 04:30 )  pH: 7.37  /  pCO2: 56    /  pO2: 106   / HCO3: 32    / Base Excess: 5.5   /  SaO2: 98.0      RADIOLOGY & ADDITIONAL STUDIES:  < from: Xray Chest 1 View-PORTABLE IMMEDIATE (Xray Chest 1 View-PORTABLE IMMEDIATE .) (01.27.22 @ 07:38) >  Impression:    Bilateral pulmonary opacities, not significantly changed.    < end of copied text >

## 2022-01-28 NOTE — PROGRESS NOTE ADULT - ASSESSMENT
81 year old male with a hx of HTN, BPH, recent diagnosis of COVID 19 2 days ago prior to admission -> presented to the ED S/P cardiac arrest.  Patient began to have worsening dyspnea at home, EMS was called. Found to be in cardiac arrest. ROSC was achieved after 2 rounds of CPR and epi, downtime 7 mins. Was intubated in the field.     acute respiratory failure / Cardiac arrest / COVID-19 pneumonia / probable NSTEMI / L sided pneumothorax - resolved s/p CT - removed - sepsis      - very poor prognosis   - check ct head - still not done, no SAT per pulm   - dc sq insulin   - start feeds and IV insulin CC protocol   - free water for hypernatremia - improved   - IV Dexamethasone   - no echo as per cardiology   - cardio and pulmonary following   - pneumothorax resolved    - DVT prophylaxis  - started on vanco and cefepimine - follow vacno trough  - chest tube taken out today - CTS following - cxr looks reexpanded- if collapse  -recall cts for chest tube   grave prognosis - discussed with team

## 2022-01-28 NOTE — CHART NOTE - NSCHARTNOTEFT_GEN_A_CORE
Registered Dietitian Follow-Up     Patient Profile Reviewed                           Yes [x]   No []     Nutrition History Previously Obtained        Yes []  No [x]       Pertinent Information:   pt is 81 year old male with hx of HTN, BPH, unvaccinated, tested + for COVID 12 days ago, BIBA 2/2 respiratory distress s/p cardiac arrest downtime of 7 minutes, s/p intubation.  + PNA, L sided pnemothorax, s/p chest tube. pt became difficult to ventilate 1/21/22 s/p bronchoscopy  2/2 thick secretions. Poor prognosis. DNR. Family to decide on comfort care. presently sedated with propofol at 28 ml/hr providing an additional 739  kcals.     Diet, NPO with Tube Feed:   Tube Feeding Modality: Orogastric  Vital High Protein  Total Volume for 24 Hours (mL): 960  Continuous  Starting Tube Feed Rate {mL per Hour}: 20  Until Goal Tube Feed Rate (mL per Hour): 40  Tube Feed Duration (in Hours): 24  Tube Feed Start Time: 13:00 (01-24-22 @ 13:13) [Active]    1/26/22 Free H20 flushes of 400 ml q 4 hrs 2/2 hypernatremia (presently resolved)       Anthropometrics:  - Ht. 175.3 cm  - Wt. 79.6 kg 1/16 vs 86.6  kgs today  - %wt change         Pertinent Lab Data: 1/28/22  wbc 20.07H  hgb 10.6L  hct 35.3L  BUN 34H  gluc 154H  alt 65H  POCT 165-201H     Pertinent Meds:  MEDICATIONS  (STANDING):  aspirin  chewable 81 milliGRAM(s) Oral daily  cefepime   IVPB 1000 milliGRAM(s) IV Intermittent every 8 hours  dexAMETHasone  Injectable 6 milliGRAM(s) IV Push every 12 hours  dexMEDEtomidine Infusion 0.2 MICROgram(s)/kG/Hr (3.85 mL/Hr) IV Continuous <Continuous>  dextrose 5%. 1000 milliLiter(s) (50 mL/Hr) IV Continuous <Continuous>  enoxaparin Injectable 40 milliGRAM(s) SubCutaneous at bedtime  fentaNYL   Infusion. 0.5 MICROgram(s)/kG/Hr (3.85 mL/Hr) IV Continuous <Continuous>  glucagon  Injectable 1 milliGRAM(s) IntraMuscular once  insulin lispro (ADMELOG) corrective regimen sliding scale   SubCutaneous every 6 hours  norepinephrine Infusion 0.05 MICROgram(s)/kG/Min (3.61 mL/Hr) IV Continuous <Continuous>  oxyCODONE    IR 10 milliGRAM(s) Oral four times a day  pantoprazole  Injectable 40 milliGRAM(s) IV Push daily  polyethylene glycol 3350 17 Gram(s) Oral daily  propofol Infusion 0.05 MICROgram(s)/kG/Min (0.02 mL/Hr) IV Continuous <Continuous>  vancomycin  IVPB 1500 milliGRAM(s) IV Intermittent every 12 hours    MEDICATIONS  (PRN):  acetaminophen     Tablet .. 650 milliGRAM(s) Oral every 6 hours PRN Temp greater or equal to 38C (100.4F), Mild Pain (1 - 3)  albuterol/ipratropium for Nebulization 3 milliLiter(s) Nebulizer every 6 hours PRN Shortness of Breath and/or Wheezing       Physical Findings:  - Appearance: sedated, intubated to vent   - GI function: +BS, BM noted 1/27  - Tubes:  - Oral/Mouth cavity:  - Skin:      Nutrition Requirements  Weight Used: 79.6 kgs      Estimated Energy Needs: 1835 kcals MARIA LUISA state,  79.6-96g pro (1.0-1.2g/kg/BW) 1;1 kcal for estimated fluid needs        Nutrient Intake : present feeds provide  948 kcals + 739 kcals  from propofol, 83g protein and 960 ml total volume + 2400 ml H20 flushes  meeting > 85% of estimated nutrient needs          [] Previous Nutrition Diagnosis:            [] Ongoing          []X Resolved      Nutrition Intervention: maintain on present feeds, decrease H20 flushes to 360 ml q 6 hrs     Goal/Expected Outcome: pt to continue to tolerate EN and meet > 85% of estimated nutrient needs      Indicator/Monitoring: RD to monitor tolerance to EN, labs/meds, NFPF and f/u as needed within 2-4 days

## 2022-01-28 NOTE — CONSULT NOTE ADULT - ATTENDING COMMENTS
I, Papo Owens reviewed the diagnostic images of patient Jonny Jackson on 01/28/22 and agreed with Dr. Hogue’s clinical note, physical examination, and treatment plan. Mr. Jackson is an 81-year-old male admitted with diagnosis of Covid pneumonia. Patient had an episode of cardiac arrest at home, intubated by EMS, CPR and ROSC after 7 minutes of maneuvers. Had left pleural drain placed for pneumothorax. Currently intubated, ongoing conversation with the family regarding goals of care. Pleural anais found partially dislodged in CXR, thoracic surgery consulted for assistance in care. No pneumothorax and no air leak in a partially dislodged drain, I recommended removal and follow up with serial CXR.

## 2022-01-29 NOTE — PROGRESS NOTE ADULT - ASSESSMENT
Assessment  81M presenting s/p cardiac arrest, ROSC attained after 7min, intubated in the field found to be COVID (+), L lung PTX on presentation s/p pigtail placement and most likely malpositioning 1/28 AM noticed on CXR    Plan  - daily AM CXR  - f/u CXR read  - recall CT surgery as needed

## 2022-01-29 NOTE — PROGRESS NOTE ADULT - ASSESSMENT
IMPRESSION:  Acute hypoxic respiratory failure  cardiac arrest  HAGMA  multi lobar pneumonia due to covid 19   Sepsis/ Septic   L PTX  S/p bronchoscopy on 1/21/2022    SUGGEST:      CNS:   Continue sedation, add fentanyl.  no SAT  HEENT: Oral care    PULMONARY: No SBT, HOB @ 45 degrees.   Decrease Fio2 to 60%, increase rate to 26as necessary to maintain sats > 90%<94  Dexamethasone 6mg Q12  monitor driving pressure  Keep PEEP at 12.       CARDIOVASCULAR:  avoid overload Keep i<=Os  lasix 40 mg Q12 hr today     GI: GI prophylaxis.    OG feeding     RENAL:  Follow up lytes.    Correct as needed  decrease free water intake.    INFECTIOUS DISEASE:   ID F/u, send DTA , blood cx . started on  cedepime and vanc yesterday follow up ID  inflammatory markers CRP, d-dimer, fibrinogen  Follow up cultures.   antivirals per ID  repeat procal    HEMATOLOGICAL: Lovenox.     ENDOCRINE:  Follow up FS.  Insulin protocol if needed.    MUSCULOSKELETAL: bedrest    Left IJ  monitor in ICU  DNR  poor prognosis  The patient is critically ill with a high probability of deterioration.

## 2022-01-29 NOTE — PROGRESS NOTE ADULT - SUBJECTIVE AND OBJECTIVE BOX
Patient is a 81y old  Male who presents with a chief complaint of s/p cardiac arrest, covid (23 Jan 2022 10:27)      Over Night Events:  Patient seen and examined.   on vent       ROS:  See HPI    PHYSICAL EXAM    ICU Vital Signs Last 24 Hrs  T(C): 38.1 (29 Jan 2022 03:00), Max: 38.1 (29 Jan 2022 03:00)  T(F): 100.6 (29 Jan 2022 03:00), Max: 100.6 (29 Jan 2022 03:00)  HR: 94 (29 Jan 2022 03:00) (87 - 101)  BP: 118/61 (29 Jan 2022 03:00) (116/60 - 138/62)  BP(mean): 82 (29 Jan 2022 03:00) (80 - 91)  ABP: --  ABP(mean): --  RR: 33 (29 Jan 2022 03:00) (29 - 34)  SpO2: 99% (29 Jan 2022 03:00) (95% - 99%)      General: ill looking   HEENT:     et tube          Lymph Nodes: NO cervical LN   Lungs: Bilateral BS  Cardiovascular: Regular   Abdomen: Soft, Positive BS  Extremities: No clubbing   Skin: warm   Neurological: no focal   Musculoskeletal: move all ext     I&O's Detail    27 Jan 2022 07:01  -  28 Jan 2022 07:00  --------------------------------------------------------  IN:    dextrose 5%: 150 mL    Enteral Tube Flush: 2000 mL    FentaNYL: 162.5 mL    IV PiggyBack: 30 mL    Propofol: 346.5 mL    Vital High Protein: 1000 mL  Total IN: 3689 mL    OUT:    Chest Tube (mL): 370 mL    Indwelling Catheter - Urethral (mL): 1645 mL  Total OUT: 2015 mL    Total NET: 1674 mL      28 Jan 2022 07:01  -  29 Jan 2022 06:38  --------------------------------------------------------  IN:    Enteral Tube Flush: 580 mL    IV PiggyBack: 600 mL    Propofol: 605.5 mL    Vital High Protein: 880 mL  Total IN: 2665.5 mL    OUT:    Indwelling Catheter - Urethral (mL): 710 mL  Total OUT: 710 mL    Total NET: 1955.5 mL          LABS:                          10.6   20.07 )-----------( 124      ( 28 Jan 2022 06:11 )             35.3         28 Jan 2022 06:11    141    |  103    |  34     ----------------------------<  154    4.8     |  29     |  <0.5     Ca    8.3        28 Jan 2022 06:11                                                                                                                                                                                               Mode: AC/ CMV (Assist Control/ Continuous Mandatory Ventilation)  RR (machine): 24  TV (machine): 450  FiO2: 80  PEEP: 12  MAP: 17  PIP: 31         7.36/61/137                                 MEDICATIONS  (STANDING):  aspirin  chewable 81 milliGRAM(s) Oral daily  cefepime   IVPB 1000 milliGRAM(s) IV Intermittent every 8 hours  cefepime   IVPB      chlorhexidine 0.12% Liquid 15 milliLiter(s) Oral Mucosa every 12 hours  chlorhexidine 4% Liquid 1 Application(s) Topical <User Schedule>  dexAMETHasone  Injectable 6 milliGRAM(s) IV Push every 12 hours  dexMEDEtomidine Infusion 0.2 MICROgram(s)/kG/Hr (3.85 mL/Hr) IV Continuous <Continuous>  dextrose 5%. 1000 milliLiter(s) (100 mL/Hr) IV Continuous <Continuous>  dextrose 5%. 1000 milliLiter(s) (50 mL/Hr) IV Continuous <Continuous>  dextrose 50% Injectable 25 Gram(s) IV Push once  dextrose 50% Injectable 12.5 Gram(s) IV Push once  dextrose 50% Injectable 25 Gram(s) IV Push once  enoxaparin Injectable 40 milliGRAM(s) SubCutaneous at bedtime  fentaNYL   Infusion. 0.5 MICROgram(s)/kG/Hr (3.85 mL/Hr) IV Continuous <Continuous>  glucagon  Injectable 1 milliGRAM(s) IntraMuscular once  insulin lispro (ADMELOG) corrective regimen sliding scale   SubCutaneous every 6 hours  norepinephrine Infusion 0.05 MICROgram(s)/kG/Min (3.61 mL/Hr) IV Continuous <Continuous>  oxyCODONE    IR 10 milliGRAM(s) Oral four times a day  pantoprazole  Injectable 40 milliGRAM(s) IV Push daily  polyethylene glycol 3350 17 Gram(s) Oral daily  propofol Infusion 0.05 MICROgram(s)/kG/Min (0.02 mL/Hr) IV Continuous <Continuous>  vancomycin  IVPB 1500 milliGRAM(s) IV Intermittent every 12 hours    MEDICATIONS  (PRN):  acetaminophen     Tablet .. 650 milliGRAM(s) Oral every 6 hours PRN Temp greater or equal to 38C (100.4F), Mild Pain (1 - 3)  albuterol/ipratropium for Nebulization 3 milliLiter(s) Nebulizer every 6 hours PRN Shortness of Breath and/or Wheezing          Xrays:  TLC:  OG:  ET tube:                                                                                    b/l opacity /effusion    ECHO:  [facility] ICU:

## 2022-01-29 NOTE — PROGRESS NOTE ADULT - ASSESSMENT
CHADWICK VENCES 81y Male  MRN#: [id number]   CODE STATUS:dnr       SUBJECTIVE  Patient is a 81y old Male who presents with a chief complaint of s/p cardiac arrest, covid (23 Jan 2022 10:27)  Currently admitted to medicine with the primary diagnosis of Cardiac arrest  Today is hospital day 14d, and this morning he is intubated and sedated     OBJECTIVE  PAST MEDICAL & SURGICAL HISTORY  BPH (benign prostatic hyperplasia)    Mild HTN      ALLERGIES:  Allergy Status Unknown    MEDICATIONS:  STANDING MEDICATIONS  aspirin  chewable 81 milliGRAM(s) Oral daily  cefepime   IVPB      cefepime   IVPB 1000 milliGRAM(s) IV Intermittent every 8 hours  chlorhexidine 0.12% Liquid 15 milliLiter(s) Oral Mucosa every 12 hours  chlorhexidine 4% Liquid 1 Application(s) Topical <User Schedule>  dexAMETHasone  Injectable 6 milliGRAM(s) IV Push every 12 hours  dexMEDEtomidine Infusion 0.2 MICROgram(s)/kG/Hr IV Continuous <Continuous>  dextrose 5%. 1000 milliLiter(s) IV Continuous <Continuous>  dextrose 5%. 1000 milliLiter(s) IV Continuous <Continuous>  dextrose 50% Injectable 25 Gram(s) IV Push once  dextrose 50% Injectable 12.5 Gram(s) IV Push once  dextrose 50% Injectable 25 Gram(s) IV Push once  enoxaparin Injectable 40 milliGRAM(s) SubCutaneous at bedtime  fentaNYL   Infusion. 0.5 MICROgram(s)/kG/Hr IV Continuous <Continuous>  furosemide   Injectable 40 milliGRAM(s) IV Push two times a day  glucagon  Injectable 1 milliGRAM(s) IntraMuscular once  insulin lispro (ADMELOG) corrective regimen sliding scale   SubCutaneous every 6 hours  norepinephrine Infusion 0.05 MICROgram(s)/kG/Min IV Continuous <Continuous>  oxyCODONE    IR 10 milliGRAM(s) Oral four times a day  pantoprazole  Injectable 40 milliGRAM(s) IV Push daily  polyethylene glycol 3350 17 Gram(s) Oral daily  propofol Infusion 0.05 MICROgram(s)/kG/Min IV Continuous <Continuous>  vancomycin  IVPB 1500 milliGRAM(s) IV Intermittent every 12 hours    PRN MEDICATIONS  acetaminophen     Tablet .. 650 milliGRAM(s) Oral every 6 hours PRN  albuterol/ipratropium for Nebulization 3 milliLiter(s) Nebulizer every 6 hours PRN      VITAL SIGNS: Last 24 Hours  T(C): 37.4 (29 Jan 2022 11:00), Max: 38.1 (29 Jan 2022 03:00)  T(F): 99.3 (29 Jan 2022 11:00), Max: 100.6 (29 Jan 2022 03:00)  HR: 87 (29 Jan 2022 09:00) (84 - 101)  BP: 111/57 (29 Jan 2022 09:00) (110/58 - 138/62)  BP(mean): 80 (29 Jan 2022 09:00) (77 - 91)  RR: 29 (29 Jan 2022 11:00) (29 - 34)  SpO2: 96% (29 Jan 2022 09:00) (95% - 99%)    LABS:                        9.7    13.88 )-----------( 107      ( 29 Jan 2022 07:17 )             32.8     01-29    144  |  106  |  32<H>  ----------------------------<  166<H>  4.5   |  31  |  0.5<L>    Ca    8.0<L>      29 Jan 2022 07:17  Mg     2.3     01-29    TPro  4.4<L>  /  Alb  2.8<L>  /  TBili  0.5  /  DBili  x   /  AST  28  /  ALT  65<H>  /  AlkPhos  72  01-29        ABG - ( 29 Jan 2022 06:31 )  pH, Arterial: 7.37  pH, Blood: x     /  pCO2: 61    /  pO2: 142   / HCO3: 35    / Base Excess: 8.2   /  SaO2: 98.2          RADIOLOGY:    < from: Xray Chest 1 View- PORTABLE-Routine (Xray Chest 1 View- PORTABLE-Routine in AM.) (01.29.22 @ 05:30) >  .  Support lines/tubes, as described.  2. Extensive bilateral pulmonary opacities, slightly increased.    < end of copied text >    PHYSICAL EXAM:    GENERAL: intubated  HEENT:  Atraumatic, Normocephalic.   PULMONARY: dec breath sounds b/l   CARDIOVASCULAR: normal s1 s2  GASTROINTESTINAL: Soft, Nontender, Nondistended  MUSCULOSKELETAL:  No clubbing, cyanosis, +1 b/l LE  edema  NEUROLOGY: sedated   SKIN: No rashes or lesions      ASSESSMENT & PLAN  81 year old male with a hx of HTN, BPH, recent diagnosis of COVID 19 2 days ago prior to admission -> presented to the ED S/P cardiac arrest.  Patient began to have worsening dyspnea at home, EMS was called. Found to be in cardiac arrest. ROSC was achieved after 2 rounds of CPR and epi, downtime 7 mins. Was intubated in the field.     ·	Acute respiratory failure   ·	Cardiac arrest   ·	COVID-19 pneumonia   ·	probable NSTEMI   ·	L sided pneumothorax - resolved s/p CT - removed    ·	sepsis   ·	Prolonged QTC   ·	anemia chronic stable      - very poor prognosis   - check ct head - still not done, no SAT per pulm   - start feeds and IV insulin CC protocol   - free water for hypernatremia -resolved  - correct electrolytes as needed   - vent setting/sedation per cc   - IV Dexamethasone   - no echo as per cardiology   - cardio and pulmonary following   - DVT prophylaxis  - started on vanco and cefepime - follow vacno trough  - ID f/u   - procal  - chest tube taken out yesterday - CTS following - cxr looks reexpanded- if collapse  -recall cts for chest tube

## 2022-01-29 NOTE — PROGRESS NOTE ADULT - SUBJECTIVE AND OBJECTIVE BOX
Progress Note: Surgery  Patient: CHADWICK VENCES , 81y (1940)Male   MRN: [id number]  Location: [address]  Visit: 01-15-22 Inpatient  Date: 01-29-22 @ 11:27    Events over 24h: Chest tube removed 1/28, no PTX seen on repeat CXR. This AM CXR still pending official read, however no PTX appreciated.     RR (machine): 24, TV (machine): 450, FiO2: 80, PEEP: 12  01-29 @ 06:31 -- 7.37 / 61 / 142 / 35 / 98.2        SAT  98.2  Lac 1.90     Vitals: T(F): 99.3 (01-29-22 @ 11:00), Max: 100.6 (01-29-22 @ 03:00)  HR: 87 (01-29-22 @ 09:00)  BP: 111/57 (01-29-22 @ 09:00) (110/58 - 138/62)  RR: 29 (01-29-22 @ 11:00)  SpO2: 96% (01-29-22 @ 09:00)  RR (machine): 24, TV (machine): 450, FiO2: 80, PEEP: 12, PIP: 30    Diet: Diet, NPO with Tube Feed:   Tube Feeding Modality: Orogastric  Vital High Protein  Total Volume for 24 Hours (mL): 960  Continuous  Starting Tube Feed Rate mL per Hour: 20  Until Goal Tube Feed Rate (mL per Hour): 40  Tube Feed Duration (in Hours): 24  Tube Feed Start Time: 13:00 (01-24-22 @ 13:13)    IV Fluid: dextrose 5%. 1000 milliLiter(s) (50 mL/Hr) IV Continuous <Continuous>  dextrose 5%. 1000 milliLiter(s) (100 mL/Hr) IV Continuous <Continuous>    In:   01-28-22 @ 07:01  -  01-29-22 @ 07:00  --------------------------------------------------------  IN: 3351.5 mL    01-29-22 @ 07:01  -  01-29-22 @ 11:27  --------------------------------------------------------  IN: 204 mL    Out:   01-28-22 @ 07:01  -  01-29-22 @ 07:00  --------------------------------------------------------  OUT:    Indwelling Catheter - Urethral (mL): 1475 mL  Total OUT: 1475 mL    01-29-22 @ 07:01  -  01-29-22 @ 11:27  --------------------------------------------------------  OUT:    Indwelling Catheter - Urethral (mL): 625 mL  Total OUT: 625 mL    Net:   01-28-22 @ 07:01  -  01-29-22 @ 07:00  --------------------------------------------------------  NET: 1876.5 mL    01-29-22 @ 07:01  -  01-29-22 @ 11:27  --------------------------------------------------------  NET: -421 mL    Physical Examination:  General Appearance: NAD, alert and cooperative  Heart: S1 and S2. No murmurs. Rhythm is regular.  Lungs: Clear to auscultation BL without rales, rhonchi, wheezing, crackles or diminished breath sounds.  Abdomen:  Soft, nondistended, nontender. No rigidity, guarding, or rebound tenderness.     Medications: [Standing]  aspirin  chewable 81 milliGRAM(s) Oral daily  cefepime   IVPB      cefepime   IVPB 1000 milliGRAM(s) IV Intermittent every 8 hours  chlorhexidine 0.12% Liquid 15 milliLiter(s) Oral Mucosa every 12 hours  chlorhexidine 4% Liquid 1 Application(s) Topical <User Schedule>  dexAMETHasone  Injectable 6 milliGRAM(s) IV Push every 12 hours  dexMEDEtomidine Infusion 0.2 MICROgram(s)/kG/Hr (3.85 mL/Hr) IV Continuous <Continuous>  dextrose 5%. 1000 milliLiter(s) (50 mL/Hr) IV Continuous <Continuous>  dextrose 5%. 1000 milliLiter(s) (100 mL/Hr) IV Continuous <Continuous>  dextrose 50% Injectable 25 Gram(s) IV Push once  dextrose 50% Injectable 12.5 Gram(s) IV Push once  dextrose 50% Injectable 25 Gram(s) IV Push once  enoxaparin Injectable 40 milliGRAM(s) SubCutaneous at bedtime  fentaNYL   Infusion. 0.5 MICROgram(s)/kG/Hr (3.85 mL/Hr) IV Continuous <Continuous>  furosemide   Injectable 40 milliGRAM(s) IV Push two times a day  glucagon  Injectable 1 milliGRAM(s) IntraMuscular once  insulin lispro (ADMELOG) corrective regimen sliding scale   SubCutaneous every 6 hours  norepinephrine Infusion 0.05 MICROgram(s)/kG/Min (3.61 mL/Hr) IV Continuous <Continuous>  oxyCODONE    IR 10 milliGRAM(s) Oral four times a day  pantoprazole  Injectable 40 milliGRAM(s) IV Push daily  polyethylene glycol 3350 17 Gram(s) Oral daily  propofol Infusion 0.05 MICROgram(s)/kG/Min (0.02 mL/Hr) IV Continuous <Continuous>  vancomycin  IVPB 1500 milliGRAM(s) IV Intermittent every 12 hours    Medications:[PRN]  acetaminophen     Tablet .. 650 milliGRAM(s) Oral every 6 hours PRN  albuterol/ipratropium for Nebulization 3 milliLiter(s) Nebulizer every 6 hours PRN    Labs:                     9.7    13.88 )-----------( 107      ( 29 Jan 2022 07:17 )             32.8   01-29    144  |  106  |  32<H>  ----------------------------<  166<H>  4.5   |  31  |  0.5<L>    Ca    8.0<L>      29 Jan 2022 07:17  Mg     2.3     01-29    TPro  4.4<L>  /  Alb  2.8<L>  /  TBili  0.5  /  DBili  x   /  AST  28  /  ALT  65<H>  /  AlkPhos  72  01-29  LIVER FUNCTIONS - ( 29 Jan 2022 07:17 )  Alb: 2.8 g/dL / Pro: 4.4 g/dL / ALK PHOS: 72 U/L / ALT: 65 U/L / AST: 28 U/L / GGT: x         ABG - ( 29 Jan 2022 06:31 )  pH: 7.37  /  pCO2: 61    /  pO2: 142   / HCO3: 35    / Base Excess: 8.2   /  SaO2: 98.2     Micro/Urine:    Imaging:  None/24h

## 2022-01-30 NOTE — PROGRESS NOTE ADULT - ASSESSMENT
IMPRESSION:  Acute hypoxic respiratory failure  cardiac arrest  HAGMA  multi lobar pneumonia due to covid 19   Sepsis/ Septic   L PTX  S/p bronchoscopy on 1/21/2022    SUGGEST:      CNS:   Continue sedation, add fentanyl.  no SAT  HEENT: Oral care    PULMONARY: No SBT, HOB @ 45 degrees.   Decrease Fio2 to 40%, maintain sats > 90%<94  Dexamethasone 6mg Q12  monitor driving pressure  Keep PEEP at 12.       CARDIOVASCULAR:  avoid overload Keep i<=Os  lasix 40 mg Q12 hr today     GI: GI prophylaxis.    OG feeding     RENAL:  Follow up lytes.    Correct as needed  decrease free water intake.    INFECTIOUS DISEASE:   ID F/u, send DTA , blood cx .  continue cefepime   d/c vanco  inflammatory markers CRP, d-dimer, fibrinogen  Follow up cultures.   antivirals per ID  repeat procal    HEMATOLOGICAL: Lovenox.     ENDOCRINE:  Follow up FS.  Insulin protocol if needed.    MUSCULOSKELETAL: bedrest    Left IJ  monitor in ICU  DNR  poor prognosis  The patient is critically ill with a high probability of deterioration.

## 2022-01-30 NOTE — CHART NOTE - NSCHARTNOTEFT_GEN_A_CORE
Received call by nursing staff that patient's heart rate was in 130s-140s. Stat EKG showed a-fib with RVR. Administered 10mg cardizem IV push with good response. Will start cardizem drip if HR increases again. Hold cardizem if MAP <60

## 2022-01-30 NOTE — PROGRESS NOTE ADULT - ASSESSMENT
CHADWICK VENCES 81y Male  MRN#: [id number]   CODE STATUS:DNR      SUBJECTIVE  Patient is a 81y old Male who presents with a chief complaint of s/p cardiac arrest, covid (23 Jan 2022 10:27)  Currently admitted to medicine with the primary diagnosis of Cardiac arrest  Today is hospital day 15d, and this morning he is intubated and sedated started on cardizem drip overnight for afib rvr    OBJECTIVE  PAST MEDICAL & SURGICAL HISTORY  BPH (benign prostatic hyperplasia)    Mild HTN      ALLERGIES:  Allergy Status Unknown    MEDICATIONS:  STANDING MEDICATIONS  aspirin  chewable 81 milliGRAM(s) Oral daily  cefepime   IVPB      cefepime   IVPB 1000 milliGRAM(s) IV Intermittent every 8 hours  chlorhexidine 0.12% Liquid 15 milliLiter(s) Oral Mucosa every 12 hours  chlorhexidine 4% Liquid 1 Application(s) Topical <User Schedule>  dexAMETHasone  Injectable 6 milliGRAM(s) IV Push every 12 hours  dexMEDEtomidine Infusion 0.2 MICROgram(s)/kG/Hr IV Continuous <Continuous>  dextrose 5%. 1000 milliLiter(s) IV Continuous <Continuous>  dextrose 5%. 1000 milliLiter(s) IV Continuous <Continuous>  dextrose 50% Injectable 25 Gram(s) IV Push once  dextrose 50% Injectable 12.5 Gram(s) IV Push once  dextrose 50% Injectable 25 Gram(s) IV Push once  diltiazem Infusion 5 mG/Hr IV Continuous <Continuous>  enoxaparin Injectable 40 milliGRAM(s) SubCutaneous at bedtime  fentaNYL   Infusion. 0.5 MICROgram(s)/kG/Hr IV Continuous <Continuous>  furosemide   Injectable 40 milliGRAM(s) IV Push two times a day  glucagon  Injectable 1 milliGRAM(s) IntraMuscular once  insulin lispro (ADMELOG) corrective regimen sliding scale   SubCutaneous every 6 hours  norepinephrine Infusion 0.05 MICROgram(s)/kG/Min IV Continuous <Continuous>  oxyCODONE    IR 10 milliGRAM(s) Oral four times a day  pantoprazole  Injectable 40 milliGRAM(s) IV Push daily  polyethylene glycol 3350 17 Gram(s) Oral daily  propofol Infusion 0.05 MICROgram(s)/kG/Min IV Continuous <Continuous>    PRN MEDICATIONS  acetaminophen     Tablet .. 650 milliGRAM(s) Oral every 6 hours PRN  albuterol/ipratropium for Nebulization 3 milliLiter(s) Nebulizer every 6 hours PRN      VITAL SIGNS: Last 24 Hours  T(C): 37.9 (30 Jan 2022 11:02), Max: 38.2 (29 Jan 2022 17:00)  T(F): 100.2 (30 Jan 2022 11:02), Max: 100.8 (29 Jan 2022 17:00)  HR: 92 (30 Jan 2022 10:00) (81 - 156)  BP: 155/65 (30 Jan 2022 10:00) (99/54 - 159/70)  BP(mean): 93 (30 Jan 2022 10:00) (73 - 110)  RR: 30 (30 Jan 2022 11:02) (29 - 35)  SpO2: 93% (30 Jan 2022 10:00) (91% - 99%)    LABS:                        10.1   12.25 )-----------( 100      ( 30 Jan 2022 07:07 )             33.5     01-30    142  |  102  |  31<H>  ----------------------------<  146<H>  4.1   |  32  |  <0.5<L>    Ca    7.8<L>      30 Jan 2022 07:07  Mg     2.1     01-30    TPro  4.5<L>  /  Alb  3.0<L>  /  TBili  0.6  /  DBili  x   /  AST  25  /  ALT  59<H>  /  AlkPhos  71  01-30        ABG - ( 30 Jan 2022 04:14 )  pH, Arterial: 7.44  pH, Blood: x     /  pCO2: 54    /  pO2: 108   / HCO3: 37    / Base Excess: 11.0  /  SaO2: 97.8                  Culture - Blood (collected 28 Jan 2022 16:37)  Source: .Blood None  Preliminary Report (29 Jan 2022 23:02):    No growth to date.          RADIOLOGY:      PHYSICAL EXAM:    GENERAL: intubated  HEENT:  Atraumatic, Normocephalic.   PULMONARY: dec breath sounds b/l   CARDIOVASCULAR: normal s1 s2  GASTROINTESTINAL: Soft, Nontender, Nondistended  MUSCULOSKELETAL:  No clubbing, cyanosis, +1 b/l LE  edema  NEUROLOGY: sedated   SKIN: No rashes or lesions      ASSESSMENT & PLAN  81 year old male with a hx of HTN, BPH, recent diagnosis of COVID 19 2 days ago prior to admission -> presented to the ED S/P cardiac arrest.  Patient began to have worsening dyspnea at home, EMS was called. Found to be in cardiac arrest. ROSC was achieved after 2 rounds of CPR and epi, downtime 7 mins. Was intubated in the field.     ·	Acute respiratory failure   ·	Cardiac arrest   ·	COVID-19 pneumonia   ·	NSTEMI type2?  ·	L sided pneumothorax - resolved s/p CT - removed    ·	sepsis, low grade fever overnight  ·	Anemia Macrocytic   ·	Prolonged QTC   ·	anemia chronic stable      - very poor prognosis   - no SAT per pulm, fentanyl added    - start feeds and IV insulin CC protocol   - free water for hypernatremia -resolved  -  correct electrolytes as needed   -  vent setting/sedation per cc   - IV Dexamethasone   - no echo as per cardiology   - cardio and pulmonary following  - started lasix 40 mg BID   - c/w PPI   - DVT prophylaxis  - started on vanco and cefepime - follow vacno trough  - ID f/u   - f/u cultures   - procal  - inflammatory markers CRP, d-dimer, fibrinogen  - chest tube taken out friday - CTS following - cxr looks reexpanded- if collapse  -recall cts for chest tube   - Poor prognosis    CHADWICK VENCES 81y Male  MRN#: [id number]   CODE STATUS:DNR      SUBJECTIVE  Patient is a 81y old Male who presents with a chief complaint of s/p cardiac arrest, covid (23 Jan 2022 10:27)  Currently admitted to medicine with the primary diagnosis of Cardiac arrest  Today is hospital day 15d, and this morning he is intubated and sedated started on cardizem drip overnight for afib rvr    OBJECTIVE  PAST MEDICAL & SURGICAL HISTORY  BPH (benign prostatic hyperplasia)    Mild HTN      ALLERGIES:  Allergy Status Unknown    MEDICATIONS:  STANDING MEDICATIONS  aspirin  chewable 81 milliGRAM(s) Oral daily  cefepime   IVPB      cefepime   IVPB 1000 milliGRAM(s) IV Intermittent every 8 hours  chlorhexidine 0.12% Liquid 15 milliLiter(s) Oral Mucosa every 12 hours  chlorhexidine 4% Liquid 1 Application(s) Topical <User Schedule>  dexAMETHasone  Injectable 6 milliGRAM(s) IV Push every 12 hours  dexMEDEtomidine Infusion 0.2 MICROgram(s)/kG/Hr IV Continuous <Continuous>  dextrose 5%. 1000 milliLiter(s) IV Continuous <Continuous>  dextrose 5%. 1000 milliLiter(s) IV Continuous <Continuous>  dextrose 50% Injectable 25 Gram(s) IV Push once  dextrose 50% Injectable 12.5 Gram(s) IV Push once  dextrose 50% Injectable 25 Gram(s) IV Push once  diltiazem Infusion 5 mG/Hr IV Continuous <Continuous>  enoxaparin Injectable 40 milliGRAM(s) SubCutaneous at bedtime  fentaNYL   Infusion. 0.5 MICROgram(s)/kG/Hr IV Continuous <Continuous>  furosemide   Injectable 40 milliGRAM(s) IV Push two times a day  glucagon  Injectable 1 milliGRAM(s) IntraMuscular once  insulin lispro (ADMELOG) corrective regimen sliding scale   SubCutaneous every 6 hours  norepinephrine Infusion 0.05 MICROgram(s)/kG/Min IV Continuous <Continuous>  oxyCODONE    IR 10 milliGRAM(s) Oral four times a day  pantoprazole  Injectable 40 milliGRAM(s) IV Push daily  polyethylene glycol 3350 17 Gram(s) Oral daily  propofol Infusion 0.05 MICROgram(s)/kG/Min IV Continuous <Continuous>    PRN MEDICATIONS  acetaminophen     Tablet .. 650 milliGRAM(s) Oral every 6 hours PRN  albuterol/ipratropium for Nebulization 3 milliLiter(s) Nebulizer every 6 hours PRN      VITAL SIGNS: Last 24 Hours  T(C): 37.9 (30 Jan 2022 11:02), Max: 38.2 (29 Jan 2022 17:00)  T(F): 100.2 (30 Jan 2022 11:02), Max: 100.8 (29 Jan 2022 17:00)  HR: 92 (30 Jan 2022 10:00) (81 - 156)  BP: 155/65 (30 Jan 2022 10:00) (99/54 - 159/70)  BP(mean): 93 (30 Jan 2022 10:00) (73 - 110)  RR: 30 (30 Jan 2022 11:02) (29 - 35)  SpO2: 93% (30 Jan 2022 10:00) (91% - 99%)    LABS:                        10.1   12.25 )-----------( 100      ( 30 Jan 2022 07:07 )             33.5     01-30    142  |  102  |  31<H>  ----------------------------<  146<H>  4.1   |  32  |  <0.5<L>    Ca    7.8<L>      30 Jan 2022 07:07  Mg     2.1     01-30    TPro  4.5<L>  /  Alb  3.0<L>  /  TBili  0.6  /  DBili  x   /  AST  25  /  ALT  59<H>  /  AlkPhos  71  01-30        ABG - ( 30 Jan 2022 04:14 )  pH, Arterial: 7.44  pH, Blood: x     /  pCO2: 54    /  pO2: 108   / HCO3: 37    / Base Excess: 11.0  /  SaO2: 97.8                  Culture - Blood (collected 28 Jan 2022 16:37)  Source: .Blood None  Preliminary Report (29 Jan 2022 23:02):    No growth to date.          RADIOLOGY:      PHYSICAL EXAM:    GENERAL: intubated  HEENT:  Atraumatic, Normocephalic.   PULMONARY: dec breath sounds b/l   CARDIOVASCULAR: normal s1 s2  GASTROINTESTINAL: Soft, Nontender, Nondistended  MUSCULOSKELETAL:  No clubbing, cyanosis, +1 b/l LE  edema  NEUROLOGY: sedated   SKIN: No rashes or lesions      ASSESSMENT & PLAN  81 year old male with a hx of HTN, BPH, recent diagnosis of COVID 19 2 days ago prior to admission -> presented to the ED S/P cardiac arrest.  Patient began to have worsening dyspnea at home, EMS was called. Found to be in cardiac arrest. ROSC was achieved after 2 rounds of CPR and epi, downtime 7 mins. Was intubated in the field.     ·	Acute respiratory failure   ·	Cardiac arrest   ·	COVID-19 pneumonia   ·	NSTEMI type2?  ·	L sided pneumothorax - resolved s/p CT - removed    ·	sepsis, low grade fever overnight  ·	Anemia Macrocytic   ·	Prolonged QTC   ·	anemia chronic stable      - very poor prognosis   - no SAT per pulm, fentanyl added    - start feeds and IV insulin CC protocol   - free water for hypernatremia -resolved  -  correct electrolytes as needed   -  vent setting/sedation per cc   - IV Dexamethasone   - no echo as per cardiology   - cardio and pulmonary following  - started lasix 40 mg BID   - c/w PPI   - DVT prophylaxis  - started on cefepime - vanc d/dejon  - ID f/u   - f/u cultures   - procal  - inflammatory markers CRP, d-dimer, fibrinogen  - chest tube taken out friday - CTS following - cxr looks reexpanded- if collapse  -recall cts for chest tube   - Poor prognosis    CHADWICK VENCES 81y Male  MRN#: [id number]   CODE STATUS:DNR      SUBJECTIVE  Patient is a 81y old Male who presents with a chief complaint of s/p cardiac arrest, covid (23 Jan 2022 10:27)  Currently admitted to medicine with the primary diagnosis of Cardiac arrest  Today is hospital day 15d, and this morning he is intubated and sedated started on cardizem drip overnight for afib rvr    OBJECTIVE  PAST MEDICAL & SURGICAL HISTORY  BPH (benign prostatic hyperplasia)    Mild HTN      ALLERGIES:  Allergy Status Unknown    MEDICATIONS:  STANDING MEDICATIONS  aspirin  chewable 81 milliGRAM(s) Oral daily  cefepime   IVPB      cefepime   IVPB 1000 milliGRAM(s) IV Intermittent every 8 hours  chlorhexidine 0.12% Liquid 15 milliLiter(s) Oral Mucosa every 12 hours  chlorhexidine 4% Liquid 1 Application(s) Topical <User Schedule>  dexAMETHasone  Injectable 6 milliGRAM(s) IV Push every 12 hours  dexMEDEtomidine Infusion 0.2 MICROgram(s)/kG/Hr IV Continuous <Continuous>  dextrose 5%. 1000 milliLiter(s) IV Continuous <Continuous>  dextrose 5%. 1000 milliLiter(s) IV Continuous <Continuous>  dextrose 50% Injectable 25 Gram(s) IV Push once  dextrose 50% Injectable 12.5 Gram(s) IV Push once  dextrose 50% Injectable 25 Gram(s) IV Push once  diltiazem Infusion 5 mG/Hr IV Continuous <Continuous>  enoxaparin Injectable 40 milliGRAM(s) SubCutaneous at bedtime  fentaNYL   Infusion. 0.5 MICROgram(s)/kG/Hr IV Continuous <Continuous>  furosemide   Injectable 40 milliGRAM(s) IV Push two times a day  glucagon  Injectable 1 milliGRAM(s) IntraMuscular once  insulin lispro (ADMELOG) corrective regimen sliding scale   SubCutaneous every 6 hours  norepinephrine Infusion 0.05 MICROgram(s)/kG/Min IV Continuous <Continuous>  oxyCODONE    IR 10 milliGRAM(s) Oral four times a day  pantoprazole  Injectable 40 milliGRAM(s) IV Push daily  polyethylene glycol 3350 17 Gram(s) Oral daily  propofol Infusion 0.05 MICROgram(s)/kG/Min IV Continuous <Continuous>    PRN MEDICATIONS  acetaminophen     Tablet .. 650 milliGRAM(s) Oral every 6 hours PRN  albuterol/ipratropium for Nebulization 3 milliLiter(s) Nebulizer every 6 hours PRN      VITAL SIGNS: Last 24 Hours  T(C): 37.9 (30 Jan 2022 11:02), Max: 38.2 (29 Jan 2022 17:00)  T(F): 100.2 (30 Jan 2022 11:02), Max: 100.8 (29 Jan 2022 17:00)  HR: 92 (30 Jan 2022 10:00) (81 - 156)  BP: 155/65 (30 Jan 2022 10:00) (99/54 - 159/70)  BP(mean): 93 (30 Jan 2022 10:00) (73 - 110)  RR: 30 (30 Jan 2022 11:02) (29 - 35)  SpO2: 93% (30 Jan 2022 10:00) (91% - 99%)    LABS:                        10.1   12.25 )-----------( 100      ( 30 Jan 2022 07:07 )             33.5     01-30    142  |  102  |  31<H>  ----------------------------<  146<H>  4.1   |  32  |  <0.5<L>    Ca    7.8<L>      30 Jan 2022 07:07  Mg     2.1     01-30    TPro  4.5<L>  /  Alb  3.0<L>  /  TBili  0.6  /  DBili  x   /  AST  25  /  ALT  59<H>  /  AlkPhos  71  01-30        ABG - ( 30 Jan 2022 04:14 )  pH, Arterial: 7.44  pH, Blood: x     /  pCO2: 54    /  pO2: 108   / HCO3: 37    / Base Excess: 11.0  /  SaO2: 97.8                  Culture - Blood (collected 28 Jan 2022 16:37)  Source: .Blood None  Preliminary Report (29 Jan 2022 23:02):    No growth to date.          RADIOLOGY:      PHYSICAL EXAM:    GENERAL: intubated  HEENT:  Atraumatic, Normocephalic.   PULMONARY: dec breath sounds b/l   CARDIOVASCULAR: normal s1 s2  GASTROINTESTINAL: Soft, Nontender, Nondistended  MUSCULOSKELETAL:  No clubbing, cyanosis, +1 b/l LE  edema  NEUROLOGY: sedated   SKIN: No rashes or lesions      ASSESSMENT & PLAN  81 year old male with a hx of HTN, BPH, recent diagnosis of COVID 19 2 days ago prior to admission -> presented to the ED S/P cardiac arrest.  Patient began to have worsening dyspnea at home, EMS was called. Found to be in cardiac arrest. ROSC was achieved after 2 rounds of CPR and epi, downtime 7 mins. Was intubated in the field.     ·	Acute respiratory failure   ·	Cardiac arrest   ·	COVID-19 pneumonia   ·	NSTEMI type2?  ·	Afib w rvr   ·	L sided pneumothorax - resolved s/p CT - removed    ·	sepsis, low grade fever overnight  ·	Anemia Macrocytic   ·	Prolonged QTC   ·	anemia chronic stable      - very poor prognosis   - no SAT per pulm, fentanyl added    - tubefeeding and IV insulin CC protocol   - free water for hypernatremia -resolved  -  correct electrolytes as needed   -  vent setting/sedation per cc   - IV Dexamethasone  - c/w Cardizem. AC for Afib per CC.    - no echo as per cardiology   - cardio and pulmonary following  -  started lasix 40 mg BID   -  c/w PPI   - DVT prophylaxis  -  started on cefepime - vanc d/dejon  -  ID f/u   -  f/u cultures   -  procal  -  inflammatory markers CRP, d-dimer, fibrinogen  -  chest tube taken out friday - CTS following - cxr looks reexpanded- if collapse  -recall cts for chest tube   -  Poor prognosis    CHADWICK VENCES 81y Male  MRN#: [id number]   CODE STATUS:DNR      SUBJECTIVE  Patient is a 81y old Male who presents with a chief complaint of s/p cardiac arrest, covid (23 Jan 2022 10:27)  Currently admitted to medicine with the primary diagnosis of Cardiac arrest  Today is hospital day 15d, and this morning he is intubated and sedated started on cardizem drip overnight for afib rvr    OBJECTIVE  PAST MEDICAL & SURGICAL HISTORY  BPH (benign prostatic hyperplasia)    Mild HTN      ALLERGIES:  Allergy Status Unknown    MEDICATIONS:  STANDING MEDICATIONS  aspirin  chewable 81 milliGRAM(s) Oral daily  cefepime   IVPB      cefepime   IVPB 1000 milliGRAM(s) IV Intermittent every 8 hours  chlorhexidine 0.12% Liquid 15 milliLiter(s) Oral Mucosa every 12 hours  chlorhexidine 4% Liquid 1 Application(s) Topical <User Schedule>  dexAMETHasone  Injectable 6 milliGRAM(s) IV Push every 12 hours  dexMEDEtomidine Infusion 0.2 MICROgram(s)/kG/Hr IV Continuous <Continuous>  dextrose 5%. 1000 milliLiter(s) IV Continuous <Continuous>  dextrose 5%. 1000 milliLiter(s) IV Continuous <Continuous>  dextrose 50% Injectable 25 Gram(s) IV Push once  dextrose 50% Injectable 12.5 Gram(s) IV Push once  dextrose 50% Injectable 25 Gram(s) IV Push once  diltiazem Infusion 5 mG/Hr IV Continuous <Continuous>  enoxaparin Injectable 40 milliGRAM(s) SubCutaneous at bedtime  fentaNYL   Infusion. 0.5 MICROgram(s)/kG/Hr IV Continuous <Continuous>  furosemide   Injectable 40 milliGRAM(s) IV Push two times a day  glucagon  Injectable 1 milliGRAM(s) IntraMuscular once  insulin lispro (ADMELOG) corrective regimen sliding scale   SubCutaneous every 6 hours  norepinephrine Infusion 0.05 MICROgram(s)/kG/Min IV Continuous <Continuous>  oxyCODONE    IR 10 milliGRAM(s) Oral four times a day  pantoprazole  Injectable 40 milliGRAM(s) IV Push daily  polyethylene glycol 3350 17 Gram(s) Oral daily  propofol Infusion 0.05 MICROgram(s)/kG/Min IV Continuous <Continuous>    PRN MEDICATIONS  acetaminophen     Tablet .. 650 milliGRAM(s) Oral every 6 hours PRN  albuterol/ipratropium for Nebulization 3 milliLiter(s) Nebulizer every 6 hours PRN      VITAL SIGNS: Last 24 Hours  T(C): 37.9 (30 Jan 2022 11:02), Max: 38.2 (29 Jan 2022 17:00)  T(F): 100.2 (30 Jan 2022 11:02), Max: 100.8 (29 Jan 2022 17:00)  HR: 92 (30 Jan 2022 10:00) (81 - 156)  BP: 155/65 (30 Jan 2022 10:00) (99/54 - 159/70)  BP(mean): 93 (30 Jan 2022 10:00) (73 - 110)  RR: 30 (30 Jan 2022 11:02) (29 - 35)  SpO2: 93% (30 Jan 2022 10:00) (91% - 99%)    LABS:                        10.1   12.25 )-----------( 100      ( 30 Jan 2022 07:07 )             33.5     01-30    142  |  102  |  31<H>  ----------------------------<  146<H>  4.1   |  32  |  <0.5<L>    Ca    7.8<L>      30 Jan 2022 07:07  Mg     2.1     01-30    TPro  4.5<L>  /  Alb  3.0<L>  /  TBili  0.6  /  DBili  x   /  AST  25  /  ALT  59<H>  /  AlkPhos  71  01-30        ABG - ( 30 Jan 2022 04:14 )  pH, Arterial: 7.44  pH, Blood: x     /  pCO2: 54    /  pO2: 108   / HCO3: 37    / Base Excess: 11.0  /  SaO2: 97.8                  Culture - Blood (collected 28 Jan 2022 16:37)  Source: .Blood None  Preliminary Report (29 Jan 2022 23:02):    No growth to date.          RADIOLOGY:      PHYSICAL EXAM:    GENERAL: intubated  HEENT:  Atraumatic, Normocephalic.   PULMONARY: dec breath sounds b/l   CARDIOVASCULAR: normal s1 s2  GASTROINTESTINAL: Soft, Nontender, Nondistended  MUSCULOSKELETAL:  No clubbing, cyanosis, +1 b/l LE  edema  NEUROLOGY: sedated   SKIN: No rashes or lesions      ASSESSMENT & PLAN  81 year old male with a hx of HTN, BPH, recent diagnosis of COVID 19 2 days ago prior to admission -> presented to the ED S/P cardiac arrest.  Patient began to have worsening dyspnea at home, EMS was called. Found to be in cardiac arrest. ROSC was achieved after 2 rounds of CPR and epi, downtime 7 mins. Was intubated in the field.     ·	Acute respiratory failure   ·	Cardiac arrest   ·	COVID-19 pneumonia   ·	NSTEMI type2?  ·	Afib w rvr   ·	L sided pneumothorax - resolved s/p CT - removed    ·	sepsis, low grade fever overnight  ·	Anemia Macrocytic   ·	Prolonged QTC   ·	anemia chronic stable   ·	Thrombocytopenia      - very poor prognosis   - no SAT per pulm, fentanyl added    - tubefeeding and IV insulin CC protocol   - free water for hypernatremia -resolved  -  correct electrolytes as needed   -  vent setting/sedation per cc   - IV Dexamethasone  - c/w Cardizem. AC for Afib if platelets repeat stable    - no echo as per cardiology   - cardio and pulmonary following  -  started lasix 40 mg BID   -  c/w PPI   - DVT prophylaxis  -  started on cefepime - vanc d/dejon  -  ID f/u   -  f/u cultures   -  procal  -  inflammatory markers CRP, d-dimer, fibrinogen  -  chest tube taken out friday - CTS following - cxr looks reexpanded- if collapse  -recall cts for chest tube   -  Poor prognosis    CHADWICK VENCES 81y Male  MRN#: [id number]   CODE STATUS:DNR      SUBJECTIVE  Patient is a 81y old Male who presents with a chief complaint of s/p cardiac arrest, covid (23 Jan 2022 10:27)  Currently admitted to medicine with the primary diagnosis of Cardiac arrest  Today is hospital day 15d, and this morning he is intubated and sedated started on cardizem drip overnight for afib rvr    OBJECTIVE  PAST MEDICAL & SURGICAL HISTORY  BPH (benign prostatic hyperplasia)    Mild HTN      ALLERGIES:  Allergy Status Unknown    MEDICATIONS:  STANDING MEDICATIONS  aspirin  chewable 81 milliGRAM(s) Oral daily  cefepime   IVPB      cefepime   IVPB 1000 milliGRAM(s) IV Intermittent every 8 hours  chlorhexidine 0.12% Liquid 15 milliLiter(s) Oral Mucosa every 12 hours  chlorhexidine 4% Liquid 1 Application(s) Topical <User Schedule>  dexAMETHasone  Injectable 6 milliGRAM(s) IV Push every 12 hours  dexMEDEtomidine Infusion 0.2 MICROgram(s)/kG/Hr IV Continuous <Continuous>  dextrose 5%. 1000 milliLiter(s) IV Continuous <Continuous>  dextrose 5%. 1000 milliLiter(s) IV Continuous <Continuous>  dextrose 50% Injectable 25 Gram(s) IV Push once  dextrose 50% Injectable 12.5 Gram(s) IV Push once  dextrose 50% Injectable 25 Gram(s) IV Push once  diltiazem Infusion 5 mG/Hr IV Continuous <Continuous>  enoxaparin Injectable 40 milliGRAM(s) SubCutaneous at bedtime  fentaNYL   Infusion. 0.5 MICROgram(s)/kG/Hr IV Continuous <Continuous>  furosemide   Injectable 40 milliGRAM(s) IV Push two times a day  glucagon  Injectable 1 milliGRAM(s) IntraMuscular once  insulin lispro (ADMELOG) corrective regimen sliding scale   SubCutaneous every 6 hours  norepinephrine Infusion 0.05 MICROgram(s)/kG/Min IV Continuous <Continuous>  oxyCODONE    IR 10 milliGRAM(s) Oral four times a day  pantoprazole  Injectable 40 milliGRAM(s) IV Push daily  polyethylene glycol 3350 17 Gram(s) Oral daily  propofol Infusion 0.05 MICROgram(s)/kG/Min IV Continuous <Continuous>    PRN MEDICATIONS  acetaminophen     Tablet .. 650 milliGRAM(s) Oral every 6 hours PRN  albuterol/ipratropium for Nebulization 3 milliLiter(s) Nebulizer every 6 hours PRN      VITAL SIGNS: Last 24 Hours  T(C): 37.9 (30 Jan 2022 11:02), Max: 38.2 (29 Jan 2022 17:00)  T(F): 100.2 (30 Jan 2022 11:02), Max: 100.8 (29 Jan 2022 17:00)  HR: 92 (30 Jan 2022 10:00) (81 - 156)  BP: 155/65 (30 Jan 2022 10:00) (99/54 - 159/70)  BP(mean): 93 (30 Jan 2022 10:00) (73 - 110)  RR: 30 (30 Jan 2022 11:02) (29 - 35)  SpO2: 93% (30 Jan 2022 10:00) (91% - 99%)    LABS:                        10.1   12.25 )-----------( 100      ( 30 Jan 2022 07:07 )             33.5     01-30    142  |  102  |  31<H>  ----------------------------<  146<H>  4.1   |  32  |  <0.5<L>    Ca    7.8<L>      30 Jan 2022 07:07  Mg     2.1     01-30    TPro  4.5<L>  /  Alb  3.0<L>  /  TBili  0.6  /  DBili  x   /  AST  25  /  ALT  59<H>  /  AlkPhos  71  01-30        ABG - ( 30 Jan 2022 04:14 )  pH, Arterial: 7.44  pH, Blood: x     /  pCO2: 54    /  pO2: 108   / HCO3: 37    / Base Excess: 11.0  /  SaO2: 97.8                  Culture - Blood (collected 28 Jan 2022 16:37)  Source: .Blood None  Preliminary Report (29 Jan 2022 23:02):    No growth to date.          RADIOLOGY:      PHYSICAL EXAM:    GENERAL: intubated  HEENT:  Atraumatic, Normocephalic.   PULMONARY: dec breath sounds b/l   CARDIOVASCULAR: normal s1 s2  GASTROINTESTINAL: Soft, Nontender, Nondistended  MUSCULOSKELETAL:  No clubbing, cyanosis, +1 b/l LE  edema  NEUROLOGY: sedated   SKIN: No rashes or lesions      ASSESSMENT & PLAN  81 year old male with a hx of HTN, BPH, recent diagnosis of COVID 19 2 days ago prior to admission -> presented to the ED S/P cardiac arrest.  Patient began to have worsening dyspnea at home, EMS was called. Found to be in cardiac arrest. ROSC was achieved after 2 rounds of CPR and epi, downtime 7 mins. Was intubated in the field.     ·	Acute respiratory failure   ·	Cardiac arrest   ·	COVID-19 pneumonia   ·	NSTEMI type2?  ·	Afib w rvr   ·	L sided pneumothorax - resolved s/p CT - removed    ·	sepsis, low grade fever overnight  ·	Anemia Macrocytic   ·	Prolonged QTC   ·	anemia chronic stable   ·	Thrombocytopenia r/o HIT     - very poor prognosis   - no SAT per pulm, fentanyl added    - tubefeeding and IV insulin CC protocol   - free water for hypernatremia -resolved  -  correct electrolytes as needed   -  vent setting/sedation per cc   - IV Dexamethasone  - c/w Cardizem. AC for Afib if platelets repeat stable    - no echo as per cardiology   - cardio and pulmonary following  -  started lasix 40 mg BID   -  c/w PPI   - DVT prophylaxis  -  started on cefepime - vanc d/dejon  -  ID f/u   -  f/u cultures   -  procal  -  inflammatory markers CRP, d-dimer, fibrinogen  -  chest tube taken out friday - CTS following - cxr looks reexpanded- if collapse  -recall cts for chest tube   -  Poor prognosis

## 2022-01-31 NOTE — PROGRESS NOTE ADULT - SUBJECTIVE AND OBJECTIVE BOX
Patient is a 81y old  Male who presents with a chief complaint of s/p cardiac arrest, covid (23 Jan 2022 10:27)    Over Night Events:  Remains intubated on MV.  Off pressors.  Sedated on Propofol.    ROS:   All ROS are negative except HPI     PHYSICAL EXAM  ICU Vital Signs Last 24 Hrs  T(C): 37.5 (31 Jan 2022 07:10), Max: 37.9 (30 Jan 2022 11:02)  T(F): 99.5 (31 Jan 2022 07:10), Max: 100.2 (30 Jan 2022 11:02)  HR: 79 (31 Jan 2022 09:01) (75 - 94)  BP: 123/66 (31 Jan 2022 09:01) (103/50 - 158/88)  BP(mean): 87 (31 Jan 2022 09:01) (72 - 104)  RR: 27 (31 Jan 2022 09:01) (25 - 31)  SpO2: 97% (31 Jan 2022 09:01) (90% - 98%)    CAM ICU: Positive  SAT: Y  SBT: N    CONSTITUTIONAL:  Ill appearing in NAD    ENT:   Airway patent,   Mouth with normal mucosa.   No thrush    EYES:   Pupils equal,   Round and reactive to light.    CARDIAC:   Normal rate,   Regular rhythm.    + edema    RESPIRATORY:   No wheezing  Bilateral crackles  Normal chest expansion  Not tachypneic,  No use of accessory muscles    GASTROINTESTINAL:  Abdomen soft,   Non-tender,   No guarding,   + BS    MUSCULOSKELETAL:   Range of motion is not limited,  No clubbing, cyanosis    NEUROLOGICAL:   Sedated  + gag & corneals  Does not follow commands    SKIN:   Skin normal color for race,   Warm and dry and intact.   Sacral ulcer    01-30-22 @ 07:01  -  01-31-22 @ 07:00  --------------------------------------------------------  IN:    Diltiazem: 120 mL    Enteral Tube Flush: 520 mL    IV PiggyBack: 100 mL    Propofol: 122 mL    Vital High Protein: 960 mL  Total IN: 1822 mL    OUT:    Indwelling Catheter - Urethral (mL): 3710 mL  Total OUT: 3710 mL    Total NET: -1888 mL      01-31-22 @ 07:01  -  01-31-22 @ 10:10  --------------------------------------------------------  IN:    Diltiazem: 20 mL    Propofol: 32 mL    Vital High Protein: 160 mL  Total IN: 212 mL    OUT:    Dexmedetomidine: 0 mL    Indwelling Catheter - Urethral (mL): 800 mL    Norepinephrine: 0 mL  Total OUT: 800 mL    Total NET: -588 mL      LABS:                          10.4   11.63 )-----------( 106      ( 31 Jan 2022 06:15 )             34.1     141  |  99  |  35<H>  ----------------------------<  195<H>  3.7   |  33<H>  |  <0.5<L>    Ca    8.1<L>      31 Jan 2022 06:15  Mg     2.0     01-31    TPro  4.5<L>  /  Alb  3.1<L>  /  TBili  0.6  /  DBili  x   /  AST  29  /  ALT  65<H>  /  AlkPhos  77  01-31    LIVER FUNCTIONS - ( 31 Jan 2022 06:15 )  Alb: 3.1 g/dL / Pro: 4.5 g/dL / ALK PHOS: 77 U/L / ALT: 65 U/L / AST: 29 U/L / GGT: x                                              Culture - Bronchial (collected 30 Jan 2022 16:00)  Source: .Bronchial None  Gram Stain (30 Jan 2022 22:58):    Few polymorphonuclear leukocytes per low power field    No Squamous epithelial cells per low power field    Few Gram positive cocci in pairs per oil power field    Culture - Blood (collected 28 Jan 2022 16:37)  Source: .Blood None  Preliminary Report (29 Jan 2022 23:02):    No growth to date.                                              Mode: AC/ CMV (Assist Control/ Continuous Mandatory Ventilation)  RR (machine): 26  TV (machine): 450  FiO2: 45  PEEP: 12  MAP: 15  PIP: 23    ABG - ( 31 Jan 2022 04:00 )  pH, Arterial: 7.52  pH, Blood: x     /  pCO2: 49    /  pO2: 109   / HCO3: 40    / Base Excess: 14.9  /  SaO2: 97.5        MEDICATIONS  (STANDING):  aspirin  chewable 81 milliGRAM(s) Oral daily  cefepime   IVPB      cefepime   IVPB 1000 milliGRAM(s) IV Intermittent every 8 hours  chlorhexidine 0.12% Liquid 15 milliLiter(s) Oral Mucosa every 12 hours  chlorhexidine 4% Liquid 1 Application(s) Topical <User Schedule>  dexAMETHasone  Injectable 6 milliGRAM(s) IV Push every 12 hours  dexMEDEtomidine Infusion 0.2 MICROgram(s)/kG/Hr (3.85 mL/Hr) IV Continuous <Continuous>  dextrose 5%. 1000 milliLiter(s) (100 mL/Hr) IV Continuous <Continuous>  dextrose 5%. 1000 milliLiter(s) (50 mL/Hr) IV Continuous <Continuous>  dextrose 50% Injectable 25 Gram(s) IV Push once  dextrose 50% Injectable 12.5 Gram(s) IV Push once  dextrose 50% Injectable 25 Gram(s) IV Push once  diltiazem Infusion 5 mG/Hr (5 mL/Hr) IV Continuous <Continuous>  enoxaparin Injectable 40 milliGRAM(s) SubCutaneous at bedtime  furosemide   Injectable 40 milliGRAM(s) IV Push two times a day  glucagon  Injectable 1 milliGRAM(s) IntraMuscular once  insulin lispro (ADMELOG) corrective regimen sliding scale   SubCutaneous every 6 hours  norepinephrine Infusion 0.05 MICROgram(s)/kG/Min (3.61 mL/Hr) IV Continuous <Continuous>  pantoprazole  Injectable 40 milliGRAM(s) IV Push daily  polyethylene glycol 3350 17 Gram(s) Oral daily  propofol Infusion 0.05 MICROgram(s)/kG/Min (0.02 mL/Hr) IV Continuous <Continuous>    MEDICATIONS  (PRN):  acetaminophen     Tablet .. 650 milliGRAM(s) Oral every 6 hours PRN Temp greater or equal to 38C (100.4F), Mild Pain (1 - 3)  albuterol/ipratropium for Nebulization 3 milliLiter(s) Nebulizer every 6 hours PRN Shortness of Breath and/or Wheezing      New X-rays reviewed:                                                                                  ECHO    CXR interpreted by me:  ETT OGT OK, bilateral infiltrates

## 2022-01-31 NOTE — PROGRESS NOTE ADULT - SUBJECTIVE AND OBJECTIVE BOX
SUBJECTIVE:    Patient is a 81y old Male who presents with a chief complaint of s/p cardiac arrest, covid (23 Jan 2022 10:27)      HPI:  81 year old male with a hx of HTN, BPH, recent diagnosis of COVID 19 2 days ago (unvaccinated, prescribed decadron/ zpack/ zinc) presenting to the ED S/P cardiac arrest. Patient intubated/ sedated so history obtained from chart (son left and attempted to reach). Patient began to have worsening dyspnea at home, EMS was called. Found to be in respiratory arrest by EMS and followed by EMS. ROSC was achieved after 2 rounds of CPR and epi, downtime 7 mins. Was intubated in the field.   In ED central line was placed. ABG: PH 7.15, PaO2 66, PaCO2 45, HCO3- 16. Troponins .1, BNP > 8000, D-Dimer 7368. COVID 19 positive. Patient being admitted s/p cardiac arrest to ICU.      (15 Aquiles 2022 04:09)      Currently admitted to medicine with the primary diagnosis of Cardiac arrest    no events overnight, no following commands    Besides the pertinent positives and negatives described above, the ROS was within normal limits.    PAST MEDICAL & SURGICAL HISTORY  BPH (benign prostatic hyperplasia)    Mild HTN      SOCIAL HISTORY:    ALLERGIES:  Allergy Status Unknown    MEDICATIONS:  STANDING MEDICATIONS  aspirin  chewable 81 milliGRAM(s) Oral daily  cefepime   IVPB      cefepime   IVPB 1000 milliGRAM(s) IV Intermittent every 8 hours  chlorhexidine 0.12% Liquid 15 milliLiter(s) Oral Mucosa every 12 hours  chlorhexidine 4% Liquid 1 Application(s) Topical <User Schedule>  dexAMETHasone  Injectable 6 milliGRAM(s) IV Push every 12 hours  dexMEDEtomidine Infusion 0.2 MICROgram(s)/kG/Hr IV Continuous <Continuous>  dextrose 5%. 1000 milliLiter(s) IV Continuous <Continuous>  dextrose 5%. 1000 milliLiter(s) IV Continuous <Continuous>  dextrose 50% Injectable 25 Gram(s) IV Push once  dextrose 50% Injectable 12.5 Gram(s) IV Push once  dextrose 50% Injectable 25 Gram(s) IV Push once  diltiazem Infusion 5 mG/Hr IV Continuous <Continuous>  enoxaparin Injectable 40 milliGRAM(s) SubCutaneous at bedtime  glucagon  Injectable 1 milliGRAM(s) IntraMuscular once  insulin lispro (ADMELOG) corrective regimen sliding scale   SubCutaneous every 6 hours  norepinephrine Infusion 0.05 MICROgram(s)/kG/Min IV Continuous <Continuous>  pantoprazole  Injectable 40 milliGRAM(s) IV Push daily  polyethylene glycol 3350 17 Gram(s) Oral daily  propofol Infusion 0.05 MICROgram(s)/kG/Min IV Continuous <Continuous>    PRN MEDICATIONS  acetaminophen     Tablet .. 650 milliGRAM(s) Oral every 6 hours PRN  albuterol/ipratropium for Nebulization 3 milliLiter(s) Nebulizer every 6 hours PRN    VITALS:   T(F): 99  HR: 90  BP: 108/70  RR: 27  SpO2: 97%    LABS:                        10.4   11.63 )-----------( 106      ( 31 Jan 2022 06:15 )             34.1     01-31    141  |  99  |  35<H>  ----------------------------<  195<H>  3.7   |  33<H>  |  <0.5<L>    Ca    8.1<L>      31 Jan 2022 06:15  Mg     2.0     01-31    TPro  4.5<L>  /  Alb  3.1<L>  /  TBili  0.6  /  DBili  x   /  AST  29  /  ALT  65<H>  /  AlkPhos  77  01-31        ABG - ( 31 Jan 2022 04:00 )  pH, Arterial: 7.52  pH, Blood: x     /  pCO2: 49    /  pO2: 109   / HCO3: 40    / Base Excess: 14.9  /  SaO2: 97.5                  Culture - Bronchial (collected 30 Jan 2022 16:00)  Source: .Bronchial None  Gram Stain (30 Jan 2022 22:58):    Few polymorphonuclear leukocytes per low power field    No Squamous epithelial cells per low power field    Few Gram positive cocci in pairs per oil power field    Culture - Blood (collected 28 Jan 2022 16:37)  Source: .Blood None  Preliminary Report (29 Jan 2022 23:02):    No growth to date.          RADIOLOGY:    PHYSICAL EXAM:  GEN: No acute distress  LUNGS: Clear to auscultation bilaterally   HEART: Regular  ABD: Soft, non-tender, non-distended.  EXT: NC/NC/NE/2+PP/SHABAZZ/Skin Intact.   NEURO: not f ollowing commands    Intravenous access: central  NG tube: og tube  Becker Catheter:   Indwelling Urethral Catheter:     Connect To:  Straight Drainage/Gravity    Indication:  Urine Output Monitoring in Critically Ill (01-30-22 @ 13:06) (not performed) [Active]  Indwelling Urethral Catheter:     Connect To:  Straight Drainage/[place]    Indication:  Urinary Retention / Obstruction (01-29-22 @ 10:02) (not performed) [Active]  Indwelling Urethral Catheter:     Connect To:  Straight Drainage/[place]    Indication:  Urinary Retention / Obstruction (01-25-22 @ 16:18) (not performed) [Active]  Indwelling Urethral Catheter:     Connect To:  Straight Drainage/[place]    Indication:  Urinary Retention / Obstruction (01-24-22 @ 15:34) (not performed) [Active]  Indwelling Urethral Catheter:     Connect To:  Straight Drainage/Gravity    Indication:  Urine Output Monitoring in Critically Ill (01-20-22 @ 08:04) (not performed) [Active]  Indwelling Urethral Catheter:     Connect To:  Straight Drainage/Gravity    Indication:  Urine Output Monitoring in Critically Ill (01-19-22 @ 07:52) (not performed) [Active]

## 2022-01-31 NOTE — PROGRESS NOTE ADULT - ASSESSMENT
IMPRESSION:  Acute hypoxic respiratory failure  Cardiac Arrest  Severe COVID PNA  Sepsis on present on admission  Left PNX SP chest tube (discontinued)  Mucus plugs SP Bronchoscopy      SUGGEST:    CNS:  Daily SAT.  Wean Propofol.  Precedex if needed.  Fentanyl prn.    HEENT: Oral care    PULMONARY: HOB @ 45 degrees.  Aspiration precautions.  Vent changes: none.  c/w Dexamethasone 6mg Q12H D#14.  Monitor driving pressure.  Send DTA.    CARDIOVASCULAR: Hold Lasix.  Avoid overload. Keep I < O.    GI: GI prophylaxis.  OG feeding.  Bowel regimen    RENAL:  Follow up lytes.  Correct as needed.  Monitor UO.  Becker care.    INFECTIOUS DISEASE: c/w Cefepime.  Repeat Procalcitonin.  Tend inflammatory markers.  ID FU. FU cultures.    HEMATOLOGICAL: DVT prophylaxis    ENDOCRINE:  Follow up FS.  Insulin protocol if needed.    MUSCULOSKELETAL: bedrest    Left IJ TLC 1/20  DNR  Monitor in MICU  Poor prognosis  The patient is critically ill with a high probability of deterioration. IMPRESSION:  Acute hypoxic respiratory failure  Cardiac Arrest  Severe COVID PNA  Sepsis on present on admission  Left PNX SP chest tube (discontinued)  Mucus plugs SP Bronchoscopy      SUGGEST:    CNS:  Daily SAT.  Wean Propofol.  Precedex if needed.  Fentanyl prn.  CT head    HEENT: Oral care    PULMONARY: HOB @ 45 degrees.  Aspiration precautions.  Vent changes: none.  c/w Dexamethasone 6mg Q12H D#14.  Monitor driving pressure.  Send DTA.    CARDIOVASCULAR: Hold Lasix.  Avoid overload. Keep I < O.    GI: GI prophylaxis.  OG feeding.  Bowel regimen    RENAL:  Follow up lytes.  Correct as needed.  Monitor UO.  Becker care.    INFECTIOUS DISEASE: c/w Cefepime.  Repeat Procalcitonin.  Tend inflammatory markers.  ID FU. FU cultures.    HEMATOLOGICAL: DVT prophylaxis    ENDOCRINE:  Follow up FS.  Insulin protocol if needed.    MUSCULOSKELETAL: bedrest    Left IJ TLC 1/20  DNR  Monitor in MICU  Poor prognosis  The patient is critically ill with a high probability of deterioration.

## 2022-01-31 NOTE — PROGRESS NOTE ADULT - ASSESSMENT
81 year old male with a hx of HTN, BPH, recent diagnosis of COVID 19 2 days ago prior to admission -> presented to the ED S/P cardiac arrest.  Patient began to have worsening dyspnea at home, EMS was called. Found to be in cardiac arrest. ROSC was achieved after 2 rounds of CPR and epi, downtime 7 mins. Was intubated in the field.     acute respiratory failure / Cardiac arrest / COVID-19 pneumonia / probable NSTEMI / L sided pneumothorax - resolved s/p CT      - episode of rapid afib - now on IV Cardizem for rate control   - cardio following   - check  echo    - dexamethasone   - pneumothorax resolved    - probable trach and peg    - DVT prophylaxis

## 2022-01-31 NOTE — PROGRESS NOTE ADULT - SUBJECTIVE AND OBJECTIVE BOX
Patient seen and evaluated this am, sedated on propofol, on Cardizem drip      T(F): 99 (01-31-22 @ 11:01), Max: 99.9 (01-30-22 @ 15:05)  HR: 87 (01-31-22 @ 12:00)  BP: 134/82 (01-31-22 @ 12:00)  RR: 20  SpO2: 95% (01-31-22 @ 12:00) (90% - 98%)    PHYSICAL EXAM:  GENERAL: NAD  HEAD:  Atraumatic, Normocephalic  EYES: EOMI, PERRLA, conjunctiva and sclera clear  NERVOUS SYSTEM:  no focal deficits   CHEST/LUNG:  bilateral rhonchi  HEART: irregular   ABDOMEN: Soft, Nontender, Nondistended; Bowel sounds present  EXTREMITIES:  2+ Peripheral Pulses, No clubbing, cyanosis, or edema    LABS  01-31    141  |  99  |  35<H>  ----------------------------<  195<H>  3.7   |  33<H>  |  <0.5<L>    Ca    8.1<L>      31 Jan 2022 06:15  Mg     2.0     01-31    TPro  4.5<L>  /  Alb  3.1<L>  /  TBili  0.6  /  DBili  x   /  AST  29  /  ALT  65<H>  /  AlkPhos  77  01-31                          10.4   11.63 )-----------( 106      ( 31 Jan 2022 06:15 )             34.1       Mode: AC/ CMV (Assist Control/ Continuous Mandatory Ventilation)  RR (machine): 26  TV (machine): 450  FiO2: 45  PEEP: 12      Culture Results:   No growth to date. (01-28-22)  Culture Results:   Culture is being performed. (01-25-22)  Culture Results:   No Growth Final (01-23-22)  Culture Results:   No growth (01-23-22)    RADIOLOGY  < from: Xray Chest 1 View- PORTABLE-Routine (Xray Chest 1 View- PORTABLE-Routine in AM.) (01.31.22 @ 06:28) >  IMPRESSION:    ET tube is 3 to 4 cm above the claudette. NG tube terminating below the   diaphragm. On telemetry leads. Unchanged left IJ venous catheter.    Diffuse lung opacities without significant change. No pneumothorax.    < end of copied text >    MEDICATIONS  (STANDING):  aspirin  chewable 81 milliGRAM(s) Oral daily     cefepime   IVPB 1000 milliGRAM(s) IV Intermittent every 8 hours  chlorhexidine 0.12% Liquid 15 milliLiter(s) Oral Mucosa every 12 hours  chlorhexidine 4% Liquid 1 Application(s) Topical <User Schedule>  dexAMETHasone  Injectable 6 milliGRAM(s) IV Push every 12 hours  dexMEDEtomidine Infusion 0.2 MICROgram(s)/kG/Hr (3.85 mL/Hr) IV Continuous <Continuous>  diltiazem Infusion 5 mG/Hr (5 mL/Hr) IV Continuous <Continuous>  enoxaparin Injectable 40 milliGRAM(s) SubCutaneous at bedtime  insulin lispro (ADMELOG) corrective regimen sliding scale   SubCutaneous every 6 hours  norepinephrine Infusion 0.05 MICROgram(s)/kG/Min (3.61 mL/Hr) IV Continuous <Continuous>  pantoprazole  Injectable 40 milliGRAM(s) IV Push daily  polyethylene glycol 3350 17 Gram(s) Oral daily  propofol Infusion 0.05 MICROgram(s)/kG/Min (0.02 mL/Hr) IV Continuous <Continuous>    MEDICATIONS  (PRN):  acetaminophen     Tablet .. 650 milliGRAM(s) Oral every 6 hours PRN Temp greater or equal to 38C (100.4F), Mild Pain (1 - 3)  albuterol/ipratropium for Nebulization 3 milliLiter(s) Nebulizer every 6 hours PRN Shortness of Breath and/or Wheezing

## 2022-01-31 NOTE — PROGRESS NOTE ADULT - ASSESSMENT
81 year old male with a hx of HTN, BPH, recent diagnosis of COVID 19 2 days ago (unvaccinated, prescribed decadron/ zpack/ zinc) presenting to the ED S/P cardiac arrest, course complicated by pneumothorax s/p chest tube and removal, and mucus plug removed by bronchoscopy. 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

## 2022-02-01 NOTE — PROGRESS NOTE ADULT - SUBJECTIVE AND OBJECTIVE BOX
Patient is a 81y old  Male who presents with a chief complaint of s/p cardiac arrest, covid (23 Jan 2022 10:27)    Over Night Events: Remains intubated on MV.  Off pressors.  Off sedation since 7:30AM.    ROS:   All ROS are negative except HPI     PHYSICAL EXAM  ICU Vital Signs Last 24 Hrs  T(C): 37.5 (01 Feb 2022 07:10), Max: 37.5 (01 Feb 2022 02:00)  T(F): 99.5 (01 Feb 2022 07:10), Max: 99.5 (01 Feb 2022 02:00)  HR: 72 (01 Feb 2022 09:01) (71 - 92)  BP: 143/65 (01 Feb 2022 09:01) (104/59 - 144/68)  BP(mean): 94 (01 Feb 2022 09:01) (76 - 103)  RR: 28 (01 Feb 2022 09:01) (23 - 30)  SpO2: 97% (01 Feb 2022 09:01) (95% - 99%)    CAM ICU: Positive  SAT: Y  SBT: N    CONSTITUTIONAL:  Ill appearing in NAD    ENT:   Airway patent,   Mouth with normal mucosa.   No thrush    EYES:   Pupils equal,   Round and reactive to light.    CARDIAC:   Normal rate,   Regular rhythm.    + edema    RESPIRATORY:   No wheezing  Bilateral crackles  Normal chest expansion  Not tachypneic,  No use of accessory muscles    GASTROINTESTINAL:  Abdomen soft,   Non-tender,   No guarding,   + BS    MUSCULOSKELETAL:   Range of motion is not limited,  No clubbing, cyanosis    NEUROLOGICAL:   Sedated  + gag & corneals  Does not follow commands    SKIN:   Skin normal color for race,   Warm and dry and intact.   Sacral ulcer    01-31-22 @ 07:01  -  02-01-22 @ 07:00  --------------------------------------------------------  IN:    Diltiazem: 50 mL    IV PiggyBack: 50 mL    Propofol: 206.2 mL    Vital High Protein: 920 mL  Total IN: 1226.2 mL    OUT:    Dexmedetomidine: 0 mL    Indwelling Catheter - Urethral (mL): 2050 mL    Norepinephrine: 0 mL  Total OUT: 2050 mL    Total NET: -823.8 mL      02-01-22 @ 07:01  -  02-01-22 @ 09:51  --------------------------------------------------------  IN:  Total IN: 0 mL    OUT:    Indwelling Catheter - Urethral (mL): 100 mL  Total OUT: 100 mL    Total NET: -100 mL    LABS:                          9.7    9.03  )-----------( 95       ( 01 Feb 2022 06:25 )             32.5     141  |  99  |  35<H>  ----------------------------<  195<H>  3.7   |  33<H>  |  <0.5<L>    Ca    8.1<L>      31 Jan 2022 06:15  Mg     2.0     01-31    TPro  4.5<L>  /  Alb  3.1<L>  /  TBili  0.6  /  DBili  x   /  AST  29  /  ALT  65<H>  /  AlkPhos  77  01-31    LIVER FUNCTIONS - ( 31 Jan 2022 06:15 )  Alb: 3.1 g/dL / Pro: 4.5 g/dL / ALK PHOS: 77 U/L / ALT: 65 U/L / AST: 29 U/L / GGT: x                                              Culture - Bronchial (collected 30 Jan 2022 16:00)  Source: .Bronchial None  Gram Stain (30 Jan 2022 22:58):    Few polymorphonuclear leukocytes per low power field    No Squamous epithelial cells per low power field    Few Gram positive cocci in pairs per oil power field  Preliminary Report (31 Jan 2022 16:59):    Normal Respiratory Corina present    Mode: AC/ CMV (Assist Control/ Continuous Mandatory Ventilation)  RR (machine): 26  TV (machine): 450  FiO2: 45  PEEP: 12  MAP: 19  PIP: 35    ABG - ( 01 Feb 2022 04:24 )  pH, Arterial: 7.49  pH, Blood: x     /  pCO2: 55    /  pO2: 191   / HCO3: 42    / Base Excess: 16.3  /  SaO2: 98.3      MEDICATIONS  (STANDING):  aspirin  chewable 81 milliGRAM(s) Oral daily  cefepime   IVPB      cefepime   IVPB 1000 milliGRAM(s) IV Intermittent every 8 hours  chlorhexidine 0.12% Liquid 15 milliLiter(s) Oral Mucosa every 12 hours  chlorhexidine 4% Liquid 1 Application(s) Topical <User Schedule>  dexAMETHasone  Injectable 6 milliGRAM(s) IV Push every 12 hours  dexMEDEtomidine Infusion 0.2 MICROgram(s)/kG/Hr (3.85 mL/Hr) IV Continuous <Continuous>  dextrose 5%. 1000 milliLiter(s) (100 mL/Hr) IV Continuous <Continuous>  dextrose 5%. 1000 milliLiter(s) (50 mL/Hr) IV Continuous <Continuous>  dextrose 50% Injectable 25 Gram(s) IV Push once  dextrose 50% Injectable 12.5 Gram(s) IV Push once  dextrose 50% Injectable 25 Gram(s) IV Push once  diltiazem    Tablet 30 milliGRAM(s) Oral every 6 hours  diltiazem Infusion 5 mG/Hr (5 mL/Hr) IV Continuous <Continuous>  enoxaparin Injectable 40 milliGRAM(s) SubCutaneous at bedtime  glucagon  Injectable 1 milliGRAM(s) IntraMuscular once  insulin lispro (ADMELOG) corrective regimen sliding scale   SubCutaneous every 6 hours  norepinephrine Infusion 0.05 MICROgram(s)/kG/Min (3.61 mL/Hr) IV Continuous <Continuous>  pantoprazole  Injectable 40 milliGRAM(s) IV Push daily  polyethylene glycol 3350 17 Gram(s) Oral daily  propofol Infusion 0.05 MICROgram(s)/kG/Min (0.02 mL/Hr) IV Continuous <Continuous>    MEDICATIONS  (PRN):  acetaminophen     Tablet .. 650 milliGRAM(s) Oral every 6 hours PRN Temp greater or equal to 38C (100.4F), Mild Pain (1 - 3)  albuterol/ipratropium for Nebulization 3 milliLiter(s) Nebulizer every 6 hours PRN Shortness of Breath and/or Wheezing      New X-rays reviewed:                                                                                  ECHO    CXR interpreted by me:  ETT OGT OK, bilateral infiltrates

## 2022-02-01 NOTE — PROGRESS NOTE ADULT - SUBJECTIVE AND OBJECTIVE BOX
Patient seen and evaluated this am, sedated on propofol      T(F): 99.5 (02-01-22 @ 19:01), Max: 99.7 (02-01-22 @ 19:00)  HR: 70 (02-01-22 @ 19:00)  BP: 115/60 (02-01-22 @ 19:00)  RR: 20  SpO2: 100% (02-01-22 @ 19:00) (95% - 100%)    PHYSICAL EXAM:  GENERAL: NAD  HEAD:  Atraumatic, Normocephalic  EYES: EOMI, PERRLA, conjunctiva and sclera clear  NERVOUS SYSTEM:   no focal deficits   CHEST/LUNG:  bilateral rhonchi  HEART: Regular rate and rhythm; No murmurs, rubs, or gallops  ABDOMEN: Soft, Nontender, Nondistended; Bowel sounds present  EXTREMITIES:  2+ Peripheral Pulses, No clubbing, cyanosis, or edema    LABS  02-01    137  |  95<L>  |  31<H>  ----------------------------<  209<H>  4.1   |  37<H>  |  <0.5<L>    Ca    8.4<L>      01 Feb 2022 11:15  Mg     2.2     02-01    TPro  4.8<L>  /  Alb  3.1<L>  /  TBili  0.8  /  DBili  x   /  AST  27  /  ALT  65<H>  /  AlkPhos  79  02-01                          9.7    9.03  )-----------( 95       ( 01 Feb 2022 06:25 )             32.5       Mode: AC/ CMV (Assist Control/ Continuous Mandatory Ventilation)  RR (machine): 26  TV (machine): 450  FiO2: 40      Culture Results:   Normal Respiratory Corina present (01-30-22)  Culture Results:   No growth to date. (01-28-22)  Culture Results:   Culture is being performed. (01-25-22)  Culture Results:   No Growth Final (01-23-22)  Culture Results:   No growth (01-23-22)    RADIOLOGY  < from: Xray Chest 1 View- PORTABLE-Routine (Xray Chest 1 View- PORTABLE-Routine in AM.) (02.01.22 @ 06:42) >  Lung parenchyma/Pleura: Lung apices incompletely imaged. Bilateral   opacities grossly similar to prior. Bilateral pleural effusions. No   definite pneumothorax.    < end of copied text >  < from: CT Head No Cont (01.31.22 @ 14:02) >  IMPRESSION:    1.  Mild atrophic changes.    2.  Periventricular white matter hypodensities, nonspecific, differential   diagnostic possibilities include ischemic change, gliosis or   demyelination.    3.  Relative hyperdensity within the basal ganglia is nonspecific and can   be seen in patients with hyperglycemia, follow-up examination is   suggested if clinically warranted.    < end of copied text >    MEDICATIONS  (STANDING):  aspirin  chewable 81 milliGRAM(s) Oral daily      cefepime   IVPB 1000 milliGRAM(s) IV Intermittent every 8 hours  chlorhexidine 0.12% Liquid 15 milliLiter(s) Oral Mucosa every 12 hours  chlorhexidine 4% Liquid 1 Application(s) Topical <User Schedule>  dexAMETHasone  Injectable 6 milliGRAM(s) IV Push every 12 hours  dexMEDEtomidine Infusion 0.2 MICROgram(s)/kG/Hr (3.85 mL/Hr) IV Continuous <Continuous>  diltiazem    Tablet 30 milliGRAM(s) Oral every 6 hours  diltiazem Infusion 5 mG/Hr (5 mL/Hr) IV Continuous <Continuous>  enoxaparin Injectable 40 milliGRAM(s) SubCutaneous at bedtime  glucagon  Injectable 1 milliGRAM(s) IntraMuscular once  insulin lispro (ADMELOG) corrective regimen sliding scale   SubCutaneous every 6 hours  norepinephrine Infusion 0.05 MICROgram(s)/kG/Min (3.61 mL/Hr) IV Continuous <Continuous>  pantoprazole  Injectable 40 milliGRAM(s) IV Push daily  polyethylene glycol 3350 17 Gram(s) Oral daily  propofol Infusion 0.05 MICROgram(s)/kG/Min (0.02 mL/Hr) IV Continuous <Continuous>    MEDICATIONS  (PRN):  acetaminophen     Tablet .. 650 milliGRAM(s) Oral every 6 hours PRN Temp greater or equal to 38C (100.4F), Mild Pain (1 - 3)  albuterol/ipratropium for Nebulization 3 milliLiter(s) Nebulizer every 6 hours PRN Shortness of Breath and/or Wheezing

## 2022-02-01 NOTE — CONSULT NOTE ADULT - SUBJECTIVE AND OBJECTIVE BOX
Neurology Consult    Patient is a 81y old  Male who presents with a chief complaint of s/p cardiac arrest, covid (23 Jan 2022 10:27)      HPI:  81 year old male with a hx of HTN, BPH, recent diagnosis of COVID 19 2 days ago (unvaccinated, prescribed decadron/ zpack/ zinc) presenting to the ED S/P cardiac arrest. Patient intubated/ sedated so history obtained from chart (son left and attempted to reach). Patient began to have worsening dyspnea at home, EMS was called. Found to be in respiratory arrest by EMS and followed by EMS. ROSC was achieved after 2 rounds of CPR and epi, downtime 7 mins. Was intubated in the field.   In ED central line was placed. ABG: PH 7.15, PaO2 66, PaCO2 45, HCO3- 16. Troponins .1, BNP > 8000, D-Dimer 7368. COVID 19 positive. Patient being admitted s/p cardiac arrest to ICU.      (15 Aquiles 2022 04:09)    *** Neurology consulted for AMS now that sedation stopped, still not responsive. ***  Nursing notes patient off of fentanyl and propofol since yesterday.    PAST MEDICAL & SURGICAL HISTORY:  BPH (benign prostatic hyperplasia)  Mild HTN        FAMILY HISTORY:      Social History: (-) x 3    Allergies    Allergy Status Unknown    Intolerances        MEDICATIONS  (STANDING):  aspirin  chewable 81 milliGRAM(s) Oral daily  cefepime   IVPB      cefepime   IVPB 1000 milliGRAM(s) IV Intermittent every 8 hours  chlorhexidine 0.12% Liquid 15 milliLiter(s) Oral Mucosa every 12 hours  chlorhexidine 4% Liquid 1 Application(s) Topical <User Schedule>  dexAMETHasone  Injectable 6 milliGRAM(s) IV Push every 12 hours  dexMEDEtomidine Infusion 0.2 MICROgram(s)/kG/Hr (3.85 mL/Hr) IV Continuous <Continuous>  dextrose 5%. 1000 milliLiter(s) (100 mL/Hr) IV Continuous <Continuous>  dextrose 5%. 1000 milliLiter(s) (50 mL/Hr) IV Continuous <Continuous>  dextrose 50% Injectable 25 Gram(s) IV Push once  dextrose 50% Injectable 12.5 Gram(s) IV Push once  dextrose 50% Injectable 25 Gram(s) IV Push once  diltiazem    Tablet 30 milliGRAM(s) Oral every 6 hours  diltiazem Infusion 5 mG/Hr (5 mL/Hr) IV Continuous <Continuous>  enoxaparin Injectable 40 milliGRAM(s) SubCutaneous at bedtime  glucagon  Injectable 1 milliGRAM(s) IntraMuscular once  insulin lispro (ADMELOG) corrective regimen sliding scale   SubCutaneous every 6 hours  norepinephrine Infusion 0.05 MICROgram(s)/kG/Min (3.61 mL/Hr) IV Continuous <Continuous>  pantoprazole  Injectable 40 milliGRAM(s) IV Push daily  polyethylene glycol 3350 17 Gram(s) Oral daily  propofol Infusion 0.05 MICROgram(s)/kG/Min (0.02 mL/Hr) IV Continuous <Continuous>    MEDICATIONS  (PRN):  acetaminophen     Tablet .. 650 milliGRAM(s) Oral every 6 hours PRN Temp greater or equal to 38C (100.4F), Mild Pain (1 - 3)  albuterol/ipratropium for Nebulization 3 milliLiter(s) Nebulizer every 6 hours PRN Shortness of Breath and/or Wheezing      Review of systems:    Constitutional: No fever, weight loss or fatigue    Eyes: No eye pain or discharge  ENMT:  No difficulty hearing; No sinus or throat pain  Neck: No pain or stiffness  Respiratory: No cough, wheezing, chills or hemoptysis  Cardiovascular: No chest pain, palpitations, shortness of breath, dyspnea on exertion  Gastrointestinal: No abdominal pain, nausea, vomiting or hematemesis; No diarrhea or constipation.   Genitourinary: No dysuria, frequency, hematuria or incontinence  Neurological: As per HPI  Skin: No rashes or lesions   Endocrine: No heat or cold intolerance; No hair loss  Musculoskeletal: No joint pain or swelling  Psychiatric: No depression, anxiety, mood swings  Heme/Lymph: No easy bruising or bleeding gums    Vital Signs Last 24 Hrs  T(C): 37.5 (01 Feb 2022 19:01), Max: 37.6 (01 Feb 2022 19:00)  T(F): 99.5 (01 Feb 2022 19:01), Max: 99.7 (01 Feb 2022 19:00)  HR: 64 (01 Feb 2022 20:31) (60 - 88)  BP: 134/65 (01 Feb 2022 20:00) (111/61 - 183/74)  BP(mean): 92 (01 Feb 2022 20:00) (80 - 107)  RR: 26 (01 Feb 2022 20:00) (23 - 30)  SpO2: 100% (01 Feb 2022 20:31) (95% - 100%)    Neurologic Examination:  Patient intubated, no sedation.  General:  Appearance is consistent with chronologic age.  No abnormal facies.   General: Patient is unresponsive to voice and deep pain.    Cranial nerves: Facial mm symmetric, Pupils equal and reactive to light.  Corneal responses are present bilaterally.  No blink to threat.  Oculocephalics are present.    Motor examination:   Normal tone, bulk and range of motion.  No tenderness, twitching, tremors or involuntary movements.  Sensory examination:  No withdrawal or posturing to pain.      Labs:   CBC Full  -  ( 01 Feb 2022 06:25 )  WBC Count : 9.03 K/uL  RBC Count : 3.35 M/uL  Hemoglobin : 9.7 g/dL  Hematocrit : 32.5 %  Platelet Count - Automated : 95 K/uL  Mean Cell Volume : 97.0 fL  Mean Cell Hemoglobin : 29.0 pg  Mean Cell Hemoglobin Concentration : 29.8 g/dL  Auto Neutrophil # : 7.82 K/uL  Auto Lymphocyte # : 0.49 K/uL  Auto Monocyte # : 0.60 K/uL  Auto Eosinophil # : 0.00 K/uL  Auto Basophil # : 0.02 K/uL  Auto Neutrophil % : 86.7 %  Auto Lymphocyte % : 5.4 %  Auto Monocyte % : 6.6 %  Auto Eosinophil % : 0.0 %  Auto Basophil % : 0.2 %    02-01    137  |  95<L>  |  31<H>  ----------------------------<  209<H>  4.1   |  37<H>  |  <0.5<L>    Ca    8.4<L>      01 Feb 2022 11:15  Mg     2.2     02-01    TPro  4.8<L>  /  Alb  3.1<L>  /  TBili  0.8  /  DBili  x   /  AST  27  /  ALT  65<H>  /  AlkPhos  79  02-01    LIVER FUNCTIONS - ( 01 Feb 2022 11:15 )  Alb: 3.1 g/dL / Pro: 4.8 g/dL / ALK PHOS: 79 U/L / ALT: 65 U/L / AST: 27 U/L / GGT: x             < from: EEG Awake or Drowsy (01.19.22 @ 15:00) >  Impression:  -  Abnormal due to thepresence of: generalized slowing as above  Diffuse low voltage slowing    < end of copied text >    < from: CT Head No Cont (01.31.22 @ 14:02) >  1.  Mild atrophic changes.    2.  Periventricular white matter hypodensities, nonspecific, differential   diagnostic possibilities include ischemic change, gliosis or   demyelination.    3.  Relative hyperdensity within the basal ganglia is nonspecific and can   be seen in patients with hyperglycemia, follow-up examination is   suggested if clinically warranted.    < end of copied text >      Assessment:  This is a 81y Male with h/o 7 min Cardiac arrest and now hypoxic encephalopathy.  Brainstem reflexes are present and patient is overbreathing the ventilator.  However absence of flexor posturing or better is unfavorable indicator.    Plan:   1.  Repeat routine EEG.  2.  Increase environmental stimulation - TV or music.  3.  Avoid CNS active medications.    4.  Reexamine in next 48 hours looking for any improvement.  -   02-01-22 @ 22:00

## 2022-02-01 NOTE — CHART NOTE - NSCHARTNOTEFT_GEN_A_CORE
PALLIATIVE MEDICINE INTERDISCIPLINARY TEAM NOTE    Provider:  [x  ]Social Work   [   ]          [ x  ] Initial visit [   ] Follow up    Family or contact name / phone #   Met with: [x   ] Patient :  patient was observed to be resting comfortably. [ x  ] Family:  T/C to son-in-law, Jonny Reza [   ] Other:    Primary Language: [   ] English [   ] Other*:                      *Interpretation provided by:    SUPPORT DIAGNOSES            (Check all that apply)  [   ] Psychosocial spiritual assessment (PSSA)  [   ] EOL issues  [   ] Cultural / spiritual concerns  [   ] Pain / suffering  [   ] Dementia / AMS  [   ] Other:  [x   ] AD issues  [   ] Grief / loss / sadness  [   ] Discharge issues  [  x ] Distress / coping    PSYCHOSOCIAL ASSESSMENT OF PATIENT         (Check all that apply)  [  x ] Initial Assessment            [   ] Reassessment          [   ] Not Applicable this visit    Pain/suffering acuity:   patient was observed to be resting comfortably  [   ] None to mild (0-3)           [   ] Moderate (4-6)        [   ] High (7-10)    Mental Status:  unable to assess  [   ] Alert/oriented (x3)          [   ] Confused/Altered(x2/x1)         [   ] Non-resp    Functional status:  [   ] Independent w ADLs      [   ] Needs Assistance             [  x ] Bedbound/Full Care    Coping:  unable to assess  [   ] Coping well                     [   ] Coping w/difficulty            [   ] Poor coping    Support system:  [  x ] Strong                              [   ] Adequate                        [   ] Inadequate      Past history and medications for:   unknown    [ ] Anxiety       [ ] Depression    [ ] Sleep disorders     SPIRITUAL ASSESSMENT  Islam/Spiritual practice: ___________________________    Role of organized Faith:  [   ] Important                     [   ] Some (fam tradition, cultural)               [   ] None    Effects on medical care:  [   ] Yes, _____________________________________                         [   ] None    Cultural/Restoration need:  [   ] Yes, _____________________________________                         [   ] None    Refer to Pastoral Care:  [   ] Yes           [   ] No, not at this time    SERVICE PROVIDED  [   ]PSSA                                                                             [   ]Discharge support / facilitation  [ x  ]AD / goals of care counseling                                  [   ]EOL / death / bereavement counseling  [  x ]Counseling / support                                                [   ] Family meeting  [   ]Prayer / sacrament / ritual                                      [   ] Referral   [   ]Other                                                                       NOTE and Plan of Care (PoC):    Patient is an 81 year old male.  Patient was admitted on 1/15/22, dx:  Cardiac Arrest, Pneumonia due to COVID, Acute Respiratory Distress.    Patient was observed to be resting comfortably.  T/C to Jonny Reza, son-in-law:  daughter was not available.  Provided information regarding role of Palliative Care.   Daughter, Judy Jackson will call writer later today or tomorrow.

## 2022-02-01 NOTE — PROGRESS NOTE ADULT - SUBJECTIVE AND OBJECTIVE BOX
SUBJECTIVE:    Patient is a 81y old Male who presents with a chief complaint of s/p cardiac arrest, covid (23 Jan 2022 10:27)      HPI:  81 year old male with a hx of HTN, BPH, recent diagnosis of COVID 19 2 days ago (unvaccinated, prescribed decadron/ zpack/ zinc) presenting to the ED S/P cardiac arrest. Patient intubated/ sedated so history obtained from chart (son left and attempted to reach). Patient began to have worsening dyspnea at home, EMS was called. Found to be in respiratory arrest by EMS and followed by EMS. ROSC was achieved after 2 rounds of CPR and epi, downtime 7 mins. Was intubated in the field.   In ED central line was placed. ABG: PH 7.15, PaO2 66, PaCO2 45, HCO3- 16. Troponins .1, BNP > 8000, D-Dimer 7368. COVID 19 positive. Patient being admitted s/p cardiac arrest to ICU.      (15 Aquiles 2022 04:09)      Currently admitted to medicine with the primary diagnosis of Cardiac arrest    off sedation not following any commands    Besides the pertinent positives and negatives described above, the ROS was within normal limits.    PAST MEDICAL & SURGICAL HISTORY  BPH (benign prostatic hyperplasia)    Mild HTN      SOCIAL HISTORY:    ALLERGIES:  Allergy Status Unknown    MEDICATIONS:  STANDING MEDICATIONS  aspirin  chewable 81 milliGRAM(s) Oral daily  cefepime   IVPB      cefepime   IVPB 1000 milliGRAM(s) IV Intermittent every 8 hours  chlorhexidine 0.12% Liquid 15 milliLiter(s) Oral Mucosa every 12 hours  chlorhexidine 4% Liquid 1 Application(s) Topical <User Schedule>  dexAMETHasone  Injectable 6 milliGRAM(s) IV Push every 12 hours  dexMEDEtomidine Infusion 0.2 MICROgram(s)/kG/Hr IV Continuous <Continuous>  dextrose 5%. 1000 milliLiter(s) IV Continuous <Continuous>  dextrose 5%. 1000 milliLiter(s) IV Continuous <Continuous>  dextrose 50% Injectable 25 Gram(s) IV Push once  dextrose 50% Injectable 12.5 Gram(s) IV Push once  dextrose 50% Injectable 25 Gram(s) IV Push once  diltiazem    Tablet 30 milliGRAM(s) Oral every 6 hours  diltiazem Infusion 5 mG/Hr IV Continuous <Continuous>  enoxaparin Injectable 40 milliGRAM(s) SubCutaneous at bedtime  glucagon  Injectable 1 milliGRAM(s) IntraMuscular once  insulin lispro (ADMELOG) corrective regimen sliding scale   SubCutaneous every 6 hours  norepinephrine Infusion 0.05 MICROgram(s)/kG/Min IV Continuous <Continuous>  pantoprazole  Injectable 40 milliGRAM(s) IV Push daily  polyethylene glycol 3350 17 Gram(s) Oral daily  propofol Infusion 0.05 MICROgram(s)/kG/Min IV Continuous <Continuous>    PRN MEDICATIONS  acetaminophen     Tablet .. 650 milliGRAM(s) Oral every 6 hours PRN  albuterol/ipratropium for Nebulization 3 milliLiter(s) Nebulizer every 6 hours PRN    VITALS:   T(F): 99.5  HR: 72  BP: 143/65  RR: 28  SpO2: 97%    LABS:                        9.7    9.03  )-----------( 95       ( 01 Feb 2022 06:25 )             32.5     01-31    141  |  99  |  35<H>  ----------------------------<  195<H>  3.7   |  33<H>  |  <0.5<L>    Ca    8.1<L>      31 Jan 2022 06:15  Mg     2.0     01-31    TPro  4.5<L>  /  Alb  3.1<L>  /  TBili  0.6  /  DBili  x   /  AST  29  /  ALT  65<H>  /  AlkPhos  77  01-31        ABG - ( 01 Feb 2022 04:24 )  pH, Arterial: 7.49  pH, Blood: x     /  pCO2: 55    /  pO2: 191   / HCO3: 42    / Base Excess: 16.3  /  SaO2: 98.3                  Culture - Bronchial (collected 30 Jan 2022 16:00)  Source: .Bronchial None  Gram Stain (30 Jan 2022 22:58):    Few polymorphonuclear leukocytes per low power field    No Squamous epithelial cells per low power field    Few Gram positive cocci in pairs per oil power field  Preliminary Report (31 Jan 2022 16:59):    Normal Respiratory Corina present          RADIOLOGY:    PHYSICAL EXAM:  GEN: No acute distress  LUNGS: Clear to auscultation bilaterally   HEART: Regular  ABD: Soft, non-tender, non-distended.  EXT: NC/NC/NE/2+PP/SHABAZZ/Skin Intact.   NEURO: not following commands    Intravenous access: yes  NG tube:   Becker Catheter:   Indwelling Urethral Catheter:     Connect To:  Straight Drainage/Dalhart    Indication:  Urinary Retention / Obstruction (01-31-22 @ 14:21) (not performed) [Active]  Indwelling Urethral Catheter:     Connect To:  Straight Drainage/Gravity    Indication:  Urine Output Monitoring in Critically Ill (01-30-22 @ 13:06) (not performed) [Active]  Indwelling Urethral Catheter:     Connect To:  Straight Drainage/D[place]hart    Indication:  Urinary Retention / Obstruction (01-29-22 @ 10:02) (not performed) [Active]  Indwelling Urethral Catheter:     Connect To:  Straight Drainage/Dalhart    Indication:  Urinary Retention / Obstruction (01-25-22 @ 16:18) (not performed) [Active]  Indwelling Urethral Catheter:     Connect To:  Straight Drainage/Dalhart    Indication:  Urinary Retention / Obstruction (01-24-22 @ 15:34) (not performed) [Active]  Indwelling Urethral Catheter:     Connect To:  Straight Drainage/Gravity    Indication:  Urine Output Monitoring in Critically Ill (01-20-22 @ 08:04) (not performed) [Active]  Indwelling Urethral Catheter:     Connect To:  Straight Drainage/Gravity    Indication:  Urine Output Monitoring in Critically Ill (01-19-22 @ 07:52) (not performed) [Active]

## 2022-02-01 NOTE — PROGRESS NOTE ADULT - ASSESSMENT
81 year old male with a hx of HTN, BPH, recent diagnosis of COVID 19 2 days ago (unvaccinated, prescribed decadron/ zpack/ zinc) presenting to the ED S/P cardiac arrest, course complicated by pneumothorax s/p chest tube and removal, and mucus plug removed by bronchoscopy. 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

## 2022-02-01 NOTE — PROGRESS NOTE ADULT - ASSESSMENT
81 year old male with a hx of HTN, BPH, recent diagnosis of COVID 19 2 days ago prior to admission -> presented to the ED S/P cardiac arrest.  Patient began to have worsening dyspnea at home, EMS was called. Found to be in cardiac arrest. ROSC was achieved after 2 rounds of CPR and epi, downtime 7 mins. Was intubated in the field.     acute respiratory failure / Cardiac arrest / COVID-19 pneumonia / probable NSTEMI / L sided pneumothorax - resolved s/p CT      - episode of rapid afib - now on Cardizem for rate control   - cardio following   - check  echo    - dexamethasone   - pneumothorax resolved    - suspected anoxic brain injury   - probable trach and peg    - DVT prophylaxis

## 2022-02-01 NOTE — CHART NOTE - NSCHARTNOTEFT_GEN_A_CORE
Registered Dietitian Follow-Up     Patient Profile Reviewed                           Yes [X]   No []     Nutrition History Previously Obtained        Yes []  No [X]       Pertinent  Information: pt is 81 year old male with hx of HTN, BPH, unvaccinated, tested + for COVID 12 days ago, BIBA 2/2 respiratory distress s/p cardiac arrest downtime of 7 minutes, s/p intubation.  + PNA, L sided pnemothorax, s/p chest tube. pt became difficult to ventilate 1/21/22 s/p bronchoscopy  2/2 thick secretions. 1/29 s/p cardizem drip 2/2 afib RVR s/p chest tube removal.  Poor prognosis. DNR. Family to decide on comfort care. propofol held all day        Diet order:  Diet, NPO with Tube Feed:   Tube Feeding Modality: Orogastric  Vital High Protein  Total Volume for 24 Hours (mL): 960  Continuous  Starting Tube Feed Rate {mL per Hour}: 20  Until Goal Tube Feed Rate (mL per Hour): 40  Tube Feed Duration (in Hours): 24  Tube Feed Start Time: 13:00 (01-24-22 @ 13:13) [Active]      Anthropometrics:  - Ht. 175.3 cm  - Wt. 79.6 kg 1/16 vs 81.3  kgs today       Pertinent Lab Data: 2/1/22  hgb 9.7L  hct 32.5L  BUN 31H  ALT 65H  gluc 209H  POCT 178-236H     Pertinent Meds:  MEDICATIONS  (STANDING):  aspirin  chewable 81 milliGRAM(s) Oral daily  cefepime   IVPB 1000 milliGRAM(s) IV Intermittent every 8 hours  dexAMETHasone  Injectable 6 milliGRAM(s) IV Push every 12 hours  dexMEDEtomidine Infusion 0.2 MICROgram(s)/kG/Hr (3.85 mL/Hr) IV Continuous <Continuous>  dextrose 5%. 1000 milliLiter(s) (100 mL/Hr) IV Continuous <Continuous>  diltiazem    Tablet 30 milliGRAM(s) Oral every 6 hours  diltiazem Infusion 5 mG/Hr (5 mL/Hr) IV Continuous <Continuous>  enoxaparin Injectable 40 milliGRAM(s) SubCutaneous at bedtime  glucagon  Injectable 1 milliGRAM(s) IntraMuscular once  insulin lispro (ADMELOG) corrective regimen sliding scale   SubCutaneous every 6 hours  norepinephrine Infusion 0.05 MICROgram(s)/kG/Min (3.61 mL/Hr) IV Continuous <Continuous>  pantoprazole  Injectable 40 milliGRAM(s) IV Push daily  polyethylene glycol 3350 17 Gram(s) Oral daily  propofol Infusion 0.05 MICROgram(s)/kG/Min (0.02 mL/Hr) IV Continuous <Continuous>    MEDICATIONS  (PRN):  acetaminophen     Tablet .. 650 milliGRAM(s) Oral every 6 hours PRN Temp greater or equal to 38C (100.4F), Mild Pain (1 - 3)  albuterol/ipratropium for Nebulization 3 milliLiter(s) Nebulizer every 6 hours PRN Shortness of Breath and/or Wheezing       Physical Findings:  - Appearance: intubated to vent   - GI function: +BS large BM noted today  - Tubes: OGT  - Oral/Mouth cavity:  - Skin:      Nutrition Requirements  Weight Used: 79.6 kgs      Estimated Energy Needs: 1868 kcals [facility],  79.6-96g pro (1.0-1.2g/kg/BW) 1;1 kcal for estimated fluid needs       Nutrient Intake:  948 kcals,  83g protein and 960 ml total volume + 2400 ml H20 flushes  meeting > 85% of estimated protein needs and < 50% of estimated kcal needs.          Nutrition Diagnostic #1  Problem:  inadequate kcal intake   Etiology:  pt was previously on high infusion of propofol  Statement: presently meeting <50% of estimated kcal needs        Nutrition Intervention: recommend to change feeds to Osmolite 1.5 at 30 m/hr increase as tolerated to goal rate of 55 ml/hr will provide 1959 kcals, 82g protein, 1320 ml.      Goal/Expected Outcome: pt to tolerate EN and meet > 80% of estimated  nutrient needs     Indicator/Monitoring: RD to monitor tolerance to EN, labs/meds, NFPF and f/u as needed within 2-4 days.

## 2022-02-01 NOTE — PROGRESS NOTE ADULT - ASSESSMENT
IMPRESSION:  Acute hypoxic respiratory failure  Cardiac Arrest  Severe COVID PNA  Sepsis on present on admission  Left PNX SP chest tube (discontinued)  Mucus plugs SP Bronchoscopy      SUGGEST:    CNS:  Daily SAT.  Wean Propofol.  Precedex if needed.  Fentanyl prn.  CT head    HEENT: Oral care    PULMONARY: HOB @ 45 degrees.  Aspiration precautions.  Vent changes: wean FiO2, decrease PEEP to 10.  c/w Dexamethasone 6mg Q12H D#15.  Monitor driving pressure.  FU DTA.    CARDIOVASCULAR:  Continue holding diuresis.  Avoid overload.  Keep I < O.    GI: GI prophylaxis.  OG feeding.  Bowel regimen    RENAL:  Follow up lytes.  Correct as needed.  Monitor UO.  Becker care.    INFECTIOUS DISEASE: c/w Cefepime.  Repeat Procalcitonin.  Tend inflammatory markers.  ID FU.  FU cultures.    HEMATOLOGICAL: DVT prophylaxis    ENDOCRINE:  Follow up FS.  Insulin protocol if needed.  TSH    MUSCULOSKELETAL: bedrest    Attempt peripheral, DC TLC (Left IJ TLC 1/20)  DNR  Monitor in MICU  Very poor overall prognosis  Palliative eval  The patient is critically ill with a high probability of deterioration. IMPRESSION:  Acute hypoxic respiratory failure  Cardiac Arrest  Severe COVID PNA  Sepsis on present on admission  Left PNX SP chest tube (discontinued)  Mucus plugs SP Bronchoscopy  Ischemic changes on CT Head      SUGGEST:    CNS:  Daily SAT.  Wean Propofol.  Precedex if needed.  Fentanyl prn.  Neuro eval.    HEENT: Oral care    PULMONARY: HOB @ 45 degrees.  Aspiration precautions.  Vent changes: wean FiO2, decrease PEEP to 10.  c/w Dexamethasone 6mg Q12H D#15.  Monitor driving pressure.  FU DTA.    CARDIOVASCULAR:  Continue holding diuresis.  Avoid overload.  Keep I < O.    GI: GI prophylaxis.  OG feeding.  Bowel regimen    RENAL:  Follow up lytes.  Correct as needed.  Monitor UO.  Becker care.    INFECTIOUS DISEASE: c/w Cefepime.  Repeat Procalcitonin.  Tend inflammatory markers.  ID FU.  FU cultures.    HEMATOLOGICAL: DVT prophylaxis    ENDOCRINE:  Follow up FS.  Insulin protocol if needed.  TSH    MUSCULOSKELETAL: bedrest    Attempt peripheral, DC TLC (Left IJ TLC 1/20)  DNR  Monitor in MICU  Very poor overall prognosis  Palliative eval  The patient is critically ill with a high probability of deterioration.

## 2022-02-02 NOTE — PROGRESS NOTE ADULT - SUBJECTIVE AND OBJECTIVE BOX
Patient is a 81y old  Male who presents with a chief complaint of Cardiac arrest (01 Feb 2022 22:00)    Over Night Events:  Remains intubated on MV.  Off pressors.  Off sedation for 24h    ROS:   All ROS are negative except HPI     PHYSICAL EXAM  ICU Vital Signs Last 24 Hrs  T(C): 37.4 (02 Feb 2022 08:00), Max: 37.6 (01 Feb 2022 19:00)  T(F): 99.3 (02 Feb 2022 08:00), Max: 99.7 (01 Feb 2022 19:00)  HR: 61 (02 Feb 2022 08:15) (60 - 88)  BP: 133/73 (02 Feb 2022 07:00) (115/60 - 183/74)  BP(mean): 98 (02 Feb 2022 07:00) (81 - 113)  RR: 26 (02 Feb 2022 08:00) (26 - 29)  SpO2: 100% (02 Feb 2022 08:15) (95% - 100%)    CAM ICU: Positive  SAT: Y  SBT: N    CONSTITUTIONAL:  Ill appearing in NAD    ENT:   Airway patent,   Mouth with normal mucosa.   No thrush    EYES:   Pupils equal,   Round and reactive to light.    CARDIAC:   Normal rate,   Regular rhythm.    + edema    RESPIRATORY:   No wheezing  Bilateral crackles  Normal chest expansion  Not tachypneic,  No use of accessory muscles    GASTROINTESTINAL:  Abdomen soft,   Non-tender,   No guarding,   + BS    MUSCULOSKELETAL:   Range of motion is not limited,  No clubbing, cyanosis    NEUROLOGICAL:   Sedated  + gag & corneals  Does not follow commands    SKIN:   Skin normal color for race,   Warm and dry and intact.   Sacral ulcer        02-01-22 @ 07:01  -  02-02-22 @ 07:00  --------------------------------------------------------  IN:    Enteral Tube Flush: 300 mL    IV PiggyBack: 150 mL    Vital High Protein: 960 mL  Total IN: 1410 mL    OUT:    Indwelling Catheter - Urethral (mL): 1550 mL  Total OUT: 1550 mL    Total NET: -140 mL      02-02-22 @ 07:01 - 02-02-22 @ 09:46  --------------------------------------------------------  IN:  Total IN: 0 mL    OUT:    Indwelling Catheter - Urethral (mL): 125 mL  Total OUT: 125 mL    Total NET: -125 mL      LABS:                            10.6   12.03 )-----------( 104      ( 02 Feb 2022 05:48 )             35.4     144  |  99  |  33<H>  ----------------------------<  224<H>  3.8   |  33<H>  |  <0.5<L>    Ca    8.5      02 Feb 2022 05:48  Mg     2.2     02-02    TPro  4.7<L>  /  Alb  3.0<L>  /  TBili  0.8  /  DBili  x   /  AST  27  /  ALT  76<H>  /  AlkPhos  81  02-02  LIVER FUNCTIONS - ( 02 Feb 2022 05:48 )  Alb: 3.0 g/dL / Pro: 4.7 g/dL / ALK PHOS: 81 U/L / ALT: 76 U/L / AST: 27 U/L / GGT: x                                             Culture - Blood (collected 31 Jan 2022 06:15)  Source: .Blood Blood-Peripheral  Preliminary Report (01 Feb 2022 22:01):    No growth to date.    Culture - Bronchial (collected 30 Jan 2022 16:00)  Source: .Bronchial None  Gram Stain (30 Jan 2022 22:58):    Few polymorphonuclear leukocytes per low power field    No Squamous epithelial cells per low power field    Few Gram positive cocci in pairs per oil power field  Final Report (01 Feb 2022 19:11):    Normal Respiratory Corina present    Mode: AC/ CMV (Assist Control/ Continuous Mandatory Ventilation)  RR (machine): 26  TV (machine): 450  FiO2: 60  PEEP: 10  MAP: 14  PIP: 23    ABG - ( 02 Feb 2022 04:28 ) on FiO2 100%  pH, Arterial: 7.49  pH, Blood: x     /  pCO2: 52    /  pO2: 293   / HCO3: 40    / Base Excess: 14.3  /  SaO2: 98.2      MEDICATIONS  (STANDING):  aspirin  chewable 81 milliGRAM(s) Oral daily  cefepime   IVPB      cefepime   IVPB 1000 milliGRAM(s) IV Intermittent every 8 hours  chlorhexidine 0.12% Liquid 15 milliLiter(s) Oral Mucosa every 12 hours  chlorhexidine 4% Liquid 1 Application(s) Topical <User Schedule>  dexAMETHasone  Injectable 6 milliGRAM(s) IV Push every 12 hours  dexMEDEtomidine Infusion 0.2 MICROgram(s)/kG/Hr (3.85 mL/Hr) IV Continuous <Continuous>  dextrose 5%. 1000 milliLiter(s) (100 mL/Hr) IV Continuous <Continuous>  dextrose 5%. 1000 milliLiter(s) (50 mL/Hr) IV Continuous <Continuous>  dextrose 50% Injectable 25 Gram(s) IV Push once  dextrose 50% Injectable 12.5 Gram(s) IV Push once  dextrose 50% Injectable 25 Gram(s) IV Push once  diltiazem    Tablet 30 milliGRAM(s) Oral every 6 hours  diltiazem Infusion 5 mG/Hr (5 mL/Hr) IV Continuous <Continuous>  enoxaparin Injectable 40 milliGRAM(s) SubCutaneous at bedtime  glucagon  Injectable 1 milliGRAM(s) IntraMuscular once  insulin lispro (ADMELOG) corrective regimen sliding scale   SubCutaneous every 6 hours  norepinephrine Infusion 0.05 MICROgram(s)/kG/Min (3.61 mL/Hr) IV Continuous <Continuous>  pantoprazole  Injectable 40 milliGRAM(s) IV Push daily  polyethylene glycol 3350 17 Gram(s) Oral daily  propofol Infusion 0.05 MICROgram(s)/kG/Min (0.02 mL/Hr) IV Continuous <Continuous>    MEDICATIONS  (PRN):  acetaminophen     Tablet .. 650 milliGRAM(s) Oral every 6 hours PRN Temp greater or equal to 38C (100.4F), Mild Pain (1 - 3)  albuterol/ipratropium for Nebulization 3 milliLiter(s) Nebulizer every 6 hours PRN Shortness of Breath and/or Wheezing      New X-rays reviewed:                                                                                  ECHO    CXR interpreted by me:  ETT OGT OK,  small apical MATHEW PNX

## 2022-02-02 NOTE — PROGRESS NOTE ADULT - ASSESSMENT
This is a 81y Male with h/o 7 min Cardiac arrest and now hypoxic encephalopathy.  Brainstem reflexes are present and patient is overbreathing the ventilator.  However absence of flexor posturing or better is unfavorable indicator.    Plan:   1.  Repeat routine EEG.  2.  Increase environmental stimulation - TV or music.  3.  Avoid CNS active medications.    4.  Reexamine in next 48 hours looking for any improvement.

## 2022-02-02 NOTE — PROGRESS NOTE ADULT - SUBJECTIVE AND OBJECTIVE BOX
Neurology Follow up note    Name  CHADWICK VENCES    HPI:  81 year old male with a hx of HTN, BPH, recent diagnosis of COVID 19 2 days ago (unvaccinated, prescribed decadron/ zpack/ zinc) presenting to the ED S/P cardiac arrest. Patient intubated/ sedated so history obtained from chart (son left and attempted to reach). Patient began to have worsening dyspnea at home, EMS was called. Found to be in respiratory arrest by EMS and followed by EMS. ROSC was achieved after 2 rounds of CPR and epi, downtime 7 mins. Was intubated in the field.   In ED central line was placed. ABG: PH 7.15, PaO2 66, PaCO2 45, HCO3- 16. Troponins .1, BNP > 8000, D-Dimer 7368. COVID 19 positive. Patient being admitted s/p cardiac arrest to ICU.      (15 Aquiles 2022 04:09)      Interval History - remains off sedation          Vital Signs Last 24 Hrs  T(C): 37.1 (02 Feb 2022 11:00), Max: 37.6 (01 Feb 2022 19:00)  T(F): 98.8 (02 Feb 2022 11:00), Max: 99.7 (01 Feb 2022 19:00)  HR: 62 (02 Feb 2022 09:00) (60 - 88)  BP: 141/76 (02 Feb 2022 09:00) (115/60 - 167/79)  BP(mean): 102 (02 Feb 2022 09:00) (81 - 113)  RR: 39 (02 Feb 2022 11:00) (26 - 39)  SpO2: 100% (02 Feb 2022 09:00) (95% - 100%)      Neuro:  MENTAL STATUS: off sedation, does not follow commands  LANG/SPEECH: non-verbal  CRANIAL NERVES: Pupils are equal and reactive, face symmetric - poor cough and gag to suctioning, + corneal reflex, eyes initially deviated to left then R after several minutes, this is not convincing of purposeful movements.  MOTOR: no spontaneous movements - no withdrawal to pain on either side, no posturing to painful stimuli  SENSORY: no reaction to pain in both sides        Medications  acetaminophen     Tablet .. 650 milliGRAM(s) Oral every 6 hours PRN  acetaZOLAMIDE  IVPB 250 milliGRAM(s) IV Intermittent once  albuterol/ipratropium for Nebulization 3 milliLiter(s) Nebulizer every 6 hours PRN  aspirin  chewable 81 milliGRAM(s) Oral daily  cefepime   IVPB      cefepime   IVPB 1000 milliGRAM(s) IV Intermittent every 8 hours  chlorhexidine 0.12% Liquid 15 milliLiter(s) Oral Mucosa every 12 hours  chlorhexidine 4% Liquid 1 Application(s) Topical <User Schedule>  dexMEDEtomidine Infusion 0.2 MICROgram(s)/kG/Hr IV Continuous <Continuous>  dextrose 5%. 1000 milliLiter(s) IV Continuous <Continuous>  dextrose 5%. 1000 milliLiter(s) IV Continuous <Continuous>  dextrose 50% Injectable 25 Gram(s) IV Push once  dextrose 50% Injectable 12.5 Gram(s) IV Push once  dextrose 50% Injectable 25 Gram(s) IV Push once  diltiazem    Tablet 30 milliGRAM(s) Oral every 6 hours  diltiazem Infusion 5 mG/Hr IV Continuous <Continuous>  enoxaparin Injectable 40 milliGRAM(s) SubCutaneous at bedtime  glucagon  Injectable 1 milliGRAM(s) IntraMuscular once  insulin lispro (ADMELOG) corrective regimen sliding scale   SubCutaneous every 6 hours  norepinephrine Infusion 0.05 MICROgram(s)/kG/Min IV Continuous <Continuous>  pantoprazole  Injectable 40 milliGRAM(s) IV Push daily  polyethylene glycol 3350 17 Gram(s) Oral daily  propofol Infusion 0.05 MICROgram(s)/kG/Min IV Continuous <Continuous>      Lab                        10.6   12.03 )-----------( 104      ( 02 Feb 2022 05:48 )             35.4     02-02    144  |  99  |  33<H>  ----------------------------<  224<H>  3.8   |  33<H>  |  <0.5<L>    Ca    8.5      02 Feb 2022 05:48  Mg     2.2     02-02    TPro  4.7<L>  /  Alb  3.0<L>  /  TBili  0.8  /  DBili  x   /  AST  27  /  ALT  76<H>  /  AlkPhos  81  02-02    CAPILLARY BLOOD GLUCOSE      POCT Blood Glucose.: 227 mg/dL (02 Feb 2022 06:06)  POCT Blood Glucose.: 217 mg/dL (01 Feb 2022 23:46)  POCT Blood Glucose.: 192 mg/dL (01 Feb 2022 17:20)  POCT Blood Glucose.: 206 mg/dL (01 Feb 2022 11:25)    LIVER FUNCTIONS - ( 02 Feb 2022 05:48 )  Alb: 3.0 g/dL / Pro: 4.7 g/dL / ALK PHOS: 81 U/L / ALT: 76 U/L / AST: 27 U/L / GGT: x

## 2022-02-02 NOTE — PROGRESS NOTE ADULT - SUBJECTIVE AND OBJECTIVE BOX
Patient seen and evaluated this am, minimally responsive off sedation x 24hrs      T(F): 98.8 (02-02-22 @ 15:00), Max: 99.7 (02-01-22 @ 19:00)  HR: 63 (02-02-22 @ 15:14)  BP: 127/65 (02-02-22 @ 15:01)  RR: 20  SpO2: 100% (02-02-22 @ 15:14) (98% - 100%)    PHYSICAL EXAM:  GENERAL: NAD  HEAD:  Atraumatic, Normocephalic  EYES: EOMI, PERRLA, conjunctiva and sclera clear  NERVOUS SYSTEM:  positive gag reflex  CHEST/LUNG:  bilateral rhonchi  HEART: Regular rate and rhythm; No murmurs, rubs, or gallops  ABDOMEN: Soft, Nontender, Nondistended; Bowel sounds present  EXTREMITIES:  2+ Peripheral Pulses, No clubbing, cyanosis, or edema    LABS  02-02    144  |  99  |  33<H>  ----------------------------<  224<H>  3.8   |  33<H>  |  <0.5<L>    Ca    8.5      02 Feb 2022 05:48  Mg     2.2     02-02    TPro  4.7<L>  /  Alb  3.0<L>  /  TBili  0.8  /  DBili  x   /  AST  27  /  ALT  76<H>  /  AlkPhos  81  02-02                          10.6   12.03 )-----------( 104      ( 02 Feb 2022 05:48 )             35.4       Mode: AC/ CMV (Assist Control/ Continuous Mandatory Ventilation)  RR (machine): 24  TV (machine): 450  FiO2: 60  PEEP: 10    Culture Results:   No growth to date. (01-31-22)  Culture Results:   Normal Respiratory Corina present (01-30-22)  Culture Results:   No growth to date. (01-28-22)  Culture Results:   No growth at 1 week. (01-25-22)  Culture Results:   No Growth Final (01-23-22)  Culture Results:   No growth (01-23-22)    RADIOLOGY  < from: CT Head No Cont (01.31.22 @ 14:02) >  IMPRESSION:    1.  Mild atrophic changes.    2.  Periventricular white matter hypodensities, nonspecific, differential   diagnostic possibilities include ischemic change, gliosis or   demyelination.    3.  Relative hyperdensity within the basal ganglia is nonspecific and can   be seen in patients with hyperglycemia, follow-up examination is   suggested if clinically warranted.    < end of copied text >  < from: Xray Chest 1 View- PORTABLE-Urgent (Xray Chest 1 View- PORTABLE-Urgent .) (02.02.22 @ 11:20) >  Impression:    Again seen are a lack of vascular markings at the left lung apex   suggestive of a pneumothorax, but remains limited in evaluation    Stable bilateral lung opacities      < end of copied text >    MEDICATIONS  (STANDING):  aspirin  chewable 81 milliGRAM(s) Oral daily      cefepime   IVPB 1000 milliGRAM(s) IV Intermittent every 8 hours  chlorhexidine 0.12% Liquid 15 milliLiter(s) Oral Mucosa every 12 hours  chlorhexidine 4% Liquid 1 Application(s) Topical <User Schedule>  diltiazem    Tablet 30 milliGRAM(s) Oral every 6 hours  diltiazem Infusion 5 mG/Hr (5 mL/Hr) IV Continuous <Continuous>  enoxaparin Injectable 40 milliGRAM(s) SubCutaneous at bedtime  glucagon  Injectable 1 milliGRAM(s) IntraMuscular once  insulin lispro (ADMELOG) corrective regimen sliding scale   SubCutaneous every 6 hours  norepinephrine Infusion 0.05 MICROgram(s)/kG/Min (3.61 mL/Hr) IV Continuous <Continuous>  pantoprazole  Injectable 40 milliGRAM(s) IV Push daily  polyethylene glycol 3350 17 Gram(s) Oral daily    MEDICATIONS  (PRN):  acetaminophen     Tablet .. 650 milliGRAM(s) Oral every 6 hours PRN Temp greater or equal to 38C (100.4F), Mild Pain (1 - 3)  albuterol/ipratropium for Nebulization 3 milliLiter(s) Nebulizer every 6 hours PRN Shortness of Breath and/or Wheezing

## 2022-02-02 NOTE — PROGRESS NOTE ADULT - ASSESSMENT
IMPRESSION:  Acute hypoxic respiratory failure  Cardiac Arrest  Severe COVID PNA  Sepsis on present on admission  MATHEW Left PNX, recurrent  Prior left chest tube (discontinued)  Mucus plugs SP Bronchoscopy  Ischemic changes on CT Head      SUGGEST:    CNS:  Daily SAT.  Neuro eval.  CT Head reviewed.  EEG.    HEENT: Oral care    PULMONARY: HOB @ 45 degrees.  Aspiration precautions.  Vent changes: wean FiO2.  c/w Dexamethasone 6mg QD D#16.  Monitor driving pressure.  FU DTA.  Repeat CXR.  CT surgery eval    CARDIOVASCULAR:  Diamox x 1.  Avoid overload.  Keep I < O.    GI: GI prophylaxis.  OG feeding.  Bowel regimen    RENAL:  Follow up lytes.  Correct as needed.  Monitor UO.  Becker care.    INFECTIOUS DISEASE: c/w Cefepime.  Repeat Procalcitonin.  Tend inflammatory markers.  ID FU.  FU cultures.    HEMATOLOGICAL: DVT prophylaxis    ENDOCRINE:  Follow up FS.  Insulin protocol if needed.  TSH    MUSCULOSKELETAL: bedrest    Attempt peripheral, DC TLC (Left IJ TLC 1/20)  DNR  Monitor in MICU  Very poor overall prognosis  Palliative eval  The patient is critically ill with a high probability of deterioration. IMPRESSION:  Acute hypoxic respiratory failure  Cardiac Arrest  Severe COVID PNA  Sepsis on present on admission  MATHEW small PNX, recurrent  Prior left chest tube (discontinued)  Mucus plugs SP Bronchoscopy  Ischemic changes on CT Head      SUGGEST:    CNS:  Daily SAT.  Neuro eval.  CT Head reviewed.  EEG.    HEENT: Oral care    PULMONARY: HOB @ 45 degrees.  Aspiration precautions.    Vent changes: wean FiO2.  c/w Dexamethasone 6mg QD D#16.  Monitor driving pressure.    FU DTA.    Repeat CXR.  CT surgery eval may need CT    CARDIOVASCULAR:  Diamox x 1.  Avoid overload.  Keep I < O.    GI: GI prophylaxis.  OG feeding.  Bowel regimen    RENAL:  Follow up lytes.  Correct as needed.  Monitor UO.  Becker care.    INFECTIOUS DISEASE: c/w Cefepime.    Repeat Procalcitonin.    Tend inflammatory markers.    ID FU.    FU cultures.    HEMATOLOGICAL: DVT prophylaxis    ENDOCRINE:  Follow up FS.  Insulin protocol if needed.  TSH    MUSCULOSKELETAL: bedrest    Attempt peripheral, DC TLC (Left IJ TLC 1/20)  DNR  Monitor in MICU  Very poor overall prognosis  Palliative eval  The patient is critically ill with a high probability of deterioration.

## 2022-02-02 NOTE — PROGRESS NOTE ADULT - ASSESSMENT
81 year old male with a hx of HTN, BPH, recent diagnosis of COVID 19 2 days ago prior to admission -> presented to the ED S/P cardiac arrest.  Patient began to have worsening dyspnea at home, EMS was called. Found to be in cardiac arrest. ROSC was achieved after 2 rounds of CPR and epi, downtime 7 mins. Was intubated in the field.     acute respiratory failure / Cardiac arrest / COVID-19 pneumonia / probable NSTEMI / L sided pneumothorax / suspected anoxic brain injury      - episode of rapid afib - now on Cardizem for rate control   - cardio following   - dexamethasone   - probable trach and peg if family is not liberating pt from ventilator   - DVT prophylaxis

## 2022-02-02 NOTE — PROGRESS NOTE ADULT - ASSESSMENT
81 year old male with a hx of HTN, BPH, recent diagnosis of COVID 19 2 days ago (unvaccinated, prescribed decadron/ zpack/ zinc) presenting to the ED S/P cardiac arrest, course complicated by pneumothorax s/p chest tube and removal, and mucus plug removed by bronchoscopy. patient does not follow any commands as of now    # COVID pneumonia s/p cardiac arrest  - was down for 7 minutes / found to have pneumothorax s/p chest tube and removal  - course complicated by mucus plug removed by bronch  - on minimal vent settings however not following commands  - on cefepime for possible bacterial superinfection, wbc trending down  - received toci and remdesivir, c/w dexa 6 bid day 16 // off sedation  - chest xray concerning for apical pneumothorax on left side as per radiology, repeated xray // having surgery evaluate  - Mode: AC/ CMV (Assist Control/ Continuous Mandatory Ventilation)  RR (machine): 26  TV (machine): 450  FiO2: 60  PEEP: 10  MAP: 14  PIP: 23    # 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

## 2022-02-02 NOTE — PROGRESS NOTE ADULT - SUBJECTIVE AND OBJECTIVE BOX
SUBJECTIVE:    Patient is a 81y old Male who presents with a chief complaint of Cardiac arrest (01 Feb 2022 22:00)      HPI:  81 year old male with a hx of HTN, BPH, recent diagnosis of COVID 19 2 days ago (unvaccinated, prescribed decadron/ zpack/ zinc) presenting to the ED S/P cardiac arrest. Patient intubated/ sedated so history obtained from chart (son left and attempted to reach). Patient began to have worsening dyspnea at home, EMS was called. Found to be in respiratory arrest by EMS and followed by EMS. ROSC was achieved after 2 rounds of CPR and epi, downtime 7 mins. Was intubated in the field.   In ED central line was placed. ABG: PH 7.15, PaO2 66, PaCO2 45, HCO3- 16. Troponins .1, BNP > 8000, D-Dimer 7368. COVID 19 positive. Patient being admitted s/p cardiac arrest to ICU.      (15 Aquiles 2022 04:09)      Currently admitted to medicine with the primary diagnosis of Cardiac arrest    not responding to commands    Besides the pertinent positives and negatives described above, the ROS was within normal limits.    PAST MEDICAL & SURGICAL HISTORY  BPH (benign prostatic hyperplasia)    Mild HTN      SOCIAL HISTORY:    ALLERGIES:  Allergy Status Unknown    MEDICATIONS:  STANDING MEDICATIONS  acetaZOLAMIDE  IVPB 250 milliGRAM(s) IV Intermittent once  aspirin  chewable 81 milliGRAM(s) Oral daily  cefepime   IVPB      cefepime   IVPB 1000 milliGRAM(s) IV Intermittent every 8 hours  chlorhexidine 0.12% Liquid 15 milliLiter(s) Oral Mucosa every 12 hours  chlorhexidine 4% Liquid 1 Application(s) Topical <User Schedule>  dexMEDEtomidine Infusion 0.2 MICROgram(s)/kG/Hr IV Continuous <Continuous>  dextrose 5%. 1000 milliLiter(s) IV Continuous <Continuous>  dextrose 5%. 1000 milliLiter(s) IV Continuous <Continuous>  dextrose 50% Injectable 25 Gram(s) IV Push once  dextrose 50% Injectable 12.5 Gram(s) IV Push once  dextrose 50% Injectable 25 Gram(s) IV Push once  diltiazem    Tablet 30 milliGRAM(s) Oral every 6 hours  diltiazem Infusion 5 mG/Hr IV Continuous <Continuous>  enoxaparin Injectable 40 milliGRAM(s) SubCutaneous at bedtime  glucagon  Injectable 1 milliGRAM(s) IntraMuscular once  insulin lispro (ADMELOG) corrective regimen sliding scale   SubCutaneous every 6 hours  norepinephrine Infusion 0.05 MICROgram(s)/kG/Min IV Continuous <Continuous>  pantoprazole  Injectable 40 milliGRAM(s) IV Push daily  polyethylene glycol 3350 17 Gram(s) Oral daily  propofol Infusion 0.05 MICROgram(s)/kG/Min IV Continuous <Continuous>    PRN MEDICATIONS  acetaminophen     Tablet .. 650 milliGRAM(s) Oral every 6 hours PRN  albuterol/ipratropium for Nebulization 3 milliLiter(s) Nebulizer every 6 hours PRN    VITALS:   T(F): 98.8  HR: 62  BP: 141/76  RR: 39  SpO2: 100%    LABS:                        10.6   12.03 )-----------( 104      ( 02 Feb 2022 05:48 )             35.4     02-02    144  |  99  |  33<H>  ----------------------------<  224<H>  3.8   |  33<H>  |  <0.5<L>    Ca    8.5      02 Feb 2022 05:48  Mg     2.2     02-02    TPro  4.7<L>  /  Alb  3.0<L>  /  TBili  0.8  /  DBili  x   /  AST  27  /  ALT  76<H>  /  AlkPhos  81  02-02        ABG - ( 02 Feb 2022 04:28 )  pH, Arterial: 7.49  pH, Blood: x     /  pCO2: 52    /  pO2: 293   / HCO3: 40    / Base Excess: 14.3  /  SaO2: 98.2                  Culture - Blood (collected 31 Jan 2022 06:15)  Source: .Blood Blood-Peripheral  Preliminary Report (01 Feb 2022 22:01):    No growth to date.    Culture - Bronchial (collected 30 Jan 2022 16:00)  Source: .Bronchial None  Gram Stain (30 Jan 2022 22:58):    Few polymorphonuclear leukocytes per low power field    No Squamous epithelial cells per low power field    Few Gram positive cocci in pairs per oil power field  Final Report (01 Feb 2022 19:11):    Normal Respiratory Corina present          RADIOLOGY:    PHYSICAL EXAM:  GEN: No acute distress  LUNGS: Clear to auscultation bilaterally   HEART: Regular  ABD: Soft, non-tender, non-distended.  EXT: NC/NC/NE/2+PP/SHABAZZ/Skin Intact.   NEURO: not responding to commands    Intravenous access: yes  NG tube: og tube  Becker Catheter:   Indwelling Urethral Catheter:     Connect To:  Straight Drainage/Junction    Indication:  Urinary Retention / Obstruction (02-01-22 @ 19:51) (not performed) [Active]  Indwelling Urethral Catheter:     Connect To:  Straight Drainage/Junction    Indication:  Urinary Retention / Obstruction (01-31-22 @ 14:21) (not performed) [Active]  Indwelling Urethral Catheter:     Connect To:  Straight Drainage/Gravity    Indication:  Urine Output Monitoring in Critically Ill (01-30-22 @ 13:06) (not performed) [Active]  Indwelling Urethral Catheter:     Connect To:  Straight Drainage/Junction    Indication:  Urinary Retention / Obstruction (01-29-22 @ 10:02) (not performed) [Active]  Indwelling Urethral Catheter:     Connect To:  Straight Drainage/Junction    Indication:  Urinary Retention / Obstruction (01-25-22 @ 16:18) (not performed) [Active]  Indwelling Urethral Catheter:     Connect To:  Straight Drainage/Junction    Indication:  Urinary Retention / Obstruction (01-24-22 @ 15:34) (not performed) [Active]  Indwelling Urethral Catheter:     Connect To:  Straight Drainage/Gravity    Indication:  Urine Output Monitoring in Critically Ill (01-20-22 @ 08:04) (not performed) [Active]

## 2022-02-03 NOTE — PROGRESS NOTE ADULT - SUBJECTIVE AND OBJECTIVE BOX
Patient  is afebrile  and  remains intubated/vented.  The WBC  count is trending down, The blood culture from 1/31/22 grew Coagulase negative Staphylococcus, most likely a contaminant.       REVIEW OF SYSTEMS: Unable  to obtain due to mental status       Allergy Status Unknown      PHYSICAL EXAM:   ICU Vital Signs Last 24 Hrs  T(C): 37.2 (03 Feb 2022 07:10), Max: 37.4 (02 Feb 2022 08:00)  T(F): 99 (03 Feb 2022 07:10), Max: 99.3 (02 Feb 2022 08:00)  HR: 70 (03 Feb 2022 06:00) (60 - 71)  BP: 140/77 (03 Feb 2022 06:00) (127/65 - 168/87)  BP(mean): 102 (03 Feb 2022 06:00) (90 - 121)  ABP: --  ABP(mean): --  RR: 24 (03 Feb 2022 07:10) (20 - 39)  SpO2: 100% (03 Feb 2022 06:00) (98% - 100%)      LABS:                        10.6   12.03 )-----------( 104      ( 02 Feb 2022 05:48 )             35.4                           10.1   18.15 )-----------( 120      ( 27 Jan 2022 13:50 )             34.2                           10.3   24.47 )-----------( 218      ( 23 Jan 2022 06:35 )             34.9       02-03    141  |  102  |  29<H>  ----------------------------<  172<H>  4.1   |  28  |  <0.5<L>    Ca    8.1<L>      03 Feb 2022 06:00  Mg     2.2     02-03    TPro  4.5<L>  /  Alb  3.1<L>  /  TBili  0.7  /  DBili  x   /  AST  25  /  ALT  56<H>  /  AlkPhos  76  02-03 01-27    139  |  102  |  39<H>  ----------------------------<  253<H>  5.2<H>   |  28  |  0.5<L>    Ca    7.9<L>      27 Jan 2022 13:50    TPro  4.6<L>  /  Alb  3.0<L>  /  TBili  0.6  /  DBili  x   /  AST  25  /  ALT  64<H>  /  AlkPhos  68  01-27        CAPILLARY BLOOD GLUCOSE  POCT Blood Glucose.: 369 mg/dL (23 Jan 2022 12:49)  POCT Blood Glucose.: 348 mg/dL (23 Jan 2022 06:38)      ABG - ( 23 Jan 2022 05:20 )  pH, Arterial: 7.30  pH, Blood: x     /  pCO2: 58    /  pO2: 268   / HCO3: 28    / Base Excess: 1.5   /  SaO2: 96.3          Urinalysis Basic - ( 23 Jan 2022 11:00 )  Color: Yellow / Appearance: Clear / SG: >=1.030 / pH: x  Gluc: x / Ketone: Negative  / Bili: Negative / Urobili: 0.2 mg/dL   Blood: x / Protein: Negative mg/dL / Nitrite: Negative   Leuk Esterase: Negative / RBC: 3-5 /HPF / WBC 1-2 /HPF   Sq Epi: x / Non Sq Epi: Occasional /HPF / Bacteria: Few        Procalcitonin, Serum (02.01.22 @ 06:25)   Procalcitonin, Serum: 0.03  Procalcitonin, Serum (01.19.22 @ 06:30) : 0.44  Procalcitonin, Serum (01.16.22 @ 06:56) : 2.32    MEDICATIONS  (STANDING):    aspirin  chewable 81 milliGRAM(s) Oral daily  cefepime   IVPB      cefepime   IVPB 1000 milliGRAM(s) IV Intermittent every 8 hours  chlorhexidine 0.12% Liquid 15 milliLiter(s) Oral Mucosa every 12 hours  chlorhexidine 4% Liquid 1 Application(s) Topical <User Schedule>  dexAMETHasone  Injectable 6 milliGRAM(s) IV Push daily  dexMEDEtomidine Infusion 0.2 MICROgram(s)/kG/Hr (3.85 mL/Hr) IV Continuous <Continuous>  diltiazem    Tablet 30 milliGRAM(s) Oral every 6 hours  diltiazem Infusion 5 mG/Hr (5 mL/Hr) IV Continuous <Continuous>  enoxaparin Injectable 40 milliGRAM(s) SubCutaneous at bedtime  glucagon  Injectable 1 milliGRAM(s) IntraMuscular once  insulin lispro (ADMELOG) corrective regimen sliding scale   SubCutaneous every 6 hours  norepinephrine Infusion 0.05 MICROgram(s)/kG/Min (3.61 mL/Hr) IV Continuous <Continuous>  pantoprazole  Injectable 40 milliGRAM(s) IV Push daily  polyethylene glycol 3350 17 Gram(s) Oral daily  propofol Infusion 0.05 MICROgram(s)/kG/Min (0.02 mL/Hr) IV Continuous <Continuous>  vancomycin  IVPB      vancomycin  IVPB 1000 milliGRAM(s) IV Intermittent once  vancomycin  IVPB 1000 milliGRAM(s) IV Intermittent every 12 hours        RADIOLOGY & ADDITIONAL TESTS:    < from: Xray Chest 1 View- PORTABLE-Urgent (Xray Chest 1 View- PORTABLE-Urgent .) (02.02.22 @ 11:20) >    Again seen are a lack of vascular markings at the left lung apex   suggestive of a pneumothorax, but remains limited in evaluation    Stable bilateral lung opacities    r< from: Xray Chest 1 View- PORTABLE-Routine (Xray Chest 1 View- PORTABLE-Routine in AM.) (01.23.22 @ 07:52) >  Bilateral opacities similar to prior  Left-sided chest tube unchanged in position.      < from: Xray Chest 1 View- PORTABLE-Routine (Xray Chest 1 View- PORTABLE-Routine in AM.) (01.22.22 @ 06:34) >  Support devices: ET tube mid trachea central venous line superior vena  cava NG tube in stomach    Cardiac/mediastinum/hilum: Unremarkable.    Lung parenchyma/Pleura: Decreased previous bilateral opacities. No  pleural effusion or air leak    Skeleton/soft tissues: Unremarkable.        MICROBIOLOGY DATA:    mCulture - Blood in AM (01.31.22 @ 06:15)   - Coagulase negative Staphylococcus: Detec   Gram Stain:   Growth in anaerobic bottle: Gram positive cocci in pairs   Specimen Source: .Blood Blood-Peripheral   Organism: Blood Culture PCR   Culture Results: Growth in anaerobic bottle: Gram positive cocci in pairs   ***Blood Panel PCR results on this specimen are available     Culture - Bronchial (01.30.22 @ 16:00)   Gram Stain: Few polymorphonuclear leukocytes per low power field   No Squamous epithelial cells per low power field   Few Gram positive cocci in pairs per oil power field   Specimen Source: .Bronchial None   Culture Results:   Normal Respiratory Corina present     Culture - Blood (01.28.22 @ 16:37)   Specimen Source: .Blood None   Culture Results: No Growth Final     MRSA/MSSA PCR (01.23.22 @ 11:00)   MRSA PCR Result.: Negative    Respiratory Viral Panel with COVID-19 by JEN (01.15.22 @ 01:25)   Rapid RVP Result: Detected   SARS-CoV-2: Detected:

## 2022-02-03 NOTE — PROGRESS NOTE ADULT - ASSESSMENT
81 year old male with a hx of HTN, BPH, recent diagnosis of COVID 19 2 days ago (unvaccinated, prescribed decadron/ zpack/ zinc) presenting to the ED S/P cardiac arrest, course complicated by pneumothorax s/p chest tube and removal, and mucus plug removed by bronchoscopy. patient does not follow any commands as of now    # COVID pneumonia s/p cardiac arrest  - was down for 7 minutes / found to have pneumothorax s/p chest tube and removal  - course complicated by mucus plug removed by bronch  - on minimal vent settings however not following commands  - seen by ID recommending to stop abx  - received toci and remdesivir, c/w dexa 6 bid day 17 // off sedation  - chest xray concerning for apical pneumothorax on left side as per radiology, repeated xray // having surgery evaluate  Mode: AC/ CMV (Assist Control/ Continuous Mandatory Ventilation)  RR (machine): 24  TV (machine): 450  FiO2: 60  PEEP: 10  MAP: 12  PIP: 18    # Likely hypoxic brain injury  - not following commands off sedation; CT head showed periventricular white matter changes which can reflect ischemia  - f/u neuro, recommending EEG    PLAN: monitor off sedation    # DVT PPX: lovenox  # GI PPX: protonix  # Diet: tube feeding  DNR 81 year old male with a hx of HTN, BPH, recent diagnosis of COVID 19 2 days ago (unvaccinated, prescribed decadron/ zpack/ zinc) presenting to the ED S/P cardiac arrest, course complicated by pneumothorax s/p chest tube and removal, and mucus plug removed by bronchoscopy. patient does not follow any commands as of now    # COVID pneumonia s/p cardiac arrest  - was down for 7 minutes / found to have pneumothorax s/p chest tube and removal  - course complicated by mucus plug removed by bronch  - on minimal vent settings however not following commands  - seen by ID recommending to stop abx  - received toci and remdesivir, c/w dexa 6 bid day 17 // off sedation  - chest xray concerning for apical pneumothorax on left side as per radiology, repeated xray // having surgery evaluate  Mode: AC/ CMV (Assist Control/ Continuous Mandatory Ventilation)  RR (machine): 24  TV (machine): 450  FiO2: 60  PEEP: 10  MAP: 12  PIP: 18    # 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

## 2022-02-03 NOTE — CONSULT NOTE ADULT - PROBLEM/RECOMMENDATION-4
"Oncology Rooming Note    October 9, 2019 9:34 AM   Capri Chacon is a 71 year old female who presents for:    Chief Complaint   Patient presents with     Oncology Clinic Visit     Malignant neoplasm (H)     Initial Vitals: BP (!) 181/71 (BP Location: Right arm, Patient Position: Sitting, Cuff Size: Adult Regular)   Pulse 66   Temp 98.4  F (36.9  C) (Oral)   Resp 18   Ht 1.499 m (4' 11\")   Wt 59.3 kg (130 lb 12.8 oz)   LMP 01/01/2000   SpO2 96%   BMI 26.42 kg/m   Estimated body mass index is 26.42 kg/m  as calculated from the following:    Height as of this encounter: 1.499 m (4' 11\").    Weight as of this encounter: 59.3 kg (130 lb 12.8 oz). Body surface area is 1.57 meters squared.  No Pain (0) Comment: Data Unavailable   Patient's last menstrual period was 01/01/2000.  Allergies reviewed: Yes  Medications reviewed: Yes    Medications: MEDICATION REFILLS NEEDED TODAY. Provider was notified. Refill ANASTROZOLE  Pharm[facility] entered into EPIC[facility] [place], [place] - [address] AT PORTAL TO REGISTERED [facility] SITES    Clinical concerns: no         Yaima Sow CMA    " DISPLAY PLAN FREE TEXT

## 2022-02-03 NOTE — PROGRESS NOTE ADULT - SUBJECTIVE AND OBJECTIVE BOX
Patient remains unresponsive on vent off sedation      T(F): 98.4 (02-03-22 @ 15:02), Max: 99.1 (02-02-22 @ 23:00)  HR: 64 (02-03-22 @ 12:53)  BP: 120/60 (02-03-22 @ 09:01)  RR: 26/24  SpO2: 99% (02-03-22 @ 15:02) (98% - 100%)    PHYSICAL EXAM:  GENERAL: NAD  HEAD:  Atraumatic, Normocephalic  EYES: EOMI, PERRLA, conjunctiva and sclera clear  NERVOUS SYSTEM:  positive gag reflex  CHEST/LUNG:  bilateral rhonchi  HEART: Regular rate and rhythm; No murmurs, rubs, or gallops  ABDOMEN: Soft, Nontender, Nondistended; Bowel sounds present  EXTREMITIES:  2+ Peripheral Pulses, No clubbing, cyanosis, or edema    LABS  02-03    141  |  102  |  29<H>  ----------------------------<  172<H>  4.1   |  28  |  <0.5<L>    Ca    8.1<L>      03 Feb 2022 06:00  Mg     2.2     02-03    TPro  4.5<L>  /  Alb  3.1<L>  /  TBili  0.7  /  DBili  x   /  AST  25  /  ALT  56<H>  /  AlkPhos  76  02-03                          12.0   12.56 )-----------( 80       ( 03 Feb 2022 06:00 )             39.0       Mode: AC/ CMV (Assist Control/ Continuous Mandatory Ventilation)  RR (machine): 24  TV (machine): 450  FiO2: 50  PEEP: 8      Culture Results:   Growth in anaerobic bottle: Gram positive cocci in pairs  ***Blood Panel PCR results on this specimen are available  approximately 3 hours after the Gram stain result.***  Gram stain, PCR, and/or culture results may not always  correspond due to difference in methodologies.  ************************************************************  This PCR assay was performed by multiplex PCR. This  Assay tests for 66 bacterial and resistance gene targets.  Please refer to the [facility] test directory  at [email or web address] for details. (01-31-22)  Culture Results:   Normal Respiratory Corina present (01-30-22)  Culture Results:   No Growth Final (01-28-22)  Culture Results:   No growth at 1 week. (01-25-22)  Culture Results:   No Growth Final (01-23-22)  Culture Results:   No growth (01-23-22)    RADIOLOGY  < from: Xray Chest 1 View- PORTABLE-Routine (Xray Chest 1 View- PORTABLE-Routine in AM.) (02.03.22 @ 07:18) >  Impression:    Stable bilateral lung opacities    < end of copied text >    MEDICATIONS  (STANDING):  aspirin  chewable 81 milliGRAM(s) Oral daily  chlorhexidine 0.12% Liquid 15 milliLiter(s) Oral Mucosa every 12 hours  chlorhexidine 4% Liquid 1 Application(s) Topical <User Schedule>  diltiazem    Tablet 30 milliGRAM(s) Oral every 6 hours  enoxaparin Injectable 40 milliGRAM(s) SubCutaneous at bedtime  insulin lispro (ADMELOG) corrective regimen sliding scale   SubCutaneous every 6 hours  pantoprazole  Injectable 40 milliGRAM(s) IV Push daily  polyethylene glycol 3350 17 Gram(s) Oral daily    MEDICATIONS  (PRN):  acetaminophen     Tablet .. 650 milliGRAM(s) Oral every 6 hours PRN Temp greater or equal to 38C (100.4F), Mild Pain (1 - 3)  albuterol/ipratropium for Nebulization 3 milliLiter(s) Nebulizer every 6 hours PRN Shortness of Breath and/or Wheezing

## 2022-02-03 NOTE — PROGRESS NOTE ADULT - ASSESSMENT
IMPRESSION:  Acute hypoxic respiratory failure  Anoxic brain injury  Cardiac Arrest  Severe COVID PNA  Sepsis on present on admission  Left PNX SP chest tube, resolved  Mucus plugs SP Bronchoscopy  Ischemic changes on CT Head      SUGGEST:    CNS:  Daily SAT.  Neuro FU.  CT Head reviewed.  EEG reviewed.    HEENT: Oral care    PULMONARY: HOB @ 45 degrees.  Aspiration precautions.  Spontaneous respiratory drive on Apnea test  Vent changes: decrease PEEP to 8, decrease Dexamethasone to 4mg QD (day 17).  Monitor driving pressure.     CARDIOVASCULAR:  Hold diuresis today.  Avoid overload.  Keep I < O.    GI: GI prophylaxis.  OG feeding.  Bowel regimen    RENAL:  Follow up lytes.  Correct as needed.  Monitor UO.  Becker care.    INFECTIOUS DISEASE: Abx per ID.  Repeat Procalcitonin.    Tend inflammatory markers.  ID FU. FU cultures.    HEMATOLOGICAL: DVT prophylaxis    ENDOCRINE:  Follow up FS.  Insulin protocol if needed.  TSH    MUSCULOSKELETAL: bedrest    DNR  Monitor in MICU  Very poor overall prognosis  Palliative FU  Will discuss with family regarding poor prognosis and possible terminal weaning  The patient is critically ill with a high probability of deterioration.

## 2022-02-03 NOTE — CONSULT NOTE ADULT - ASSESSMENT
81M with PMH of HTN, BPH, recent COVID-19 (unvaccinated status) here after cardiac arrest x 7 mins, c/b PTX s/p chest tube and removal, and bronch to remove mucus plug. 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 However, he has on purposeful motor responses, and neuro recommended repeat imaging to assess infarct extent. Palliative care called for GOC.

## 2022-02-03 NOTE — PROGRESS NOTE ADULT - SUBJECTIVE AND OBJECTIVE BOX
SUBJECTIVE:    Patient is a 81y old Male who presents with a chief complaint of Cardiac arrest (01 Feb 2022 22:00)      HPI:  81 year old male with a hx of HTN, BPH, recent diagnosis of COVID 19 2 days ago (unvaccinated, prescribed decadron/ zpack/ zinc) presenting to the ED S/P cardiac arrest. Patient intubated/ sedated so history obtained from chart (son left and attempted to reach). Patient began to have worsening dyspnea at home, EMS was called. Found to be in respiratory arrest by EMS and followed by EMS. ROSC was achieved after 2 rounds of CPR and epi, downtime 7 mins. Was intubated in the field.   In ED central line was placed. ABG: PH 7.15, PaO2 66, PaCO2 45, HCO3- 16. Troponins .1, BNP > 8000, D-Dimer 7368. COVID 19 positive. Patient being admitted s/p cardiac arrest to ICU.      (15 Aquiles 2022 04:09)      Currently admitted to medicine with the primary diagnosis of Cardiac arrest     not responding to any commands    Besides the pertinent positives and negatives described above, the ROS was within normal limits.    PAST MEDICAL & SURGICAL HISTORY  BPH (benign prostatic hyperplasia)    Mild HTN      SOCIAL HISTORY:    ALLERGIES:  Allergy Status Unknown    MEDICATIONS:  STANDING MEDICATIONS  aspirin  chewable 81 milliGRAM(s) Oral daily  chlorhexidine 0.12% Liquid 15 milliLiter(s) Oral Mucosa every 12 hours  chlorhexidine 4% Liquid 1 Application(s) Topical <User Schedule>  dexAMETHasone  Injectable 6 milliGRAM(s) IV Push daily  dexMEDEtomidine Infusion 0.2 MICROgram(s)/kG/Hr IV Continuous <Continuous>  dextrose 5%. 1000 milliLiter(s) IV Continuous <Continuous>  dextrose 5%. 1000 milliLiter(s) IV Continuous <Continuous>  dextrose 50% Injectable 25 Gram(s) IV Push once  dextrose 50% Injectable 12.5 Gram(s) IV Push once  dextrose 50% Injectable 25 Gram(s) IV Push once  diltiazem    Tablet 30 milliGRAM(s) Oral every 6 hours  enoxaparin Injectable 40 milliGRAM(s) SubCutaneous at bedtime  glucagon  Injectable 1 milliGRAM(s) IntraMuscular once  insulin lispro (ADMELOG) corrective regimen sliding scale   SubCutaneous every 6 hours  norepinephrine Infusion 0.05 MICROgram(s)/kG/Min IV Continuous <Continuous>  pantoprazole  Injectable 40 milliGRAM(s) IV Push daily  polyethylene glycol 3350 17 Gram(s) Oral daily  propofol Infusion 0.05 MICROgram(s)/kG/Min IV Continuous <Continuous>    PRN MEDICATIONS  acetaminophen     Tablet .. 650 milliGRAM(s) Oral every 6 hours PRN  albuterol/ipratropium for Nebulization 3 milliLiter(s) Nebulizer every 6 hours PRN    VITALS:   T(F): 99  HR: 70  BP: 140/77  RR: 24  SpO2: 100%    LABS:                        10.6   12.03 )-----------( 104      ( 02 Feb 2022 05:48 )             35.4     02-03    141  |  102  |  29<H>  ----------------------------<  172<H>  4.1   |  28  |  <0.5<L>    Ca    8.1<L>      03 Feb 2022 06:00  Mg     2.2     02-03    TPro  4.5<L>  /  Alb  3.1<L>  /  TBili  0.7  /  DBili  x   /  AST  25  /  ALT  56<H>  /  AlkPhos  76  02-03        ABG - ( 03 Feb 2022 04:41 )  pH, Arterial: 7.38  pH, Blood: x     /  pCO2: 55    /  pO2: 134   / HCO3: 32    / Base Excess: 6.0   /  SaO2: 98.4                      RADIOLOGY:    PHYSICAL EXAM:  GEN: No acute distress  LUNGS: Clear to auscultation bilaterally   HEART: Regular  ABD: Soft, non-tender, non-distended.  EXT: NC/NC/NE/2+PP/SHABAZZ/Skin Intact.   NEURO: not responding to any commands    Intravenous access: peripheral lines  NG tube: og tube  Becker Catheter:   Indwelling Urethral Catheter:     Connect To:  Straight Drainage/[place]    Indication:  Urinary Retention / Obstruction (02-01-22 @ 19:51) (not performed) [Active]  Indwelling Urethral Catheter:     Connect To:  Straight Drainage/[place]    Indication:  Urinary Retention / Obstruction (01-31-22 @ 14:21) (not performed) [Active]  Indwelling Urethral Catheter:     Connect To:  Straight Drainage/[place]    Indication:  Urine Output Monitoring in Critically Ill (01-30-22 @ 13:06) (not performed) [Active]  Indwelling Urethral Catheter:     Connect To:  Straight Drainage/[place]    Indication:  Urinary Retention / Obstruction (01-29-22 @ 10:02) (not performed) [Active]  Indwelling Urethral Catheter:     Connect To:  Straight Drainage/[place]    Indication:  Urinary Retention / Obstruction (01-25-22 @ 16:18) (not performed) [Active]  Indwelling Urethral Catheter:     Connect To:  Straight Drainage/[place]    Indication:  Urinary Retention / Obstruction (01-24-22 @ 15:34) (not performed) [Active]  Indwelling Urethral Catheter:     Connect To:  Straight Drainage/Gravity    Indication:  Urine Output Monitoring in Critically Ill (01-20-22 @ 08:04) (not performed) [Active]

## 2022-02-03 NOTE — PROGRESS NOTE ADULT - SUBJECTIVE AND OBJECTIVE BOX
Patient is a 81y old  Male who presents with a chief complaint of Cardiac arrest (01 Feb 2022 22:00)    Over Night Events:  Remains intubated on MV.  Off pressors.  Off sedation for > 48h    ROS:   All ROS are negative except HPI     PHYSICAL EXAM  ICU Vital Signs Last 24 Hrs  T(C): 37.2 (03 Feb 2022 07:10), Max: 37.3 (02 Feb 2022 23:00)  T(F): 99 (03 Feb 2022 07:10), Max: 99.1 (02 Feb 2022 23:00)  HR: 60 (03 Feb 2022 09:01) (60 - 71)  BP: 120/60 (03 Feb 2022 09:01) (120/60 - 168/87)  BP(mean): 82 (03 Feb 2022 09:01) (82 - 121)  RR: 24 (03 Feb 2022 09:01) (20 - 39)  SpO2: 98% (03 Feb 2022 09:01) (98% - 100%)    CAM ICU: Positive  SAT: Y  SBT: N    CONSTITUTIONAL:  Ill appearing in NAD    ENT:   Airway patent,   Mouth with normal mucosa.   No thrush    EYES:   Pupils equal,   Round and reactive to light.    CARDIAC:   Normal rate,   Regular rhythm.    + edema    RESPIRATORY:   No wheezing  Bilateral crackles  Normal chest expansion  Not tachypneic,  No use of accessory muscles    GASTROINTESTINAL:  Abdomen soft,   Non-tender,   No guarding,   + BS    MUSCULOSKELETAL:   Range of motion is not limited,  No clubbing, cyanosis    NEUROLOGICAL:   Sedated  + gag  Does not follow commands    SKIN:   Skin normal color for race,   Warm and dry and intact.   Sacral ulcer      02-02-22 @ 07:01  -  02-03-22 @ 07:00  --------------------------------------------------------  IN:    IV PiggyBack: 200 mL    Vital High Protein: 480 mL  Total IN: 680 mL    OUT:    Dexmedetomidine: 0 mL    Diltiazem: 0 mL    Indwelling Catheter - Urethral (mL): 2240 mL    Norepinephrine: 0 mL    Propofol: 0 mL  Total OUT: 2240 mL    Total NET: -1560 mL      02-03-22 @ 07:01  -  02-03-22 @ 10:05  --------------------------------------------------------  IN:  Total IN: 0 mL    OUT:    Indwelling Catheter - Urethral (mL): 180 mL  Total OUT: 180 mL    Total NET: -180 mL      LABS:                        12.0   12.56 )-----------( 80       ( 03 Feb 2022 06:00 )             39.0     141  |  102  |  29<H>  ----------------------------<  172<H>  4.1   |  28  |  <0.5<L>    Ca    8.1<L>      03 Feb 2022 06:00  Mg     2.2     02-03    TPro  4.5<L>  /  Alb  3.1<L>  /  TBili  0.7  /  DBili  x   /  AST  25  /  ALT  56<H>  /  AlkPhos  76  02-03    LIVER FUNCTIONS - ( 03 Feb 2022 06:00 )  Alb: 3.1 g/dL / Pro: 4.5 g/dL / ALK PHOS: 76 U/L / ALT: 56 U/L / AST: 25 U/L / GGT: x                                              Mode: AC/ CMV (Assist Control/ Continuous Mandatory Ventilation)  RR (machine): 24  TV (machine): 450  FiO2: 50  PEEP: 10decrease   MAP: 12  PIP: 21    ABG - ( 03 Feb 2022 04:41 )  pH, Arterial: 7.38  pH, Blood: x     /  pCO2: 55    /  pO2: 134   / HCO3: 32    / Base Excess: 6.0   /  SaO2: 98.4      MEDICATIONS  (STANDING):  aspirin  chewable 81 milliGRAM(s) Oral daily  chlorhexidine 0.12% Liquid 15 milliLiter(s) Oral Mucosa every 12 hours  chlorhexidine 4% Liquid 1 Application(s) Topical <User Schedule>  dexAMETHasone  Injectable 6 milliGRAM(s) IV Push daily  dexMEDEtomidine Infusion 0.2 MICROgram(s)/kG/Hr (3.85 mL/Hr) IV Continuous <Continuous>  dextrose 5%. 1000 milliLiter(s) (50 mL/Hr) IV Continuous <Continuous>  dextrose 5%. 1000 milliLiter(s) (100 mL/Hr) IV Continuous <Continuous>  dextrose 50% Injectable 25 Gram(s) IV Push once  dextrose 50% Injectable 12.5 Gram(s) IV Push once  dextrose 50% Injectable 25 Gram(s) IV Push once  diltiazem    Tablet 30 milliGRAM(s) Oral every 6 hours  enoxaparin Injectable 40 milliGRAM(s) SubCutaneous at bedtime  glucagon  Injectable 1 milliGRAM(s) IntraMuscular once  insulin lispro (ADMELOG) corrective regimen sliding scale   SubCutaneous every 6 hours  norepinephrine Infusion 0.05 MICROgram(s)/kG/Min (3.61 mL/Hr) IV Continuous <Continuous>  pantoprazole  Injectable 40 milliGRAM(s) IV Push daily  polyethylene glycol 3350 17 Gram(s) Oral daily  propofol Infusion 0.05 MICROgram(s)/kG/Min (0.02 mL/Hr) IV Continuous <Continuous>    MEDICATIONS  (PRN):  acetaminophen     Tablet .. 650 milliGRAM(s) Oral every 6 hours PRN Temp greater or equal to 38C (100.4F), Mild Pain (1 - 3)  albuterol/ipratropium for Nebulization 3 milliLiter(s) Nebulizer every 6 hours PRN Shortness of Breath and/or Wheezing      New X-rays reviewed:                                                                                  ECHO    CXR interpreted by me:

## 2022-02-03 NOTE — CONSULT NOTE ADULT - PROBLEM SELECTOR RECOMMENDATION 9
Full consult to follow shortly. Please call [phone] with any acute concerns. - due to anoxic brain injury  - monitor clinically

## 2022-02-03 NOTE — CHART NOTE - NSCHARTNOTEFT_GEN_A_CORE
Meeting today with Dr. Louise, Dr. Talbert, granddaughter, Angela Joan.  Patient's daughter was called during meeting.  Family was informed of patient's poor prognosis.  At this time, family want to allow for another week to see if patient will improve.  Family understand that patient will require trach.

## 2022-02-03 NOTE — PROGRESS NOTE ADULT - ASSESSMENT
81 year old male with a hx of HTN, BPH, recent diagnosis of COVID 19 2 days ago prior to admission -> presented to the ED S/P cardiac arrest.  Patient began to have worsening dyspnea at home, EMS was called. Found to be in cardiac arrest. ROSC was achieved after 2 rounds of CPR and epi, downtime 7 mins. Was intubated in the field.     acute respiratory failure / Cardiac arrest / COVID-19 pneumonia / probable NSTEMI / L sided pneumothorax / suspected anoxic brain injury      - off sedation x 2 days - still unresponsive   - episode of rapid afib - now in NSR   - cardio following   - probable trach and peg if family is not liberating pt from ventilator   - very poor prognosis, family aware   - DVT prophylaxis

## 2022-02-03 NOTE — CHART NOTE - NSCHARTNOTEFT_GEN_A_CORE
Spoke with Judy ([phone]).  Discussed that the patient has anoxic brain injury.  Discussed with her the option of terminal weaning if the patient never wanted artificial life sustaining measures.  She will talk to her family before making a decision.

## 2022-02-03 NOTE — PROGRESS NOTE ADULT - SUBJECTIVE AND OBJECTIVE BOX
Neurology Progress Note    Interval History:  Patient seen at bedside with Dr. aMrtinez. No acute interval events. Pt still intubated, but now off sedation and obtunded. No interaction with environment. EEG showing generalized slowing.     HPI:  81 year old male with a hx of HTN, BPH, recent diagnosis of COVID 19 2 days ago (unvaccinated, prescribed decadron/ zpack/ zinc) presenting to the ED S/P cardiac arrest. Patient intubated/ sedated so history obtained from chart (son left and attempted to reach). Patient began to have worsening dyspnea at home, EMS was called. Found to be in respiratory arrest by EMS and followed by EMS. ROSC was achieved after 2 rounds of CPR and epi, downtime 7 mins. Was intubated in the field.   In ED central line was placed. ABG: PH 7.15, PaO2 66, PaCO2 45, HCO3- 16. Troponins .1, BNP > 8000, D-Dimer 7368. COVID 19 positive. Patient being admitted s/p cardiac arrest to ICU.      (15 Aquiles 2022 04:09)      PAST MEDICAL & SURGICAL HISTORY:  BPH (benign prostatic hyperplasia)    Mild HTN            Medications:  acetaminophen     Tablet .. 650 milliGRAM(s) Oral every 6 hours PRN  albuterol/ipratropium for Nebulization 3 milliLiter(s) Nebulizer every 6 hours PRN  aspirin  chewable 81 milliGRAM(s) Oral daily  chlorhexidine 0.12% Liquid 15 milliLiter(s) Oral Mucosa every 12 hours  chlorhexidine 4% Liquid 1 Application(s) Topical <User Schedule>  dextrose 5%. 1000 milliLiter(s) IV Continuous <Continuous>  dextrose 5%. 1000 milliLiter(s) IV Continuous <Continuous>  dextrose 50% Injectable 25 Gram(s) IV Push once  dextrose 50% Injectable 12.5 Gram(s) IV Push once  dextrose 50% Injectable 25 Gram(s) IV Push once  diltiazem    Tablet 30 milliGRAM(s) Oral every 6 hours  enoxaparin Injectable 40 milliGRAM(s) SubCutaneous at bedtime  glucagon  Injectable 1 milliGRAM(s) IntraMuscular once  insulin lispro (ADMELOG) corrective regimen sliding scale   SubCutaneous every 6 hours  pantoprazole  Injectable 40 milliGRAM(s) IV Push daily  polyethylene glycol 3350 17 Gram(s) Oral daily      Vital Signs Last 24 Hrs  T(C): 37.2 (03 Feb 2022 07:10), Max: 37.3 (02 Feb 2022 23:00)  T(F): 99 (03 Feb 2022 07:10), Max: 99.1 (02 Feb 2022 23:00)  HR: 60 (03 Feb 2022 09:01) (60 - 71)  BP: 120/60 (03 Feb 2022 09:01) (120/60 - 168/87)  BP(mean): 82 (03 Feb 2022 09:01) (82 - 121)  RR: 24 (03 Feb 2022 09:01) (20 - 28)  SpO2: 98% (03 Feb 2022 09:01) (98% - 100%)    Neurological Exam:   Mental status: Intubated and off sedation. Obtunded.    Cranial nerves: PERRLA, +oculocephalic reflex, +spontaneous blinking, but no blinking to threat.   Motor: No movement of extremities   Sensation: No grimace or retraction to pain    Labs:                        12.0   12.56 )-----------( 80       ( 03 Feb 2022 06:00 )             39.0       02-03    141  |  102  |  29<H>  ----------------------------<  172<H>  4.1   |  28  |  <0.5<L>    Ca    8.1<L>      03 Feb 2022 06:00  Mg     2.2     02-03    TPro  4.5<L>  /  Alb  3.1<L>  /  TBili  0.7  /  DBili  x   /  AST  25  /  ALT  56<H>  /  AlkPhos  76  02-03    LIVER FUNCTIONS - ( 03 Feb 2022 06:00 )  Alb: 3.1 g/dL / Pro: 4.5 g/dL / ALK PHOS: 76 U/L / ALT: 56 U/L / AST: 25 U/L / GGT: x             < from: CT Head No Cont (01.31.22 @ 14:02) >  IMPRESSION:    1.  Mild atrophic changes.    2.  Periventricular white matter hypodensities, nonspecific, differential   diagnostic possibilities include ischemic change, gliosis or   demyelination.    3.  Relative hyperdensity within the basal ganglia is nonspecific and can   be seen in patients with hyperglycemia, follow-up examination is   suggested if clinically warranted.    --- End of Report ---    < end of copied text >    < from: EEG (02.02.22 @ 14:00) >  Impression:  -  Abnormal due to the presence of: generalized slowing as above  -    Clinical Correlation & Recommendations    < end of copied text >

## 2022-02-03 NOTE — PROGRESS NOTE ADULT - ASSESSMENT
Assessment:  81y Male patient admitted with COVID PNA, remains intubated, no mental status off sedation GOC underway, thoracic surgery on board for L PTX with the above physical exam, labs, and imaging findings.    Plan:  - no PTX appreciated  - daily CXR  - recall PRN  -Pain control as needed  -Hemodynamic monitoring as per routine  -GI and DVT prophylaxis  -Check and replete CBC and BMP q daily  -Strict input and output monitoring  -Continue current management    Date/Time: 02-03-22 @ 12:05

## 2022-02-03 NOTE — CONSULT NOTE ADULT - CONVERSATION DETAILS
Family meeting held with patient's granddaughter Eunice in person, and with patient's two daughters intermittently on phone. Medical attending presented degree of medical issues, including anoxic brain injury, need for trach given prolonged intubation, and overall clinical status. Palliative care team informed family of likely disposition in ventilator SNF if trach pursued, and the quality of life that would entail. Angela wants to discuss with family, is open to trach if needed while they make further decision.

## 2022-02-03 NOTE — CONSULT NOTE ADULT - PROBLEM SELECTOR RECOMMENDATION 4
- will follow    ______________  Roland Talbert MD  Palliative Medicine     [facility]  [phone]

## 2022-02-03 NOTE — CHART NOTE - NSCHARTNOTEFT_GEN_A_CORE
[facility] labs called 1/31/22 positive growth in anerobic bottle of gram + cocci in pairs. may need to restart vancomycin as pt on maxipime . will recall ID

## 2022-02-03 NOTE — PROGRESS NOTE ADULT - ASSESSMENT
ASSESSMENT: This is a 81y Male with h/o 7 min Cardiac arrest and now hypoxic encephalopathy.  Brainstem reflexes are present and patient is overbreathing the ventilator.  However absence of flexor posturing or better is unfavorable indicator.    PLAN: Case d/w Dr. Martinez  - EEG appreciated - generalized slowing.   - Continue neuro-conscious checks to assess minimally conscious state  - Recommend obtaining GOC from family   - Would recommend MRI w/o contrast (if stable for transport) or repeat head CT to assess for extent of damage   - Avoid CNS active medications.     ASSESSMENT: This is a 81y Male with h/o 7 min Cardiac arrest and now hypoxic encephalopathy.  Brainstem reflexes are present and patient is overbreathing the ventilator.  However absence of flexor posturing or better is unfavorable indicator from a motor standpoint.    PLAN: Case d/w Dr. Martinez  - EEG appreciated - generalized slowing.   - Continue neuro-conscious checks to assess minimally conscious state  - Recommend obtaining GOC from family   - Would recommend MRI w/o contrast (if stable for transport) or repeat head CT to assess for extent of damage   - Avoid CNS active medications.

## 2022-02-03 NOTE — PROGRESS NOTE ADULT - ASSESSMENT
81 year old male with a hx of HTN, BPH, recent diagnosis of COVID 19 2 days ago (unvaccinated, prescribed decadron/ zpack/ zinc) presenting to the ED S/P cardiac arrest. Intubated in the field    IMPRESSION  #COVID19 PNA, Severe (O2 < or = 94% on RA and requiring supplemental O2) with Cardiac arrest    CXR diffuse bilateral opacities     D-Dimer Assay, Quantitative: 7368 ng/mL DDU (01-15-22 @ 01:25)    s/p Toci 1/17  #Lactic acidosis  #Transaminitis   #Cardiac arrest- ROSC was achieved after 2 rounds of CPR and epi, downtime 7 mins  # Left sided Pneumothorax- on CXR from 2/2/22      would recommend:    1. Coagulase negative Staphylococcus in blood culture from 1/31/22, most likely a contaminant.   2. Monitor WBC count, is trending down  3. Continue Dexamethasone   4. Supportive care including AC    5. Aspiration precaution  6. Management of Pneumothorax and vent as per CCU/ICU protocol   7. Please discontinue All Abx  since Procalcitonin level is low, 0.03 as of 2/1/22    Attending Attestation:    Spent more than 35 minutes on total encounter, more than 50 % of the visit was spent counseling and/or coordinating care by the Attending physician.

## 2022-02-03 NOTE — CONSULT NOTE ADULT - SUBJECTIVE AND OBJECTIVE BOX
CHADWICK VENCES          MRN-[id number]              HPI: 81M with PMH of HTN, BPH, recent COVID-19 (unvaccinated status) here after cardiac arrest x 7 mins, c/b PTX s/p chest tube and removal, and bronch to remove mucus plug. 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 However, he has on purposeful motor responses, and neuro recommended repeat imaging to assess infarct extent. Palliative care called for GOC.          PAST MEDICAL & SURGICAL HISTORY:  BPH (benign prostatic hyperplasia)    Mild HTN        FAMILY HISTORY:   Reviewed and found non contributory in mother or father    SOCIAL HISTORY: no tobacco/etoh use reported. Pt resides at ______       ROS:	    Unable to attain due to:                      Dyspnea (Jd 0-10): 0                       N/V (Y/N): No                             Secretions (Y/N) : No                                          Agitation(Y/N): No                              Pain (Y/N): No                                 -Provocation/Palliation: N/A  -Quality/Quantity: N/A  -Radiating: N/A  -Severity: No pain  -Timing/Frequency: N/A  -Impact on ADLs: N/A    General:  Denied  HEENT:    Denied  Neck:  Denied  CVS:  Denied  Resp:  Denied  GI:  Denied    :  Denied  Musc:  Denied  Neuro:  Denied  Psych:  Denied  Skin:  Denied  Lymph:  Denied    Last BM:      Allergies    Allergy Status Unknown    Intolerances      Opiate Naive (Y/N):   -iStop reviewed (Y/N):   Ref#:              Medications:	      MEDICATIONS  (STANDING):  aspirin  chewable 81 milliGRAM(s) Oral daily  chlorhexidine 0.12% Liquid 15 milliLiter(s) Oral Mucosa every 12 hours  chlorhexidine 4% Liquid 1 Application(s) Topical <User Schedule>  dextrose 5%. 1000 milliLiter(s) (50 mL/Hr) IV Continuous <Continuous>  dextrose 5%. 1000 milliLiter(s) (100 mL/Hr) IV Continuous <Continuous>  dextrose 50% Injectable 25 Gram(s) IV Push once  dextrose 50% Injectable 12.5 Gram(s) IV Push once  dextrose 50% Injectable 25 Gram(s) IV Push once  diltiazem    Tablet 30 milliGRAM(s) Oral every 6 hours  enoxaparin Injectable 40 milliGRAM(s) SubCutaneous at bedtime  glucagon  Injectable 1 milliGRAM(s) IntraMuscular once  insulin lispro (ADMELOG) corrective regimen sliding scale   SubCutaneous every 6 hours  pantoprazole  Injectable 40 milliGRAM(s) IV Push daily  polyethylene glycol 3350 17 Gram(s) Oral daily    MEDICATIONS  (PRN):  acetaminophen     Tablet .. 650 milliGRAM(s) Oral every 6 hours PRN Temp greater or equal to 38C (100.4F), Mild Pain (1 - 3)  albuterol/ipratropium for Nebulization 3 milliLiter(s) Nebulizer every 6 hours PRN Shortness of Breath and/or Wheezing      Labs:	    CBC:                        12.0   12.56 )-----------( 80       ( 2022 06:00 )             39.0     CMP:        141  |  102  |  29<H>  ----------------------------<  172<H>  4.1   |  28  |  <0.5<L>    Ca    8.1<L>      2022 06:00  Mg     2.2         TPro  4.5<L>  /  Alb  3.1<L>  /  TBili  0.7  /  DBili  x   /  AST  25  /  ALT  56<H>  /  AlkPhos  76                    Radiology:	       EK Lead ECG:   Ventricular Rate 141 BPM    Atrial Rate 122 BPM    QRS Duration 112 ms    Q-T Interval 334 ms    QTC Calculation(Bazett) 511 ms    R Axis -29 degrees    T Axis 136 degrees    Diagnosis Line Atrial fibrillation with rapid ventricular response  Incomplete right bundle branch block  ST & T wave abnormality, consider lateral ischemia  Abnormal ECG    Confirmed by JESSICA EDMONDSON MD ([id number]) on 2022 11:17:30 AM (22 @ 02:11)      Imaging Personally Reviewed:  [ ] YES  [ ] NO    Consultant(s) Notes Reviewed:  [ ] YES  [ ] NO  Care Discussed with Consultants/Other Providers [ ] YES  [ ] NO    PEx:	  T(C): 36.9 (22 @ 11:00), Max: 37.3 (22 @ 23:00)  HR: 64 (22 @ 12:53) (60 - 71)  BP: 120/60 (22 @ 09:01) (120/60 - 168/87)  RR: 25 (22 @ 13:00) (15 - 27)  SpO2: 98% (22 @ 12:53) (98% - 100%)  Wt(kg): --  Daily     Daily Weight in k.5 (2022 01:00)    General:  found in bed in NAD  HEENT:  NCAT PERRL EOMI Non icteric, no noted thrush  CVS: RR S1S2 No M/G/R  Resp: Unlabored Non tachypneic No increased WOB  GI:  Soft NT ND BS+  :  Voiding / Becker / PrimaFit  Musc: No C/C/E    Neuro: Follows commands No focal deficits  Psych: Calm Pleasant, AAOx3    Skin: Non jaundiced , no rash   Lymph: no adenopathy     Preadmit Karnofsky:  %           Current Karnofsky:     %  http://www.npcrc.org/files/news/karnofsky_performance_scale.pdf   http://www.npcrc.org/files/news/palliative_performance_scale_PPSv2.pdf  Cachexia (Y/N):   BMI:          Advanced Directives:	     Full Code     DNR/DNI     MOLST     HCP     DPOA     Living Will     Decision maker: The patient is able to participate in complex medical decision making conversations.   Legal surrogate:    GOALS OF CARE DISCUSSION	       Palliative care info/counseling provided	           Family meeting       Advanced Directives addressed please see Advance Care Planning Note	           See previous Palliative Medicine Note       Documentation of GOC: 	    REFERRALS	        Palliative Med        Unit SW/Case Mgmt              Hospice       Speech/Swallow       Nutrition       PT/OT CHADWICK VENCES          MRN-[id number]              HPI: 81M with PMH of HTN, BPH, recent COVID-19 (unvaccinated status) here after cardiac arrest x 7 mins, c/b PTX s/p chest tube and removal, and bronch to remove mucus plug. 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 However, he has on purposeful motor responses, and neuro recommended repeat imaging to assess infarct extent. Palliative care called for GOC.          PAST MEDICAL & SURGICAL HISTORY:  BPH (benign prostatic hyperplasia)    Mild HTN        FAMILY HISTORY:   Reviewed and found non contributory in mother or father    SOCIAL HISTORY: no tobacco/etoh use reported. Pt resides at ______       ROS:	    Unable to attain due to:  mental status 2/2 anoxic brain injury                    Dyspnea (Jd 0-10): 0                       N/V (Y/N): No                             Secretions (Y/N) : No                                          Agitation(Y/N): No                              Pain (Y/N): No                                 -Provocation/Palliation: N/A  -Quality/Quantity: N/A  -Radiating: N/A  -Severity: No pain  -Timing/Frequency: N/A  -Impact on ADLs: N/A    General:  unable to assess  HEENT:    unable to assess  Neck:  unable to assess  CVS:  unable to assess  Resp:  unable to assess  GI:  unable to assess  :  unable to assess  Musc:  unable to assess  Neuro:  unable to assess  Psych:  unable to assess  Skin:  unable to assess  Lymph:  unable to assess    Last BM:      Allergies    Allergy Status Unknown    Intolerances      Opiate Naive (Y/N):   -iStop reviewed (Y/N):   Ref#:              Medications:	      MEDICATIONS  (STANDING):  aspirin  chewable 81 milliGRAM(s) Oral daily  chlorhexidine 0.12% Liquid 15 milliLiter(s) Oral Mucosa every 12 hours  chlorhexidine 4% Liquid 1 Application(s) Topical <User Schedule>  dextrose 5%. 1000 milliLiter(s) (50 mL/Hr) IV Continuous <Continuous>  dextrose 5%. 1000 milliLiter(s) (100 mL/Hr) IV Continuous <Continuous>  dextrose 50% Injectable 25 Gram(s) IV Push once  dextrose 50% Injectable 12.5 Gram(s) IV Push once  dextrose 50% Injectable 25 Gram(s) IV Push once  diltiazem    Tablet 30 milliGRAM(s) Oral every 6 hours  enoxaparin Injectable 40 milliGRAM(s) SubCutaneous at bedtime  glucagon  Injectable 1 milliGRAM(s) IntraMuscular once  insulin lispro (ADMELOG) corrective regimen sliding scale   SubCutaneous every 6 hours  pantoprazole  Injectable 40 milliGRAM(s) IV Push daily  polyethylene glycol 3350 17 Gram(s) Oral daily    MEDICATIONS  (PRN):  acetaminophen     Tablet .. 650 milliGRAM(s) Oral every 6 hours PRN Temp greater or equal to 38C (100.4F), Mild Pain (1 - 3)  albuterol/ipratropium for Nebulization 3 milliLiter(s) Nebulizer every 6 hours PRN Shortness of Breath and/or Wheezing      Labs:	    CBC:                        12.0   12.56 )-----------( 80       ( 2022 06:00 )             39.0     CMP:        141  |  102  |  29<H>  ----------------------------<  172<H>  4.1   |  28  |  <0.5<L>    Ca    8.1<L>      2022 06:00  Mg     2.2     -    TPro  4.5<L>  /  Alb  3.1<L>  /  TBili  0.7  /  DBili  x   /  AST  25  /  ALT  56<H>  /  AlkPhos  76  02-03                  Radiology:	       EK Lead ECG:   Ventricular Rate 141 BPM    Atrial Rate 122 BPM    QRS Duration 112 ms    Q-T Interval 334 ms    QTC Calculation(Bazett) 511 ms    R Axis -29 degrees    T Axis 136 degrees    Diagnosis Line Atrial fibrillation with rapid ventricular response  Incomplete right bundle branch block  ST & T wave abnormality, consider lateral ischemia  Abnormal ECG    Confirmed by JESSICA EDMONDSON MD ([id number]) on 2022 11:17:30 AM (22 @ 02:11)      Imaging Personally Reviewed:  [ ] YES  [ ] NO    Consultant(s) Notes Reviewed:  [ ] YES  [ ] NO  Care Discussed with Consultants/Other Providers [ ] YES  [ ] NO    PEx:	  Vital Signs Last 24 Hrs  T(C): 36.9 (2022 15:02), Max: 37.3 (2022 23:00)  T(F): 98.4 (2022 15:02), Max: 99.1 (2022 23:00)  HR: 61 (2022 15:01) (60 - 71)  BP: 162/79 (2022 15:01) (120/60 - 168/87)  BP(mean): 113 (2022 15:01) (82 - 121)  RR: 26 (2022 17:00) (15 - 27)  SpO2: 99% (2022 15:02) (98% - 100%)    GENERAL:   [ ]Alert  [ ]Oriented x   [ ]Lethargic  [ ]Cachexia  [ ]Unarousable  [ ]Verbal  [ ]Non-Verbal  Behavioral:  Limited exam for patient safety and to limit infection risk during COVID-19 outbreak. Please refer to primary team's examination for today.  [ ] Anxiety  [ ] Delirium [ ] Agitation [ ] Other    HEENT: Limited exam for patient safety and to limit infection risk during COVID-19 outbreak. Please refer to primary team's examination for today.  [ ]Normal   [ ]Dry mouth   [ ]ET Tube/Trach  [ ]Oral lesions    PULMONARY:  Limited exam for patient safety and to limit infection risk during COVID-19 outbreak. Please refer to primary team's examination for today.  [ ]Clear [ ]Tachypnea  [ ]Audible excessive secretions   [ ]Rhonchi        [ ]Right [ ]Left [ ]Bilateral  [ ]Crackles        [ ]Right [ ]Left [ ]Bilateral  [ ]Wheezing     [ ]Right [ ]Left [ ]Bilateral  [ ]Diminished breath sounds [ ]right [ ]left [ ]bilateral    CARDIOVASCULAR:  Limited exam for patient safety and to limit infection risk during COVID-19 outbreak. Please refer to primary team's examination for today.  [ ]Regular [ ]Irregular [ ]Tachy  [ ]Tico [ ]Murmur [ ]Other    GASTROINTESTINAL: Limited exam for patient safety and to limit infection risk during COVID-19 outbreak. Please refer to primary team's examination for today.  [ ]Soft  [ ]Distended   [ ]+BS  [ ]Non tender [ ]Tender  [ ]PEG [ ]OGT/ NGT  Last BM:     GENITOURINARY:Limited exam for patient safety and to limit infection risk during COVID-19 outbreak. Please refer to primary team's examination for today.  [ ]Normal [ ] Incontinent   [ ]Oliguria/Anuria   [ ]Becker    MUSCULOSKELETAL: Limited exam for patient safety and to limit infection risk during COVID-19 outbreak. Please refer to primary team's examination for today.  [ ]Normal   [ ]Weakness  [ ]Bed/Wheelchair bound [ ]Edema    NEUROLOGIC: Limited exam for patient safety and to limit infection risk during COVID-19 outbreak. Please refer to primary team's examination for today.  [ ]No focal deficits  [ ]Cognitive impairment  [ ]Dysphagia [ ]Dysarthria [ ]Paresis [ ]Other     SKIN: Limited exam for patient safety and to limit infection risk during COVID-19 outbreak. Please refer to primary team's examination for today.  [ ]Normal    [ ]Rash  [ ]Pressure ulcer(s)       Present on admission [ ]y [ ]n      Preadmit Karnofsky:  %           Current Karnofsky:     %  http://www.npcrc.org/files/news/karnofsky_performance_scale.pdf   http://www.npcrc.org/files/news/palliative_performance_scale_PPSv2.pdf  Cachexia (Y/N):   BMI:          Advanced Directives:	     Full Code     DNR/DNI     MOLST     HCP     DPOA     Living Will     Decision maker: The patient is able to participate in complex medical decision making conversations.   Legal surrogate:    GOALS OF CARE DISCUSSION	       Palliative care info/counseling provided	           Family meeting       Advanced Directives addressed please see Advance Care Planning Note	           See previous Palliative Medicine Note       Documentation of GOC: 	    REFERRALS	        Palliative Med        Unit SW/Case Mgmt              Hospice       Speech/Swallow       Nutrition       PT/OT

## 2022-02-03 NOTE — PROGRESS NOTE ADULT - SUBJECTIVE AND OBJECTIVE BOX
Progress Note: Surgery  Patient: CHADWICK VENCES , 81y (1940)Male   MRN: [id number]  [facility]  Visit: 01-15-22 Inpatient  Date: 02-03-22 @ 12:05    Events over 24h: Thoracic surgery recalled 2/2AM due to possible L PTX, repeat CXR with difficulty to interpret CXR. This AM CXR showing no PTX. Otherwise patient remains intubated, saturating well with ventilatory settings listed below. Patient nonresponsive off sedation. Neurology on board, EEG showing generalized slowing. GOC conversation underway.    RR (machine): 24, TV (machine): 450, FiO2: 50, PEEP: 8  02-03 @ 04:41 -- 7.38 / 55 / 134 / 32 / 98.4        SAT  98.4  Lac 1.30     Vitals: T(F): 98.4 (02-03-22 @ 11:00), Max: 99.1 (02-02-22 @ 23:00)  HR: 60 (02-03-22 @ 09:01)  BP: 120/60 (02-03-22 @ 09:01) (120/60 - 168/87)  RR: 15 (02-03-22 @ 11:00)  SpO2: 98% (02-03-22 @ 09:01)  RR (machine): 24, TV (machine): 450, FiO2: 50, PEEP: 8, PIP: 21    Diet: Diet, NPO with Tube Feed:   Tube Feeding Modality: Orogastric  Vital High Protein  Total Volume for 24 Hours (mL): 960  Continuous  Starting Tube Feed Rate mL per Hour: 20  Until Goal Tube Feed Rate (mL per Hour): 40  Tube Feed Duration (in Hours): 24  Tube Feed Start Time: 13:00 (01-24-22 @ 13:13)    IV Fluid: dextrose 5%. 1000 milliLiter(s) (50 mL/Hr) IV Continuous <Continuous>  dextrose 5%. 1000 milliLiter(s) (100 mL/Hr) IV Continuous <Continuous>      In:   02-02-22 @ 07:01  -  02-03-22 @ 07:00  --------------------------------------------------------  IN: 680 mL    02-03-22 @ 07:01  -  02-03-22 @ 12:05  --------------------------------------------------------  IN: 0 mL      Out:   02-02-22 @ 07:01  -  02-03-22 @ 07:00  --------------------------------------------------------  OUT:    Dexmedetomidine: 0 mL    Diltiazem: 0 mL    Indwelling Catheter - Urethral (mL): 2240 mL    Norepinephrine: 0 mL    Propofol: 0 mL  Total OUT: 2240 mL      02-03-22 @ 07:01  -  02-03-22 @ 12:05  --------------------------------------------------------  OUT:    Indwelling Catheter - Urethral (mL): 405 mL  Total OUT: 405 mL        Net:   02-02-22 @ 07:01  -  02-03-22 @ 07:00  --------------------------------------------------------  NET: -1560 mL    02-03-22 @ 07:01  -  02-03-22 @ 12:05  --------------------------------------------------------  NET: -405 mL        Physical Examination:  General Appearance: NAD, alert and cooperative  Heart: S1 and S2. No murmurs. Rhythm is regular.  Lungs: Clear to auscultation BL without rales, rhonchi, wheezing, crackles or diminished breath sounds.  Abdomen: Soft, nondistended, nontender. No rigidity, guarding, or rebound tenderness. No masses palpated. No McBurney point     Medications: [Standing]  aspirin  chewable 81 milliGRAM(s) Oral daily  chlorhexidine 0.12% Liquid 15 milliLiter(s) Oral Mucosa every 12 hours  chlorhexidine 4% Liquid 1 Application(s) Topical <User Schedule>  dextrose 5%. 1000 milliLiter(s) (50 mL/Hr) IV Continuous <Continuous>  dextrose 5%. 1000 milliLiter(s) (100 mL/Hr) IV Continuous <Continuous>  dextrose 50% Injectable 25 Gram(s) IV Push once  dextrose 50% Injectable 12.5 Gram(s) IV Push once  dextrose 50% Injectable 25 Gram(s) IV Push once  diltiazem    Tablet 30 milliGRAM(s) Oral every 6 hours  enoxaparin Injectable 40 milliGRAM(s) SubCutaneous at bedtime  glucagon  Injectable 1 milliGRAM(s) IntraMuscular once  insulin lispro (ADMELOG) corrective regimen sliding scale   SubCutaneous every 6 hours  pantoprazole  Injectable 40 milliGRAM(s) IV Push daily  polyethylene glycol 3350 17 Gram(s) Oral daily    Medications:[PRN]  acetaminophen     Tablet .. 650 milliGRAM(s) Oral every 6 hours PRN  albuterol/ipratropium for Nebulization 3 milliLiter(s) Nebulizer every 6 hours PRN    Labs:                        12.0   12.56 )-----------( 80       ( 03 Feb 2022 06:00 )             39.0   02-03    141  |  102  |  29<H>  ----------------------------<  172<H>  4.1   |  28  |  <0.5<L>    Ca    8.1<L>      03 Feb 2022 06:00  Mg     2.2     02-03    TPro  4.5<L>  /  Alb  3.1<L>  /  TBili  0.7  /  DBili  x   /  AST  25  /  ALT  56<H>  /  AlkPhos  76  02-03  LIVER FUNCTIONS - ( 03 Feb 2022 06:00 )  Alb: 3.1 g/dL / Pro: 4.5 g/dL / ALK PHOS: 76 U/L / ALT: 56 U/L / AST: 25 U/L / GGT: x         ABG - ( 03 Feb 2022 04:41 )  pH: 7.38  /  pCO2: 55    /  pO2: 134   / HCO3: 32    / Base Excess: 6.0   /  SaO2: 98.4        Micro/Urine:    Imaging:  None/24h

## 2022-02-04 NOTE — CONSULT NOTE ADULT - ATTENDING COMMENTS
80yo man with a history of COVID-19 PNA with resulting cardiac arrest; ROSC was achieved, though the pt's overall prognosis is grim. He has remained intubated since admission and had a L PTX with pigtail resultingly placed (removed 1/28, CXR since neg for PTX). Pt has been unable to be weaned from the vent. Surgical consult is requested for tracheostomy; family wishes to continue all measures of care, per notes. We will cont to discuss patient's status and the relevant indications for aggressive measures in this circumstance, i.e. trach, PEG with the ICU and the family. Recommend close involvement with palliative care team. Delayed entry; pt seen and examined 2/4/22. 80yo man with a history of COVID-19 PNA with resulting cardiac arrest; ROSC was achieved, though the pt's overall prognosis is grim. He has remained intubated since admission and had a L PTX with pigtail resultingly placed (removed 1/28, CXR 2/4/22 with bilateral opacities and neg for PTX). Pt has been unable to be weaned from the vent due to underlying COVID-19 infection and respiratory failure as well as neurological status given long period of cardiac arrest. Surgical consult is requested for tracheostomy; family wishes to continue all measures of care, per notes. We will cont to discuss patient's status and the relevant indications for aggressive measures in this circumstance, i.e. trach, PEG with the ICU and the family. Recommend close involvement with palliative care team. Delayed entry; pt seen and examined 2/4/22. 82yo man with a history of COVID-19 PNA with resulting cardiac arrest; ROSC was achieved, though the pt's overall prognosis is grim. He has remained intubated since admission and had a L PTX with pigtail resultingly placed (removed 1/28, CXR 2/4/22 with bilateral opacities and neg for PTX). Pt has been unable to be weaned from the vent due to underlying COVID-19 infection and respiratory failure as well as neurological status given long period of cardiac arrest and resulting anoxic brain injury. Surgical consult is requested for tracheostomy; family wishes to continue all measures of care, per notes. We will cont to discuss patient's status and the relevant indications for aggressive measures in this circumstance, i.e. trach, PEG with the ICU and the family. Recommend close involvement with palliative care team.

## 2022-02-04 NOTE — PROGRESS NOTE ADULT - SUBJECTIVE AND OBJECTIVE BOX
SUBJECTIVE:    Patient is a 81y old Male who presents with a chief complaint of Cardiac arrest (01 Feb 2022 22:00)      HPI:  81 year old male with a hx of HTN, BPH, recent diagnosis of COVID 19 2 days ago (unvaccinated, prescribed decadron/ zpack/ zinc) presenting to the ED S/P cardiac arrest. Patient intubated/ sedated so history obtained from chart (son left and attempted to reach). Patient began to have worsening dyspnea at home, EMS was called. Found to be in respiratory arrest by EMS and followed by EMS. ROSC was achieved after 2 rounds of CPR and epi, downtime 7 mins. Was intubated in the field.   In ED central line was placed. ABG: PH 7.15, PaO2 66, PaCO2 45, HCO3- 16. Troponins .1, BNP > 8000, D-Dimer 7368. COVID 19 positive. Patient being admitted s/p cardiac arrest to ICU.      (15 Aquiles 2022 04:09)      Currently admitted to medicine with the primary diagnosis of Cardiac arrest     not respondin gto commands, not retracting to pain    Besides the pertinent positives and negatives described above, the ROS was within normal limits.    PAST MEDICAL & SURGICAL HISTORY  BPH (benign prostatic hyperplasia)    Mild HTN      SOCIAL HISTORY:    ALLERGIES:  Allergy Status Unknown    MEDICATIONS:  STANDING MEDICATIONS  aspirin  chewable 81 milliGRAM(s) Oral daily  chlorhexidine 0.12% Liquid 15 milliLiter(s) Oral Mucosa every 12 hours  chlorhexidine 4% Liquid 1 Application(s) Topical <User Schedule>  dexAMETHasone  Injectable 4 milliGRAM(s) IV Push daily  dextrose 5%. 1000 milliLiter(s) IV Continuous <Continuous>  dextrose 5%. 1000 milliLiter(s) IV Continuous <Continuous>  dextrose 50% Injectable 25 Gram(s) IV Push once  dextrose 50% Injectable 12.5 Gram(s) IV Push once  dextrose 50% Injectable 25 Gram(s) IV Push once  diltiazem    Tablet 30 milliGRAM(s) Oral every 6 hours  enoxaparin Injectable 40 milliGRAM(s) SubCutaneous at bedtime  glucagon  Injectable 1 milliGRAM(s) IntraMuscular once  insulin lispro (ADMELOG) corrective regimen sliding scale   SubCutaneous every 6 hours  pantoprazole  Injectable 40 milliGRAM(s) IV Push daily  polyethylene glycol 3350 17 Gram(s) Oral daily    PRN MEDICATIONS  acetaminophen     Tablet .. 650 milliGRAM(s) Oral every 6 hours PRN  albuterol/ipratropium for Nebulization 3 milliLiter(s) Nebulizer every 6 hours PRN    VITALS:   T(F): 99.1  HR: 68  BP: 141/75  RR: 27  SpO2: 96%    LABS:                        11.3   10.96 )-----------( 84       ( 04 Feb 2022 06:43 )             36.7     02-04    141  |  107  |  24<H>  ----------------------------<  173<H>  4.7   |  24  |  <0.5<L>    Ca    8.2<L>      04 Feb 2022 06:43  Mg     2.1     02-04    TPro  4.4<L>  /  Alb  2.9<L>  /  TBili  0.8  /  DBili  x   /  AST  33  /  ALT  45<H>  /  AlkPhos  71  02-04        ABG - ( 04 Feb 2022 03:52 )  pH, Arterial: 7.51  pH, Blood: x     /  pCO2: 38    /  pO2: 123   / HCO3: 30    / Base Excess: 6.8   /  SaO2: 97.8                      RADIOLOGY:    PHYSICAL EXAM:  GEN: No acute distress  LUNGS: Clear to auscultation bilaterally   HEART: Regular  ABD: Soft, non-tender, non-distended.  EXT: NC/NC/NE/2+PP/SHABAZZ/Skin Intact.   NEURO: not responding to commands    Intravenous access: yes  NG tube: og tube  Becker Catheter:   Indwelling Urethral Catheter:     Connect To:  Straight Drainage/[place]    Indication:  Urinary Retention / Obstruction (02-01-22 @ 19:51) (not performed) [Active]  Indwelling Urethral Catheter:     Connect To:  Straight Drainage/[place]    Indication:  Urinary Retention / Obstruction (01-31-22 @ 14:21) (not performed) [Active]  Indwelling Urethral Catheter:     Connect To:  Straight Drainage/[place]    Indication:  Urine Output Monitoring in Critically Ill (01-30-22 @ 13:06) (not performed) [Active]  Indwelling Urethral Catheter:     Connect To:  Straight Drainage/[place]    Indication:  Urinary Retention / Obstruction (01-29-22 @ 10:02) (not performed) [Active]  Indwelling Urethral Catheter:     Connect To:  Straight Drainage/[place]    Indication:  Urinary Retention / Obstruction (01-25-22 @ 16:18) (not performed) [Active]  Indwelling Urethral Catheter:     Connect To:  Straight Drainage/[place]    Indication:  Urinary Retention / Obstruction (01-24-22 @ 15:34) (not performed) [Active]       SUBJECTIVE:    Patient is a 81y old Male who presents with a chief complaint of Cardiac arrest (01 Feb 2022 22:00)      HPI:  81 year old male with a hx of HTN, BPH, recent diagnosis of COVID 19 2 days ago (unvaccinated, prescribed decadron/ zpack/ zinc) presenting to the ED S/P cardiac arrest. Patient intubated/ sedated so history obtained from chart (son left and attempted to reach). Patient began to have worsening dyspnea at home, EMS was called. Found to be in respiratory arrest by EMS and followed by EMS. ROSC was achieved after 2 rounds of CPR and epi, downtime 7 mins. Was intubated in the field.   In ED central line was placed. ABG: PH 7.15, PaO2 66, PaCO2 45, HCO3- 16. Troponins .1, BNP > 8000, D-Dimer 7368. COVID 19 positive. Patient being admitted s/p cardiac arrest to ICU.      (15 Aquiles 2022 04:09)      Currently admitted to medicine with the primary diagnosis of Cardiac arrest     not responding to commands, not retracting to pain    Besides the pertinent positives and negatives described above, the ROS was within normal limits.    PAST MEDICAL & SURGICAL HISTORY  BPH (benign prostatic hyperplasia)    Mild HTN      SOCIAL HISTORY:    ALLERGIES:  Allergy Status Unknown    MEDICATIONS:  STANDING MEDICATIONS  aspirin  chewable 81 milliGRAM(s) Oral daily  chlorhexidine 0.12% Liquid 15 milliLiter(s) Oral Mucosa every 12 hours  chlorhexidine 4% Liquid 1 Application(s) Topical <User Schedule>  dexAMETHasone  Injectable 4 milliGRAM(s) IV Push daily  dextrose 5%. 1000 milliLiter(s) IV Continuous <Continuous>  dextrose 5%. 1000 milliLiter(s) IV Continuous <Continuous>  dextrose 50% Injectable 25 Gram(s) IV Push once  dextrose 50% Injectable 12.5 Gram(s) IV Push once  dextrose 50% Injectable 25 Gram(s) IV Push once  diltiazem    Tablet 30 milliGRAM(s) Oral every 6 hours  enoxaparin Injectable 40 milliGRAM(s) SubCutaneous at bedtime  glucagon  Injectable 1 milliGRAM(s) IntraMuscular once  insulin lispro (ADMELOG) corrective regimen sliding scale   SubCutaneous every 6 hours  pantoprazole  Injectable 40 milliGRAM(s) IV Push daily  polyethylene glycol 3350 17 Gram(s) Oral daily    PRN MEDICATIONS  acetaminophen     Tablet .. 650 milliGRAM(s) Oral every 6 hours PRN  albuterol/ipratropium for Nebulization 3 milliLiter(s) Nebulizer every 6 hours PRN    VITALS:   T(F): 99.1  HR: 68  BP: 141/75  RR: 27  SpO2: 96%    LABS:                        11.3   10.96 )-----------( 84       ( 04 Feb 2022 06:43 )             36.7     02-04    141  |  107  |  24<H>  ----------------------------<  173<H>  4.7   |  24  |  <0.5<L>    Ca    8.2<L>      04 Feb 2022 06:43  Mg     2.1     02-04    TPro  4.4<L>  /  Alb  2.9<L>  /  TBili  0.8  /  DBili  x   /  AST  33  /  ALT  45<H>  /  AlkPhos  71  02-04        ABG - ( 04 Feb 2022 03:52 )  pH, Arterial: 7.51  pH, Blood: x     /  pCO2: 38    /  pO2: 123   / HCO3: 30    / Base Excess: 6.8   /  SaO2: 97.8                      RADIOLOGY:    PHYSICAL EXAM:  GEN: No acute distress  LUNGS: Clear to auscultation bilaterally   HEART: Regular  ABD: Soft, non-tender, non-distended.  EXT: NC/NC/NE/2+PP/SHABAZZ/Skin Intact.   NEURO: not responding to commands    Intravenous access: yes  NG tube: og tube  Becker Catheter:   Indwelling Urethral Catheter:     Connect To:  Straight Drainage/[place]    Indication:  Urinary Retention / Obstruction (02-01-22 @ 19:51) (not performed) [Active]  Indwelling Urethral Catheter:     Connect To:  Straight Drainage/[place]    Indication:  Urinary Retention / Obstruction (01-31-22 @ 14:21) (not performed) [Active]  Indwelling Urethral Catheter:     Connect To:  Straight Drainage/[place]    Indication:  Urine Output Monitoring in Critically Ill (01-30-22 @ 13:06) (not performed) [Active]  Indwelling Urethral Catheter:     Connect To:  Straight Drainage/[place]    Indication:  Urinary Retention / Obstruction (01-29-22 @ 10:02) (not performed) [Active]  Indwelling Urethral Catheter:     Connect To:  Straight Drainage/[place]    Indication:  Urinary Retention / Obstruction (01-25-22 @ 16:18) (not performed) [Active]  Indwelling Urethral Catheter:     Connect To:  Straight Drainage/[place]    Indication:  Urinary Retention / Obstruction (01-24-22 @ 15:34) (not performed) [Active]

## 2022-02-04 NOTE — PROGRESS NOTE ADULT - SUBJECTIVE AND OBJECTIVE BOX
Neurology Progress Note    Interval History:  Patient seen at bedside with Dr. Martinez. Intubated and off sedation. No change in mental status. No acute interval events.     HPI:  81 year old male with a hx of HTN, BPH, recent diagnosis of COVID 19 2 days ago (unvaccinated, prescribed decadron/ zpack/ zinc) presenting to the ED S/P cardiac arrest. Patient intubated/ sedated so history obtained from chart (son left and attempted to reach). Patient began to have worsening dyspnea at home, EMS was called. Found to be in respiratory arrest by EMS and followed by EMS. ROSC was achieved after 2 rounds of CPR and epi, downtime 7 mins. Was intubated in the field.   In ED central line was placed. ABG: PH 7.15, PaO2 66, PaCO2 45, HCO3- 16. Troponins .1, BNP > 8000, D-Dimer 7368. COVID 19 positive. Patient being admitted s/p cardiac arrest to ICU.      (15 Aquiles 2022 04:09)      PAST MEDICAL & SURGICAL HISTORY:  BPH (benign prostatic hyperplasia)  Mild HTN        Medications:  acetaminophen     Tablet .. 650 milliGRAM(s) Oral every 6 hours PRN  albuterol/ipratropium for Nebulization 3 milliLiter(s) Nebulizer every 6 hours PRN  aspirin  chewable 81 milliGRAM(s) Oral daily  chlorhexidine 0.12% Liquid 15 milliLiter(s) Oral Mucosa every 12 hours  chlorhexidine 4% Liquid 1 Application(s) Topical <User Schedule>  dexAMETHasone  Injectable 4 milliGRAM(s) IV Push daily  dextrose 5%. 1000 milliLiter(s) IV Continuous <Continuous>  dextrose 5%. 1000 milliLiter(s) IV Continuous <Continuous>  dextrose 50% Injectable 25 Gram(s) IV Push once  dextrose 50% Injectable 12.5 Gram(s) IV Push once  dextrose 50% Injectable 25 Gram(s) IV Push once  diltiazem    Tablet 30 milliGRAM(s) Oral every 6 hours  enoxaparin Injectable 40 milliGRAM(s) SubCutaneous at bedtime  glucagon  Injectable 1 milliGRAM(s) IntraMuscular once  insulin lispro (ADMELOG) corrective regimen sliding scale   SubCutaneous every 6 hours  pantoprazole  Injectable 40 milliGRAM(s) IV Push daily  polyethylene glycol 3350 17 Gram(s) Oral daily      Vital Signs Last 24 Hrs  T(C): 37 (04 Feb 2022 11:00), Max: 37.4 (04 Feb 2022 02:00)  T(F): 98.6 (04 Feb 2022 11:00), Max: 99.3 (04 Feb 2022 02:00)  HR: 68 (04 Feb 2022 09:08) (56 - 72)  BP: 145/77 (04 Feb 2022 09:00) (108/58 - 162/79)  BP(mean): 104 (04 Feb 2022 09:00) (76 - 116)  RR: 28 (04 Feb 2022 11:00) (24 - 28)  SpO2: 96% (04 Feb 2022 09:08) (96% - 100%)      Neurological Exam:   Mental status: Intubated and off sedation. Obtunded.    Cranial nerves: PERRLA, +oculocephalic reflex, +spontaneous blinking, but no blinking to threat.   Motor: No movement of extremities   Sensation: No grimace or retraction to pain      Labs:                        11.3   10.96 )-----------( 84       ( 04 Feb 2022 06:43 )             36.7       02-04    141  |  107  |  24<H>  ----------------------------<  173<H>  4.7   |  24  |  <0.5<L>    Ca    8.2<L>      04 Feb 2022 06:43  Mg     2.1     02-04    TPro  4.4<L>  /  Alb  2.9<L>  /  TBili  0.8  /  DBili  x   /  AST  33  /  ALT  45<H>  /  AlkPhos  71  02-04    LIVER FUNCTIONS - ( 04 Feb 2022 06:43 )  Alb: 2.9 g/dL / Pro: 4.4 g/dL / ALK PHOS: 71 U/L / ALT: 45 U/L / AST: 33 U/L / GGT: x             < from: CT Head No Cont (01.31.22 @ 14:02) >    IMPRESSION:    1.  Mild atrophic changes.    2.  Periventricular white matter hypodensities, nonspecific, differential   diagnostic possibilities include ischemic change, gliosis or   demyelination.    3.  Relative hyperdensity within the basal ganglia is nonspecific and can   be seen in patients with hyperglycemia, follow-up examination is   suggested if clinically warranted.    --- End of Report ---            DOMENICO LAUREN MD; Attending Radiologist  This document has been electronically signed. Jan 31 2022  2:46PM    < end of copied text >

## 2022-02-04 NOTE — PROGRESS NOTE ADULT - SUBJECTIVE AND OBJECTIVE BOX
CHADWICK VENCES  81y, Male  Allergy: Allergy Status Unknown      LOS  20d    CHIEF COMPLAINT: Cardiac arrest (01 Feb 2022 22:00)      INTERVAL EVENTS/HPI  - No acute events overnight  - T(F): , Max: 99.3 (02-04-22 @ 02:00)  - Denies any worsening symptoms  - Tolerating medication  - WBC Count: 10.96 (02-04-22 @ 06:43)  WBC Count: 12.56 (02-03-22 @ 06:00)     - Creatinine, Serum: <0.5 (02-04-22 @ 06:43)  Creatinine, Serum: <0.5 (02-03-22 @ 06:00)       ROS  General: Denies rigors, nightsweats  HEENT: Denies headache, rhinorrhea, sore throat, eye pain  CV: Denies CP, palpitations  PULM: Denies wheezing, hemoptysis  GI: Denies hematemesis, hematochezia, melena  : Denies discharge, hematuria  MSK: Denies arthralgias, myalgias  SKIN: Denies rash, lesions  NEURO: Denies paresthesias, weakness  PSYCH: Denies depression, anxiety    VITALS:  T(F): 99.1, Max: 99.3 (02-04-22 @ 02:00)  HR: 68  BP: 145/77  RR: 27Vital Signs Last 24 Hrs  T(C): 37.3 (04 Feb 2022 07:10), Max: 37.4 (04 Feb 2022 02:00)  T(F): 99.1 (04 Feb 2022 07:10), Max: 99.3 (04 Feb 2022 02:00)  HR: 68 (04 Feb 2022 09:08) (56 - 72)  BP: 145/77 (04 Feb 2022 09:00) (108/58 - 162/79)  BP(mean): 104 (04 Feb 2022 09:00) (76 - 116)  RR: 27 (04 Feb 2022 09:00) (24 - 28)  SpO2: 96% (04 Feb 2022 09:08) (96% - 100%)    PHYSICAL EXAM:  Gen: NAD, resting in bed  HEENT: Normocephalic, atraumatic  Neck: supple, no lymphadenopathy  CV: Regular rate & regular rhythm  Lungs: decreased BS at bases, no fremitus  Abdomen: Soft, BS present  Ext: Warm, well perfused  Neuro: non focal, awake  Skin: no rash, no erythema  Lines: no phlebitis    FH: Non-contributory  Social Hx: Non-contributory    TESTS & MEASUREMENTS:                        11.3   10.96 )-----------( 84       ( 04 Feb 2022 06:43 )             36.7     02-04    141  |  107  |  24<H>  ----------------------------<  173<H>  4.7   |  24  |  <0.5<L>    Ca    8.2<L>      04 Feb 2022 06:43  Mg     2.1     02-04    TPro  4.4<L>  /  Alb  2.9<L>  /  TBili  0.8  /  DBili  x   /  AST  33  /  ALT  45<H>  /  AlkPhos  71  02-04    eGFR if Non African American: 124 mL/min/1.73M2 (02-04-22 @ 06:43)  eGFR if : 144 mL/min/1.73M2 (02-04-22 @ 06:43)    LIVER FUNCTIONS - ( 04 Feb 2022 06:43 )  Alb: 2.9 g/dL / Pro: 4.4 g/dL / ALK PHOS: 71 U/L / ALT: 45 U/L / AST: 33 U/L / GGT: x               Culture - Blood (collected 01-31-22 @ 06:15)  Source: .Blood Blood-Peripheral  Gram Stain (02-03-22 @ 03:08):    Growth in anaerobic bottle: Gram positive cocci in pairs  Final Report (02-03-22 @ 21:57):    Growth in anaerobic bottle: Staphylococcus capitis    Coag Negative Staphylococcus    Single set isolate, possible contaminant. Contact    Microbiology if susceptibility testing clinically    indicated.    ***Blood Panel PCR results on this specimen are available    approximately 3 hours after the Gram stain result.***    Gram stain, PCR, and/or culture results may not always    correspond due to difference in methodologies.    ************************************************************    This PCR assay was performed by multiplex PCR. This    Assay tests for 66 bacterial and resistance gene targets.    Please refer to the [facility] test directory    at [email or web address] for details.  Organism: Blood Culture PCR (02-03-22 @ 21:57)  Organism: Blood Culture PCR (02-03-22 @ 21:57)      -  Coagulase negative Staphylococcus: Detec      Method Type: PCR    Culture - Bronchial (collected 01-30-22 @ 16:00)  Source: .Bronchial None  Gram Stain (01-30-22 @ 22:58):    Few polymorphonuclear leukocytes per low power field    No Squamous epithelial cells per low power field    Few Gram positive cocci in pairs per oil power field  Final Report (02-01-22 @ 19:11):    Normal Respiratory Corina present    Culture - Blood (collected 01-28-22 @ 16:37)  Source: .Blood None  Final Report (02-02-22 @ 23:00):    No Growth Final    Culture - Acid Fast - Sputum w/Smear (collected 01-25-22 @ 10:00)  Source: .Sputum Sputum  Preliminary Report (02-02-22 @ 15:03):    No growth at 1 week.    Culture - Blood (collected 01-23-22 @ 11:17)  Source: .Blood Blood-Peripheral  Final Report (01-28-22 @ 23:00):    No Growth Final    Culture - Urine (collected 01-23-22 @ 11:00)  Source: Catheterized Catheterized  Final Report (01-24-22 @ 16:25):    No growth            INFECTIOUS DISEASES TESTING  Procalcitonin, Serum: 0.03 (02-01-22 @ 06:25)  Procalcitonin, Serum: 0.07 (01-31-22 @ 06:15)  Procalcitonin, Serum: 0.10 (01-30-22 @ 10:45)  Procalcitonin, Serum: 0.11 (01-29-22 @ 07:17)  Procalcitonin, Serum: 0.08 (01-26-22 @ 06:15)  Procalcitonin, Serum: 0.09 (01-25-22 @ 05:58)  Procalcitonin, Serum: 0.11 (01-23-22 @ 11:17)  MRSA PCR Result.: Negative (01-23-22 @ 11:00)  Procalcitonin, Serum: 0.44 (01-19-22 @ 06:30)  Procalcitonin, Serum: 2.32 (01-16-22 @ 06:56)  Rapid RVP Result: Detected (01-15-22 @ 01:25)  Procalcitonin, Serum: 0.10 (01-15-22 @ 01:25)      INFLAMMATORY MARKERS  C-Reactive Protein, Serum: <3 mg/L (02-01-22 @ 06:25)  C-Reactive Protein, Serum: <3 mg/L (01-26-22 @ 06:15)  C-Reactive Protein, Serum: 50 mg/L (01-16-22 @ 06:56)  C-Reactive Protein, Serum: 76 mg/L (01-15-22 @ 01:25)      RADIOLOGY & ADDITIONAL TESTS:  I have personally reviewed the last available Chest xray  CXR  Xray Chest 1 View- PORTABLE-Urgent:   ACC: [id number] EXAM:  XR CHEST PORTABLE URGENT 1V                          PROCEDURE DATE:  02/02/2022          INTERPRETATION:  Clinical History / Reason for exam: Follow-up    Comparison : Chest radiograph earlier in the day.    Technique/Positioning: Frontal, portable.    Findings:    Support devices: Endotracheal tube is in good position. Left central line   is stable. Nasogastric tube tip is seen overlying left upper quadrant.   Telemetry leads overlie patient.    Cardiac/mediastinum/hilum:Stable    Lung parenchyma/Pleura: Stable bilateral lung opacities. Again seen are a   lack of vascular markings at the left lung apex suggestive of a   pneumothorax, but remains limited in evaluation.    Skeleton/soft tissues: Stable    Impression:    Again seen are a lack of vascular markings at the left lung apex   suggestive of a pneumothorax, but remains limited in evaluation    Stable bilateral lung opacities    --- End of Report ---            AISHA RAMOS MD; Attending Radiologist  This document has been electronically signed. Feb 2 2022 11:55AM (02-02-22 @ 11:20)      CT      CARDIOLOGY TESTING  12 Lead ECG:   Ventricular Rate 141 BPM    Atrial Rate 122 BPM    QRS Duration 112 ms    Q-T Interval 334 ms    QTC Calculation(Bazett) 511 ms    R Axis -29 degrees    T Axis 136 degrees    Diagnosis Line Atrial fibrillation with rapid ventricular response  Incomplete right bundle branch block  ST & T wave abnormality, consider lateral ischemia  Abnormal ECG    Confirmed by JESSICA EDMONDSON MD ([id number]) on 1/30/2022 11:17:30 AM (01-30-22 @ 02:11)      MEDICATIONS  aspirin  chewable 81 Oral daily  chlorhexidine 0.12% Liquid 15 Oral Mucosa every 12 hours  chlorhexidine 4% Liquid 1 Topical <User Schedule>  dexAMETHasone  Injectable 4 IV Push daily  dextrose 5%. 1000 IV Continuous <Continuous>  dextrose 5%. 1000 IV Continuous <Continuous>  dextrose 50% Injectable 25 IV Push once  dextrose 50% Injectable 12.5 IV Push once  dextrose 50% Injectable 25 IV Push once  diltiazem    Tablet 30 Oral every 6 hours  enoxaparin Injectable 40 SubCutaneous at bedtime  glucagon  Injectable 1 IntraMuscular once  insulin lispro (ADMELOG) corrective regimen sliding scale  SubCutaneous every 6 hours  pantoprazole  Injectable 40 IV Push daily  polyethylene glycol 3350 17 Oral daily      WEIGHT  Weight (kg): 76.929 (01-15-22 @ 05:05)  Creatinine, Serum: <0.5 mg/dL (02-04-22 @ 06:43)      ANTIBIOTICS:      All available historical records have been reviewed

## 2022-02-04 NOTE — PROGRESS NOTE ADULT - SUBJECTIVE AND OBJECTIVE BOX
Patient remains unresponsive off sedation      T(F): 98.6 (02-04-22 @ 11:00), Max: 99.3 (02-04-22 @ 02:00)  HR: 68 (02-04-22 @ 09:08)  BP: 145/77 (02-04-22 @ 09:00)  RR: 24  SpO2: 96% (02-04-22 @ 09:08) (96% - 100%)    PHYSICAL EXAM:  GENERAL: NAD  HEAD:  Atraumatic, Normocephalic  EYES: EOMI, PERRLA, conjunctiva and sclera clear  NERVOUS SYSTEM:  positive gag  CHEST/LUNG: b/l rhonchi  HEART: Regular rate and rhythm; No murmurs, rubs, or gallops  ABDOMEN: Soft, Nontender, Nondistended; Bowel sounds present  EXTREMITIES:  2+ Peripheral Pulses, No clubbing, cyanosis, or edema    LABS  02-04    141  |  107  |  24<H>  ----------------------------<  173<H>  4.7   |  24  |  <0.5<L>    Ca    8.2<L>      04 Feb 2022 06:43  Mg     2.1     02-04    TPro  4.4<L>  /  Alb  2.9<L>  /  TBili  0.8  /  DBili  x   /  AST  33  /  ALT  45<H>  /  AlkPhos  71  02-04                          11.3   10.96 )-----------( 84       ( 04 Feb 2022 06:43 )             36.7       Mode: AC/ CMV (Assist Control/ Continuous Mandatory Ventilation)  RR (machine): 24  TV (machine): 420  FiO2: 40  PEEP: 8    Culture Results:   Growth in anaerobic bottle: Staphylococcus capitis  Coag Negative Staphylococcus  Single set isolate, possible contaminant. Contact  Microbiology if susceptibility testing clinically  indicated.  ***Blood Panel PCR results on this specimen are available  approximately 3 hours after the Gram stain result.***  Gram stain, PCR, and/or culture results may not always  correspond due to difference in methodologies.  ************************************************************  This PCR assay was performed by multiplex PCR. This  Assay tests for 66 bacterial and resistance gene targets.  Please refer to the [facility] test directory  at [email or web address] for details. (01-31-22)  Culture Results:   Normal Respiratory Corina present (01-30-22)  Culture Results:   No Growth Final (01-28-22)  Culture Results:   No growth at 1 week. (01-25-22)  Culture Results:   No Growth Final (01-23-22)  Culture Results:   No growth (01-23-22)    RADIOLOGY  < from: Xray Chest 1 View- PORTABLE-Routine (Xray Chest 1 View- PORTABLE-Routine in AM.) (02.04.22 @ 05:53) >  Impression:    Bilateral lung opacities.    < end of copied text >    MEDICATIONS  (STANDING):  aspirin  chewable 81 milliGRAM(s) Oral daily  chlorhexidine 0.12% Liquid 15 milliLiter(s) Oral Mucosa every 12 hours  chlorhexidine 4% Liquid 1 Application(s) Topical <User Schedule>  dexAMETHasone  Injectable 4 milliGRAM(s) IV Push daily  diltiazem    Tablet 30 milliGRAM(s) Oral every 6 hours  enoxaparin Injectable 40 milliGRAM(s) SubCutaneous at bedtime  glucagon  Injectable 1 milliGRAM(s) IntraMuscular once  insulin lispro (ADMELOG) corrective regimen sliding scale   SubCutaneous every 6 hours  pantoprazole  Injectable 40 milliGRAM(s) IV Push daily  polyethylene glycol 3350 17 Gram(s) Oral daily    MEDICATIONS  (PRN):  acetaminophen     Tablet .. 650 milliGRAM(s) Oral every 6 hours PRN Temp greater or equal to 38C (100.4F), Mild Pain (1 - 3)  albuterol/ipratropium for Nebulization 3 milliLiter(s) Nebulizer every 6 hours PRN Shortness of Breath and/or Wheezing

## 2022-02-04 NOTE — PROGRESS NOTE ADULT - SUBJECTIVE AND OBJECTIVE BOX
Patient is a 81y old  Male who presents with a chief complaint of Cardiac arrest (01 Feb 2022 22:00)    Over Night Events:  Remains intubated on MV.  Off pressors.  Off sedation for > 72h    ROS:   All ROS are negative except HPI     PHYSICAL EXAM  ICU Vital Signs Last 24 Hrs  T(C): 37.3 (04 Feb 2022 07:10), Max: 37.4 (04 Feb 2022 02:00)  T(F): 99.1 (04 Feb 2022 07:10), Max: 99.3 (04 Feb 2022 02:00)  HR: 68 (04 Feb 2022 09:08) (56 - 72)  BP: 141/75 (04 Feb 2022 07:00) (108/58 - 162/79)  BP(mean): 102 (04 Feb 2022 07:00) (76 - 116)  RR: 27 (04 Feb 2022 07:10) (15 - 28)  SpO2: 96% (04 Feb 2022 09:08) (96% - 100%)    CAM ICU: Positive  SAT: Y  SBT: N    CONSTITUTIONAL:  Ill appearing in NAD    ENT:   Airway patent,   Mouth with normal mucosa.   No thrush    EYES:   Pupils equal,   Round and reactive to light.    CARDIAC:   Normal rate,   Regular rhythm.    + edema    RESPIRATORY:   No wheezing  Bilateral crackles  Normal chest expansion  Not tachypneic,  No use of accessory muscles    GASTROINTESTINAL:  Abdomen soft,   Non-tender,   No guarding,   + BS    MUSCULOSKELETAL:   Range of motion is not limited,  No clubbing, cyanosis    NEUROLOGICAL:   Off sedation  + gag  Does not open eyes spontaneous  Does not withdraw to pain  Does not follow commands    SKIN:   Skin normal color for race,   Warm and dry and intact.   Sacral ulcer      02-03-22 @ 07:01  -  02-04-22 @ 07:00  --------------------------------------------------------  IN:    Enteral Tube Flush: 100 mL    Vital High Protein: 960 mL  Total IN: 1060 mL    OUT:    Indwelling Catheter - Urethral (mL): 2045 mL  Total OUT: 2045 mL    Total NET: -985 mL      02-04-22 @ 07:01  -  02-04-22 @ 09:40  --------------------------------------------------------  IN:    Vital High Protein: 80 mL  Total IN: 80 mL    OUT:    Indwelling Catheter - Urethral (mL): 40 mL  Total OUT: 40 mL    Total NET: 40 mL      LABS:                            11.3   10.96 )-----------( 84       ( 04 Feb 2022 06:43 )             36.7     141  |  107  |  24<H>  ----------------------------<  173<H>  4.7   |  24  |  <0.5<L>    Ca    8.2<L>      04 Feb 2022 06:43  Mg     2.1     02-04    TPro  4.4<L>  /  Alb  2.9<L>  /  TBili  0.8  /  DBili  x   /  AST  33  /  ALT  45<H>  /  AlkPhos  71  02-04    LIVER FUNCTIONS - ( 04 Feb 2022 06:43 )  Alb: 2.9 g/dL / Pro: 4.4 g/dL / ALK PHOS: 71 U/L / ALT: 45 U/L / AST: 33 U/L / GGT: x                                              Mode: AC/ CMV (Assist Control/ Continuous Mandatory Ventilation)  RR (machine): 24  TV (machine): 450  FiO2: 40  PEEP: 8  MAP: 10  PC:   PIP: 18    ABG - ( 04 Feb 2022 03:52 )  pH, Arterial: 7.51  pH, Blood: x     /  pCO2: 38    /  pO2: 123   / HCO3: 30    / Base Excess: 6.8   /  SaO2: 97.8      MEDICATIONS  (STANDING):  aspirin  chewable 81 milliGRAM(s) Oral daily  chlorhexidine 0.12% Liquid 15 milliLiter(s) Oral Mucosa every 12 hours  chlorhexidine 4% Liquid 1 Application(s) Topical <User Schedule>  dexAMETHasone  Injectable 4 milliGRAM(s) IV Push daily  dextrose 5%. 1000 milliLiter(s) (50 mL/Hr) IV Continuous <Continuous>  dextrose 5%. 1000 milliLiter(s) (100 mL/Hr) IV Continuous <Continuous>  dextrose 50% Injectable 25 Gram(s) IV Push once  dextrose 50% Injectable 12.5 Gram(s) IV Push once  dextrose 50% Injectable 25 Gram(s) IV Push once  diltiazem    Tablet 30 milliGRAM(s) Oral every 6 hours  enoxaparin Injectable 40 milliGRAM(s) SubCutaneous at bedtime  glucagon  Injectable 1 milliGRAM(s) IntraMuscular once  insulin lispro (ADMELOG) corrective regimen sliding scale   SubCutaneous every 6 hours  pantoprazole  Injectable 40 milliGRAM(s) IV Push daily  polyethylene glycol 3350 17 Gram(s) Oral daily    MEDICATIONS  (PRN):  acetaminophen     Tablet .. 650 milliGRAM(s) Oral every 6 hours PRN Temp greater or equal to 38C (100.4F), Mild Pain (1 - 3)  albuterol/ipratropium for Nebulization 3 milliLiter(s) Nebulizer every 6 hours PRN Shortness of Breath and/or Wheezing      New X-rays reviewed:                                                                                  ECHO    CXR interpreted by me:  RLL opacity

## 2022-02-04 NOTE — CHART NOTE - NSCHARTNOTEFT_GEN_A_CORE
Registered Dietitian Follow-Up     Patient Profile Reviewed                           Yes [x]   No []     Nutrition History Previously Obtained        Yes []  No [x]       Pertinent  Information:  pt is 81 year old male with hx of HTN, BPH, unvaccinated, tested + for COVID 12 days ago, BIBA 2/2 respiratory distress s/p cardiac arrest downtime of 7 minutes, s/p intubation.  + PNA, L sided pnemothorax, s/p chest tube. pt became difficult to ventilate 1/21/22 s/p bronchoscopy  2/2 thick secretions. 1/29 s/p cardizem drip 2/2 afib RVR s/p chest tube removal.  Poor prognosis. DNR. pt has been off sedation for 3 days, unresponsive, + anoxic brain injury. 2/3 + blood cultures followed by ID likely contaminent. Family wants to wait another week before making any decisions regarding goals of care.        Diet order:  Diet, NPO with Tube Feed:   Tube Feeding Modality: Orogastric  Vital High Protein  Total Volume for 24 Hours (mL): 960  Continuous  Starting Tube Feed Rate {mL per Hour}: 20  Until Goal Tube Feed Rate (mL per Hour): 40  Tube Feed Duration (in Hours): 24  Tube Feed Start Time: 13:00 (01-24-22 @ 13:13) [Active]    H20 flushes of 400 ml q 4 hrs         Anthropometrics:  - Ht. 175.3 cm  - Wt. 79.6 kg 1/16 vs 78.4  kgs today         Pertinent Lab Data: 2/4/22  wbc 10.96H  hgb 11.3L  hct 36.7L  BUN 24H  gluc 173H  POCT 149-206H     Pertinent Meds:   MEDICATIONS  (STANDING):  amantadine Syrup 100 milliGRAM(s) Oral every 12 hours  aspirin  chewable 81 milliGRAM(s) Oral daily  dexAMETHasone  Injectable 4 milliGRAM(s) IV Push daily  dextrose 5%. 1000 milliLiter(s) (50 mL/Hr) IV Continuous <Continuous>  diltiazem    Tablet 30 milliGRAM(s) Oral every 6 hours  insulin lispro (ADMELOG) corrective regimen sliding scale   SubCutaneous every 6 hours  pantoprazole  Injectable 40 milliGRAM(s) IV Push daily  polyethylene glycol 3350 17 Gram(s) Oral daily    MEDICATIONS  (PRN):  acetaminophen     Tablet .. 650 milliGRAM(s) Oral every 6 hours PRN Temp greater or equal to 38C (100.4F), Mild Pain (1 - 3)  albuterol/ipratropium for Nebulization 3 milliLiter(s) Nebulizer every 6 hours PRN Shortness of Breath and/or Wheezing    Physical Findings:  - Appearance: intubated to vent   - GI function: +BS, BM x2 noted today and large loose BM noted 2/3  - Tubes: OGT  - Oral/Mouth cavity:  - Skin:      Nutrition Requirements  Weight Used: 78.4 kgs     Estimated Energy Needs: 1823 kcals MARIA LUISA state,  79.6-96g pro (1.0-1.2g/kg/BW) 1;1 kcal for estimated fluid needs       Nutrient Intake: present feeds provide 948 kcals,  83g protein and 960 ml total volume + 2400 ml H20 flushes  meeting > 85% of estimated protein needs and < 50% of estimated kcal needs.        [] Previous Nutrition Diagnosis:            [X] Ongoing          [] Resolved    inadequate kcal intake remains         Nutrition Intervention:  recommend to change feeds to Glucerna 1.2, 20 ml/hr increase as tolerated to goal rate of 60 ml/hr will provide 1710 kcals, 85g protein and 1440 ml fluid. + H20 flushes of 300 ml q 8hrs.     Goal/Expected Outcome: Pt to tolerate EN and meet at least 85% of estimated nutrient needs     Indicator/Monitoring: RD to monitor tolerance to EN, labs/meds, NFPF and f/u as needed within 2-4 days

## 2022-02-04 NOTE — CONSULT NOTE ADULT - ASSESSMENT
81 year old male with a hx of HTN, BPH, recent diagnosis of COVID 19 2 days ago prior to admission -> presented to the ED S/P cardiac arrest.  Patient began to have worsening dyspnea at home, EMS was called. Found to be in cardiac arrest. ROSC was achieved after 2 rounds of CPR and epi, downtime 7 mins. Was intubated in the field.     acute respiratory failure / Cardiac arrest / COVID-19 pneumonia / probable NSTEMI / L sided pneumothorax / anoxic brain injury   family not considering terminal wean at this time    Case D/W Dr. Patel:    - will consider trach placement next week pending further family discussions  - recommend GI consult for PEG placement

## 2022-02-04 NOTE — PROGRESS NOTE ADULT - ASSESSMENT
81 year old male with a hx of HTN, BPH, recent diagnosis of COVID 19 2 days ago (unvaccinated, prescribed decadron/ zpack/ zinc) presenting to the ED S/P cardiac arrest. Intubated in the field    IMPRESSION  #COVID19 PNA, Severe (O2 < or = 94% on RA and requiring supplemental O2) with Cardiac arrest    CXR diffuse bilateral opacities     D-Dimer Assay, Quantitative: 7368 ng/mL DDU (01-15-22 @ 01:25)    s/p Toci 1/17  #Lactic acidosis  #Transaminitis   #Cardiac arrest- ROSC was achieved after 2 rounds of CPR and epi, downtime 7 mins  # Left sided Pneumothorax- on CXR from 2/2/22    Recommendations  - monitor off antibiotics for now - coag negative staph grew in 1 set -- did have central line, but is now removed  - repeat blood cx if febrile   - Management of Pneumothorax and vent as per CCU/ICU protocol     Please call or message on [employer] Teams if with any questions.  Spectra [phone]

## 2022-02-04 NOTE — PROGRESS NOTE ADULT - ASSESSMENT
IMPRESSION:  Acute hypoxic respiratory failure  Anoxic brain injury  Cardiac Arrest  Severe COVID PNA  Sepsis on present on admission  Left PNX SP chest tube, resolved  Mucus plugs SP Bronchoscopy  Ischemic changes on CT Head      SUGGEST:    CNS:  Neuro FU.  CT Head reviewed.  EEG reviewed.    HEENT: Oral care.  Surgery for Trach    PULMONARY: HOB @ 45 degrees.  Aspiration precautions.  Repeat Apnea test.  Vent changes: Decrease TV to 420.  Dexamethasone 4mg QD (day 18).  Monitor driving pressure.     CARDIOVASCULAR:  Avoid overload.  Keep I < O.    GI: GI prophylaxis.  c/w OG feeding.  Bowel regimen.  Surgery for PEG.    RENAL:  Follow up lytes.  Correct as needed.  Monitor UO.  Becker care.    INFECTIOUS DISEASE: Abx per ID.  Repeat Procalcitonin.    Tend inflammatory markers.  ID FU. FU cultures.    HEMATOLOGICAL: DVT prophylaxis    ENDOCRINE:  Follow up FS.  Insulin protocol if needed.  TSH    MUSCULOSKELETAL: bedrest    DNR  Monitor in MICU  Very poor overall prognosis  Palliative FU  Terminal weaning discussed with Family, want to wait  Dispo to long term facility  The patient is critically ill with a high probability of deterioration.

## 2022-02-04 NOTE — PROGRESS NOTE ADULT - ASSESSMENT
81 year old male with a hx of HTN, BPH, recent diagnosis of COVID 19 2 days ago (unvaccinated, prescribed decadron/ zpack/ zinc) presenting to the ED S/P cardiac arrest, course complicated by pneumothorax s/p chest tube and removal, and mucus plug removed by bronchoscopy. patient does not follow any commands as of now    # COVID pneumonia s/p cardiac arrest  - was down for 7 minutes / found to have pneumothorax s/p chest tube and removal  - course complicated by mucus plug removed by bronch  - on minimal vent settings however not following commands  - seen by ID recommending to stop abx  - received toci and remdesivir, c/w dexa 6 bid day 17 // off sedation  - chest xray concerning for apical pneumothorax on left side as per radiology, repeated xray // having surgery evaluate  Mode: AC/ CMV (Assist Control/ Continuous Mandatory Ventilation)  RR (machine): 24  TV (machine): 420  FiO2: 40  PEEP: 8  MAP: 10  PC:   PIP: 18    # Anoxic brain injury  - not following commands off sedation; CT head showed periventricular white matter changes which can reflect ischemia  - f/u neuro, EEG showing generalized slowing  - d/w family amenable for trach, f/u surgery    PLAN: surgery for trach    # DVT PPX: lovenox  # GI PPX: protonix  # Diet: tube feeding  DNR

## 2022-02-04 NOTE — PROGRESS NOTE ADULT - ASSESSMENT
81 year old male with a hx of HTN, BPH, recent diagnosis of COVID 19 2 days ago prior to admission -> presented to the ED S/P cardiac arrest.  Patient began to have worsening dyspnea at home, EMS was called. Found to be in cardiac arrest. ROSC was achieved after 2 rounds of CPR and epi, downtime 7 mins. Was intubated in the field.     acute respiratory failure / Cardiac arrest / COVID-19 pneumonia / probable NSTEMI / L sided pneumothorax / anoxic brain injury      - off sedation x 3days - still unresponsive   - episode of rapid afib - now in NSR   - cardio following   - surgical consult for  trach and peg as  family is not liberating pt from ventilator at this time    - very poor prognosis, family aware   - DVT prophylaxis

## 2022-02-04 NOTE — CONSULT NOTE ADULT - SUBJECTIVE AND OBJECTIVE BOX
CHADWICK VENCES [id number]  81y Male  20d    HPI:  81 year old male with a hx of HTN, BPH, recent diagnosis of COVID 19 2 days ago (unvaccinated, prescribed decadron/ zpack/ zinc) presenting to the ED S/P cardiac arrest. Patient intubated/ sedated so history obtained from chart (son left and attempted to reach). Patient began to have worsening dyspnea at home, EMS was called. Found to be in respiratory arrest by EMS and followed by EMS. ROSC was achieved after 2 rounds of CPR and epi, downtime 7 mins. Was intubated in the field.   In ED central line was placed. ABG: PH 7.15, PaO2 66, PaCO2 45, HCO3- 16. Troponins .1, BNP > 8000, D-Dimer 7368. COVID 19 positive. Patient being admitted s/p cardiac arrest to ICU.  (15 Aquiles 2022 04:09)    SURGERY: requested for trach placement; Pt with acute respiratory failure / Cardiac arrest / COVID-19 pneumonia / probable NSTEMI / L sided pneumothorax / anoxic brain injury --Off sedation x 3 days now - unresponsive    Pt had L PTX, s/p pigtail catheter which was removed 1/28--CXR today is negative for PTX      PAST MEDICAL & SURGICAL HISTORY:  BPH (benign prostatic hyperplasia)    Mild HTN          MEDICATIONS  (STANDING):  amantadine Syrup 100 milliGRAM(s) Oral every 12 hours  aspirin  chewable 81 milliGRAM(s) Oral daily  chlorhexidine 0.12% Liquid 15 milliLiter(s) Oral Mucosa every 12 hours  chlorhexidine 4% Liquid 1 Application(s) Topical <User Schedule>  dexAMETHasone  Injectable 4 milliGRAM(s) IV Push daily  dextrose 5%. 1000 milliLiter(s) (50 mL/Hr) IV Continuous <Continuous>  dextrose 5%. 1000 milliLiter(s) (100 mL/Hr) IV Continuous <Continuous>  dextrose 50% Injectable 25 Gram(s) IV Push once  dextrose 50% Injectable 12.5 Gram(s) IV Push once  dextrose 50% Injectable 25 Gram(s) IV Push once  diltiazem    Tablet 30 milliGRAM(s) Oral every 6 hours  enoxaparin Injectable 40 milliGRAM(s) SubCutaneous at bedtime  glucagon  Injectable 1 milliGRAM(s) IntraMuscular once  insulin lispro (ADMELOG) corrective regimen sliding scale   SubCutaneous every 6 hours  pantoprazole  Injectable 40 milliGRAM(s) IV Push daily  polyethylene glycol 3350 17 Gram(s) Oral daily    MEDICATIONS  (PRN):  acetaminophen     Tablet .. 650 milliGRAM(s) Oral every 6 hours PRN Temp greater or equal to 38C (100.4F), Mild Pain (1 - 3)  albuterol/ipratropium for Nebulization 3 milliLiter(s) Nebulizer every 6 hours PRN Shortness of Breath and/or Wheezing      Allergies    Allergy Status Unknown    Intolerances        REVIEW OF SYSTEMS    [ ] A ten-point review of systems was otherwise negative except as noted.  [ x] Due to altered mental status/intubation, subjective information were not able to be obtained from the patient. History was obtained, to the extent possible, from review of the chart and collateral sources of information.      Vital Signs Last 24 Hrs  T(C): 37 (04 Feb 2022 11:00), Max: 37.4 (04 Feb 2022 02:00)  T(F): 98.6 (04 Feb 2022 11:00), Max: 99.3 (04 Feb 2022 02:00)  HR: 84 (04 Feb 2022 12:00) (56 - 84)  BP: 183/94 (04 Feb 2022 12:00) (108/58 - 183/94)  BP(mean): 126 (04 Feb 2022 12:00) (76 - 126)  RR: 29 (04 Feb 2022 12:00) (24 - 29)  SpO2: 94% (04 Feb 2022 12:00) (94% - 100%)    Mode: AC/ CMV (Assist Control/ Continuous Mandatory Ventilation)  RR (machine): 24  TV (machine): 420  FiO2: 40  PEEP: 8  MAP: 10  PIP: 18      PHYSICAL EXAM:  GENERAL:intubated off sedation, obtunded  CHEST/LUNG: +rhonchi  HEART: Regular rate and rhythm  ABDOMEN: Soft, Nontender, Nondistended;     LABS:  Labs:  CAPILLARY BLOOD GLUCOSE      POCT Blood Glucose.: 206 mg/dL (04 Feb 2022 11:23)  POCT Blood Glucose.: 144 mg/dL (04 Feb 2022 05:36)  POCT Blood Glucose.: 149 mg/dL (04 Feb 2022 00:00)  POCT Blood Glucose.: 179 mg/dL (03 Feb 2022 17:10)                          11.3   10.96 )-----------( 84       ( 04 Feb 2022 06:43 )             36.7       Auto Neutrophil %: 88.3 % (02-04-22 @ 06:43)  Auto Immature Granulocyte %: 0.5 % (02-04-22 @ 06:43)    02-04    141  |  107  |  24<H>  ----------------------------<  173<H>  4.7   |  24  |  <0.5<L>      Calcium, Total Serum: 8.2 mg/dL (02-04-22 @ 06:43)      LFTs:             4.4  | 0.8  | 33       ------------------[71      ( 04 Feb 2022 06:43 )  2.9  | x    | 45          Lipase:x      Amylase:x         Blood Gas Arterial, Lactate: 1.50 mmol/L (02-04-22 @ 03:52)  Blood Gas Arterial, Lactate: 1.30 mmol/L (02-03-22 @ 04:41)  Blood Gas Arterial, Lactate: 1.70 mmol/L (02-02-22 @ 04:28)    ABG - ( 04 Feb 2022 03:52 )  pH: 7.51  /  pCO2: 38    /  pO2: 123   / HCO3: 30    / Base Excess: 6.8   /  SaO2: 97.8            ABG - ( 03 Feb 2022 04:41 )  pH: 7.38  /  pCO2: 55    /  pO2: 134   / HCO3: 32    / Base Excess: 6.0   /  SaO2: 98.4            ABG - ( 02 Feb 2022 04:28 )  pH: 7.49  /  pCO2: 52    /  pO2: 293   / HCO3: 40    / Base Excess: 14.3  /  SaO2: 98.2        RADIOLOGY & ADDITIONAL STUDIES:  < from: Xray Chest 1 View- PORTABLE-Routine (Xray Chest 1 View- PORTABLE-Routine in AM.) (02.04.22 @ 05:53) >  Findings:    Support devices: Endotracheal tube, nasogastric tube, EKG leads.    Cardiac/mediastinum/hilum: The overall heart size is within normal   limits. Dilatation of the thoracic aorta.    Lung parenchyma/Pleura: Bilateral lung opacities.    Impression:    Bilateral lung opacities.     CT Head No Cont (01.31.22 @ 14:02) >  IMPRESSION:    1.  Mild atrophic changes.    2.  Periventricular white matter hypodensities, nonspecific, differential   diagnostic possibilities include ischemic change, gliosis or   demyelination.    3.  Relative hyperdensity within the basal ganglia is nonspecific and can   be seen in patients with hyperglycemia, follow-up examination is   suggested if clinically warranted.       CHADWICK VENCES [id number]  81y Male  20d    HPI:  81 year old male with a hx of HTN, BPH, recent diagnosis of COVID 19 2 days ago (unvaccinated, prescribed decadron/ zpack/ zinc) presenting to the ED S/P cardiac arrest. Patient intubated/ sedated so history obtained from chart (son left and attempted to reach). Patient began to have worsening dyspnea at home, EMS was called. Found to be in respiratory arrest by EMS and followed by EMS. ROSC was achieved after 2 rounds of CPR and epi, downtime 7 mins. Was intubated in the field.   In ED central line was placed. ABG: PH 7.15, PaO2 66, PaCO2 45, HCO3- 16. Troponins .1, BNP > 8000, D-Dimer 7368. COVID 19 positive. Patient being admitted s/p cardiac arrest to ICU.  (15 Aquiles 2022 04:09)    SURGERY: requested for trach placement; Pt with acute respiratory failure / Cardiac arrest / COVID-19 pneumonia / probable NSTEMI / L sided pneumothorax / anoxic brain injury --Off sedation x 3 days now - unresponsive    Pt had L PTX, s/p pigtail catheter which was removed 1/28--CXR today is negative for PTX      PAST MEDICAL & SURGICAL HISTORY:  BPH (benign prostatic hyperplasia)    Mild HTN          MEDICATIONS  (STANDING):  amantadine Syrup 100 milliGRAM(s) Oral every 12 hours  aspirin  chewable 81 milliGRAM(s) Oral daily  chlorhexidine 0.12% Liquid 15 milliLiter(s) Oral Mucosa every 12 hours  chlorhexidine 4% Liquid 1 Application(s) Topical <User Schedule>  dexAMETHasone  Injectable 4 milliGRAM(s) IV Push daily  dextrose 5%. 1000 milliLiter(s) (50 mL/Hr) IV Continuous <Continuous>  dextrose 5%. 1000 milliLiter(s) (100 mL/Hr) IV Continuous <Continuous>  dextrose 50% Injectable 25 Gram(s) IV Push once  dextrose 50% Injectable 12.5 Gram(s) IV Push once  dextrose 50% Injectable 25 Gram(s) IV Push once  diltiazem    Tablet 30 milliGRAM(s) Oral every 6 hours  enoxaparin Injectable 40 milliGRAM(s) SubCutaneous at bedtime  glucagon  Injectable 1 milliGRAM(s) IntraMuscular once  insulin lispro (ADMELOG) corrective regimen sliding scale   SubCutaneous every 6 hours  pantoprazole  Injectable 40 milliGRAM(s) IV Push daily  polyethylene glycol 3350 17 Gram(s) Oral daily    MEDICATIONS  (PRN):  acetaminophen     Tablet .. 650 milliGRAM(s) Oral every 6 hours PRN Temp greater or equal to 38C (100.4F), Mild Pain (1 - 3)  albuterol/ipratropium for Nebulization 3 milliLiter(s) Nebulizer every 6 hours PRN Shortness of Breath and/or Wheezing      Allergies    Allergy Status Unknown    Intolerances        REVIEW OF SYSTEMS    [ ] A ten-point review of systems was otherwise negative except as noted.  [ x] Due to altered mental status/intubation, subjective information were not able to be obtained from the patient. History was obtained, to the extent possible, from review of the chart and collateral sources of information.      Vital Signs Last 24 Hrs  T(C): 37 (04 Feb 2022 11:00), Max: 37.4 (04 Feb 2022 02:00)  T(F): 98.6 (04 Feb 2022 11:00), Max: 99.3 (04 Feb 2022 02:00)  HR: 84 (04 Feb 2022 12:00) (56 - 84)  BP: 183/94 (04 Feb 2022 12:00) (108/58 - 183/94)  BP(mean): 126 (04 Feb 2022 12:00) (76 - 126)  RR: 29 (04 Feb 2022 12:00) (24 - 29)  SpO2: 94% (04 Feb 2022 12:00) (94% - 100%)    Mode: AC/ CMV (Assist Control/ Continuous Mandatory Ventilation)  RR (machine): 24  TV (machine): 420  FiO2: 40  PEEP: 8  MAP: 10  PIP: 18      PHYSICAL EXAM:  GENERAL:intubated off sedation, obtunded  HEENT: NCAT; sclerae anicteric.  NECK: Trachea midline; no JVD. Normal thyroid.  CHEST/LUNG: +rhonchi, c/w with ventilator oscillations.  HEART: Regular rate and rhythm  ABDOMEN: Soft, Nontender, Nondistended.  SKIN: Warm; dry  NEURO: GCS 4  Ext: Warm and NV intact with intact DP and PT pulses    LABS:  Labs:  CAPILLARY BLOOD GLUCOSE      POCT Blood Glucose.: 206 mg/dL (04 Feb 2022 11:23)  POCT Blood Glucose.: 144 mg/dL (04 Feb 2022 05:36)  POCT Blood Glucose.: 149 mg/dL (04 Feb 2022 00:00)  POCT Blood Glucose.: 179 mg/dL (03 Feb 2022 17:10)                          11.3   10.96 )-----------( 84       ( 04 Feb 2022 06:43 )             36.7       Auto Neutrophil %: 88.3 % (02-04-22 @ 06:43)  Auto Immature Granulocyte %: 0.5 % (02-04-22 @ 06:43)    02-04    141  |  107  |  24<H>  ----------------------------<  173<H>  4.7   |  24  |  <0.5<L>      Calcium, Total Serum: 8.2 mg/dL (02-04-22 @ 06:43)      LFTs:             4.4  | 0.8  | 33       ------------------[71      ( 04 Feb 2022 06:43 )  2.9  | x    | 45          Lipase:x      Amylase:x         Blood Gas Arterial, Lactate: 1.50 mmol/L (02-04-22 @ 03:52)  Blood Gas Arterial, Lactate: 1.30 mmol/L (02-03-22 @ 04:41)  Blood Gas Arterial, Lactate: 1.70 mmol/L (02-02-22 @ 04:28)    ABG - ( 04 Feb 2022 03:52 )  pH: 7.51  /  pCO2: 38    /  pO2: 123   / HCO3: 30    / Base Excess: 6.8   /  SaO2: 97.8            ABG - ( 03 Feb 2022 04:41 )  pH: 7.38  /  pCO2: 55    /  pO2: 134   / HCO3: 32    / Base Excess: 6.0   /  SaO2: 98.4            ABG - ( 02 Feb 2022 04:28 )  pH: 7.49  /  pCO2: 52    /  pO2: 293   / HCO3: 40    / Base Excess: 14.3  /  SaO2: 98.2        RADIOLOGY & ADDITIONAL STUDIES:  < from: Xray Chest 1 View- PORTABLE-Routine (Xray Chest 1 View- PORTABLE-Routine in AM.) (02.04.22 @ 05:53) >  Findings:    Support devices: Endotracheal tube, nasogastric tube, EKG leads.    Cardiac/mediastinum/hilum: The overall heart size is within normal   limits. Dilatation of the thoracic aorta.    Lung parenchyma/Pleura: Bilateral lung opacities.    Impression:    Bilateral lung opacities.     CT Head No Cont (01.31.22 @ 14:02) >  IMPRESSION:    1.  Mild atrophic changes.    2.  Periventricular white matter hypodensities, nonspecific, differential   diagnostic possibilities include ischemic change, gliosis or   demyelination.    3.  Relative hyperdensity within the basal ganglia is nonspecific and can   be seen in patients with hyperglycemia, follow-up examination is   suggested if clinically warranted.

## 2022-02-04 NOTE — PROGRESS NOTE ADULT - ASSESSMENT
ASSESSMENT: This is a 81y Male with h/o 7 min Cardiac arrest and now hypoxic encephalopathy.  Brainstem reflexes are present and patient is overbreathing the ventilator.  However absence of flexor posturing or better is unfavorable indicator from a motor standpoint.    PLAN: Case d/w Dr. Martinez  - EEG appreciated - generalized slowing.   - Continue neuro-conscious checks to assess minimally conscious state  - Would recommend MRI w/o contrast (if stable for transport) or repeat head CT to assess for extent of damage   - Avoid CNS active medications.     ASSESSMENT: This is a 81y Male with h/o 7 min Cardiac arrest and now hypoxic encephalopathy.  Brainstem reflexes are present and patient is overbreathing the ventilator.  However absence of flexor posturing or better is unfavorable indicator from a motor standpoint.    PLAN: Case d/w Dr. Martinez  - EEG appreciated - generalized slowing.   - Continue neuro-conscious checks to assess for minimally conscious state  - Would recommend MRI w/o contrast (if stable for transport) or repeat head CT to assess for extent of damage   - Avoid CNS active medications.

## 2022-02-05 NOTE — PROGRESS NOTE ADULT - ASSESSMENT
CHADWICK VENCES 81y Male  MRN#: [id number]   CODE STATUS:DNR       SUBJECTIVE  Patient is a 81y old Male who presents with a chief complaint of Cardiac arrest (01 Feb 2022 22:00)  Currently admitted to medicine with the primary diagnosis of Cardiac arrest  Today is hospital day 21d, and this morning he is off sedation and RN reports no overnight events.       OBJECTIVE  PAST MEDICAL & SURGICAL HISTORY  BPH (benign prostatic hyperplasia)    Mild HTN      ALLERGIES:  Allergy Status Unknown    MEDICATIONS:  STANDING MEDICATIONS  amantadine Syrup 100 milliGRAM(s) Oral every 12 hours  aspirin  chewable 81 milliGRAM(s) Oral daily  chlorhexidine 0.12% Liquid 15 milliLiter(s) Oral Mucosa every 12 hours  chlorhexidine 4% Liquid 1 Application(s) Topical <User Schedule>  dexAMETHasone  Injectable 4 milliGRAM(s) IV Push daily  dextrose 5%. 1000 milliLiter(s) IV Continuous <Continuous>  dextrose 5%. 1000 milliLiter(s) IV Continuous <Continuous>  dextrose 50% Injectable 25 Gram(s) IV Push once  dextrose 50% Injectable 12.5 Gram(s) IV Push once  dextrose 50% Injectable 25 Gram(s) IV Push once  diltiazem    Tablet 30 milliGRAM(s) Oral every 6 hours  enoxaparin Injectable 40 milliGRAM(s) SubCutaneous at bedtime  glucagon  Injectable 1 milliGRAM(s) IntraMuscular once  insulin lispro (ADMELOG) corrective regimen sliding scale   SubCutaneous every 6 hours  pantoprazole  Injectable 40 milliGRAM(s) IV Push daily  polyethylene glycol 3350 17 Gram(s) Oral daily    PRN MEDICATIONS  acetaminophen     Tablet .. 650 milliGRAM(s) Oral every 6 hours PRN  albuterol/ipratropium for Nebulization 3 milliLiter(s) Nebulizer every 6 hours PRN  fentaNYL    Injectable 50 MICROGram(s) IV Push every 4 hours PRN      VITAL SIGNS: Last 24 Hours  T(C): 37.4 (05 Feb 2022 11:10), Max: 37.4 (05 Feb 2022 11:10)  T(F): 99.3 (05 Feb 2022 11:10), Max: 99.3 (05 Feb 2022 11:10)  HR: 67 (05 Feb 2022 11:10) (66 - 88)  BP: 153/86 (05 Feb 2022 11:10) (93/55 - 176/79)  BP(mean): 98 (05 Feb 2022 11:10) (72 - 117)  RR: 24 (05 Feb 2022 11:10) (21 - 32)  SpO2: 98% (05 Feb 2022 11:10) (91% - 100%)    LABS:                        12.0   11.68 )-----------( 81       ( 05 Feb 2022 06:30 )             38.6     02-05    137  |  102  |  21<H>  ----------------------------<  134<H>  4.2   |  24  |  <0.5<L>    Ca    8.4<L>      05 Feb 2022 06:30  Mg     2.1     02-05    TPro  4.6<L>  /  Alb  2.9<L>  /  TBili  0.8  /  DBili  x   /  AST  28  /  ALT  40  /  AlkPhos  82  02-05        ABG - ( 05 Feb 2022 03:51 )  pH, Arterial: 7.44  pH, Blood: x     /  pCO2: 48    /  pO2: 86    / HCO3: 33    / Base Excess: 7.3   /  SaO2: 96.8                      RADIOLOGY:      PHYSICAL EXAM:    GENERAL: intubated sedated   HEENT:  Atraumatic, Normocephalic.  PULMONARY: Clear to auscultation bilaterally;  CARDIOVASCULAR: Regular rate and rhythm  GASTROINTESTINAL: Soft, Nontender, Nondistended;  MUSCULOSKELETAL: =1  edema  NEUROLOGY: sedated   SKIN: No rashes or lesions    ASSESSMENT & PLAN  81 year old male with a hx of HTN, BPH, recent diagnosis of COVID 19 2 days ago prior to admission -> presented to the ED S/P cardiac arrest.  Patient began to have worsening dyspnea at home, EMS was called. Found to be in cardiac arrest. ROSC was achieved after 2 rounds of CPR and epi, downtime 7 mins. Was intubated in the field.     acute respiratory failure   Cardiac arrest   COVID-19 pneumonia   probable NSTEMI   L sided pneumothorax   anoxic brain injury      - off sedation x 3days - still unresponsive   - episode of rapid afib - now in NSR   - cardio following   - surgical consult for  trach and peg as  family is not liberating pt from ventilator at this time    - very poor prognosis, family aware   - DVT prophylaxis

## 2022-02-05 NOTE — PROGRESS NOTE ADULT - ASSESSMENT
IMPRESSION:  Acute hypoxic respiratory failure  Anoxic brain injury  Cardiac Arrest  Severe COVID PNA  Sepsis on present on admission  Left PNX SP chest tube, resolved  Mucus plugs SP Bronchoscopy  Ischemic changes on CT Head      SUGGEST:    CNS:  Neuro FU.  reviewed  CT Head reviewed.    EEG reviewed. general slowing    HEENT: Oral care.  Surgery for Trach    PULMONARY: HOB @ 45 degrees.    Aspiration precautions.   Vent changes: Decrease TV to 420.    Dexamethasone 4mg QD (day 18).    Monitor driving pressure.     CARDIOVASCULAR:  Avoid overload.  Keep I < O.    GI: GI prophylaxis.  c/w OG feeding.  Bowel regimen.  Surgery for PEG.    RENAL:  Follow up lytes.  Correct as needed.  Monitor UO.  Becker care.    INFECTIOUS DISEASE: Abx per ID.  Repeat Procalcitonin.    Tend inflammatory markers.  ID FU. FU cultures.    HEMATOLOGICAL: DVT prophylaxis    ENDOCRINE:  Follow up FS.  Insulin protocol if needed.  TSH    MUSCULOSKELETAL: bedrest    DNR  Monitor in MICU  Very poor overall prognosis  Palliative FU  Terminal weaning discussed with Family, want to wait a week or so  Dispo to long term facility  The patient is critically ill with a high probability of deterioration.

## 2022-02-05 NOTE — PROGRESS NOTE ADULT - SUBJECTIVE AND OBJECTIVE BOX
Patient is a 81y old  Male who presents with a chief complaint of Cardiac arrest (01 Feb 2022 22:00)        HPI:  81 year old male with a hx of HTN, BPH, recent diagnosis of COVID 19 2 days ago (unvaccinated, prescribed decadron/ zpack/ zinc) presenting to the ED S/P cardiac arrest. Patient intubated/ sedated so history obtained from chart (son left and attempted to reach). Patient began to have worsening dyspnea at home, EMS was called. Found to be in respiratory arrest by EMS and followed by EMS. ROSC was achieved after 2 rounds of CPR and epi, downtime 7 mins. Was intubated in the field.   In ED central line was placed. ABG: PH 7.15, PaO2 66, PaCO2 45, HCO3- 16. Troponins .1, BNP > 8000, D-Dimer 7368. COVID 19 positive. Patient being admitted s/p cardiac arrest to ICU.      (15 Aquiles 2022 04:09)      Pt evaluated on rounds.  I reviewed the radiology tests and hospital record.    I reviewed previous notes on this patient.    Interval Events: No overnight events.      CAM:+    SAT:n    SBT:n      REVIEW OF SYSTEMS:   see HPI      OBJECTIVE:  ICU Vital Signs Last 24 Hrs  T(C): 37.3 (05 Feb 2022 03:00), Max: 37.3 (04 Feb 2022 07:10)  T(F): 99.1 (05 Feb 2022 03:00), Max: 99.1 (04 Feb 2022 07:10)  HR: 84 (05 Feb 2022 01:24) (66 - 88)  BP: 151/89 (05 Feb 2022 00:00) (113/60 - 183/94)  BP(mean): 113 (05 Feb 2022 00:00) (81 - 126)    RR: 24 (05 Feb 2022 05:00) (21 - 30)  SpO2: 94% (05 Feb 2022 01:24) (91% - 99%)    Mode: AC/ CMV (Assist Control/ Continuous Mandatory Ventilation), RR (machine): 24, TV (machine): 420, FiO2: 50, PEEP: 8, MAP: 7, PIP: 15    02-03 @ 07:01  -  02-04 @ 07:00  --------------------------------------------------------  IN: 1060 mL / OUT: 2045 mL / NET: -985 mL    02-04 @ 07:01  - 02-05 @ 06:11  --------------------------------------------------------  IN: 500 mL / OUT: 1820 mL / NET: -1320 mL      CAPILLARY BLOOD GLUCOSE      POCT Blood Glucose.: 162 mg/dL (04 Feb 2022 23:40)        PHYSICAL EXAM:       · ENMT:   Airway patent,   Nasal mucosa clear.  Mouth with normal mucosa.   No thrush    · EYES:   Clear bilaterally,   pupils equal,   round and reactive to light.    · CARDIAC:   Normal rate,   regular rhythm.    Heart sounds S1, S2.   No murmurs, no rubs or gallops on auscultation  no edema        CAROTID:   normal systolic impulse  no bruits    · RESPIRATORY:   rales  normal chest expansion  no retractions or use of accessory muscles  palpation of chest is normal with no fremitus  percussion of chest demonstrates no hyperresonance or dullness    · GASTROINTESTINAL:  Abdomen soft,   non-tender,   + BS  liver/spleen not palpable    · MUSCULOSKELETAL:   no clubbing, cyanosis      · SKIN:   Skin normal color for race,   warm, dry   No evidence of rash.        · HEME LYMPH:   no splenomegaly.  No cervical  lymphadenopathy.  no inguinal lymphadenopathy    HOSPITAL MEDICATIONS:  MEDICATIONS  (STANDING):  amantadine Syrup 100 milliGRAM(s) Oral every 12 hours  aspirin  chewable 81 milliGRAM(s) Oral daily  chlorhexidine 0.12% Liquid 15 milliLiter(s) Oral Mucosa every 12 hours  chlorhexidine 4% Liquid 1 Application(s) Topical <User Schedule>  dexAMETHasone  Injectable 4 milliGRAM(s) IV Push daily  dextrose 5%. 1000 milliLiter(s) (50 mL/Hr) IV Continuous <Continuous>  dextrose 5%. 1000 milliLiter(s) (100 mL/Hr) IV Continuous <Continuous>  dextrose 50% Injectable 25 Gram(s) IV Push once  dextrose 50% Injectable 12.5 Gram(s) IV Push once  dextrose 50% Injectable 25 Gram(s) IV Push once  diltiazem    Tablet 30 milliGRAM(s) Oral every 6 hours  enoxaparin Injectable 40 milliGRAM(s) SubCutaneous at bedtime  glucagon  Injectable 1 milliGRAM(s) IntraMuscular once  insulin lispro (ADMELOG) corrective regimen sliding scale   SubCutaneous every 6 hours  pantoprazole  Injectable 40 milliGRAM(s) IV Push daily  polyethylene glycol 3350 17 Gram(s) Oral daily    MEDICATIONS  (PRN):  acetaminophen     Tablet .. 650 milliGRAM(s) Oral every 6 hours PRN Temp greater or equal to 38C (100.4F), Mild Pain (1 - 3)  albuterol/ipratropium for Nebulization 3 milliLiter(s) Nebulizer every 6 hours PRN Shortness of Breath and/or Wheezing  fentaNYL    Injectable 50 MICROGram(s) IV Push every 4 hours PRN Agitatiom    sodium chloride 0.9%.: Solution, 1000 milliLiter(s) infuse at 75 mL/Hr  Provider's Contact #: [phone]  lactated ringers Bolus:   1000 milliLiter(s), IV Bolus, once, infuse over 60 Minute(s), Stop After 1 Doses  lactated ringers.: Solution, 1000 milliLiter(s) infuse at 50 mL/Hr  sodium chloride 0.9% Bolus:   250 milliLiter(s), IV Bolus, once, infuse over 60 Minute(s), Stop After 1 Doses  lactated ringers.: Solution, 1000 milliLiter(s) infuse at 1000 mL/Hr, Stop After 1 Hours  lactated ringers.: Solution, 500 milliLiter(s) infuse at 60 mL/Hr, Stop After 1 Doses  lactated ringers Bolus:   1000 milliLiter(s), IV Bolus, once, infuse over 60 Minute(s), Stop After 1 Doses  sodium chloride 0.9% Bolus:   250 milliLiter(s), IV Bolus, once, infuse over 60 Minute(s), Stop After 1 Doses  Provider's Contact #: [phone]      LABS:                        11.3   10.96 )-----------( 84       ( 04 Feb 2022 06:43 )             36.7     02-04    141  |  107  |  24<H>  ----------------------------<  173<H>  4.7   |  24  |  <0.5<L>    Ca    8.2<L>      04 Feb 2022 06:43  Mg     2.1     02-04    TPro  4.4<L>  /  Alb  2.9<L>  /  TBili  0.8  /  DBili  x   /  AST  33  /  ALT  45<H>  /  AlkPhos  71  02-04        Arterial Blood Gas:  02-05 @ 03:51  7.44/48/86/33/96.8/7.3  ABG lactate: --  Arterial Blood Gas:  02-04 @ 03:52  7.51/38/123/30/97.8/6.8  ABG lactate: --      Mode: AC/ CMV (Assist Control/ Continuous Mandatory Ventilation), RR (machine): 24, TV (machine): 420, FiO2: 50, PEEP: 8, MAP: 7, PIP: 15      SARS-CoV-2: Detected (15 Aquiles 2022 01:25)    Mode: AC/ CMV (Assist Control/ Continuous Mandatory Ventilation)  RR (machine): 24  TV (machine): 420  FiO2: 50  PEEP: 8  MAP: 7  PIP: 15      ABG - ( 05 Feb 2022 03:51 )  pH, Arterial: 7.44  pH, Blood: x     /  pCO2: 48    /  pO2: 86    / HCO3: 33    / Base Excess: 7.3   /  SaO2: 96.8                RADIOLOGY: Today I personally interpreted the latest CXR and other pertinent films.

## 2022-02-06 NOTE — CHART NOTE - NSCHARTNOTEFT_GEN_A_CORE
pt saturation started to drop to mid 70s-80s, CXR showed MATHEW pneumothorax, chest tube inserted, attached to waterseal system suction, pending CXR.

## 2022-02-06 NOTE — PROGRESS NOTE ADULT - ASSESSMENT
81 year old male with a hx of HTN, BPH, recent diagnosis of COVID 19 2 days ago (unvaccinated, prescribed decadron/ zpack/ zinc) presenting to the ED S/P cardiac arrest. Intubated in the field    IMPRESSION  #COVID19 PNA, Severe (O2 < or = 94% on RA and requiring supplemental O2) with Cardiac arrest    CXR diffuse bilateral opacities     D-Dimer Assay, Quantitative: 7368 ng/mL DDU (01-15-22 @ 01:25)    s/p Toci 1/17  #Lactic acidosis  #Transaminitis   #Cardiac arrest- ROSC was achieved after 2 rounds of CPR and epi, downtime 7 mins  # Left sided Pneumothorax- on CXR from 2/2/22      would recommend:    1. Monitor ogg ABx  2. Monitor WBC count, is trending down  3. Continue Dexamethasone   4. Supportive care including AC    5. Aspiration precaution  6. Management of Pneumothorax and vent as per CCU/ICU protocol   7. Please discontinue All Abx  since Procalcitonin level is low, 0.03 as of 2/1/22    Attending Attestation:    Spent more than 35 minutes on total encounter, more than 50 % of the visit was spent counseling and/or coordinating care by the Attending physician.   81 year old male with a hx of HTN, BPH, recent diagnosis of COVID 19 2 days ago (unvaccinated, prescribed decadron/ zpack/ zinc) presenting to the ED S/P cardiac arrest. Intubated in the field    IMPRESSION  #COVID19 PNA, Severe (O2 < or = 94% on RA and requiring supplemental O2) with Cardiac arrest    CXR diffuse bilateral opacities     D-Dimer Assay, Quantitative: 7368 ng/mL DDU (01-15-22 @ 01:25)    s/p Toci 1/17  #Lactic acidosis  #Transaminitis   #Cardiac arrest- ROSC was achieved after 2 rounds of CPR and epi, downtime 7 mins  # Left sided Pneumothorax- on CXR from 2/2/22      would recommend:    1. Management of chest tube as per CCU protocol  2. Taper off  Dexamethasone   3. Supportive care including AC    4. Aspiration precaution  5. Monitor OFF Abx    Attending Attestation:    Spent more than 35 minutes on total encounter, more than 50 % of the visit was spent counseling and/or coordinating care by the Attending physician.

## 2022-02-06 NOTE — PROGRESS NOTE ADULT - SUBJECTIVE AND OBJECTIVE BOX
Patient  is afebrile  and  remains intubated/vented.  The WBC  count is trending down, The blood culture from 1/31/22 grew Coagulase negative Staphylococcus, most likely a contaminant.       REVIEW OF SYSTEMS: Unable  to obtain due to mental status       Allergy Status Unknown      PHYSICAL EXAM:   ICU Vital Signs Last 24 Hrs  T(C): 36.8 (06 Feb 2022 19:00), Max: 37.3 (06 Feb 2022 03:09)  T(F): 98.2 (06 Feb 2022 19:00), Max: 99.1 (06 Feb 2022 03:09)  HR: 64 (06 Feb 2022 21:00) (61 - 81)  BP: 145/70 (06 Feb 2022 21:00) (114/66 - 189/84)  BP(mean): 101 (06 Feb 2022 21:00) (84 - 123)  ABP: --  ABP(mean): --  RR: 26 (06 Feb 2022 21:00) (23 - 31)  SpO2: 100% (06 Feb 2022 21:00) (96% - 100%)        LABS:                        11.1   10.35 )-----------( 100      ( 06 Feb 2022 06:08 )             36.4                           10.6   12.03 )-----------( 104      ( 02 Feb 2022 05:48 )             35.4                         10.3   24.47 )-----------( 218      ( 23 Jan 2022 06:35 )             34.9       02-06    139  |  103  |  22<H>  ----------------------------<  188<H>  4.1   |  28  |  <0.5<L>    Ca    8.5      06 Feb 2022 06:08  Mg     2.1     02-06    TPro  4.5<L>  /  Alb  3.0<L>  /  TBili  0.8  /  DBili  x   /  AST  16  /  ALT  33  /  AlkPhos  81  02-06    02-03    141  |  102  |  29<H>  ----------------------------<  172<H>  4.1   |  28  |  <0.5<L>    Ca    8.1<L>      03 Feb 2022 06:00  Mg     2.2     02-03    TPro  4.5<L>  /  Alb  3.1<L>  /  TBili  0.7  /  DBili  x   /  AST  25  /  ALT  56<H>  /  AlkPhos  76  02-03 01-27    139  |  102  |  39<H>  ----------------------------<  253<H>  5.2<H>   |  28  |  0.5<L>    Ca    7.9<L>      27 Jan 2022 13:50    TPro  4.6<L>  /  Alb  3.0<L>  /  TBili  0.6  /  DBili  x   /  AST  25  /  ALT  64<H>  /  AlkPhos  68  01-27        CAPILLARY BLOOD GLUCOSE  POCT Blood Glucose.: 369 mg/dL (23 Jan 2022 12:49)  POCT Blood Glucose.: 348 mg/dL (23 Jan 2022 06:38)      ABG - ( 23 Jan 2022 05:20 )  pH, Arterial: 7.30  pH, Blood: x     /  pCO2: 58    /  pO2: 268   / HCO3: 28    / Base Excess: 1.5   /  SaO2: 96.3          Urinalysis Basic - ( 23 Jan 2022 11:00 )  Color: Yellow / Appearance: Clear / SG: >=1.030 / pH: x  Gluc: x / Ketone: Negative  / Bili: Negative / Urobili: 0.2 mg/dL   Blood: x / Protein: Negative mg/dL / Nitrite: Negative   Leuk Esterase: Negative / RBC: 3-5 /HPF / WBC 1-2 /HPF   Sq Epi: x / Non Sq Epi: Occasional /HPF / Bacteria: Few        Procalcitonin, Serum (02.01.22 @ 06:25)   Procalcitonin, Serum: 0.03  Procalcitonin, Serum (01.19.22 @ 06:30) : 0.44  Procalcitonin, Serum (01.16.22 @ 06:56) : 2.32    MEDICATIONS  (STANDING):    amantadine Syrup 100 milliGRAM(s) Oral every 12 hours  aspirin  chewable 81 milliGRAM(s) Oral daily  chlorhexidine 0.12% Liquid 15 milliLiter(s) Oral Mucosa every 12 hours  chlorhexidine 4% Liquid 1 Application(s) Topical <User Schedule>  dexAMETHasone  Injectable 4 milliGRAM(s) IV Push daily  dextrose 5%. 1000 milliLiter(s) (50 mL/Hr) IV Continuous <Continuous>  dextrose 5%. 1000 milliLiter(s) (100 mL/Hr) IV Continuous <Continuous>  dextrose 50% Injectable 25 Gram(s) IV Push once  dextrose 50% Injectable 12.5 Gram(s) IV Push once  dextrose 50% Injectable 25 Gram(s) IV Push once  diltiazem    Tablet 30 milliGRAM(s) Oral every 6 hours  enoxaparin Injectable 40 milliGRAM(s) SubCutaneous at bedtime  glucagon  Injectable 1 milliGRAM(s) IntraMuscular once  insulin lispro (ADMELOG) corrective regimen sliding scale   SubCutaneous every 6 hours  pantoprazole  Injectable 40 milliGRAM(s) IV Push daily  polyethylene glycol 3350 17 Gram(s) Oral daily        RADIOLOGY & ADDITIONAL TESTS:    < from: Xray Chest 1 View- PORTABLE-Urgent (Xray Chest 1 View- PORTABLE-Urgent .) (02.02.22 @ 11:20) >    Again seen are a lack of vascular markings at the left lung apex   suggestive of a pneumothorax, but remains limited in evaluation    Stable bilateral lung opacities    r< from: Xray Chest 1 View- PORTABLE-Routine (Xray Chest 1 View- PORTABLE-Routine in AM.) (01.23.22 @ 07:52) >  Bilateral opacities similar to prior  Left-sided chest tube unchanged in position.      < from: Xray Chest 1 View- PORTABLE-Routine (Xray Chest 1 View- PORTABLE-Routine in AM.) (01.22.22 @ 06:34) >  Support devices: ET tube mid trachea central venous line superior vena  cava NG tube in stomach    Cardiac/mediastinum/hilum: Unremarkable.    Lung parenchyma/Pleura: Decreased previous bilateral opacities. No  pleural effusion or air leak    Skeleton/soft tissues: Unremarkable.        MICROBIOLOGY DATA:    mCulture - Blood in AM (01.31.22 @ 06:15)   - Coagulase negative Staphylococcus: Detec   Gram Stain:   Growth in anaerobic bottle: Gram positive cocci in pairs   Specimen Source: .Blood Blood-Peripheral   Organism: Blood Culture PCR   Culture Results: Growth in anaerobic bottle: Gram positive cocci in pairs   ***Blood Panel PCR results on this specimen are available     Culture - Bronchial (01.30.22 @ 16:00)   Gram Stain: Few polymorphonuclear leukocytes per low power field   No Squamous epithelial cells per low power field   Few Gram positive cocci in pairs per oil power field   Specimen Source: .Bronchial None   Culture Results:   Normal Respiratory Corina present     Culture - Blood (01.28.22 @ 16:37)   Specimen Source: .Blood None   Culture Results: No Growth Final     MRSA/MSSA PCR (01.23.22 @ 11:00)   MRSA PCR Result.: Negative    Respiratory Viral Panel with COVID-19 by JEN (01.15.22 @ 01:25)   Rapid RVP Result: Detected   SARS-CoV-2: Detected:                     Patient  is afebrile  and  remains intubated/vented.  And Left Chest tube in placed. The CXR from 2/6 shows No pneumothorax.       REVIEW OF SYSTEMS: Unable  to obtain due to mental status       Allergy Status Unknown      PHYSICAL EXAM:   ICU Vital Signs Last 24 Hrs  T(C): 36.8 (06 Feb 2022 19:00), Max: 37.3 (06 Feb 2022 03:09)  T(F): 98.2 (06 Feb 2022 19:00), Max: 99.1 (06 Feb 2022 03:09)  HR: 64 (06 Feb 2022 21:00) (61 - 81)  BP: 145/70 (06 Feb 2022 21:00) (114/66 - 189/84)  BP(mean): 101 (06 Feb 2022 21:00) (84 - 123)  ABP: --  ABP(mean): --  RR: 26 (06 Feb 2022 21:00) (23 - 31)  SpO2: 100% (06 Feb 2022 21:00) (96% - 100%)        LABS:                        11.1   10.35 )-----------( 100      ( 06 Feb 2022 06:08 )             36.4                           10.6   12.03 )-----------( 104      ( 02 Feb 2022 05:48 )             35.4                         10.3   24.47 )-----------( 218      ( 23 Jan 2022 06:35 )             34.9       02-06    139  |  103  |  22<H>  ----------------------------<  188<H>  4.1   |  28  |  <0.5<L>    Ca    8.5      06 Feb 2022 06:08  Mg     2.1     02-06    TPro  4.5<L>  /  Alb  3.0<L>  /  TBili  0.8  /  DBili  x   /  AST  16  /  ALT  33  /  AlkPhos  81  02-06 02-03    141  |  102  |  29<H>  ----------------------------<  172<H>  4.1   |  28  |  <0.5<L>    Ca    8.1<L>      03 Feb 2022 06:00  Mg     2.2     02-03    TPro  4.5<L>  /  Alb  3.1<L>  /  TBili  0.7  /  DBili  x   /  AST  25  /  ALT  56<H>  /  AlkPhos  76  02-03 01-27    139  |  102  |  39<H>  ----------------------------<  253<H>  5.2<H>   |  28  |  0.5<L>    Ca    7.9<L>      27 Jan 2022 13:50    TPro  4.6<L>  /  Alb  3.0<L>  /  TBili  0.6  /  DBili  x   /  AST  25  /  ALT  64<H>  /  AlkPhos  68  01-27        CAPILLARY BLOOD GLUCOSE  POCT Blood Glucose.: 369 mg/dL (23 Jan 2022 12:49)  POCT Blood Glucose.: 348 mg/dL (23 Jan 2022 06:38)      ABG - ( 23 Jan 2022 05:20 )  pH, Arterial: 7.30  pH, Blood: x     /  pCO2: 58    /  pO2: 268   / HCO3: 28    / Base Excess: 1.5   /  SaO2: 96.3          Urinalysis Basic - ( 23 Jan 2022 11:00 )  Color: Yellow / Appearance: Clear / SG: >=1.030 / pH: x  Gluc: x / Ketone: Negative  / Bili: Negative / Urobili: 0.2 mg/dL   Blood: x / Protein: Negative mg/dL / Nitrite: Negative   Leuk Esterase: Negative / RBC: 3-5 /HPF / WBC 1-2 /HPF   Sq Epi: x / Non Sq Epi: Occasional /HPF / Bacteria: Few        Procalcitonin, Serum (02.01.22 @ 06:25)   Procalcitonin, Serum: 0.03  Procalcitonin, Serum (01.19.22 @ 06:30) : 0.44  Procalcitonin, Serum (01.16.22 @ 06:56) : 2.32    MEDICATIONS  (STANDING):    amantadine Syrup 100 milliGRAM(s) Oral every 12 hours  aspirin  chewable 81 milliGRAM(s) Oral daily  chlorhexidine 0.12% Liquid 15 milliLiter(s) Oral Mucosa every 12 hours  chlorhexidine 4% Liquid 1 Application(s) Topical <User Schedule>  dexAMETHasone  Injectable 4 milliGRAM(s) IV Push daily  dextrose 5%. 1000 milliLiter(s) (50 mL/Hr) IV Continuous <Continuous>  dextrose 5%. 1000 milliLiter(s) (100 mL/Hr) IV Continuous <Continuous>  dextrose 50% Injectable 25 Gram(s) IV Push once  dextrose 50% Injectable 12.5 Gram(s) IV Push once  dextrose 50% Injectable 25 Gram(s) IV Push once  diltiazem    Tablet 30 milliGRAM(s) Oral every 6 hours  enoxaparin Injectable 40 milliGRAM(s) SubCutaneous at bedtime  glucagon  Injectable 1 milliGRAM(s) IntraMuscular once  insulin lispro (ADMELOG) corrective regimen sliding scale   SubCutaneous every 6 hours  pantoprazole  Injectable 40 milliGRAM(s) IV Push daily  polyethylene glycol 3350 17 Gram(s) Oral daily        RADIOLOGY & ADDITIONAL TESTS:    < from: Xray Chest 1 View- PORTABLE-Urgent (Xray Chest 1 View- PORTABLE-Urgent .) (02.06.22 @ 10:26) >    Support devices: Endotracheal tube, feeding tube and left-sided pigtail   chest tube are unchanged    Cardiac/mediastinum/hilum: Unchanged.    Lung parenchyma/Pleura: Bilateral opacities, unchanged. No definite   pneumothorax identified.      < from: Xray Chest 1 View- PORTABLE-Urgent (Xray Chest 1 View- PORTABLE-Urgent .) (02.02.22 @ 11:20) >    Again seen are a lack of vascular markings at the left lung apex suggestive of a pneumothorax, but remains limited in evaluation    Stable bilateral lung opacities    r< from: Xray Chest 1 View- PORTABLE-Routine (Xray Chest 1 View- PORTABLE-Routine in AM.) (01.23.22 @ 07:52) >  Bilateral opacities similar to prior  Left-sided chest tube unchanged in position.      < from: Xray Chest 1 View- PORTABLE-Routine (Xray Chest 1 View- PORTABLE-Routine in AM.) (01.22.22 @ 06:34) >  Support devices: ET tube mid trachea central venous line superior vena  cava NG tube in stomach    Cardiac/mediastinum/hilum: Unremarkable.    Lung parenchyma/Pleura: Decreased previous bilateral opacities. No  pleural effusion or air leak    Skeleton/soft tissues: Unremarkable.        MICROBIOLOGY DATA:  Culture - Blood in AM (01.31.22 @ 06:15)   - Coagulase negative Staphylococcus: Detec   Gram Stain:   Growth in anaerobic bottle: Gram positive cocci in pairs   Specimen Source: .Blood Blood-Peripheral   Organism: Blood Culture PCR   Culture Results: Growth in anaerobic bottle: Gram positive cocci in pairs   ***Blood Panel PCR results on this specimen are available     Culture - Bronchial (01.30.22 @ 16:00)   Gram Stain: Few polymorphonuclear leukocytes per low power field   No Squamous epithelial cells per low power field   Few Gram positive cocci in pairs per oil power field   Specimen Source: .Bronchial None   Culture Results:   Normal Respiratory Corina present     Culture - Blood (01.28.22 @ 16:37)   Specimen Source: .Blood None   Culture Results: No Growth Final     MRSA/MSSA PCR (01.23.22 @ 11:00)   MRSA PCR Result.: Negative    Respiratory Viral Panel with COVID-19 by JEN (01.15.22 @ 01:25)   Rapid RVP Result: Detected   SARS-CoV-2: Detected:

## 2022-02-06 NOTE — PROVIDER CONTACT NOTE (EICU) - ACTION/TREATMENT ORDERED:
Patient appeared more comfortable when visualized after receiving fentanyl  Repeat CXR with improvement in PTX

## 2022-02-06 NOTE — PROVIDER CONTACT NOTE (EICU) - ASSESSMENT
Patient evaluated using two-way AV TeleHealth technology with bedside RN in room. Patient with significant vent dyssynchrony. Not currently on any medications for analgesia or sedation

## 2022-02-06 NOTE — PROVIDER CONTACT NOTE (EICU) - RECOMMENDATIONS
RN to give fentanyl prn as ordered  CXR after chest tube placement with lung not fully expanded  Repeat CXR  Bedside team to assess patient, including possible need for continuous analgesia/sedation

## 2022-02-06 NOTE — PROCEDURE NOTE - NSPROCDETAILS_GEN_ALL_CORE
guidewire recovered/lumen(s) aspirated and flushed/sterile dressing applied/sterile technique, catheter placed/ultrasound guidance with use of sterile gel and probe cove
Carlotta technique/dressing applied/secured in place/sterile dressing applied

## 2022-02-06 NOTE — PROGRESS NOTE ADULT - SUBJECTIVE AND OBJECTIVE BOX
Patient is a 81y old  Male who presents with a chief complaint of Cardiac arrest (01 Feb 2022 22:00)        HPI:  81 year old male with a hx of HTN, BPH, recent diagnosis of COVID 19 2 days ago (unvaccinated, prescribed decadron/ zpack/ zinc) presenting to the ED S/P cardiac arrest. Patient intubated/ sedated so history obtained from chart (son left and attempted to reach). Patient began to have worsening dyspnea at home, EMS was called. Found to be in respiratory arrest by EMS and followed by EMS. ROSC was achieved after 2 rounds of CPR and epi, downtime 7 mins. Was intubated in the field.   In ED central line was placed. ABG: PH 7.15, PaO2 66, PaCO2 45, HCO3- 16. Troponins .1, BNP > 8000, D-Dimer 7368. COVID 19 positive. Patient being admitted s/p cardiac arrest to ICU.      (15 Aquiles 2022 04:09)      Pt evaluated on rounds.  I reviewed the radiology tests and hospital record.    I reviewed previous notes on this patient.    Interval Events: No overnight events.      CAM:    SAT:    SBT:      REVIEW OF SYSTEMS:   see HPI      OBJECTIVE:  ICU Vital Signs Last 24 Hrs  T(C): 37.3 (06 Feb 2022 03:09), Max: 37.4 (05 Feb 2022 11:10)  T(F): 99.1 (06 Feb 2022 03:09), Max: 99.3 (05 Feb 2022 11:10)  HR: 65 (06 Feb 2022 03:25) (59 - 87)  BP: 144/65 (06 Feb 2022 02:00) (93/55 - 158/78)  BP(mean): 94 (06 Feb 2022 02:00) (72 - 107)    RR: 26 (06 Feb 2022 03:09) (21 - 28)  SpO2: 98% (06 Feb 2022 03:25) (96% - 100%)    Mode: AC/ CMV (Assist Control/ Continuous Mandatory Ventilation), RR (machine): 24, TV (machine): 420, FiO2: 50, PEEP: 8, MAP: 17, PIP: 27    02-04 @ 07:01  -  02-05 @ 07:00  --------------------------------------------------------  IN: 1080 mL / OUT: 2070 mL / NET: -990 mL    02-05 @ 07:01  -  02-06 @ 05:43  --------------------------------------------------------  IN: 890 mL / OUT: 1425 mL / NET: -535 mL      CAPILLARY BLOOD GLUCOSE      POCT Blood Glucose.: 180 mg/dL (05 Feb 2022 23:52)        PHYSICAL EXAM:       · ENMT:   Airway patent,   Nasal mucosa clear.  Mouth with normal mucosa.   No thrush    · EYES:   Clear bilaterally,   pupils equal,   round and reactive to light.    · CARDIAC:   Normal rate,   regular rhythm.    Heart sounds S1, S2.   No murmurs, no rubs or gallops on auscultation  no edema        CAROTID:   normal systolic impulse  no bruits    · RESPIRATORY:   rales  normal chest expansion  no retractions or use of accessory muscles  palpation of chest is normal with no fremitus  percussion of chest demonstrates no hyperresonance or dullness    · GASTROINTESTINAL:  Abdomen soft,   non-tender,   + BS  liver/spleen not palpable    · MUSCULOSKELETAL:   no clubbing, cyanosis      · SKIN:   Skin normal color for race,   warm, dry   No evidence of rash.        · HEME LYMPH:   no splenomegaly.  No cervical  lymphadenopathy.  no inguinal lymphadenopathy    HOSPITAL MEDICATIONS:  MEDICATIONS  (STANDING):  amantadine Syrup 100 milliGRAM(s) Oral every 12 hours  aspirin  chewable 81 milliGRAM(s) Oral daily  chlorhexidine 0.12% Liquid 15 milliLiter(s) Oral Mucosa every 12 hours  chlorhexidine 4% Liquid 1 Application(s) Topical <User Schedule>  dexAMETHasone  Injectable 4 milliGRAM(s) IV Push daily  dextrose 5%. 1000 milliLiter(s) (100 mL/Hr) IV Continuous <Continuous>  dextrose 5%. 1000 milliLiter(s) (50 mL/Hr) IV Continuous <Continuous>  dextrose 50% Injectable 25 Gram(s) IV Push once  dextrose 50% Injectable 12.5 Gram(s) IV Push once  dextrose 50% Injectable 25 Gram(s) IV Push once  diltiazem    Tablet 30 milliGRAM(s) Oral every 6 hours  enoxaparin Injectable 40 milliGRAM(s) SubCutaneous at bedtime  glucagon  Injectable 1 milliGRAM(s) IntraMuscular once  insulin lispro (ADMELOG) corrective regimen sliding scale   SubCutaneous every 6 hours  pantoprazole  Injectable 40 milliGRAM(s) IV Push daily  polyethylene glycol 3350 17 Gram(s) Oral daily    MEDICATIONS  (PRN):  acetaminophen     Tablet .. 650 milliGRAM(s) Oral every 6 hours PRN Temp greater or equal to 38C (100.4F), Mild Pain (1 - 3)  albuterol/ipratropium for Nebulization 3 milliLiter(s) Nebulizer every 6 hours PRN Shortness of Breath and/or Wheezing  fentaNYL    Injectable 50 MICROGram(s) IV Push every 4 hours PRN Agitatiom    sodium chloride 0.9%.: Solution, 1000 milliLiter(s) infuse at 75 mL/Hr  Provider's Contact #: [phone]  lactated ringers Bolus:   1000 milliLiter(s), IV Bolus, once, infuse over 60 Minute(s), Stop After 1 Doses  lactated ringers.: Solution, 1000 milliLiter(s) infuse at 50 mL/Hr  sodium chloride 0.9% Bolus:   250 milliLiter(s), IV Bolus, once, infuse over 60 Minute(s), Stop After 1 Doses  lactated ringers.: Solution, 1000 milliLiter(s) infuse at 1000 mL/Hr, Stop After 1 Hours  lactated ringers.: Solution, 500 milliLiter(s) infuse at 60 mL/Hr, Stop After 1 Doses  lactated ringers Bolus:   1000 milliLiter(s), IV Bolus, once, infuse over 60 Minute(s), Stop After 1 Doses  sodium chloride 0.9% Bolus:   250 milliLiter(s), IV Bolus, once, infuse over 60 Minute(s), Stop After 1 Doses  Provider's Contact #: [phone]      LABS:                        12.0   11.68 )-----------( 81       ( 05 Feb 2022 06:30 )             38.6     02-05    137  |  102  |  21<H>  ----------------------------<  134<H>  4.2   |  24  |  <0.5<L>    Ca    8.4<L>      05 Feb 2022 06:30  Mg     2.1     02-05    TPro  4.6<L>  /  Alb  2.9<L>  /  TBili  0.8  /  DBili  x   /  AST  28  /  ALT  40  /  AlkPhos  82  02-05        Arterial Blood Gas:  02-06 @ 03:42  7.53/38/71/32/96.2/8.5  ABG lactate: --  Arterial Blood Gas:  02-05 @ 03:51  7.44/48/86/33/96.8/7.3  ABG lactate: --      Mode: AC/ CMV (Assist Control/ Continuous Mandatory Ventilation), RR (machine): 24, TV (machine): 420, FiO2: 50, PEEP: 8, MAP: 17, PIP: 27      SARS-CoV-2: Detected (15 Aquiles 2022 01:25)    Mode: AC/ CMV (Assist Control/ Continuous Mandatory Ventilation)  RR (machine): 24  TV (machine): 420  FiO2: 50  PEEP: 8  MAP: 17  PIP: 27      ABG - ( 06 Feb 2022 03:42 )  pH, Arterial: 7.53  pH, Blood: x     /  pCO2: 38    /  pO2: 71    / HCO3: 32    / Base Excess: 8.5   /  SaO2: 96.2                RADIOLOGY: Today I personally interpreted the latest CXR and other pertinent films.  Increasing MATHEW IMPRESSION:  spontaneous Ptx likely bleb    SUGGEST:  cont suction for another 24H then H2O seal  needs CT NC chest to look for target of Ptx source  MUST BE ON SUCTION GOING TO CT  T surg referral  no flying or diving for 6months  no straining or Valsalva  DVT/Gastritis prophylaxi

## 2022-02-06 NOTE — PROGRESS NOTE ADULT - ASSESSMENT
IMPRESSION:  Acute hypoxic respiratory failure  Anoxic brain injury  Cardiac Arrest  Severe COVID PNA  Sepsis on present on admission  Left PNX SP chest tube, resolved  Mucus plugs SP Bronchoscopy  Ischemic changes on CT Head      SUGGEST:    CNS:  Neuro FU.  reviewed  CT Head reviewed.    EEG reviewed. general slowing    HEENT: Oral care.  Surgery for Trach    PULMONARY: HOB @ 45 degrees.    Aspiration precautions.   Vent changes: Decrease TV to 420.    Dexamethasone 4mg QD (day 18).    Monitor driving pressure.   needs pigtail for expanding MATHEW ptx    CARDIOVASCULAR:  Avoid overload.  Keep I < O.    GI: GI prophylaxis.  c/w OG feeding.  Bowel regimen.  Surgery for PEG.    RENAL:  Follow up lytes.  Correct as needed.  Monitor UO.  Becker care.    INFECTIOUS DISEASE: Abx per ID.  Repeat Procalcitonin.    Tend inflammatory markers.  ID FU. FU cultures.    HEMATOLOGICAL: DVT prophylaxis    ENDOCRINE:  Follow up FS.  Insulin protocol if needed.  TSH    MUSCULOSKELETAL: bedrest    DNR  Monitor in MICU  Very poor overall prognosis  Palliative FU  Terminal weaning discussed with Family, want to wait a week or so  The patient is critically ill with a high probability of deterioration.

## 2022-02-06 NOTE — PROVIDER CONTACT NOTE (EICU) - BACKGROUND
82 yo M with COVID PNA on ventilator, s/p pigtail catheter for left PTX, not on any sedation or analgesia

## 2022-02-06 NOTE — PROVIDER CONTACT NOTE (EICU) - SITUATION
Notified by eICU RN that patient with labored breathing, tachypneic on ventilator s/p chest tube placement earlier this morning.

## 2022-02-06 NOTE — PROGRESS NOTE ADULT - SUBJECTIVE AND OBJECTIVE BOX
Patient developed hypoxemia and increased respiratory rate this am, chest tube placed on L for pneumothorax      T(F): 98.4 (02-06-22 @ 07:10), Max: 99.3 (02-05-22 @ 11:10)  HR: 64 (02-06-22 @ 09:30)  BP: 114/66 (02-06-22 @ 09:30)  RR: 25 (02-06-22 @ 09:30)  SpO2: 100% (02-06-22 @ 09:30) (96% - 100%)    PHYSICAL EXAM:  GENERAL: NAD  HEAD:  Atraumatic, Normocephalic  EYES: EOMI, PERRLA, conjunctiva and sclera clear  NERVOUS SYSTEM:  positive gag  CHEST/LUNG:  bilateral rhonchi  HEART: Regular rate and rhythm; No murmurs, rubs, or gallops  ABDOMEN: Soft, Nontender, Nondistended; Bowel sounds present  EXTREMITIES:  2+ Peripheral Pulses, No clubbing, cyanosis, or edema    LABS  02-06    139  |  103  |  22<H>  ----------------------------<  188<H>  4.1   |  28  |  <0.5<L>    Ca    8.5      06 Feb 2022 06:08  Mg     2.1     02-06    TPro  4.5<L>  /  Alb  3.0<L>  /  TBili  0.8  /  DBili  x   /  AST  16  /  ALT  33  /  AlkPhos  81  02-06                          11.1   10.35 )-----------( 100      ( 06 Feb 2022 06:08 )             36.4       Mode: AC/ CMV (Assist Control/ Continuous Mandatory Ventilation)  RR (machine): 24  TV (machine): 420  FiO2: 100  PEEP: 8    Culture Results:   Growth in anaerobic bottle: Staphylococcus capitis  Coag Negative Staphylococcus  Single set isolate, possible contaminant. Contact  Microbiology if susceptibility testing clinically  indicated.  ***Blood Panel PCR results on this specimen are available  approximately 3 hours after the Gram stain result.***  Gram stain, PCR, and/or culture results may not always  correspond due to difference in methodologies.  ************************************************************  This PCR assay was performed by multiplex PCR. This  Assay tests for 66 bacterial and resistance gene targets.  Please refer to the [facility] test directory  at [email or web address] for details. (01-31-22)  Culture Results:   Normal Respiratory Corina present (01-30-22)  Culture Results:   No Growth Final (01-28-22)  Culture Results:   No growth at 1 week. (01-25-22)  Culture Results:   No Growth Final (01-23-22)  Culture Results:   No growth (01-23-22    MEDICATIONS  (STANDING):  amantadine Syrup 100 milliGRAM(s) Oral every 12 hours  aspirin  chewable 81 milliGRAM(s) Oral daily  chlorhexidine 0.12% Liquid 15 milliLiter(s) Oral Mucosa every 12 hours  chlorhexidine 4% Liquid 1 Application(s) Topical <User Schedule>  dexAMETHasone  Injectable 4 milliGRAM(s) IV Push daily  diltiazem    Tablet 30 milliGRAM(s) Oral every 6 hours  enoxaparin Injectable 40 milliGRAM(s) SubCutaneous at bedtime  insulin lispro (ADMELOG) corrective regimen sliding scale   SubCutaneous every 6 hours  pantoprazole  Injectable 40 milliGRAM(s) IV Push daily  polyethylene glycol 3350 17 Gram(s) Oral daily    MEDICATIONS  (PRN):  acetaminophen     Tablet .. 650 milliGRAM(s) Oral every 6 hours PRN Temp greater or equal to 38C (100.4F), Mild Pain (1 - 3)  albuterol/ipratropium for Nebulization 3 milliLiter(s) Nebulizer every 6 hours PRN Shortness of Breath and/or Wheezing  fentaNYL    Injectable 50 MICROGram(s) IV Push every 4 hours PRN Agitatiom

## 2022-02-07 NOTE — PROGRESS NOTE ADULT - RS GEN PE MLT RESP DETAILS PC
good air movement/no chest wall tenderness/no intercostal retractions/no subcutaneous emphysema/no wheezes

## 2022-02-07 NOTE — CHART NOTE - NSCHARTNOTEFT_GEN_A_CORE
Registered Dietitian Follow-Up     Patient Profile Reviewed                           Yes [x]   No []     Nutrition History Previously Obtained        Yes [x]  No []       Pertinent  Information:  pt is 81 year old male with hx of HTN, BPH, unvaccinated, tested + for COVID 12 days ago, BIBA 2/2 respiratory distress s/p cardiac arrest downtime of 7 minutes, s/p intubation.  + PNA, L sided pnemothorax, s/p chest tube. pt became difficult to ventilate 1/21/22 s/p bronchoscopy  2/2 thick secretions. 1/29 s/p cardizem drip 2/2 afib RVR s/p chest tube removal.  Poor prognosis. DNR. pt has been off sedation since 2/1, unresponsive, + anoxic brain injury. 2/3 + blood cultures followed by ID likely contaminent. Family wants to wait another week before making any decisions regarding goals of care.       Diet order:  Diet, NPO with Tube Feed:   Tube Feeding Modality: Orogastric  Glucerna 1.2 Haroldo  Total Volume for 24 Hours (mL): 1440  Continuous  Starting Tube Feed Rate {mL per Hour}: 20  Until Goal Tube Feed Rate (mL per Hour): 60  Tube Feed Duration (in Hours): 24  Tube Feed Start Time: 21:00 (02-04-22 @ 20:43) [Pending Verification By Attending]      Diet, NPO with Tube Feed:   Tube Feeding Modality: Orogastric  Vital High Protein  Total Volume for 24 Hours (mL): 960  Continuous  Starting Tube Feed Rate {mL per Hour}: 20  Until Goal Tube Feed Rate (mL per Hour): 40  Tube Feed Duration (in Hours): 24  Tube Feed Start Time: 13:00 (01-24-22 @ 13:13) [Active]    H20 flushes of 400 ml q 4 hrs         Anthropometrics:  - Ht. 175.3 cm  - Wt. 79.6 kg 1/16 vs 72.5 kgs 2/5           Pertinent Lab Data: 2/7/22  hgb 10.9L  hct 35.5L  BUN 21H  gluc 151H  POCT 122-195H     Pertinent Meds:  MEDICATIONS  (STANDING):  amantadine Syrup 100 milliGRAM(s) Oral every 12 hours  aspirin  chewable 81 milliGRAM(s) Oral daily  dexAMETHasone  Injectable 2 milliGRAM(s) IV Push daily  dextrose 5%. 1000 milliLiter(s) (50 mL/Hr) IV Continuous <Continuous>  dextrose 5%. 1000 milliLiter(s) (100 mL/Hr) IV Continuous <Continuous>  diltiazem    Tablet 30 milliGRAM(s) Oral every 6 hours  insulin lispro (ADMELOG) corrective regimen sliding scale   SubCutaneous every 6 hours  pantoprazole  Injectable 40 milliGRAM(s) IV Push daily  polyethylene glycol 3350 17 Gram(s) Oral daily    MEDICATIONS  (PRN):  acetaminophen     Tablet .. 650 milliGRAM(s) Oral every 6 hours PRN Temp greater or equal to 38C (100.4F), Mild Pain (1 - 3)  albuterol/ipratropium for Nebulization 3 milliLiter(s) Nebulizer every 6 hours PRN Shortness of Breath and/or Wheezing  fentaNYL    Injectable 50 MICROGram(s) IV Push every 4 hours PRN Agitatiom       Physical Findings:  - Appearance: unresponsive, intubated to vent   - GI function: +BS, BM noted 2/4/22  - Tubes: OGT   - Oral/Mouth cavity:  - Skin:      Nutrition Requirements  Weight Used: 72.5 kgs      Estimated Energy Needs: 1735 kcals MARIA LUISA state, 72.5-87g protein (1.0-1.2g/kg/BW) 1;1 kcal for estimated fluid needs              Nutrient Intake: present feeds provide 948 kcals,  83g protein and 960 ml total volume + 2400 ml H20 flushes  meeting > 85% of estimated protein needs and < 50% of estimated kcal needs.          [] Previous Nutrition Diagnosis:            [X] Ongoing          [] Resolved      inadequate kcal intake remains       Nutrition Intervention: recommend to change feeds to Glucerna 1.2, 20 ml/hr increase as tolerated to goal rate of 60 ml/hr will provide 1710 kcals, 85g protein and 1440 ml fluid. + H20 flushes of 300 ml q 8hrs.       Goal/Expected Outcome: Pt to tolerate EN and meet at least 85% of estimated nutrient needs     Indicator/Monitoring: RD to monitor tolerance to EN, labs/meds, NFPF and f/u as needed within 2-4 days.

## 2022-02-07 NOTE — PROGRESS NOTE ADULT - SUBJECTIVE AND OBJECTIVE BOX
Patient is a 81y old  Male who presents with a chief complaint of Cardiac arrest (01 Feb 2022 22:00)      Over Night Events:  Patient seen and examined.   off sedation not awake     ROS:  See HPI    PHYSICAL EXAM    ICU Vital Signs Last 24 Hrs  T(C): 37.2 (07 Feb 2022 07:04), Max: 37.3 (06 Feb 2022 11:00)  T(F): 99 (07 Feb 2022 07:04), Max: 99.1 (06 Feb 2022 11:00)  HR: 65 (07 Feb 2022 07:00) (60 - 78)  BP: 142/69 (07 Feb 2022 07:00) (112/55 - 168/90)  BP(mean): 96 (07 Feb 2022 07:00) (75 - 123)  ABP: --  ABP(mean): --  RR: 15 (07 Feb 2022 09:00) (15 - 28)  SpO2: 100% (07 Feb 2022 07:00) (99% - 100%)      General: not awake   HEENT:    et tube             Lymph Nodes: NO cervical LN   Lungs: Bilateral BS  Cardiovascular: Regular   Abdomen: Soft, Positive BS  Extremities: No clubbing   Skin: warm   Neurological: positive gag   Musculoskeletal: move all ext     I&O's Detail    06 Feb 2022 07:01  -  07 Feb 2022 07:00  --------------------------------------------------------  IN:    Enteral Tube Flush: 100 mL    Vital High Protein: 1000 mL  Total IN: 1100 mL    OUT:    Chest Tube (mL): 440 mL    Indwelling Catheter - Urethral (mL): 1260 mL  Total OUT: 1700 mL    Total NET: -600 mL      07 Feb 2022 07:01  -  07 Feb 2022 09:22  --------------------------------------------------------  IN:  Total IN: 0 mL    OUT:    Indwelling Catheter - Urethral (mL): 75 mL  Total OUT: 75 mL    Total NET: -75 mL          LABS:                          10.9   9.68  )-----------( 78       ( 07 Feb 2022 05:55 )             35.5         07 Feb 2022 05:55    137    |  102    |  21     ----------------------------<  151    4.5     |  26     |  <0.5     Ca    8.7        07 Feb 2022 05:55  Mg     2.1       07 Feb 2022 05:55    TPro  4.6    /  Alb  2.8    /  TBili  0.8    /  DBili  x      /  AST  28     /  ALT  32     /  AlkPhos  84     07 Feb 2022 05:55  Amylase x     lipase x                                                                                                                                                                                                 Mode: AC/ CMV (Assist Control/ Continuous Mandatory Ventilation)  RR (machine): 24  TV (machine): 420  FiO2: 50  PEEP: 8  MAP: 9  PIP: 16                                      ABG - ( 07 Feb 2022 06:08 )  pH, Arterial: 7.43  pH, Blood: x     /  pCO2: 44    /  pO2: 141   / HCO3: 29    / Base Excess: 4.3   /  SaO2: 98.2                MEDICATIONS  (STANDING):  amantadine Syrup 100 milliGRAM(s) Oral every 12 hours  aspirin  chewable 81 milliGRAM(s) Oral daily  chlorhexidine 0.12% Liquid 15 milliLiter(s) Oral Mucosa every 12 hours  chlorhexidine 4% Liquid 1 Application(s) Topical <User Schedule>  dexAMETHasone  Injectable 4 milliGRAM(s) IV Push daily  dextrose 5%. 1000 milliLiter(s) (50 mL/Hr) IV Continuous <Continuous>  dextrose 5%. 1000 milliLiter(s) (100 mL/Hr) IV Continuous <Continuous>  dextrose 50% Injectable 25 Gram(s) IV Push once  dextrose 50% Injectable 12.5 Gram(s) IV Push once  dextrose 50% Injectable 25 Gram(s) IV Push once  diltiazem    Tablet 30 milliGRAM(s) Oral every 6 hours  enoxaparin Injectable 40 milliGRAM(s) SubCutaneous at bedtime  glucagon  Injectable 1 milliGRAM(s) IntraMuscular once  insulin lispro (ADMELOG) corrective regimen sliding scale   SubCutaneous every 6 hours  pantoprazole  Injectable 40 milliGRAM(s) IV Push daily  polyethylene glycol 3350 17 Gram(s) Oral daily    MEDICATIONS  (PRN):  acetaminophen     Tablet .. 650 milliGRAM(s) Oral every 6 hours PRN Temp greater or equal to 38C (100.4F), Mild Pain (1 - 3)  albuterol/ipratropium for Nebulization 3 milliLiter(s) Nebulizer every 6 hours PRN Shortness of Breath and/or Wheezing  fentaNYL    Injectable 50 MICROGram(s) IV Push every 4 hours PRN Agitatiom          Xrays:  TLC:  OG:  ET tube:                                                                                    b/l opacity    ECHO:  [facility] ICU:

## 2022-02-07 NOTE — PROGRESS NOTE ADULT - SUBJECTIVE AND OBJECTIVE BOX
Patient  is afebrile  and  remains intubated/vented. The WBC count and kidney function stay normal.      REVIEW OF SYSTEMS: Unable  to obtain due to mental status       Allergy Status Unknown      PHYSICAL EXAM:   ICU Vital Signs Last 24 Hrs  T(C): 37.2 (07 Feb 2022 19:00), Max: 37.5 (07 Feb 2022 17:00)  T(F): 99 (07 Feb 2022 19:00), Max: 99.5 (07 Feb 2022 17:00)  HR: 84 (07 Feb 2022 20:06) (60 - 85)  BP: 139/85 (07 Feb 2022 20:00) (112/55 - 170/79)  BP(mean): 105 (07 Feb 2022 20:00) (75 - 115)  ABP: --  ABP(mean): --  RR: 28 (07 Feb 2022 20:00) (15 - 28)  SpO2: 98% (07 Feb 2022 20:06) (97% - 100%)      LABS:                        10.9   9.68  )-----------( 78       ( 07 Feb 2022 05:55 )             35.5                           11.1   10.35 )-----------( 100      ( 06 Feb 2022 06:08 )             36.4                           10.6   12.03 )-----------( 104      ( 02 Feb 2022 05:48 )             35.4                         10.3   24.47 )-----------( 218      ( 23 Jan 2022 06:35 )             34.9       02-07    137  |  102  |  21<H>  ----------------------------<  151<H>  4.5   |  26  |  <0.5<L>    Ca    8.7      07 Feb 2022 05:55  Mg     2.1     02-07    TPro  4.6<L>  /  Alb  2.8<L>  /  TBili  0.8  /  DBili  x   /  AST  28  /  ALT  32  /  AlkPhos  84  02-07 02-06    139  |  103  |  22<H>  ----------------------------<  188<H>  4.1   |  28  |  <0.5<L>    Ca    8.5      06 Feb 2022 06:08  Mg     2.1     02-06    TPro  4.5<L>  /  Alb  3.0<L>  /  TBili  0.8  /  DBili  x   /  AST  16  /  ALT  33  /  AlkPhos  81  02-06        CAPILLARY BLOOD GLUCOSE  POCT Blood Glucose.: 369 mg/dL (23 Jan 2022 12:49)  POCT Blood Glucose.: 348 mg/dL (23 Jan 2022 06:38)      ABG - ( 23 Jan 2022 05:20 )  pH, Arterial: 7.30  pH, Blood: x     /  pCO2: 58    /  pO2: 268   / HCO3: 28    / Base Excess: 1.5   /  SaO2: 96.3          Urinalysis Basic - ( 23 Jan 2022 11:00 )  Color: Yellow / Appearance: Clear / SG: >=1.030 / pH: x  Gluc: x / Ketone: Negative  / Bili: Negative / Urobili: 0.2 mg/dL   Blood: x / Protein: Negative mg/dL / Nitrite: Negative   Leuk Esterase: Negative / RBC: 3-5 /HPF / WBC 1-2 /HPF   Sq Epi: x / Non Sq Epi: Occasional /HPF / Bacteria: Few        Procalcitonin, Serum (02.01.22 @ 06:25)   Procalcitonin, Serum: 0.03  Procalcitonin, Serum (01.19.22 @ 06:30) : 0.44  Procalcitonin, Serum (01.16.22 @ 06:56) : 2.32    MEDICATIONS  (STANDING):    amantadine Syrup 100 milliGRAM(s) Oral every 12 hours  aspirin  chewable 81 milliGRAM(s) Oral daily  chlorhexidine 0.12% Liquid 15 milliLiter(s) Oral Mucosa every 12 hours  chlorhexidine 4% Liquid 1 Application(s) Topical <User Schedule>  dexAMETHasone  Injectable 2 milliGRAM(s) IV Push daily  dextrose 5%. 1000 milliLiter(s) (50 mL/Hr) IV Continuous <Continuous>  dextrose 5%. 1000 milliLiter(s) (100 mL/Hr) IV Continuous <Continuous>  dextrose 50% Injectable 25 Gram(s) IV Push once  dextrose 50% Injectable 12.5 Gram(s) IV Push once  dextrose 50% Injectable 25 Gram(s) IV Push once  diltiazem    Tablet 30 milliGRAM(s) Oral every 6 hours  enoxaparin Injectable 40 milliGRAM(s) SubCutaneous at bedtime  glucagon  Injectable 1 milliGRAM(s) IntraMuscular once  insulin lispro (ADMELOG) corrective regimen sliding scale   SubCutaneous every 6 hours  pantoprazole  Injectable 40 milliGRAM(s) IV Push daily  polyethylene glycol 3350 17 Gram(s) Oral daily      RADIOLOGY & ADDITIONAL TESTS:    < from: Xray Chest 1 View- PORTABLE-Urgent (Xray Chest 1 View- PORTABLE-Urgent .) (02.06.22 @ 10:26) >    Support devices: Endotracheal tube, feeding tube and left-sided pigtail   chest tube are unchanged    Cardiac/mediastinum/hilum: Unchanged.    Lung parenchyma/Pleura: Bilateral opacities, unchanged. No definite   pneumothorax identified.      < from: Xray Chest 1 View- PORTABLE-Urgent (Xray Chest 1 View- PORTABLE-Urgent .) (02.02.22 @ 11:20) >    Again seen are a lack of vascular markings at the left lung apex suggestive of a pneumothorax, but remains limited in evaluation    Stable bilateral lung opacities    r< from: Xray Chest 1 View- PORTABLE-Routine (Xray Chest 1 View- PORTABLE-Routine in AM.) (01.23.22 @ 07:52) >  Bilateral opacities similar to prior  Left-sided chest tube unchanged in position.      < from: Xray Chest 1 View- PORTABLE-Routine (Xray Chest 1 View- PORTABLE-Routine in AM.) (01.22.22 @ 06:34) >  Support devices: ET tube mid trachea central venous line superior vena  cava NG tube in stomach    Cardiac/mediastinum/hilum: Unremarkable.    Lung parenchyma/Pleura: Decreased previous bilateral opacities. No  pleural effusion or air leak    Skeleton/soft tissues: Unremarkable.        MICROBIOLOGY DATA:  Culture - Blood in AM (01.31.22 @ 06:15)   - Coagulase negative Staphylococcus: Detec   Gram Stain:   Growth in anaerobic bottle: Gram positive cocci in pairs   Specimen Source: .Blood Blood-Peripheral   Organism: Blood Culture PCR   Culture Results: Growth in anaerobic bottle: Gram positive cocci in pairs   ***Blood Panel PCR results on this specimen are available     Culture - Bronchial (01.30.22 @ 16:00)   Gram Stain: Few polymorphonuclear leukocytes per low power field   No Squamous epithelial cells per low power field   Few Gram positive cocci in pairs per oil power field   Specimen Source: .Bronchial None   Culture Results:   Normal Respiratory Corina present     Culture - Blood (01.28.22 @ 16:37)   Specimen Source: .Blood None   Culture Results: No Growth Final     MRSA/MSSA PCR (01.23.22 @ 11:00)   MRSA PCR Result.: Negative    Respiratory Viral Panel with COVID-19 by JEN (01.15.22 @ 01:25)   Rapid RVP Result: Detected   SARS-CoV-2: Detected:

## 2022-02-07 NOTE — PROGRESS NOTE ADULT - ASSESSMENT
81 year old male with a hx of HTN, BPH, recent diagnosis of COVID 19 2 days ago prior to admission -> presented to the ED S/P cardiac arrest.  Patient began to have worsening dyspnea at home, EMS was called. Found to be in cardiac arrest. ROSC was achieved after 2 rounds of CPR and epi, downtime 7 mins. Was intubated in the field.     acute respiratory failure / Cardiac arrest / COVID-19 pneumonia / probable NSTEMI / L sided pneumothorax / anoxic brain injury      - CT    - off sedation x 6days - still unresponsive   - episode of rapid afib - now in NSR   - cardio following   - surgical consult for  trach and peg as  family is not liberating pt from ventilator at this time    - very poor prognosis, family aware

## 2022-02-07 NOTE — PROGRESS NOTE ADULT - SUBJECTIVE AND OBJECTIVE BOX
Patient remains unresponsive off sedation on ventilator       T(F): 99 (02-07-22 @ 19:00), Max: 99.5 (02-07-22 @ 17:00)  HR: 82 (02-07-22 @ 18:00)  BP: 134/86 (02-07-22 @ 19:00)  RR: 24 (02-07-22 @ 19:00)  SpO2: 99% (02-07-22 @ 19:00) (97% - 100%)    PHYSICAL EXAM:  GENERAL: NAD  HEAD:  Atraumatic, Normocephalic  EYES: EOMI, PERRLA, conjunctiva and sclera clear  NERVOUS SYSTEM:  positive gag  CHEST/LUNG: Clear to percussion bilaterally; No rales, rhonchi, wheezing, or rubs  HEART: Regular rate and rhythm; No murmurs, rubs, or gallops  ABDOMEN: Soft, Nontender, Nondistended; Bowel sounds present  EXTREMITIES:  2+ Peripheral Pulses, No clubbing, cyanosis, or edema    LABS  02-07    137  |  102  |  21<H>  ----------------------------<  151<H>  4.5   |  26  |  <0.5<L>    Ca    8.7      07 Feb 2022 05:55  Mg     2.1     02-07    TPro  4.6<L>  /  Alb  2.8<L>  /  TBili  0.8  /  DBili  x   /  AST  28  /  ALT  32  /  AlkPhos  84  02-07                          10.9   9.68  )-----------( 78       ( 07 Feb 2022 05:55 )             35.5       Mode: AC/ CMV (Assist Control/ Continuous Mandatory Ventilation)  RR (machine): 24  TV (machine): 420  FiO2: 40  PEEP: 8      Culture Results:   Growth in anaerobic bottle: Staphylococcus capitis  Coag Negative Staphylococcus  Single set isolate, possible contaminant. Contact  Microbiology if susceptibility testing clinically  indicated.  ***Blood Panel PCR results on this specimen are available  approximately 3 hours after the Gram stain result.***  Gram stain, PCR, and/or culture results may not always  correspond due to difference in methodologies.  ************************************************************  This PCR assay was performed by multiplex PCR. This  Assay tests for 66 bacterial and resistance gene targets.  Please refer to the [facility] Labs test directory  at [email or web address] for details. (01-31-22)  Culture Results:   Normal Respiratory Corina present (01-30-22)  Culture Results:   No Growth Final (01-28-22)  Culture Results:   No growth at 1 week. (01-25-22)  Culture Results:   No Growth Final (01-23-22)  Culture Results:   No growth (01-23-22)    RADIOLOGY  < from: Xray Chest 1 View- PORTABLE-Routine (Xray Chest 1 View- PORTABLE-Routine in AM.) (02.07.22 @ 06:01) >    Unchanged bilateral opacities. No definite pneumothorax identified.  Stable support devices.    < end of copied text >    MEDICATIONS  (STANDING):  amantadine Syrup 100 milliGRAM(s) Oral every 12 hours  aspirin  chewable 81 milliGRAM(s) Oral daily  chlorhexidine 0.12% Liquid 15 milliLiter(s) Oral Mucosa every 12 hours  chlorhexidine 4% Liquid 1 Application(s) Topical <User Schedule>  dexAMETHasone  Injectable 2 milliGRAM(s) IV Push daily  diltiazem    Tablet 30 milliGRAM(s) Oral every 6 hours  enoxaparin Injectable 40 milliGRAM(s) SubCutaneous at bedtime  insulin lispro (ADMELOG) corrective regimen sliding scale   SubCutaneous every 6 hours  pantoprazole  Injectable 40 milliGRAM(s) IV Push daily  polyethylene glycol 3350 17 Gram(s) Oral daily    MEDICATIONS  (PRN):  acetaminophen     Tablet .. 650 milliGRAM(s) Oral every 6 hours PRN Temp greater or equal to 38C (100.4F), Mild Pain (1 - 3)  albuterol/ipratropium for Nebulization 3 milliLiter(s) Nebulizer every 6 hours PRN Shortness of Breath and/or Wheezing  fentaNYL    Injectable 50 MICROGram(s) IV Push every 4 hours PRN Agitatiom

## 2022-02-07 NOTE — PROGRESS NOTE ADULT - SUBJECTIVE AND OBJECTIVE BOX
Afebrile  Remains clinically stable; no clinical improvement in mental status while off sedation in lieu of anoxic brain injury  Over weekend, chest tube reinserted for recurrent PTX.  Being observed off abx

## 2022-02-07 NOTE — PROGRESS NOTE ADULT - ASSESSMENT
82yo man with history of COVID-19 PNA and found ni cardiac arrest; ROSC was achieved though patient with severe neurological impairment and very low probability of longterm, meaningful recovery. Further procedures in my opinion would be medically futile, though would obtain neurology consult to establish clinical brain death and palliative care followup. Family is per chart considering withdrawing care.

## 2022-02-07 NOTE — PROGRESS NOTE ADULT - ASSESSMENT
81 year old male with a hx of HTN, BPH, recent diagnosis of COVID 19 2 days ago (unvaccinated, prescribed decadron/ zpack/ zinc) presenting to the ED S/P cardiac arrest. Intubated in the field    IMPRESSION  #COVID19 PNA, Severe (O2 < or = 94% on RA and requiring supplemental O2) with Cardiac arrest    CXR diffuse bilateral opacities     D-Dimer Assay, Quantitative: 7368 ng/mL DDU (01-15-22 @ 01:25)    s/p Toci 1/17  #Lactic acidosis  #Transaminitis   #Cardiac arrest- ROSC was achieved after 2 rounds of CPR and epi, downtime 7 mins  # Left sided Pneumothorax- on CXR from 2/2/22      would recommend:    1. Monitor oFF Abx  2. Management of chest tube as per CCU protocol  3. Taper off  Dexamethasone   4. Aspiration precaution    - Family wish noted    Attending Attestation:    Spent more than 35 minutes on total encounter, more than 50 % of the visit was spent counseling and/or coordinating care by the Attending physician.

## 2022-02-07 NOTE — PROGRESS NOTE ADULT - ASSESSMENT
IMPRESSION:  Acute hypoxic respiratory failure  Anoxic brain injury  Cardiac Arrest  Severe COVID PNA  Sepsis on present on admission  Left PNX SP chest tube, resolved  Mucus plugs SP Bronchoscopy  Ischemic changes on CT Head      SUGGEST:    CNS:   off sedation not awake  reviewed  CT Head reviewed.    EEG reviewed. general slowing    HEENT: Oral care.  Surgery for Trach    PULMONARY: HOB @ 45 degrees.    Aspiration precautions.   Vent changes:  keep same vent setting     lower Dexamethasone 2mg QD (day 18).    Monitor driving pressure.    pig tial to suction     CARDIOVASCULAR:  Avoid overload.  Keep I < O.    GI: GI prophylaxis.  c/w OG feeding.  Bowel regimen.  Surgery for PEG.    RENAL:  Follow up lytes.  Correct as needed.  Monitor UO.  Becker care.    INFECTIOUS DISEASE: Abx per ID.  Repeat Procalcitonin.    Tend inflammatory markers.  ID FU. FU cultures.    HEMATOLOGICAL: DVT prophylaxis resume lovenox     ENDOCRINE:  Follow up FS.  Insulin protocol if needed.  TSH    MUSCULOSKELETAL: bedrest    DNR  Monitor in MICU  Very poor overall prognosis  Palliative FU  Terminal weaning discussed with Family, want to wait  for extra week which almost   The patient is critically ill with a high probability of deterioration.

## 2022-02-08 NOTE — PROGRESS NOTE ADULT - SUBJECTIVE AND OBJECTIVE BOX
Patient remains unresponsive on ventilator off sedation       T(F): 99.1 (02-08-22 @ 11:00), Max: 99.5 (02-07-22 @ 17:00)  HR: 69 (02-08-22 @ 13:00)  BP: 119/59 (02-08-22 @ 13:00)  RR: 24 (02-08-22 @ 13:00)  SpO2: 97% (02-08-22 @ 13:00) (97% - 99%)    PHYSICAL EXAM:  GENERAL: NAD  HEAD:  Atraumatic, Normocephalic  EYES: EOMI, PERRLA, conjunctiva and sclera clear  NERVOUS SYSTEM:  positive gag   CHEST/LUNG: Clear to percussion bilaterally; No rales, rhonchi, wheezing, or rubs  HEART: Regular rate and rhythm; No murmurs, rubs, or gallops  ABDOMEN: Soft, Nontender, Nondistended; Bowel sounds present  EXTREMITIES:  2+ Peripheral Pulses, No clubbing, cyanosis, or edema    LABS  02-08    142  |  103  |  18  ----------------------------<  159<H>  4.2   |  29  |  <0.5<L>    Ca    8.5      08 Feb 2022 06:11  Mg     2.1     02-08    TPro  4.4<L>  /  Alb  3.0<L>  /  TBili  1.0  /  DBili  x   /  AST  18  /  ALT  32  /  AlkPhos  79  02-08                          10.9   9.47  )-----------( 102      ( 08 Feb 2022 06:11 )             35.1       Mode: AC/ CMV (Assist Control/ Continuous Mandatory Ventilation)  RR (machine): 24  TV (machine): 420  FiO2: 40  PEEP: 5        Culture Results:   Growth in anaerobic bottle: Staphylococcus capitis  Coag Negative Staphylococcus  Single set isolate, possible contaminant. Contact  Microbiology if susceptibility testing clinically  indicated.  ***Blood Panel PCR results on this specimen are available  approximately 3 hours after the Gram stain result.***  Gram stain, PCR, and/or culture results may not always  correspond due to difference in methodologies.  ************************************************************  This PCR assay was performed by multiplex PCR. This  Assay tests for 66 bacterial and resistance gene targets.  Please refer to the [facility] test directory  at [email or web address] for details. (01-31-22)  Culture Results:   Normal Respiratory Corina present (01-30-22)  Culture Results:   No Growth Final (01-28-22)  Culture Results:   No growth at 1 week. (01-25-22)  Culture Results:   No Growth Final (01-23-22)  Culture Results:   No growth (01-23-22)    RADIOLOGY  < from: Xray Chest 1 View- PORTABLE-Routine (Xray Chest 1 View- PORTABLE-Routine in AM.) (02.08.22 @ 06:53) >  Findings:    Support devices: Stable endotracheal tube, left pleural catheter, enteric   tube.    Cardiac/mediastinum/hilum: Unchanged.    Lung parenchyma/Pleura: Unchanged bilateral opacities. No definite   pneumothorax identified.    Skeleton/soft tissues: Unchanged.    Impression:    Unchanged bilateral opacities.    < end of copied text >    MEDICATIONS  (STANDING):  amantadine Syrup 100 milliGRAM(s) Oral every 12 hours  aspirin  chewable 81 milliGRAM(s) Oral daily  chlorhexidine 0.12% Liquid 15 milliLiter(s) Oral Mucosa every 12 hours  chlorhexidine 4% Liquid 1 Application(s) Topical <User Schedule>  dexAMETHasone  Injectable 2 milliGRAM(s) IV Push daily  diltiazem    Tablet 30 milliGRAM(s) Oral every 6 hours  enoxaparin Injectable 40 milliGRAM(s) SubCutaneous at bedtime  glucagon  Injectable 1 milliGRAM(s) IntraMuscular once  insulin lispro (ADMELOG) corrective regimen sliding scale   SubCutaneous every 6 hours  pantoprazole  Injectable 40 milliGRAM(s) IV Push daily  polyethylene glycol 3350 17 Gram(s) Oral daily    MEDICATIONS  (PRN):  acetaminophen     Tablet .. 650 milliGRAM(s) Oral every 6 hours PRN Temp greater or equal to 38C (100.4F), Mild Pain (1 - 3)  albuterol/ipratropium for Nebulization 3 milliLiter(s) Nebulizer every 6 hours PRN Shortness of Breath and/or Wheezing  fentaNYL    Injectable 50 MICROGram(s) IV Push every 4 hours PRN Agitatiom

## 2022-02-08 NOTE — CHART NOTE - NSCHARTNOTEFT_GEN_A_CORE
I spoke this morning with patient's daughter Judy Jackson regarding the request for tracheostomy from the surgical service. Patient's daughter understands that her father's condition is grave and that his longterm prognosis remains poor. She and her family strongly prefer transfer to a facility in [place], and do not wish to make a decision pertaining to proceeding with tracheostomy until this is clarified. I explained that it is unlikely given the patient's condition and endotracheal intubation that this will be feasible or safe. We discussed the procedure and its indications and risks. I provided reassurance and empathy to the patient's daughter, and encouraged her to continue discussing the patient's wishes with Palliative Care service, ICU team, and social work. I emphasized that should she ultimately decide to proceed with the procedure, the surgical team will be available. Please keep the surgical team updated with the patient's progress and family's decision regarding future care. I spoke this morning with patient's daughter Judy Jackson regarding the request for tracheostomy from the surgical service. Patient's daughter understands that her father's condition is grave and that his longterm prognosis remains poor. She and her family strongly prefer transfer to a facility in [place]. She advised me that the ICU is aware of this and working on it, and also that the family do not wish to make a decision pertaining to proceeding with tracheostomy until this is clarified. I explained that it is unlikely given the patient's condition and endotracheal intubation that this will be feasible or safe. We discussed the procedure and its indications and risks. I provided reassurance and empathy to the patient's daughter, and encouraged her to continue discussing the patient's wishes with Palliative Care service, ICU team, and social work. I emphasized that should she ultimately decide to proceed with the procedure, the surgical team will be available. Please keep the surgical team updated with the patient's progress and family's decision regarding future care.

## 2022-02-08 NOTE — PROGRESS NOTE ADULT - SUBJECTIVE AND OBJECTIVE BOX
SUBJECTIVE:    Patient is a 81y old Male who presents with a chief complaint of Cardiac arrest (01 Feb 2022 22:00)      HPI:  81 year old male with a hx of HTN, BPH, recent diagnosis of COVID 19 2 days ago (unvaccinated, prescribed decadron/ zpack/ zinc) presenting to the ED S/P cardiac arrest. Patient intubated/ sedated so history obtained from chart (son left and attempted to reach). Patient began to have worsening dyspnea at home, EMS was called. Found to be in respiratory arrest by EMS and followed by EMS. ROSC was achieved after 2 rounds of CPR and epi, downtime 7 mins. Was intubated in the field.   In ED central line was placed. ABG: PH 7.15, PaO2 66, PaCO2 45, HCO3- 16. Troponins .1, BNP > 8000, D-Dimer 7368. COVID 19 positive. Patient being admitted s/p cardiac arrest to ICU.      (15 Aquiles 2022 04:09)      Currently admitted to medicine with the primary diagnosis of Cardiac arrest     not responding to commands, not retracting to pain    Besides the pertinent positives and negatives described above, the ROS was within normal limits.    PAST MEDICAL & SURGICAL HISTORY  BPH (benign prostatic hyperplasia)    Mild HTN      SOCIAL HISTORY:    ALLERGIES:  Allergy Status Unknown    MEDICATIONS:  STANDING MEDICATIONS  aspirin  chewable 81 milliGRAM(s) Oral daily  chlorhexidine 0.12% Liquid 15 milliLiter(s) Oral Mucosa every 12 hours  chlorhexidine 4% Liquid 1 Application(s) Topical <User Schedule>  dexAMETHasone  Injectable 4 milliGRAM(s) IV Push daily  dextrose 5%. 1000 milliLiter(s) IV Continuous <Continuous>  dextrose 5%. 1000 milliLiter(s) IV Continuous <Continuous>  dextrose 50% Injectable 25 Gram(s) IV Push once  dextrose 50% Injectable 12.5 Gram(s) IV Push once  dextrose 50% Injectable 25 Gram(s) IV Push once  diltiazem    Tablet 30 milliGRAM(s) Oral every 6 hours  enoxaparin Injectable 40 milliGRAM(s) SubCutaneous at bedtime  glucagon  Injectable 1 milliGRAM(s) IntraMuscular once  insulin lispro (ADMELOG) corrective regimen sliding scale   SubCutaneous every 6 hours  pantoprazole  Injectable 40 milliGRAM(s) IV Push daily  polyethylene glycol 3350 17 Gram(s) Oral daily    PRN MEDICATIONS  acetaminophen     Tablet .. 650 milliGRAM(s) Oral every 6 hours PRN  albuterol/ipratropium for Nebulization 3 milliLiter(s) Nebulizer every 6 hours PRN    VITALS:   T(F): 99.1  HR: 68  BP: 141/75  RR: 27  SpO2: 96%    LABS:                        11.3   10.96 )-----------( 84       ( 04 Feb 2022 06:43 )             36.7     02-04    141  |  107  |  24<H>  ----------------------------<  173<H>  4.7   |  24  |  <0.5<L>    Ca    8.2<L>      04 Feb 2022 06:43  Mg     2.1     02-04    TPro  4.4<L>  /  Alb  2.9<L>  /  TBili  0.8  /  DBili  x   /  AST  33  /  ALT  45<H>  /  AlkPhos  71  02-04        ABG - ( 04 Feb 2022 03:52 )  pH, Arterial: 7.51  pH, Blood: x     /  pCO2: 38    /  pO2: 123   / HCO3: 30    / Base Excess: 6.8   /  SaO2: 97.8                      RADIOLOGY:    PHYSICAL EXAM:  GEN: No acute distress  LUNGS: Clear to auscultation bilaterally   HEART: Regular  ABD: Soft, non-tender, non-distended.  EXT: NC/NC/NE/2+PP/SHABAZZ/Skin Intact.   NEURO: not responding to commands    Intravenous access: yes  NG tube: og tube  Becker Catheter:   Indwelling Urethral Catheter:     Connect To:  Straight Drainage/[place]    Indication:  Urinary Retention / Obstruction (02-01-22 @ 19:51) (not performed) [Active]  Indwelling Urethral Catheter:     Connect To:  Straight Drainage/[place]    Indication:  Urinary Retention / Obstruction (01-31-22 @ 14:21) (not performed) [Active]  Indwelling Urethral Catheter:     Connect To:  Straight Drainage/[place]    Indication:  Urine Output Monitoring in Critically Ill (01-30-22 @ 13:06) (not performed) [Active]  Indwelling Urethral Catheter:     Connect To:  Straight Drainage/[place]    Indication:  Urinary Retention / Obstruction (01-29-22 @ 10:02) (not performed) [Active]  Indwelling Urethral Catheter:     Connect To:  Straight Drainage/[place]    Indication:  Urinary Retention / Obstruction (01-25-22 @ 16:18) (not performed) [Active]  Indwelling Urethral Catheter:     Connect To:  Straight Drainage/[place]    Indication:  Urinary Retention / Obstruction (01-24-22 @ 15:34) (not performed) [Active]          Assessment and Plan:   · Assessment	  81 year old male with a hx of HTN, BPH, recent diagnosis of COVID 19 2 days ago (unvaccinated, prescribed decadron/ zpack/ zinc) presenting to the ED S/P cardiac arrest, course complicated by pneumothorax s/p chest tube and removal, and mucus plug removed by bronchoscopy. Patient does not follow any commands as of now.    # COVID pneumonia s/p cardiac arrest  - was down for 7 minutes / found to have pneumothorax s/p pigtail catheter insertion  - course complicated by mucus plug removed by bronch  - on minimal vent settings however not following commands  - seen by ID recommending to stop abx  - received toci and remdesivir, c/w dexa 6 bid day 17 // off sedation  - chest xray concerning for apical pneumothorax on left side as per radiology, pigtail placed (to suction)  - dexamethasone decreased to 2mg qd  - monitor driving pressure  -f/u procalcitonin  -f/u ID recommendations    # Anoxic brain injury  - not following commands off sedation; CT head showed periventricular white matter changes which can reflect ischemia  - EEG showing generalized slowing  - f/u with family regarding trach and peg tube placement    # DVT PPX: lovenox  # GI PPX: protonix  # Diet: tube feeding  DNR       SUBJECTIVE:    Patient is a 81y old Male who presents with a chief complaint of Cardiac arrest (01 Feb 2022 22:00)      HPI:  81 year old male with a hx of HTN, BPH, recent diagnosis of COVID 19 2 days ago (unvaccinated, prescribed decadron/ zpack/ zinc) presenting to the ED S/P cardiac arrest. Patient intubated/ sedated so history obtained from chart (son left and attempted to reach). Patient began to have worsening dyspnea at home, EMS was called. Found to be in respiratory arrest by EMS and followed by EMS. ROSC was achieved after 2 rounds of CPR and epi, downtime 7 mins. Was intubated in the field.   In ED central line was placed. ABG: PH 7.15, PaO2 66, PaCO2 45, HCO3- 16. Troponins .1, BNP > 8000, D-Dimer 7368. COVID 19 positive. Patient being admitted s/p cardiac arrest to ICU.     (15 Aquiles 2022 04:09)      Currently admitted to medicine with the primary diagnosis of cardiac arrest. Not responding to commands, not retracting to pain.    Besides the pertinent positives and negatives described above, the ROS was within normal limits.    PAST MEDICAL & SURGICAL HISTORY  BPH (benign prostatic hyperplasia)    Mild HTN      SOCIAL HISTORY:    ALLERGIES:  Allergy Status Unknown    MEDICATIONS:  STANDING MEDICATIONS  aspirin  chewable 81 milliGRAM(s) Oral daily  chlorhexidine 0.12% Liquid 15 milliLiter(s) Oral Mucosa every 12 hours  chlorhexidine 4% Liquid 1 Application(s) Topical <User Schedule>  dexAMETHasone  Injectable 4 milliGRAM(s) IV Push daily  dextrose 5%. 1000 milliLiter(s) IV Continuous <Continuous>  dextrose 5%. 1000 milliLiter(s) IV Continuous <Continuous>  dextrose 50% Injectable 25 Gram(s) IV Push once  dextrose 50% Injectable 12.5 Gram(s) IV Push once  dextrose 50% Injectable 25 Gram(s) IV Push once  diltiazem    Tablet 30 milliGRAM(s) Oral every 6 hours  enoxaparin Injectable 40 milliGRAM(s) SubCutaneous at bedtime  glucagon  Injectable 1 milliGRAM(s) IntraMuscular once  insulin lispro (ADMELOG) corrective regimen sliding scale   SubCutaneous every 6 hours  pantoprazole  Injectable 40 milliGRAM(s) IV Push daily  polyethylene glycol 3350 17 Gram(s) Oral daily    PRN MEDICATIONS  acetaminophen     Tablet .. 650 milliGRAM(s) Oral every 6 hours PRN  albuterol/ipratropium for Nebulization 3 milliLiter(s) Nebulizer every 6 hours PRN    VITALS:   T(F): 99.1  HR: 68  BP: 141/75  RR: 27  SpO2: 96%    LABS:                        11.3   10.96 )-----------( 84       ( 04 Feb 2022 06:43 )             36.7     02-04    141  |  107  |  24<H>  ----------------------------<  173<H>  4.7   |  24  |  <0.5<L>    Ca    8.2<L>      04 Feb 2022 06:43  Mg     2.1     02-04    TPro  4.4<L>  /  Alb  2.9<L>  /  TBili  0.8  /  DBili  x   /  AST  33  /  ALT  45<H>  /  AlkPhos  71  02-04        ABG - ( 04 Feb 2022 03:52 )  pH, Arterial: 7.51  pH, Blood: x     /  pCO2: 38    /  pO2: 123   / HCO3: 30    / Base Excess: 6.8   /  SaO2: 97.8        PHYSICAL EXAM:  General:  not awake   HEENT:    et tube             Lymph Nodes: NO cervical LN   Lungs: Bilateral BS  Cardiovascular: Regular   Abdomen: Soft, Positive BS  Extremities: No clubbing   Skin: warm   Neurological: positive gag   Musculoskeletal: move all ext     Intravenous access: yes  NG tube: og tube  Becker Catheter:   Indwelling Urethral Catheter:     Connect To:  Straight Drainage/[place]    Indication:  Urinary Retention / Obstruction (02-01-22 @ 19:51) (not performed) [Active]  Indwelling Urethral Catheter:     Connect To:  Straight Drainage/[place]    Indication:  Urinary Retention / Obstruction (01-31-22 @ 14:21) (not performed) [Active]  Indwelling Urethral Catheter:     Connect To:  Straight Drainage/[place]    Indication:  Urine Output Monitoring in Critically Ill (01-30-22 @ 13:06) (not performed) [Active]  Indwelling Urethral Catheter:     Connect To:  Straight Drainage/[place]    Indication:  Urinary Retention / Obstruction (01-29-22 @ 10:02) (not performed) [Active]  Indwelling Urethral Catheter:     Connect To:  Straight Drainage/[place]    Indication:  Urinary Retention / Obstruction (01-25-22 @ 16:18) (not performed) [Active]  Indwelling Urethral Catheter:     Connect To:  Straight Drainage/[place]    Indication:  Urinary Retention / Obstruction (01-24-22 @ 15:34) (not performed) [Active]          Assessment and Plan:   · Assessment	  81 year old male with a hx of HTN, BPH, recent diagnosis of COVID 19 2 days ago (unvaccinated, prescribed decadron/ zpack/ zinc) presenting to the ED S/P cardiac arrest, course complicated by pneumothorax s/p chest tube and removal, and mucus plug removed by bronchoscopy. Patient does not follow any commands as of now.    # COVID pneumonia s/p cardiac arrest  - was down for 7 minutes / found to have pneumothorax s/p pigtail catheter insertion  - course complicated by mucus plug removed by bronch  - on minimal vent settings however not following commands  - seen by ID recommending to stop abx  - received toci and remdesivir, c/w dexa 6 bid day 17 // off sedation  - chest xray concerning for apical pneumothorax on left side as per radiology, pigtail placed (to suction)  - dexamethasone decreased to 2mg qd  - monitor driving pressure  -f/u procalcitonin  -f/u ID recommendations    # Anoxic brain injury  - not following commands off sedation; CT head showed periventricular white matter changes which can reflect ischemia  - EEG showing generalized slowing  - f/u with family regarding trach and peg tube placement    # DVT PPX: lovenox  # GI PPX: protonix  # Diet: tube feeding  DNR

## 2022-02-08 NOTE — CHART NOTE - NSCHARTNOTEFT_GEN_A_CORE
T/C from daughter, Judy:  daughter requested contact information for staff that would assist with patient transfer to another facility.  Writer provided Bharti, contact information.  Writer left VM for  as well.

## 2022-02-08 NOTE — PROGRESS NOTE ADULT - SUBJECTIVE AND OBJECTIVE BOX
CHADWICK VENCES  81y, Male  Allergy: Allergy Status Unknown      LOS  24d    CHIEF COMPLAINT: Cardiac arrest (01 Feb 2022 22:00)      INTERVAL EVENTS/HPI  - No acute events overnight  - T(F): , Max: 99.5 (02-07-22 @ 17:00)  - WBC Count: 9.47 (02-08-22 @ 06:11)  WBC Count: 9.68 (02-07-22 @ 05:55)     - Creatinine, Serum: <0.5 (02-08-22 @ 06:11)  Creatinine, Serum: <0.5 (02-07-22 @ 05:55)       ROS  unable to obtain history secondary to patient's mental status and/or sedation    VITALS:  T(F): 99.1, Max: 99.5 (02-07-22 @ 17:00)  HR: 62  BP: 122/71  RR: 24Vital Signs Last 24 Hrs  T(C): 37.3 (08 Feb 2022 07:05), Max: 37.5 (07 Feb 2022 17:00)  T(F): 99.1 (08 Feb 2022 07:05), Max: 99.5 (07 Feb 2022 17:00)  HR: 62 (08 Feb 2022 09:12) (62 - 88)  BP: 122/71 (08 Feb 2022 09:00) (122/60 - 170/79)  BP(mean): 92 (08 Feb 2022 09:00) (85 - 115)  RR: 24 (08 Feb 2022 09:00) (19 - 43)  SpO2: 99% (08 Feb 2022 09:12) (97% - 99%)    PHYSICAL EXAM:  Gen: intubated  HEENT: Normocephalic, atraumatic  Neck: supple, no lymphadenopathy  CV: Regular rate & regular rhythm  Lungs: decreased BS at bases, no fremitus  Abdomen: Soft, BS present  Ext: Warm, well perfused  Neuro: non focal, sedated  Skin: no rash, no erythema  Lines: no phlebitis    FH: Non-contributory  Social Hx: Non-contributory    TESTS & MEASUREMENTS:                        10.9   9.47  )-----------( 102      ( 08 Feb 2022 06:11 )             35.1     02-08    142  |  103  |  18  ----------------------------<  159<H>  4.2   |  29  |  <0.5<L>    Ca    8.5      08 Feb 2022 06:11  Mg     2.1     02-08    TPro  4.4<L>  /  Alb  3.0<L>  /  TBili  1.0  /  DBili  x   /  AST  18  /  ALT  32  /  AlkPhos  79  02-08    eGFR if Non African American: 147 mL/min/1.73M2 (02-08-22 @ 06:11)  eGFR if African American: 170 mL/min/1.73M2 (02-08-22 @ 06:11)    LIVER FUNCTIONS - ( 08 Feb 2022 06:11 )  Alb: 3.0 g/dL / Pro: 4.4 g/dL / ALK PHOS: 79 U/L / ALT: 32 U/L / AST: 18 U/L / GGT: x               Culture - Blood (collected 01-31-22 @ 06:15)  Source: .Blood Blood-Peripheral  Gram Stain (02-03-22 @ 03:08):    Growth in anaerobic bottle: Gram positive cocci in pairs  Final Report (02-03-22 @ 21:57):    Growth in anaerobic bottle: Staphylococcus capitis    Coag Negative Staphylococcus    Single set isolate, possible contaminant. Contact    Microbiology if susceptibility testing clinically    indicated.    ***Blood Panel PCR results on this specimen are available    approximately 3 hours after the Gram stain result.***    Gram stain, PCR, and/or culture results may not always    correspond due to difference in methodologies.    ************************************************************    This PCR assay was performed by multiplex PCR. This    Assay tests for 66 bacterial and resistance gene targets.    Please refer to the [facility] Labs test directory    at [email or web address] for details.  Organism: Blood Culture PCR (02-03-22 @ 21:57)  Organism: Blood Culture PCR (02-03-22 @ 21:57)      -  Coagulase negative Staphylococcus: Detec      Method Type: PCR    Culture - Bronchial (collected 01-30-22 @ 16:00)  Source: .Bronchial None  Gram Stain (01-30-22 @ 22:58):    Few polymorphonuclear leukocytes per low power field    No Squamous epithelial cells per low power field    Few Gram positive cocci in pairs per oil power field  Final Report (02-01-22 @ 19:11):    Normal Respiratory Corina present    Culture - Blood (collected 01-28-22 @ 16:37)  Source: .Blood None  Final Report (02-02-22 @ 23:00):    No Growth Final    Culture - Acid Fast - Sputum w/Smear (collected 01-25-22 @ 10:00)  Source: .Sputum Sputum  Preliminary Report (02-02-22 @ 15:03):    No growth at 1 week.    Culture - Blood (collected 01-23-22 @ 11:17)  Source: .Blood Blood-Peripheral  Final Report (01-28-22 @ 23:00):    No Growth Final    Culture - Urine (collected 01-23-22 @ 11:00)  Source: Catheterized Catheterized  Final Report (01-24-22 @ 16:25):    No growth            INFECTIOUS DISEASES TESTING  Procalcitonin, Serum: 0.03 (02-01-22 @ 06:25)  Procalcitonin, Serum: 0.07 (01-31-22 @ 06:15)  Procalcitonin, Serum: 0.10 (01-30-22 @ 10:45)  Procalcitonin, Serum: 0.11 (01-29-22 @ 07:17)  Procalcitonin, Serum: 0.08 (01-26-22 @ 06:15)  Procalcitonin, Serum: 0.09 (01-25-22 @ 05:58)  Procalcitonin, Serum: 0.11 (01-23-22 @ 11:17)  MRSA PCR Result.: Negative (01-23-22 @ 11:00)  Procalcitonin, Serum: 0.44 (01-19-22 @ 06:30)  Procalcitonin, Serum: 2.32 (01-16-22 @ 06:56)  Rapid RVP Result: Detected (01-15-22 @ 01:25)  Procalcitonin, Serum: 0.10 (01-15-22 @ 01:25)      INFLAMMATORY MARKERS  C-Reactive Protein, Serum: <3 mg/L (02-01-22 @ 06:25)  C-Reactive Protein, Serum: <3 mg/L (01-26-22 @ 06:15)  C-Reactive Protein, Serum: 50 mg/L (01-16-22 @ 06:56)  C-Reactive Protein, Serum: 76 mg/L (01-15-22 @ 01:25)      RADIOLOGY & ADDITIONAL TESTS:  I have personally reviewed the last available Chest xray  CXR  Xray Chest 1 View- PORTABLE-Urgent:   ACC: [id number] EXAM:  XR CHEST PORTABLE URGENT 1V                          PROCEDURE DATE:  02/06/2022          INTERPRETATION:  Clinical History / Reason for exam: Chest tube placement    Comparison : Chest radiograph February 6, 2022.    Technique/Positioning: Single AP view of the chest.    Findings:    Support devices: Endotracheal tube, feeding tube and left-sided pigtail   chest tube are unchanged    Cardiac/mediastinum/hilum: Unchanged.    Lung parenchyma/Pleura: Bilateral opacities, unchanged. No definite   pneumothorax identified.    Skeleton/soft tissues: Unchanged.    Impression:    Bilateral opacities, unchanged. No definite pneumothorax identified.        --- End of Report ---            ELLIOT LANDAU MD; Attending Radiologist  This document has been electronically signed. Feb 6 2022 12:21PM (02-06-22 @ 10:26)      CT      CARDIOLOGY TESTING  12 Lead ECG:   Ventricular Rate 141 BPM    Atrial Rate 122 BPM    QRS Duration 112 ms    Q-T Interval 334 ms    QTC Calculation(Bazett) 511 ms    R Axis -29 degrees    T Axis 136 degrees    Diagnosis Line Atrial fibrillation with rapid ventricular response  Incomplete right bundle branch block  ST & T wave abnormality, consider lateral ischemia  Abnormal ECG    Confirmed by JESSICA EDMONDSON MD ([id number]) on 1/30/2022 11:17:30 AM (01-30-22 @ 02:11)      MEDICATIONS  amantadine Syrup 100 Oral every 12 hours  aspirin  chewable 81 Oral daily  chlorhexidine 0.12% Liquid 15 Oral Mucosa every 12 hours  chlorhexidine 4% Liquid 1 Topical <User Schedule>  dexAMETHasone  Injectable 2 IV Push daily  dextrose 5%. 1000 IV Continuous <Continuous>  dextrose 5%. 1000 IV Continuous <Continuous>  dextrose 50% Injectable 25 IV Push once  dextrose 50% Injectable 12.5 IV Push once  dextrose 50% Injectable 25 IV Push once  diltiazem    Tablet 30 Oral every 6 hours  enoxaparin Injectable 40 SubCutaneous at bedtime  glucagon  Injectable 1 IntraMuscular once  insulin lispro (ADMELOG) corrective regimen sliding scale  SubCutaneous every 6 hours  pantoprazole  Injectable 40 IV Push daily  polyethylene glycol 3350 17 Oral daily      WEIGHT  Weight (kg): 76.929 (01-15-22 @ 05:05)  Creatinine, Serum: <0.5 mg/dL (02-08-22 @ 06:11)      ANTIBIOTICS:      All available historical records have been reviewed

## 2022-02-08 NOTE — PROGRESS NOTE ADULT - SUBJECTIVE AND OBJECTIVE BOX
Patient is a 81y old  Male who presents with a chief complaint of Cardiac arrest (01 Feb 2022 22:00)      Over Night Events:  Patient seen and examined.   still not awake off sedation for couple of days     ROS:  See HPI    PHYSICAL EXAM    ICU Vital Signs Last 24 Hrs  T(C): 37.3 (08 Feb 2022 07:05), Max: 37.5 (07 Feb 2022 17:00)  T(F): 99.1 (08 Feb 2022 07:05), Max: 99.5 (07 Feb 2022 17:00)  HR: 69 (08 Feb 2022 07:00) (69 - 88)  BP: 145/70 (08 Feb 2022 07:00) (122/60 - 170/79)  BP(mean): 100 (08 Feb 2022 07:00) (85 - 115)  ABP: --  ABP(mean): --  RR: 19 (08 Feb 2022 07:05) (15 - 43)  SpO2: 98% (08 Feb 2022 07:00) (97% - 99%)      General:  not awake   HEENT:    et tube             Lymph Nodes: NO cervical LN   Lungs: Bilateral BS  Cardiovascular: Regular   Abdomen: Soft, Positive BS  Extremities: No clubbing   Skin: warm   Neurological: positive gag   Musculoskeletal: move all ext     I&O's Detail    07 Feb 2022 07:01  -  08 Feb 2022 07:00  --------------------------------------------------------  IN:    Enteral Tube Flush: 100 mL    Free Water: 1200 mL    Vital High Protein: 960 mL  Total IN: 2260 mL    OUT:    Chest Tube (mL): 90 mL    Indwelling Catheter - Urethral (mL): 1350 mL  Total OUT: 1440 mL    Total NET: 820 mL      08 Feb 2022 07:01  -  08 Feb 2022 08:14  --------------------------------------------------------  IN:  Total IN: 0 mL    OUT:    Indwelling Catheter - Urethral (mL): 30 mL  Total OUT: 30 mL    Total NET: -30 mL          LABS:                          10.9   9.47  )-----------( 102      ( 08 Feb 2022 06:11 )             35.1         08 Feb 2022 06:11    142    |  103    |  18     ----------------------------<  159    4.2     |  29     |  <0.5     Ca    8.5        08 Feb 2022 06:11  Mg     2.1       08 Feb 2022 06:11    TPro  4.4    /  Alb  3.0    /  TBili  1.0    /  DBili  x      /  AST  18     /  ALT  32     /  AlkPhos  79     08 Feb 2022 06:11  Amylase x     lipase x                                                                                                                                                                                                 Mode: AC/ CMV (Assist Control/ Continuous Mandatory Ventilation)  RR (machine): 24  TV (machine): 420  FiO2: 40  PEEP: 8  MAP: 9  PIP: 15                                      ABG - ( 08 Feb 2022 03:48 )  pH, Arterial: 7.54  pH, Blood: x     /  pCO2: 37    /  pO2: 123   / HCO3: 32    / Base Excess: 8.5   /  SaO2: 98.8                MEDICATIONS  (STANDING):  amantadine Syrup 100 milliGRAM(s) Oral every 12 hours  aspirin  chewable 81 milliGRAM(s) Oral daily  chlorhexidine 0.12% Liquid 15 milliLiter(s) Oral Mucosa every 12 hours  chlorhexidine 4% Liquid 1 Application(s) Topical <User Schedule>  dexAMETHasone  Injectable 2 milliGRAM(s) IV Push daily  dextrose 5%. 1000 milliLiter(s) (50 mL/Hr) IV Continuous <Continuous>  dextrose 5%. 1000 milliLiter(s) (100 mL/Hr) IV Continuous <Continuous>  dextrose 50% Injectable 25 Gram(s) IV Push once  dextrose 50% Injectable 12.5 Gram(s) IV Push once  dextrose 50% Injectable 25 Gram(s) IV Push once  diltiazem    Tablet 30 milliGRAM(s) Oral every 6 hours  enoxaparin Injectable 40 milliGRAM(s) SubCutaneous at bedtime  glucagon  Injectable 1 milliGRAM(s) IntraMuscular once  insulin lispro (ADMELOG) corrective regimen sliding scale   SubCutaneous every 6 hours  pantoprazole  Injectable 40 milliGRAM(s) IV Push daily  polyethylene glycol 3350 17 Gram(s) Oral daily    MEDICATIONS  (PRN):  acetaminophen     Tablet .. 650 milliGRAM(s) Oral every 6 hours PRN Temp greater or equal to 38C (100.4F), Mild Pain (1 - 3)  albuterol/ipratropium for Nebulization 3 milliLiter(s) Nebulizer every 6 hours PRN Shortness of Breath and/or Wheezing  fentaNYL    Injectable 50 MICROGram(s) IV Push every 4 hours PRN Agitatiom          Xrays:  TLC:  OG:  ET tube:                                                                                    b/l opacity   ECHO:  [facility] ICU:

## 2022-02-08 NOTE — PROGRESS NOTE ADULT - ASSESSMENT
81 year old male with a hx of HTN, BPH, recent diagnosis of COVID 19 2 days ago prior to admission -> presented to the ED S/P cardiac arrest.  Patient began to have worsening dyspnea at home, EMS was called. Found to be in cardiac arrest. ROSC was achieved after 2 rounds of CPR and epi, downtime 7 mins. Was intubated in the field.     acute respiratory failure / Cardiac arrest / COVID-19 pneumonia / probable NSTEMI / L sided pneumothorax / anoxic brain injury      - pt has been off sedation x one week and is not awake with no response to verbal stimuli   - pt does breath over the ventilator - demonstrating brain stem activity   - I explained that medically we do not expect neurological improvement    - I spoke with family multiple times today and there wishes are to have the pt transferred to a long term facility in [place]   - I explained that the pt would need a trach and PEG for long term care   - they report to me that they would consent for trach and peg prior to transfer to [place] facility   - very poor prognosis, family aware   - no need for continued ICU care

## 2022-02-08 NOTE — PROGRESS NOTE ADULT - ASSESSMENT
81 year old male with a hx of HTN, BPH, recent diagnosis of COVID 19 2 days ago (unvaccinated, prescribed decadron/ zpack/ zinc) presenting to the ED S/P cardiac arrest. Intubated in the field    IMPRESSION  #COVID19 PNA, Severe (O2 < or = 94% on RA and requiring supplemental O2) with Cardiac arrest    CXR diffuse bilateral opacities     D-Dimer Assay, Quantitative: 7368 ng/mL DDU (01-15-22 @ 01:25)    s/p Toci 1/17  #Lactic acidosis  #Transaminitis   #Cardiac arrest- ROSC was achieved after 2 rounds of CPR and epi, downtime 7 mins  # Left sided Pneumothorax- on CXR from 2/2/22    Recommendations  - monitor off antibiotics  - repeat blood cx if febrile   - Management of Pneumothorax and vent as per CCU/ICU protocol   - GOC discussions noted    Please call or message on [employer] Teams if with any questions.  Spectra [phone]

## 2022-02-08 NOTE — PROGRESS NOTE ADULT - ASSESSMENT
IMPRESSION:  Acute hypoxic respiratory failure  Anoxic brain injury  Cardiac Arrest  Severe COVID PNA  Sepsis on present on admission  Left PNX SP chest tube, resolved  Mucus plugs SP Bronchoscopy  Ischemic changes on CT Head      SUGGEST:    CNS:   off sedation not awake  reviewed  CT Head reviewed.    EEG reviewed. general slowing    HEENT: Oral care.  Surgery for Trach    PULMONARY: HOB @ 45 degrees.    Aspiration precautions.   Vent changes:  keep same vent setting     lower Dexamethasone 2mg QD (day 18).    Monitor driving pressure.    pig tial to suction     CARDIOVASCULAR:  Avoid overload.  Keep I < O.    GI: GI prophylaxis.  c/w OG feeding.  Bowel regimen.  Surgery for PEG.    RENAL:  Follow up lytes.  Correct as needed.  Monitor UO.  Becker care.    INFECTIOUS DISEASE: Abx per ID.  Repeat Procalcitonin.    Tend inflammatory markers.  ID FU. FU cultures.    HEMATOLOGICAL: DVT prophylaxis resume lovenox     ENDOCRINE:  Follow up FS.  Insulin protocol if needed.  TSH    MUSCULOSKELETAL: bedrest    DNR  Monitor in MICU  Very poor overall prognosis  Palliative FU  pending family decision regarding terminal wean Vs peg and tracheostomy

## 2022-02-09 NOTE — PROGRESS NOTE ADULT - SUBJECTIVE AND OBJECTIVE BOX
SUBJECTIVE:    Patient is a 81y old Male who presents with a chief complaint of Cardiac arrest (01 Feb 2022 22:00)      HPI:  81 year old male with a hx of HTN, BPH, recent diagnosis of COVID 19 2 days ago (unvaccinated, prescribed decadron/ zpack/ zinc) presenting to the ED S/P cardiac arrest. Patient intubated/ sedated so history obtained from chart (son left and attempted to reach). Patient began to have worsening dyspnea at home, EMS was called. Found to be in respiratory arrest by EMS and followed by EMS. ROSC was achieved after 2 rounds of CPR and epi, downtime 7 mins. Was intubated in the field.   In ED central line was placed. ABG: PH 7.15, PaO2 66, PaCO2 45, HCO3- 16. Troponins .1, BNP > 8000, D-Dimer 7368. COVID 19 positive. Patient being admitted s/p cardiac arrest to ICU.     (15 Aquiles 2022 04:09)      Currently admitted to medicine with the primary diagnosis of cardiac arrest. Not responding to commands, not retracting to pain.    Besides the pertinent positives and negatives described above, the ROS was within normal limits.    PAST MEDICAL & SURGICAL HISTORY  BPH (benign prostatic hyperplasia)    Mild HTN      SOCIAL HISTORY:    ALLERGIES:  Allergy Status Unknown    MEDICATIONS:  STANDING MEDICATIONS  aspirin  chewable 81 milliGRAM(s) Oral daily  chlorhexidine 0.12% Liquid 15 milliLiter(s) Oral Mucosa every 12 hours  chlorhexidine 4% Liquid 1 Application(s) Topical <User Schedule>  dexAMETHasone  Injectable 4 milliGRAM(s) IV Push daily  dextrose 5%. 1000 milliLiter(s) IV Continuous <Continuous>  dextrose 5%. 1000 milliLiter(s) IV Continuous <Continuous>  dextrose 50% Injectable 25 Gram(s) IV Push once  dextrose 50% Injectable 12.5 Gram(s) IV Push once  dextrose 50% Injectable 25 Gram(s) IV Push once  diltiazem    Tablet 30 milliGRAM(s) Oral every 6 hours  enoxaparin Injectable 40 milliGRAM(s) SubCutaneous at bedtime  glucagon  Injectable 1 milliGRAM(s) IntraMuscular once  insulin lispro (ADMELOG) corrective regimen sliding scale   SubCutaneous every 6 hours  pantoprazole  Injectable 40 milliGRAM(s) IV Push daily  polyethylene glycol 3350 17 Gram(s) Oral daily    PRN MEDICATIONS  acetaminophen     Tablet .. 650 milliGRAM(s) Oral every 6 hours PRN  albuterol/ipratropium for Nebulization 3 milliLiter(s) Nebulizer every 6 hours PRN    VITALS:   T(F): 99.1  HR: 68  BP: 141/75  RR: 27  SpO2: 96%    LABS:                        11.3   10.96 )-----------( 84       ( 04 Feb 2022 06:43 )             36.7     02-04    141  |  107  |  24<H>  ----------------------------<  173<H>  4.7   |  24  |  <0.5<L>    Ca    8.2<L>      04 Feb 2022 06:43  Mg     2.1     02-04    TPro  4.4<L>  /  Alb  2.9<L>  /  TBili  0.8  /  DBili  x   /  AST  33  /  ALT  45<H>  /  AlkPhos  71  02-04        ABG - ( 04 Feb 2022 03:52 )  pH, Arterial: 7.51  pH, Blood: x     /  pCO2: 38    /  pO2: 123   / HCO3: 30    / Base Excess: 6.8   /  SaO2: 97.8        PHYSICAL EXAM:  General:  not awake   HEENT:    et tube             Lymph Nodes: NO cervical LN   Lungs: Bilateral BS  Cardiovascular: Regular   Abdomen: Soft, Positive BS  Extremities: No clubbing   Skin: warm   Neurological: positive gag   Musculoskeletal: move all ext     Intravenous access: yes  NG tube: og tube  Becker Catheter:   Indwelling Urethral Catheter:     Connect To:  Straight Drainage/Simla    Indication:  Urinary Retention / Obstruction (02-01-22 @ 19:51) (not performed) [Active]  Indwelling Urethral Catheter:     Connect To:  Straight Drainage/Simla    Indication:  Urinary Retention / Obstruction (01-31-22 @ 14:21) (not performed) [Active]  Indwelling Urethral Catheter:     Connect To:  Straight Drainage/Gravity    Indication:  Urine Output Monitoring in Critically Ill (01-30-22 @ 13:06) (not performed) [Active]  Indwelling Urethral Catheter:     Connect To:  Straight Drainage/Simla    Indication:  Urinary Retention / Obstruction (01-29-22 @ 10:02) (not performed) [Active]  Indwelling Urethral Catheter:     Connect To:  Straight Drainage/Simla    Indication:  Urinary Retention / Obstruction (01-25-22 @ 16:18) (not performed) [Active]  Indwelling Urethral Catheter:     Connect To:  Straight Drainage/Simla    Indication:  Urinary Retention / Obstruction (01-24-22 @ 15:34) (not performed) [Active]      Assessment and Plan:   · Assessment	  81 year old male with a hx of HTN, BPH, recent diagnosis of COVID 19 2 days ago (unvaccinated, prescribed decadron/ zpack/ zinc) presenting to the ED S/P cardiac arrest, course complicated by pneumothorax s/p chest tube and removal, and mucus plug removed by bronchoscopy. Patient does not follow any commands as of now.    # COVID pneumonia s/p cardiac arrest  - was down for 7 minutes / found to have pneumothorax s/p pigtail catheter insertion  - course complicated by mucus plug removed by bronch  - on minimal vent settings however not following commands  - seen by ID recommending to stop abx  - received toci and remdesivir, c/w dexa 6 bid day 17 // off sedation  - chest xray concerning for apical pneumothorax on left side as per radiology, pigtail placed (to suction)  - dexamethasone decreased to 2mg qd  - monitor driving pressure  -f/u procalcitonin  -f/u ID recommendations    # Anoxic brain injury  - not following commands off sedation; CT head showed periventricular white matter changes which can reflect ischemia  - EEG showing generalized slowing  - f/u with family regarding trach and peg tube placement  - pending family decision re: transfer to different institution  - palliative f/u    # DVT PPX: lovenox  # GI PPX: protonix  # Diet: tube feeding  DNR

## 2022-02-09 NOTE — PROGRESS NOTE ADULT - ASSESSMENT
81 year old male with a hx of HTN, BPH, recent diagnosis of COVID 19 2 days ago prior to admission -> presented to the ED S/P cardiac arrest.  Patient began to have worsening dyspnea at home, EMS was called. Found to be in cardiac arrest. ROSC was achieved after 2 rounds of CPR and epi, downtime 7 mins. Was intubated in the field.     acute respiratory failure / Cardiac arrest / COVID-19 pneumonia / probable NSTEMI / L sided pneumothorax / anoxic brain injury      - pt is not awake    - pt does breath over the ventilator    - I explained that medically we do not expect significant neurological improvement    - I spoke with family multiple times  and there wishes are to have the pt transferred to a long term facility in [place]   - I explained that the pt would need a trach and PEG for long term care   - they report to me that they would consent for trach and peg prior to transfer to [place] facility   - very poor prognosis, family aware   - no need for continued ICU care

## 2022-02-09 NOTE — PROGRESS NOTE ADULT - ASSESSMENT
IMPRESSION:  Acute hypoxic respiratory failure  Anoxic brain injury  Cardiac Arrest  Severe COVID PNA  Sepsis on present on admission  Left PNX SP chest tube, resolved  Mucus plugs SP Bronchoscopy  Ischemic changes on CT Head      SUGGEST:    CNS:   off sedation not awake  reviewed     EEG reviewed. general slowing    HEENT: Oral care.    PULMONARY: HOB @ 45 degrees.    Aspiration precautions.   Vent changes:  keep same vent setting     d/c  Dexamethasone   Monitor driving pressure.    pig tial to suction     CARDIOVASCULAR:  Avoid overload.  Keep I < O.    GI: GI prophylaxis.  c/w OG feeding.  Bowel regimen.  Surgery for PEG.    RENAL:  Follow up lytes.  Correct as needed.  Monitor UO.  Becker care.    INFECTIOUS DISEASE: Abx per ID.  Repeat Procalcitonin.    Tend inflammatory markers.  ID FU. FU cultures.    HEMATOLOGICAL: DVT prophylaxis resume lovenox     ENDOCRINE:  Follow up FS.  Insulin protocol if needed.  TSH    MUSCULOSKELETAL: bedrest    DNR  pending family decision   ?? transfer to different [place]   Very poor overall prognosis  Palliative FU

## 2022-02-09 NOTE — PROGRESS NOTE ADULT - PROBLEM SELECTOR PLAN 2
- c/w vent, likely plan for trach and PEG  - is tachypneic (breathing 3-4bpm above vent), would consider adding fentanyl 50mcg IV push PRN for dyspnea as well

## 2022-02-09 NOTE — PROGRESS NOTE ADULT - SUBJECTIVE AND OBJECTIVE BOX
GENERAL SURGERY PROGRESS NOTE    Patient: CHADWICK VENCES , 81y ([birth year])Male   MRN: [id number]  Location: [address] 1  Visit: 01-15-22 Inpatient  Date: 02-09-22 @ 10:31    Events of past 24 hours: No acute events, patient continue intubated, on mechanical ventilation, with no improvement in mental status, off sedation, pending family decision for surgical procedure (tracheostomy)    PAST MEDICAL & SURGICAL HISTORY:  BPH (benign prostatic hyperplasia)    Mild HTN    Vitals:   T(F): 98.6 (02-09-22 @ 07:01), Max: 99.1 (02-08-22 @ 11:00)  HR: 92 (02-09-22 @ 09:00)  BP: 164/80 (02-09-22 @ 09:00)  RR: 30 (02-09-22 @ 09:00)  SpO2: 91% (02-09-22 @ 09:00)  Mode: AC/ CMV (Assist Control/ Continuous Mandatory Ventilation), RR (machine): 24, TV (machine): 420, FiO2: 50, PEEP: 5, MAP: 6, PIP: 10    Diet, NPO with Tube Feed:   Tube Feeding Modality: Orogastric  Glucerna 1.2 Haroldo  Total Volume for 24 Hours (mL): 1440  Continuous  Starting Tube Feed Rate mL per Hour: 20  Until Goal Tube Feed Rate (mL per Hour): 60  Tube Feed Duration (in Hours): 24  Tube Feed Start Time: 10:00      Fluids:     I & O's:    02-08-22 @ 07:01  -  02-09-22 @ 07:00  --------------------------------------------------------  IN:    Enteral Tube Flush: 360 mL    Glucerna: 1240 mL    Vital High Protein: 120 mL  Total IN: 1720 mL    OUT:    Chest Tube (mL): 120 mL    Indwelling Catheter - Urethral (mL): 1725 mL  Total OUT: 1845 mL    Total NET: -125 mL      PHYSICAL EXAM:  GENERAL: Off cedation, no improvement in mental status  HEENT: NCAT, Trachea ML, Neck supple  Cardiac: RRR S1, S2,   Respiratory: Intubated on mechanical ventilation  Abdomen: Soft, non-distended,   Vascular: extremities well perfused  Skin: Warm/dry, normal color, no jaundice    MEDICATIONS  (STANDING):  amantadine Syrup 100 milliGRAM(s) Oral every 12 hours  aspirin  chewable 81 milliGRAM(s) Oral daily  chlorhexidine 0.12% Liquid 15 milliLiter(s) Oral Mucosa every 12 hours  chlorhexidine 4% Liquid 1 Application(s) Topical <User Schedule>  dextrose 5%. 1000 milliLiter(s) (50 mL/Hr) IV Continuous <Continuous>  dextrose 5%. 1000 milliLiter(s) (100 mL/Hr) IV Continuous <Continuous>  dextrose 50% Injectable 25 Gram(s) IV Push once  dextrose 50% Injectable 12.5 Gram(s) IV Push once  dextrose 50% Injectable 25 Gram(s) IV Push once  enoxaparin Injectable 40 milliGRAM(s) SubCutaneous at bedtime  glucagon  Injectable 1 milliGRAM(s) IntraMuscular once  insulin lispro (ADMELOG) corrective regimen sliding scale   SubCutaneous every 6 hours  metoprolol tartrate 25 milliGRAM(s) Oral two times a day  pantoprazole  Injectable 40 milliGRAM(s) IV Push daily  polyethylene glycol 3350 17 Gram(s) Oral daily    MEDICATIONS  (PRN):  acetaminophen     Tablet .. 650 milliGRAM(s) Oral every 6 hours PRN Temp greater or equal to 38C (100.4F), Mild Pain (1 - 3)  albuterol/ipratropium for Nebulization 3 milliLiter(s) Nebulizer every 6 hours PRN Shortness of Breath and/or Wheezing  fentaNYL    Injectable 50 MICROGram(s) IV Push every 4 hours PRN Agitatiom      DVT PROPHYLAXIS: enoxaparin Injectable 40 milliGRAM(s) SubCutaneous at bedtime    GI PROPHYLAXIS: pantoprazole  Injectable 40 milliGRAM(s) IV Push daily    ANTICOAGULATION:   ANTIBIOTICS:        LAB/STUDIES:  Labs:  CAPILLARY BLOOD GLUCOSE      POCT Blood Glucose.: 110 mg/dL (09 Feb 2022 06:13)  POCT Blood Glucose.: 124 mg/dL (08 Feb 2022 23:17)  POCT Blood Glucose.: 167 mg/dL (08 Feb 2022 18:09)  POCT Blood Glucose.: 214 mg/dL (08 Feb 2022 11:15)                          10.9   10.22 )-----------( 110      ( 09 Feb 2022 06:03 )             35.3       Auto Neutrophil %: 87.1 % (02-09-22 @ 06:03)  Auto Immature Granulocyte %: 0.6 % (02-09-22 @ 06:03)    02-09    140  |  102  |  14  ----------------------------<  115<H>  3.9   |  27  |  <0.5<L>      Calcium, Total Serum: 8.5 mg/dL (02-09-22 @ 06:03)      LFTs:             4.7  | 1.1  | 23       ------------------[80      ( 09 Feb 2022 06:03 )  3.0  | x    | 31          Lipase:x      Amylase:x         Blood Gas Arterial, Lactate: 1.10 mmol/L (02-09-22 @ 03:50)  Blood Gas Arterial, Lactate: 1.10 mmol/L (02-08-22 @ 03:48)  Blood Gas Arterial, Lactate: 0.80 mmol/L (02-07-22 @ 06:08)    ABG - ( 09 Feb 2022 03:50 )  pH: 7.50  /  pCO2: 40    /  pO2: 70    / HCO3: 31    / Base Excess: 7.4   /  SaO2: 94.9            ABG - ( 08 Feb 2022 03:48 )  pH: 7.54  /  pCO2: 37    /  pO2: 123   / HCO3: 32    / Base Excess: 8.5   /  SaO2: 98.8            ABG - ( 07 Feb 2022 06:08 )  pH: 7.43  /  pCO2: 44    /  pO2: 141   / HCO3: 29    / Base Excess: 4.3   /  SaO2: 98.2              Coags:                        IMAGING:      ACCESS/ DEVICES:  [ ] Peripheral IV  [ ] Central Venous Line	[ ] R	[ ] L	[ ] IJ	[ ] Fem	[ ] SC	Placed:   [ ] Arterial Line		[ ] R	[ ] L	[ ] Fem	[ ] Rad	[ ] Ax	Placed:   [ ] PICC:					[ ] Mediport  [ ] Urinary Catheter,  Date Placed:   [ ] Chest tube: [ ] Right, [ ] Left  [ ] CHANNING/Mateo Drains      ASSESSMENT:  81y M w/ PMHx of  ****    PLAN:  -  -  -    Lines/Tubes: PIV, Midline, Central Line, A-Line, Chest tubes, Mateo/CHANNING drains, Becker Catheter.   GENERAL SURGERY PROGRESS NOTE    Patient: CHADWICK VENCES , 81y ([birth year])Male   MRN: [id number]  Location: [address] 1  Visit: 01-15-22 Inpatient  Date: 02-09-22 @ 10:31    Events of past 24 hours: No acute events, patient continue intubated, on mechanical ventilation, with no improvement in mental status, off sedation, pending family decision for surgical procedure (tracheostomy)    PAST MEDICAL & SURGICAL HISTORY:  BPH (benign prostatic hyperplasia)    Mild HTN    Vitals:   T(F): 98.6 (02-09-22 @ 07:01), Max: 99.1 (02-08-22 @ 11:00)  HR: 92 (02-09-22 @ 09:00)  BP: 164/80 (02-09-22 @ 09:00)  RR: 30 (02-09-22 @ 09:00)  SpO2: 91% (02-09-22 @ 09:00)  Mode: AC/ CMV (Assist Control/ Continuous Mandatory Ventilation), RR (machine): 24, TV (machine): 420, FiO2: 50, PEEP: 5, MAP: 6, PIP: 10    Diet, NPO with Tube Feed:   Tube Feeding Modality: Orogastric  Glucerna 1.2 Haroldo  Total Volume for 24 Hours (mL): 1440  Continuous  Starting Tube Feed Rate mL per Hour: 20  Until Goal Tube Feed Rate (mL per Hour): 60  Tube Feed Duration (in Hours): 24  Tube Feed Start Time: 10:00      Fluids:     I & O's:    02-08-22 @ 07:01  -  02-09-22 @ 07:00  --------------------------------------------------------  IN:    Enteral Tube Flush: 360 mL    Glucerna: 1240 mL    Vital High Protein: 120 mL  Total IN: 1720 mL    OUT:    Chest Tube (mL): 120 mL    Indwelling Catheter - Urethral (mL): 1725 mL  Total OUT: 1845 mL    Total NET: -125 mL      PHYSICAL EXAM:  GENERAL: Off cedation, no improvement in mental status  HEENT: NCAT, Trachea ML, Neck supple  Cardiac: RRR S1, S2,   Respiratory: Intubated on mechanical ventilation  Abdomen: Soft, non-distended,   Vascular: extremities well perfused  Skin: Warm/dry, normal color, no jaundice    MEDICATIONS  (STANDING):  amantadine Syrup 100 milliGRAM(s) Oral every 12 hours  aspirin  chewable 81 milliGRAM(s) Oral daily  chlorhexidine 0.12% Liquid 15 milliLiter(s) Oral Mucosa every 12 hours  chlorhexidine 4% Liquid 1 Application(s) Topical <User Schedule>  dextrose 5%. 1000 milliLiter(s) (50 mL/Hr) IV Continuous <Continuous>  dextrose 5%. 1000 milliLiter(s) (100 mL/Hr) IV Continuous <Continuous>  dextrose 50% Injectable 25 Gram(s) IV Push once  dextrose 50% Injectable 12.5 Gram(s) IV Push once  dextrose 50% Injectable 25 Gram(s) IV Push once  enoxaparin Injectable 40 milliGRAM(s) SubCutaneous at bedtime  glucagon  Injectable 1 milliGRAM(s) IntraMuscular once  insulin lispro (ADMELOG) corrective regimen sliding scale   SubCutaneous every 6 hours  metoprolol tartrate 25 milliGRAM(s) Oral two times a day  pantoprazole  Injectable 40 milliGRAM(s) IV Push daily  polyethylene glycol 3350 17 Gram(s) Oral daily    MEDICATIONS  (PRN):  acetaminophen     Tablet .. 650 milliGRAM(s) Oral every 6 hours PRN Temp greater or equal to 38C (100.4F), Mild Pain (1 - 3)  albuterol/ipratropium for Nebulization 3 milliLiter(s) Nebulizer every 6 hours PRN Shortness of Breath and/or Wheezing  fentaNYL    Injectable 50 MICROGram(s) IV Push every 4 hours PRN Agitatiom      DVT PROPHYLAXIS: enoxaparin Injectable 40 milliGRAM(s) SubCutaneous at bedtime    GI PROPHYLAXIS: pantoprazole  Injectable 40 milliGRAM(s) IV Push daily    ANTICOAGULATION:   ANTIBIOTICS:        LAB/STUDIES:  Labs:  CAPILLARY BLOOD GLUCOSE      POCT Blood Glucose.: 110 mg/dL (09 Feb 2022 06:13)  POCT Blood Glucose.: 124 mg/dL (08 Feb 2022 23:17)  POCT Blood Glucose.: 167 mg/dL (08 Feb 2022 18:09)  POCT Blood Glucose.: 214 mg/dL (08 Feb 2022 11:15)                          10.9   10.22 )-----------( 110      ( 09 Feb 2022 06:03 )             35.3       Auto Neutrophil %: 87.1 % (02-09-22 @ 06:03)  Auto Immature Granulocyte %: 0.6 % (02-09-22 @ 06:03)    02-09    140  |  102  |  14  ----------------------------<  115<H>  3.9   |  27  |  <0.5<L>      Calcium, Total Serum: 8.5 mg/dL (02-09-22 @ 06:03)      LFTs:             4.7  | 1.1  | 23       ------------------[80      ( 09 Feb 2022 06:03 )  3.0  | x    | 31          Lipase:x      Amylase:x         Blood Gas Arterial, Lactate: 1.10 mmol/L (02-09-22 @ 03:50)  Blood Gas Arterial, Lactate: 1.10 mmol/L (02-08-22 @ 03:48)  Blood Gas Arterial, Lactate: 0.80 mmol/L (02-07-22 @ 06:08)    ABG - ( 09 Feb 2022 03:50 )  pH: 7.50  /  pCO2: 40    /  pO2: 70    / HCO3: 31    / Base Excess: 7.4   /  SaO2: 94.9            ABG - ( 08 Feb 2022 03:48 )  pH: 7.54  /  pCO2: 37    /  pO2: 123   / HCO3: 32    / Base Excess: 8.5   /  SaO2: 98.8            ABG - ( 07 Feb 2022 06:08 )  pH: 7.43  /  pCO2: 44    /  pO2: 141   / HCO3: 29    / Base Excess: 4.3   /  SaO2: 98.2              Coags:

## 2022-02-09 NOTE — PROGRESS NOTE ADULT - SUBJECTIVE AND OBJECTIVE BOX
Patient is a 81y old  Male who presents with a chief complaint of Cardiac arrest (01 Feb 2022 22:00)      Over Night Events:  Patient seen and examined not awake over breath not on pressors   case discussed with hospitalist he discussed case with family they don't want any trach or peg or terminal wean   they want to transfer patient to different hospital     ROS:  See HPI    PHYSICAL EXAM    ICU Vital Signs Last 24 Hrs  T(C): 37 (09 Feb 2022 07:01), Max: 37.3 (08 Feb 2022 11:00)  T(F): 98.6 (09 Feb 2022 07:01), Max: 99.1 (08 Feb 2022 11:00)  HR: 80 (09 Feb 2022 08:37) (61 - 80)  BP: 138/72 (09 Feb 2022 08:00) (117/63 - 174/83)  BP(mean): 99 (09 Feb 2022 08:00) (82 - 119)  ABP: --  ABP(mean): --  RR: 30 (09 Feb 2022 09:00) (13 - 31)  SpO2: 96% (09 Feb 2022 08:37) (94% - 100%)      General: not awake   HEENT: et tube                Lymph Nodes: NO cervical LN   Lungs: Bilateral BS  Cardiovascular: Regular   Abdomen: Soft, Positive BS  Extremities: No clubbing   Skin: warm   Neurological: positive gag   Musculoskeletal: move all ext     I&O's Detail    08 Feb 2022 07:01  -  09 Feb 2022 07:00  --------------------------------------------------------  IN:    Enteral Tube Flush: 360 mL    Glucerna: 1240 mL    Vital High Protein: 120 mL  Total IN: 1720 mL    OUT:    Chest Tube (mL): 120 mL    Indwelling Catheter - Urethral (mL): 1725 mL  Total OUT: 1845 mL    Total NET: -125 mL      09 Feb 2022 07:01  -  09 Feb 2022 09:38  --------------------------------------------------------  IN:  Total IN: 0 mL    OUT:    Indwelling Catheter - Urethral (mL): 160 mL  Total OUT: 160 mL    Total NET: -160 mL          LABS:                          10.9   10.22 )-----------( 110      ( 09 Feb 2022 06:03 )             35.3         09 Feb 2022 06:03    140    |  102    |  14     ----------------------------<  115    3.9     |  27     |  <0.5     Ca    8.5        09 Feb 2022 06:03  Mg     1.9       09 Feb 2022 06:03    TPro  4.7    /  Alb  3.0    /  TBili  1.1    /  DBili  x      /  AST  23     /  ALT  31     /  AlkPhos  80     09 Feb 2022 06:03  Amylase x     lipase x                                                                                                                                                                                                 Mode: AC/ CMV (Assist Control/ Continuous Mandatory Ventilation)  RR (machine): 24  TV (machine): 420  FiO2: 40  PEEP: 5  MAP: 6  PIP: 10                                      ABG - ( 09 Feb 2022 03:50 )  pH, Arterial: 7.50  pH, Blood: x     /  pCO2: 40    /  pO2: 70    / HCO3: 31    / Base Excess: 7.4   /  SaO2: 94.9                MEDICATIONS  (STANDING):  amantadine Syrup 100 milliGRAM(s) Oral every 12 hours  aspirin  chewable 81 milliGRAM(s) Oral daily  chlorhexidine 0.12% Liquid 15 milliLiter(s) Oral Mucosa every 12 hours  chlorhexidine 4% Liquid 1 Application(s) Topical <User Schedule>  dexAMETHasone  Injectable 2 milliGRAM(s) IV Push daily  dextrose 5%. 1000 milliLiter(s) (50 mL/Hr) IV Continuous <Continuous>  dextrose 5%. 1000 milliLiter(s) (100 mL/Hr) IV Continuous <Continuous>  dextrose 50% Injectable 25 Gram(s) IV Push once  dextrose 50% Injectable 12.5 Gram(s) IV Push once  dextrose 50% Injectable 25 Gram(s) IV Push once  enoxaparin Injectable 40 milliGRAM(s) SubCutaneous at bedtime  glucagon  Injectable 1 milliGRAM(s) IntraMuscular once  insulin lispro (ADMELOG) corrective regimen sliding scale   SubCutaneous every 6 hours  metoprolol tartrate 25 milliGRAM(s) Oral two times a day  pantoprazole  Injectable 40 milliGRAM(s) IV Push daily  polyethylene glycol 3350 17 Gram(s) Oral daily    MEDICATIONS  (PRN):  acetaminophen     Tablet .. 650 milliGRAM(s) Oral every 6 hours PRN Temp greater or equal to 38C (100.4F), Mild Pain (1 - 3)  albuterol/ipratropium for Nebulization 3 milliLiter(s) Nebulizer every 6 hours PRN Shortness of Breath and/or Wheezing  fentaNYL    Injectable 50 MICROGram(s) IV Push every 4 hours PRN Agitatiom          Xrays:  TLC:  OG:  ET tube:                                                                                    b/l opacity right effusion    ECHO:  [facility] ICU:

## 2022-02-09 NOTE — PROGRESS NOTE ADULT - SUBJECTIVE AND OBJECTIVE BOX
CHADWICK VENCES          MRN-[id number]              HPI: 81M with PMH of HTN, BPH, recent COVID-19 (unvaccinated status) here after cardiac arrest x 7 mins, c/b PTX s/p chest tube and removal, and bronch to remove mucus plug. 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 However, he has on purposeful motor responses, and neuro recommended repeat imaging to assess infarct extent. Palliative care called for GOC.      INTERVAL EVENTS:  : remains intubated on vent    PAST MEDICAL & SURGICAL HISTORY:  BPH (benign prostatic hyperplasia)    Mild HTN        FAMILY HISTORY:   Reviewed and found non contributory in mother or father    SOCIAL HISTORY: no tobacco/etoh use reported. Pt resides at ______       ROS:	    Unable to attain due to:  mental status 2/2 anoxic brain injury                    Dyspnea (Jd 0-10): 0                       N/V (Y/N): No                             Secretions (Y/N) : No                                          Agitation(Y/N): No                              Pain (Y/N): No                                 -Provocation/Palliation: N/A  -Quality/Quantity: N/A  -Radiating: N/A  -Severity: No pain  -Timing/Frequency: N/A  -Impact on ADLs: N/A    General:  unable to assess  HEENT:    unable to assess  Neck:  unable to assess  CVS:  unable to assess  Resp:  unable to assess  GI:  unable to assess  :  unable to assess  Musc:  unable to assess  Neuro:  unable to assess  Psych:  unable to assess  Skin:  unable to assess  Lymph:  unable to assess    Last BM:      Allergies    Allergy Status Unknown    Intolerances      Opiate Naive (Y/N):   -iStop reviewed (Y/N):   Ref#:              Medications:	      MEDICATIONS  (STANDING):  amantadine Syrup 100 milliGRAM(s) Oral every 12 hours  aspirin  chewable 81 milliGRAM(s) Oral daily  chlorhexidine 0.12% Liquid 15 milliLiter(s) Oral Mucosa every 12 hours  chlorhexidine 4% Liquid 1 Application(s) Topical <User Schedule>  dextrose 5%. 1000 milliLiter(s) (50 mL/Hr) IV Continuous <Continuous>  dextrose 5%. 1000 milliLiter(s) (100 mL/Hr) IV Continuous <Continuous>  dextrose 50% Injectable 25 Gram(s) IV Push once  dextrose 50% Injectable 12.5 Gram(s) IV Push once  dextrose 50% Injectable 25 Gram(s) IV Push once  enoxaparin Injectable 40 milliGRAM(s) SubCutaneous at bedtime  glucagon  Injectable 1 milliGRAM(s) IntraMuscular once  insulin lispro (ADMELOG) corrective regimen sliding scale   SubCutaneous every 6 hours  metoprolol tartrate 25 milliGRAM(s) Oral two times a day  pantoprazole  Injectable 40 milliGRAM(s) IV Push daily  polyethylene glycol 3350 17 Gram(s) Oral daily    MEDICATIONS  (PRN):  acetaminophen     Tablet .. 650 milliGRAM(s) Oral every 6 hours PRN Temp greater or equal to 38C (100.4F), Mild Pain (1 - 3)  albuterol/ipratropium for Nebulization 3 milliLiter(s) Nebulizer every 6 hours PRN Shortness of Breath and/or Wheezing  fentaNYL    Injectable 50 MICROGram(s) IV Push every 4 hours PRN Agitatiom        Labs:	                          10.9   10.22 )-----------( 110      ( 2022 06:03 )             35.3         140  |  102  |  14  ----------------------------<  115<H>  3.9   |  27  |  <0.5<L>    Ca    8.5      2022 06:03  Mg     1.9             Radiology:	       EK Lead ECG:   Ventricular Rate 141 BPM    Atrial Rate 122 BPM    QRS Duration 112 ms    Q-T Interval 334 ms    QTC Calculation(Bazett) 511 ms    R Axis -29 degrees    T Axis 136 degrees    Diagnosis Line Atrial fibrillation with rapid ventricular response  Incomplete right bundle branch block  ST & T wave abnormality, consider lateral ischemia  Abnormal ECG    Confirmed by JESSICA EDMONDSON MD ([id number]) on 2022 11:17:30 AM (22 @ 02:11)      Imaging Personally Reviewed:  [ ] YES  [ ] NO    Consultant(s) Notes Reviewed:  [ ] YES  [ ] NO  Care Discussed with Consultants/Other Providers [ ] YES  [ ] NO    PEx:	  Vital Signs Last 24 Hrs  T(C): 36.8 (2022 15:00), Max: 37.3 (2022 01:00)  T(F): 98.2 (2022 15:00), Max: 99.1 (2022 01:00)  HR: 75 (2022 15:00) (63 - 92)  BP: 146/68 (2022 15:00) (106/62 - 174/83)  BP(mean): 98 (2022 15:00) (78 - 119)  RR: 28 (2022 15:00) (13 - 30)  SpO2: 99% (2022 15:00) (91% - 100%)    GENERAL:   [ ]Alert  [ ]Oriented x   [ ]Lethargic  [ ]Cachexia  [ ]Unarousable  [ ]Verbal  [ ]Non-Verbal  Behavioral:  Limited exam for patient safety and to limit infection risk during COVID-19 outbreak. Please refer to primary team's examination for today.  [ ] Anxiety  [ ] Delirium [ ] Agitation [ ] Other    HEENT: Limited exam for patient safety and to limit infection risk during COVID-19 outbreak. Please refer to primary team's examination for today.  [ ]Normal   [ ]Dry mouth   [ ]ET Tube/Trach  [ ]Oral lesions    PULMONARY:  Limited exam for patient safety and to limit infection risk during COVID-19 outbreak. Please refer to primary team's examination for today.  [ ]Clear [ ]Tachypnea  [ ]Audible excessive secretions   [ ]Rhonchi        [ ]Right [ ]Left [ ]Bilateral  [ ]Crackles        [ ]Right [ ]Left [ ]Bilateral  [ ]Wheezing     [ ]Right [ ]Left [ ]Bilateral  [ ]Diminished breath sounds [ ]right [ ]left [ ]bilateral    CARDIOVASCULAR:  Limited exam for patient safety and to limit infection risk during COVID-19 outbreak. Please refer to primary team's examination for today.  [ ]Regular [ ]Irregular [ ]Tachy  [ ]Tico [ ]Murmur [ ]Other    GASTROINTESTINAL: Limited exam for patient safety and to limit infection risk during COVID-19 outbreak. Please refer to primary team's examination for today.  [ ]Soft  [ ]Distended   [ ]+BS  [ ]Non tender [ ]Tender  [ ]PEG [ ]OGT/ NGT  Last BM:     GENITOURINARY:Limited exam for patient safety and to limit infection risk during COVID-19 outbreak. Please refer to primary team's examination for today.  [ ]Normal [ ] Incontinent   [ ]Oliguria/Anuria   [ ]Becker    MUSCULOSKELETAL: Limited exam for patient safety and to limit infection risk during COVID-19 outbreak. Please refer to primary team's examination for today.  [ ]Normal   [ ]Weakness  [ ]Bed/Wheelchair bound [ ]Edema    NEUROLOGIC: Limited exam for patient safety and to limit infection risk during COVID-19 outbreak. Please refer to primary team's examination for today.  [ ]No focal deficits  [ ]Cognitive impairment  [ ]Dysphagia [ ]Dysarthria [ ]Paresis [ ]Other     SKIN: Limited exam for patient safety and to limit infection risk during COVID-19 outbreak. Please refer to primary team's examination for today.  [ ]Normal    [ ]Rash  [ ]Pressure ulcer(s)       Present on admission [ ]y [ ]n      Preadmit Karnofsky:  %           Current Karnofsky:     %  http://www.npcrc.org/files/news/karnofsky_performance_scale.pdf   http://www.npcrc.org/files/news/palliative_performance_scale_[email or web address].pdf  Cachexia (Y/N):   BMI:          Advanced Directives:	     Full Code     DNR/DNI     MOLST     HCP     DPOA     Living Will     Decision maker: The patient is able to participate in complex medical decision making conversations.   Legal surrogate:    GOALS OF CARE DISCUSSION	       Palliative care info/counseling provided	           Family meeting       Advanced Directives addressed please see Advance Care Planning Note	           See previous Palliative Medicine Note       Documentation of GOC: 	    REFERRALS	        Palliative Med        Unit SW/Case Mgmt              Hospice       Speech/Swallow       Nutrition       PT/OT

## 2022-02-09 NOTE — PROGRESS NOTE ADULT - SUBJECTIVE AND OBJECTIVE BOX
Patient is breathing over the ventilator, received iv fentanyl push this am      T(F): 98.6 (02-09-22 @ 07:01), Max: 99.1 (02-08-22 @ 11:00)  HR: 80 (02-09-22 @ 08:37)  BP: 138/72 (02-09-22 @ 08:00)  RR: 30 (02-09-22 @ 09:00)  SpO2: 96% (02-09-22 @ 08:37) (94% - 100%)    PHYSICAL EXAM:  GENERAL: NAD  HEAD:  Atraumatic, Normocephalic  EYES: EOMI, PERRLA, conjunctiva and sclera clear  NERVOUS SYSTEM:  positive gag  CHEST/LUNG: Clear to percussion bilaterally; No rales, rhonchi, wheezing, or rubs  HEART: Regular rate and rhythm; No murmurs, rubs, or gallops  ABDOMEN: Soft, Nontender, Nondistended; Bowel sounds present  EXTREMITIES:  2+ Peripheral Pulses, No clubbing, cyanosis, or edema    LABS  02-09    140  |  102  |  14  ----------------------------<  115<H>  3.9   |  27  |  <0.5<L>    Ca    8.5      09 Feb 2022 06:03  Mg     1.9     02-09    TPro  4.7<L>  /  Alb  3.0<L>  /  TBili  1.1  /  DBili  x   /  AST  23  /  ALT  31  /  AlkPhos  80  02-09                          10.9   10.22 )-----------( 110      ( 09 Feb 2022 06:03 )             35.3       Mode: AC/ CMV (Assist Control/ Continuous Mandatory Ventilation)  RR (machine): 24  TV (machine): 420  FiO2: 40  PEEP: 5      Culture Results:   Growth in anaerobic bottle: Staphylococcus capitis  Coag Negative Staphylococcus  Single set isolate, possible contaminant. Contact  Microbiology if susceptibility testing clinically  indicated.  ***Blood Panel PCR results on this specimen are available  approximately 3 hours after the Gram stain result.***  Gram stain, PCR, and/or culture results may not always  correspond due to difference in methodologies.  ************************************************************  This PCR assay was performed by multiplex PCR. This  Assay tests for 66 bacterial and resistance gene targets.  Please refer to the [facility] test directory  at [email or web address] for details. (01-31-22)  Culture Results:   Normal Respiratory Corina present (01-30-22)  Culture Results:   No Growth Final (01-28-22)  Culture Results:   No growth at 1 week. (01-25-22)  Culture Results:   No Growth Final (01-23-22)  Culture Results:   No growth (01-23-22)    RADIOLOGY  < from: Xray Chest 1 View- PORTABLE-Routine (Xray Chest 1 View- PORTABLE-Routine in AM.) (02.08.22 @ 06:53) >  Impression:    Unchanged bilateral opacities.  Stable support devices.    < end of copied text >    MEDICATIONS  (STANDING):  amantadine Syrup 100 milliGRAM(s) Oral every 12 hours  aspirin  chewable 81 milliGRAM(s) Oral daily  chlorhexidine 0.12% Liquid 15 milliLiter(s) Oral Mucosa every 12 hours  chlorhexidine 4% Liquid 1 Application(s) Topical <User Schedule>  dexAMETHasone  Injectable 2 milliGRAM(s) IV Push daily  enoxaparin Injectable 40 milliGRAM(s) SubCutaneous at bedtime  glucagon  Injectable 1 milliGRAM(s) IntraMuscular once  insulin lispro (ADMELOG) corrective regimen sliding scale   SubCutaneous every 6 hours  metoprolol tartrate 25 milliGRAM(s) Oral two times a day  pantoprazole  Injectable 40 milliGRAM(s) IV Push daily  polyethylene glycol 3350 17 Gram(s) Oral daily    MEDICATIONS  (PRN):  acetaminophen     Tablet .. 650 milliGRAM(s) Oral every 6 hours PRN Temp greater or equal to 38C (100.4F), Mild Pain (1 - 3)  albuterol/ipratropium for Nebulization 3 milliLiter(s) Nebulizer every 6 hours PRN Shortness of Breath and/or Wheezing  fentaNYL    Injectable 50 MICROGram(s) IV Push every 4 hours PRN Agitatiom

## 2022-02-09 NOTE — PROGRESS NOTE ADULT - ASSESSMENT
80yo man with history of COVID-19 PNA and found ni cardiac arrest; ROSC was achieved though patient with severe neurological impairment, patient continue intubated, on mechanical ventilation, with no improvement in mental status, off sedation, pending family decision for surgical procedure (tracheostomy)    PLAN  - Continue management as per primary team  - Pending Family consent for tracheostomy procedure

## 2022-02-10 NOTE — PROGRESS NOTE ADULT - ASSESSMENT
IMPRESSION:  Acute hypoxic respiratory failure  Anoxic brain injury  Cardiac Arrest  Severe COVID PNA  Sepsis on present on admission  Left PNX SP chest tube, resolved  Mucus plugs SP Bronchoscopy  Ischemic changes on CT Head      SUGGEST:    CNS:   off sedation not awake  reviewed     EEG reviewed. general slowing    HEENT: Oral care.    PULMONARY: HOB @ 45 degrees.    Aspiration precautions.   Vent changes: lower fio2 to 40%  d/c  Dexamethasone   Monitor driving pressure.    pig tial to suction     CARDIOVASCULAR:  Avoid overload.  Keep I < O.    GI: GI prophylaxis.  c/w OG feeding.  Bowel regimen.  Surgery for PEG.    RENAL:  Follow up lytes.  Correct as needed.  Monitor UO.  Becker care.    INFECTIOUS DISEASE: Abx per ID.  Repeat Procalcitonin.    Tend inflammatory markers.  ID FU. FU cultures.    HEMATOLOGICAL: DVT prophylaxis resume lovenox     ENDOCRINE:  Follow up FS.  Insulin protocol if needed.  TSH    MUSCULOSKELETAL: bedrest    DNR  pending family decision   ?? transfer to different [place]   Very poor overall prognosis  Palliative FU   Pt is more lethargic this morning and slurring her words.  Multiple bottles of medications and loose pills, including controlled substances, found in patient's room.  Medications sent to security.   Empty saline syringes and wrappers also found in pt's bed. Per Dr. Paz, morning medications held. Saline syringes placed in locked drawer.

## 2022-02-10 NOTE — CONSULT NOTE ADULT - SUBJECTIVE AND OBJECTIVE BOX
Chief complaint/Reason for consult: PEG tube placement    HPI:  81 year old male with a hx of HTN, BPH, recent diagnosis of COVID 19 2 days ago (unvaccinated, prescribed decadron/ zpack/ zinc) presenting to the ED S/P cardiac arrest. Patient intubated/ sedated so history obtained from chart (son left and attempted to reach). Patient began to have worsening dyspnea at home, EMS was called. Found to be in respiratory arrest by EMS and followed by EMS. ROSC was achieved after 2 rounds of CPR and epi, downtime 7 mins. Was intubated in the field.   In ED central line was placed. ABG: PH 7.15, PaO2 66, PaCO2 45, HCO3- 16. Troponins .1, BNP > 8000, D-Dimer 7368. COVID 19 positive. Patient being admitted s/p cardiac arrest to ICU.      (15 Aquiles 2022 04:09)    GI Updates: 81yMale [facility] BPH, HTN admitted s/p cardiac arrest and intubated. Patient with anoxic brain injury. GI consulted for PEG placement. No hematemesis, melena, blood in stool.      PAST MEDICAL & SURGICAL HISTORY:   BPH (benign prostatic hyperplasia)    Mild HTN          Family history:  FAMILY HISTORY:    No GI cancers in first or second degree relatives    Social History: No smoking. No alcohol. No illegal drug use.    Allergies:   Allergy Status Unknown            MEDICATIONS: Home Medications:  Aspir 81 oral delayed release tablet: 1 tab(s) orally once a day (15 Aquiles 2022 02:49)  benzonatate 100 mg oral capsule: 2 cap(s) orally 3 times a day (15 Aquiles 2022 02:49)  dexamethasone 4 mg oral tablet: 1.5 tab(s) orally once a day (15 Aquiles 2022 02:49)  metoprolol succinate 25 mg oral tablet, extended release: 1 tab(s) orally once a day (15 Aquiles 2022 02:49)  NIFEdipine 60 mg oral tablet, extended release: 1 tab(s) orally once a day (15 Aquiles 2022 02:49)  tamsulosin 0.4 mg oral capsule: 1 cap(s) orally once a day (15 Aquiles 2022 02:49)  telmisartan 80 mg oral tablet: 1 tab(s) orally once a day (15 Aquiles 2022 02:49)  Vitamin B12 1000 mcg oral tablet: 1 tab(s) orally once a day (15 Aquiles 2022 02:49)    MEDICATIONS  (STANDING):  amantadine Syrup 100 milliGRAM(s) Oral every 12 hours  aspirin  chewable 81 milliGRAM(s) Oral daily  chlorhexidine 0.12% Liquid 15 milliLiter(s) Oral Mucosa every 12 hours  chlorhexidine 4% Liquid 1 Application(s) Topical <User Schedule>  dextrose 5%. 1000 milliLiter(s) (50 mL/Hr) IV Continuous <Continuous>  dextrose 5%. 1000 milliLiter(s) (100 mL/Hr) IV Continuous <Continuous>  dextrose 50% Injectable 25 Gram(s) IV Push once  dextrose 50% Injectable 12.5 Gram(s) IV Push once  dextrose 50% Injectable 25 Gram(s) IV Push once  enoxaparin Injectable 40 milliGRAM(s) SubCutaneous at bedtime  glucagon  Injectable 1 milliGRAM(s) IntraMuscular once  insulin lispro (ADMELOG) corrective regimen sliding scale   SubCutaneous every 6 hours  metoprolol tartrate 25 milliGRAM(s) Oral two times a day  pantoprazole  Injectable 40 milliGRAM(s) IV Push daily  polyethylene glycol 3350 17 Gram(s) Oral daily    MEDICATIONS  (PRN):  acetaminophen     Tablet .. 650 milliGRAM(s) Oral every 6 hours PRN Temp greater or equal to 38C (100.4F), Mild Pain (1 - 3)  albuterol/ipratropium for Nebulization 3 milliLiter(s) Nebulizer every 6 hours PRN Shortness of Breath and/or Wheezing  fentaNYL    Injectable 50 MICROGram(s) IV Push every 4 hours PRN Agitatiom        REVIEW OF SYSTEMS  Unobtainable     VITALS:   T(F): 96.8 (02-10-22 @ 11:01), Max: 99 (02-10-22 @ 03:05)  HR: 70 (02-10-22 @ 14:29) (61 - 88)  BP: 125/62 (02-10-22 @ 13:00) (114/70 - 156/79)  RR: 24 (02-10-22 @ 13:00) (22 - 29)  SpO2: 98% (02-10-22 @ 14:29) (96% - 100%)    PHYSICAL EXAM:  GENERAL: +intubated, NG tube  HEAD:  Atraumatic, Normocephalic  EYES: conjunctiva and sclera clear  NECK: Supple, No thyromegaly   CHEST/LUNG: b/l rhonchi   HEART: Regular rate and rhythm; normal S1, S2, No murmurs.  ABDOMEN: Soft, nontender, nondistended; Bowel sounds present  NEUROLOGY: No asterixis or tremor  SKIN: Intact, no jaundice          LABS:  02-10    141  |  103  |  14  ----------------------------<  160<H>  4.0   |  29  |  <0.5<L>    Ca    8.4<L>      10 Feb 2022 06:00  Mg     2.0     02-10    TPro  4.5<L>  /  Alb  2.9<L>  /  TBili  0.9  /  DBili  x   /  AST  23  /  ALT  33  /  AlkPhos  81  02-10                          11.1   10.03 )-----------( 110      ( 10 Feb 2022 06:00 )             35.6     LIVER FUNCTIONS - ( 10 Feb 2022 06:00 )  Alb: 2.9 g/dL / Pro: 4.5 g/dL / ALK PHOS: 81 U/L / ALT: 33 U/L / AST: 23 U/L / GGT: x               IMAGING:    < from: Xray Chest 1 View- PORTABLE-Urgent (Xray Chest 1 View- PORTABLE-Urgent .) (02.10.22 @ 11:51) >  ACC: [id number] EXAM:  XR CHEST PORTABLE URGENT 1V                          PROCEDURE DATE:  02/10/2022          INTERPRETATION:  Clinical History / Reason for exam: Feeding tube    Comparison : Chest radiograph 2/10/2022.    Technique/Positioning: Frontal chest.    Findings/  impression:    Support devices: Endotracheal tube in satisfactory position. Feeding tube   coursing below the field-of-view. Left chest pigtail catheter.    Cardiac/mediastinum/hilum: Unchanged    Lung parenchyma/Pleura: Extensive bilateral opacities similar to prior.   No definite pneumothorax.    Skeleton/soft tissues: Unchanged    --- End of Report ---            JENNIE GRAF MD; Attending Radiologist  This document has been electronically signed. Feb 10 2022 12:29PM    < end of copied text >       Chief complaint/Reason for consult: PEG tube placement    HPI:  81 year old male with a hx of HTN, BPH, recent diagnosis of COVID 19 2 days ago (unvaccinated, prescribed decadron/ zpack/ zinc) presenting to the ED S/P cardiac arrest. Patient intubated/ sedated so history obtained from chart (son left and attempted to reach). Patient began to have worsening dyspnea at home, EMS was called. Found to be in respiratory arrest by EMS and followed by EMS. ROSC was achieved after 2 rounds of CPR and epi, downtime 7 mins. Was intubated in the field.   In ED central line was placed. ABG: PH 7.15, PaO2 66, PaCO2 45, HCO3- 16. Troponins .1, BNP > 8000, D-Dimer 7368. COVID 19 positive. Patient being admitted s/p cardiac arrest to ICU.      (15 Aquiles 2022 04:09)    GI Updates: 81yMale [facility] BPH, HTN admitted s/p cardiac arrest, covid pneumonia and intubated. Patient with anoxic brain injury. GI consulted for PEG placement. No hematemesis, melena, blood in stool.      PAST MEDICAL & SURGICAL HISTORY:   BPH (benign prostatic hyperplasia)    Mild HTN          Family history:  FAMILY HISTORY:    No GI cancers in first or second degree relatives    Social History: No smoking. No alcohol. No illegal drug use.    Allergies:   Allergy Status Unknown            MEDICATIONS: Home Medications:  Aspir 81 oral delayed release tablet: 1 tab(s) orally once a day (15 Aquiles 2022 02:49)  benzonatate 100 mg oral capsule: 2 cap(s) orally 3 times a day (15 Aquiles 2022 02:49)  dexamethasone 4 mg oral tablet: 1.5 tab(s) orally once a day (15 Aquiles 2022 02:49)  metoprolol succinate 25 mg oral tablet, extended release: 1 tab(s) orally once a day (15 Aquiles 2022 02:49)  NIFEdipine 60 mg oral tablet, extended release: 1 tab(s) orally once a day (15 Aquiles 2022 02:49)  tamsulosin 0.4 mg oral capsule: 1 cap(s) orally once a day (15 Aquiles 2022 02:49)  telmisartan 80 mg oral tablet: 1 tab(s) orally once a day (15 Aquiles 2022 02:49)  Vitamin B12 1000 mcg oral tablet: 1 tab(s) orally once a day (15 Aquiles 2022 02:49)    MEDICATIONS  (STANDING):  amantadine Syrup 100 milliGRAM(s) Oral every 12 hours  aspirin  chewable 81 milliGRAM(s) Oral daily  chlorhexidine 0.12% Liquid 15 milliLiter(s) Oral Mucosa every 12 hours  chlorhexidine 4% Liquid 1 Application(s) Topical <User Schedule>  dextrose 5%. 1000 milliLiter(s) (50 mL/Hr) IV Continuous <Continuous>  dextrose 5%. 1000 milliLiter(s) (100 mL/Hr) IV Continuous <Continuous>  dextrose 50% Injectable 25 Gram(s) IV Push once  dextrose 50% Injectable 12.5 Gram(s) IV Push once  dextrose 50% Injectable 25 Gram(s) IV Push once  enoxaparin Injectable 40 milliGRAM(s) SubCutaneous at bedtime  glucagon  Injectable 1 milliGRAM(s) IntraMuscular once  insulin lispro (ADMELOG) corrective regimen sliding scale   SubCutaneous every 6 hours  metoprolol tartrate 25 milliGRAM(s) Oral two times a day  pantoprazole  Injectable 40 milliGRAM(s) IV Push daily  polyethylene glycol 3350 17 Gram(s) Oral daily    MEDICATIONS  (PRN):  acetaminophen     Tablet .. 650 milliGRAM(s) Oral every 6 hours PRN Temp greater or equal to 38C (100.4F), Mild Pain (1 - 3)  albuterol/ipratropium for Nebulization 3 milliLiter(s) Nebulizer every 6 hours PRN Shortness of Breath and/or Wheezing  fentaNYL    Injectable 50 MICROGram(s) IV Push every 4 hours PRN Agitatiom        REVIEW OF SYSTEMS  Unobtainable     VITALS:   T(F): 96.8 (02-10-22 @ 11:01), Max: 99 (02-10-22 @ 03:05)  HR: 70 (02-10-22 @ 14:29) (61 - 88)  BP: 125/62 (02-10-22 @ 13:00) (114/70 - 156/79)  RR: 24 (02-10-22 @ 13:00) (22 - 29)  SpO2: 98% (02-10-22 @ 14:29) (96% - 100%)    PHYSICAL EXAM:  GENERAL: +intubated, NG tube  HEAD:  Atraumatic, Normocephalic  EYES: conjunctiva and sclera clear  NECK: Supple, No thyromegaly   CHEST/LUNG: b/l rhonchi   HEART: Regular rate and rhythm; normal S1, S2, No murmurs.  ABDOMEN: Soft, nontender, nondistended; Bowel sounds present  NEUROLOGY: No asterixis or tremor  SKIN: Intact, no jaundice          LABS:  02-10    141  |  103  |  14  ----------------------------<  160<H>  4.0   |  29  |  <0.5<L>    Ca    8.4<L>      10 Feb 2022 06:00  Mg     2.0     02-10    TPro  4.5<L>  /  Alb  2.9<L>  /  TBili  0.9  /  DBili  x   /  AST  23  /  ALT  33  /  AlkPhos  81  02-10                          11.1   10.03 )-----------( 110      ( 10 Feb 2022 06:00 )             35.6     LIVER FUNCTIONS - ( 10 Feb 2022 06:00 )  Alb: 2.9 g/dL / Pro: 4.5 g/dL / ALK PHOS: 81 U/L / ALT: 33 U/L / AST: 23 U/L / GGT: x               IMAGING:    < from: Xray Chest 1 View- PORTABLE-Urgent (Xray Chest 1 View- PORTABLE-Urgent .) (02.10.22 @ 11:51) >  ACC: [id number] EXAM:  XR CHEST PORTABLE URGENT 1V                          PROCEDURE DATE:  02/10/2022          INTERPRETATION:  Clinical History / Reason for exam: Feeding tube    Comparison : Chest radiograph 2/10/2022.    Technique/Positioning: Frontal chest.    Findings/  impression:    Support devices: Endotracheal tube in satisfactory position. Feeding tube   coursing below the field-of-view. Left chest pigtail catheter.    Cardiac/mediastinum/hilum: Unchanged    Lung parenchyma/Pleura: Extensive bilateral opacities similar to prior.   No definite pneumothorax.    Skeleton/soft tissues: Unchanged    --- End of Report ---            JENNIE GRAF MD; Attending Radiologist  This document has been electronically signed. Feb 10 2022 12:29PM    < end of copied text >

## 2022-02-10 NOTE — CHART NOTE - NSCHARTNOTEFT_GEN_A_CORE
Patient was observed to be resting comfortably.  Staff reported that daughter, Melly Jackson, is coming to unit today to sign to have PEG and Trach.

## 2022-02-10 NOTE — PROGRESS NOTE ADULT - SUBJECTIVE AND OBJECTIVE BOX
Patient is unchanged, breathing over vent, not awake      T(F): 97.5 (02-10-22 @ 07:07), Max: 99 (02-10-22 @ 03:05)  HR: 78 (02-10-22 @ 08:35)  BP: 114/70 (02-10-22 @ 08:00)  RR: 27 (02-10-22 @ 08:00)  SpO2: 99% (02-10-22 @ 08:35) (96% - 100%)    PHYSICAL EXAM:  GENERAL: NAD  HEAD:  Atraumatic, Normocephalic  EYES: EOMI, PERRLA, conjunctiva and sclera clear  NERVOUS SYSTEM:  positive gag   CHEST/LUNG:  bilateral rhonchi  HEART: Regular rate and rhythm; No murmurs, rubs, or gallops  ABDOMEN: Soft, Nontender, Nondistended; Bowel sounds present  EXTREMITIES:  2+ Peripheral Pulses, No clubbing, cyanosis, or edema    LABS  02-10    141  |  103  |  14  ----------------------------<  160<H>  4.0   |  29  |  <0.5<L>    Ca    8.4<L>      10 Feb 2022 06:00  Mg     2.0     02-10    TPro  4.5<L>  /  Alb  2.9<L>  /  TBili  0.9  /  DBili  x   /  AST  23  /  ALT  33  /  AlkPhos  81  02-10                          11.1   10.03 )-----------( 110      ( 10 Feb 2022 06:00 )             35.6       Mode: AC/ CMV (Assist Control/ Continuous Mandatory Ventilation)  RR (machine): 24  TV (machine): 420  FiO2: 50  PEEP: 5      Culture Results:   Growth in anaerobic bottle: Staphylococcus capitis  Coag Negative Staphylococcus  Single set isolate, possible contaminant. Contact  Microbiology if susceptibility testing clinically  indicated.  ***Blood Panel PCR results on this specimen are available  approximately 3 hours after the Gram stain result.***  Gram stain, PCR, and/or culture results may not always  correspond due to difference in methodologies.  ************************************************************  This PCR assay was performed by multiplex PCR. This  Assay tests for 66 bacterial and resistance gene targets.  Please refer to the [facility] test directory  at [email or web address] for details. (01-31-22)  Culture Results:   Normal Respiratory Corina present (01-30-22)  Culture Results:   No Growth Final (01-28-22)  Culture Results:   No growth at 1 week. (01-25-22)  Culture Results:   No Growth Final (01-23-22)  Culture Results:   No growth (01-23-22)    RADIOLOGY  < from: Xray Chest 1 View- PORTABLE-Routine (Xray Chest 1 View- PORTABLE-Routine in AM.) (02.10.22 @ 07:05) >    Impression:    Bilateral pulmonary opacities more extensive on the right, slightly   decreased from prior.    < end of copied text >    MEDICATIONS  (STANDING):  amantadine Syrup 100 milliGRAM(s) Oral every 12 hours  aspirin  chewable 81 milliGRAM(s) Oral daily  chlorhexidine 0.12% Liquid 15 milliLiter(s) Oral Mucosa every 12 hours  chlorhexidine 4% Liquid 1 Application(s) Topical <User Schedule>  enoxaparin Injectable 40 milliGRAM(s) SubCutaneous at bedtime  insulin lispro (ADMELOG) corrective regimen sliding scale   SubCutaneous every 6 hours  metoprolol tartrate 25 milliGRAM(s) Oral two times a day  pantoprazole  Injectable 40 milliGRAM(s) IV Push daily  polyethylene glycol 3350 17 Gram(s) Oral daily    MEDICATIONS  (PRN):  acetaminophen     Tablet .. 650 milliGRAM(s) Oral every 6 hours PRN Temp greater or equal to 38C (100.4F), Mild Pain (1 - 3)  albuterol/ipratropium for Nebulization 3 milliLiter(s) Nebulizer every 6 hours PRN Shortness of Breath and/or Wheezing  fentaNYL    Injectable 50 MICROGram(s) IV Push every 4 hours PRN Agitatiom

## 2022-02-10 NOTE — PROGRESS NOTE ADULT - ASSESSMENT
81 year old male with a hx of HTN, BPH, recent diagnosis of COVID 19 2 days ago prior to admission -> presented to the ED S/P cardiac arrest.  Patient began to have worsening dyspnea at home, EMS was called. Found to be in cardiac arrest. ROSC was achieved after 2 rounds of CPR and epi, downtime 7 mins. Was intubated in the field.     acute respiratory failure / Cardiac arrest / COVID-19 pneumonia / probable NSTEMI / L sided pneumothorax / anoxic brain injury         - pt does breath over the ventilator    - I spoke with family multiple times  and there wishes are to have the pt transferred to a long term facility in [place]   - I explained that the pt would need a trach and PEG for long term care   - they report to me that they would consent for trach and peg prior to transfer to [place] facility   - covid swab today for procedures - surgery aware (they "dont do pegs")   - will consult GI for PEG   - very poor prognosis, family aware   - no need for continued ICU care

## 2022-02-10 NOTE — PROGRESS NOTE ADULT - SUBJECTIVE AND OBJECTIVE BOX
Patient  is afebrile  and  remains intubated/vented. The WBC count and kidney function stay normal.      REVIEW OF SYSTEMS: Unable  to obtain due to mental status       Allergy Status Unknown      PHYSICAL EXAM:   ICU Vital Signs Last 24 Hrs  T(C): 37.2 (10 Feb 2022 19:00), Max: 37.2 (10 Feb 2022 03:05)  T(F): 99 (10 Feb 2022 19:00), Max: 99 (10 Feb 2022 03:05)  HR: 71 (10 Feb 2022 18:00) (66 - 85)  BP: 146/65 (10 Feb 2022 18:00) (114/70 - 156/79)  BP(mean): 94 (10 Feb 2022 18:00) (83 - 114)  ABP: --  ABP(mean): --  RR: 27 (10 Feb 2022 19:00) (22 - 29)  SpO2: 99% (10 Feb 2022 19:00) (98% - 100%)        LABS:                        11.1   10.03 )-----------( 110      ( 10 Feb 2022 06:00 )             35.6                           10.9   9.68  )-----------( 78       ( 07 Feb 2022 05:55 )             35.5                         10.6   12.03 )-----------( 104      ( 02 Feb 2022 05:48 )             35.4                         10.3   24.47 )-----------( 218      ( 23 Jan 2022 06:35 )             34.9       02-10    141  |  103  |  14  ----------------------------<  160<H>  4.0   |  29  |  <0.5<L>    Ca    8.4<L>      10 Feb 2022 06:00  Mg     2.0     02-10    TPro  4.5<L>  /  Alb  2.9<L>  /  TBili  0.9  /  DBili  x   /  AST  23  /  ALT  33  /  AlkPhos  81  02-10    02-07    137  |  102  |  21<H>  ----------------------------<  151<H>  4.5   |  26  |  <0.5<L>    Ca    8.7      07 Feb 2022 05:55  Mg     2.1     02-07    TPro  4.6<L>  /  Alb  2.8<L>  /  TBili  0.8  /  DBili  x   /  AST  28  /  ALT  32  /  AlkPhos  84  02-07        CAPILLARY BLOOD GLUCOSE  POCT Blood Glucose.: 369 mg/dL (23 Jan 2022 12:49)  POCT Blood Glucose.: 348 mg/dL (23 Jan 2022 06:38)      ABG - ( 23 Jan 2022 05:20 )  pH, Arterial: 7.30  pH, Blood: x     /  pCO2: 58    /  pO2: 268   / HCO3: 28    / Base Excess: 1.5   /  SaO2: 96.3          Urinalysis Basic - ( 23 Jan 2022 11:00 )  Color: Yellow / Appearance: Clear / SG: >=1.030 / pH: x  Gluc: x / Ketone: Negative  / Bili: Negative / Urobili: 0.2 mg/dL   Blood: x / Protein: Negative mg/dL / Nitrite: Negative   Leuk Esterase: Negative / RBC: 3-5 /HPF / WBC 1-2 /HPF   Sq Epi: x / Non Sq Epi: Occasional /HPF / Bacteria: Few        Procalcitonin, Serum (02.01.22 @ 06:25)   Procalcitonin, Serum: 0.03  Procalcitonin, Serum (01.19.22 @ 06:30) : 0.44  Procalcitonin, Serum (01.16.22 @ 06:56) : 2.32    MEDICATIONS  (STANDING):    amantadine Syrup 100 milliGRAM(s) Oral every 12 hours  aspirin  chewable 81 milliGRAM(s) Oral daily  chlorhexidine 0.12% Liquid 15 milliLiter(s) Oral Mucosa every 12 hours  chlorhexidine 4% Liquid 1 Application(s) Topical <User Schedule>  dextrose 5%. 1000 milliLiter(s) (50 mL/Hr) IV Continuous <Continuous>  dextrose 5%. 1000 milliLiter(s) (100 mL/Hr) IV Continuous <Continuous>  dextrose 50% Injectable 25 Gram(s) IV Push once  dextrose 50% Injectable 12.5 Gram(s) IV Push once  dextrose 50% Injectable 25 Gram(s) IV Push once  enoxaparin Injectable 40 milliGRAM(s) SubCutaneous at bedtime  glucagon  Injectable 1 milliGRAM(s) IntraMuscular once  insulin lispro (ADMELOG) corrective regimen sliding scale   SubCutaneous every 6 hours  metoprolol tartrate 25 milliGRAM(s) Oral two times a day  pantoprazole  Injectable 40 milliGRAM(s) IV Push daily  polyethylene glycol 3350 17 Gram(s) Oral daily        RADIOLOGY & ADDITIONAL TESTS:    < from: Xray Chest 1 View- PORTABLE-Urgent (Xray Chest 1 View- PORTABLE-Urgent .) (02.06.22 @ 10:26) >    Support devices: Endotracheal tube, feeding tube and left-sided pigtail   chest tube are unchanged    Cardiac/mediastinum/hilum: Unchanged.    Lung parenchyma/Pleura: Bilateral opacities, unchanged. No definite   pneumothorax identified.      < from: Xray Chest 1 View- PORTABLE-Urgent (Xray Chest 1 View- PORTABLE-Urgent .) (02.02.22 @ 11:20) >    Again seen are a lack of vascular markings at the left lung apex suggestive of a pneumothorax, but remains limited in evaluation    Stable bilateral lung opacities    r< from: Xray Chest 1 View- PORTABLE-Routine (Xray Chest 1 View- PORTABLE-Routine in AM.) (01.23.22 @ 07:52) >  Bilateral opacities similar to prior  Left-sided chest tube unchanged in position.      < from: Xray Chest 1 View- PORTABLE-Routine (Xray Chest 1 View- PORTABLE-Routine in AM.) (01.22.22 @ 06:34) >  Support devices: ET tube mid trachea central venous line superior vena  cava NG tube in stomach    Cardiac/mediastinum/hilum: Unremarkable.    Lung parenchyma/Pleura: Decreased previous bilateral opacities. No  pleural effusion or air leak    Skeleton/soft tissues: Unremarkable.        MICROBIOLOGY DATA:  Culture - Blood in AM (01.31.22 @ 06:15)   - Coagulase negative Staphylococcus: Detec   Gram Stain:   Growth in anaerobic bottle: Gram positive cocci in pairs   Specimen Source: .Blood Blood-Peripheral   Organism: Blood Culture PCR   Culture Results: Growth in anaerobic bottle: Gram positive cocci in pairs   ***Blood Panel PCR results on this specimen are available     Culture - Bronchial (01.30.22 @ 16:00)   Gram Stain: Few polymorphonuclear leukocytes per low power field   No Squamous epithelial cells per low power field   Few Gram positive cocci in pairs per oil power field   Specimen Source: .Bronchial None   Culture Results:   Normal Respiratory Corina present     Culture - Blood (01.28.22 @ 16:37)   Specimen Source: .Blood None   Culture Results: No Growth Final     MRSA/MSSA PCR (01.23.22 @ 11:00)   MRSA PCR Result.: Negative    Respiratory Viral Panel with COVID-19 by JEN (01.15.22 @ 01:25)   Rapid RVP Result: Detected   SARS-CoV-2: Detected:                 Patient  is seen and examined at the bed side, is afebrile  and  remains intubated/vented. The Family wish noted.      REVIEW OF SYSTEMS: Unable  to obtain due to mental status       Allergy Status Unknown        ICU Vital Signs Last 24 Hrs  T(C): 37.2 (10 Feb 2022 19:00), Max: 37.2 (10 Feb 2022 03:05)  T(F): 99 (10 Feb 2022 19:00), Max: 99 (10 Feb 2022 03:05)  HR: 71 (10 Feb 2022 18:00) (66 - 85)  BP: 146/65 (10 Feb 2022 18:00) (114/70 - 156/79)  BP(mean): 94 (10 Feb 2022 18:00) (83 - 114)  ABP: --  ABP(mean): --  RR: 27 (10 Feb 2022 19:00) (22 - 29)  SpO2: 99% (10 Feb 2022 19:00) (98% - 100%)      PHYSICAL EXAM:   GENERAL: intubated/vented  CVS: s1 and s2 present  RESP: Not using accessory muscles  GI: Abdomen non distended  EXT: pedal  edema  CNS: I ntubated/vented        LABS:                        11.1   10.03 )-----------( 110      ( 10 Feb 2022 06:00 )             35.6                           10.9   9.68  )-----------( 78       ( 07 Feb 2022 05:55 )             35.5                         10.6   12.03 )-----------( 104      ( 02 Feb 2022 05:48 )             35.4                         10.3   24.47 )-----------( 218      ( 23 Jan 2022 06:35 )             34.9       02-10    141  |  103  |  14  ----------------------------<  160<H>  4.0   |  29  |  <0.5<L>    Ca    8.4<L>      10 Feb 2022 06:00  Mg     2.0     02-10    TPro  4.5<L>  /  Alb  2.9<L>  /  TBili  0.9  /  DBili  x   /  AST  23  /  ALT  33  /  AlkPhos  81  02-10    02-07    137  |  102  |  21<H>  ----------------------------<  151<H>  4.5   |  26  |  <0.5<L>    Ca    8.7      07 Feb 2022 05:55  Mg     2.1     02-07    TPro  4.6<L>  /  Alb  2.8<L>  /  TBili  0.8  /  DBili  x   /  AST  28  /  ALT  32  /  AlkPhos  84  02-07        CAPILLARY BLOOD GLUCOSE  POCT Blood Glucose.: 369 mg/dL (23 Jan 2022 12:49)  POCT Blood Glucose.: 348 mg/dL (23 Jan 2022 06:38)      ABG - ( 23 Jan 2022 05:20 )  pH, Arterial: 7.30  pH, Blood: x     /  pCO2: 58    /  pO2: 268   / HCO3: 28    / Base Excess: 1.5   /  SaO2: 96.3          Urinalysis Basic - ( 23 Jan 2022 11:00 )  Color: Yellow / Appearance: Clear / SG: >=1.030 / pH: x  Gluc: x / Ketone: Negative  / Bili: Negative / Urobili: 0.2 mg/dL   Blood: x / Protein: Negative mg/dL / Nitrite: Negative   Leuk Esterase: Negative / RBC: 3-5 /HPF / WBC 1-2 /HPF   Sq Epi: x / Non Sq Epi: Occasional /HPF / Bacteria: Few        Procalcitonin, Serum (02.01.22 @ 06:25)   Procalcitonin, Serum: 0.03  Procalcitonin, Serum (01.19.22 @ 06:30) : 0.44  Procalcitonin, Serum (01.16.22 @ 06:56) : 2.32    MEDICATIONS  (STANDING):    amantadine Syrup 100 milliGRAM(s) Oral every 12 hours  aspirin  chewable 81 milliGRAM(s) Oral daily  chlorhexidine 0.12% Liquid 15 milliLiter(s) Oral Mucosa every 12 hours  chlorhexidine 4% Liquid 1 Application(s) Topical <User Schedule>  dextrose 5%. 1000 milliLiter(s) (50 mL/Hr) IV Continuous <Continuous>  dextrose 5%. 1000 milliLiter(s) (100 mL/Hr) IV Continuous <Continuous>  dextrose 50% Injectable 25 Gram(s) IV Push once  dextrose 50% Injectable 12.5 Gram(s) IV Push once  dextrose 50% Injectable 25 Gram(s) IV Push once  enoxaparin Injectable 40 milliGRAM(s) SubCutaneous at bedtime  glucagon  Injectable 1 milliGRAM(s) IntraMuscular once  insulin lispro (ADMELOG) corrective regimen sliding scale   SubCutaneous every 6 hours  metoprolol tartrate 25 milliGRAM(s) Oral two times a day  pantoprazole  Injectable 40 milliGRAM(s) IV Push daily  polyethylene glycol 3350 17 Gram(s) Oral daily        RADIOLOGY & ADDITIONAL TESTS:    < from: Xray Chest 1 View- PORTABLE-Urgent (Xray Chest 1 View- PORTABLE-Urgent .) (02.06.22 @ 10:26) >    Support devices: Endotracheal tube, feeding tube and left-sided pigtail   chest tube are unchanged    Cardiac/mediastinum/hilum: Unchanged.    Lung parenchyma/Pleura: Bilateral opacities, unchanged. No definite   pneumothorax identified.      < from: Xray Chest 1 View- PORTABLE-Urgent (Xray Chest 1 View- PORTABLE-Urgent .) (02.02.22 @ 11:20) >    Again seen are a lack of vascular markings at the left lung apex suggestive of a pneumothorax, but remains limited in evaluation    Stable bilateral lung opacities    r< from: Xray Chest 1 View- PORTABLE-Routine (Xray Chest 1 View- PORTABLE-Routine in AM.) (01.23.22 @ 07:52) >  Bilateral opacities similar to prior  Left-sided chest tube unchanged in position.      < from: Xray Chest 1 View- PORTABLE-Routine (Xray Chest 1 View- PORTABLE-Routine in AM.) (01.22.22 @ 06:34) >  Support devices: ET tube mid trachea central venous line superior vena  cava NG tube in stomach    Cardiac/mediastinum/hilum: Unremarkable.    Lung parenchyma/Pleura: Decreased previous bilateral opacities. No  pleural effusion or air leak    Skeleton/soft tissues: Unremarkable.        MICROBIOLOGY DATA:    COVID-19 PCR . (02.10.22 @ 13:00)   COVID-19 PCR: Detected:   Culture - Blood in AM (01.31.22 @ 06:15)   - Coagulase negative Staphylococcus: Detec   Gram Stain:   Growth in anaerobic bottle: Gram positive cocci in pairs   Specimen Source: .Blood Blood-Peripheral   Organism: Blood Culture PCR   Culture Results: Growth in anaerobic bottle: Gram positive cocci in pairs   ***Blood Panel PCR results on this specimen are available     Culture - Bronchial (01.30.22 @ 16:00)   Gram Stain: Few polymorphonuclear leukocytes per low power field   No Squamous epithelial cells per low power field   Few Gram positive cocci in pairs per oil power field   Specimen Source: .Bronchial None   Culture Results:   Normal Respiratory Corina present     Culture - Blood (01.28.22 @ 16:37)   Specimen Source: .Blood None   Culture Results: No Growth Final     MRSA/MSSA PCR (01.23.22 @ 11:00)   MRSA PCR Result.: Negative    Respiratory Viral Panel with COVID-19 by JEN (01.15.22 @ 01:25)   Rapid RVP Result: Detected   SARS-CoV-2: Detected:

## 2022-02-10 NOTE — CONSULT NOTE ADULT - ATTENDING COMMENTS
80 yo male for peg tube placement   Please see note above for details of impressions and recommendations  I have reviewed the above history and examination, and have personally interviewed the patient and examined the patient and agree with the above impressions and recommendations.

## 2022-02-10 NOTE — CONSULT NOTE ADULT - ASSESSMENT
81yMale pmh BPH, HTN admitted s/p cardiac arrest and intubated. Patient with anoxic brain injury. GI consulted for PEG placement    Problem 1-PEG tube placement 81yMale pmh BPH, HTN admitted s/p cardiac arrest, covid pneumonia and intubated. Patient with anoxic brain injury. GI consulted for PEG placement    Problem 1-PEG tube placement for FTT  Rec  -Cardiac risk stratification given N-Stemi and cardiac arrest  -Will aim for PEG after tracheostomy  -Continue NG feeds for now

## 2022-02-10 NOTE — PROGRESS NOTE ADULT - SUBJECTIVE AND OBJECTIVE BOX
Patient is a 81y old  Male who presents with a chief complaint of MI (09 Feb 2022 16:06)      Over Night Events:  Patient seen and examined.   not awake over breath   pending transfer to [facility]     ROS:  See HPI    PHYSICAL EXAM    ICU Vital Signs Last 24 Hrs  T(C): 36.4 (10 Feb 2022 07:07), Max: 37.2 (10 Feb 2022 03:05)  T(F): 97.5 (10 Feb 2022 07:07), Max: 99 (10 Feb 2022 03:05)  HR: 74 (10 Feb 2022 07:00) (61 - 92)  BP: 128/71 (10 Feb 2022 07:00) (106/62 - 164/80)  BP(mean): 94 (10 Feb 2022 07:00) (78 - 114)  ABP: --  ABP(mean): --  RR: 22 (10 Feb 2022 07:07) (22 - 30)  SpO2: 100% (10 Feb 2022 07:00) (91% - 100%)      General: not awake   HEENT:      et tube           Lymph Nodes: NO cervical LN   Lungs: Bilateral BS  Cardiovascular: Regular   Abdomen: Soft, Positive BS  Extremities: No clubbing   Skin: warm   Neurological: positive gag   Musculoskeletal: move all ext     I&O's Detail    09 Feb 2022 07:01  -  10 Feb 2022 07:00  --------------------------------------------------------  IN:    Enteral Tube Flush: 270 mL    Glucerna: 1440 mL  Total IN: 1710 mL    OUT:    Chest Tube (mL): 35 mL    Indwelling Catheter - Urethral (mL): 2385 mL  Total OUT: 2420 mL    Total NET: -710 mL      10 Feb 2022 07:01  -  10 Feb 2022 08:34  --------------------------------------------------------  IN:  Total IN: 0 mL    OUT:    Indwelling Catheter - Urethral (mL): 30 mL  Total OUT: 30 mL    Total NET: -30 mL          LABS:                          11.1   10.03 )-----------( 110      ( 10 Feb 2022 06:00 )             35.6         10 Feb 2022 06:00    141    |  103    |  14     ----------------------------<  160    4.0     |  29     |  <0.5     Ca    8.4        10 Feb 2022 06:00  Mg     2.0       10 Feb 2022 06:00    TPro  4.5    /  Alb  2.9    /  TBili  0.9    /  DBili  x      /  AST  23     /  ALT  33     /  AlkPhos  81     10 Feb 2022 06:00  Amylase x     lipase x                                                                                                                                                                                                 Mode: AC/ CMV (Assist Control/ Continuous Mandatory Ventilation)  RR (machine): 24  TV (machine): 420  FiO2: 50  PEEP: 5  MAP: 5  PIP: 17                                      ABG - ( 10 Feb 2022 04:23 )  pH, Arterial: 7.50  pH, Blood: x     /  pCO2: 40    /  pO2: 120   / HCO3: 31    / Base Excess: 7.4   /  SaO2: 98.6                MEDICATIONS  (STANDING):  amantadine Syrup 100 milliGRAM(s) Oral every 12 hours  aspirin  chewable 81 milliGRAM(s) Oral daily  chlorhexidine 0.12% Liquid 15 milliLiter(s) Oral Mucosa every 12 hours  chlorhexidine 4% Liquid 1 Application(s) Topical <User Schedule>  dextrose 5%. 1000 milliLiter(s) (50 mL/Hr) IV Continuous <Continuous>  dextrose 5%. 1000 milliLiter(s) (100 mL/Hr) IV Continuous <Continuous>  dextrose 50% Injectable 25 Gram(s) IV Push once  dextrose 50% Injectable 12.5 Gram(s) IV Push once  dextrose 50% Injectable 25 Gram(s) IV Push once  enoxaparin Injectable 40 milliGRAM(s) SubCutaneous at bedtime  glucagon  Injectable 1 milliGRAM(s) IntraMuscular once  insulin lispro (ADMELOG) corrective regimen sliding scale   SubCutaneous every 6 hours  metoprolol tartrate 25 milliGRAM(s) Oral two times a day  pantoprazole  Injectable 40 milliGRAM(s) IV Push daily  polyethylene glycol 3350 17 Gram(s) Oral daily    MEDICATIONS  (PRN):  acetaminophen     Tablet .. 650 milliGRAM(s) Oral every 6 hours PRN Temp greater or equal to 38C (100.4F), Mild Pain (1 - 3)  albuterol/ipratropium for Nebulization 3 milliLiter(s) Nebulizer every 6 hours PRN Shortness of Breath and/or Wheezing  fentaNYL    Injectable 50 MICROGram(s) IV Push every 4 hours PRN Agitatiom          Xrays:  TLC:  OG:  ET tube:                                                                                    b/l opacity small effusion   pig tail in place    ECHO:  [facility] ICU:

## 2022-02-10 NOTE — PROGRESS NOTE ADULT - ASSESSMENT
81 year old male with a hx of HTN, BPH, recent diagnosis of COVID 19 2 days ago (unvaccinated, prescribed decadron/ zpack/ zinc) presenting to the ED S/P cardiac arrest. Intubated in the field    IMPRESSION  #COVID19 PNA, Severe (O2 < or = 94% on RA and requiring supplemental O2) with Cardiac arrest    CXR diffuse bilateral opacities     D-Dimer Assay, Quantitative: 7368 ng/mL DDU (01-15-22 @ 01:25)    s/p Toci 1/17  #Lactic acidosis  #Transaminitis   #Cardiac arrest- ROSC was achieved after 2 rounds of CPR and epi, downtime 7 mins  # Left sided Pneumothorax- on CXR from 2/2/22      would recommend:    1. Monitor oFF Abx  2. Management of chest tube as per CCU protocol  3. Taper off  Dexamethasone   4. Aspiration precaution    - Family wish noted    Attending Attestation:    Spent more than 35 minutes on total encounter, more than 50 % of the visit was spent counseling and/or coordinating care by the Attending physician. 81 year old male with a hx of HTN, BPH, recent diagnosis of COVID 19 2 days ago (unvaccinated, prescribed decadron/ zpack/ zinc) presenting to the ED S/P cardiac arrest. Intubated in the field    IMPRESSION  #COVID19 PNA, Severe (O2 < or = 94% on RA and requiring supplemental O2) with Cardiac arrest    CXR diffuse bilateral opacities     D-Dimer Assay, Quantitative: 7368 ng/mL DDU (01-15-22 @ 01:25)    s/p Toci 1/17  #Lactic acidosis  #Transaminitis   #Cardiac arrest- ROSC was achieved after 2 rounds of CPR and epi, downtime 7 mins  # Left sided Pneumothorax- on CXR from 2/2/22      would recommend:    1. Monitor oFF Abx  2. Management of Left chest tube as per CCU protocol  3. Supportive care including AC  4. Aspiration precaution    Attending Attestation:    Spent more than 35 minutes on total encounter, more than 50 % of the visit was spent counseling and/or coordinating care by the Attending physician.

## 2022-02-10 NOTE — PROGRESS NOTE ADULT - SUBJECTIVE AND OBJECTIVE BOX
SUBJECTIVE:    Patient is a 81y old Male who presents with a chief complaint of Cardiac arrest (01 Feb 2022 22:00)      HPI:  81 year old male with a hx of HTN, BPH, recent diagnosis of COVID 19 2 days ago (unvaccinated, prescribed decadron/ zpack/ zinc) presenting to the ED S/P cardiac arrest. Patient intubated/ sedated so history obtained from chart (son left and attempted to reach). Patient began to have worsening dyspnea at home, EMS was called. Found to be in respiratory arrest by EMS and followed by EMS. ROSC was achieved after 2 rounds of CPR and epi, downtime 7 mins. Was intubated in the field.  central line was placed. ABG: PH 7.15, PaO2 66, PaCO2 45, HCO3- 16. Troponins .1, BNP > 8000, D-Dimer 7368. COVID 19 positive. Patient being admitted s/p cardiac arrest to ICU.   Currently admitted to medicine with the primary diagnosis of cardiac arrest. Not responding to commands, not retracting to pain.    Besides the pertinent positives and negatives described above, the ROS was within normal limits.      PAST MEDICAL & SURGICAL HISTORY  BPH (benign prostatic hyperplasia),  Mild HTN      SOCIAL HISTORY:    ALLERGIES:  Allergy Status Unknown    MEDICATIONS:  STANDING MEDICATIONS  aspirin  chewable 81 milliGRAM(s) Oral daily  chlorhexidine 0.12% Liquid 15 milliLiter(s) Oral Mucosa every 12 hours  chlorhexidine 4% Liquid 1 Application(s) Topical <User Schedule>  dexAMETHasone  Injectable 4 milliGRAM(s) IV Push daily  dextrose 5%. 1000 milliLiter(s) IV Continuous <Continuous>  dextrose 5%. 1000 milliLiter(s) IV Continuous <Continuous>  dextrose 50% Injectable 25 Gram(s) IV Push once  dextrose 50% Injectable 12.5 Gram(s) IV Push once  dextrose 50% Injectable 25 Gram(s) IV Push once  diltiazem    Tablet 30 milliGRAM(s) Oral every 6 hours  enoxaparin Injectable 40 milliGRAM(s) SubCutaneous at bedtime  glucagon  Injectable 1 milliGRAM(s) IntraMuscular once  insulin lispro (ADMELOG) corrective regimen sliding scale   SubCutaneous every 6 hours  pantoprazole  Injectable 40 milliGRAM(s) IV Push daily  polyethylene glycol 3350 17 Gram(s) Oral daily    PRN MEDICATIONS  acetaminophen     Tablet .. 650 milliGRAM(s) Oral every 6 hours PRN  albuterol/ipratropium for Nebulization 3 milliLiter(s) Nebulizer every 6 hours PRN    VITALS:   T(F): 99.1  HR: 68  BP: 141/75  RR: 27  SpO2: 96%    LABS:                        11.3   10.96 )-----------( 84       ( 04 Feb 2022 06:43 )             36.7     02-04    141  |  107  |  24<H>  ----------------------------<  173<H>  4.7   |  24  |  <0.5<L>    Ca    8.2<L>      04 Feb 2022 06:43  Mg     2.1     02-04    TPro  4.4<L>  /  Alb  2.9<L>  /  TBili  0.8  /  DBili  x   /  AST  33  /  ALT  45<H>  /  AlkPhos  71  02-04        ABG - ( 04 Feb 2022 03:52 )  pH, Arterial: 7.51  pH, Blood: x     /  pCO2: 38    /  pO2: 123   / HCO3: 30    / Base Excess: 6.8   /  SaO2: 97.8        PHYSICAL EXAM:  General:  not awake   HEENT:    et tube             Lymph Nodes: NO cervical LN   Lungs: Bilateral BS  Cardiovascular: Regular   Abdomen: Soft, Positive BS  Extremities: No clubbing   Skin: warm   Neurological: positive gag   Musculoskeletal: move all ext     Intravenous access: yes  NG tube: og tube  Becker Catheter:   Indwelling Urethral Catheter:     Connect To:  Straight Drainage/[place]    Indication:  Urinary Retention / Obstruction (02-01-22 @ 19:51) (not performed) [Active]  Indwelling Urethral Catheter:     Connect To:  Straight Drainage/[place]    Indication:  Urinary Retention / Obstruction (01-31-22 @ 14:21) (not performed) [Active]  Indwelling Urethral Catheter:     Connect To:  Straight Drainage/[place]    Indication:  Urine Output Monitoring in Critically Ill (01-30-22 @ 13:06) (not performed) [Active]  Indwelling Urethral Catheter:     Connect To:  Straight Drainage/[place]    Indication:  Urinary Retention / Obstruction (01-29-22 @ 10:02) (not performed) [Active]  Indwelling Urethral Catheter:     Connect To:  Straight Drainage/[place]    Indication:  Urinary Retention / Obstruction (01-25-22 @ 16:18) (not performed) [Active]  Indwelling Urethral Catheter:     Connect To:  Straight Drainage/[place]    Indication:  Urinary Retention / Obstruction (01-24-22 @ 15:34) (not performed) [Active]      Assessment and Plan:   · Assessment	  81 year old male with a hx of HTN, BPH, recent diagnosis of COVID 19 2 days ago (unvaccinated, prescribed decadron/ zpack/ zinc) presenting to the ED S/P cardiac arrest, course complicated by pneumothorax s/p chest tube and removal, and mucus plug removed by bronchoscopy. Patient does not follow any commands as of now.    # COVID pneumonia s/p cardiac arrest  - was down for 7 minutes / found to have pneumothorax s/p pigtail catheter insertion  - course complicated by mucus plug removed by bronch  - on minimal vent settings however not following commands  - seen by ID recommending to stop abx  - received toci and remdesivir, c/w dexa 6 bid day 17 // off sedation  - chest xray concerning for apical pneumothorax on left side as per radiology, pigtail placed (to suction)  - dexamethasone decreased to 2mg qd  - monitor driving pressure  -f/u procalcitonin  -f/u ID recommendations    # Anoxic brain injury  - not following commands off sedation; CT head showed periventricular white matter changes which can reflect ischemia  - EEG showing generalized slowing  - f/u with family regarding trach and peg tube placement  - pending family decision re: transfer to different institution  - palliative f/u    # DVT PPX: lovenox  # GI PPX: protonix  # Diet: tube feeding  DNR

## 2022-02-11 NOTE — PRE-ANESTHESIA EVALUATION ADULT - NSANTHPMHFT_GEN_ALL_CORE
respiratory failure, covid respiratory failure, covid, s/p arrest, anoxic encephalopathy, dnr rescinded perioperatively

## 2022-02-11 NOTE — PROGRESS NOTE ADULT - SUBJECTIVE AND OBJECTIVE BOX
Patient seen and evaluated this am remains unresponsive on ventilator      T(F): 99.3 (02-11-22 @ 15:00), Max: 99.7 (02-11-22 @ 11:00)  HR: 82 (02-11-22 @ 15:00)  BP: 131/83 (02-11-22 @ 15:00)  RR: 24  SpO2: 99% (02-11-22 @ 15:00) (99% - 100%)    PHYSICAL EXAM:  GENERAL: NAD  HEAD:  Atraumatic, Normocephalic  EYES: EOMI, PERRLA, conjunctiva and sclera clear  NERVOUS SYSTEM:  positive gag  CHEST/LUNG:  bilateral rhonchi  HEART: Regular rate and rhythm; No murmurs, rubs, or gallops  ABDOMEN: Soft, Nontender, Nondistended; Bowel sounds present  EXTREMITIES:  2+ Peripheral Pulses, No clubbing, cyanosis, or edema    LABS  02-11    137  |  100  |  12  ----------------------------<  117<H>  3.7   |  28  |  <0.5<L>    Ca    8.1<L>      11 Feb 2022 06:30  Mg     2.0     02-11    TPro  4.5<L>  /  Alb  2.7<L>  /  TBili  1.1  /  DBili  x   /  AST  22  /  ALT  31  /  AlkPhos  82  02-11                          10.7   10.28 )-----------( 123      ( 11 Feb 2022 06:30 )             33.9     PT/INR - ( 11 Feb 2022 11:10 )   PT: 12.90 sec;   INR: 1.12 ratio         PTT - ( 11 Feb 2022 11:10 )  PTT:29.0 sec    Mode: AC/ CMV (Assist Control/ Continuous Mandatory Ventilation)  RR (machine): 24  TV (machine): 420  FiO2: 40  PEEP: 5    Culture Results:   Growth in anaerobic bottle: Staphylococcus capitis  Coag Negative Staphylococcus  Single set isolate, possible contaminant. Contact  Microbiology if susceptibility testing clinically  indicated.  ***Blood Panel PCR results on this specimen are available  approximately 3 hours after the Gram stain result.***  Gram stain, PCR, and/or culture results may not always  correspond due to difference in methodologies.  ************************************************************  This PCR assay was performed by multiplex PCR. This  Assay tests for 66 bacterial and resistance gene targets.  Please refer to the [facility] test directory  at [email or web address] for details. (01-31-22)  Culture Results:   Normal Respiratory Corina present (01-30-22)  Culture Results:   No Growth Final (01-28-22)  Culture Results:   No growth at 1 week. (01-25-22)  Culture Results:   No Growth Final (01-23-22)  Culture Results:   No growth (01-23-22)    RADIOLOGY  < from: Xray Chest 1 View- PORTABLE-Routine (Xray Chest 1 View- PORTABLE-Routine in AM.) (02.11.22 @ 06:47) >  Impression:    In comparison with the prior chest x-ray dated February 10, 2022 time   11:14 AM:    Bilateral lung opacities, unchanged.      < end of copied text >    MEDICATIONS  (STANDING):  amantadine Syrup 100 milliGRAM(s) Oral every 12 hours  aspirin  chewable 81 milliGRAM(s) Oral daily  chlorhexidine 0.12% Liquid 15 milliLiter(s) Oral Mucosa every 12 hours  chlorhexidine 4% Liquid 1 Application(s) Topical <User Schedule>  enoxaparin Injectable 40 milliGRAM(s) SubCutaneous at bedtime  glucagon  Injectable 1 milliGRAM(s) IntraMuscular once  insulin lispro (ADMELOG) corrective regimen sliding scale   SubCutaneous every 6 hours  metoprolol tartrate 25 milliGRAM(s) Oral two times a day  pantoprazole  Injectable 40 milliGRAM(s) IV Push daily  polyethylene glycol 3350 17 Gram(s) Oral daily    MEDICATIONS  (PRN):  acetaminophen     Tablet .. 650 milliGRAM(s) Oral every 6 hours PRN Temp greater or equal to 38C (100.4F), Mild Pain (1 - 3)  albuterol/ipratropium for Nebulization 3 milliLiter(s) Nebulizer every 6 hours PRN Shortness of Breath and/or Wheezing  fentaNYL    Injectable 50 MICROGram(s) IV Push every 4 hours PRN Agitatiom

## 2022-02-11 NOTE — PROGRESS NOTE ADULT - SUBJECTIVE AND OBJECTIVE BOX
SUBJECTIVE:    Patient is a 81y old Male who presents with a chief complaint of Cardiac arrest (01 Feb 2022 22:00)      HPI:  81 year old male with a hx of HTN, BPH, recent diagnosis of COVID 19 2 days ago (unvaccinated, prescribed decadron/ zpack/ zinc) presenting to the ED S/P cardiac arrest. Patient intubated/ sedated so history obtained from chart (son left and attempted to reach). Patient began to have worsening dyspnea at home, EMS was called. Found to be in respiratory arrest by EMS and followed by EMS. ROSC was achieved after 2 rounds of CPR and epi, downtime 7 mins. Was intubated in the field.  central line was placed. ABG: PH 7.15, PaO2 66, PaCO2 45, HCO3- 16. Troponins .1, BNP > 8000, D-Dimer 7368. COVID 19 positive. Patient being admitted s/p cardiac arrest to ICU.   Currently admitted to medicine with the primary diagnosis of cardiac arrest. Not responding to commands, not retracting to pain.    Besides the pertinent positives and negatives described above, the ROS was within normal limits.      PAST MEDICAL & SURGICAL HISTORY  BPH (benign prostatic hyperplasia),  Mild HTN      SOCIAL HISTORY:    ALLERGIES:  Allergy Status Unknown    MEDICATIONS:  STANDING MEDICATIONS  aspirin  chewable 81 milliGRAM(s) Oral daily  chlorhexidine 0.12% Liquid 15 milliLiter(s) Oral Mucosa every 12 hours  chlorhexidine 4% Liquid 1 Application(s) Topical <User Schedule>  dexAMETHasone  Injectable 4 milliGRAM(s) IV Push daily  dextrose 5%. 1000 milliLiter(s) IV Continuous <Continuous>  dextrose 5%. 1000 milliLiter(s) IV Continuous <Continuous>  dextrose 50% Injectable 25 Gram(s) IV Push once  dextrose 50% Injectable 12.5 Gram(s) IV Push once  dextrose 50% Injectable 25 Gram(s) IV Push once  diltiazem    Tablet 30 milliGRAM(s) Oral every 6 hours  enoxaparin Injectable 40 milliGRAM(s) SubCutaneous at bedtime  glucagon  Injectable 1 milliGRAM(s) IntraMuscular once  insulin lispro (ADMELOG) corrective regimen sliding scale   SubCutaneous every 6 hours  pantoprazole  Injectable 40 milliGRAM(s) IV Push daily  polyethylene glycol 3350 17 Gram(s) Oral daily    PRN MEDICATIONS  acetaminophen     Tablet .. 650 milliGRAM(s) Oral every 6 hours PRN  albuterol/ipratropium for Nebulization 3 milliLiter(s) Nebulizer every 6 hours PRN    VITALS:   T(F): 99.1  HR: 68  BP: 141/75  RR: 27  SpO2: 96%    LABS:                        11.3   10.96 )-----------( 84       ( 04 Feb 2022 06:43 )             36.7     02-04    141  |  107  |  24<H>  ----------------------------<  173<H>  4.7   |  24  |  <0.5<L>    Ca    8.2<L>      04 Feb 2022 06:43  Mg     2.1     02-04    TPro  4.4<L>  /  Alb  2.9<L>  /  TBili  0.8  /  DBili  x   /  AST  33  /  ALT  45<H>  /  AlkPhos  71  02-04        ABG - ( 04 Feb 2022 03:52 )  pH, Arterial: 7.51  pH, Blood: x     /  pCO2: 38    /  pO2: 123   / HCO3: 30    / Base Excess: 6.8   /  SaO2: 97.8        PHYSICAL EXAM:  General:  not awake   HEENT:    et tube             Lymph Nodes: NO cervical LN   Lungs: Bilateral BS  Cardiovascular: Regular   Abdomen: Soft, Positive BS  Extremities: No clubbing   Skin: warm   Neurological: positive gag   Musculoskeletal: move all ext     Intravenous access: yes  NG tube: og tube  Becker Catheter:   Indwelling Urethral Catheter:     Connect To:  Straight Drainage/[place]    Indication:  Urinary Retention / Obstruction (02-01-22 @ 19:51) (not performed) [Active]  Indwelling Urethral Catheter:     Connect To:  Straight Drainage/[place]    Indication:  Urinary Retention / Obstruction (01-31-22 @ 14:21) (not performed) [Active]  Indwelling Urethral Catheter:     Connect To:  Straight Drainage/[place]    Indication:  Urine Output Monitoring in Critically Ill (01-30-22 @ 13:06) (not performed) [Active]  Indwelling Urethral Catheter:     Connect To:  Straight Drainage/[place]    Indication:  Urinary Retention / Obstruction (01-29-22 @ 10:02) (not performed) [Active]  Indwelling Urethral Catheter:     Connect To:  Straight Drainage/[place]    Indication:  Urinary Retention / Obstruction (01-25-22 @ 16:18) (not performed) [Active]  Indwelling Urethral Catheter:     Connect To:  Straight Drainage/[place]    Indication:  Urinary Retention / Obstruction (01-24-22 @ 15:34) (not performed) [Active]      Assessment and Plan:   · Assessment	  81 year old male with a hx of HTN, BPH, recent diagnosis of COVID 19 2 days ago (unvaccinated, prescribed decadron/ zpack/ zinc) presenting to the ED S/P cardiac arrest, course complicated by pneumothorax s/p chest tube and removal, and mucus plug removed by bronchoscopy. Patient does not follow any commands as of now.    # COVID pneumonia s/p cardiac arrest  - was down for 7 minutes / found to have pneumothorax s/p pigtail catheter insertion  - course complicated by mucus plug removed by bronch  - on minimal vent settings however not following commands  - seen by ID recommending to stop abx  - received toci and remdesivir, dexamethasone // off sedation  - chest xray concerning for apical pneumothorax on left side as per radiology, pigtail placed (to suction)  - monitor driving pressure  - f/u procalcitonin  - f/u ID recommendations    # Anoxic brain injury  - not following commands off sedation; CT head showed periventricular white matter changes which can reflect ischemia  - pending trach and peg tube placement    # DVT PPX: lovenox  # GI PPX: protonix  # Diet: tube feeding  DNR

## 2022-02-11 NOTE — PROGRESS NOTE ADULT - SUBJECTIVE AND OBJECTIVE BOX
80 y/o male  Acute respiratory failure with hypoxia   ·  PROCEDURES:  Open tracheostomy  seen and  examined  in bed  vented  on 40%, 420 24. 5   ICU Vital Signs Last 24 Hrs  T(C): 37.4 (11 Feb 2022 16:42), Max: 37.6 (11 Feb 2022 11:00)  T(F): 99.3 (11 Feb 2022 15:00), Max: 99.7 (11 Feb 2022 11:00)  HR: 98 (11 Feb 2022 19:33) (71 - 98)  BP: 121/68 (11 Feb 2022 17:00) (121/68 - 154/73)  BP(mean): 90 (11 Feb 2022 17:00) (88 - 105)  ABP: --  ABP(mean): --  RR: 24 (11 Feb 2022 17:00) (24 - 29)  SpO2: 95% (11 Feb 2022 19:33) (95% - 100%)  PE    TRACH SITE  DRESSING  CDI  LUNGS  GOOD AIR  ENTRY  BL  CVS  S1S2    A&P; 80 y/o male  Acute respiratory failure with hypoxia   ·  PROCEDURES:  Open tracheostomy  WILL FOLLOW

## 2022-02-11 NOTE — CHART NOTE - NSCHARTNOTEFT_GEN_A_CORE
PACU ANESTHESIA ADMISSION NOTE      Procedure: Open tracheostomy      Post op diagnosis:  Respiratory Failure    ____  Intubated  TV:______       Rate: ______      FiO2: ______    ____  Patent Airway    ___  Full return of protective reflexes    ____  Full recovery from anesthesia / back to baseline     Vitals:   T:           R:  22              BP:   105/59              Sat: 100%                   P: 100      Mental Status:  ___ Awake   ___ Alert   _____ Drowsy   _____ Sedated     Nausea/Vomiting:  __x__ NO  ______Yes,   See Post - Op Orders          Pain Scale (0-10):  _____    Treatment: ____ None    __x__ See Post - Op/PCA Orders    Post - Operative Fluids:   ____ Oral   __x__ See Post - Op Orders    Plan: Discharge:   ____Home       _____Floor     ___x__Critical Care    _____  Other:_________________

## 2022-02-11 NOTE — PROGRESS NOTE ADULT - SUBJECTIVE AND OBJECTIVE BOX
Patient is a 81y old  Male who presents with a chief complaint of MI (09 Feb 2022 16:06)      Over Night Events:  Patient seen and examined.   not awake   pending trach and peg     ROS:  See HPI    PHYSICAL EXAM    ICU Vital Signs Last 24 Hrs  T(C): 36.2 (11 Feb 2022 07:05), Max: 37.4 (10 Feb 2022 23:01)  T(F): 97.2 (11 Feb 2022 07:05), Max: 99.3 (10 Feb 2022 23:01)  HR: 71 (11 Feb 2022 07:00) (66 - 86)  BP: 150/73 (11 Feb 2022 07:00) (118/62 - 155/76)  BP(mean): 105 (11 Feb 2022 07:00) (83 - 109)  ABP: --  ABP(mean): --  RR: 27 (11 Feb 2022 07:05) (24 - 29)  SpO2: 100% (11 Feb 2022 07:00) (98% - 100%)      General: not awake   HEENT:   et tube              Lymph Nodes: NO cervical LN   Lungs: Bilateral BS  Cardiovascular: Regular   Abdomen: Soft, Positive BS  Extremities: No clubbing   Skin: warm   Neurological: positive gaag  Musculoskeletal: move all ext     I&O's Detail    10 Feb 2022 07:01  -  11 Feb 2022 07:00  --------------------------------------------------------  IN:    Enteral Tube Flush: 120 mL    Glucerna: 570 mL  Total IN: 690 mL    OUT:    Chest Tube (mL): 120 mL    Indwelling Catheter - Urethral (mL): 1640 mL  Total OUT: 1760 mL    Total NET: -1070 mL          LABS:                          10.7   10.28 )-----------( 123      ( 11 Feb 2022 06:30 )             33.9         11 Feb 2022 06:30    137    |  100    |  12     ----------------------------<  117    3.7     |  28     |  <0.5     Ca    8.1        11 Feb 2022 06:30  Mg     2.0       11 Feb 2022 06:30    TPro  4.5    /  Alb  2.7    /  TBili  1.1    /  DBili  x      /  AST  22     /  ALT  31     /  AlkPhos  82     11 Feb 2022 06:30  Amylase x     lipase x                                                                                                                                                                                                 Mode: AC/ CMV (Assist Control/ Continuous Mandatory Ventilation)  RR (machine): 24  TV (machine): 420  FiO2: 50  PEEP: 5  MAP: 8  PIP: 13                                      ABG - ( 11 Feb 2022 04:15 )  pH, Arterial: 7.50  pH, Blood: x     /  pCO2: 40    /  pO2: 124   / HCO3: 31    / Base Excess: 7.4   /  SaO2: 98.1                MEDICATIONS  (STANDING):  amantadine Syrup 100 milliGRAM(s) Oral every 12 hours  aspirin  chewable 81 milliGRAM(s) Oral daily  chlorhexidine 0.12% Liquid 15 milliLiter(s) Oral Mucosa every 12 hours  chlorhexidine 4% Liquid 1 Application(s) Topical <User Schedule>  dextrose 5%. 1000 milliLiter(s) (50 mL/Hr) IV Continuous <Continuous>  dextrose 5%. 1000 milliLiter(s) (100 mL/Hr) IV Continuous <Continuous>  dextrose 50% Injectable 25 Gram(s) IV Push once  dextrose 50% Injectable 12.5 Gram(s) IV Push once  dextrose 50% Injectable 25 Gram(s) IV Push once  enoxaparin Injectable 40 milliGRAM(s) SubCutaneous at bedtime  glucagon  Injectable 1 milliGRAM(s) IntraMuscular once  insulin lispro (ADMELOG) corrective regimen sliding scale   SubCutaneous every 6 hours  metoprolol tartrate 25 milliGRAM(s) Oral two times a day  pantoprazole  Injectable 40 milliGRAM(s) IV Push daily  polyethylene glycol 3350 17 Gram(s) Oral daily    MEDICATIONS  (PRN):  acetaminophen     Tablet .. 650 milliGRAM(s) Oral every 6 hours PRN Temp greater or equal to 38C (100.4F), Mild Pain (1 - 3)  albuterol/ipratropium for Nebulization 3 milliLiter(s) Nebulizer every 6 hours PRN Shortness of Breath and/or Wheezing  fentaNYL    Injectable 50 MICROGram(s) IV Push every 4 hours PRN Agitatiom          Xrays:  TLC:  OG:  ET tube:                                                                                    increase opacity right   pig tial in palce    ECHO:  CAM ICU:

## 2022-02-11 NOTE — PROGRESS NOTE ADULT - SUBJECTIVE AND OBJECTIVE BOX
CHADWICK VENCES  81y, Male  Allergy: Allergy Status Unknown      LOS  27d    CHIEF COMPLAINT: MI (09 Feb 2022 16:06)      INTERVAL EVENTS/HPI  - No acute events overnight  - T(F): , Max: 99.3 (02-10-22 @ 23:01)  - WBC Count: 10.28 (02-11-22 @ 06:30)  WBC Count: 10.03 (02-10-22 @ 06:00)     - Creatinine, Serum: <0.5 (02-11-22 @ 06:30)  Creatinine, Serum: <0.5 (02-10-22 @ 06:00)       ROS  unable to obtain history secondary to patient's mental status and/or sedation    VITALS:  T(F): 97.2, Max: 99.3 (02-10-22 @ 23:01)  HR: 75  BP: 150/73  RR: 27Vital Signs Last 24 Hrs  T(C): 36.2 (11 Feb 2022 07:05), Max: 37.4 (10 Feb 2022 23:01)  T(F): 97.2 (11 Feb 2022 07:05), Max: 99.3 (10 Feb 2022 23:01)  HR: 75 (11 Feb 2022 08:47) (66 - 86)  BP: 150/73 (11 Feb 2022 07:00) (118/62 - 155/76)  BP(mean): 105 (11 Feb 2022 07:00) (83 - 109)  RR: 27 (11 Feb 2022 07:05) (24 - 29)  SpO2: 100% (11 Feb 2022 08:47) (98% - 100%)    PHYSICAL EXAM:  Gen: intubated  HEENT: Normocephalic, atraumatic  Neck: supple, no lymphadenopathy  CV: Regular rate & regular rhythm  Lungs: decreased BS at bases, no fremitus  Abdomen: Soft, BS present  Ext: Warm, well perfused  Neuro: non focal, seated  Skin: no rash, no erythema  Lines: no phlebitis    FH: Non-contributory  Social Hx: Non-contributory    TESTS & MEASUREMENTS:                        10.7   10.28 )-----------( 123      ( 11 Feb 2022 06:30 )             33.9     02-11    137  |  100  |  12  ----------------------------<  117<H>  3.7   |  28  |  <0.5<L>    Ca    8.1<L>      11 Feb 2022 06:30  Mg     2.0     02-11    TPro  4.5<L>  /  Alb  2.7<L>  /  TBili  1.1  /  DBili  x   /  AST  22  /  ALT  31  /  AlkPhos  82  02-11    eGFR if Non African American: 147 mL/min/1.73M2 (02-11-22 @ 06:30)  eGFR if African American: 170 mL/min/1.73M2 (02-11-22 @ 06:30)    LIVER FUNCTIONS - ( 11 Feb 2022 06:30 )  Alb: 2.7 g/dL / Pro: 4.5 g/dL / ALK PHOS: 82 U/L / ALT: 31 U/L / AST: 22 U/L / GGT: x               Culture - Blood (collected 01-31-22 @ 06:15)  Source: .Blood Blood-Peripheral  Gram Stain (02-03-22 @ 03:08):    Growth in anaerobic bottle: Gram positive cocci in pairs  Final Report (02-03-22 @ 21:57):    Growth in anaerobic bottle: Staphylococcus capitis    Coag Negative Staphylococcus    Single set isolate, possible contaminant. Contact    Microbiology if susceptibility testing clinically    indicated.    ***Blood Panel PCR results on this specimen are available    approximately 3 hours after the Gram stain result.***    Gram stain, PCR, and/or culture results may not always    correspond due to difference in methodologies.    ************************************************************    This PCR assay was performed by multiplex PCR. This    Assay tests for 66 bacterial and resistance gene targets.    Please refer to the [facility] test directory    at [email or web address] for details.  Organism: Blood Culture PCR (02-03-22 @ 21:57)  Organism: Blood Culture PCR (02-03-22 @ 21:57)      -  Coagulase negative Staphylococcus: Detec      Method Type: PCR    Culture - Bronchial (collected 01-30-22 @ 16:00)  Source: .Bronchial None  Gram Stain (01-30-22 @ 22:58):    Few polymorphonuclear leukocytes per low power field    No Squamous epithelial cells per low power field    Few Gram positive cocci in pairs per oil power field  Final Report (02-01-22 @ 19:11):    Normal Respiratory Corina present    Culture - Blood (collected 01-28-22 @ 16:37)  Source: .Blood None  Final Report (02-02-22 @ 23:00):    No Growth Final    Culture - Acid Fast - Sputum w/Smear (collected 01-25-22 @ 10:00)  Source: .Sputum Sputum  Preliminary Report (02-02-22 @ 15:03):    No growth at 1 week.    Culture - Blood (collected 01-23-22 @ 11:17)  Source: .Blood Blood-Peripheral  Final Report (01-28-22 @ 23:00):    No Growth Final    Culture - Urine (collected 01-23-22 @ 11:00)  Source: Catheterized Catheterized  Final Report (01-24-22 @ 16:25):    No growth            INFECTIOUS DISEASES TESTING  COVID-19 PCR: Detected (02-10-22 @ 13:00)  Procalcitonin, Serum: 0.04 (02-09-22 @ 06:03)  Procalcitonin, Serum: 0.03 (02-01-22 @ 06:25)  Procalcitonin, Serum: 0.07 (01-31-22 @ 06:15)  Procalcitonin, Serum: 0.10 (01-30-22 @ 10:45)  Procalcitonin, Serum: 0.11 (01-29-22 @ 07:17)  Procalcitonin, Serum: 0.08 (01-26-22 @ 06:15)  Procalcitonin, Serum: 0.09 (01-25-22 @ 05:58)  Procalcitonin, Serum: 0.11 (01-23-22 @ 11:17)  MRSA PCR Result.: Negative (01-23-22 @ 11:00)  Procalcitonin, Serum: 0.44 (01-19-22 @ 06:30)  Procalcitonin, Serum: 2.32 (01-16-22 @ 06:56)  Rapid RVP Result: Detected (01-15-22 @ 01:25)  Procalcitonin, Serum: 0.10 (01-15-22 @ 01:25)      INFLAMMATORY MARKERS  C-Reactive Protein, Serum: <3 mg/L (02-01-22 @ 06:25)  C-Reactive Protein, Serum: <3 mg/L (01-26-22 @ 06:15)  C-Reactive Protein, Serum: 50 mg/L (01-16-22 @ 06:56)  C-Reactive Protein, Serum: 76 mg/L (01-15-22 @ 01:25)      RADIOLOGY & ADDITIONAL TESTS:  I have personally reviewed the last available Chest xray  CXR  Xray Chest 1 View- PORTABLE-Urgent:   ACC: [id number] EXAM:  XR CHEST PORTABLE URGENT 1V                          PROCEDURE DATE:  02/10/2022          INTERPRETATION:  Clinical History / Reason for exam: Feeding tube    Comparison : Chest radiograph 2/10/2022.    Technique/Positioning: Frontal chest.    Findings/  impression:    Support devices: Endotracheal tube in satisfactory position. Feeding tube   coursing below the field-of-view. Left chest pigtail catheter.    Cardiac/mediastinum/hilum: Unchanged    Lung parenchyma/Pleura: Extensive bilateral opacities similar to prior.   No definite pneumothorax.    Skeleton/soft tissues: Unchanged    --- End of Report ---            JENNIE GRAF MD; Attending Radiologist  This document has been electronically signed. Feb 10 2022 12:29PM (02-10-22 @ 11:51)      CT      CARDIOLOGY TESTING  12 Lead ECG:   Ventricular Rate 141 BPM    Atrial Rate 122 BPM    QRS Duration 112 ms    Q-T Interval 334 ms    QTC Calculation(Bazett) 511 ms    R Axis -29 degrees    T Axis 136 degrees    Diagnosis Line Atrial fibrillation with rapid ventricular response  Incomplete right bundle branch block  ST & T wave abnormality, consider lateral ischemia  Abnormal ECG    Confirmed by JESSICA EDMONDSON MD ([id number]) on 1/30/2022 11:17:30 AM (01-30-22 @ 02:11)      MEDICATIONS  amantadine Syrup 100 Oral every 12 hours  aspirin  chewable 81 Oral daily  chlorhexidine 0.12% Liquid 15 Oral Mucosa every 12 hours  chlorhexidine 4% Liquid 1 Topical <User Schedule>  dextrose 5%. 1000 IV Continuous <Continuous>  dextrose 5%. 1000 IV Continuous <Continuous>  dextrose 50% Injectable 25 IV Push once  dextrose 50% Injectable 12.5 IV Push once  dextrose 50% Injectable 25 IV Push once  enoxaparin Injectable 40 SubCutaneous at bedtime  glucagon  Injectable 1 IntraMuscular once  insulin lispro (ADMELOG) corrective regimen sliding scale  SubCutaneous every 6 hours  metoprolol tartrate 25 Oral two times a day  pantoprazole  Injectable 40 IV Push daily  polyethylene glycol 3350 17 Oral daily      WEIGHT  Weight (kg): 76.929 (01-15-22 @ 05:05)  Creatinine, Serum: <0.5 mg/dL (02-11-22 @ 06:30)      ANTIBIOTICS:      All available historical records have been reviewed

## 2022-02-11 NOTE — PROGRESS NOTE ADULT - ASSESSMENT
81 year old male with a hx of HTN, BPH, recent diagnosis of COVID 19 2 days ago (unvaccinated, prescribed decadron/ zpack/ zinc) presenting to the ED S/P cardiac arrest. Intubated in the field    IMPRESSION  #COVID19 PNA, Severe (O2 < or = 94% on RA and requiring supplemental O2) with Cardiac arrest    CXR diffuse bilateral opacities     D-Dimer Assay, Quantitative: 7368 ng/mL DDU (01-15-22 @ 01:25)    s/p Toci 1/17  #Lactic acidosis  #Transaminitis   #Cardiac arrest- ROSC was achieved after 2 rounds of CPR and epi, downtime 7 mins  # Left sided Pneumothorax- on CXR from 2/2/22      Recommendations  - monitor off antibiotics  - repeat blood cx if febrile   - Management of Pneumothorax and vent as per CCU/ICU protocol     Please call or message on [employer] Teams if with any questions.  Spectra [id number]

## 2022-02-11 NOTE — PROGRESS NOTE ADULT - ASSESSMENT
IMPRESSION:  Acute hypoxic respiratory failure  Anoxic brain injury  Cardiac Arrest  Severe COVID PNA  Sepsis on present on admission  Left PNX SP chest tube, resolved  Mucus plugs SP Bronchoscopy  Ischemic changes on CT Head      SUGGEST:    CNS:   off sedation not awake  reviewed     EEG reviewed. general slowing    HEENT: Oral care.    PULMONARY: HOB @ 45 degrees. pending trach    Aspiration precautions.   Vent changes: lower fio2 to 40%  Monitor driving pressure.    pig tial to suction     CARDIOVASCULAR:  Avoid overload.  Keep I < O. give lasix 40 mg once     GI: GI prophylaxis.  c/w OG feeding.  Bowel regimen.  GI for PEG.    RENAL:  Follow up lytes.  Correct as needed.  Monitor UO.  Becker care.    INFECTIOUS DISEASE: Abx per ID.  Repeat Procalcitonin.    Tend inflammatory markers.  ID FU. FU cultures.    HEMATOLOGICAL: DVT prophylaxis resume lovenox     ENDOCRINE:  Follow up FS.  Insulin protocol if needed.  TSH    MUSCULOSKELETAL: bedrest    DNR    Very poor overall prognosis  Palliative FU

## 2022-02-11 NOTE — PROGRESS NOTE ADULT - ASSESSMENT
81 year old male with a hx of HTN, BPH, recent diagnosis of COVID 19 2 days ago prior to admission -> presented to the ED S/P cardiac arrest.  Patient began to have worsening dyspnea at home, EMS was called. Found to be in cardiac arrest. ROSC was achieved after 2 rounds of CPR and epi, downtime 7 mins. Was intubated in the field.     acute respiratory failure / Cardiac arrest / COVID-19 pneumonia / probable NSTEMI / L sided pneumothorax / anoxic brain injury         - pt does breath over the ventilator    - I spoke with family multiple times  and there wishes are to have the pt transferred to a long term facility in [place]   - I explained that the pt would need a trach and PEG for long term care   - they report to me that they would consent for trach and peg prior to transfer to [place] facility   - trach today    - GI for PEG they are aware   - very poor prognosis, family aware

## 2022-02-12 NOTE — PROGRESS NOTE ADULT - ASSESSMENT
Acute hypoxic respiratory failure  - s/p trach    Anoxic brain injury  - poor prognosis    Severe COVID PNA   - unvaccinated    dysphagia  -PEG feeds

## 2022-02-12 NOTE — PROGRESS NOTE ADULT - SUBJECTIVE AND OBJECTIVE BOX
LENGTH OF HOSPITAL STAY: 28d    CHIEF COMPLAINT:   Patient is a 81y old  Male who presents with a chief complaint of MI (09 Feb 2022 16:06)      HISTORY OF PRESENTING ILLNESS:    HPI:  81 year old male with a hx of HTN, BPH, recent diagnosis of COVID 19 2 days ago (unvaccinated, prescribed decadron/ zpack/ zinc) presenting to the ED S/P cardiac arrest. Patient intubated/ sedated so history obtained from chart (son left and attempted to reach). Patient began to have worsening dyspnea at home, EMS was called. Found to be in respiratory arrest by EMS and followed by EMS. ROSC was achieved after 2 rounds of CPR and epi, downtime 7 mins. Was intubated in the field.   In ED central line was placed. ABG: PH 7.15, PaO2 66, PaCO2 45, HCO3- 16. Troponins .1, BNP > 8000, D-Dimer 7368. COVID 19 positive. Patient being admitted s/p cardiac arrest to ICU.      (15 Aquiles 2022 04:09)      t remains intubated minimally responsive.     PAST MEDICAL & SURGICAL HISTORY  PAST MEDICAL & SURGICAL HISTORY:  BPH (benign prostatic hyperplasia)    Mild HTN      SOCIAL HISTORY:    ALLERGIES:  Allergy Status Unknown    MEDICATIONS:  STANDING MEDICATIONS  amantadine Syrup 100 milliGRAM(s) Oral every 12 hours  aspirin  chewable 81 milliGRAM(s) Oral daily  chlorhexidine 0.12% Liquid 15 milliLiter(s) Oral Mucosa every 12 hours  chlorhexidine 4% Liquid 1 Application(s) Topical <User Schedule>  dextrose 5%. 1000 milliLiter(s) IV Continuous <Continuous>  dextrose 5%. 1000 milliLiter(s) IV Continuous <Continuous>  dextrose 50% Injectable 25 Gram(s) IV Push once  dextrose 50% Injectable 12.5 Gram(s) IV Push once  dextrose 50% Injectable 25 Gram(s) IV Push once  enoxaparin Injectable 40 milliGRAM(s) SubCutaneous daily  glucagon  Injectable 1 milliGRAM(s) IntraMuscular once  insulin lispro (ADMELOG) corrective regimen sliding scale   SubCutaneous every 6 hours  metoprolol tartrate 25 milliGRAM(s) Oral two times a day  pantoprazole  Injectable 40 milliGRAM(s) IV Push daily  polyethylene glycol 3350 17 Gram(s) Oral daily    PRN MEDICATIONS  acetaminophen     Tablet .. 650 milliGRAM(s) Oral every 6 hours PRN  albuterol/ipratropium for Nebulization 3 milliLiter(s) Nebulizer every 6 hours PRN  fentaNYL    Injectable 50 MICROGram(s) IV Push every 4 hours PRN    VITALS:   T(F): 98.4  HR: 74  BP: 126/61  RR: 25  SpO2: 99%    LABS:                        10.5   13.63 )-----------( 143      ( 12 Feb 2022 06:21 )             34.2     02-12    140  |  103  |  12  ----------------------------<  97  3.8   |  26  |  <0.5<L>    Ca    7.7<L>      12 Feb 2022 06:21  Mg     2.1     02-12    TPro  4.5<L>  /  Alb  2.7<L>  /  TBili  1.1  /  DBili  x   /  AST  25  /  ALT  28  /  AlkPhos  78  02-12    PT/INR - ( 11 Feb 2022 11:10 )   PT: 12.90 sec;   INR: 1.12 ratio         PTT - ( 11 Feb 2022 11:10 )  PTT:29.0 sec    ABG - ( 12 Feb 2022 03:54 )  pH, Arterial: 7.51  pH, Blood: x     /  pCO2: 38    /  pO2: 93    / HCO3: 30    / Base Excess: 6.9   /  SaO2: 97.8                      RADIOLOGY:  cxr    IMPRESSION: Unchanged lines and tubes.    Unchanged bilateral opacities. No pneumothorax.    Stable cardiomediastinal silhouette.    Unchanged osseous structures.    PHYSICAL EXAM:  GEN: intubated/sedated  HEENT: trach  LUNGS: b/l air entry  HEART: S1/S2 present. RRR.   ABD: Soft, non-tender, non-distended. Bowel sounds present  EXT:-c/c/e  NEURO: unresponsive

## 2022-02-12 NOTE — PROGRESS NOTE ADULT - SUBJECTIVE AND OBJECTIVE BOX
Pt seen and examined. Appears calm    T(F): , Max: 98.8 (02-12-22 @ 03:00)  HR: 82 (02-12-22 @ 19:00) (70 - 103)  BP: 136/73 (02-12-22 @ 19:00)  RR: 22 (02-12-22 @ 19:00)  SpO2: 99% (02-12-22 @ 19:00)  IN: 60 mL / OUT: 875 mL / NET: -815 mL    IN: 700 mL / OUT: 425 mL / NET: 275 mL      General: No apparent distress  Cardiovascular: S1, S2  Gastrointestinal: peg  Respiratory: trach   Musculoskeletal: Moves all extremities  Lymphatic: No edema  Neurologic: No gross motor deficit  Dermatologic: Skin dry  PT/INR - ( 11 Feb 2022 11:10 )   PT: 12.90 sec;   INR: 1.12 ratio         PTT - ( 11 Feb 2022 11:10 )  PTT:29.0 sec                        10.5   13.63 )-----------( 143      ( 12 Feb 2022 06:21 )             34.2     02-12    140  |  103  |  12  ----------------------------<  97  3.8   |  26  |  <0.5<L>    Ca    7.7<L>      12 Feb 2022 06:21  Mg     2.1     02-12    TPro  4.5<L>  /  Alb  2.7<L>  /  TBili  1.1  /  DBili  x   /  AST  25  /  ALT  28  /  AlkPhos  78  02-12

## 2022-02-12 NOTE — PROGRESS NOTE ADULT - ASSESSMENT
a/p:   Acute hypoxic respiratory failure  Anoxic brain injury  Cardiac Arrest  Severe COVID PNA  Sepsis on present on admission  Left PNX SP chest tube, resolved  Mucus plugs SP Bronchoscopy  Ischemic changes on CT Head    Pt s/p trach for acute hypoxic resp failure, pt s/p cardiac arrest, COVID, ptx w L pigtail. Pt w poor mental status unlikely to be successfully weaned from the vent.  Discharge planning. Family will need to decide upon appropriate placement. continue ac, gi ppx, and  feeds. Monitor off abx.    time spent on critical care services >30 minutes.

## 2022-02-13 NOTE — PROGRESS NOTE ADULT - SUBJECTIVE AND OBJECTIVE BOX
Patient is s/p trach, breathing over vent      T(F): 98.6 (02-13-22 @ 05:00), Max: 98.6 (02-13-22 @ 05:00)  HR: 87 (02-13-22 @ 08:00)  BP: 138/76 (02-13-22 @ 07:00)  RR: 34 (02-13-22 @ 07:00)  SpO2: 98% (02-13-22 @ 08:00) (96% - 100%)    PHYSICAL EXAM:  GENERAL: NAD  HEAD:  Atraumatic, Normocephalic  EYES: EOMI, PERRLA, conjunctiva and sclera clear  NERVOUS SYSTEM:  positive gag  CHEST/LUNG:  bilateral rales  HEART: Regular rate and rhythm; No murmurs, rubs, or gallops  ABDOMEN: Soft, Nontender, Nondistended; Bowel sounds present  EXTREMITIES:  2+ Peripheral Pulses, No clubbing, cyanosis, or edema    LABS  02-13    139  |  101  |  17  ----------------------------<  186<H>  3.8   |  27  |  <0.5<L>    Ca    8.0<L>      13 Feb 2022 06:14  Mg     2.2     02-13    TPro  4.8<L>  /  Alb  2.7<L>  /  TBili  1.7<H>  /  DBili  x   /  AST  19  /  ALT  25  /  AlkPhos  103  02-13                          10.5   13.63 )-----------( 143      ( 12 Feb 2022 06:21 )             34.2     PT/INR - ( 11 Feb 2022 11:10 )   PT: 12.90 sec;   INR: 1.12 ratio         PTT - ( 11 Feb 2022 11:10 )  PTT:29.0 sec    Mode: AC/ CMV (Assist Control/ Continuous Mandatory Ventilation)  RR (machine): 24  TV (machine): 420  FiO2: 40  PEEP: 5    Culture Results:   Growth in anaerobic bottle: Staphylococcus capitis  Coag Negative Staphylococcus  Single set isolate, possible contaminant. Contact  Microbiology if susceptibility testing clinically  indicated.  ***Blood Panel PCR results on this specimen are available  approximately 3 hours after the Gram stain result.***  Gram stain, PCR, and/or culture results may not always  correspond due to difference in methodologies.  ************************************************************  This PCR assay was performed by multiplex PCR. This  Assay tests for 66 bacterial and resistance gene targets.  Please refer to the [facility] test directory  at [email or web address] for details. (01-31-22)  Culture Results:   Normal Respiratory Corina present (01-30-22)  Culture Results:   No Growth Final (01-28-22)  Culture Results:   No growth at 1 week. (01-25-22)  Culture Results:   No Growth Final (01-23-22)  Culture Results:   No growth (01-23-22)    RADIOLOGY  < from: Xray Chest 1 View- PORTABLE-Routine (Xray Chest 1 View- PORTABLE-Routine in AM.) (02.12.22 @ 06:23) >  Unchanged bilateral opacities. No pneumothorax.    < end of copied text >    MEDICATIONS  (STANDING):  amantadine Syrup 100 milliGRAM(s) Oral every 12 hours  aspirin  chewable 81 milliGRAM(s) Oral daily  chlorhexidine 0.12% Liquid 15 milliLiter(s) Oral Mucosa every 12 hours  chlorhexidine 4% Liquid 1 Application(s) Topical <User Schedule>  enoxaparin Injectable 40 milliGRAM(s) SubCutaneous daily  fentaNYL    Injectable 50 MICROGram(s) IV Push once  fentaNYL   Patch  25 MICROgram(s)/Hr 1 Patch Transdermal every 72 hours  insulin lispro (ADMELOG) corrective regimen sliding scale   SubCutaneous every 6 hours  metoprolol tartrate 25 milliGRAM(s) Oral two times a day  pantoprazole  Injectable 40 milliGRAM(s) IV Push daily  polyethylene glycol 3350 17 Gram(s) Oral daily    MEDICATIONS  (PRN):  acetaminophen     Tablet .. 650 milliGRAM(s) Oral every 6 hours PRN Temp greater or equal to 38C (100.4F), Mild Pain (1 - 3)  albuterol/ipratropium for Nebulization 3 milliLiter(s) Nebulizer every 6 hours PRN Shortness of Breath and/or Wheezing  fentaNYL    Injectable 50 MICROGram(s) IV Push every 4 hours PRN Agitation

## 2022-02-13 NOTE — CHART NOTE - NSCHARTNOTEFT_GEN_A_CORE
Patient seen and examined  POD #2 S/P tracheostomy  No signs of active bleeding or hematoma post procedure  Patient on mechanical ventilation through tracheostomy  Continue management as per primary team  recall surgery PRN as needed

## 2022-02-13 NOTE — PROGRESS NOTE ADULT - ASSESSMENT
A 81 year old male with a hx of HTN, BPH, recent diagnosis of COVID 19 2 days ago (unvaccinated, prescribed decadron/ zpack/ zinc) presenting to the ED S/P cardiac arrest. Intubated in the field    IMPRESSION  #COVID19 PNA, Severe (O2 < or = 94% on RA and requiring supplemental O2) with Cardiac arrest    CXR diffuse bilateral opacities     D-Dimer Assay, Quantitative: 7368 ng/mL DDU (01-15-22 @ 01:25)    s/p Toci 1/17  #Lactic acidosis  #Transaminitis   #Cardiac arrest- ROSC was achieved after 2 rounds of CPR and epi, downtime 7 mins  # Left sided Pneumothorax- on CXR from 2/2/22      would recommend:    1. Obtain  cultures since  Febrile   2. Management of Trach as per CCU protocol  3. Monitor  Temp. and c/w supportive care  4. Aspiration precaution    Attending Attestation:    Spent more than 35 minutes on total encounter, more than 50 % of the visit was spent counseling and/or coordinating care by the Attending physician.

## 2022-02-13 NOTE — PROGRESS NOTE ADULT - SUBJECTIVE AND OBJECTIVE BOX
· Subjective and Objective:    Patient is s/p trach, breathing over vent      T(F): 98.6 (02-13-22 @ 05:00), Max: 98.6 (02-13-22 @ 05:00)  HR: 87 (02-13-22 @ 08:00)  BP: 138/76 (02-13-22 @ 07:00)  RR: 34 (02-13-22 @ 07:00)  SpO2: 98% (02-13-22 @ 08:00) (96% - 100%)    PHYSICAL EXAM:  GENERAL: NAD  HEAD:  Atraumatic, Normocephalic  EYES: EOMI, PERRLA, conjunctiva and sclera clear  NERVOUS SYSTEM:  positive gag  CHEST/LUNG:  bilateral rales  HEART: Regular rate and rhythm; No murmurs, rubs, or gallops  ABDOMEN: Soft, Nontender, Nondistended; Bowel sounds present  EXTREMITIES:  2+ Peripheral Pulses, No clubbing, cyanosis, or edema    LABS  02-13    139  |  101  |  17  ----------------------------<  186<H>  3.8   |  27  |  <0.5<L>    Ca    8.0<L>      13 Feb 2022 06:14  Mg     2.2     02-13    TPro  4.8<L>  /  Alb  2.7<L>  /  TBili  1.7<H>  /  DBili  x   /  AST  19  /  ALT  25  /  AlkPhos  103  02-13                          10.5   13.63 )-----------( 143      ( 12 Feb 2022 06:21 )             34.2     PT/INR - ( 11 Feb 2022 11:10 )   PT: 12.90 sec;   INR: 1.12 ratio         PTT - ( 11 Feb 2022 11:10 )  PTT:29.0 sec    Mode: AC/ CMV (Assist Control/ Continuous Mandatory Ventilation)  RR (machine): 24  TV (machine): 420  FiO2: 40  PEEP: 5    Culture Results:   Growth in anaerobic bottle: Staphylococcus capitis  Coag Negative Staphylococcus  Single set isolate, possible contaminant. Contact  Microbiology if susceptibility testing clinically  indicated.  ***Blood Panel PCR results on this specimen are available  approximately 3 hours after the Gram stain result.***  Gram stain, PCR, and/or culture results may not always  correspond due to difference in methodologies.  ************************************************************  This PCR assay was performed by multiplex PCR. This  Assay tests for 66 bacterial and resistance gene targets.  Please refer to the [facility] test directory  at [email or web address] for details. (01-31-22)  Culture Results:   Normal Respiratory Corina present (01-30-22)  Culture Results:   No Growth Final (01-28-22)  Culture Results:   No growth at 1 week. (01-25-22)  Culture Results:   No Growth Final (01-23-22)  Culture Results:   No growth (01-23-22)    RADIOLOGY  < from: Xray Chest 1 View- PORTABLE-Routine (Xray Chest 1 View- PORTABLE-Routine in AM.) (02.12.22 @ 06:23) >  Unchanged bilateral opacities. No pneumothorax.    < end of copied text >    MEDICATIONS  (STANDING):  amantadine Syrup 100 milliGRAM(s) Oral every 12 hours  aspirin  chewable 81 milliGRAM(s) Oral daily  chlorhexidine 0.12% Liquid 15 milliLiter(s) Oral Mucosa every 12 hours  chlorhexidine 4% Liquid 1 Application(s) Topical <User Schedule>  enoxaparin Injectable 40 milliGRAM(s) SubCutaneous daily  fentaNYL    Injectable 50 MICROGram(s) IV Push once  fentaNYL   Patch  25 MICROgram(s)/Hr 1 Patch Transdermal every 72 hours  insulin lispro (ADMELOG) corrective regimen sliding scale   SubCutaneous every 6 hours  metoprolol tartrate 25 milliGRAM(s) Oral two times a day  pantoprazole  Injectable 40 milliGRAM(s) IV Push daily  polyethylene glycol 3350 17 Gram(s) Oral daily    MEDICATIONS  (PRN):  acetaminophen     Tablet .. 650 milliGRAM(s) Oral every 6 hours PRN Temp greater or equal to 38C (100.4F), Mild Pain (1 - 3)  albuterol/ipratropium for Nebulization 3 milliLiter(s) Nebulizer every 6 hours PRN Shortness of Breath and/or Wheezing  fentaNYL    Injectable 50 MICROGram(s) IV Push every 4 hours PRN Agitation        Assessment and Plan:   · Assessment	  81 year old male with a hx of HTN, BPH, recent diagnosis of COVID 19 2 days ago prior to admission -> presented to the ED S/P cardiac arrest.  Patient began to have worsening dyspnea at home, EMS was called. Found to be in cardiac arrest. ROSC was achieved after 2 rounds of CPR and epi, downtime 7 mins. Was intubated in the field.     acute respiratory failure / Cardiac arrest / COVID-19 pneumonia / probable NSTEMI / L sided pneumothorax / anoxic brain injury         - start fentanyl patch and continue fentanyl pushes prn   - I spoke with family multiple times  and there wishes are to have the pt transferred to a long term facility in [facility] for PEG they are aware   - very poor prognosis, family aware

## 2022-02-13 NOTE — PROGRESS NOTE ADULT - ASSESSMENT
81 year old male with a hx of HTN, BPH, recent diagnosis of COVID 19 2 days ago prior to admission -> presented to the ED S/P cardiac arrest.  Patient began to have worsening dyspnea at home, EMS was called. Found to be in cardiac arrest. ROSC was achieved after 2 rounds of CPR and epi, downtime 7 mins. Was intubated in the field.     acute respiratory failure / Cardiac arrest / COVID-19 pneumonia / probable NSTEMI / L sided pneumothorax / anoxic brain injury         - start fentanyl patch and continue fentanyl pushes prn   - I spoke with family multiple times  and there wishes are to have the pt transferred to a long term facility in [facility] for PEG they are aware   - very poor prognosis, family aware

## 2022-02-13 NOTE — PROGRESS NOTE ADULT - SUBJECTIVE AND OBJECTIVE BOX
Patient  is seen and examined at the bed side, is febrile. He is s.o Tracheostomy.       REVIEW OF SYSTEMS: Unable  to obtain due to mental status       Allergy Status Unknown      ICU Vital Signs Last 24 Hrs  T(C): 38 (13 Feb 2022 19:00), Max: 38 (13 Feb 2022 19:00)  T(F): 100.4 (13 Feb 2022 19:00), Max: 100.4 (13 Feb 2022 19:00)  HR: 93 (13 Feb 2022 19:00) (87 - 113)  BP: 136/62 (13 Feb 2022 19:00) (116/56 - 150/79)  BP(mean): 89 (13 Feb 2022 19:00) (79 - 105)  ABP: --  ABP(mean): --  RR: 31 (13 Feb 2022 19:00) (26 - 35)  SpO2: 97% (13 Feb 2022 19:00) (97% - 98%)      PHYSICAL EXAM:   GENERAL: Not  in distress  HEENT: On vent via TRAch  CVS: s1 and s2 present  RESP: Not using accessory muscles  GI: Abdomen non distended  EXT: pedal  edema        LABS:                        10.3   11.86 )-----------( 156      ( 13 Feb 2022 12:06 )             33.4                        10.6   12.03 )-----------( 104      ( 02 Feb 2022 05:48 )             35.4                         10.3   24.47 )-----------( 218      ( 23 Jan 2022 06:35 )             34.9         02-13    139  |  101  |  17  ----------------------------<  186<H>  3.8   |  27  |  <0.5<L>    Ca    8.0<L>      13 Feb 2022 06:14  Mg     2.2     02-13    TPro  4.8<L>  /  Alb  2.7<L>  /  TBili  1.7<H>  /  DBili  x   /  AST  19  /  ALT  25  /  AlkPhos  103  02-13    02-10    141  |  103  |  14  ----------------------------<  160<H>  4.0   |  29  |  <0.5<L>    Ca    8.4<L>      10 Feb 2022 06:00  Mg     2.0     02-10    TPro  4.5<L>  /  Alb  2.9<L>  /  TBili  0.9  /  DBili  x   /  AST  23  /  ALT  33  /  AlkPhos  81  02-10      CAPILLARY BLOOD GLUCOSE  POCT Blood Glucose.: 369 mg/dL (23 Jan 2022 12:49)  POCT Blood Glucose.: 348 mg/dL (23 Jan 2022 06:38)      ABG - ( 23 Jan 2022 05:20 )  pH, Arterial: 7.30  pH, Blood: x     /  pCO2: 58    /  pO2: 268   / HCO3: 28    / Base Excess: 1.5   /  SaO2: 96.3          Urinalysis Basic - ( 23 Jan 2022 11:00 )  Color: Yellow / Appearance: Clear / SG: >=1.030 / pH: x  Gluc: x / Ketone: Negative  / Bili: Negative / Urobili: 0.2 mg/dL   Blood: x / Protein: Negative mg/dL / Nitrite: Negative   Leuk Esterase: Negative / RBC: 3-5 /HPF / WBC 1-2 /HPF   Sq Epi: x / Non Sq Epi: Occasional /HPF / Bacteria: Few        Procalcitonin, Serum (02.01.22 @ 06:25)   Procalcitonin, Serum: 0.03  Procalcitonin, Serum (01.19.22 @ 06:30) : 0.44  Procalcitonin, Serum (01.16.22 @ 06:56) : 2.32    MEDICATIONS  (STANDING):    amantadine Syrup 100 milliGRAM(s) Oral every 12 hours  aspirin  chewable 81 milliGRAM(s) Oral daily  chlorhexidine 0.12% Liquid 15 milliLiter(s) Oral Mucosa every 12 hours  chlorhexidine 4% Liquid 1 Application(s) Topical <User Schedule>  dextrose 5%. 1000 milliLiter(s) (50 mL/Hr) IV Continuous <Continuous>  dextrose 5%. 1000 milliLiter(s) (100 mL/Hr) IV Continuous <Continuous>  dextrose 50% Injectable 12.5 Gram(s) IV Push once  dextrose 50% Injectable 25 Gram(s) IV Push once  dextrose 50% Injectable 25 Gram(s) IV Push once  enoxaparin Injectable 40 milliGRAM(s) SubCutaneous daily  fentaNYL   Patch  25 MICROgram(s)/Hr 1 Patch Transdermal every 72 hours  glucagon  Injectable 1 milliGRAM(s) IntraMuscular once  insulin lispro (ADMELOG) corrective regimen sliding scale   SubCutaneous every 6 hours  metoprolol tartrate 25 milliGRAM(s) Oral two times a day  pantoprazole  Injectable 40 milliGRAM(s) IV Push daily  polyethylene glycol 3350 17 Gram(s) Oral daily        RADIOLOGY & ADDITIONAL TESTS:    < from: Xray Chest 1 View- PORTABLE-Urgent (Xray Chest 1 View- PORTABLE-Urgent .) (02.06.22 @ 10:26) >    Support devices: Endotracheal tube, feeding tube and left-sided pigtail   chest tube are unchanged    Cardiac/mediastinum/hilum: Unchanged.    Lung parenchyma/Pleura: Bilateral opacities, unchanged. No definite   pneumothorax identified.      < from: Xray Chest 1 View- PORTABLE-Urgent (Xray Chest 1 View- PORTABLE-Urgent .) (02.02.22 @ 11:20) >    Again seen are a lack of vascular markings at the left lung apex suggestive of a pneumothorax, but remains limited in evaluation    Stable bilateral lung opacities    r< from: Xray Chest 1 View- PORTABLE-Routine (Xray Chest 1 View- PORTABLE-Routine in AM.) (01.23.22 @ 07:52) >  Bilateral opacities similar to prior  Left-sided chest tube unchanged in position.      < from: Xray Chest 1 View- PORTABLE-Routine (Xray Chest 1 View- PORTABLE-Routine in AM.) (01.22.22 @ 06:34) >  Support devices: ET tube mid trachea central venous line superior vena  cava NG tube in stomach    Cardiac/mediastinum/hilum: Unremarkable.    Lung parenchyma/Pleura: Decreased previous bilateral opacities. No  pleural effusion or air leak    Skeleton/soft tissues: Unremarkable.        MICROBIOLOGY DATA:    COVID-19 PCR . (02.10.22 @ 13:00)   COVID-19 PCR: Detected:   Culture - Blood in AM (01.31.22 @ 06:15)   - Coagulase negative Staphylococcus: Detec   Gram Stain:   Growth in anaerobic bottle: Gram positive cocci in pairs   Specimen Source: .Blood Blood-Peripheral   Organism: Blood Culture PCR   Culture Results: Growth in anaerobic bottle: Gram positive cocci in pairs   ***Blood Panel PCR results on this specimen are available     Culture - Bronchial (01.30.22 @ 16:00)   Gram Stain: Few polymorphonuclear leukocytes per low power field   No Squamous epithelial cells per low power field   Few Gram positive cocci in pairs per oil power field   Specimen Source: .Bronchial None   Culture Results:   Normal Respiratory Corina present     Culture - Blood (01.28.22 @ 16:37)   Specimen Source: .Blood None   Culture Results: No Growth Final     MRSA/MSSA PCR (01.23.22 @ 11:00)   MRSA PCR Result.: Negative    Respiratory Viral Panel with COVID-19 by JEN (01.15.22 @ 01:25)   Rapid RVP Result: Detected   SARS-CoV-2: Detected:

## 2022-02-14 NOTE — PROGRESS NOTE ADULT - ASSESSMENT
IMPRESSION:  Acute hypoxic respiratory failure SP Trach  Anoxic brain injury  Cardiac Arrest  Severe COVID PNA  Sepsis on present on admission  Left PNX SP chest tube, resolved  Mucus plugs SP Bronchoscopy  Ischemic changes on CT Head      SUGGEST:    CNS:   off sedation not awake  EEG reviewed. general slowing    HEENT: Oral care.      PULMONARY: HOB @ 45 degrees. pending trach    Aspiration precautions.   Vent changes: wean fiO2, decrease RR to 22  Monitor driving pressure.   Pigtail to suction    CARDIOVASCULAR:  Avoid overload.  Keep I < O.  Lasix 40mg x 1    GI: GI prophylaxis.  c/w OG feeding.  Bowel regimen.  GI for PEG.    RENAL:  Follow up lytes.  Correct as needed.  Monitor UO.  Becker care.    INFECTIOUS DISEASE: Abx per ID.  Repeat Procalcitonin.  Send DTA  Tend inflammatory markers.  ID FU.  FU cultures.    HEMATOLOGICAL: DVT prophylaxis    ENDOCRINE:  Follow up FS.  Insulin protocol if needed    MUSCULOSKELETAL: bedrest    DNR  Very poor overall prognosis  Start Dispo planning

## 2022-02-14 NOTE — PROGRESS NOTE ADULT - SUBJECTIVE AND OBJECTIVE BOX
Patient  is seen and examined at the bed side, is afebrile  now.       REVIEW OF SYSTEMS: Unable  to obtain due to mental status       Allergy Status Unknown      ICU Vital Signs Last 24 Hrs  T(C): 37.8 (14 Feb 2022 19:00), Max: 38.2 (13 Feb 2022 21:00)  T(F): 100 (14 Feb 2022 19:00), Max: 100.8 (13 Feb 2022 21:00)  HR: 107 (14 Feb 2022 15:00) (87 - 109)  BP: 165/74 (14 Feb 2022 15:00) (96/52 - 165/74)  BP(mean): 107 (14 Feb 2022 15:00) (69 - 107)  ABP: --  ABP(mean): --  RR: 24 (14 Feb 2022 19:00) (24 - 37)  SpO2: 99% (14 Feb 2022 15:00) (95% - 99%)        PHYSICAL EXAM:   GENERAL: Not  in distress  HEENT: On vent via TRAch  CVS: s1 and s2 present  RESP: Not using accessory muscles  GI: Abdomen non distended  EXT: pedal  edema        LABS:                        10.0   11.02 )-----------( 149      ( 14 Feb 2022 05:30 )             32.5                10.6   12.03 )-----------( 104      ( 02 Feb 2022 05:48 )             35.4                         10.3   24.47 )-----------( 218      ( 23 Jan 2022 06:35 )             34.9       02-14    142  |  104  |  16  ----------------------------<  166<H>  3.9   |  28  |  <0.5<L>    Ca    8.1<L>      14 Feb 2022 05:30  Mg     2.2     02-14    TPro  4.6<L>  /  Alb  2.4<L>  /  TBili  1.2  /  DBili  x   /  AST  20  /  ALT  25  /  AlkPhos  94  02-14    02-13    139  |  101  |  17  ----------------------------<  186<H>  3.8   |  27  |  <0.5<L>    Ca    8.0<L>      13 Feb 2022 06:14  Mg     2.2     02-13    TPro  4.8<L>  /  Alb  2.7<L>  /  TBili  1.7<H>  /  DBili  x   /  AST  19  /  ALT  25  /  AlkPhos  103  02-13      CAPILLARY BLOOD GLUCOSE  POCT Blood Glucose.: 369 mg/dL (23 Jan 2022 12:49)  POCT Blood Glucose.: 348 mg/dL (23 Jan 2022 06:38)      ABG - ( 23 Jan 2022 05:20 )  pH, Arterial: 7.30  pH, Blood: x     /  pCO2: 58    /  pO2: 268   / HCO3: 28    / Base Excess: 1.5   /  SaO2: 96.3          Urinalysis Basic - ( 23 Jan 2022 11:00 )  Color: Yellow / Appearance: Clear / SG: >=1.030 / pH: x  Gluc: x / Ketone: Negative  / Bili: Negative / Urobili: 0.2 mg/dL   Blood: x / Protein: Negative mg/dL / Nitrite: Negative   Leuk Esterase: Negative / RBC: 3-5 /HPF / WBC 1-2 /HPF   Sq Epi: x / Non Sq Epi: Occasional /HPF / Bacteria: Few        Procalcitonin, Serum (02.01.22 @ 06:25)   Procalcitonin, Serum: 0.03  Procalcitonin, Serum (01.19.22 @ 06:30) : 0.44  Procalcitonin, Serum (01.16.22 @ 06:56) : 2.32    MEDICATIONS  (STANDING):    amantadine Syrup 100 milliGRAM(s) Oral every 12 hours  aspirin  chewable 81 milliGRAM(s) Oral daily  chlorhexidine 0.12% Liquid 15 milliLiter(s) Oral Mucosa every 12 hours  chlorhexidine 4% Liquid 1 Application(s) Topical <User Schedule>  dextrose 5%. 1000 milliLiter(s) (50 mL/Hr) IV Continuous <Continuous>  dextrose 5%. 1000 milliLiter(s) (100 mL/Hr) IV Continuous <Continuous>  dextrose 50% Injectable 25 Gram(s) IV Push once  dextrose 50% Injectable 12.5 Gram(s) IV Push once  dextrose 50% Injectable 25 Gram(s) IV Push once  enoxaparin Injectable 40 milliGRAM(s) SubCutaneous daily  glucagon  Injectable 1 milliGRAM(s) IntraMuscular once  insulin lispro (ADMELOG) corrective regimen sliding scale   SubCutaneous every 6 hours  metoprolol tartrate 25 milliGRAM(s) Oral two times a day  pantoprazole  Injectable 40 milliGRAM(s) IV Push daily  polyethylene glycol 3350 17 Gram(s) Oral daily      RADIOLOGY & ADDITIONAL TESTS:    < from: Xray Chest 1 View- PORTABLE-Urgent (Xray Chest 1 View- PORTABLE-Urgent .) (02.06.22 @ 10:26) >    Support devices: Endotracheal tube, feeding tube and left-sided pigtail   chest tube are unchanged    Cardiac/mediastinum/hilum: Unchanged.    Lung parenchyma/Pleura: Bilateral opacities, unchanged. No definite   pneumothorax identified.      < from: Xray Chest 1 View- PORTABLE-Urgent (Xray Chest 1 View- PORTABLE-Urgent .) (02.02.22 @ 11:20) >    Again seen are a lack of vascular markings at the left lung apex suggestive of a pneumothorax, but remains limited in evaluation    Stable bilateral lung opacities    r< from: Xray Chest 1 View- PORTABLE-Routine (Xray Chest 1 View- PORTABLE-Routine in AM.) (01.23.22 @ 07:52) >  Bilateral opacities similar to prior  Left-sided chest tube unchanged in position.      < from: Xray Chest 1 View- PORTABLE-Routine (Xray Chest 1 View- PORTABLE-Routine in AM.) (01.22.22 @ 06:34) >  Support devices: ET tube mid trachea central venous line superior vena  cava NG tube in stomach    Cardiac/mediastinum/hilum: Unremarkable.    Lung parenchyma/Pleura: Decreased previous bilateral opacities. No  pleural effusion or air leak    Skeleton/soft tissues: Unremarkable.        MICROBIOLOGY DATA:    COVID-19 PCR . (02.10.22 @ 13:00)   COVID-19 PCR: Detected:   Culture - Blood in AM (01.31.22 @ 06:15)   - Coagulase negative Staphylococcus: Detec   Gram Stain:   Growth in anaerobic bottle: Gram positive cocci in pairs   Specimen Source: .Blood Blood-Peripheral   Organism: Blood Culture PCR   Culture Results: Growth in anaerobic bottle: Gram positive cocci in pairs   ***Blood Panel PCR results on this specimen are available     Culture - Bronchial (01.30.22 @ 16:00)   Gram Stain: Few polymorphonuclear leukocytes per low power field   No Squamous epithelial cells per low power field   Few Gram positive cocci in pairs per oil power field   Specimen Source: .Bronchial None   Culture Results:   Normal Respiratory Corina present     Culture - Blood (01.28.22 @ 16:37)   Specimen Source: .Blood None   Culture Results: No Growth Final     MRSA/MSSA PCR (01.23.22 @ 11:00)   MRSA PCR Result.: Negative    Respiratory Viral Panel with COVID-19 by JEN (01.15.22 @ 01:25)   Rapid RVP Result: Detected   SARS-CoV-2: Detected:

## 2022-02-14 NOTE — CHART NOTE - NSCHARTNOTEFT_GEN_A_CORE
Registered Dietitian Follow-Up     Patient Profile Reviewed                           Yes [X]   No []     Nutrition History Previously Obtained        Yes [X]  No []       Pertinent  Information:  pt is 81 year old male with hx of HTN, BPH, unvaccinated, tested + for COVID 12 days PTA, BIBA 2/2 respiratory distress s/p cardiac arrest downtime of 7 minutes, s/p intubation.  + PNA, L sided pnemothorax, s/p chest tube. pt became difficult to ventilate 22 s/p bronchoscopy  2/2 thick secretions.  s/p cardizem drip 2/2 afib RVR s/p chest tube removal.  Poor prognosis. DNR. pt has been off sedation since , unresponsive, + anoxic brain injury. 2/3 + blood cultures followed by ID likely contaminant. Pt now w/trach and PEG placement pending.      Diet order:   Diet, NPO with Tube Feed:   Tube Feeding Modality: Orogastric  Glucerna 1.2 Haroldo  Total Volume for 24 Hours (mL): 1440  Continuous  Starting Tube Feed Rate {mL per Hour}: 20  Until Goal Tube Feed Rate (mL per Hour): 60  Tube Feed Duration (in Hours): 24  Tube Feed Start Time: 10:00 (22 @ 09:59) [Active]      Anthropometrics:  Height (cm): 175.3 (22 @ 16:42)  Weight (kg): 76.9 (22 @ 16:42)  BMI (kg/m2): 25 (22 @ 16:42)  IBW:     Weight in k.5 (),   Weight in k.6 (),   Weight in k.1 (),   Weight in k.1 (),   Weight in k.6 (02-10),   Weight in k (),   Weight in k.1 ()  % Weight Change    MEDICATIONS  (STANDING):  amantadine Syrup 100 milliGRAM(s) Oral every 12 hours  enoxaparin Injectable 40 milliGRAM(s) SubCutaneous daily  insulin lispro (ADMELOG) corrective regimen sliding scale   SubCutaneous every 6 hours  metoprolol tartrate 25 milliGRAM(s) Oral two times a day  pantoprazole  Injectable 40 milliGRAM(s) IV Push daily  polyethylene glycol 3350 17 Gram(s) Oral daily    MEDICATIONS  (PRN):  fentaNYL    Injectable 50 MICROGram(s) IV Push every 4 hours PRN Agitation    Pertinent Labs:  @ 05:30: Na 142, BUN 16, Cr <0.5<L>, <H>, K+ 3.9, Phos --, Mg 2.2, Alk Phos 94, ALT/SGPT 25, AST/SGOT 20    Finger Sticks:  POCT Blood Glucose.: 148 mg/dL ( @ 11:26)  POCT Blood Glucose.: 174 mg/dL ( @ 06:37)  POCT Blood Glucose.: 153 mg/dL ( @ 00:49)  POCT Blood Glucose.: 168 mg/dL ( @ 17:03)    Physical Findings:  - Appearance: unresponsive, 2+ generalized edema  - GI function: LBM   - Tubes: trach, NGT  - Oral/Mouth cavity:  - Skin: sacrum DTI     Nutrition Requirements  Weight Used: 72.6 kgs      Estimated Energy Needs: 1918 kcals MARIA LUISA state,  g protein (1.2-1.4g/kg/BW), 1:1 kcal for estimated fluid needs      Nutrient Intake:  present feeds provide 1728 kcals, 85 g protein and 1440 ml fluid. + H20 flushes of 300 ml q 8hrs. meeting >/=90% of estimated needs     [] Previous Nutrition Diagnosis:            [] Ongoing          [X] Resolved (inadequate kcal intake)    Nutrition Intervention: maintain on above feeds     Goal/Expected Outcome: pt to continue to tolerate EN and meet >80% of estimated nutrient needs      Indicator/Monitoring: RD to monitor tolerance to EN, labs/meds, NFPF and f/u as needed within 2-4 days.

## 2022-02-14 NOTE — PROGRESS NOTE ADULT - SUBJECTIVE AND OBJECTIVE BOX
Patient is a 81y old  Male who presents with a chief complaint of MI (09 Feb 2022 16:06)    Over Night Events: SP trach. LGF overnight.    ROS:   All ROS are negative except HPI     PHYSICAL EXAM  ICU Vital Signs Last 24 Hrs  T(C): 37.5 (14 Feb 2022 07:23), Max: 38.2 (13 Feb 2022 21:00)  T(F): 99.5 (14 Feb 2022 07:23), Max: 100.8 (13 Feb 2022 21:00)  HR: 92 (14 Feb 2022 09:00) (87 - 109)  BP: 131/79 (14 Feb 2022 09:00) (96/52 - 136/62)  BP(mean): 102 (14 Feb 2022 09:00) (69 - 102)  RR: 33 (14 Feb 2022 09:00) (28 - 37)  SpO2: 98% (14 Feb 2022 09:00) (95% - 99%)    General: not awake   HEENT:  trach              Lymph Nodes: No cervical LN   Lungs: Bilateral crackles R > L  Cardiovascular: Regular   Abdomen: Soft, Positive BS  Extremities: No clubbing , edema  Skin: warm   Neurological:  opens eyes spontaneously, does not follow commands  Musculoskeletal: move all ext       02-13-22 @ 07:01  -  02-14-22 @ 07:00  --------------------------------------------------------  IN:    Enteral Tube Flush: 300 mL    Free Water: 160 mL    Glucerna: 1440 mL  Total IN: 1900 mL    OUT:    Chest Tube (mL): 190 mL    Indwelling Catheter - Urethral (mL): 725 mL  Total OUT: 915 mL    Total NET: 985 mL      02-14-22 @ 07:01  -  02-14-22 @ 10:03  --------------------------------------------------------  IN:    Enteral Tube Flush: 30 mL    Glucerna: 180 mL  Total IN: 210 mL    OUT:  Total OUT: 0 mL    Total NET: 210 mL    LABS:                            10.0   11.02 )-----------( 149      ( 14 Feb 2022 05:30 )             32.5     142  |  104  |  16  ----------------------------<  166<H>  3.9   |  28  |  <0.5<L>    Ca    8.1<L>      14 Feb 2022 05:30  Mg     2.2     02-14    TPro  4.6<L>  /  Alb  2.4<L>  /  TBili  1.2  /  DBili  x   /  AST  20  /  ALT  25  /  AlkPhos  94  02-14    LIVER FUNCTIONS - ( 14 Feb 2022 05:30 )  Alb: 2.4 g/dL / Pro: 4.6 g/dL / ALK PHOS: 94 U/L / ALT: 25 U/L / AST: 20 U/L / GGT: x                                              Mode: AC/ CMV (Assist Control/ Continuous Mandatory Ventilation)  RR (machine): 24  TV (machine): 420  FiO2: 40  PEEP: 5  ITime: 0.8  MAP: 8  PIP: 20    ABG - ( 14 Feb 2022 05:01 )  pH, Arterial: 7.45  pH, Blood: x     /  pCO2: 45    /  pO2: 89    / HCO3: 31    / Base Excess: 6.5   /  SaO2: 97.6        MEDICATIONS  (STANDING):  amantadine Syrup 100 milliGRAM(s) Oral every 12 hours  aspirin  chewable 81 milliGRAM(s) Oral daily  chlorhexidine 0.12% Liquid 15 milliLiter(s) Oral Mucosa every 12 hours  chlorhexidine 4% Liquid 1 Application(s) Topical <User Schedule>  dextrose 5%. 1000 milliLiter(s) (50 mL/Hr) IV Continuous <Continuous>  dextrose 5%. 1000 milliLiter(s) (100 mL/Hr) IV Continuous <Continuous>  dextrose 50% Injectable 25 Gram(s) IV Push once  dextrose 50% Injectable 12.5 Gram(s) IV Push once  dextrose 50% Injectable 25 Gram(s) IV Push once  enoxaparin Injectable 40 milliGRAM(s) SubCutaneous daily  fentaNYL   Patch  25 MICROgram(s)/Hr 1 Patch Transdermal every 72 hours  glucagon  Injectable 1 milliGRAM(s) IntraMuscular once  insulin lispro (ADMELOG) corrective regimen sliding scale   SubCutaneous every 6 hours  metoprolol tartrate 25 milliGRAM(s) Oral two times a day  pantoprazole  Injectable 40 milliGRAM(s) IV Push daily  polyethylene glycol 3350 17 Gram(s) Oral daily    MEDICATIONS  (PRN):  acetaminophen     Tablet .. 650 milliGRAM(s) Oral every 6 hours PRN Temp greater or equal to 38C (100.4F), Mild Pain (1 - 3)  albuterol/ipratropium for Nebulization 3 milliLiter(s) Nebulizer every 6 hours PRN Shortness of Breath and/or Wheezing  fentaNYL    Injectable 50 MICROGram(s) IV Push every 4 hours PRN Agitation      New X-rays reviewed:                                                                                  ECHO    CXR interpreted by me:  Trach, bilateral infiltrates R > L

## 2022-02-14 NOTE — PROGRESS NOTE ADULT - SUBJECTIVE AND OBJECTIVE BOX
Patient remains on ventilator, does not respond to verbal stimuli       T(F): 99.5 (02-14-22 @ 07:23), Max: 100.8 (02-13-22 @ 21:00)  HR: 92 (02-14-22 @ 09:00)  BP: 131/79 (02-14-22 @ 09:00)  RR: 24/32  SpO2: 98% (02-14-22 @ 09:00) (95% - 99%)    PHYSICAL EXAM:  GENERAL: NAD  HEAD:  Atraumatic, Normocephalic  EYES: EOMI, PERRLA, conjunctiva and sclera clear  NERVOUS SYSTEM: positive gag   CHEST/LUNG:  bilateral rhonchi  HEART: Regular rate and rhythm; No murmurs, rubs, or gallops  ABDOMEN: Soft, Nontender, Nondistended; Bowel sounds present  EXTREMITIES:  2+ Peripheral Pulses, No clubbing, cyanosis, or edema    LABS  02-14    142  |  104  |  16  ----------------------------<  166<H>  3.9   |  28  |  <0.5<L>    Ca    8.1<L>      14 Feb 2022 05:30  Mg     2.2     02-14    TPro  4.6<L>  /  Alb  2.4<L>  /  TBili  1.2  /  DBili  x   /  AST  20  /  ALT  25  /  AlkPhos  94  02-14                          10.0   11.02 )-----------( 149      ( 14 Feb 2022 05:30 )             32.5       Mode: AC/ CMV (Assist Control/ Continuous Mandatory Ventilation)  RR (machine): 24  TV (machine): 420  FiO2: 40  PEEP: 5    Culture Results:   Growth in anaerobic bottle: Staphylococcus capitis  Coag Negative Staphylococcus  Single set isolate, possible contaminant. Contact  Microbiology if susceptibility testing clinically  indicated.  ***Blood Panel PCR results on this specimen are available  approximately 3 hours after the Gram stain result.***  Gram stain, PCR, and/or culture results may not always  correspond due to difference in methodologies.  ************************************************************  This PCR assay was performed by multiplex PCR. This  Assay tests for 66 bacterial and resistance gene targets.  Please refer to the [facility] test directory  at [email or web address] for details. (01-31-22)  Culture Results:   Normal Respiratory Corina present (01-30-22)  Culture Results:   No Growth Final (01-28-22)  Culture Results:   No growth at 1 week. (01-25-22)  Culture Results:   No Growth Final (01-23-22)  Culture Results:   No growth (01-23-22)    RADIOLOGY  < from: Xray Chest 1 View- PORTABLE-Routine (Xray Chest 1 View- PORTABLE-Routine in AM.) (02.14.22 @ 06:20) >  impression:    Support devices: Tracheostomy tube redemonstrated. Feeding tube coursing   below the field-of-view. Left-sided chest tube redemonstrated.    Cardiac/mediastinum/hilum: Unchanged    Lung parenchyma/Pleura: Bilateral opacities redemonstrated, more   extensive on the right, increased compared to prior. No definite   pneumothorax.    Skeleton/soft tissues: Unchanged      < end of copied text >    MEDICATIONS  (STANDING):  amantadine Syrup 100 milliGRAM(s) Oral every 12 hours  aspirin  chewable 81 milliGRAM(s) Oral daily  chlorhexidine 0.12% Liquid 15 milliLiter(s) Oral Mucosa every 12 hours  chlorhexidine 4% Liquid 1 Application(s) Topical <User Schedule>  enoxaparin Injectable 40 milliGRAM(s) SubCutaneous daily  fentaNYL   Patch  25 MICROgram(s)/Hr 1 Patch Transdermal every 72 hours  insulin lispro (ADMELOG) corrective regimen sliding scale   SubCutaneous every 6 hours  metoprolol tartrate 25 milliGRAM(s) Oral two times a day  pantoprazole  Injectable 40 milliGRAM(s) IV Push daily  polyethylene glycol 3350 17 Gram(s) Oral daily    MEDICATIONS  (PRN):  acetaminophen     Tablet .. 650 milliGRAM(s) Oral every 6 hours PRN Temp greater or equal to 38C (100.4F), Mild Pain (1 - 3)  albuterol/ipratropium for Nebulization 3 milliLiter(s) Nebulizer every 6 hours PRN Shortness of Breath and/or Wheezing  fentaNYL    Injectable 50 MICROGram(s) IV Push every 4 hours PRN Agitation

## 2022-02-14 NOTE — PROGRESS NOTE ADULT - ASSESSMENT
81yMale pmh BPH, HTN admitted s/p cardiac arrest, covid pneumonia and intubated. Patient with anoxic brain injury. GI consulted for PEG placement    Problem 1-PEG tube placement for FTT  Rec  -Will aim for PEG Wednesday given fevers, f/u tracheal cultures  -Continue NG feeds for now  -Please obtain COVID swab tomorrow, will do PEG whether COVID positive or not but incase patient is negative we can do it in our endoscopy suite

## 2022-02-14 NOTE — PROGRESS NOTE ADULT - ASSESSMENT
81 year old male with a hx of HTN, BPH, recent diagnosis of COVID 19 2 days ago (unvaccinated, prescribed decadron/ zpack/ zinc) presenting to the ED S/P cardiac arrest, course complicated by pneumothorax s/p chest tube and removal, and mucus plug removed by bronchoscopy. Patient does not follow any commands as of now.    # COVID pneumonia s/p cardiac arrest  - was down for 7 minutes / found to have pneumothorax s/p pigtail catheter insertion  - course complicated by mucus plug removed by bronch  - on minimal vent settings however not following commands  - seen by ID recommending to stop abx  - received toci and remdesivir, dexamethasone // off sedation  - on Fentanyl patch fentanyl Bolus.  - chest xray concerning for apical pneumothorax on left side as per radiology, pigtail placed (to suction)  - monitor driving pressure  - fever 100.3 will obtain blood cultures and trach cultures per ID  -patient clinically fluid overloaded.  lasix 40mg x1 today    # Anoxic brain injury  - not following commands off sedation; CT head showed periventricular white matter changes which can reflect ischemia  - pending trach and peg tube placement    # DVT PPX: lovenox  # GI PPX: protonix  # Diet: tube feeding  DNR     81 year old male with a hx of HTN, BPH, recent diagnosis of COVID 19 2 days ago (unvaccinated, prescribed decadron/ zpack/ zinc) presenting to the ED S/P cardiac arrest, course complicated by pneumothorax s/p chest tube and removal, and mucus plug removed by bronchoscopy. Patient does not follow any commands as of now.    # COVID pneumonia s/p cardiac arrest  - was down for 7 minutes / found to have pneumothorax s/p pigtail catheter insertion  - course complicated by mucus plug removed by bronch  - on minimal vent settings however not following commands  - seen by ID recommending to stop abx  - received toci and remdesivir, dexamethasone // off sedation  - on Fentanyl patch fentanyl Bolus.  - chest xray concerning for apical pneumothorax on left side as per radiology, pigtail placed (to suction)  - monitor driving pressure  - fever 100.3 will obtain blood cultures and trach cultures per ID  -patient clinically fluid overloaded.  lasix 40mg x1 today    # Anoxic brain injury  - not following commands off sedation; CT head showed periventricular white matter changes which can reflect ischemia  - s/p trach placement on 2/11  - GI will place PEG tube later this week.     # DVT PPX: lovenox  # GI PPX: protonix  # Diet: tube feeding  DNR     81 year old male with a hx of HTN, BPH, recent diagnosis of COVID 19 2 days ago (unvaccinated, prescribed decadron/ zpack/ zinc) presenting to the ED S/P cardiac arrest, course complicated by pneumothorax s/p chest tube and removal, and mucus plug removed by bronchoscopy. Patient does not follow any commands as of now.    # COVID pneumonia s/p cardiac arrest  - was down for 7 minutes / found to have pneumothorax s/p pigtail catheter insertion  - course complicated by mucus plug removed by bronch  - on minimal vent settings however not following commands  - seen by ID recommending to stop abx  - received toci and remdesivir, dexamethasone // off sedation  - on Fentanyl patch fentanyl Bolus.  - chest xray concerning for apical pneumothorax on left side as per radiology, pigtail placed (to suction)  - monitor driving pressure  - fever 100.3 will obtain blood cultures and trach cultures per ID  -patient clinically fluid overloaded.  lasix 40mg x1 today    # Anoxic brain injury  - not following commands off sedation; CT head showed periventricular white matter changes which can reflect ischemia  - s/p trach placement on 2/11  - GI will place PEG tube later this week.     discussed update with Daughter otis and son in law lindsey today    # DVT PPX: lovenox  # GI PPX: protonix  # Diet: tube feeding  DNR

## 2022-02-14 NOTE — PROGRESS NOTE ADULT - SUBJECTIVE AND OBJECTIVE BOX
SUBJECTIVE:    Patient is a 81y old Male who presents with a chief complaint of MI (09 Feb 2022 16:06)    Currently admitted to medicine with the primary diagnosis of Cardiac arrest       Today is hospital day 30d. This morning he is resting comfortably in bed and reports no new issues or overnight events.     Review of Systems: patient sedated unable to obtain    PAST MEDICAL & SURGICAL HISTORY  BPH (benign prostatic hyperplasia)    Mild HTN        ALLERGIES:  Allergy Status Unknown    MEDICATIONS:  STANDING MEDICATIONS  amantadine Syrup 100 milliGRAM(s) Oral every 12 hours  aspirin  chewable 81 milliGRAM(s) Oral daily  chlorhexidine 0.12% Liquid 15 milliLiter(s) Oral Mucosa every 12 hours  chlorhexidine 4% Liquid 1 Application(s) Topical <User Schedule>  dextrose 5%. 1000 milliLiter(s) IV Continuous <Continuous>  dextrose 5%. 1000 milliLiter(s) IV Continuous <Continuous>  dextrose 50% Injectable 25 Gram(s) IV Push once  dextrose 50% Injectable 12.5 Gram(s) IV Push once  dextrose 50% Injectable 25 Gram(s) IV Push once  enoxaparin Injectable 40 milliGRAM(s) SubCutaneous daily  fentaNYL   Patch  25 MICROgram(s)/Hr 1 Patch Transdermal every 72 hours  furosemide   Injectable 40 milliGRAM(s) IV Push once  glucagon  Injectable 1 milliGRAM(s) IntraMuscular once  insulin lispro (ADMELOG) corrective regimen sliding scale   SubCutaneous every 6 hours  metoprolol tartrate 25 milliGRAM(s) Oral two times a day  pantoprazole  Injectable 40 milliGRAM(s) IV Push daily  polyethylene glycol 3350 17 Gram(s) Oral daily    PRN MEDICATIONS  acetaminophen     Tablet .. 650 milliGRAM(s) Oral every 6 hours PRN  albuterol/ipratropium for Nebulization 3 milliLiter(s) Nebulizer every 6 hours PRN  fentaNYL    Injectable 50 MICROGram(s) IV Push every 4 hours PRN    VITALS:   T(F): 99.5  HR: 92  BP: 131/79  RR: 33  SpO2: 98%    LABS:                        10.0   11.02 )-----------( 149      ( 14 Feb 2022 05:30 )             32.5     02-14    142  |  104  |  16  ----------------------------<  166<H>  3.9   |  28  |  <0.5<L>    Ca    8.1<L>      14 Feb 2022 05:30  Mg     2.2     02-14    TPro  4.6<L>  /  Alb  2.4<L>  /  TBili  1.2  /  DBili  x   /  AST  20  /  ALT  25  /  AlkPhos  94  02-14        ABG - ( 14 Feb 2022 05:01 )  pH, Arterial: 7.45  pH, Blood: x     /  pCO2: 45    /  pO2: 89    / HCO3: 31    / Base Excess: 6.5   /  SaO2: 97.6                      RADIOLOGY:    PHYSICAL EXAM:  GEN: No acute distress  LUNGS: Clear to auscultation bilaterally   HEART: S1/S2 present. RRR.   ABD: Soft, non-tender, non-distended. Bowel sounds present  EXT: NC/NC/NE/2+PP/SHABAZZ/Skin Intact.   NEURO: AAOX3    Intravenous access:   NG tube:   Becker cathter: Indwelling Urethral Catheter:     Connect To:  Straight Drainage/Gravity    Indication:  Urine Output Monitoring in Critically Ill (02-10-22 @ 22:56) (not performed) [Active]  Indwelling Urethral Catheter:     Connect To:  Straight Drainage/Gravity    Indication:  Urine Output Monitoring in Critically Ill (02-08-22 @ 13:22) (not performed) [Active]  Indwelling Urethral Catheter:     Connect To:  Straight Drainage/Gravity    Indication:  Urine Output Monitoring in Critically Ill (02-07-22 @ 14:46) (not performed) [Active]  Indwelling Urethral Catheter:     Connect To:  Straight Drainage/Gravity    Indication:  Urine Output Monitoring in Critically Ill (02-06-22 @ 11:57) (not performed) [Active]  Indwelling Urethral Catheter:     Connect To:  Straight Drainage/[place]    Indication:  Urinary Retention / Obstruction (02-01-22 @ 19:51) (not performed) [Active]  Indwelling Urethral Catheter:     Connect To:  Straight Drainage/[place]    Indication:  Urinary Retention / Obstruction (01-31-22 @ 14:21) (not performed) [Active]

## 2022-02-14 NOTE — PROGRESS NOTE ADULT - ASSESSMENT
81 year old male with a hx of HTN, BPH, recent diagnosis of COVID 19 2 days ago prior to admission -> presented to the ED S/P cardiac arrest.  Patient began to have worsening dyspnea at home, EMS was called. Found to be in cardiac arrest. ROSC was achieved after 2 rounds of CPR and epi, downtime 7 mins. Was intubated in the field.     acute respiratory failure / Cardiac arrest / COVID-19 pneumonia / probable NSTEMI / L sided pneumothorax / anoxic brain injury         - start fentanyl patch and continue fentanyl pushes prn   - send cultures if spikes temp   -  family wishes to have the pt transferred to a long term facility in [facility] for PEG    - very poor prognosis, family aware

## 2022-02-14 NOTE — PROGRESS NOTE ADULT - SUBJECTIVE AND OBJECTIVE BOX
81yMale  Being followed for PEG placement   Interval history: No hematemesis, melena, blood in stool reported. Patient s/p trach with fevers now awaiting PEG.      PAST MEDICAL & SURGICAL HISTORY:   BPH (benign prostatic hyperplasia)    Mild HTN            Social History: No smoking. No alcohol. No illegal drug use.            MEDICATIONS  (STANDING):  amantadine Syrup 100 milliGRAM(s) Oral every 12 hours  aspirin  chewable 81 milliGRAM(s) Oral daily  chlorhexidine 0.12% Liquid 15 milliLiter(s) Oral Mucosa every 12 hours  chlorhexidine 4% Liquid 1 Application(s) Topical <User Schedule>  dextrose 5%. 1000 milliLiter(s) (50 mL/Hr) IV Continuous <Continuous>  dextrose 5%. 1000 milliLiter(s) (100 mL/Hr) IV Continuous <Continuous>  dextrose 50% Injectable 25 Gram(s) IV Push once  dextrose 50% Injectable 12.5 Gram(s) IV Push once  dextrose 50% Injectable 25 Gram(s) IV Push once  enoxaparin Injectable 40 milliGRAM(s) SubCutaneous daily  fentaNYL   Patch  25 MICROgram(s)/Hr 1 Patch Transdermal every 72 hours  furosemide   Injectable 40 milliGRAM(s) IV Push once  glucagon  Injectable 1 milliGRAM(s) IntraMuscular once  insulin lispro (ADMELOG) corrective regimen sliding scale   SubCutaneous every 6 hours  metoprolol tartrate 25 milliGRAM(s) Oral two times a day  pantoprazole  Injectable 40 milliGRAM(s) IV Push daily  polyethylene glycol 3350 17 Gram(s) Oral daily    MEDICATIONS  (PRN):  acetaminophen     Tablet .. 650 milliGRAM(s) Oral every 6 hours PRN Temp greater or equal to 38C (100.4F), Mild Pain (1 - 3)  albuterol/ipratropium for Nebulization 3 milliLiter(s) Nebulizer every 6 hours PRN Shortness of Breath and/or Wheezing  fentaNYL    Injectable 50 MICROGram(s) IV Push every 4 hours PRN Agitation      Allergies:   Allergy Status Unknown          REVIEW OF SYSTEMS:  does not respond to prompts     VITAL SIGNS:   T(F): 99.5 (02-14-22 @ 07:23), Max: 100.8 (02-13-22 @ 21:00)  HR: 92 (02-14-22 @ 09:00) (87 - 109)  BP: 131/79 (02-14-22 @ 09:00) (96/52 - 136/62)  RR: 33 (02-14-22 @ 09:00) (28 - 37)  SpO2: 98% (02-14-22 @ 09:00) (95% - 99%)    PHYSICAL EXAM:  GENERAL: does not respond to prompts, +trach  HEAD:  Atraumatic, Normocephalic  EYES: conjunctiva and sclera clear  NECK: Supple, no JVD or thyromegaly  CHEST/LUNG: b/l rhonchi  HEART: Regular rate and rhythm; normal S1, S2, No murmurs.  ABDOMEN: Soft, nontender, nondistended; Bowel sounds present  NEUROLOGY: No asterixis or tremor.   SKIN: Intact, no jaundice            LABS:                        10.0   11.02 )-----------( 149      ( 14 Feb 2022 05:30 )             32.5     02-14    142  |  104  |  16  ----------------------------<  166<H>  3.9   |  28  |  <0.5<L>    Ca    8.1<L>      14 Feb 2022 05:30  Mg     2.2     02-14    TPro  4.6<L>  /  Alb  2.4<L>  /  TBili  1.2  /  DBili  x   /  AST  20  /  ALT  25  /  AlkPhos  94  02-14    LIVER FUNCTIONS - ( 14 Feb 2022 05:30 )  Alb: 2.4 g/dL / Pro: 4.6 g/dL / ALK PHOS: 94 U/L / ALT: 25 U/L / AST: 20 U/L / GGT: x               IMAGING:      < from: Xray Chest 1 View- PORTABLE-Routine (Xray Chest 1 View- PORTABLE-Routine in AM.) (02.14.22 @ 06:20) >    ACC: [id number] EXAM:  XR CHEST PORTABLE  ROUTINE 1V                          PROCEDURE DATE:  02/14/2022          INTERPRETATION:  Clinical History / Reason for exam: Respiratory failure    Comparison : Chest radiograph 2/12/2022.    Technique/Positioning: Frontal chest radiograph.    Findings/  impression:    Support devices: Tracheostomy tube redemonstrated. Feeding tube coursing   below the field-of-view. Left-sided chest tube redemonstrated.    Cardiac/mediastinum/hilum: Unchanged    Lung parenchyma/Pleura: Bilateral opacities redemonstrated, more   extensive on the right, increased compared to prior. No definite   pneumothorax.    Skeleton/soft tissues: Unchanged    --- End of Report ---            JENNIE GRAF MD; Attending Radiologist  This document has been electronically signed. Feb 14 2022  9:07AM    < end of copied text >

## 2022-02-14 NOTE — PROGRESS NOTE ADULT - ASSESSMENT
A 81 year old male with a hx of HTN, BPH, recent diagnosis of COVID 19 2 days ago (unvaccinated, prescribed decadron/ zpack/ zinc) presenting to the ED S/P cardiac arrest. Intubated in the field    IMPRESSION  #COVID19 PNA, Severe (O2 < or = 94% on RA and requiring supplemental O2) with Cardiac arrest    CXR diffuse bilateral opacities     D-Dimer Assay, Quantitative: 7368 ng/mL DDU (01-15-22 @ 01:25)    s/p Toci 1/17  #Lactic acidosis  #Transaminitis   #Cardiac arrest- ROSC was achieved after 2 rounds of CPR and epi, downtime 7 mins  # Left sided Pneumothorax- on CXR from 2/2/22      would recommend:    1. Follow up sputum culture, is in process   2. Management of Trach as per CCU protocol  3. Monitor  Temp. and c/w supportive care  4. Aspiration precaution    Attending Attestation:    Spent more than 35 minutes on total encounter, more than 50 % of the visit was spent counseling and/or coordinating care by the Attending physician.

## 2022-02-15 NOTE — PROGRESS NOTE ADULT - ASSESSMENT
IMPRESSION:  Acute hypoxic respiratory failure SP Trach  Anoxic brain injury  Cardiac Arrest  Severe COVID PNA  Sepsis on present on admission  Left PNX SP chest tube, resolved  Mucus plugs SP Bronchoscopy  GNR on DTA  Ischemic changes on CT Head      SUGGEST:    CNS:   off sedation.   Pain control.    HEENT: Oral care.      PULMONARY: HOB @ 45 degrees.   Aspiration precautions.   Vent changes: wean fiO2  Monitor driving pressure.   Pigtail to suction    CARDIOVASCULAR:  Lasix 40mg IV x 1.  Avoid overload.  Keep I < O.    GI: GI prophylaxis.  c/w OG feeding.  Bowel regimen.  GI for PEG.    RENAL:  Follow up lytes.  Correct as needed.  Monitor UO.  Becker care.    INFECTIOUS DISEASE:  Cefepime.  Repeat Procalcitonin. ID FU.  FU cultures.    HEMATOLOGICAL: DVT prophylaxis    ENDOCRINE:  Follow up FS.  Insulin protocol if needed    MUSCULOSKELETAL: bedrest    DNR  Very poor overall prognosis  Start Dispo planning

## 2022-02-15 NOTE — PROGRESS NOTE ADULT - SUBJECTIVE AND OBJECTIVE BOX
Having low grade fevers; plan for PEG on hold for now, in lieu of this.  Secretions per tracheostomy per nurse; being suctioned; otherwise, remains clinically stable with no improvement in mental status and remains nonverbal.     Vent settings with FiO2 40%  WBC ct 11.44 // Hgb stable, 10.1  ABG 7.49 // 45 / /106 // 34    CXR with bilateral opacitis, to my view R>L, L pigtail in place in lieu of PTX being managed by thoracic surgery

## 2022-02-15 NOTE — PROGRESS NOTE ADULT - ASSESSMENT
81 year old male with a hx of HTN, BPH, recent diagnosis of COVID 19 2 days ago prior to admission -> presented to the ED S/P cardiac arrest.  Patient began to have worsening dyspnea at home, EMS was called. Found to be in cardiac arrest. ROSC was achieved after 2 rounds of CPR and epi, downtime 7 mins. Was intubated in the field.     acute respiratory failure / Cardiac arrest / COVID-19 pneumonia / probable NSTEMI / L sided pneumothorax / anoxic brain injury         - maintain  fentanyl patch and continue fentanyl pushes prn   - send cultures if spikes temp   -  family wishes to have the pt transferred to a long term facility in [place]   - s/p trach   - awaiting GI for PEG    - very poor prognosis, family aware 81 year old male with a hx of HTN, BPH, recent diagnosis of COVID 19 2 days ago prior to admission -> presented to the ED S/P cardiac arrest.  Patient began to have worsening dyspnea at home, EMS was called. Found to be in cardiac arrest. ROSC was achieved after 2 rounds of CPR and epi, downtime 7 mins. Was intubated in the field.     acute respiratory failure / Cardiac arrest / COVID-19 pneumonia / probable NSTEMI / L sided pneumothorax / anoxic brain injury         - maintain  fentanyl patch and continue fentanyl pushes prn   - send cultures if spikes temp   -  family wishes to have the pt transferred to a long term facility in [place]   - s/p trach   - awaiting GI for PEG - send covid swab today   - very poor prognosis, family aware

## 2022-02-15 NOTE — PROGRESS NOTE ADULT - NECK DETAILS
Tracheostomy in place; some mixed hemo-purulent content expressed and gauze removed from incision. Wound closed with suture; intact. No cellulitis or crepitus./supple

## 2022-02-15 NOTE — PROGRESS NOTE ADULT - SUBJECTIVE AND OBJECTIVE BOX
CHADWICK VENCES  81y, Male  Allergy: Allergy Status Unknown      LOS  31d    CHIEF COMPLAINT: MI (09 Feb 2022 16:06)      INTERVAL EVENTS/HPI  - No acute events overnight  - T(F): , Max: 100 (02-14-22 @ 19:00)  - Febrile on 2.13, low grade temps overnight   - WBC Count: 11.44 (02-15-22 @ 05:30)  WBC Count: 11.02 (02-14-22 @ 05:30)     - Creatinine, Serum: <0.5 (02-15-22 @ 05:30)  Creatinine, Serum: <0.5 (02-14-22 @ 05:30)       ROS  unable to obtain history secondary to patient's mental status and/or sedation    VITALS:  T(F): 100, Max: 100 (02-14-22 @ 19:00)  HR: 97  BP: 122/74  RR: 23Vital Signs Last 24 Hrs  T(C): 37.8 (15 Feb 2022 07:01), Max: 37.8 (14 Feb 2022 19:00)  T(F): 100 (15 Feb 2022 07:01), Max: 100 (14 Feb 2022 19:00)  HR: 97 (15 Feb 2022 07:56) (91 - 110)  BP: 122/74 (15 Feb 2022 05:00) (117/63 - 165/74)  BP(mean): 92 (15 Feb 2022 05:00) (84 - 120)  RR: 23 (15 Feb 2022 07:01) (23 - 34)  SpO2: 100% (15 Feb 2022 07:56) (97% - 100%)    PHYSICAL EXAM:  Gen: trach/PEG  HEENT: Normocephalic, atraumatic  Neck: supple, no lymphadenopathy  CV: Regular rate & regular rhythm  Lungs: decreased BS at bases, no fremitus  Abdomen: Soft, BS present  Ext: Warm, well perfused  Neuro: non focal, awake  Skin: no rash, no erythema  Lines: no phlebitis    FH: Non-contributory  Social Hx: Non-contributory    TESTS & MEASUREMENTS:                        10.1   11.44 )-----------( 173      ( 15 Feb 2022 05:30 )             33.3     02-15    144  |  103  |  18  ----------------------------<  153<H>  4.2   |  29  |  <0.5<L>    Ca    8.5      15 Feb 2022 05:30  Phos  3.6     02-15  Mg     2.1     02-15    TPro  4.8<L>  /  Alb  2.5<L>  /  TBili  0.9  /  DBili  x   /  AST  29  /  ALT  35  /  AlkPhos  101  02-15    eGFR if Non African American: 124 mL/min/1.73M2 (02-15-22 @ 05:30)  eGFR if African American: 144 mL/min/1.73M2 (02-15-22 @ 05:30)    LIVER FUNCTIONS - ( 15 Feb 2022 05:30 )  Alb: 2.5 g/dL / Pro: 4.8 g/dL / ALK PHOS: 101 U/L / ALT: 35 U/L / AST: 29 U/L / GGT: x               Culture - Sputum (collected 02-14-22 @ 11:30)  Source: .Sputum Deep tracheal aspirate  Gram Stain (02-14-22 @ 23:16):    Moderate polymorphonuclear leukocytes per low power field    No Squamous epithelial cells per low power field    Moderate Gram Negative Rods seen per oil power field    Culture - Blood (collected 01-31-22 @ 06:15)  Source: .Blood Blood-Peripheral  Gram Stain (02-03-22 @ 03:08):    Growth in anaerobic bottle: Gram positive cocci in pairs  Final Report (02-03-22 @ 21:57):    Growth in anaerobic bottle: Staphylococcus capitis    Coag Negative Staphylococcus    Single set isolate, possible contaminant. Contact    Microbiology if susceptibility testing clinically    indicated.    ***Blood Panel PCR results on this specimen are available    approximately 3 hours after the Gram stain result.***    Gram stain, PCR, and/or culture results may not always    correspond due to difference in methodologies.    ************************************************************    This PCR assay was performed by multiplex PCR. This    Assay tests for 66 bacterial and resistance gene targets.    Please refer to the [facility] test directory    at [email or web address] for details.  Organism: Blood Culture PCR (02-03-22 @ 21:57)  Organism: Blood Culture PCR (02-03-22 @ 21:57)      -  Coagulase negative Staphylococcus: Detec      Method Type: PCR    Culture - Bronchial (collected 01-30-22 @ 16:00)  Source: .Bronchial None  Gram Stain (01-30-22 @ 22:58):    Few polymorphonuclear leukocytes per low power field    No Squamous epithelial cells per low power field    Few Gram positive cocci in pairs per oil power field  Final Report (02-01-22 @ 19:11):    Normal Respiratory Corina present    Culture - Blood (collected 01-28-22 @ 16:37)  Source: .Blood None  Final Report (02-02-22 @ 23:00):    No Growth Final    Culture - Acid Fast - Sputum w/Smear (collected 01-25-22 @ 10:00)  Source: .Sputum Sputum  Preliminary Report (02-02-22 @ 15:03):    No growth at 1 week.    Culture - Blood (collected 01-23-22 @ 11:17)  Source: .Blood Blood-Peripheral  Final Report (01-28-22 @ 23:00):    No Growth Final    Culture - Urine (collected 01-23-22 @ 11:00)  Source: Catheterized Catheterized  Final Report (01-24-22 @ 16:25):    No growth            INFECTIOUS DISEASES TESTING  Procalcitonin, Serum: 0.09 (02-12-22 @ 06:21)  COVID-19 PCR: Detected (02-10-22 @ 13:00)  Procalcitonin, Serum: 0.04 (02-09-22 @ 06:03)  Procalcitonin, Serum: 0.03 (02-01-22 @ 06:25)  Procalcitonin, Serum: 0.07 (01-31-22 @ 06:15)  Procalcitonin, Serum: 0.10 (01-30-22 @ 10:45)  Procalcitonin, Serum: 0.11 (01-29-22 @ 07:17)  Procalcitonin, Serum: 0.08 (01-26-22 @ 06:15)  Procalcitonin, Serum: 0.09 (01-25-22 @ 05:58)  Procalcitonin, Serum: 0.11 (01-23-22 @ 11:17)  MRSA PCR Result.: Negative (01-23-22 @ 11:00)  Procalcitonin, Serum: 0.44 (01-19-22 @ 06:30)  Procalcitonin, Serum: 2.32 (01-16-22 @ 06:56)  Rapid RVP Result: Detected (01-15-22 @ 01:25)  Procalcitonin, Serum: 0.10 (01-15-22 @ 01:25)      INFLAMMATORY MARKERS  C-Reactive Protein, Serum: <3 mg/L (02-01-22 @ 06:25)  C-Reactive Protein, Serum: <3 mg/L (01-26-22 @ 06:15)  C-Reactive Protein, Serum: 50 mg/L (01-16-22 @ 06:56)  C-Reactive Protein, Serum: 76 mg/L (01-15-22 @ 01:25)      RADIOLOGY & ADDITIONAL TESTS:  I have personally reviewed the last available Chest xray  CXR      CT      CARDIOLOGY TESTING      MEDICATIONS  amantadine Syrup 100 Oral every 12 hours  aspirin  chewable 81 Oral daily  chlorhexidine 0.12% Liquid 15 Oral Mucosa every 12 hours  chlorhexidine 4% Liquid 1 Topical <User Schedule>  dextrose 5%. 1000 IV Continuous <Continuous>  dextrose 5%. 1000 IV Continuous <Continuous>  dextrose 50% Injectable 25 IV Push once  dextrose 50% Injectable 12.5 IV Push once  dextrose 50% Injectable 25 IV Push once  enoxaparin Injectable 40 SubCutaneous daily  glucagon  Injectable 1 IntraMuscular once  insulin lispro (ADMELOG) corrective regimen sliding scale  SubCutaneous every 6 hours  metoprolol tartrate 25 Oral two times a day  pantoprazole  Injectable 40 IV Push daily  polyethylene glycol 3350 17 Oral daily      WEIGHT  Weight (kg): 76.9 (02-11-22 @ 16:42)  Creatinine, Serum: <0.5 mg/dL (02-15-22 @ 05:30)      ANTIBIOTICS:      All available historical records have been reviewed       CHADWICK VENCES  81y, Male  Allergy: Allergy Status Unknown      LOS  31d    CHIEF COMPLAINT: MI (09 Feb 2022 16:06)      INTERVAL EVENTS/HPI  - No acute events overnight  - T(F): , Max: 100 (02-14-22 @ 19:00)  - Febrile on 2.13, low grade temps overnight   - WBC Count: 11.44 (02-15-22 @ 05:30)  WBC Count: 11.02 (02-14-22 @ 05:30)     - Creatinine, Serum: <0.5 (02-15-22 @ 05:30)  Creatinine, Serum: <0.5 (02-14-22 @ 05:30)       ROS  unable to obtain history secondary to patient's mental status and/or sedation    VITALS:  T(F): 100, Max: 100 (02-14-22 @ 19:00)  HR: 97  BP: 122/74  RR: 23Vital Signs Last 24 Hrs  T(C): 37.8 (15 Feb 2022 07:01), Max: 37.8 (14 Feb 2022 19:00)  T(F): 100 (15 Feb 2022 07:01), Max: 100 (14 Feb 2022 19:00)  HR: 97 (15 Feb 2022 07:56) (91 - 110)  BP: 122/74 (15 Feb 2022 05:00) (117/63 - 165/74)  BP(mean): 92 (15 Feb 2022 05:00) (84 - 120)  RR: 23 (15 Feb 2022 07:01) (23 - 34)  SpO2: 100% (15 Feb 2022 07:56) (97% - 100%)    PHYSICAL EXAM:  Gen: trach/PEG  HEENT: Normocephalic, atraumatic  Neck: supple, no lymphadenopathy  CV: Regular rate & regular rhythm  Lungs: decreased BS at bases, no fremitus; left chest tube   Abdomen: Soft, BS present  Ext: Warm, well perfused  Neuro: non focal, awake  Skin: no rash, no erythema  Lines: no phlebitis    FH: Non-contributory  Social Hx: Non-contributory    TESTS & MEASUREMENTS:                        10.1   11.44 )-----------( 173      ( 15 Feb 2022 05:30 )             33.3     02-15    144  |  103  |  18  ----------------------------<  153<H>  4.2   |  29  |  <0.5<L>    Ca    8.5      15 Feb 2022 05:30  Phos  3.6     02-15  Mg     2.1     02-15    TPro  4.8<L>  /  Alb  2.5<L>  /  TBili  0.9  /  DBili  x   /  AST  29  /  ALT  35  /  AlkPhos  101  02-15    eGFR if Non African American: 124 mL/min/1.73M2 (02-15-22 @ 05:30)  eGFR if African American: 144 mL/min/1.73M2 (02-15-22 @ 05:30)    LIVER FUNCTIONS - ( 15 Feb 2022 05:30 )  Alb: 2.5 g/dL / Pro: 4.8 g/dL / ALK PHOS: 101 U/L / ALT: 35 U/L / AST: 29 U/L / GGT: x               Culture - Sputum (collected 02-14-22 @ 11:30)  Source: .Sputum Deep tracheal aspirate  Gram Stain (02-14-22 @ 23:16):    Moderate polymorphonuclear leukocytes per low power field    No Squamous epithelial cells per low power field    Moderate Gram Negative Rods seen per oil power field    Culture - Blood (collected 01-31-22 @ 06:15)  Source: .Blood Blood-Peripheral  Gram Stain (02-03-22 @ 03:08):    Growth in anaerobic bottle: Gram positive cocci in pairs  Final Report (02-03-22 @ 21:57):    Growth in anaerobic bottle: Staphylococcus capitis    Coag Negative Staphylococcus    Single set isolate, possible contaminant. Contact    Microbiology if susceptibility testing clinically    indicated.    ***Blood Panel PCR results on this specimen are available    approximately 3 hours after the Gram stain result.***    Gram stain, PCR, and/or culture results may not always    correspond due to difference in methodologies.    ************************************************************    This PCR assay was performed by multiplex PCR. This    Assay tests for 66 bacterial and resistance gene targets.    Please refer to the [facility] test directory    at [email or web address] for details.  Organism: Blood Culture PCR (02-03-22 @ 21:57)  Organism: Blood Culture PCR (02-03-22 @ 21:57)      -  Coagulase negative Staphylococcus: Detec      Method Type: PCR    Culture - Bronchial (collected 01-30-22 @ 16:00)  Source: .Bronchial None  Gram Stain (01-30-22 @ 22:58):    Few polymorphonuclear leukocytes per low power field    No Squamous epithelial cells per low power field    Few Gram positive cocci in pairs per oil power field  Final Report (02-01-22 @ 19:11):    Normal Respiratory Corina present    Culture - Blood (collected 01-28-22 @ 16:37)  Source: .Blood None  Final Report (02-02-22 @ 23:00):    No Growth Final    Culture - Acid Fast - Sputum w/Smear (collected 01-25-22 @ 10:00)  Source: .Sputum Sputum  Preliminary Report (02-02-22 @ 15:03):    No growth at 1 week.    Culture - Blood (collected 01-23-22 @ 11:17)  Source: .Blood Blood-Peripheral  Final Report (01-28-22 @ 23:00):    No Growth Final    Culture - Urine (collected 01-23-22 @ 11:00)  Source: Catheterized Catheterized  Final Report (01-24-22 @ 16:25):    No growth            INFECTIOUS DISEASES TESTING  Procalcitonin, Serum: 0.09 (02-12-22 @ 06:21)  COVID-19 PCR: Detected (02-10-22 @ 13:00)  Procalcitonin, Serum: 0.04 (02-09-22 @ 06:03)  Procalcitonin, Serum: 0.03 (02-01-22 @ 06:25)  Procalcitonin, Serum: 0.07 (01-31-22 @ 06:15)  Procalcitonin, Serum: 0.10 (01-30-22 @ 10:45)  Procalcitonin, Serum: 0.11 (01-29-22 @ 07:17)  Procalcitonin, Serum: 0.08 (01-26-22 @ 06:15)  Procalcitonin, Serum: 0.09 (01-25-22 @ 05:58)  Procalcitonin, Serum: 0.11 (01-23-22 @ 11:17)  MRSA PCR Result.: Negative (01-23-22 @ 11:00)  Procalcitonin, Serum: 0.44 (01-19-22 @ 06:30)  Procalcitonin, Serum: 2.32 (01-16-22 @ 06:56)  Rapid RVP Result: Detected (01-15-22 @ 01:25)  Procalcitonin, Serum: 0.10 (01-15-22 @ 01:25)      INFLAMMATORY MARKERS  C-Reactive Protein, Serum: <3 mg/L (02-01-22 @ 06:25)  C-Reactive Protein, Serum: <3 mg/L (01-26-22 @ 06:15)  C-Reactive Protein, Serum: 50 mg/L (01-16-22 @ 06:56)  C-Reactive Protein, Serum: 76 mg/L (01-15-22 @ 01:25)      RADIOLOGY & ADDITIONAL TESTS:  I have personally reviewed the last available Chest xray  CXR      CT      CARDIOLOGY TESTING      MEDICATIONS  amantadine Syrup 100 Oral every 12 hours  aspirin  chewable 81 Oral daily  chlorhexidine 0.12% Liquid 15 Oral Mucosa every 12 hours  chlorhexidine 4% Liquid 1 Topical <User Schedule>  dextrose 5%. 1000 IV Continuous <Continuous>  dextrose 5%. 1000 IV Continuous <Continuous>  dextrose 50% Injectable 25 IV Push once  dextrose 50% Injectable 12.5 IV Push once  dextrose 50% Injectable 25 IV Push once  enoxaparin Injectable 40 SubCutaneous daily  glucagon  Injectable 1 IntraMuscular once  insulin lispro (ADMELOG) corrective regimen sliding scale  SubCutaneous every 6 hours  metoprolol tartrate 25 Oral two times a day  pantoprazole  Injectable 40 IV Push daily  polyethylene glycol 3350 17 Oral daily      WEIGHT  Weight (kg): 76.9 (02-11-22 @ 16:42)  Creatinine, Serum: <0.5 mg/dL (02-15-22 @ 05:30)      ANTIBIOTICS:      All available historical records have been reviewed

## 2022-02-15 NOTE — PROGRESS NOTE ADULT - SUBJECTIVE AND OBJECTIVE BOX
SUBJECTIVE:    Patient is a 81y old Male who presents with a chief complaint of MI (09 Feb 2022 16:06)    Currently admitted to medicine with the primary diagnosis of Cardiac arrest       Today is hospital day 31d. This morning he is resting comfortably in bed and reports no new issues or overnight events.     Review of Systems:  General: denies fevers, chills, or weight changes  HEENT: denies headaches, visual changes, or vertigo  Resp: denies worsening dsypnea or cough  CVS: denies chest pain or palpitations  GI: denies diarrhea, abdominal pain, or melena  : denies dysuria, hematuria, freuquency or urgency  Neuro/MSK: denies weakness or memory changes    PAST MEDICAL & SURGICAL HISTORY  BPH (benign prostatic hyperplasia)    Mild HTN        ALLERGIES:  Allergy Status Unknown    MEDICATIONS:  STANDING MEDICATIONS  amantadine Syrup 100 milliGRAM(s) Oral every 12 hours  aspirin  chewable 81 milliGRAM(s) Oral daily  cefepime   IVPB 1000 milliGRAM(s) IV Intermittent every 8 hours  chlorhexidine 0.12% Liquid 15 milliLiter(s) Oral Mucosa every 12 hours  chlorhexidine 4% Liquid 1 Application(s) Topical <User Schedule>  dextrose 5%. 1000 milliLiter(s) IV Continuous <Continuous>  dextrose 5%. 1000 milliLiter(s) IV Continuous <Continuous>  dextrose 50% Injectable 25 Gram(s) IV Push once  dextrose 50% Injectable 12.5 Gram(s) IV Push once  dextrose 50% Injectable 25 Gram(s) IV Push once  enoxaparin Injectable 40 milliGRAM(s) SubCutaneous daily  furosemide   Injectable 40 milliGRAM(s) IV Push once  glucagon  Injectable 1 milliGRAM(s) IntraMuscular once  insulin lispro (ADMELOG) corrective regimen sliding scale   SubCutaneous every 6 hours  metoprolol tartrate 25 milliGRAM(s) Oral two times a day  pantoprazole  Injectable 40 milliGRAM(s) IV Push daily  polyethylene glycol 3350 17 Gram(s) Oral daily    PRN MEDICATIONS  acetaminophen     Tablet .. 650 milliGRAM(s) Oral every 6 hours PRN  albuterol/ipratropium for Nebulization 3 milliLiter(s) Nebulizer every 6 hours PRN  fentaNYL    Injectable 50 MICROGram(s) IV Push every 4 hours PRN  morphine  - Injectable 2 milliGRAM(s) IV Push every 4 hours PRN    VITALS:   T(F): 99.7  HR: 103  BP: 132/82  RR: 33  SpO2: 99%    LABS:                        10.1   11.44 )-----------( 173      ( 15 Feb 2022 05:30 )             33.3     02-15    144  |  103  |  18  ----------------------------<  153<H>  4.2   |  29  |  <0.5<L>    Ca    8.5      15 Feb 2022 05:30  Phos  3.6     02-15  Mg     2.1     02-15    TPro  4.8<L>  /  Alb  2.5<L>  /  TBili  0.9  /  DBili  x   /  AST  29  /  ALT  35  /  AlkPhos  101  02-15    PT/INR - ( 15 Feb 2022 05:30 )   PT: 13.40 sec;   INR: 1.17 ratio         PTT - ( 15 Feb 2022 05:30 )  PTT:30.5 sec    ABG - ( 15 Feb 2022 04:34 )  pH, Arterial: 7.49  pH, Blood: x     /  pCO2: 45    /  pO2: 106   / HCO3: 34    / Base Excess: 9.8   /  SaO2: 98.7                  Culture - Sputum (collected 14 Feb 2022 11:30)  Source: .Sputum Deep tracheal aspirate  Gram Stain (14 Feb 2022 23:16):    Moderate polymorphonuclear leukocytes per low power field    No Squamous epithelial cells per low power field    Moderate Gram Negative Rods seen per oil power field            PHYSICAL EXAM:  GEN: intubated/sedated  HEENT: trach  LUNGS: b/l air entry  HEART: S1/S2 present. RRR.   ABD: Soft, non-tender, non-distended. Bowel sounds present  EXT:-c/c/e  NEURO: unresponsive    Intravenous access:   NG tube:   Becker cathter: Indwelling Urethral Catheter:     Connect To:  Straight Drainage/Gravity    Indication:  Urine Output Monitoring in Critically Ill (02-10-22 @ 22:56) (not performed) [Active]  Indwelling Urethral Catheter:     Connect To:  Straight Drainage/Gravity    Indication:  Urine Output Monitoring in Critically Ill (02-08-22 @ 13:22) (not performed) [Active]  Indwelling Urethral Catheter:     Connect To:  Straight Drainage/Gravity    Indication:  Urine Output Monitoring in Critically Ill (02-07-22 @ 14:46) (not performed) [Active]  Indwelling Urethral Catheter:     Connect To:  Straight Drainage/Gravity    Indication:  Urine Output Monitoring in Critically Ill (02-06-22 @ 11:57) (not performed) [Active]  Indwelling Urethral Catheter:     Connect To:  Straight Drainage/[place]    Indication:  Urinary Retention / Obstruction (02-01-22 @ 19:51) (not performed) [Active]

## 2022-02-15 NOTE — PROGRESS NOTE ADULT - SUBJECTIVE AND OBJECTIVE BOX
Patient remains unresponsive to verbal stimuli       T(F): 99.9 (02-15-22 @ 09:00), Max: 100 (02-14-22 @ 19:00)  HR: 101 (02-15-22 @ 09:01)  BP: 128/80 (02-15-22 @ 09:01)  RR: 32/24  SpO2: 99% (02-15-22 @ 09:01) (97% - 100%)    PHYSICAL EXAM:  GENERAL: NAD  HEAD:  Atraumatic, Normocephalic  EYES: EOMI, PERRLA, conjunctiva and sclera clear  NERVOUS SYSTEM:  positive gag   CHEST/LUNG:  bilateral rhonchi  HEART: Regular rate and rhythm; No murmurs, rubs, or gallops  ABDOMEN: Soft, Nontender, Nondistended; Bowel sounds present  EXTREMITIES:  2+ Peripheral Pulses, No clubbing, cyanosis, or edema    LABS  02-15    144  |  103  |  18  ----------------------------<  153<H>  4.2   |  29  |  <0.5<L>    Ca    8.5      15 Feb 2022 05:30  Phos  3.6     02-15  Mg     2.1     02-15    TPro  4.8<L>  /  Alb  2.5<L>  /  TBili  0.9  /  DBili  x   /  AST  29  /  ALT  35  /  AlkPhos  101  02-15                          10.1   11.44 )-----------( 173      ( 15 Feb 2022 05:30 )             33.3     PT/INR - ( 15 Feb 2022 05:30 )   PT: 13.40 sec;   INR: 1.17 ratio         PTT - ( 15 Feb 2022 05:30 )  PTT:30.5 sec    Mode: AC/ CMV (Assist Control/ Continuous Mandatory Ventilation)  RR (machine): 24  TV (machine): 420  FiO2: 40  PEEP: 5    Culture Results:   Growth in anaerobic bottle: Staphylococcus capitis  Coag Negative Staphylococcus  Single set isolate, possible contaminant. Contact  Microbiology if susceptibility testing clinically  indicated.  ***Blood Panel PCR results on this specimen are available  approximately 3 hours after the Gram stain result.***  Gram stain, PCR, and/or culture results may not always  correspond due to difference in methodologies.  ************************************************************  This PCR assay was performed by multiplex PCR. This  Assay tests for 66 bacterial and resistance gene targets.  Please refer to the [facility] test directory  at [email or web address] for details. (01-31-22)  Culture Results:   Normal Respiratory Corina present (01-30-22)  Culture Results:   No Growth Final (01-28-22)  Culture Results:   No growth at 1 week. (01-25-22)  Culture Results:   No Growth Final (01-23-22)  Culture Results:   No growth (01-23-22)    RADIOLOGY  < from: Xray Chest 1 View- PORTABLE-Routine (Xray Chest 1 View- PORTABLE-Routine in AM.) (02.14.22 @ 06:20) >  impression:    Support devices: Tracheostomy tube redemonstrated. Feeding tube coursing   below the field-of-view. Left-sided chest tube redemonstrated.    Cardiac/mediastinum/hilum: Unchanged    Lung parenchyma/Pleura: Bilateral opacities redemonstrated, more   extensive on the right, increased compared to prior. No definite   pneumothorax.      < end of copied text >    MEDICATIONS  (STANDING):  amantadine Syrup 100 milliGRAM(s) Oral every 12 hours  aspirin  chewable 81 milliGRAM(s) Oral daily  chlorhexidine 0.12% Liquid 15 milliLiter(s) Oral Mucosa every 12 hours  chlorhexidine 4% Liquid 1 Application(s) Topical <User Schedule>  enoxaparin Injectable 40 milliGRAM(s) SubCutaneous daily  insulin lispro (ADMELOG) corrective regimen sliding scale   SubCutaneous every 6 hours  metoprolol tartrate 25 milliGRAM(s) Oral two times a day  pantoprazole  Injectable 40 milliGRAM(s) IV Push daily  polyethylene glycol 3350 17 Gram(s) Oral daily    MEDICATIONS  (PRN):  acetaminophen     Tablet .. 650 milliGRAM(s) Oral every 6 hours PRN Temp greater or equal to 38C (100.4F), Mild Pain (1 - 3)  albuterol/ipratropium for Nebulization 3 milliLiter(s) Nebulizer every 6 hours PRN Shortness of Breath and/or Wheezing  fentaNYL    Injectable 50 MICROGram(s) IV Push every 4 hours PRN Agitation

## 2022-02-15 NOTE — PROGRESS NOTE ADULT - ASSESSMENT
81 year old male with a hx of HTN, BPH, recent diagnosis of COVID 19 2 days ago (unvaccinated, prescribed decadron/ zpack/ zinc) presenting to the ED S/P cardiac arrest. Intubated in the field    IMPRESSION  #COVID19 PNA, Severe (O2 < or = 94% on RA and requiring supplemental O2) with Cardiac arrest    CXR diffuse bilateral opacities     D-Dimer Assay, Quantitative: 7368 ng/mL DDU (01-15-22 @ 01:25)    s/p Toci 1/17    #Fevers     #Cardiac arrest- ROSC was achieved after 2 rounds of CPR and epi, downtime 7 mins  - s/p trach 2/11    #Left sided Pneumothorax- on CXR from 2/2/22      Recommendations  This is a preliminary incomplete pended note, all final recommendations to follow after interview and examination of the patient.    Please call or message on [employer] if with any questions.  Spectra [phone]     81 year old male with a hx of HTN, BPH, recent diagnosis of COVID 19 2 days ago (unvaccinated, prescribed decadron/ zpack/ zinc) presenting to the ED S/P cardiac arrest. Intubated in the field    IMPRESSION  #COVID19 PNA, Severe (O2 < or = 94% on RA and requiring supplemental O2) with Cardiac arrest    CXR diffuse bilateral opacities     D-Dimer Assay, Quantitative: 7368 ng/mL DDU (01-15-22 @ 01:25)    s/p Toci 1/17    #Fevers 2/13  - cXR 2/14 with bilateral opacities, worsening on Right     #Cardiac arrest- ROSC was achieved after 2 rounds of CPR and epi, downtime 7 mins  - s/p trach 2/11    #Left sided Pneumothorax- on CXR from 2/2/22    Recommendations  - follow-up blood cx 2/15  - check tracheal cultures  - monitor off antibiotics for now    Please call or message on [employer] Teams if with any questions.  Spectra [phone]

## 2022-02-15 NOTE — PROGRESS NOTE ADULT - SUBJECTIVE AND OBJECTIVE BOX
81yMale  Being followed for PEG tube placement  Interval history: no hematemesis, melena, blood in stool reported. patient having low grade fevers.      PAST MEDICAL & SURGICAL HISTORY:   BPH (benign prostatic hyperplasia)    Mild HTN            Social History: No smoking. No alcohol. No illegal drug use.            MEDICATIONS  (STANDING):  amantadine Syrup 100 milliGRAM(s) Oral every 12 hours  aspirin  chewable 81 milliGRAM(s) Oral daily  cefepime   IVPB 1000 milliGRAM(s) IV Intermittent every 8 hours  chlorhexidine 0.12% Liquid 15 milliLiter(s) Oral Mucosa every 12 hours  chlorhexidine 4% Liquid 1 Application(s) Topical <User Schedule>  dextrose 5%. 1000 milliLiter(s) (50 mL/Hr) IV Continuous <Continuous>  dextrose 5%. 1000 milliLiter(s) (100 mL/Hr) IV Continuous <Continuous>  dextrose 50% Injectable 25 Gram(s) IV Push once  dextrose 50% Injectable 12.5 Gram(s) IV Push once  dextrose 50% Injectable 25 Gram(s) IV Push once  enoxaparin Injectable 40 milliGRAM(s) SubCutaneous daily  furosemide   Injectable 40 milliGRAM(s) IV Push once  glucagon  Injectable 1 milliGRAM(s) IntraMuscular once  insulin lispro (ADMELOG) corrective regimen sliding scale   SubCutaneous every 6 hours  metoprolol tartrate 25 milliGRAM(s) Oral two times a day  pantoprazole  Injectable 40 milliGRAM(s) IV Push daily  polyethylene glycol 3350 17 Gram(s) Oral daily    MEDICATIONS  (PRN):  acetaminophen     Tablet .. 650 milliGRAM(s) Oral every 6 hours PRN Temp greater or equal to 38C (100.4F), Mild Pain (1 - 3)  albuterol/ipratropium for Nebulization 3 milliLiter(s) Nebulizer every 6 hours PRN Shortness of Breath and/or Wheezing  fentaNYL    Injectable 50 MICROGram(s) IV Push every 4 hours PRN Agitation  morphine  - Injectable 2 milliGRAM(s) IV Push every 4 hours PRN Moderate Pain (4 - 6)      Allergies:   Allergy Status Unknown          REVIEW OF SYSTEMS:  unobtainable        VITAL SIGNS:   T(F): 99.7 (02-15-22 @ 11:00), Max: 100 (02-14-22 @ 19:00)  HR: 103 (02-15-22 @ 11:01) (91 - 110)  BP: 132/82 (02-15-22 @ 11:01) (120/75 - 165/74)  RR: 33 (02-15-22 @ 11:01) (23 - 34)  SpO2: 99% (02-15-22 @ 11:01) (98% - 100%)    PHYSICAL EXAM:  GENERAL: +trach  HEAD:  Atraumatic, Normocephalic  EYES: conjunctiva and sclera clear  NECK: Supple, no JVD or thyromegaly  CHEST/LUNG: b/l rhonchi  HEART: Regular rate and rhythm; normal S1, S2, No murmurs.  ABDOMEN: Soft, nontender, nondistended; Bowel sounds present  NEUROLOGY: No asterixis or tremor.   SKIN: Intact, no jaundice            LABS:                        10.1   11.44 )-----------( 173      ( 15 Feb 2022 05:30 )             33.3     02-15    144  |  103  |  18  ----------------------------<  153<H>  4.2   |  29  |  <0.5<L>    Ca    8.5      15 Feb 2022 05:30  Phos  3.6     02-15  Mg     2.1     02-15    TPro  4.8<L>  /  Alb  2.5<L>  /  TBili  0.9  /  DBili  x   /  AST  29  /  ALT  35  /  AlkPhos  101  02-15    LIVER FUNCTIONS - ( 15 Feb 2022 05:30 )  Alb: 2.5 g/dL / Pro: 4.8 g/dL / ALK PHOS: 101 U/L / ALT: 35 U/L / AST: 29 U/L / GGT: x           PT/INR - ( 15 Feb 2022 05:30 )   PT: 13.40 sec;   INR: 1.17 ratio         PTT - ( 15 Feb 2022 05:30 )  PTT:30.5 sec    IMAGING:    < from: Xray Chest 1 View- PORTABLE-Routine (Xray Chest 1 View- PORTABLE-Routine in AM.) (02.15.22 @ 07:04) >  ACC: [id number] EXAM:  XR CHEST PORTABLE  ROUTINE 1V                          PROCEDURE DATE:  02/15/2022          INTERPRETATION:  Clinical History / Reason for exam: Chest pain    Comparison : Chest radiograph 2/14/2022.    Technique/Positioning: Frontal, portable and rotated to the right.    Findings:    Support devices: Tracheostomy tube is in good position. Nasogastric tube   is seen coursing below diaphragm, tip not seen. There is a left chest   catheter. Telemetry leads overlie patient.    Cardiac/mediastinum/hilum: Difficult to evaluate due to rotation    Lung parenchyma/Pleura: Stable bilateral lung opacities    Skeleton/soft tissues: Stable    Impression:    Stable bilateral lung opacities        --- End of Report ---            AISHA RAMOS MD; Attending Radiologist  This document has been electronically signed. Feb 15 2022 10:42AM    < end of copied text >

## 2022-02-15 NOTE — PROGRESS NOTE ADULT - SUBJECTIVE AND OBJECTIVE BOX
Patient is a 81y old  Male who presents with a chief complaint of MI (09 Feb 2022 16:06)    Over Night Events:  No overnight events.  Off pressors.    ROS:   All ROS are negative except HPI     PHYSICAL EXAM  ICU Vital Signs Last 24 Hrs  T(C): 37.7 (15 Feb 2022 09:00), Max: 37.8 (14 Feb 2022 19:00)  T(F): 99.9 (15 Feb 2022 09:00), Max: 100 (14 Feb 2022 19:00)  HR: 101 (15 Feb 2022 09:01) (91 - 110)  BP: 128/80 (15 Feb 2022 09:01) (117/63 - 165/74)  BP(mean): 100 (15 Feb 2022 09:01) (84 - 120)  RR: 33 (15 Feb 2022 09:01) (23 - 34)  SpO2: 99% (15 Feb 2022 09:01) (97% - 100%)    General: not awake   HEENT:  trach              Lymph Nodes: No cervical LN   Lungs: Bilateral crackles R > L  Cardiovascular: Regular   Abdomen: Soft, Positive BS  Extremities: No clubbing , edema  Skin: warm   Neurological:  opens eyes spontaneously, does not follow commands  Musculoskeletal: move all ext     02-14-22 @ 07:01  -  02-15-22 @ 07:00  --------------------------------------------------------  IN:    Enteral Tube Flush: 250 mL    Glucerna: 1440 mL  Total IN: 1690 mL    OUT:    Chest Tube (mL): 20 mL    Indwelling Catheter - Urethral (mL): 1840 mL  Total OUT: 1860 mL    Total NET: -170 mL      02-15-22 @ 07:01  -  02-15-22 @ 09:51  --------------------------------------------------------  IN:    Enteral Tube Flush: 30 mL    Glucerna: 180 mL  Total IN: 210 mL    OUT:    Indwelling Catheter - Urethral (mL): 55 mL  Total OUT: 55 mL    Total NET: 155 mL      LABS:                        10.1   11.44 )-----------( 173      ( 15 Feb 2022 05:30 )             33.3     144  |  103  |  18  ----------------------------<  153<H>  4.2   |  29  |  <0.5<L>    Ca    8.5      15 Feb 2022 05:30  Phos  3.6     02-15  Mg     2.1     02-15    TPro  4.8<L>  /  Alb  2.5<L>  /  TBili  0.9  /  DBili  x   /  AST  29  /  ALT  35  /  AlkPhos  101  02-15    PT/INR - ( 15 Feb 2022 05:30 )   PT: 13.40 sec;   INR: 1.17 ratio    PTT - ( 15 Feb 2022 05:30 )  PTT:30.5 sec                                           LIVER FUNCTIONS - ( 15 Feb 2022 05:30 )  Alb: 2.5 g/dL / Pro: 4.8 g/dL / ALK PHOS: 101 U/L / ALT: 35 U/L / AST: 29 U/L / GGT: x                                              Culture - Sputum (collected 14 Feb 2022 11:30)  Source: .Sputum Deep tracheal aspirate  Gram Stain (14 Feb 2022 23:16):    Moderate polymorphonuclear leukocytes per low power field    No Squamous epithelial cells per low power field    Moderate Gram Negative Rods seen per oil power field    Mode: AC/ CMV (Assist Control/ Continuous Mandatory Ventilation)  RR (machine): 24  TV (machine): 420  FiO2: 40  PEEP: 5  ITime: 0.8  MAP: 9  PIP: 23    ABG - ( 15 Feb 2022 04:34 )  pH, Arterial: 7.49  pH, Blood: x     /  pCO2: 45    /  pO2: 106   / HCO3: 34    / Base Excess: 9.8   /  SaO2: 98.7        MEDICATIONS  (STANDING):  amantadine Syrup 100 milliGRAM(s) Oral every 12 hours  aspirin  chewable 81 milliGRAM(s) Oral daily  chlorhexidine 0.12% Liquid 15 milliLiter(s) Oral Mucosa every 12 hours  chlorhexidine 4% Liquid 1 Application(s) Topical <User Schedule>  dextrose 5%. 1000 milliLiter(s) (50 mL/Hr) IV Continuous <Continuous>  dextrose 5%. 1000 milliLiter(s) (100 mL/Hr) IV Continuous <Continuous>  dextrose 50% Injectable 25 Gram(s) IV Push once  dextrose 50% Injectable 12.5 Gram(s) IV Push once  dextrose 50% Injectable 25 Gram(s) IV Push once  enoxaparin Injectable 40 milliGRAM(s) SubCutaneous daily  glucagon  Injectable 1 milliGRAM(s) IntraMuscular once  insulin lispro (ADMELOG) corrective regimen sliding scale   SubCutaneous every 6 hours  metoprolol tartrate 25 milliGRAM(s) Oral two times a day  pantoprazole  Injectable 40 milliGRAM(s) IV Push daily  polyethylene glycol 3350 17 Gram(s) Oral daily    MEDICATIONS  (PRN):  acetaminophen     Tablet .. 650 milliGRAM(s) Oral every 6 hours PRN Temp greater or equal to 38C (100.4F), Mild Pain (1 - 3)  albuterol/ipratropium for Nebulization 3 milliLiter(s) Nebulizer every 6 hours PRN Shortness of Breath and/or Wheezing  fentaNYL    Injectable 50 MICROGram(s) IV Push every 4 hours PRN Agitation      New X-rays reviewed:                                                                                  ECHO    CXR interpreted by me:  bilateral opacities R > L, improved from prior

## 2022-02-15 NOTE — PROGRESS NOTE ADULT - ASSESSMENT
81yMale pmh BPH, HTN admitted s/p cardiac arrest, covid pneumonia and intubated. Patient with anoxic brain injury. GI consulted for PEG placement    Problem 1-PEG tube placement for FTT  Rec  -To discuss with Dr. Fuentes about PEG tomorrow given fevers  -Continue NG feeds for now   81yMale pmh BPH, HTN admitted s/p cardiac arrest, covid pneumonia and intubated. Patient with anoxic brain injury. GI consulted for PEG placement    Problem 1-PEG tube placement for FTT  Rec  -case discussed with performing endoscopist, will hold off on PEG until infectious workup complete  -Continue NG feeds for now   81yMale pmh BPH, HTN admitted s/p cardiac arrest, covid pneumonia and intubated. Patient with anoxic brain injury. GI consulted for PEG placement    Problem 1-PEG tube placement for FTT  Rec  -case discussed with attending, will hold off on PEG until infectious workup complete  -Continue NG feeds for now

## 2022-02-15 NOTE — PROGRESS NOTE ADULT - ASSESSMENT
· Assessment	  81 year old male with a hx of HTN, BPH, recent diagnosis of COVID 19 2 days ago (unvaccinated, prescribed decadron/ zpack/ zinc) presenting to the ED S/P cardiac arrest, course complicated by pneumothorax s/p chest tube and removal, and mucus plug removed by bronchoscopy. Patient does not follow any commands as of now.    # COVID pneumonia s/p cardiac arrest  - was down for 7 minutes / found to have pneumothorax s/p pigtail catheter insertion  - course complicated by mucus plug removed by bronch  - on minimal vent settings however not following commands  - seen by ID recommending to stop abx  - received toci and remdesivir, dexamethasone // off sedation  - on Fentanyl patch fentanyl Bolus.  - chest xray worsening left sided basilar opacity.  - monitor driving pressure  - fever 100.3 deep tracheal aspirate grew moderate gram negative rods  -started cefepime 1g q8 hours   -patient clinically fluid overloaded.  lasix 40mg x1 today    # Anoxic brain injury  - not following commands off sedation; CT head showed periventricular white matter changes which can reflect ischemia  - s/p trach placement on 2/11  - GI will place PEG tube later this week.     discussed update with Daughter otis and son in law lindsey today    # DVT PPX: lovenox  # GI PPX: protonix  # Diet: tube feeding  DNR

## 2022-02-15 NOTE — PROGRESS NOTE ADULT - ASSESSMENT
80yo man with anoxic brain injury following cardiac arrest in the field in setting of acute hypoxic respiratory failure due to COVID-19. Now s/p tracheostomy and pigtail insertion for L sided PTX. Stable.

## 2022-02-16 NOTE — PROGRESS NOTE ADULT - SUBJECTIVE AND OBJECTIVE BOX
CHADWICK VENCES  81y, Male  Allergy: Allergy Status Unknown      LOS  32d    CHIEF COMPLAINT: MI (09 Feb 2022 16:06)      INTERVAL EVENTS/HPI  - No acute events overnight  - T(F): , Max: 100.6 (02-15-22 @ 23:00)  - remains febrile - sputum cx 2/14 growing pseudomonas   - WBC Count: 11.41 (02-16-22 @ 05:32)  WBC Count: 11.44 (02-15-22 @ 05:30)     - Creatinine, Serum: <0.5 (02-16-22 @ 05:32)  Creatinine, Serum: <0.5 (02-15-22 @ 12:51)       ROS  unable to obtain history secondary to patient's mental status and/or sedation      VITALS:  T(F): 99.9, Max: 100.6 (02-15-22 @ 23:00)  HR: 87  BP: 158/74  RR: 22Vital Signs Last 24 Hrs  T(C): 37.7 (16 Feb 2022 07:10), Max: 38.1 (15 Feb 2022 23:00)  T(F): 99.9 (16 Feb 2022 07:10), Max: 100.6 (15 Feb 2022 23:00)  HR: 87 (16 Feb 2022 07:56) (87 - 111)  BP: 158/74 (16 Feb 2022 05:00) (103/62 - 158/74)  BP(mean): 107 (16 Feb 2022 05:00) (78 - 111)  RR: 22 (16 Feb 2022 07:10) (22 - 35)  SpO2: 99% (16 Feb 2022 07:56) (98% - 100%)    PHYSICAL EXAM:  Gen: NAD, resting in bed  HEENT: Normocephalic, atraumatic  Neck: supple, no lymphadenopathy  CV: Regular rate & regular rhythm  Lungs: decreased BS at bases, no fremitus  Abdomen: Soft, BS present  Ext: Warm, well perfused  Neuro: non focal, awake  Skin: no rash, no erythema  Lines: no phlebitis    FH: Non-contributory  Social Hx: Non-contributory    TESTS & MEASUREMENTS:                        10.1   11.41 )-----------( 175      ( 16 Feb 2022 05:32 )             33.9     02-16    144  |  103  |  21<H>  ----------------------------<  148<H>  3.9   |  32  |  <0.5<L>    Ca    8.4<L>      16 Feb 2022 05:32  Phos  3.8     02-16  Mg     2.1     02-16    TPro  4.5<L>  /  Alb  2.5<L>  /  TBili  0.8  /  DBili  x   /  AST  34  /  ALT  51<H>  /  AlkPhos  117<H>  02-16    eGFR if Non African American: 124 mL/min/1.73M2 (02-16-22 @ 05:32)  eGFR if African American: 144 mL/min/1.73M2 (02-16-22 @ 05:32)  eGFR if Non African American: 124 mL/min/1.73M2 (02-15-22 @ 12:51)  eGFR if : 144 mL/min/1.73M2 (02-15-22 @ 12:51)    LIVER FUNCTIONS - ( 16 Feb 2022 05:32 )  Alb: 2.5 g/dL / Pro: 4.5 g/dL / ALK PHOS: 117 U/L / ALT: 51 U/L / AST: 34 U/L / GGT: x               Culture - Sputum (collected 02-14-22 @ 11:30)  Source: .Sputum Deep tracheal aspirate  Gram Stain (02-14-22 @ 23:16):    Moderate polymorphonuclear leukocytes per low power field    No Squamous epithelial cells per low power field    Moderate Gram Negative Rods seen per oil power field  Preliminary Report (02-15-22 @ 17:18):    Moderate Pseudomonas aeruginosa    Culture - Blood (collected 01-31-22 @ 06:15)  Source: .Blood Blood-Peripheral  Gram Stain (02-03-22 @ 03:08):    Growth in anaerobic bottle: Gram positive cocci in pairs  Final Report (02-03-22 @ 21:57):    Growth in anaerobic bottle: Staphylococcus capitis    Coag Negative Staphylococcus    Single set isolate, possible contaminant. Contact    Microbiology if susceptibility testing clinically    indicated.    ***Blood Panel PCR results on this specimen are available    approximately 3 hours after the Gram stain result.***    Gram stain, PCR, and/or culture results may not always    correspond due to difference in methodologies.    ************************************************************    This PCR assay was performed by multiplex PCR. This    Assay tests for 66 bacterial and resistance gene targets.    Please refer to the [facility] test directory    at [email or web address] for details.  Organism: Blood Culture PCR (02-03-22 @ 21:57)  Organism: Blood Culture PCR (02-03-22 @ 21:57)      -  Coagulase negative Staphylococcus: Detec      Method Type: PCR    Culture - Bronchial (collected 01-30-22 @ 16:00)  Source: .Bronchial None  Gram Stain (01-30-22 @ 22:58):    Few polymorphonuclear leukocytes per low power field    No Squamous epithelial cells per low power field    Few Gram positive cocci in pairs per oil power field  Final Report (02-01-22 @ 19:11):    Normal Respiratory Corina present    Culture - Blood (collected 01-28-22 @ 16:37)  Source: .Blood None  Final Report (02-02-22 @ 23:00):    No Growth Final    Culture - Acid Fast - Sputum w/Smear (collected 01-25-22 @ 10:00)  Source: .Sputum Sputum  Preliminary Report (02-02-22 @ 15:03):    No growth at 1 week.    Culture - Blood (collected 01-23-22 @ 11:17)  Source: .Blood Blood-Peripheral  Final Report (01-28-22 @ 23:00):    No Growth Final    Culture - Urine (collected 01-23-22 @ 11:00)  Source: Catheterized Catheterized  Final Report (01-24-22 @ 16:25):    No growth            INFECTIOUS DISEASES TESTING  COVID-19 PCR: NotDetec (02-15-22 @ 07:30)  Procalcitonin, Serum: 0.09 (02-12-22 @ 06:21)  COVID-19 PCR: Detected (02-10-22 @ 13:00)  Procalcitonin, Serum: 0.04 (02-09-22 @ 06:03)  Procalcitonin, Serum: 0.03 (02-01-22 @ 06:25)  Procalcitonin, Serum: 0.07 (01-31-22 @ 06:15)  Procalcitonin, Serum: 0.10 (01-30-22 @ 10:45)  Procalcitonin, Serum: 0.11 (01-29-22 @ 07:17)  Procalcitonin, Serum: 0.08 (01-26-22 @ 06:15)  Procalcitonin, Serum: 0.09 (01-25-22 @ 05:58)  Procalcitonin, Serum: 0.11 (01-23-22 @ 11:17)  MRSA PCR Result.: Negative (01-23-22 @ 11:00)  Procalcitonin, Serum: 0.44 (01-19-22 @ 06:30)  Procalcitonin, Serum: 2.32 (01-16-22 @ 06:56)  Rapid RVP Result: Detected (01-15-22 @ 01:25)  Procalcitonin, Serum: 0.10 (01-15-22 @ 01:25)      INFLAMMATORY MARKERS  C-Reactive Protein, Serum: <3 mg/L (02-01-22 @ 06:25)  C-Reactive Protein, Serum: <3 mg/L (01-26-22 @ 06:15)  C-Reactive Protein, Serum: 50 mg/L (01-16-22 @ 06:56)  C-Reactive Protein, Serum: 76 mg/L (01-15-22 @ 01:25)      RADIOLOGY & ADDITIONAL TESTS:  I have personally reviewed the last available Chest xray  CXR      CT      CARDIOLOGY TESTING      MEDICATIONS  amantadine Syrup 100 Oral every 12 hours  aspirin  chewable 81 Oral daily  cefepime   IVPB 1000 IV Intermittent every 8 hours  chlorhexidine 0.12% Liquid 15 Oral Mucosa every 12 hours  chlorhexidine 4% Liquid 1 Topical <User Schedule>  dextrose 5%. 1000 IV Continuous <Continuous>  dextrose 5%. 1000 IV Continuous <Continuous>  dextrose 50% Injectable 25 IV Push once  dextrose 50% Injectable 12.5 IV Push once  dextrose 50% Injectable 25 IV Push once  enoxaparin Injectable 40 SubCutaneous daily  glucagon  Injectable 1 IntraMuscular once  insulin lispro (ADMELOG) corrective regimen sliding scale  SubCutaneous every 6 hours  metoprolol tartrate 25 Oral two times a day  pantoprazole  Injectable 40 IV Push daily  polyethylene glycol 3350 17 Oral daily      WEIGHT  Weight (kg): 76.9 (02-11-22 @ 16:42)  Creatinine, Serum: <0.5 mg/dL (02-16-22 @ 05:32)  Creatinine, Serum: <0.5 mg/dL (02-15-22 @ 12:51)      ANTIBIOTICS:  cefepime   IVPB 1000 milliGRAM(s) IV Intermittent every 8 hours      All available historical records have been reviewed

## 2022-02-16 NOTE — PROGRESS NOTE ADULT - ASSESSMENT
81yMale pmh BPH, HTN admitted s/p cardiac arrest, covid pneumonia and intubated. Patient with anoxic brain injury. GI consulted for PEG placement    Problem 1-PEG tube placement for FTT  Rec  -case discussed with attending, will hold off on PEG until infectious workup complete, patient with fevers, positive sputum cultures, currently pending blood cultures, we would want to know if patient is bacteremic as introducing the PEG at this point will be another source of infection, thus at current the PEG placement is on hold  -Will follow-up   -Continue NG feeds for now

## 2022-02-16 NOTE — PROGRESS NOTE ADULT - SUBJECTIVE AND OBJECTIVE BOX
SUBJECTIVE:    Patient is a 81y old Male who presents with a chief complaint of MI (09 Feb 2022 16:06)    Currently admitted to medicine with the primary diagnosis of CARDIAC ARREST;PNEUMONIA DUE TO COVID VIRUS;ACUTE RESPIRATORY DISTRESS SYN      Today is hospital day 32d.This morning he  is resting comfortably in bed and reports no new issues or overnight events.     HPI:  81 year old male with a hx of HTN, BPH, recent diagnosis of COVID 19 2 days ago (unvaccinated, prescribed decadron/ zpack/ zinc) presenting to the ED S/P cardiac arrest. Patient intubated/ sedated so history obtained from chart (son left and attempted to reach). Patient began to have worsening dyspnea at home, EMS was called. Found to be in respiratory arrest by EMS and followed by EMS. ROSC was achieved after 2 rounds of CPR and epi, downtime 7 mins. Was intubated in the field.   In ED central line was placed. ABG: PH 7.15, PaO2 66, PaCO2 45, HCO3- 16. Troponins .1, BNP > 8000, D-Dimer 7368. COVID 19 positive. Patient being admitted s/p cardiac arrest to ICU.      (15 Aquiles 2022 04:09)      PAST MEDICAL & SURGICAL HISTORY  BPH (benign prostatic hyperplasia)    Mild HTN      SOCIAL HISTORY:  Negative for smoking/alcohol/drug use.     ALLERGIES:  Allergy Status Unknown    MEDICATIONS:  STANDING MEDICATIONS  amantadine Syrup 100 milliGRAM(s) Oral every 12 hours  aspirin  chewable 81 milliGRAM(s) Oral daily  cefepime   IVPB 2000 milliGRAM(s) IV Intermittent every 8 hours  chlorhexidine 0.12% Liquid 15 milliLiter(s) Oral Mucosa every 12 hours  chlorhexidine 4% Liquid 1 Application(s) Topical <User Schedule>  dextrose 5%. 1000 milliLiter(s) IV Continuous <Continuous>  dextrose 5%. 1000 milliLiter(s) IV Continuous <Continuous>  dextrose 50% Injectable 25 Gram(s) IV Push once  dextrose 50% Injectable 12.5 Gram(s) IV Push once  dextrose 50% Injectable 25 Gram(s) IV Push once  enoxaparin Injectable 40 milliGRAM(s) SubCutaneous daily  glucagon  Injectable 1 milliGRAM(s) IntraMuscular once  insulin lispro (ADMELOG) corrective regimen sliding scale   SubCutaneous every 6 hours  metoprolol tartrate 25 milliGRAM(s) Oral two times a day  pantoprazole  Injectable 40 milliGRAM(s) IV Push daily  polyethylene glycol 3350 17 Gram(s) Oral daily    PRN MEDICATIONS  acetaminophen     Tablet .. 650 milliGRAM(s) Oral every 6 hours PRN  albuterol/ipratropium for Nebulization 3 milliLiter(s) Nebulizer every 6 hours PRN  fentaNYL    Injectable 50 MICROGram(s) IV Push every 4 hours PRN  morphine  - Injectable 2 milliGRAM(s) IV Push every 4 hours PRN    VITALS:   T(F): 99.9  HR: 87  BP: 129/65  RR: 24  SpO2: 98%      PHYSICAL EXAM:  PHYSICAL EXAM:  GENERAL: NAD  HEAD:  Atraumatic, Normocephalic  EYES: EOMI, PERRLA, conjunctiva and sclera clear  NERVOUS SYSTEM: positive gag  CHEST/LUNG:  bilateral rhonchi  HEART: Regular rate and rhythm; No murmurs, rubs, or gallops  ABDOMEN: Soft, Nontender, Nondistended; Bowel sounds present  EXTREMITIES:  b/l  edema      LABS:                        10.1   11.41 )-----------( 175      ( 16 Feb 2022 05:32 )             33.9     02-16    144  |  103  |  21<H>  ----------------------------<  148<H>  3.9   |  32  |  <0.5<L>    Ca    8.4<L>      16 Feb 2022 05:32  Phos  3.8     02-16  Mg     2.1     02-16    TPro  4.5<L>  /  Alb  2.5<L>  /  TBili  0.8  /  DBili  x   /  AST  34  /  ALT  51<H>  /  AlkPhos  117<H>  02-16    PT/INR - ( 15 Feb 2022 05:30 )   PT: 13.40 sec;   INR: 1.17 ratio         PTT - ( 15 Feb 2022 05:30 )  PTT:30.5 sec    ABG - ( 16 Feb 2022 05:20 )  pH, Arterial: 7.48  pH, Blood: x     /  pCO2: 49    /  pO2: 96    / HCO3: 36    / Base Excess: 11.1  /  SaO2: 97.9                  Culture - Sputum (collected 14 Feb 2022 11:30)  Source: .Sputum Deep tracheal aspirate  Gram Stain (14 Feb 2022 23:16):    Moderate polymorphonuclear leukocytes per low power field    No Squamous epithelial cells per low power field    Moderate Gram Negative Rods seen per oil power field  Preliminary Report (15 Feb 2022 17:18):    Moderate Pseudomonas aeruginosa              02-15-22 @ 07:01  -  02-16-22 @ 07:00  --------------------------------------------------------  IN: 1840 mL / OUT: 1710 mL / NET: 130 mL    02-16-22 @ 07:01  -  02-16-22 @ 18:21  --------------------------------------------------------  IN: 920 mL / OUT: 580 mL / NET: 340 mL      Mode: AC/ CMV (Assist Control/ Continuous Mandatory Ventilation), RR (machine): 24, TV (machine): 420, FiO2: 40, PEEP: 5, ITime: 0.8, MAP: 8, PIP: 25

## 2022-02-16 NOTE — PROGRESS NOTE ADULT - ASSESSMENT
81 year old male with a hx of HTN, BPH, recent diagnosis of COVID 19 2 days ago (unvaccinated, prescribed decadron/ zpack/ zinc) presenting to the ED S/P cardiac arrest, course complicated by pneumothorax s/p chest tube and removal, and mucus plug removed by bronchoscopy. Patient does not follow any commands as of now.    # COVID pneumonia s/p cardiac arrest  - was down for 7 minutes / found to have pneumothorax s/p pigtail catheter insertion  - course complicated by mucus plug removed by bronch  - on minimal vent settings however not following commands  - seen by ID recommending to stop abx  - received toci and remdesivir, dexamethasone // off sedation  - on Fentanyl patch fentanyl Bolus.  - chest xray worsening left sided basilar opacity.  - monitor driving pressure  - fever 100.3 deep tracheal aspirate grew moderate gram negative rods  -started cefepime 1g q8 hours   -patient clinically fluid overloaded.  lasix 40mg x1 today    # Anoxic brain injury  - not following commands off sedation; CT head showed periventricular white matter changes which can reflect ischemia  - s/p trach placement on 2/11  - GI will place PEG tube later this week.     discussed update with Daughter otis and son in law lindsey today    # DVT PPX: lovenox  # GI PPX: protonix  # Diet: tube feeding  DNR

## 2022-02-16 NOTE — PROGRESS NOTE ADULT - ATTENDING SUPERVISION STATEMENT
ACP
Fellow
ACP
ACP
Fellow
Resident
ACP
Fellow
ACP
Fellow

## 2022-02-16 NOTE — PROGRESS NOTE ADULT - ASSESSMENT
81 year old male with a hx of HTN, BPH, recent diagnosis of COVID 19 2 days ago prior to admission -> presented to the ED S/P cardiac arrest.  Patient began to have worsening dyspnea at home, EMS was called. Found to be in cardiac arrest. ROSC was achieved after 2 rounds of CPR and epi, downtime 7 mins. Was intubated in the field.     acute respiratory failure / Cardiac arrest / COVID-19 pneumonia / probable NSTEMI / L sided pneumothorax / anoxic brain injury / gram negative pneumonia        - continue Cefepime as per ID and follow pseudomonas sensitivities   - maintain  fentanyl patch and continue fentanyl pushes prn   -  family wishes to have the pt transferred to a long term facility in [place]   - s/p trach   - awaiting GI for PEG -   - very poor prognosis, family aware

## 2022-02-16 NOTE — PROGRESS NOTE ADULT - SUBJECTIVE AND OBJECTIVE BOX
Patient is a 81y old  Male who presents with a chief complaint of MI (09 Feb 2022 16:06)    Over Night Events:  Remains on MV.  LGF overnight.  Off pressors.  Off sedation.    ROS:   All ROS are negative except HPI     PHYSICAL EXAM  ICU Vital Signs Last 24 Hrs  T(C): 37.7 (16 Feb 2022 07:10), Max: 38.1 (15 Feb 2022 23:00)  T(F): 99.9 (16 Feb 2022 07:10), Max: 100.6 (15 Feb 2022 23:00)  HR: 87 (16 Feb 2022 07:56) (87 - 111)  BP: 158/74 (16 Feb 2022 05:00) (103/62 - 158/74)  BP(mean): 107 (16 Feb 2022 05:00) (78 - 111)  RR: 22 (16 Feb 2022 07:10) (22 - 35)  SpO2: 99% (16 Feb 2022 07:56) (98% - 100%)    CAM ICU: Positive  SAT: Y  SBT: N    General: not awake   HEENT:  trach              Lymph Nodes: No cervical LN   Lungs: Bilateral crackles R > L  Cardiovascular: Regular   Abdomen: Soft, Positive BS  Extremities: No clubbing , edema  Skin: warm   Neurological:  opens eyes spontaneously, does not follow commands  Musculoskeletal: move all ext     02-15-22 @ 07:01  -  02-16-22 @ 07:00  --------------------------------------------------------  IN:    Enteral Tube Flush: 250 mL    Glucerna: 1440 mL    IV PiggyBack: 150 mL  Total IN: 1840 mL    OUT:    Chest Tube (mL): 80 mL    Indwelling Catheter - Urethral (mL): 1630 mL  Total OUT: 1710 mL    Total NET: 130 mL      02-16-22 @ 07:01  -  02-16-22 @ 09:33  --------------------------------------------------------  IN:  Total IN: 0 mL    OUT:    Indwelling Catheter - Urethral (mL): 70 mL  Total OUT: 70 mL    Total NET: -70 mL      LABS:                      10.1   11.41 )-----------( 175      ( 16 Feb 2022 05:32 )             33.9     144  |  103  |  21<H>  ----------------------------<  148<H>  3.9   |  32  |  <0.5<L>    Ca    8.4<L>      16 Feb 2022 05:32  Phos  3.8     02-16  Mg     2.1     02-16    TPro  4.5<L>  /  Alb  2.5<L>  /  TBili  0.8  /  DBili  x   /  AST  34  /  ALT  51<H>  /  AlkPhos  117<H>  02-16    PT/INR - ( 15 Feb 2022 05:30 )   PT: 13.40 sec;   INR: 1.17 ratio    PTT - ( 15 Feb 2022 05:30 )  PTT:30.5 sec                                            LIVER FUNCTIONS - ( 16 Feb 2022 05:32 )  Alb: 2.5 g/dL / Pro: 4.5 g/dL / ALK PHOS: 117 U/L / ALT: 51 U/L / AST: 34 U/L / GGT: x                                              Culture - Sputum (collected 14 Feb 2022 11:30)  Source: .Sputum Deep tracheal aspirate  Gram Stain (14 Feb 2022 23:16):    Moderate polymorphonuclear leukocytes per low power field    No Squamous epithelial cells per low power field    Moderate Gram Negative Rods seen per oil power field  Preliminary Report (15 Feb 2022 17:18):    Moderate Pseudomonas aeruginosa    Mode: AC/ CMV (Assist Control/ Continuous Mandatory Ventilation)  RR (machine): 24  TV (machine): 420  FiO2: 40  PEEP: 5  ITime: 0.8  MAP: 9  PIP: 24    ABG - ( 16 Feb 2022 05:20 )  pH, Arterial: 7.48  pH, Blood: x     /  pCO2: 49    /  pO2: 96    / HCO3: 36    / Base Excess: 11.1  /  SaO2: 97.9      MEDICATIONS  (STANDING):  amantadine Syrup 100 milliGRAM(s) Oral every 12 hours  aspirin  chewable 81 milliGRAM(s) Oral daily  cefepime   IVPB 2000 milliGRAM(s) IV Intermittent every 8 hours  cefepime   IVPB 1000 milliGRAM(s) IV Intermittent once  chlorhexidine 0.12% Liquid 15 milliLiter(s) Oral Mucosa every 12 hours  chlorhexidine 4% Liquid 1 Application(s) Topical <User Schedule>  dextrose 5%. 1000 milliLiter(s) (50 mL/Hr) IV Continuous <Continuous>  dextrose 5%. 1000 milliLiter(s) (100 mL/Hr) IV Continuous <Continuous>  dextrose 50% Injectable 25 Gram(s) IV Push once  dextrose 50% Injectable 12.5 Gram(s) IV Push once  dextrose 50% Injectable 25 Gram(s) IV Push once  enoxaparin Injectable 40 milliGRAM(s) SubCutaneous daily  glucagon  Injectable 1 milliGRAM(s) IntraMuscular once  insulin lispro (ADMELOG) corrective regimen sliding scale   SubCutaneous every 6 hours  metoprolol tartrate 25 milliGRAM(s) Oral two times a day  pantoprazole  Injectable 40 milliGRAM(s) IV Push daily  polyethylene glycol 3350 17 Gram(s) Oral daily    MEDICATIONS  (PRN):  acetaminophen     Tablet .. 650 milliGRAM(s) Oral every 6 hours PRN Temp greater or equal to 38C (100.4F), Mild Pain (1 - 3)  albuterol/ipratropium for Nebulization 3 milliLiter(s) Nebulizer every 6 hours PRN Shortness of Breath and/or Wheezing  fentaNYL    Injectable 50 MICROGram(s) IV Push every 4 hours PRN Agitation  morphine  - Injectable 2 milliGRAM(s) IV Push every 4 hours PRN Moderate Pain (4 - 6)      New X-rays reviewed:                                                                                  ECHO    CXR interpreted by me:  bilateral infiltrates Patient is a 81y old  Male who presents with a chief complaint of MI (09 Feb 2022 16:06)    Over Night Events:  Remains on MV.  LGF overnight.  Off pressors.  Off sedation.    ROS:   All ROS are negative except HPI     PHYSICAL EXAM  ICU Vital Signs Last 24 Hrs  T(C): 37.7 (16 Feb 2022 07:10), Max: 38.1 (15 Feb 2022 23:00)  T(F): 99.9 (16 Feb 2022 07:10), Max: 100.6 (15 Feb 2022 23:00)  HR: 87 (16 Feb 2022 07:56) (87 - 111)  BP: 158/74 (16 Feb 2022 05:00) (103/62 - 158/74)  BP(mean): 107 (16 Feb 2022 05:00) (78 - 111)  RR: 22 (16 Feb 2022 07:10) (22 - 35)  SpO2: 99% (16 Feb 2022 07:56) (98% - 100%)    CAM ICU: Positive  SAT: Y  SBT: N    General: not awake   HEENT:  trach              Lymph Nodes: No cervical LN   Lungs: Bilateral crackles R > L  Cardiovascular: Regular   Abdomen: Soft, Positive BS  Extremities: No clubbing , edema  Skin: warm   Neurological:  opens eyes spontaneously, does not follow commands  Musculoskeletal: move all ext     02-15-22 @ 07:01  -  02-16-22 @ 07:00  --------------------------------------------------------  IN:    Enteral Tube Flush: 250 mL    Glucerna: 1440 mL    IV PiggyBack: 150 mL  Total IN: 1840 mL    OUT:    Chest Tube (mL): 80 mL    Indwelling Catheter - Urethral (mL): 1630 mL  Total OUT: 1710 mL    Total NET: 130 mL      02-16-22 @ 07:01  -  02-16-22 @ 09:33  --------------------------------------------------------  IN:  Total IN: 0 mL    OUT:    Indwelling Catheter - Urethral (mL): 70 mL  Total OUT: 70 mL    Total NET: -70 mL      LABS:                      10.1   11.41 )-----------( 175      ( 16 Feb 2022 05:32 )             33.9     144  |  103  |  21<H>  ----------------------------<  148<H>  3.9   |  32  |  <0.5<L>    Ca    8.4<L>      16 Feb 2022 05:32  Phos  3.8     02-16  Mg     2.1     02-16    TPro  4.5<L>  /  Alb  2.5<L>  /  TBili  0.8  /  DBili  x   /  AST  34  /  ALT  51<H>  /  AlkPhos  117<H>  02-16    PT/INR - ( 15 Feb 2022 05:30 )   PT: 13.40 sec;   INR: 1.17 ratio    PTT - ( 15 Feb 2022 05:30 )  PTT:30.5 sec                                            LIVER FUNCTIONS - ( 16 Feb 2022 05:32 )  Alb: 2.5 g/dL / Pro: 4.5 g/dL / ALK PHOS: 117 U/L / ALT: 51 U/L / AST: 34 U/L / GGT: x                                              Culture - Sputum (collected 14 Feb 2022 11:30)  Source: .Sputum Deep tracheal aspirate  Gram Stain (14 Feb 2022 23:16):    Moderate polymorphonuclear leukocytes per low power field    No Squamous epithelial cells per low power field    Moderate Gram Negative Rods seen per oil power field  Preliminary Report (15 Feb 2022 17:18):    Moderate Pseudomonas aeruginosa    Mode: AC/ CMV (Assist Control/ Continuous Mandatory Ventilation)  RR (machine): 24  TV (machine): 420  FiO2: 40  PEEP: 5  ITime: 0.8  MAP: 9  PIP: 24    ABG - ( 16 Feb 2022 05:20 )  pH, Arterial: 7.48  pH, Blood: x     /  pCO2: 49    /  pO2: 96    / HCO3: 36    / Base Excess: 11.1  /  SaO2: 97.9      MEDICATIONS  (STANDING):  amantadine Syrup 100 milliGRAM(s) Oral every 12 hours  aspirin  chewable 81 milliGRAM(s) Oral daily  cefepime   IVPB 2000 milliGRAM(s) IV Intermittent every 8 hours  cefepime   IVPB 1000 milliGRAM(s) IV Intermittent once  chlorhexidine 0.12% Liquid 15 milliLiter(s) Oral Mucosa every 12 hours  chlorhexidine 4% Liquid 1 Application(s) Topical <User Schedule>  dextrose 5%. 1000 milliLiter(s) (50 mL/Hr) IV Continuous <Continuous>  dextrose 5%. 1000 milliLiter(s) (100 mL/Hr) IV Continuous <Continuous>  dextrose 50% Injectable 25 Gram(s) IV Push once  dextrose 50% Injectable 12.5 Gram(s) IV Push once  dextrose 50% Injectable 25 Gram(s) IV Push once  enoxaparin Injectable 40 milliGRAM(s) SubCutaneous daily  glucagon  Injectable 1 milliGRAM(s) IntraMuscular once  insulin lispro (ADMELOG) corrective regimen sliding scale   SubCutaneous every 6 hours  metoprolol tartrate 25 milliGRAM(s) Oral two times a day  pantoprazole  Injectable 40 milliGRAM(s) IV Push daily  polyethylene glycol 3350 17 Gram(s) Oral daily    MEDICATIONS  (PRN):  acetaminophen     Tablet .. 650 milliGRAM(s) Oral every 6 hours PRN Temp greater or equal to 38C (100.4F), Mild Pain (1 - 3)  albuterol/ipratropium for Nebulization 3 milliLiter(s) Nebulizer every 6 hours PRN Shortness of Breath and/or Wheezing  fentaNYL    Injectable 50 MICROGram(s) IV Push every 4 hours PRN Agitation  morphine  - Injectable 2 milliGRAM(s) IV Push every 4 hours PRN Moderate Pain (4 - 6)      New X-rays reviewed:                                                                                  ECHO    CXR interpreted by me:  bilateral infiltrates increase left opacity effusion

## 2022-02-16 NOTE — PROGRESS NOTE ADULT - ASSESSMENT
IMPRESSION:  Acute hypoxic respiratory failure SP Trach  Anoxic brain injury  Cardiac Arrest  Severe COVID PNA  Sepsis on present on admission  Left PNX SP chest tube, resolved  Pseudomonas PNA, +ve DTA  Mucus plugs SP Bronchoscopy  GNR on DTA  Ischemic changes on CT Head      SUGGEST:    CNS:   off sedation.   Pain control.    HEENT: Oral care.      PULMONARY: HOB @ 45 degrees.   Aspiration precautions.   Vent changes: wean fiO2  Monitor driving pressure.   Pigtail to suction    CARDIOVASCULAR:   Avoid overload.  Keep I < O.    GI: GI prophylaxis.  c/w OG feeding.  Bowel regimen. General surgery for PEG.    RENAL:  Follow up lytes.  Correct as needed.  Monitor UO.  Becker care.    INFECTIOUS DISEASE:  Increase Cefepime dose.  Repeat Procalcitonin. ID FU.  FU cultures.    HEMATOLOGICAL: DVT prophylaxis    ENDOCRINE:  Follow up FS.  Insulin protocol if needed    MUSCULOSKELETAL: bedrest    DNR  Very poor overall prognosis  Start Dispo planning

## 2022-02-16 NOTE — PROGRESS NOTE ADULT - SUBJECTIVE AND OBJECTIVE BOX
81yMale  Being followed for PEG tube placement  Interval history: No hematemesis, melena, blood in stool      PAST MEDICAL & SURGICAL HISTORY:   BPH (benign prostatic hyperplasia)    Mild HTN            Social History: No smoking. No alcohol. No illegal drug use.            MEDICATIONS  (STANDING):  amantadine Syrup 100 milliGRAM(s) Oral every 12 hours  aspirin  chewable 81 milliGRAM(s) Oral daily  cefepime   IVPB 2000 milliGRAM(s) IV Intermittent every 8 hours  cefepime   IVPB 1000 milliGRAM(s) IV Intermittent once  chlorhexidine 0.12% Liquid 15 milliLiter(s) Oral Mucosa every 12 hours  chlorhexidine 4% Liquid 1 Application(s) Topical <User Schedule>  dextrose 5%. 1000 milliLiter(s) (50 mL/Hr) IV Continuous <Continuous>  dextrose 5%. 1000 milliLiter(s) (100 mL/Hr) IV Continuous <Continuous>  dextrose 50% Injectable 25 Gram(s) IV Push once  dextrose 50% Injectable 12.5 Gram(s) IV Push once  dextrose 50% Injectable 25 Gram(s) IV Push once  enoxaparin Injectable 40 milliGRAM(s) SubCutaneous daily  glucagon  Injectable 1 milliGRAM(s) IntraMuscular once  insulin lispro (ADMELOG) corrective regimen sliding scale   SubCutaneous every 6 hours  metoprolol tartrate 25 milliGRAM(s) Oral two times a day  pantoprazole  Injectable 40 milliGRAM(s) IV Push daily  polyethylene glycol 3350 17 Gram(s) Oral daily    MEDICATIONS  (PRN):  acetaminophen     Tablet .. 650 milliGRAM(s) Oral every 6 hours PRN Temp greater or equal to 38C (100.4F), Mild Pain (1 - 3)  albuterol/ipratropium for Nebulization 3 milliLiter(s) Nebulizer every 6 hours PRN Shortness of Breath and/or Wheezing  fentaNYL    Injectable 50 MICROGram(s) IV Push every 4 hours PRN Agitation  morphine  - Injectable 2 milliGRAM(s) IV Push every 4 hours PRN Moderate Pain (4 - 6)      Allergies:   Allergy Status Unknown  Intolerances          REVIEW OF SYSTEMS:  unobtainable         VITAL SIGNS:   T(F): 99.9 (02-16-22 @ 07:10), Max: 100.6 (02-15-22 @ 23:00)  HR: 87 (02-16-22 @ 07:56) (87 - 111)  BP: 158/74 (02-16-22 @ 05:00) (103/62 - 158/74)  RR: 22 (02-16-22 @ 07:10) (22 - 35)  SpO2: 99% (02-16-22 @ 07:56) (98% - 100%)    PHYSICAL EXAM:  GENERAL: +NG tube +trach  HEAD:  Atraumatic, Normocephalic  EYES: conjunctiva and sclera clear  NECK: Supple, no JVD or thyromegaly  CHEST/LUNG: b/l rhonchi  HEART: Regular rate and rhythm; normal S1, S2, No murmurs.  ABDOMEN: Soft, nontender, nondistended; Bowel sounds present  NEUROLOGY: No asterixis or tremor.   SKIN: Intact, no jaundice            LABS:                        10.1   11.41 )-----------( 175      ( 16 Feb 2022 05:32 )             33.9     02-16    144  |  103  |  21<H>  ----------------------------<  148<H>  3.9   |  32  |  <0.5<L>    Ca    8.4<L>      16 Feb 2022 05:32  Phos  3.8     02-16  Mg     2.1     02-16    TPro  4.5<L>  /  Alb  2.5<L>  /  TBili  0.8  /  DBili  x   /  AST  34  /  ALT  51<H>  /  AlkPhos  117<H>  02-16    LIVER FUNCTIONS - ( 16 Feb 2022 05:32 )  Alb: 2.5 g/dL / Pro: 4.5 g/dL / ALK PHOS: 117 U/L / ALT: 51 U/L / AST: 34 U/L / GGT: x           PT/INR - ( 15 Feb 2022 05:30 )   PT: 13.40 sec;   INR: 1.17 ratio         PTT - ( 15 Feb 2022 05:30 )  PTT:30.5 sec    IMAGING:    < from: Xray Chest 1 View- PORTABLE-Urgent (Xray Chest 1 View- PORTABLE-Urgent .) (02.15.22 @ 16:04) >  ACC: [id number] EXAM:  XR CHEST PORTABLE URGENT 1V                          PROCEDURE DATE:  02/15/2022          INTERPRETATION:  CLINICAL HISTORY: chest tube placement.    COMPARISON: 2/15/2022. At 5:38 AM.    TECHNIQUE: Portable frontal chest radiograph. Adequate positioning.    FINDINGS:    Support devices: Stable tracheostomy tube and enteric tube and left   apical pigtail catheter    Cardiac/mediastinum/hilum: Stable.    Lung parenchyma/Pleura: Stable bilateral airspace opacities right greater   than left. No pneumothorax.    Skeleton/soft tissues: Stable.      IMPRESSION:    No interval change since earlier the same day.    --- End of Report ---            RAFAELA LIMA MD; Attending Radiologist  This document has been electronically signed. Feb 16 2022  8:50AM    < end of copied text >

## 2022-02-16 NOTE — PROGRESS NOTE ADULT - ASSESSMENT
81 year old male with a hx of HTN, BPH, recent diagnosis of COVID 19 2 days ago (unvaccinated, prescribed decadron/ zpack/ zinc) presenting to the ED S/P cardiac arrest. Intubated in the field    IMPRESSION  #COVID19 PNA, Severe (O2 < or = 94% on RA and requiring supplemental O2) with Cardiac arrest    CXR diffuse bilateral opacities     D-Dimer Assay, Quantitative: 7368 ng/mL DDU (01-15-22 @ 01:25)    s/p Toci 1/17    #Fevers 2/13  - cXR 2/14 with bilateral opacities, worsening on Right   - sputum Cx 2/14 - Pseudomonas     #Cardiac arrest- ROSC was achieved after 2 rounds of CPR and epi, downtime 7 mins  - s/p trach 2/11    #Left sided Pneumothorax- on CXR from 2/2/22    Recommendations  - follow-up blood cx 2/15  - increase cefepime to 2g q 8 hours   - follow-up pseudomonas susceptibilities     Please call or message on [employer] Teams if with any questions.  Spectra [phone]

## 2022-02-16 NOTE — CHART NOTE - NSCHARTNOTEFT_GEN_A_CORE
PALLIATIVE MEDICINE INTERDISCIPLINARY TEAM NOTE    Provider:  [   ]Social Work   [   ]          [   ] Initial visit [   ] Follow up    Family or contact name / phone #   Met with: [   ] Patient  [   ] Family  [   ] Other:    Primary Language: [   ] English [   ] Other*:                      *Interpretation provided by:    SUPPORT DIAGNOSES            (Check all that apply)  [   ] Psychosocial spiritual assessment (PSSA)  [   ] EOL issues  [   ] Cultural / spiritual concerns  [   ] Pain / suffering  [   ] Dementia / AMS  [   ] Other:  [   ] AD issues  [   ] Grief / loss / sadness  [   ] Discharge issues  [   ] Distress / coping    PSYCHOSOCIAL ASSESSMENT OF PATIENT         (Check all that apply)  [   ] Initial Assessment            [   ] Reassessment          [   ] Not Applicable this visit    Pain/suffering acuity:  [   ] None to mild (0-3)           [   ] Moderate (4-6)        [   ] High (7-10)    Mental Status:  [   ] Alert/oriented (x3)          [   ] Confused/Altered(x2/x1)         [   ] Non-resp    Functional status:  [   ] Independent w ADLs      [   ] Needs Assistance             [   ] Bedbound/Full Care    Coping:  [   ] Coping well                     [   ] Coping w/difficulty            [   ] Poor coping    Support system:  [   ] Strong                              [   ] Adequate                        [   ] Inadequate    SPIRITUAL ASSESSMENT  Mosque/Spiritual practice: __ Catholic________________________    Role of organized Taoism:  [   ] Important                     [ x  ] Some (fam tradition, cultural)               [   ] None    Effects on medical care:  [   ] Yes, _____________________________________                         [ x  ] None    Cultural/Evangelical need:  [   ] Yes, _____________________________________                         [ x  ] None    Refer to Pastoral Care:  [   ] Yes           [ x  ] No, not at this time    SERVICE PROVIDED  [   ]PSSA                                                                             [   ]Discharge support / facilitation  [   ]AD / goals of care counseling                                  [   ]EOL / death / bereavement counseling  [   ]Counseling / support                                                [   ] Family meeting  [ x  ]Prayer / sacrament / ritual                                      [   ] Referral   [   ]Other                                                                       NOTE and Plan of Care (PoC): Pt states he feels comfortable. Pt is trach and is having difficulty to speak. I was still able to communicate with him and offered prayers. I will follow up as needed.

## 2022-02-16 NOTE — PROGRESS NOTE ADULT - SUBJECTIVE AND OBJECTIVE BOX
Patient remains unresponsive to verbal stimuli, low grade fever overnight      T(F): 99.7 (02-16-22 @ 11:02), Max: 100.6 (02-15-22 @ 23:00)  HR: 93 (02-16-22 @ 11:01)  BP: 138/110 (02-16-22 @ 11:01)  RR: 35/24  SpO2: 99% (02-16-22 @ 11:01) (98% - 100%)    PHYSICAL EXAM:  GENERAL: NAD  HEAD:  Atraumatic, Normocephalic  EYES: EOMI, PERRLA, conjunctiva and sclera clear  NERVOUS SYSTEM: positive gag  CHEST/LUNG:  bilateral rhonchi  HEART: Regular rate and rhythm; No murmurs, rubs, or gallops  ABDOMEN: Soft, Nontender, Nondistended; Bowel sounds present  EXTREMITIES:  b/l  edema    LABS  02-16    144  |  103  |  21<H>  ----------------------------<  148<H>  3.9   |  32  |  <0.5<L>    Ca    8.4<L>      16 Feb 2022 05:32  Phos  3.8     02-16  Mg     2.1     02-16    TPro  4.5<L>  /  Alb  2.5<L>  /  TBili  0.8  /  DBili  x   /  AST  34  /  ALT  51<H>  /  AlkPhos  117<H>  02-16                          10.1   11.41 )-----------( 175      ( 16 Feb 2022 05:32 )             33.9     PT/INR - ( 15 Feb 2022 05:30 )   PT: 13.40 sec;   INR: 1.17 ratio         PTT - ( 15 Feb 2022 05:30 )  PTT:30.5 sec  Mode: AC/ CMV (Assist Control/ Continuous Mandatory Ventilation)  RR (machine): 24  TV (machine): 420  FiO2: 40  PEEP: 5      Culture Results:   Moderate Pseudomonas aeruginosa (02-14-22)  Culture Results:   Growth in anaerobic bottle: Staphylococcus capitis  Coag Negative Staphylococcus  Single set isolate, possible contaminant. Contact  Microbiology if susceptibility testing clinically  indicated.  ***Blood Panel PCR results on this specimen are available  approximately 3 hours after the Gram stain result.***  Gram stain, PCR, and/or culture results may not always  correspond due to difference in methodologies.  ************************************************************  This PCR assay was performed by multiplex PCR. This  Assay tests for 66 bacterial and resistance gene targets.  Please refer to the [facility] test directory  at [email or web address] for details. (01-31-22)  Culture Results:   Normal Respiratory Corina present (01-30-22)  Culture Results:   No Growth Final (01-28-22)  Culture Results:   No growth at 1 week. (01-25-22)  Culture Results:   No Growth Final (01-23-22)  Culture Results:   No growth (01-23-22)    RADIOLOGY  < from: Xray Chest 1 View- PORTABLE-Routine (Xray Chest 1 View- PORTABLE-Routine in AM.) (02.16.22 @ 06:14) >  IMPRESSION:    Stable patchy right lung airspace opacities. Increasing left basilar   opacity.    < end of copied text >    MEDICATIONS  (STANDING):  amantadine Syrup 100 milliGRAM(s) Oral every 12 hours  aspirin  chewable 81 milliGRAM(s) Oral daily  cefepime   IVPB 2000 milliGRAM(s) IV Intermittent every 8 hours  chlorhexidine 0.12% Liquid 15 milliLiter(s) Oral Mucosa every 12 hours  chlorhexidine 4% Liquid 1 Application(s) Topical <User Schedule>  enoxaparin Injectable 40 milliGRAM(s) SubCutaneous daily  insulin lispro (ADMELOG) corrective regimen sliding scale   SubCutaneous every 6 hours  metoprolol tartrate 25 milliGRAM(s) Oral two times a day  pantoprazole  Injectable 40 milliGRAM(s) IV Push daily  polyethylene glycol 3350 17 Gram(s) Oral daily    MEDICATIONS  (PRN):  acetaminophen     Tablet .. 650 milliGRAM(s) Oral every 6 hours PRN Temp greater or equal to 38C (100.4F), Mild Pain (1 - 3)  albuterol/ipratropium for Nebulization 3 milliLiter(s) Nebulizer every 6 hours PRN Shortness of Breath and/or Wheezing  fentaNYL    Injectable 50 MICROGram(s) IV Push every 4 hours PRN Agitation  morphine  - Injectable 2 milliGRAM(s) IV Push every 4 hours PRN Moderate Pain (4 - 6)

## 2022-02-17 NOTE — PROGRESS NOTE ADULT - SUBJECTIVE AND OBJECTIVE BOX
Hepatology/GI follow up note: Pt seen and examined at bedside.     For questions and inquiries please page [phone].  For urgent matters or after 5pm and on weekends please page the fellow on call through the GI paging system.    81y Male seen for: PEG placement    Subjective/Interval events:  In bed  No acute distress  No fevers x24 hours    Review of system  limited    Past medical/surgical Hx:  PAST MEDICAL & SURGICAL HISTORY:  BPH (benign prostatic hyperplasia)    Mild HTN      Home Medications:  Last Order Reconciliation Date: 01-15-22 @ 04:13 (Admission Reconciliation)  Aspir 81 oral delayed release tablet: 1 tab(s) orally once a day  benzonatate 100 mg oral capsule: 2 cap(s) orally 3 times a day  dexamethasone 4 mg oral tablet: 1.5 tab(s) orally once a day  metoprolol succinate 25 mg oral tablet, extended release: 1 tab(s) orally once a day  NIFEdipine 60 mg oral tablet, extended release: 1 tab(s) orally once a day  tamsulosin 0.4 mg oral capsule: 1 cap(s) orally once a day  telmisartan 80 mg oral tablet: 1 tab(s) orally once a day  Vitamin B12 1000 mcg oral tablet: 1 tab(s) orally once a day      Allergies:  Allergy Status Unknown      Current Medications:   acetaminophen     Tablet .. 650 milliGRAM(s) Oral every 6 hours PRN  albuterol/ipratropium for Nebulization 3 milliLiter(s) Nebulizer every 6 hours PRN  amantadine Syrup 100 milliGRAM(s) Oral every 12 hours  aspirin  chewable 81 milliGRAM(s) Oral daily  cefepime   IVPB 2000 milliGRAM(s) IV Intermittent every 8 hours  chlorhexidine 0.12% Liquid 15 milliLiter(s) Oral Mucosa every 12 hours  chlorhexidine 4% Liquid 1 Application(s) Topical <User Schedule>  dextrose 5%. 1000 milliLiter(s) IV Continuous <Continuous>  dextrose 5%. 1000 milliLiter(s) IV Continuous <Continuous>  dextrose 50% Injectable 25 Gram(s) IV Push once  dextrose 50% Injectable 12.5 Gram(s) IV Push once  dextrose 50% Injectable 25 Gram(s) IV Push once  enoxaparin Injectable 40 milliGRAM(s) SubCutaneous daily  fentaNYL    Injectable 50 MICROGram(s) IV Push every 4 hours PRN  glucagon  Injectable 1 milliGRAM(s) IntraMuscular once  insulin lispro (ADMELOG) corrective regimen sliding scale   SubCutaneous every 6 hours  metoprolol tartrate 25 milliGRAM(s) Oral two times a day  morphine  - Injectable 2 milliGRAM(s) IV Push every 4 hours PRN  pantoprazole  Injectable 40 milliGRAM(s) IV Push daily  polyethylene glycol 3350 17 Gram(s) Oral daily        Physical exam:  T(C): 36.6 (02-17-22 @ 13:58), Max: 37.6 (02-16-22 @ 23:00)  HR: 67 (02-17-22 @ 20:03) (53 - 114)  BP: 142/89 (02-17-22 @ 13:58) (138/94 - 157/89)  RR: 35 (02-17-22 @ 02:42) (27 - 37)  SpO2: 97% (02-17-22 @ 20:03) (97% - 100%)    GENERAL: NAD  HEAD:  Atraumatic, Normocephalic  EYES: Sclera:NL  NECK: Supple, no JVD or thyromegaly  CHEST/LUNG: Good bilateral air entry  HEART: normal S1, S2. Regular  ABDOMEN: (-) distended, (-) tender, (-) rebound, (+) BS, (-)HSM  EXTREMITIES: (-) edema  NEUROLOGY: (-) asterixis  SKIN: (-) jaundice        Data:                        10.6   12.37 )-----------( 204      ( 17 Feb 2022 06:55 )             35.1     MCV 97.8 (02-17-22)    RDW 19.1 (02-17-22)    HGB trend:  10.6  02-17-22 @ 06:55  10.1  02-16-22 @ 05:32  10.1  02-15-22 @ 05:30        WBC trend:  12.37  02-17-22 @ 06:55  11.41  02-16-22 @ 05:32  11.44  02-15-22 @ 05:30    02-17    144  |  105  |  21<H>  ----------------------------<  131<H>  4.4   |  29  |  <0.5<L>    Ca    8.5      17 Feb 2022 06:55  Phos  3.8     02-17  Mg     2.2     02-17    TPro  5.0<L>  /  Alb  2.6<L>  /  TBili  0.8  /  DBili  x   /  AST  46<H>  /  ALT  64<H>  /  AlkPhos  117<H>  02-17    Liver panel trend:  TBili 0.8   /   AST 46   /   ALT 64   /   AlkP 117   /   Tptn 5.0   /   Alb 2.6    /   DBili --      02-17  TBili 0.8   /   AST 34   /   ALT 51   /   AlkP 117   /   Tptn 4.5   /   Alb 2.5    /   DBili --      02-16  TBili 0.8   /   AST 28   /   ALT 37   /   AlkP 97   /   Tptn 4.6   /   Alb 2.4    /   DBili --      02-15  TBili 0.9   /   AST 29   /   ALT 35   /   AlkP 101   /   Tptn 4.8   /   Alb 2.5    /   DBili --      02-15  TBili 1.2   /   AST 20   /   ALT 25   /   AlkP 94   /   Tptn 4.6   /   Alb 2.4    /   DBili --      02-14  TBili 1.7   /   AST 19   /   ALT 25   /   AlkP 103   /   Tptn 4.8   /   Alb 2.7    /   DBili --      02-13  TBili 1.1   /   AST 25   /   ALT 28   /   AlkP 78   /   Tptn 4.5   /   Alb 2.7    /   DBili --      02-12  TBili 1.1   /   AST 22   /   ALT 31   /   AlkP 82   /   Tptn 4.5   /   Alb 2.7    /   DBili --      02-11  TBili 0.9   /   AST 23   /   ALT 33   /   AlkP 81   /   Tptn 4.5   /   Alb 2.9    /   DBili --      02-10  TBili 1.1   /   AST 23   /   ALT 31   /   AlkP 80   /   Tptn 4.7   /   Alb 3.0    /   DBili --      02-09  TBili 1.0   /   AST 18   /   ALT 32   /   AlkP 79   /   Tptn 4.4   /   Alb 3.0    /   DBili --      02-08            Culture - Blood (collected 15 Feb 2022 03:10)  Source: .Blood Blood  Preliminary Report (16 Feb 2022 20:00):    No growth to date.

## 2022-02-17 NOTE — PROGRESS NOTE ADULT - ASSESSMENT
80 yo M  ARF SP COVID PNA complicated by CPA  Tracheostomy/ Vent dependant  Anoxic brain injury  ?Vegetative state  Inability to acquire nutritional requirement  Awaiting placement    rec:  Hold feeds at [facility]  Possible PEG in AM  Currently afebrile x 24  BCx - on the 15th  Consent to be obtained from the family  Hold DAPT AC prior to PEG

## 2022-02-17 NOTE — PROGRESS NOTE ADULT - ASSESSMENT
81 year old male with a hx of HTN, BPH, recent diagnosis of COVID 19 2 days ago prior to admission -> presented to the ED S/P cardiac arrest.  Patient began to have worsening dyspnea at home, EMS was called. Found to be in cardiac arrest. ROSC was achieved after 2 rounds of CPR and epi, downtime 7 mins. Was intubated in the field.     acute respiratory failure / Cardiac arrest / COVID-19 pneumonia / probable NSTEMI / L sided pneumothorax / anoxic brain injury / gram negative pneumonia    Assessment and Plan:    1. s/p Cardiac Arrest POA  intubated in field, CPR in field 7 min downtime  unresponsive afterwards    2. Respi Failure POA  now vent dependent via trach    3. Anoxic Brain Injury POA  from arrest  unresponsive  poor prognosis  family has opted DNR    4. Pseudomonas Pneumonia 2/14 noted  on cefepime  other supportive treatment  pulm,. ID following    5. Fever low grade 2/15-2/17  blood culture negative to date  f/u    6. Need for PEG tube  deferred until fever /new infection respoved  await blood cultures  cont NGT feed     7. Left pneumothorax  remains on L chest tube 2/17    8. COVID Prior to arrival    9. disposition  family wants LTAC at [place]  awaiting PEG placement prior to transfer    anoxic brain injury. multi system problems  prognosis is poor    discussed w family 2/17 at bedside

## 2022-02-18 NOTE — PROGRESS NOTE ADULT - ASSESSMENT
81 year old male with a hx of HTN, BPH, recent diagnosis of COVID 19, 2  days prior to admission -> presented to the ED S/P cardiac arrest.  Patient began to have worsening dyspnea at home, EMS was called. Found to be in cardiac arrest. ROSC was achieved after 2 rounds of CPR and epi, downtime 7 mins. Was intubated in the field.     Acute respiratory failure / Cardiac arrest / COVID-19 pneumonia / probable NSTEMI / L sided pneumothorax / anoxic brain injury / gram negative pneumonia     1. s/p Cardiac Arrest POA  intubated in field, CPR in field 7 min downtime  unresponsive afterwards, currently with trach, plan for peg possibly today     2. Respi Failure POA  now vent dependent via trach    3. Anoxic Brain Injury POA  unresponsive  poor prognosis  family has opted DNR    4. Pseudomonas Pneumonia 2/14 noted  on cefepime  other supportive treatment  pulm, ID following    5. Fever low grade 2/15-2/17  blood culture negative to date  afebrile    6. severe pt calorie malnutrition  has ng tube for feeding  Need for PEG tube possibly today     7. Left pneumothorax  chest tube in place    Disp  multiorgan failure, prognosis is poor  family wants LTAC at [place]  awaiting PEG placement prior to transfer  discussed with and updated the dtr Judy [phone]

## 2022-02-18 NOTE — CHART NOTE - NSCHARTNOTEFT_GEN_A_CORE
Registered Dietitian Follow-Up  -- Authored by Veronique Gatica, RD #[id number]     Patient Profile Reviewed                           Yes [x]   No []     Nutrition History Previously Obtained        Yes [x]  No []       Pertinent Subjective Information:     Pertinent Medical Interventions:  -- pt is 81 year old male with hx of HTN, BPH, unvaccinated, tested + for COVID 12 days PTA, BIBA 2/2 respiratory distress s/p cardiac arrest downtime of 7 minutes, s/p intubation.  + PNA, L sided pnemothorax, s/p chest tube. pt became difficult to ventilate 22 s/p bronchoscopy  2/2 thick secretions.  s/p cardizem drip 2/ afib RVR s/p chest tube removal.  Poor prognosis. DNR. pt has been off sedation since , unresponsive, + anoxic brain injury. 2/3 + blood cultures followed by ID likely contaminant. Pt now w/trach and PEG placement scheduled .        Diet order:   Currently NPO for procedure  - PEG tube placement;   Prior diet order: Diet, NPO with Tube Feed:   Tube Feeding Modality: Orogastric  Glucerna 1.2 Haroldo  Total Volume for 24 Hours (mL): 1440  Continuous  Starting Tube Feed Rate {mL per Hour}: 20  Until Goal Tube Feed Rate (mL per Hour): 60  Tube Feed Duration (in Hours): 24  Tube Feed Start Time: 10:00 (22 @ 09:59)    Anthropometrics:  Height (cm): 172.7 (22 @ 08:24)  Weight (kg): 72.5 (22 @ 08:24)  BMI (kg/m2): 24.3 (22 @ 08:24)  IBW:     Daily Weight in k.2 (-16), Weight in k.1 (-15), Weight in k.5 (-14), Weight in k.6 ()  % Weight Change    MEDICATIONS  (STANDING):  amantadine Syrup 100 milliGRAM(s) Oral every 12 hours  cefepime   IVPB 2000 milliGRAM(s) IV Intermittent every 8 hours  chlorhexidine 0.12% Liquid 15 milliLiter(s) Oral Mucosa every 12 hours  chlorhexidine 4% Liquid 1 Application(s) Topical <User Schedule>  dextrose 5%. 1000 milliLiter(s) (50 mL/Hr) IV Continuous <Continuous>  dextrose 5%. 1000 milliLiter(s) (100 mL/Hr) IV Continuous <Continuous>  dextrose 50% Injectable 25 Gram(s) IV Push once  dextrose 50% Injectable 12.5 Gram(s) IV Push once  dextrose 50% Injectable 25 Gram(s) IV Push once  enoxaparin Injectable 40 milliGRAM(s) SubCutaneous daily  glucagon  Injectable 1 milliGRAM(s) IntraMuscular once  insulin lispro (ADMELOG) corrective regimen sliding scale   SubCutaneous every 6 hours  metoprolol tartrate 25 milliGRAM(s) Oral two times a day  pantoprazole  Injectable 40 milliGRAM(s) IV Push daily  polyethylene glycol 3350 17 Gram(s) Oral daily    MEDICATIONS  (PRN):  albuterol/ipratropium for Nebulization 3 milliLiter(s) Nebulizer every 6 hours PRN Shortness of Breath and/or Wheezing  fentaNYL    Injectable 50 MICROGram(s) IV Push every 4 hours PRN Agitation  morphine  - Injectable 2 milliGRAM(s) IV Push every 4 hours PRN Moderate Pain (4 - 6)    Pertinent Labs   (06:55):  WBC 12.37  RBC 3.59  H/H: 10.6/35.1  BUN 21  Cr <0.5  Glu 131  Alkaline Phosphatase 117  Aspartate Aminotransferase 46  Alanine Aminotransferase 64    Finger Sticks:  POCT Blood Glucose.: 133 mg/dL ( @ 07:47)  POCT Blood Glucose.: 174 mg/dL ( @ 23:59)    Physical Findings:  - Appearance:  - GI function:  - Tubes:  - Oral/Mouth cavity:  - Skin:     Nutrition Requirements:  Weight Used:     Estimated Energy Needs    Continue []  Adjust []  Adjusted Energy Recommendations:   kcal/day        Estimated Protein Needs    Continue []  Adjust []  Adjusted Protein Recommendations:   gm/day        Estimated Fluid Needs        Continue []  Adjust []  Adjusted Fluid Recommendations:   mL/day     Nutrient Intake:     [] Previous Nutrition Diagnosis:            [] Ongoing          [] Resolved    [] No active nutrition diagnosis identified at this time     Nutrition Intervention      Goal/Expected Outcome:      Indicator/Monitoring:      Recommendation: Registered Dietitian Follow-Up  -- Authored by Veronique Gatica, RD #[id number]     Patient Profile Reviewed                           Yes [x]   No []     Nutrition History Previously Obtained        Yes [x]  No []       Pertinent Subjective Information:     Pertinent Medical Interventions:  -- pt is 81 year old male with hx of HTN, BPH, unvaccinated, tested + for COVID 12 days PTA, BIBA 2/2 respiratory distress s/p cardiac arrest downtime of 7 minutes, s/p intubation.  + PNA, L sided pnemothorax, s/p chest tube. pt became difficult to ventilate 22 s/p bronchoscopy  2/2 thick secretions.  s/p cardizem drip 2/ afib RVR s/p chest tube removal.  Poor prognosis. DNR. pt has been off sedation since , unresponsive, + anoxic brain injury. 2/3 + blood cultures followed by ID likely contaminant. Pt now w/trach and PEG placement scheduled .        Diet order:   Currently NPO for procedure for PEG tube placement - ACTIVE   Prior diet order: Diet, NPO with Tube Feed:   Tube Feeding Modality: Orogastric  Glucerna 1.2 Haroldo  Total Volume for 24 Hours (mL): 1440  Continuous  Starting Tube Feed Rate {mL per Hour}: 20  Until Goal Tube Feed Rate (mL per Hour): 60  Tube Feed Duration (in Hours): 24  Tube Feed Start Time: 10:00 (22 @ 09:59)    Once medically feasible, initiate feed via PEG  Tube Feeding Modality: PEG  Glucerna 1.2 Haroldo  Total Volume for 24 Hours (mL): 1440  Continuous  Starting Tube Feed Rate {mL per Hour}: 20  Until Goal Tube Feed Rate (mL per Hour): 60  Tube Feed Duration (in Hours): 24  Tube Feed Start Time: 10:00 (22 @ 09:59)    Anthropometrics:  Height (cm): 172.7 (22 @ 08:24)  Weight (kg): 72.5 (22 @ 08:24)  BMI (kg/m2): 24.3 (22 @ 08:24)  IBW: 67.27 KG (148#)    Daily Weight in k.2 (-), Weight in k.1 (02-15), Weight in k.5 (-14), Weight in k.6 (-13)  % Weight Change    MEDICATIONS  (STANDING):  amantadine Syrup 100 milliGRAM(s) Oral every 12 hours  cefepime   IVPB 2000 milliGRAM(s) IV Intermittent every 8 hours  chlorhexidine 0.12% Liquid 15 milliLiter(s) Oral Mucosa every 12 hours  chlorhexidine 4% Liquid 1 Application(s) Topical <User Schedule>  dextrose 5%. 1000 milliLiter(s) (50 mL/Hr) IV Continuous <Continuous>  dextrose 5%. 1000 milliLiter(s) (100 mL/Hr) IV Continuous <Continuous>  dextrose 50% Injectable 25 Gram(s) IV Push once  dextrose 50% Injectable 12.5 Gram(s) IV Push once  dextrose 50% Injectable 25 Gram(s) IV Push once  enoxaparin Injectable 40 milliGRAM(s) SubCutaneous daily  glucagon  Injectable 1 milliGRAM(s) IntraMuscular once  insulin lispro (ADMELOG) corrective regimen sliding scale   SubCutaneous every 6 hours  metoprolol tartrate 25 milliGRAM(s) Oral two times a day  pantoprazole  Injectable 40 milliGRAM(s) IV Push daily  polyethylene glycol 3350 17 Gram(s) Oral daily    MEDICATIONS  (PRN):  albuterol/ipratropium for Nebulization 3 milliLiter(s) Nebulizer every 6 hours PRN Shortness of Breath and/or Wheezing  fentaNYL    Injectable 50 MICROGram(s) IV Push every 4 hours PRN Agitation  morphine  - Injectable 2 milliGRAM(s) IV Push every 4 hours PRN Moderate Pain (4 - 6)    Pertinent Labs   (06:55):  WBC 12.37  RBC 3.59  H/H: 10.6/35.1  BUN 21  Cr <0.5  Glu 131  Alkaline Phosphatase 117  Aspartate Aminotransferase 46  Alanine Aminotransferase 64    Finger Sticks:  POCT Blood Glucose.: 133 mg/dL ( @ 07:47)  POCT Blood Glucose.: 174 mg/dL ( @ 23:59)    Physical Findings:  - Appearance: Unresponsive, Edema: Generalized 3+  - GI function: fecal incontinence, LBM: 2/15 -1x  - Tubes: trach, NGT -- PEG placement scheduled on   - Oral/Mouth cavity:   - Skin: skin tear on left buttocks per flowsheet, sacrum suspected DTI     Nutrition Requirements:  Weight Used: 72.6 kg ABW     Estimated Energy Needs: 1387 kcals MARIA LUISA state,  g protein (1.2-1.4g/kg), 1:1 kcal for estimated fluid needs      Nutrient Intake: present feeds provide 1728 kcals, 86 g protein and 1440 ml fluid. + H20 flushes of 300 ml q 8hrs. meeting >/=90% of estimated needs      [] Previous Nutrition Diagnosis: Inadequate kcal intake            [] Ongoing          [x] Resolved    [x] No active nutrition diagnosis identified at this time     Nutrition Intervention: maintain on above feeds     Goal/Expected Outcome: pt to continue to tolerate EN and meet >80% of estimated nutrient needs      Indicator/Monitoring: RD to monitor tolerance to EN, labs/meds, NFPF and f/u as needed within 4 days. Registered Dietitian Follow-Up  -- Authored by Veronique Gatica, RD #[id number]     Patient Profile Reviewed                           Yes [x]   No []     Nutrition History Previously Obtained        Yes [x]  No []       Pertinent Subjective Information:     Pertinent Medical Interventions:  -- pt is 81 year old male with hx of HTN, BPH, unvaccinated, tested + for COVID 12 days PTA, BIBA 2/2 respiratory distress s/p cardiac arrest downtime of 7 minutes, s/p intubation.  + PNA, L sided pnemothorax, s/p chest tube. pt became difficult to ventilate 22 s/p bronchoscopy  2/2 thick secretions.  s/p cardizem drip 2/ afib RVR s/p chest tube removal.  Poor prognosis. DNR. pt has been off sedation since , unresponsive, + anoxic brain injury. 2/3 + blood cultures followed by ID likely contaminant. Pt now w/trach and PEG placement scheduled .        Diet order:   Currently NPO for procedure for PEG tube placement - ACTIVE   Prior diet order: Diet, NPO with Tube Feed:   Tube Feeding Modality: Orogastric  Glucerna 1.2 Haroldo  Total Volume for 24 Hours (mL): 1440  Continuous  Starting Tube Feed Rate {mL per Hour}: 20  Until Goal Tube Feed Rate (mL per Hour): 60  Tube Feed Duration (in Hours): 24  Tube Feed Start Time: 10:00 (22 @ 09:59)    Once medically feasible, initiate feed via PEG  Tube Feeding Modality: PEG  Glucerna 1.2 Haroldo  Total Volume for 24 Hours (mL): 1440  Continuous  Starting Tube Feed Rate {mL per Hour}: 20  Until Goal Tube Feed Rate (mL per Hour): 60  Tube Feed Duration (in Hours): 24  Tube Feed Start Time: 10:00 (22 @ 09:59)    Anthropometrics:  Height (cm): 172.7 (22 @ 08:24)  Weight (kg): 72.5 (22 @ 08:24)  BMI (kg/m2): 24.3 (22 @ 08:24)  IBW: 67.27 KG (148#)    Daily Weight in k.2 (-), Weight in k.1 (02-15), Weight in k.5 (-14), Weight in k.6 (-13)  % Weight Change    MEDICATIONS  (STANDING):  amantadine Syrup 100 milliGRAM(s) Oral every 12 hours  cefepime   IVPB 2000 milliGRAM(s) IV Intermittent every 8 hours  chlorhexidine 0.12% Liquid 15 milliLiter(s) Oral Mucosa every 12 hours  chlorhexidine 4% Liquid 1 Application(s) Topical <User Schedule>  dextrose 5%. 1000 milliLiter(s) (50 mL/Hr) IV Continuous <Continuous>  dextrose 5%. 1000 milliLiter(s) (100 mL/Hr) IV Continuous <Continuous>  dextrose 50% Injectable 25 Gram(s) IV Push once  dextrose 50% Injectable 12.5 Gram(s) IV Push once  dextrose 50% Injectable 25 Gram(s) IV Push once  enoxaparin Injectable 40 milliGRAM(s) SubCutaneous daily  glucagon  Injectable 1 milliGRAM(s) IntraMuscular once  insulin lispro (ADMELOG) corrective regimen sliding scale   SubCutaneous every 6 hours  metoprolol tartrate 25 milliGRAM(s) Oral two times a day  pantoprazole  Injectable 40 milliGRAM(s) IV Push daily  polyethylene glycol 3350 17 Gram(s) Oral daily    MEDICATIONS  (PRN):  albuterol/ipratropium for Nebulization 3 milliLiter(s) Nebulizer every 6 hours PRN Shortness of Breath and/or Wheezing  fentaNYL    Injectable 50 MICROGram(s) IV Push every 4 hours PRN Agitation  morphine  - Injectable 2 milliGRAM(s) IV Push every 4 hours PRN Moderate Pain (4 - 6)    Pertinent Labs   (06:55):  WBC 12.37  RBC 3.59  H/H: 10.6/35.1  BUN 21  Cr <0.5  Glu 131  Alkaline Phosphatase 117  Aspartate Aminotransferase 46  Alanine Aminotransferase 64    Finger Sticks:  POCT Blood Glucose.: 133 mg/dL ( @ 07:47)  POCT Blood Glucose.: 174 mg/dL ( @ 23:59)    Physical Findings:  - Appearance: Unresponsive, Edema: Generalized 3+  - GI function: fecal incontinence, LBM: 2/15 -1x  - Tubes: trach, NGT -- PEG placement scheduled on   - Oral/Mouth cavity:   - Skin: skin tear on left buttocks per flowsheet, sacrum suspected DTI     Nutrition Requirements:  Weight Used: 72.6 kg ABW     Estimated Energy Needs: 1387 kcals MARIA LUISA state,  g protein (1.2-1.4g/kg), 1:1 kcal for estimated fluid needs      Nutrient Intake: present feeds provide 1728 kcals, 86 g protein and 1440 ml fluid. + H20 flushes of 300 ml q 8hrs. meeting >/=90% of estimated needs      [] Previous Nutrition Diagnosis: Inadequate kcal intake            [] Ongoing          [x] Resolved    [x] No active nutrition diagnosis identified at this time     Nutrition Intervention: maintain on above feeds     Goal/Expected Outcome: pt to continue to tolerate EN and meet >80% of estimated nutrient needs      Indicator/Monitoring: RD to monitor tolerance to EN, labs/meds, NFPF and f/u as needed within 2-4 days. Registered Dietitian Follow-Up  -- Authored by Veronique Gatica, RD #[id number]     Patient Profile Reviewed                           Yes [x]   No []     Nutrition History Previously Obtained        Yes [x]  No []          Pertinent Medical Interventions:  -- pt is 81 year old male with hx of HTN, BPH, unvaccinated, tested + for COVID 12 days PTA, BIBA 2/2 respiratory distress s/p cardiac arrest downtime of 7 minutes, s/p intubation.  + PNA, L sided pnemothorax, s/p chest tube. pt became difficult to ventilate 22 s/p bronchoscopy  2/2 thick secretions.  s/p cardizem drip 2/ afib RVR s/p chest tube removal.  Poor prognosis. DNR. pt has been off sedation since , unresponsive, + anoxic brain injury. 2/3 + blood cultures followed by ID likely contaminant. Pt now w/trach and PEG placement scheduled .        Diet order:   Currently NPO for procedure for PEG tube placement - ACTIVE   Prior diet order: Diet, NPO with Tube Feed:   Tube Feeding Modality: Orogastric  Glucerna 1.2 Haroldo  Total Volume for 24 Hours (mL): 1440  Continuous  Starting Tube Feed Rate {mL per Hour}: 20  Until Goal Tube Feed Rate (mL per Hour): 60  Tube Feed Duration (in Hours): 24  Tube Feed Start Time: 10:00 (22 @ 09:59)      Anthropometrics:  Height (cm): 172.7 (22 @ 08:24)  Weight (kg): 72.5 (22 @ 08:24)  BMI (kg/m2): 24.3 (22 @ 08:24)  IBW: 67.27 KG (148#)    Daily Weight in k.2 (-16), Weight in k.1 (02-15), Weight in k.5 (), Weight in k.6 ()  % Weight Change    MEDICATIONS  (STANDING):  amantadine Syrup 100 milliGRAM(s) Oral every 12 hours  cefepime   IVPB 2000 milliGRAM(s) IV Intermittent every 8 hours  chlorhexidine 0.12% Liquid 15 milliLiter(s) Oral Mucosa every 12 hours  chlorhexidine 4% Liquid 1 Application(s) Topical <User Schedule>  dextrose 5%. 1000 milliLiter(s) (50 mL/Hr) IV Continuous <Continuous>  dextrose 5%. 1000 milliLiter(s) (100 mL/Hr) IV Continuous <Continuous>  dextrose 50% Injectable 25 Gram(s) IV Push once  dextrose 50% Injectable 12.5 Gram(s) IV Push once  dextrose 50% Injectable 25 Gram(s) IV Push once  enoxaparin Injectable 40 milliGRAM(s) SubCutaneous daily  glucagon  Injectable 1 milliGRAM(s) IntraMuscular once  insulin lispro (ADMELOG) corrective regimen sliding scale   SubCutaneous every 6 hours  metoprolol tartrate 25 milliGRAM(s) Oral two times a day  pantoprazole  Injectable 40 milliGRAM(s) IV Push daily  polyethylene glycol 3350 17 Gram(s) Oral daily    MEDICATIONS  (PRN):  albuterol/ipratropium for Nebulization 3 milliLiter(s) Nebulizer every 6 hours PRN Shortness of Breath and/or Wheezing  fentaNYL    Injectable 50 MICROGram(s) IV Push every 4 hours PRN Agitation  morphine  - Injectable 2 milliGRAM(s) IV Push every 4 hours PRN Moderate Pain (4 - 6)    Pertinent Labs   (06:55):  WBC 12.37  RBC 3.59  H/H: 10.6/35.1  BUN 21  Cr <0.5  Glu 131  Alkaline Phosphatase 117  Aspartate Aminotransferase 46  Alanine Aminotransferase 64    Finger Sticks:  POCT Blood Glucose.: 133 mg/dL ( @ 07:47)  POCT Blood Glucose.: 174 mg/dL ( @ 23:59)    Physical Findings:  - Appearance: Unresponsive, Edema: Generalized 3+  - GI function: fecal incontinence, LBM: 2/15 -1x  - Tubes: trach, NGT -- PEG placement scheduled on   - Oral/Mouth cavity:   - Skin: skin tear on left buttocks per flowsheet, sacrum suspected DTI     Nutrition Requirements:  Weight Used: 71.2 kg ABW -       Estimated Energy Needs: 1545 kcals MARIA LUISA state vs. 4508-3304 kcal/day (25-30 kcals/kg), PROTEIN:  g/day (1.2-1.4g/kg), FLUID: 2290-0287 mL/day (25-30 mL/kg)      Nutrient Intake: present feeds provide 1728 kcals, 86 g protein and free water of 1159 mL fluid + H20 flushes of 200 mL q 8hrs; meeting>/=90% of estimated needs      [] Previous Nutrition Diagnosis: Inadequate kcal intake            [] Ongoing          [x] Resolved    [x] No active nutrition diagnosis identified at this time     Nutrition Intervention: Maintain on above feeds s/p PEG placement.   Once able to tolerate feeds, recommend to initiate feed via PEG -- Glucerna 1.2 Haroldo  Total Volume for 24 Hours (mL): 1440  Continuous  Starting Tube Feed Rate {mL per Hour}: 20  Until Goal Tube Fee Rate (mL per Hour): 60  Free Water of 1159 mL fluid + H20 flushes of 200 mL q 8hrs  Tube Feed Duration (in Hours): 24  Tube Feed Start Time: 10:00 (22 @ 09:59)     Goal/Expected Outcome: pt to continue to tolerate EN and meet >80% of estimated energy needs.      Indicator/Monitoring: RD to monitor tolerance to EN, labs/meds, NFPF and f/u as needed within 2-4 days.

## 2022-02-18 NOTE — PROGRESS NOTE ADULT - SUBJECTIVE AND OBJECTIVE BOX
AGUSTINCHADWICK KUMARI  81y, Male  Allergy: Allergy Status Unknown    Hospital Day: 34d    Patient seen and examined earlier today. pt is in bed, unresponive, ct in place, cardoso in place, trached. aferbile    PMH/PSH:  PAST MEDICAL & SURGICAL HISTORY:  BPH (benign prostatic hyperplasia)    Mild HTN        LAST 24-Hr EVENTS:    VITALS:  T(F): 97.5 (02-18-22 @ 05:18), Max: 98.5 (02-17-22 @ 21:06)  HR: 105 (02-18-22 @ 08:42)  BP: 128/59 (02-18-22 @ 08:24) (128/59 - 142/89)  RR: --  SpO2: 99% (02-18-22 @ 08:42)  FiO2: 40        TESTS & MEASUREMENTS:  Weight/BMI  72.5 (02-18-22 @ 08:24)  24.3 (02-18-22 @ 08:24)    02-16-22 @ 07:01  -  02-17-22 @ 07:00  --------------------------------------------------------  IN: 2110 mL / OUT: 1620 mL / NET: 490 mL    02-17-22 @ 07:01  -  02-18-22 @ 07:00  --------------------------------------------------------  IN: 0 mL / OUT: 380 mL / NET: -380 mL                            10.6   12.37 )-----------( 204      ( 17 Feb 2022 06:55 )             35.1       INR: 1.17 ratio (02-15-22 @ 05:30)    02-17    144  |  105  |  21<H>  ----------------------------<  131<H>  4.4   |  29  |  <0.5<L>    Ca    8.5      17 Feb 2022 06:55  Phos  3.8     02-17  Mg     2.2     02-17    TPro  5.0<L>  /  Alb  2.6<L>  /  TBili  0.8  /  DBili  x   /  AST  46<H>  /  ALT  64<H>  /  AlkPhos  117<H>  02-17    LIVER FUNCTIONS - ( 17 Feb 2022 06:55 )  Alb: 2.6 g/dL / Pro: 5.0 g/dL / ALK PHOS: 117 U/L / ALT: 64 U/L / AST: 46 U/L / GGT: x                 Culture - Blood (collected 02-15-22 @ 03:10)  Source: .Blood Blood  Preliminary Report (02-16-22 @ 20:00):    No growth to date.    Culture - Sputum (collected 02-14-22 @ 11:30)  Source: .Sputum Deep tracheal aspirate  Gram Stain (02-14-22 @ 23:16):    Moderate polymorphonuclear leukocytes per low power field    No Squamous epithelial cells per low power field    Moderate Gram Negative Rods seen per oil power field  Final Report (02-16-22 @ 21:38):    Moderate Pseudomonas aeruginosa    Normal Respiratory Corina absent  Organism: Pseudomonas aeruginosa (02-16-22 @ 21:38)  Organism: Pseudomonas aeruginosa (02-16-22 @ 21:38)      -  Amikacin: S <=16      -  Aztreonam: S <=4      -  Cefepime: S <=2      -  Ceftazidime: S 4      -  Ciprofloxacin: S <=0.25      -  Gentamicin: S 4      -  Imipenem: S 2      -  Levofloxacin: S <=0.5      -  Meropenem: S <=1      -  Piperacillin/Tazobactam: S <=8      -  Tobramycin: S <=2      Method Type: ALEXSANDRA    Procalcitonin, Serum: 0.09 ng/mL (02-12-22 @ 06:21)  Procalcitonin, Serum: 0.04 ng/mL (02-09-22 @ 06:03)    COVID-19 PCR: NotDetec (02-15-22 @ 07:30)    A1C with Estimated Average Glucose Result: 6.2 % (01-24-22 @ 06:25)    RADIOLOGY, ECG, & ADDITIONAL TESTS:  12 Lead ECG:   Ventricular Rate 141 BPM    Atrial Rate 122 BPM    QRS Duration 112 ms    Q-T Interval 334 ms    QTC Calculation(Bazett) 511 ms    R Axis -29 degrees    T Axis 136 degrees    Diagnosis Line Atrial fibrillation with rapid ventricular response  Incomplete right bundle branch block  ST & T wave abnormality, consider lateral ischemia  Abnormal ECG    Confirmed by JESSICA EDMONDSON MD ([id number]) on 1/30/2022 11:17:30 AM (01-30-22 @ 02:11)      RECENT DIAGNOSTIC ORDERS:  Diet, NPO (02-18-22 @ 02:34)      MEDICATIONS:  MEDICATIONS  (STANDING):  amantadine Syrup 100 milliGRAM(s) Oral every 12 hours  aspirin  chewable 81 milliGRAM(s) Oral daily  cefepime   IVPB 2000 milliGRAM(s) IV Intermittent every 8 hours  chlorhexidine 0.12% Liquid 15 milliLiter(s) Oral Mucosa every 12 hours  chlorhexidine 4% Liquid 1 Application(s) Topical <User Schedule>  dextrose 5%. 1000 milliLiter(s) (50 mL/Hr) IV Continuous <Continuous>  dextrose 5%. 1000 milliLiter(s) (100 mL/Hr) IV Continuous <Continuous>  dextrose 50% Injectable 25 Gram(s) IV Push once  dextrose 50% Injectable 12.5 Gram(s) IV Push once  dextrose 50% Injectable 25 Gram(s) IV Push once  enoxaparin Injectable 40 milliGRAM(s) SubCutaneous daily  glucagon  Injectable 1 milliGRAM(s) IntraMuscular once  insulin lispro (ADMELOG) corrective regimen sliding scale   SubCutaneous every 6 hours  metoprolol tartrate 25 milliGRAM(s) Oral two times a day  pantoprazole  Injectable 40 milliGRAM(s) IV Push daily  polyethylene glycol 3350 17 Gram(s) Oral daily    MEDICATIONS  (PRN):  acetaminophen     Tablet .. 650 milliGRAM(s) Oral every 6 hours PRN Temp greater or equal to 38C (100.4F), Mild Pain (1 - 3)  albuterol/ipratropium for Nebulization 3 milliLiter(s) Nebulizer every 6 hours PRN Shortness of Breath and/or Wheezing  fentaNYL    Injectable 50 MICROGram(s) IV Push every 4 hours PRN Agitation  morphine  - Injectable 2 milliGRAM(s) IV Push every 4 hours PRN Moderate Pain (4 - 6)      HOME MEDICATIONS:  Aspir 81 oral delayed release tablet (01-15)  benzonatate 100 mg oral capsule (01-15)  dexamethasone 4 mg oral tablet (01-15)  metoprolol succinate 25 mg oral tablet, extended release (01-15)  NIFEdipine 60 mg oral tablet, extended release (01-15)  tamsulosin 0.4 mg oral capsule (01-15)  telmisartan 80 mg oral tablet (01-15)  Vitamin B12 1000 mcg oral tablet (01-15)      PHYSICAL EXAM:  GENERAL: unresponsive, no acute distress on 40 % fio2  HEAD/NECK:  Atraumatic, Normocephalic, trach in situ to vent,   Nose: no bleeding, NGT in place  Resp: scattred rhonchi, no distress, diminished, chest tube left upper chest   NERVOUS SYSTEM: unresponsive   HEART: Regular rate and rhythm; No murmurs, rubs, or gallops  ABDOMEN: Soft, Nontender, Nondistended; Bowel sounds present  EXTREMITIES:  b/l  edema

## 2022-02-18 NOTE — CHART NOTE - NSCHARTNOTEFT_GEN_A_CORE
PALLIATIVE MEDICINE INTERDISCIPLINARY TEAM NOTE    Provider:  [   ]Social Work   [   ]          [   ] Initial visit [   ] Follow up    Family or contact name / phone #   Met with: [   ] Patient  [   ] Family  [   ] Other:    Primary Language: [   ] English [   ] Other*:                      *Interpretation provided by:    SUPPORT DIAGNOSES            (Check all that apply)  [   ] Psychosocial spiritual assessment (PSSA)  [   ] EOL issues  [   ] Cultural / spiritual concerns  [   ] Pain / suffering  [   ] Dementia / AMS  [   ] Other:  [   ] AD issues  [   ] Grief / loss / sadness  [   ] Discharge issues  [   ] Distress / coping    PSYCHOSOCIAL ASSESSMENT OF PATIENT         (Check all that apply)  [   ] Initial Assessment            [   ] Reassessment          [   ] Not Applicable this visit    Pain/suffering acuity:  [   ] None to mild (0-3)           [   ] Moderate (4-6)        [   ] High (7-10)    Mental Status:  [   ] Alert/oriented (x3)          [   ] Confused/Altered(x2/x1)         [   ] Non-resp    Functional status:  [   ] Independent w ADLs      [   ] Needs Assistance             [   ] Bedbound/Full Care    Coping:  [   ] Coping well                     [   ] Coping w/difficulty            [   ] Poor coping    Support system:  [   ] Strong                              [   ] Adequate                        [   ] Inadequate    SPIRITUAL ASSESSMENT  Amish/Spiritual practice: ____Catholic_______________________    Role of organized Latter-day:  [   ] Important                     [ x  ] Some (fam tradition, cultural)               [   ] None    Effects on medical care:  [   ] Yes, _____________________________________                         [   ] None    Cultural/Anabaptist need:  [   ] Yes, _____________________________________                         [  x ] None    Refer to Pastoral Care:  [   ] Yes           [   ] No, not at this time    SERVICE PROVIDED  [   ]PSSA                                                                             [   ]Discharge support / facilitation  [   ]AD / goals of care counseling                                  [   ]EOL / death / bereavement counseling  [   ]Counseling / support                                                [   ] Family meeting  [  x ]Prayer / sacrament / ritual                                      [   ] Referral   [   ]Other                                                                       NOTE and Plan of Care (PoC):  Pt was resting comfortable at time of visit. I will follow.

## 2022-02-18 NOTE — PRE-ANESTHESIA EVALUATION ADULT - NSANTHPMHFT_GEN_ALL_CORE
resp failure , s/p trach and on vent  cardiac arrest-hypoxic brain injury  Afib  BPH  HTN  Pneumothrax-s/p drainage

## 2022-02-18 NOTE — PROGRESS NOTE ADULT - SUBJECTIVE AND OBJECTIVE BOX
Patient is a 81y old  Male who presents with a chief complaint of cardiac arrest (18 Feb 2022 12:23)      Over Night Events:  Patient seen and examined.   pig tail to water seal cxr no pneumothorax     ROS:  See HPI    PHYSICAL EXAM    ICU Vital Signs Last 24 Hrs  T(C): 36.4 (18 Feb 2022 13:32), Max: 36.9 (17 Feb 2022 21:06)  T(F): 97.6 (18 Feb 2022 13:32), Max: 98.5 (17 Feb 2022 21:06)  HR: 75 (18 Feb 2022 13:32) (53 - 108)  BP: 150/66 (18 Feb 2022 13:32) (128/59 - 150/66)  BP(mean): --  ABP: --  ABP(mean): --  RR: 24 (18 Feb 2022 13:32) (24 - 24)  SpO2: 99% (18 Feb 2022 08:42) (97% - 100%)      General: not awake   HEENT:    trach             Lymph Nodes: NO cervical LN   Lungs: Bilateral BS  Cardiovascular: Regular   Abdomen: Soft, Positive BS  Extremities: No clubbing   Skin: warm   Neurological:   Musculoskeletal: move all ext     I&O's Detail    17 Feb 2022 07:01  -  18 Feb 2022 07:00  --------------------------------------------------------  IN:  Total IN: 0 mL    OUT:    Chest Tube (mL): 30 mL    Indwelling Catheter - Urethral (mL): 350 mL  Total OUT: 380 mL    Total NET: -380 mL      18 Feb 2022 07:01  -  18 Feb 2022 13:39  --------------------------------------------------------  IN:  Total IN: 0 mL    OUT:    Indwelling Catheter - Urethral (mL): 200 mL  Total OUT: 200 mL    Total NET: -200 mL          LABS:                          10.6   12.37 )-----------( 204      ( 17 Feb 2022 06:55 )             35.1         17 Feb 2022 06:55    144    |  105    |  21     ----------------------------<  131    4.4     |  29     |  <0.5     Ca    8.5        17 Feb 2022 06:55  Phos  3.8       17 Feb 2022 06:55  Mg     2.2       17 Feb 2022 06:55                                                                                                                                                                                               Mode: AC/ CMV (Assist Control/ Continuous Mandatory Ventilation)  RR (machine): 24  TV (machine): 420  FiO2: 40  PEEP: 5  MAP: 10  PIP: 26                                          MEDICATIONS  (STANDING):  amantadine Syrup 100 milliGRAM(s) Oral every 12 hours  aspirin  chewable 81 milliGRAM(s) Oral daily  cefepime   IVPB 2000 milliGRAM(s) IV Intermittent every 8 hours  chlorhexidine 0.12% Liquid 15 milliLiter(s) Oral Mucosa every 12 hours  chlorhexidine 4% Liquid 1 Application(s) Topical <User Schedule>  dextrose 5%. 1000 milliLiter(s) (50 mL/Hr) IV Continuous <Continuous>  dextrose 5%. 1000 milliLiter(s) (100 mL/Hr) IV Continuous <Continuous>  dextrose 50% Injectable 25 Gram(s) IV Push once  dextrose 50% Injectable 12.5 Gram(s) IV Push once  dextrose 50% Injectable 25 Gram(s) IV Push once  enoxaparin Injectable 40 milliGRAM(s) SubCutaneous daily  glucagon  Injectable 1 milliGRAM(s) IntraMuscular once  insulin lispro (ADMELOG) corrective regimen sliding scale   SubCutaneous every 6 hours  metoprolol tartrate 25 milliGRAM(s) Oral two times a day  pantoprazole  Injectable 40 milliGRAM(s) IV Push daily  polyethylene glycol 3350 17 Gram(s) Oral daily    MEDICATIONS  (PRN):  acetaminophen     Tablet .. 650 milliGRAM(s) Oral every 6 hours PRN Temp greater or equal to 38C (100.4F), Mild Pain (1 - 3)  albuterol/ipratropium for Nebulization 3 milliLiter(s) Nebulizer every 6 hours PRN Shortness of Breath and/or Wheezing  fentaNYL    Injectable 50 MICROGram(s) IV Push every 4 hours PRN Agitation  morphine  - Injectable 2 milliGRAM(s) IV Push every 4 hours PRN Moderate Pain (4 - 6)          Xrays:  TLC:  OG:  ET tube:                                                                                       ECHO:  [facility] ICU:

## 2022-02-18 NOTE — PROGRESS NOTE ADULT - ASSESSMENT
IMPRESSION:  Acute hypoxic respiratory failure SP Trach  Anoxic brain injury  Cardiac Arrest  Severe COVID PNA  Sepsis on present on admission  Left PNX SP chest tube, resolved  Pseudomonas PNA, +ve DTA  Mucus plugs SP Bronchoscopy  GNR on DTA  Ischemic changes on CT Head      SUGGEST:    CNS:   off sedation.   Pain control.    HEENT: Oral care.      PULMONARY: HOB @ 45 degrees.  told PA to clamp the tube and repeat cxr in 2 years if no pneumothorax keep clamp and then cxr luna morning and if cxr no pneumothrax can d/c pig tial   if cxr show pneumothorax then unclamp the tube and place to suction   Aspiration precautions.   Vent changes: wean fiO2  case discussed with hospitalist and PA covering

## 2022-02-18 NOTE — PROGRESS NOTE ADULT - ASSESSMENT
Patient on vent sedated paralyzed.  Not on pressors. On fio2 100%. Check cxr lytes. Troponin MB  elevated . MI but probably secondary resp arrest. Define goals care, Continue current rx. Grave prognosis. Yes

## 2022-02-18 NOTE — PACU DISCHARGE NOTE - COMMENTS
PEG done in [facility] by dr. moore. Uneventful anesthesia.  Patient is vent dependent S/P hx cardiac arrest. Unresponsive baseline. Patient stable VS. Left in care of RN awaiting RT for transport back to floor.

## 2022-02-19 NOTE — CHART NOTE - NSCHARTNOTEFT_GEN_A_CORE
Pulse drops to the 30's though BP is normal. No intervention now but will consider holding morning Metoprolol dose

## 2022-02-19 NOTE — CHART NOTE - NSCHARTNOTEFT_GEN_A_CORE
followed up on cxr  no pneumo seen  c/w ct clamp--d/w rn  repeat cxr in am followed up on cxr  no pneumo seen  c/w ct clamp--d/w rn-- call pa or md if any increasing pain, hypoxia or sob  repeat cxr in am

## 2022-02-19 NOTE — PROGRESS NOTE ADULT - ASSESSMENT
81 year old male with a hx of HTN, BPH, recent diagnosis of COVID 19, 2  days prior to admission -> presented to the ED S/P cardiac arrest.  Patient began to have worsening dyspnea at home, EMS was called. Found to be in cardiac arrest. ROSC was achieved after 2 rounds of CPR and epi, downtime 7 mins. Was intubated in the field.     Acute respiratory failure / Cardiac arrest / COVID-19 pneumonia / probable NSTEMI / L sided pneumothorax / anoxic brain injury / gram negative pneumonia     1. s/p Cardiac Arrest POA  intubated in field, CPR in field 7 min downtime  unresponsive afterwards, currently with trach  s/p peg tube 2/18     2. Respi Failure POA  now vent dependent via trach    3. Anoxic Brain Injury POA  unresponsive  poor prognosis  family has opted DNR    4. Pseudomonas Pneumonia 2/14 noted  on cefepime  other supportive treatment  pulm, ID following    5. Fever low grade 2/15-2/17  blood culture negative to date  afebrile    6. severe pt calorie malnutrition  has ng tube for feeding  Need for PEG tube possibly today     7. Left pneumothorax  chest tube in place, clamped, pulmonary following    Disp  multiorgan failure, prognosis is poor  s/p PEG tube yesterday  Pulmonary managing chest tube , clamped, CXR shows no pnx  family wants LTAC at [place]  discussed with and updated the dtr Judy [phone] 2/18, 2/19  discussed with RN at bedside to start tube feeding

## 2022-02-19 NOTE — CHART NOTE - NSCHARTNOTEFT_GEN_A_CORE
chest tube drainage unit changed.  The chest tube was clamped.  Will get cxr in 2 hours and again tomorrow am.  Will f/u on cxr's.  D/w Dr Molina.

## 2022-02-19 NOTE — PROGRESS NOTE ADULT - SUBJECTIVE AND OBJECTIVE BOX
VANGIECHADWICK  81y, Male  Allergy: Allergy Status Unknown    Hospital Day: 35d    Patient seen and examined earlier today. pt seen and examined this am, discussed with his RN at bedside  no new issues reported  s/p peg tube yesterday  chest tube is clamped    PMH/PSH:  PAST MEDICAL & SURGICAL HISTORY:  BPH (benign prostatic hyperplasia)    Mild HTN        LAST 24-Hr EVENTS:    VITALS:  T(F): 97.4 (02-19-22 @ 05:01), Max: 97.6 (02-18-22 @ 13:32)  HR: 72 (02-19-22 @ 07:50)  BP: 121/64 (02-19-22 @ 05:01) (121/64 - 150/66)  RR: 24 (02-18-22 @ 15:06)  SpO2: 98% (02-19-22 @ 07:50)  FiO2: 40    TESTS & MEASUREMENTS:  Weight/BMI  72.5 (02-18-22 @ 15:06)  24.3 (02-18-22 @ 15:06)    02-17-22 @ 07:01  -  02-18-22 @ 07:00  --------------------------------------------------------  IN: 0 mL / OUT: 380 mL / NET: -380 mL    02-18-22 @ 07:01  -  02-19-22 @ 07:00  --------------------------------------------------------  IN: 0 mL / OUT: 350 mL / NET: -350 mL               10.4   10.52 )-----------( 195      ( 19 Feb 2022 09:32 )             34.2       INR: 1.17 ratio (02-15-22 @ 05:30)    02-19    145  |  106  |  21<H>  ----------------------------<  96  4.0   |  26  |  <0.5<L>    Ca    8.0<L>      19 Feb 2022 09:32    TPro  4.9<L>  /  Alb  2.4<L>  /  TBili  0.9  /  DBili  x   /  AST  29  /  ALT  50<H>  /  AlkPhos  110  02-19    LIVER FUNCTIONS - ( 19 Feb 2022 09:32 )  Alb: 2.4 g/dL / Pro: 4.9 g/dL / ALK PHOS: 110 U/L / ALT: 50 U/L / AST: 29 U/L / GGT: x             Culture - Blood (collected 02-15-22 @ 03:10)  Source: .Blood Blood  Preliminary Report (02-16-22 @ 20:00):    No growth to date.    Culture - Sputum (collected 02-14-22 @ 11:30)  Source: .Sputum Deep tracheal aspirate  Gram Stain (02-14-22 @ 23:16):    Moderate polymorphonuclear leukocytes per low power field    No Squamous epithelial cells per low power field    Moderate Gram Negative Rods seen per oil power field  Final Report (02-16-22 @ 21:38):    Moderate Pseudomonas aeruginosa    Normal Respiratory Corina absent  Organism: Pseudomonas aeruginosa (02-16-22 @ 21:38)  Organism: Pseudomonas aeruginosa (02-16-22 @ 21:38)      -  Amikacin: S <=16      -  Aztreonam: S <=4      -  Cefepime: S <=2      -  Ceftazidime: S 4      -  Ciprofloxacin: S <=0.25      -  Gentamicin: S 4      -  Imipenem: S 2      -  Levofloxacin: S <=0.5      -  Meropenem: S <=1      -  Piperacillin/Tazobactam: S <=8      -  Tobramycin: S <=2      Method Type: ALEXSANDRA      Procalcitonin, Serum: 0.09 ng/mL (02-12-22 @ 06:21)    COVID-19 PCR: NotDetec (02-15-22 @ 07:30)    A1C with Estimated Average Glucose Result: 6.2 % (01-24-22 @ 06:25)    RADIOLOGY, ECG, & ADDITIONAL TESTS:  12 Lead ECG:   Ventricular Rate 141 BPM    Atrial Rate 122 BPM    QRS Duration 112 ms    Q-T Interval 334 ms    QTC Calculation(Bazett) 511 ms    R Axis -29 degrees    T Axis 136 degrees    Diagnosis Line Atrial fibrillation with rapid ventricular response  Incomplete right bundle branch block  ST & T wave abnormality, consider lateral ischemia  Abnormal ECG    Confirmed by JESSICA EDMONDSON MD [id number]) on 1/30/2022 11:17:30 AM (01-30-22 @ 02:11)      RECENT DIAGNOSTIC ORDERS:  Comprehensive Metabolic Panel: AM Sched. Collection: 20-Feb-2022 04:30 (02-19-22 @ 11:20)  Complete Blood Count: AM Sched. Collection: 20-Feb-2022 04:30 (02-19-22 @ 11:20)  Xray Chest 1 View- PORTABLE-Routine: AM   Indication: pneumothorax  Transport: Portable,  w/ Monitor  Provider's Contact #: [phone] (02-19-22 @ 07:39)  Xray Chest 1 View- PORTABLE-Routine: Routine   Indication: pneumothorax  Transport: Portable,  w/ Monitor  Addl Info: please perform study at 930am  Exam Completed  Provider's Contact #: [phone] (02-19-22 @ 07:39)      MEDICATIONS:  MEDICATIONS  (STANDING):  amantadine Syrup 100 milliGRAM(s) Oral every 12 hours  aspirin  chewable 81 milliGRAM(s) Oral daily  cefepime   IVPB 2000 milliGRAM(s) IV Intermittent every 8 hours  chlorhexidine 0.12% Liquid 15 milliLiter(s) Oral Mucosa every 12 hours  chlorhexidine 4% Liquid 1 Application(s) Topical <User Schedule>  dextrose 5%. 1000 milliLiter(s) (50 mL/Hr) IV Continuous <Continuous>  dextrose 5%. 1000 milliLiter(s) (100 mL/Hr) IV Continuous <Continuous>  dextrose 50% Injectable 25 Gram(s) IV Push once  dextrose 50% Injectable 12.5 Gram(s) IV Push once  dextrose 50% Injectable 25 Gram(s) IV Push once  enoxaparin Injectable 40 milliGRAM(s) SubCutaneous daily  glucagon  Injectable 1 milliGRAM(s) IntraMuscular once  insulin lispro (ADMELOG) corrective regimen sliding scale   SubCutaneous every 6 hours  metoprolol tartrate 25 milliGRAM(s) Oral two times a day  pantoprazole  Injectable 40 milliGRAM(s) IV Push daily  polyethylene glycol 3350 17 Gram(s) Oral daily    MEDICATIONS  (PRN):  acetaminophen     Tablet .. 650 milliGRAM(s) Oral every 6 hours PRN Temp greater or equal to 38C (100.4F), Mild Pain (1 - 3)  albuterol/ipratropium for Nebulization 3 milliLiter(s) Nebulizer every 6 hours PRN Shortness of Breath and/or Wheezing  fentaNYL    Injectable 50 MICROGram(s) IV Push every 4 hours PRN Agitation  morphine  - Injectable 2 milliGRAM(s) IV Push every 4 hours PRN Moderate Pain (4 - 6)      HOME MEDICATIONS:  Aspir 81 oral delayed release tablet (01-15)  benzonatate 100 mg oral capsule (01-15)  dexamethasone 4 mg oral tablet (01-15)  metoprolol succinate 25 mg oral tablet, extended release (01-15)  NIFEdipine 60 mg oral tablet, extended release (01-15)  tamsulosin 0.4 mg oral capsule (01-15)  telmisartan 80 mg oral tablet (01-15)  Vitamin B12 1000 mcg oral tablet (01-15)      PHYSICAL EXAM:  GENERAL: unresponsive, no acute distress on 40 % fio2  HEAD/NECK:  Atraumatic, Normocephalic, trach in situ to vent,   Nose: no bleeding, NGT in place  Resp: scattred rhonchi, no distress, diminished, chest tube left upper chest   NERVOUS SYSTEM: unresponsive   HEART: Regular rate and rhythm; No murmurs, rubs, or gallops  GI: Soft, Nontender, Nondistended; Bowel sounds present, PEG tube in place  EXTREMITIES:  b/l  edema

## 2022-02-20 NOTE — PROGRESS NOTE ADULT - ASSESSMENT
81 year old male with a hx of HTN, BPH, recent diagnosis of COVID 19, 2  days prior to admission -> presented to the ED S/P cardiac arrest.  Patient began to have worsening dyspnea at home, EMS was called. Found to be in cardiac arrest. ROSC was achieved after 2 rounds of CPR and epi, downtime 7 mins. Was intubated in the field.     Acute respiratory failure / Cardiac arrest / COVID-19 pneumonia / probable NSTEMI / L sided pneumothorax / anoxic brain injury / gram negative pneumonia     1. s/p Cardiac Arrest POA  intubated in field, CPR in field 7 min downtime  unresponsive afterwards, currently with trach  s/p peg tube 2/18     2. Respi Failure POA  now vent dependent via trach 40 % fio2    3. Anoxic Brain Injury POA  unresponsive  poor prognosis  family has opted DNR    4. Pseudomonas Pneumonia 2/14 noted  on cefepime, would continue for total of 14 days. may switch to levaquin by peg tube on dc  other supportive treatment  pulm, ID following    5. Fever low grade 2/15-2/17  blood culture negative to date  currently afebrile    6. severe pt calorie malnutrition  s/p peg tube 2/18, tolerating feeds     7. Left pneumothorax  chest tube in place, clamped, cxr shows no pnx  possible removal per pulm today    Disp  multiorgan failure, overall prognosis is poor  s/p trach  s/p PEG tube 2/18  possible removal of chest tube today   family wants LTAC at [place]  discussed with and updated the dtr Judy [phone] 2/18, 2/19,2/20  discussed with RN at bedside

## 2022-02-20 NOTE — CHART NOTE - NSCHARTNOTEFT_GEN_A_CORE
pt with clamped chest tube , am cxr negative for pneumothorax  spoke to pulmonology suggested to keep tube clamped and repeat cxr in am or remove chest tube as resident is not at [facility]  d/w attending preferrers tube to be removed by pulmonology  tomorrow in case pt redevelops pneumothorax and requires CT tube reinsertion

## 2022-02-20 NOTE — PROGRESS NOTE ADULT - ASSESSMENT
IMPRESSION:  Acute hypoxic respiratory failure SP Trach  Anoxic brain injury  Cardiac Arrest  Severe COVID PNA  Sepsis on present on admission  Left PNX SP chest tube, resolved  Pseudomonas PNA, +ve DTA  Mucus plugs SP Bronchoscopy  GNR on DTA  Ischemic changes on CT Head      SUGGEST:    CNS:   off sedation.   Pain control.    HEENT: Oral care.      PULMONARY: HOB @ 45 degrees.  pending morning cxr if no pneumothorax will d/c pig tail   Aspiration precautions.   Vent changes: wean fiO2  case discussed with hospitalist and PA covering

## 2022-02-20 NOTE — PROGRESS NOTE ADULT - SUBJECTIVE AND OBJECTIVE BOX
Patient is a 81y old  Male who presents with a chief complaint of cardiac arrest (19 Feb 2022 11:20)      Over Night Events:  Patient seen and examined.   not on distress   tube clamped    ROS:  See HPI    PHYSICAL EXAM    ICU Vital Signs Last 24 Hrs  T(C): 36.7 (20 Feb 2022 05:03), Max: 36.7 (20 Feb 2022 05:03)  T(F): 98 (20 Feb 2022 05:03), Max: 98 (20 Feb 2022 05:03)  HR: 97 (20 Feb 2022 05:03) (35 - 97)  BP: 121/92 (20 Feb 2022 05:03) (121/92 - 141/79)  BP(mean): --  ABP: --  ABP(mean): --  RR: 18 (19 Feb 2022 20:15) (18 - 18)  SpO2: 98% (20 Feb 2022 02:30) (98% - 100%)      General: not awake   HEENT:     trach            Lymph Nodes: NO cervical LN   Lungs: Bilateral BS  Cardiovascular: Regular   Abdomen: Soft, Positive BS  Extremities: No clubbing   Skin: warm   Neurological:   Musculoskeletal: move all ext     I&O's Detail    19 Feb 2022 07:01  -  20 Feb 2022 07:00  --------------------------------------------------------  IN:  Total IN: 0 mL    OUT:    Indwelling Catheter - Urethral (mL): 425 mL  Total OUT: 425 mL    Total NET: -425 mL          LABS:                          10.4   10.52 )-----------( 195      ( 19 Feb 2022 09:32 )             34.2         19 Feb 2022 09:32    145    |  106    |  21     ----------------------------<  96     4.0     |  26     |  <0.5     Ca    8.0        19 Feb 2022 09:32    TPro  4.9    /  Alb  2.4    /  TBili  0.9    /  DBili  x      /  AST  29     /  ALT  50     /  AlkPhos  110    19 Feb 2022 09:32  Amylase x     lipase x                                                                                                                                                                                                 Mode: AC/ CMV (Assist Control/ Continuous Mandatory Ventilation)  RR (machine): 24  TV (machine): 420  FiO2: 40  PEEP: 5  ITime: 0.7  MAP: 12  PIP: 31                                          MEDICATIONS  (STANDING):  amantadine Syrup 100 milliGRAM(s) Oral every 12 hours  aspirin  chewable 81 milliGRAM(s) Oral daily  cefepime   IVPB 2000 milliGRAM(s) IV Intermittent every 8 hours  chlorhexidine 0.12% Liquid 15 milliLiter(s) Oral Mucosa every 12 hours  chlorhexidine 4% Liquid 1 Application(s) Topical <User Schedule>  dextrose 5%. 1000 milliLiter(s) (50 mL/Hr) IV Continuous <Continuous>  dextrose 5%. 1000 milliLiter(s) (100 mL/Hr) IV Continuous <Continuous>  dextrose 50% Injectable 25 Gram(s) IV Push once  dextrose 50% Injectable 12.5 Gram(s) IV Push once  dextrose 50% Injectable 25 Gram(s) IV Push once  enoxaparin Injectable 40 milliGRAM(s) SubCutaneous daily  glucagon  Injectable 1 milliGRAM(s) IntraMuscular once  insulin lispro (ADMELOG) corrective regimen sliding scale   SubCutaneous every 6 hours  metoprolol tartrate 25 milliGRAM(s) Oral two times a day  pantoprazole  Injectable 40 milliGRAM(s) IV Push daily  polyethylene glycol 3350 17 Gram(s) Oral daily    MEDICATIONS  (PRN):  acetaminophen     Tablet .. 650 milliGRAM(s) Oral every 6 hours PRN Temp greater or equal to 38C (100.4F), Mild Pain (1 - 3)  albuterol/ipratropium for Nebulization 3 milliLiter(s) Nebulizer every 6 hours PRN Shortness of Breath and/or Wheezing  fentaNYL    Injectable 50 MICROGram(s) IV Push every 4 hours PRN Agitation  morphine  - Injectable 2 milliGRAM(s) IV Push every 4 hours PRN Moderate Pain (4 - 6)          Xrays:  TLC:  OG:  ET tube:                                                                                    2/19 no pneumothroax    ECHO:  [facility] ICU:

## 2022-02-20 NOTE — PROGRESS NOTE ADULT - SUBJECTIVE AND OBJECTIVE BOX
VANGIECHADWICK  81y, Male  Allergy: Allergy Status Unknown    Hospital Day: 36d    Patient seen and examined earlier today. pt is unresponsive on tube feeds at 50 cc/hr  no issues reported by his RN  fingersticks are stable     PMH/PSH:  PAST MEDICAL & SURGICAL HISTORY:  BPH (benign prostatic hyperplasia)    Mild HTN      LAST 24-Hr EVENTS:    VITALS:  T(F): 98 (02-20-22 @ 05:03), Max: 98 (02-20-22 @ 05:03)  HR: 88 (02-20-22 @ 08:47)  BP: 121/92 (02-20-22 @ 05:03) (121/92 - 141/79)  RR: 18 (02-19-22 @ 20:15)  SpO2: 100% (02-20-22 @ 08:47)  FiO2: 40        TESTS & MEASUREMENTS:  Weight/BMI  72.5 (02-18-22 @ 15:06)  24.3 (02-18-22 @ 15:06)    02-18-22 @ 07:01  -  02-19-22 @ 07:00  --------------------------------------------------------  IN: 0 mL / OUT: 350 mL / NET: -350 mL    02-19-22 @ 07:01  -  02-20-22 @ 07:00  --------------------------------------------------------  IN: 0 mL / OUT: 425 mL / NET: -425 mL    02-20-22 @ 07:01  -  02-20-22 @ 11:45  --------------------------------------------------------  IN: 180 mL / OUT: 0 mL / NET: 180 mL                            10.5   11.01 )-----------( 177      ( 20 Feb 2022 08:27 )             34.9         02-20    147<H>  |  110  |  22<H>  ----------------------------<  146<H>  4.1   |  26  |  <0.5<L>    Ca    8.1<L>      20 Feb 2022 08:27    TPro  5.0<L>  /  Alb  2.5<L>  /  TBili  1.1  /  DBili  x   /  AST  35  /  ALT  50<H>  /  AlkPhos  118<H>  02-20    LIVER FUNCTIONS - ( 20 Feb 2022 08:27 )  Alb: 2.5 g/dL / Pro: 5.0 g/dL / ALK PHOS: 118 U/L / ALT: 50 U/L / AST: 35 U/L / GGT: x                 Culture - Blood (collected 02-15-22 @ 03:10)  Source: .Blood Blood  Preliminary Report (02-16-22 @ 20:00):    No growth to date.    Culture - Sputum (collected 02-14-22 @ 11:30)  Source: .Sputum Deep tracheal aspirate  Gram Stain (02-14-22 @ 23:16):    Moderate polymorphonuclear leukocytes per low power field    No Squamous epithelial cells per low power field    Moderate Gram Negative Rods seen per oil power field  Final Report (02-16-22 @ 21:38):    Moderate Pseudomonas aeruginosa    Normal Respiratory Corina absent  Organism: Pseudomonas aeruginosa (02-16-22 @ 21:38)  Organism: Pseudomonas aeruginosa (02-16-22 @ 21:38)      -  Amikacin: S <=16      -  Aztreonam: S <=4      -  Cefepime: S <=2      -  Ceftazidime: S 4      -  Ciprofloxacin: S <=0.25      -  Gentamicin: S 4      -  Imipenem: S 2      -  Levofloxacin: S <=0.5      -  Meropenem: S <=1      -  Piperacillin/Tazobactam: S <=8      -  Tobramycin: S <=2      Method Type: ALEXSANDRA        Procalcitonin, Serum: 0.09 ng/mL (02-12-22 @ 06:21)          COVID-19 PCR: NotDetec (02-15-22 @ 07:30)        A1C with Estimated Average Glucose Result: 6.2 % (01-24-22 @ 06:25)          RADIOLOGY, ECG, & ADDITIONAL TESTS:  12 Lead ECG:   Ventricular Rate 141 BPM    Atrial Rate 122 BPM    QRS Duration 112 ms    Q-T Interval 334 ms    QTC Calculation(Bazett) 511 ms    R Axis -29 degrees    T Axis 136 degrees    Diagnosis Line Atrial fibrillation with rapid ventricular response  Incomplete right bundle branch block  ST & T wave abnormality, consider lateral ischemia  Abnormal ECG    Confirmed by JESSICA EDMONDSON MD ([id number]) on 1/30/2022 11:17:30 AM (01-30-22 @ 02:11)      RECENT DIAGNOSTIC ORDERS:  Diet, NPO with Tube Feed:   Tube Feeding Modality: Gastrostomy  Glucerna 1.2 Haroldo  Total Volume for 24 Hours (mL): 1440  Continuous  Starting Tube Feed Rate mL per Hour: 20  Increase Tube Feed Rate by (mL): 10     Every 4 hours  Until Goal Tube Feed Rate (mL per Hour): 60  Tube Feed Duration (in Hours): 24  Tube Feed Start Time: 16:00  Free Water Flush  Bolus   Total Volume per Flush (mL): 200   Frequency: Every 8 Hours   Total Daily Volume of Flush (mL): 600    Start Time: 17:00 (02-19-22 @ 12:42)      MEDICATIONS:  MEDICATIONS  (STANDING):  amantadine Syrup 100 milliGRAM(s) Oral every 12 hours  aspirin  chewable 81 milliGRAM(s) Oral daily  cefepime   IVPB 2000 milliGRAM(s) IV Intermittent every 8 hours  chlorhexidine 0.12% Liquid 15 milliLiter(s) Oral Mucosa every 12 hours  chlorhexidine 4% Liquid 1 Application(s) Topical <User Schedule>  dextrose 5%. 1000 milliLiter(s) (100 mL/Hr) IV Continuous <Continuous>  dextrose 5%. 1000 milliLiter(s) (50 mL/Hr) IV Continuous <Continuous>  dextrose 50% Injectable 25 Gram(s) IV Push once  dextrose 50% Injectable 12.5 Gram(s) IV Push once  dextrose 50% Injectable 25 Gram(s) IV Push once  enoxaparin Injectable 40 milliGRAM(s) SubCutaneous daily  glucagon  Injectable 1 milliGRAM(s) IntraMuscular once  insulin lispro (ADMELOG) corrective regimen sliding scale   SubCutaneous every 6 hours  metoprolol tartrate 25 milliGRAM(s) Oral two times a day  pantoprazole  Injectable 40 milliGRAM(s) IV Push daily  polyethylene glycol 3350 17 Gram(s) Oral daily    MEDICATIONS  (PRN):  acetaminophen     Tablet .. 650 milliGRAM(s) Oral every 6 hours PRN Temp greater or equal to 38C (100.4F), Mild Pain (1 - 3)  albuterol/ipratropium for Nebulization 3 milliLiter(s) Nebulizer every 6 hours PRN Shortness of Breath and/or Wheezing  fentaNYL    Injectable 50 MICROGram(s) IV Push every 4 hours PRN Agitation  morphine  - Injectable 2 milliGRAM(s) IV Push every 4 hours PRN Moderate Pain (4 - 6)      HOME MEDICATIONS:  Aspir 81 oral delayed release tablet (01-15)  benzonatate 100 mg oral capsule (01-15)  dexamethasone 4 mg oral tablet (01-15)  metoprolol succinate 25 mg oral tablet, extended release (01-15)  NIFEdipine 60 mg oral tablet, extended release (01-15)  tamsulosin 0.4 mg oral capsule (01-15)  telmisartan 80 mg oral tablet (01-15)  Vitamin B12 1000 mcg oral tablet (01-15)      PHYSICAL EXAM:  GENERAL: unresponsive, no acute distress on 40 % fio2  HEAD/NECK:  Atraumatic, Normocephalic, trach in situ to vent,   Nose: no bleeding, peg tube in place, tf running at 50 cc/hr  Resp: scattred rhonchi, no distress, diminished, chest tube left upper chest   NERVOUS SYSTEM: unresponsive   HEART: Regular rate and rhythm; No murmurs, rubs, or gallops  GI: Soft, Nontender, Nondistended; Bowel sounds present, PEG tube in place  EXTREMITIES:  b/l  edema  MSK: diffuse muscle wasting

## 2022-02-21 NOTE — PROGRESS NOTE ADULT - SUBJECTIVE AND OBJECTIVE BOX
VANGIECHADWICK  81y, Male  Allergy: Allergy Status Unknown    Hospital Day: 37d    Patient seen and examined earlier today. pt is resting comfortably, no signs of distress. unresponsive  seen with his RN    PMH/PSH:  PAST MEDICAL & SURGICAL HISTORY:  BPH (benign prostatic hyperplasia)    Mild HTN    LAST 24-Hr EVENTS:    VITALS:  T(F): 100.3 (02-21-22 @ 05:01), Max: 100.3 (02-20-22 @ 13:42)  HR: 75 (02-21-22 @ 07:25)  BP: 122/75 (02-21-22 @ 05:01) (122/75 - 142/89)  RR: 18 (02-21-22 @ 05:01)  SpO2: 98% (02-21-22 @ 07:25)  FiO2: 40      TESTS & MEASUREMENTS:  Weight/BMI  72.5 (02-18-22 @ 15:06)  24.3 (02-18-22 @ 15:06)    02-19-22 @ 07:01  -  02-20-22 @ 07:00  --------------------------------------------------------  IN: 0 mL / OUT: 425 mL / NET: -425 mL    02-20-22 @ 07:01  -  02-21-22 @ 07:00  --------------------------------------------------------  IN: 2290 mL / OUT: 1125 mL / NET: 1165 mL               10.5   11.01 )-----------( 177      ( 20 Feb 2022 08:27 )             34.9     02-20    147<H>  |  110  |  22<H>  ----------------------------<  146<H>  4.1   |  26  |  <0.5<L>    Ca    8.1<L>      20 Feb 2022 08:27    TPro  5.0<L>  /  Alb  2.5<L>  /  TBili  1.1  /  DBili  x   /  AST  35  /  ALT  50<H>  /  AlkPhos  118<H>  02-20    LIVER FUNCTIONS - ( 20 Feb 2022 08:27 )  Alb: 2.5 g/dL / Pro: 5.0 g/dL / ALK PHOS: 118 U/L / ALT: 50 U/L / AST: 35 U/L / GGT: x           Culture - Blood (collected 02-15-22 @ 03:10)  Source: .Blood Blood  Final Report (02-20-22 @ 20:00):    No Growth Final    Procalcitonin, Serum: 0.09 ng/mL (02-12-22 @ 06:21)    COVID-19 PCR: Detected (02-20-22 @ 15:33)  COVID-19 PCR: NotDetec (02-15-22 @ 07:30)    A1C with Estimated Average Glucose Result: 6.2 % (01-24-22 @ 06:25)    RADIOLOGY, ECG, & ADDITIONAL TESTS:  12 Lead ECG:   Ventricular Rate 141 BPM    Atrial Rate 122 BPM    QRS Duration 112 ms    Q-T Interval 334 ms    QTC Calculation(Bazett) 511 ms    R Axis -29 degrees    T Axis 136 degrees    Diagnosis Line Atrial fibrillation with rapid ventricular response  Incomplete right bundle branch block  ST & T wave abnormality, consider lateral ischemia  Abnormal ECG    Confirmed by JESSICA EDMONDSON MD ([id number]) on 1/30/2022 11:17:30 AM (01-30-22 @ 02:11)    RECENT DIAGNOSTIC ORDERS:  Comprehensive Metabolic Panel: AM Sched. Collection: 22-Feb-2022 04:30 (02-21-22 @ 13:00)  Complete Blood Count: AM Sched. Collection: 22-Feb-2022 04:30 (02-21-22 @ 13:00)  Xray Chest 1 View- PORTABLE-Routine: Routine   Indication: sp removal chest tube  Transport: Portable,  w/ Monitor  Provider's Contact #: [phone] (02-21-22 @ 12:58)  Xray Chest 1 View- PORTABLE-Routine: AM   Indication: sp removal of chest tube r/o pneumothorax  Transport: Portable,  w/ Monitor  Provider's Contact #: [phone] (02-21-22 @ 12:35)      MEDICATIONS:  MEDICATIONS  (STANDING):  amantadine Syrup 100 milliGRAM(s) Oral every 12 hours  aspirin  chewable 81 milliGRAM(s) Oral daily  cefepime   IVPB 2000 milliGRAM(s) IV Intermittent every 8 hours  chlorhexidine 0.12% Liquid 15 milliLiter(s) Oral Mucosa every 12 hours  chlorhexidine 4% Liquid 1 Application(s) Topical <User Schedule>  dextrose 5%. 1000 milliLiter(s) (50 mL/Hr) IV Continuous <Continuous>  dextrose 5%. 1000 milliLiter(s) (100 mL/Hr) IV Continuous <Continuous>  dextrose 50% Injectable 25 Gram(s) IV Push once  dextrose 50% Injectable 12.5 Gram(s) IV Push once  dextrose 50% Injectable 25 Gram(s) IV Push once  enoxaparin Injectable 40 milliGRAM(s) SubCutaneous daily  glucagon  Injectable 1 milliGRAM(s) IntraMuscular once  insulin lispro (ADMELOG) corrective regimen sliding scale   SubCutaneous every 6 hours  metoprolol tartrate 25 milliGRAM(s) Oral two times a day  pantoprazole  Injectable 40 milliGRAM(s) IV Push daily  polyethylene glycol 3350 17 Gram(s) Oral daily    MEDICATIONS  (PRN):  acetaminophen     Tablet .. 650 milliGRAM(s) Oral every 6 hours PRN Temp greater or equal to 38C (100.4F), Mild Pain (1 - 3)  albuterol/ipratropium for Nebulization 3 milliLiter(s) Nebulizer every 6 hours PRN Shortness of Breath and/or Wheezing  morphine  - Injectable 2 milliGRAM(s) IV Push every 4 hours PRN Moderate Pain (4 - 6)    HOME MEDICATIONS:  Aspir 81 oral delayed release tablet (01-15)  benzonatate 100 mg oral capsule (01-15)  dexamethasone 4 mg oral tablet (01-15)  metoprolol succinate 25 mg oral tablet, extended release (01-15)  NIFEdipine 60 mg oral tablet, extended release (01-15)  tamsulosin 0.4 mg oral capsule (01-15)  telmisartan 80 mg oral tablet (01-15)  Vitamin B12 1000 mcg oral tablet (01-15)      PHYSICAL EXAM:  GENERAL: unresponsive, no acute distress on 40 % fio2  HEAD/NECK:  Atraumatic, Normocephalic, trach in situ to vent,   Nose: no bleeding, peg tube in place, tf running at 50 cc/hr  Resp: no labored breathing, no use of accessory muscles, chest tube left upper chest   NERVOUS SYSTEM: unresponsive   HEART: Regular rate and rhythm; No murmurs, rubs, or gallops  GI: Soft, Nontender, Nondistended; Bowel sounds present, PEG tube in place  EXTREMITIES:  no cyanosis noted

## 2022-02-21 NOTE — PROGRESS NOTE ADULT - SUBJECTIVE AND OBJECTIVE BOX
Patient is a 81y old  Male who presents with a chief complaint of cardiac arrest (20 Feb 2022 11:44)        SUBJECTIVE:  no acute events overnight  sp clamping of chest tube since yesterday    REVIEW OF SYSTEMS:    All Pertinent ROS are negative except per HPI       PHYSICAL EXAM  Vital Signs Last 24 Hrs  T(C): 37.9 (21 Feb 2022 05:01), Max: 37.9 (20 Feb 2022 13:42)  T(F): 100.3 (21 Feb 2022 05:01), Max: 100.3 (20 Feb 2022 13:42)  HR: 75 (21 Feb 2022 07:25) (75 - 104)  BP: 122/75 (21 Feb 2022 05:01) (122/75 - 142/89)  BP(mean): --  RR: 18 (21 Feb 2022 05:01) (18 - 18)  SpO2: 98% (21 Feb 2022 07:25) (98% - 99%)    CONSTITUTIONAL:  chronically ill appearing in  NAD    ENT:   Airway patent,   No thrush  +trach    CARDIAC:   anterior pigtail  Normal rate,   regular rhythm.    no edema      RESPIRATORY:   NO Wheezing  Normal chest expansion  mild tachypneic,  No use of accessory muscles    GASTROINTESTINAL:  Abdomen soft,   non-tender,   no guarding,   +peg    MUSCULOSKELETAL:   range of motion is not limited,  no clubbing, cyanosis    NEUROLOGICAL:   does not follow commands  vent dependent    SKIN:   Skin normal color for race,   warm, dry   No evidence of rash.      02-20-22 @ 07:01  -  02-21-22 @ 07:00  --------------------------------------------------------  IN:    Enteral Tube Flush: 750 mL    Glucerna: 1440 mL    IV PiggyBack: 100 mL  Total IN: 2290 mL    OUT:    Indwelling Catheter - Urethral (mL): 1125 mL  Total OUT: 1125 mL    Total NET: 1165 mL          LABS:                          10.5   11.01 )-----------( 177      ( 20 Feb 2022 08:27 )             34.9                                               02-20    147<H>  |  110  |  22<H>  ----------------------------<  146<H>  4.1   |  26  |  <0.5<L>    Ca    8.1<L>      20 Feb 2022 08:27    TPro  5.0<L>  /  Alb  2.5<L>  /  TBili  1.1  /  DBili  x   /  AST  35  /  ALT  50<H>  /  AlkPhos  118<H>  02-20                                                                                           LIVER FUNCTIONS - ( 20 Feb 2022 08:27 )  Alb: 2.5 g/dL / Pro: 5.0 g/dL / ALK PHOS: 118 U/L / ALT: 50 U/L / AST: 35 U/L / GGT: x                                                                                                MEDICATIONS  (STANDING):  amantadine Syrup 100 milliGRAM(s) Oral every 12 hours  aspirin  chewable 81 milliGRAM(s) Oral daily  cefepime   IVPB 2000 milliGRAM(s) IV Intermittent every 8 hours  chlorhexidine 0.12% Liquid 15 milliLiter(s) Oral Mucosa every 12 hours  chlorhexidine 4% Liquid 1 Application(s) Topical <User Schedule>  dextrose 5%. 1000 milliLiter(s) (50 mL/Hr) IV Continuous <Continuous>  dextrose 5%. 1000 milliLiter(s) (100 mL/Hr) IV Continuous <Continuous>  dextrose 50% Injectable 25 Gram(s) IV Push once  dextrose 50% Injectable 12.5 Gram(s) IV Push once  dextrose 50% Injectable 25 Gram(s) IV Push once  enoxaparin Injectable 40 milliGRAM(s) SubCutaneous daily  glucagon  Injectable 1 milliGRAM(s) IntraMuscular once  insulin lispro (ADMELOG) corrective regimen sliding scale   SubCutaneous every 6 hours  metoprolol tartrate 25 milliGRAM(s) Oral two times a day  pantoprazole  Injectable 40 milliGRAM(s) IV Push daily  polyethylene glycol 3350 17 Gram(s) Oral daily    MEDICATIONS  (PRN):  acetaminophen     Tablet .. 650 milliGRAM(s) Oral every 6 hours PRN Temp greater or equal to 38C (100.4F), Mild Pain (1 - 3)  albuterol/ipratropium for Nebulization 3 milliLiter(s) Nebulizer every 6 hours PRN Shortness of Breath and/or Wheezing  morphine  - Injectable 2 milliGRAM(s) IV Push every 4 hours PRN Moderate Pain (4 - 6)      X-Rays reviewed    CXR interpreted by me:

## 2022-02-21 NOTE — PROGRESS NOTE ADULT - ASSESSMENT
81 year old male with a hx of HTN, BPH, recent diagnosis of COVID 19, 2  days prior to admission -> presented to the ED S/P cardiac arrest.  Patient began to have worsening dyspnea at home, EMS was called. Found to be in cardiac arrest. ROSC was achieved after 2 rounds of CPR and epi, downtime 7 mins. Was intubated in the field.     Acute respiratory failure / Cardiac arrest / COVID-19 pneumonia / probable NSTEMI / L sided pneumothorax / anoxic brain injury / gram negative pneumonia     1. s/p Cardiac Arrest POA  intubated in field, CPR in field 7 min downtime  unresponsive afterwards, currently with trach  s/p peg tube 2/18     2. Respi Failure POA  now vent dependent via trach 40 % fio2    3. Anoxic Brain Injury POA  unresponsive  poor prognosis  family has opted DNR    4. Pseudomonas Pneumonia 2/14 noted  on cefepime, would continue for total of 14 days. may switch to levaquin by peg tube on dc  other supportive treatment  pulm, ID following    5. low grade temp 100.3 124 PM yesterday  -resolved  -monitor     6. severe pt calorie malnutrition  s/p peg tube 2/18, tolerating feeds     7. Left pneumothorax  chest tube in place, clamped, cxr shows no pnx  pulm to remove tube today     Disp  multiorgan failure, overall prognosis is poor  s/p trach  s/p PEG tube 2/18  pulmonary to remove tube today   family wants LTAC at [place]  discussed with and updated the dtr Judy [phone]  - 2/18, 2/19,2/20,2/21  discussed with his RN

## 2022-02-21 NOTE — DISCHARGE NOTE PROVIDER - HOSPITAL COURSE
A 81 year old male with a hx of HTN, BPH, recent diagnosis of COVID 19, 2  days prior to admission -> presented to the ED S/P cardiac arrest.  Patient began to have worsening dyspnea at home, EMS was called. Found to be in cardiac arrest. ROSC was achieved after 2 rounds of CPR and epi, downtime 7 mins. Was intubated in the field.  PT was treated in the unit for Acute respiratory failure / Cardiac arrest / COVID-19 pneumonia / probable NSTEMI / L sided pneumothorax / anoxic brain injury / gram negative pneumonia.    Pt unresponsive after cardiac arrest, anoxic brain injury,  now on trach  vent dependent , s/p peg  2/18   Pt developed left pneumothorax during hospital course s/p chest tube, pneumothorax resolved pending xray ***  Pt with multiorgan failure, overall poor prognosis , family wants LTAC at [place]       A 81 year old male with a hx of HTN, BPH, recent diagnosis of COVID 19, 2  days prior to admission -> presented to the ED S/P cardiac arrest.  Patient began to have worsening dyspnea at home, EMS was called. Found to be in cardiac arrest. ROSC was achieved after 2 rounds of CPR and epi, downtime 7 mins. Was intubated in the field.  PT was treated in the unit for Acute respiratory failure / Cardiac arrest / COVID-19 pneumonia / probable NSTEMI / L sided pneumothorax / anoxic brain injury / gram negative pneumonia.    Pt unresponsive after cardiac arrest, anoxic brain injury,  now on trach  vent dependent , s/p peg  2/18. Currently on po morphine and ativan to prevent overbreathing the vent.    Pt with multiorgan failure, overall poor prognosis , family wants LTACH at [place]

## 2022-02-21 NOTE — DISCHARGE NOTE PROVIDER - EXTENDED VTE OTHER REASON FREE TEXT
Will continue pt on asa 81mg qd for 30 days minimum. Higher risk of bleeding given recent hospital course

## 2022-02-21 NOTE — DISCHARGE NOTE PROVIDER - NSDCMRMEDTOKEN_GEN_ALL_CORE_FT
Aspir 81 oral delayed release tablet: 1 tab(s) orally once a day  benzonatate 100 mg oral capsule: 2 cap(s) orally 3 times a day  dexamethasone 4 mg oral tablet: 1.5 tab(s) orally once a day  metoprolol succinate 25 mg oral tablet, extended release: 1 tab(s) orally once a day  NIFEdipine 60 mg oral tablet, extended release: 1 tab(s) orally once a day  tamsulosin 0.4 mg oral capsule: 1 cap(s) orally once a day  telmisartan 80 mg oral tablet: 1 tab(s) orally once a day  Vitamin B12 1000 mcg oral tablet: 1 tab(s) orally once a day   Aspir 81 oral delayed release tablet: 1 tab(s) orally once a day  benzonatate 100 mg oral capsule: 2 cap(s) orally 3 times a day  collagenase 250 units/g topical ointment: 1 application topically 2 times a day  metoprolol succinate 25 mg oral tablet, extended release: 1 tab(s) orally once a day  midodrine 5 mg oral tablet: 1 tab(s) orally every 8 hours  morphine 20 mg/mL oral concentrate: 0.15 milliliter(s) orally every 4 hours  NIFEdipine 60 mg oral tablet, extended release: 1 tab(s) orally once a day  pantoprazole 40 mg intravenous injection: 40 milligram(s) intravenous once a day  polyethylene glycol 3350 oral powder for reconstitution: 17 gram(s) orally once a day  tamsulosin 0.4 mg oral capsule: 1 cap(s) orally once a day  telmisartan 80 mg oral tablet: 1 tab(s) orally once a day  Vitamin B12 1000 mcg oral tablet: 1 tab(s) orally once a day   Aspir 81 oral delayed release tablet: 1 tab(s) orally once a day  collagenase 250 units/g topical ointment: 1 application topically 2 times a day  ipratropium-albuterol 0.5 mg-2.5 mg/3 mL inhalation solution: 3 milliliter(s) inhaled every 6 hours, As needed, Shortness of Breath and/or Wheezing  LORazepam 2 mg oral tablet: 1 tab(s) orally 2 times a day  metoprolol succinate 25 mg oral tablet, extended release: 1 tab(s) orally once a day  midodrine 5 mg oral tablet: 1 tab(s) orally every 8 hours  morphine 20 mg/mL oral concentrate: 0.15 milliliter(s) orally every 4 hours  pantoprazole 40 mg intravenous injection: 40 milligram(s) intravenous once a day  polyethylene glycol 3350 oral powder for reconstitution: 17 gram(s) orally once a day  tamsulosin 0.4 mg oral capsule: 1 cap(s) orally once a day

## 2022-02-21 NOTE — CHART NOTE - NSCHARTNOTEFT_GEN_A_CORE
covid swab was done yesterday for possible placement result positive   will place pt on isolation covid swab was done yesterday for possible placement result positive most likely false positive  pt been in hospital for 37 d  d/w attending no isolation

## 2022-02-21 NOTE — PROGRESS NOTE ADULT - ASSESSMENT
IMPRESSION:  Acute hypoxic respiratory failure SP Trach  Anoxic brain injury  Cardiac Arrest  Severe COVID PNA  Sepsis on present on admission  Left PNX SP chest tube, resolved now SP removal of Pigtail  Pseudomonas PNA, +ve DTA -sp treatment  Mucus plugs SP Bronchoscopy  GNR on DTA  Ischemic changes on CT Head      SUGGEST:    CNS:   off sedation.   Pain control.    HEENT: Oral care.      PULMONARY: HOB @ 45 degrees.  Pig tail d/dejon. follow up CXR today and in AM  Aspiration precautions.   Vent changes: Wean FiO2  case discussed with PA covering

## 2022-02-21 NOTE — DISCHARGE NOTE PROVIDER - NSDCCPCAREPLAN_GEN_ALL_CORE_FT
PRINCIPAL DISCHARGE DIAGNOSIS  Diagnosis: Cardiac arrest  Assessment and Plan of Treatment: unresponsive 2/2 anoxious brain injury      SECONDARY DISCHARGE DIAGNOSES  Diagnosis: Pneumonia due to COVID-19 virus  Assessment and Plan of Treatment: treated with IV abx    Diagnosis: Acute respiratory distress syndrome (ARDS) due to COVID-19 virus  Assessment and Plan of Treatment: intubated , trach , vent dependent    Diagnosis: Pneumothorax, left  Assessment and Plan of Treatment: s/p chest tube, pneumothorax resolved     PRINCIPAL DISCHARGE DIAGNOSIS  Diagnosis: Cardiac arrest  Assessment and Plan of Treatment: unresponsive 2/2 anoxious brain injury      SECONDARY DISCHARGE DIAGNOSES  Diagnosis: Pneumonia due to COVID-19 virus  Assessment and Plan of Treatment: completed IV abx course    Diagnosis: Acute respiratory distress syndrome (ARDS) due to COVID-19 virus  Assessment and Plan of Treatment: intubated , trach , vent dependent; continue on morphine po and ativan as needed    Diagnosis: Pneumothorax, left  Assessment and Plan of Treatment: s/p chest tube, pneumothorax resolved

## 2022-02-22 NOTE — PROGRESS NOTE ADULT - ASSESSMENT
81 year old male with a hx of HTN, BPH, recent diagnosis of COVID 19 2 days ago (unvaccinated, prescribed decadron/ zpack/ zinc) presenting to the ED S/P cardiac arrest. Intubated in the field    IMPRESSION  #COVID19 PNA, Severe (O2 < or = 94% on RA and requiring supplemental O2) with Cardiac arrest    CXR diffuse bilateral opacities     D-Dimer Assay, Quantitative: 7368 ng/mL DDU (01-15-22 @ 01:25)    s/p Toci 1/17    #Fevers 2/13  - cXR 2/14 with bilateral opacities, worsening on Right   - sputum Cx 2/14 - Pseudomonas   - treated with course of cefepime    #Cardiac arrest- ROSC was achieved after 2 rounds of CPR and epi, downtime 7 mins  - s/p trach 2/11    #Left sided Pneumothorax- on CXR from 2/2/22    Antibiotic course  Cefepime 2/16- present     Recommendations  - noted mild transaminitis  starting on 2/16 -- check GGT and JEAN US   - check blood cx, tracheal cx, UA/Urine Cx   - check MRSA nares  - continue cefepime 2g q 8 hours   - add vancomycin 1g q 12 hours   - ensure no sacral decub development  - trend fever curve    I will be away tomorrow and Thursday   I will be unavailable by phone. Please message on Teams if with questions.  Spectra [phone]

## 2022-02-22 NOTE — PROGRESS NOTE ADULT - SUBJECTIVE AND OBJECTIVE BOX
VANGIECHADWICK  81y, Male  Allergy: Allergy Status Unknown    Hospital Day: 38d    Patient seen and examined earlier today. pt is unresponsive, had a fever of 101.4, labs show sodium 149    PMH/PSH:  PAST MEDICAL & SURGICAL HISTORY:  BPH (benign prostatic hyperplasia)    Mild HTN    LAST 24-Hr EVENTS:    VITALS:  T(F): 101.4 (02-22-22 @ 04:48), Max: 101.4 (02-22-22 @ 04:48)  HR: 112 (02-22-22 @ 04:48)  BP: 97/53 (02-22-22 @ 04:48) (97/53 - 164/78)  RR: 18 (02-22-22 @ 04:48)  SpO2: 98% (02-22-22 @ 01:37)  FiO2: 40      TESTS & MEASUREMENTS:  Weight/BMI  72.5 (02-18-22 @ 15:06)  24.3 (02-18-22 @ 15:06)    02-20-22 @ 07:01  -  02-21-22 @ 07:00  --------------------------------------------------------  IN: 2290 mL / OUT: 1125 mL / NET: 1165 mL    02-21-22 @ 07:01  -  02-22-22 @ 07:00  --------------------------------------------------------  IN: 0 mL / OUT: 1200 mL / NET: -1200 mL               11.1   15.30 )-----------( 156      ( 22 Feb 2022 05:51 )             37.5     02-22    149<H>  |  111<H>  |  18  ----------------------------<  133<H>  4.0   |  27  |  <0.5<L>    Ca    8.5      22 Feb 2022 05:51    TPro  5.3<L>  /  Alb  2.8<L>  /  TBili  0.8  /  DBili  x   /  AST  21  /  ALT  37  /  AlkPhos  118<H>  02-22    LIVER FUNCTIONS - ( 22 Feb 2022 05:51 )  Alb: 2.8 g/dL / Pro: 5.3 g/dL / ALK PHOS: 118 U/L / ALT: 37 U/L / AST: 21 U/L / GGT: x           COVID-19 PCR: Detected (02-20-22 @ 15:33)    A1C with Estimated Average Glucose Result: 6.2 % (01-24-22 @ 06:25)    RADIOLOGY, ECG, & ADDITIONAL TESTS:  12 Lead ECG:   Ventricular Rate 141 BPM    Atrial Rate 122 BPM    QRS Duration 112 ms    Q-T Interval 334 ms    QTC Calculation(Bazett) 511 ms    R Axis -29 degrees    T Axis 136 degrees    Diagnosis Line Atrial fibrillation with rapid ventricular response  Incomplete right bundle branch block  ST & T wave abnormality, consider lateral ischemia  Abnormal ECG    Confirmed by JESSICA EDMONDSON MD ([id number]) on 1/30/2022 11:17:30 AM (01-30-22 @ 02:11)      RECENT DIAGNOSTIC ORDERS:  Comprehensive Metabolic Panel: AM Sched. Collection: 23-Feb-2022 04:30 (02-22-22 @ 12:52)  Complete Blood Count: AM Sched. Collection: 23-Feb-2022 04:30 (02-22-22 @ 12:52)  Culture - Sputum: 13:30  Specimen Source: Sputum  Addl Info: tracheal culture (02-22-22 @ 12:52)  Culture - Blood: 15:00  Specimen Source: Blood-Peripheral (02-22-22 @ 12:50)  Comprehensive Metabolic Panel: AM Sched. Collection: 23-Feb-2022 04:30 (02-22-22 @ 12:43)  Complete Blood Count: AM Sched. Collection: 23-Feb-2022 04:30 (02-22-22 @ 12:43)      MEDICATIONS:  MEDICATIONS  (STANDING):  amantadine Syrup 100 milliGRAM(s) Oral every 12 hours  aspirin  chewable 81 milliGRAM(s) Oral daily  cefepime   IVPB 2000 milliGRAM(s) IV Intermittent every 8 hours  chlorhexidine 0.12% Liquid 15 milliLiter(s) Oral Mucosa every 12 hours  chlorhexidine 4% Liquid 1 Application(s) Topical <User Schedule>  dextrose 5%. 1000 milliLiter(s) (100 mL/Hr) IV Continuous <Continuous>  dextrose 5%. 1000 milliLiter(s) (50 mL/Hr) IV Continuous <Continuous>  dextrose 50% Injectable 25 Gram(s) IV Push once  dextrose 50% Injectable 12.5 Gram(s) IV Push once  dextrose 50% Injectable 25 Gram(s) IV Push once  enoxaparin Injectable 40 milliGRAM(s) SubCutaneous daily  glucagon  Injectable 1 milliGRAM(s) IntraMuscular once  insulin lispro (ADMELOG) corrective regimen sliding scale   SubCutaneous every 6 hours  metoprolol tartrate 25 milliGRAM(s) Oral two times a day  pantoprazole  Injectable 40 milliGRAM(s) IV Push daily  polyethylene glycol 3350 17 Gram(s) Oral daily  vancomycin  IVPB 1000 milliGRAM(s) IV Intermittent once    MEDICATIONS  (PRN):  acetaminophen     Tablet .. 650 milliGRAM(s) Oral every 6 hours PRN Temp greater or equal to 38C (100.4F), Mild Pain (1 - 3)  albuterol/ipratropium for Nebulization 3 milliLiter(s) Nebulizer every 6 hours PRN Shortness of Breath and/or Wheezing  morphine  - Injectable 2 milliGRAM(s) IV Push every 4 hours PRN Moderate Pain (4 - 6)      HOME MEDICATIONS:  Aspir 81 oral delayed release tablet (01-15)  benzonatate 100 mg oral capsule (01-15)  dexamethasone 4 mg oral tablet (01-15)  metoprolol succinate 25 mg oral tablet, extended release (01-15)  NIFEdipine 60 mg oral tablet, extended release (01-15)  tamsulosin 0.4 mg oral capsule (01-15)  telmisartan 80 mg oral tablet (01-15)  Vitamin B12 1000 mcg oral tablet (01-15)      PHYSICAL EXAM:  GENERAL: unresponsive, no acute distress on 40 % fio2  HEAD/NECK:  Atraumatic, Normocephalic, trach in situ to vent,   Nose: no bleeding, peg tube in place, tf running at 50 cc/hr  Resp: no labored breathing, no use of accessory muscles, rhonchorous breath sounds, chest tube removed  NERVOUS SYSTEM: unresponsive   CVS: tachycardia s1s2   GI: Soft, Nontender, Nondistended; PEG tube in place  EXTREMITIES:  no cyanosis noted  Skin: normal temp.

## 2022-02-22 NOTE — PROGRESS NOTE ADULT - SUBJECTIVE AND OBJECTIVE BOX
CHADWICK VENCES  81y, Male  Allergy: Allergy Status Unknown      LOS  38d    CHIEF COMPLAINT: fever (22 Feb 2022 12:54)      INTERVAL EVENTS/HPI  - No acute events overnight  - T(F): , Max: 101.4 (02-22-22 @ 04:48)  - fevers overnight - no reports of nausea, vomiting, diarrhea so far  - s/p PEG on 2/18   - WBC Count: 15.30 (02-22-22 @ 05:51)  WBC Count: 11.01 (02-20-22 @ 08:27)     - Creatinine, Serum: <0.5 (02-22-22 @ 05:51)       ROS  unable to obtain history secondary to patient's mental status and/or sedation    VITALS:  T(F): 97.1, Max: 101.4 (02-22-22 @ 04:48)  HR: 108  BP: 133/86  RR: 18Vital Signs Last 24 Hrs  T(C): 36.2 (22 Feb 2022 14:08), Max: 38.6 (22 Feb 2022 04:48)  T(F): 97.1 (22 Feb 2022 14:08), Max: 101.4 (22 Feb 2022 04:48)  HR: 108 (22 Feb 2022 14:08) (52 - 112)  BP: 133/86 (22 Feb 2022 14:08) (97/53 - 164/78)  BP(mean): --  RR: 18 (22 Feb 2022 14:08) (18 - 18)  SpO2: 98% (22 Feb 2022 14:04) (98% - 98%)    PHYSICAL EXAM:  Gen: vent/trach   HEENT: Normocephalic, atraumatic  Neck: supple, no lymphadenopathy  CV: Regular rate & regular rhythm  Lungs: decreased BS at bases, no fremitus  Abdomen: Soft, BS present; PEG  Ext: Warm, well perfused  Neuro: non focal, awake  Skin: no rash, no erythema  Lines: no phlebitis    FH: Non-contributory  Social Hx: Non-contributory    TESTS & MEASUREMENTS:                        11.1   15.30 )-----------( 156      ( 22 Feb 2022 05:51 )             37.5     02-22    149<H>  |  111<H>  |  18  ----------------------------<  133<H>  4.0   |  27  |  <0.5<L>    Ca    8.5      22 Feb 2022 05:51    TPro  5.3<L>  /  Alb  2.8<L>  /  TBili  0.8  /  DBili  x   /  AST  21  /  ALT  37  /  AlkPhos  118<H>  02-22    eGFR if Non African American: 124 mL/min/1.73M2 (02-22-22 @ 05:51)  eGFR if : 144 mL/min/1.73M2 (02-22-22 @ 05:51)    LIVER FUNCTIONS - ( 22 Feb 2022 05:51 )  Alb: 2.8 g/dL / Pro: 5.3 g/dL / ALK PHOS: 118 U/L / ALT: 37 U/L / AST: 21 U/L / GGT: x               Culture - Blood (collected 02-15-22 @ 03:10)  Source: .Blood Blood  Final Report (02-20-22 @ 20:00):    No Growth Final    Culture - Sputum (collected 02-14-22 @ 11:30)  Source: .Sputum Deep tracheal aspirate  Gram Stain (02-14-22 @ 23:16):    Moderate polymorphonuclear leukocytes per low power field    No Squamous epithelial cells per low power field    Moderate Gram Negative Rods seen per oil power field  Final Report (02-16-22 @ 21:38):    Moderate Pseudomonas aeruginosa    Normal Respiratory Corina absent  Organism: Pseudomonas aeruginosa (02-16-22 @ 21:38)  Organism: Pseudomonas aeruginosa (02-16-22 @ 21:38)      -  Amikacin: S <=16      -  Aztreonam: S <=4      -  Cefepime: S <=2      -  Ceftazidime: S 4      -  Ciprofloxacin: S <=0.25      -  Gentamicin: S 4      -  Imipenem: S 2      -  Levofloxacin: S <=0.5      -  Meropenem: S <=1      -  Piperacillin/Tazobactam: S <=8      -  Tobramycin: S <=2      Method Type: ALEXSANDRA    Culture - Blood (collected 01-31-22 @ 06:15)  Source: .Blood Blood-Peripheral  Gram Stain (02-03-22 @ 03:08):    Growth in anaerobic bottle: Gram positive cocci in pairs  Final Report (02-03-22 @ 21:57):    Growth in anaerobic bottle: Staphylococcus capitis    Coag Negative Staphylococcus    Single set isolate, possible contaminant. Contact    Microbiology if susceptibility testing clinically    indicated.    ***Blood Panel PCR results on this specimen are available    approximately 3 hours after the Gram stain result.***    Gram stain, PCR, and/or culture results may not always    correspond due to difference in methodologies.    ************************************************************    This PCR assay was performed by multiplex PCR. This    Assay tests for 66 bacterial and resistance gene targets.    Please refer to the [facility] test directory    at [email or web address] for details.  Organism: Blood Culture PCR (02-03-22 @ 21:57)  Organism: Blood Culture PCR (02-03-22 @ 21:57)      -  Coagulase negative Staphylococcus: Detec      Method Type: PCR    Culture - Bronchial (collected 01-30-22 @ 16:00)  Source: .Bronchial None  Gram Stain (01-30-22 @ 22:58):    Few polymorphonuclear leukocytes per low power field    No Squamous epithelial cells per low power field    Few Gram positive cocci in pairs per oil power field  Final Report (02-01-22 @ 19:11):    Normal Respiratory Corina present    Culture - Blood (collected 01-28-22 @ 16:37)  Source: .Blood None  Final Report (02-02-22 @ 23:00):    No Growth Final    Culture - Acid Fast - Sputum w/Smear (collected 01-25-22 @ 10:00)  Source: .Sputum Sputum  Preliminary Report (02-02-22 @ 15:03):    No growth at 1 week.            INFECTIOUS DISEASES TESTING  COVID-19 PCR: Detected (02-20-22 @ 15:33)  COVID-19 PCR: NotDetec (02-15-22 @ 07:30)  Procalcitonin, Serum: 0.09 (02-12-22 @ 06:21)  COVID-19 PCR: Detected (02-10-22 @ 13:00)  Procalcitonin, Serum: 0.04 (02-09-22 @ 06:03)  Procalcitonin, Serum: 0.03 (02-01-22 @ 06:25)  Procalcitonin, Serum: 0.07 (01-31-22 @ 06:15)  Procalcitonin, Serum: 0.10 (01-30-22 @ 10:45)  Procalcitonin, Serum: 0.11 (01-29-22 @ 07:17)  Procalcitonin, Serum: 0.08 (01-26-22 @ 06:15)  Procalcitonin, Serum: 0.09 (01-25-22 @ 05:58)  Procalcitonin, Serum: 0.11 (01-23-22 @ 11:17)  MRSA PCR Result.: Negative (01-23-22 @ 11:00)  Procalcitonin, Serum: 0.44 (01-19-22 @ 06:30)  Procalcitonin, Serum: 2.32 (01-16-22 @ 06:56)  Rapid RVP Result: Detected (01-15-22 @ 01:25)  Procalcitonin, Serum: 0.10 (01-15-22 @ 01:25)      INFLAMMATORY MARKERS  C-Reactive Protein, Serum: <3 mg/L (02-01-22 @ 06:25)  C-Reactive Protein, Serum: <3 mg/L (01-26-22 @ 06:15)  C-Reactive Protein, Serum: 50 mg/L (01-16-22 @ 06:56)  C-Reactive Protein, Serum: 76 mg/L (01-15-22 @ 01:25)      RADIOLOGY & ADDITIONAL TESTS:  I have personally reviewed the last available Chest xray  CXR      CT      CARDIOLOGY TESTING      MEDICATIONS  amantadine Syrup 100 Oral every 12 hours  aspirin  chewable 81 Oral daily  cefepime   IVPB 2000 IV Intermittent every 8 hours  chlorhexidine 0.12% Liquid 15 Oral Mucosa every 12 hours  chlorhexidine 4% Liquid 1 Topical <User Schedule>  dextrose 5%. 1000 IV Continuous <Continuous>  dextrose 5%. 1000 IV Continuous <Continuous>  dextrose 50% Injectable 25 IV Push once  dextrose 50% Injectable 12.5 IV Push once  dextrose 50% Injectable 25 IV Push once  enoxaparin Injectable 40 SubCutaneous daily  glucagon  Injectable 1 IntraMuscular once  insulin lispro (ADMELOG) corrective regimen sliding scale  SubCutaneous every 6 hours  metoprolol tartrate 25 Oral two times a day  pantoprazole  Injectable 40 IV Push daily  polyethylene glycol 3350 17 Oral daily  vancomycin  IVPB 1000 IV Intermittent once      WEIGHT  Weight (kg): 72.5 (02-18-22 @ 15:06)  Creatinine, Serum: <0.5 mg/dL (02-22-22 @ 05:51)      ANTIBIOTICS:  cefepime   IVPB 2000 milliGRAM(s) IV Intermittent every 8 hours  vancomycin  IVPB 1000 milliGRAM(s) IV Intermittent once      All available historical records have been reviewed

## 2022-02-22 NOTE — PROGRESS NOTE ADULT - ASSESSMENT
81 year old male with a hx of HTN, BPH, recent diagnosis of COVID 19, 2  days prior to admission -> presented to the ED S/P cardiac arrest.  Patient began to have worsening dyspnea at home, EMS was called. Found to be in cardiac arrest. ROSC was achieved after 2 rounds of CPR and epi, downtime 7 mins. Was intubated in the field.     Acute respiratory failure / Cardiac arrest / COVID-19 pneumonia / probable NSTEMI / L sided pneumothorax / anoxic brain injury / gram negative pneumonia     1. s/p Cardiac Arrest POA  intubated in field, CPR in field 7 min downtime  unresponsive afterwards, currently with trach  s/p peg tube 2/18     2. Resp Failure POA  now vent dependent via trach 40 % fio2    3. Anoxic Brain Injury POA  unresponsive  poor prognosis  family has opted DNR    4. Pseudomonas Pneumonia 2/14 noted  on cefepime, would continue for total of 14 days. may switch to levaquin by peg tube on dc  other supportive treatment    5. fever 101 today  -worsened leukocytosis - 15  -obtain blood culture and tracheal culture  -one dose of IV vanco empirically ordered  -continue cefepime for pseudomonas pna for now  -discussed with Dr Mccloud, he will review and advise    6. severe pt calorie malnutrition  s/p peg tube 2/18, tolerating feeds     7. Left pneumothorax  chest tube removed 2/21, no pnx on cxr    8. hypernatremia  -sodium 149  -free water 250 cc qid x 4 doses  -repeat sodium level in am    Disp  multiorgan failure, overall prognosis is poor  s/p trach, on 40 % Fio2  s/p PEG tube 2/18, tolerating tube feeds  chest tube removed 2/21, no pneumothorax  obtain trach and blood cultures. continue iv cefepime and added one dose of vanco  discussed with Dr Mccloud  Eventual placement at [facility] at [place]  discussed with and updated the dtr Judy [phone] - 2/18, 2/19,2/20,2/21

## 2022-02-23 NOTE — PROGRESS NOTE ADULT - ASSESSMENT
81 year old male with a hx of HTN, BPH, recent diagnosis of COVID 19, 2  days prior to admission -> presented to the ED S/P cardiac arrest.  Patient began to have worsening dyspnea at home, EMS was called. Found to be in cardiac arrest. ROSC was achieved after 2 rounds of CPR and epi, downtime 7 mins. Was intubated in the field.     Acute respiratory failure / Cardiac arrest / COVID-19 pneumonia / probable NSTEMI / L sided pneumothorax / anoxic brain injury / gram negative pneumonia     1. s/p Cardiac Arrest POA  intubated in field, CPR in field 7 min downtime  unresponsive afterwards, currently with trach  s/p peg tube 2/18     2. Resp Failure POA  now vent dependent via trach 40 % fio2    3. Anoxic Brain Injury POA  unresponsive  poor prognosis  family has opted DNR    4. Pseudomonas Pneumonia 2/14 noted  on cefepime, would continue for total of 14 days. may switch to levaquin by peg tube on dc  other supportive treatment    5. fever 101 today  -leukocytosis trending down  -CXR shows B/L opacity  -F/U obtain blood culture UA, urine culture and tracheal culture  -continue with vanco, and cefepime  -ID to follow up     6. severe pt calorie malnutrition  s/p peg tube 2/18, tolerating feeds     7. Left pneumothorax  chest tube removed 2/21, no pnx on cxr    8. hypernatremia  -sodium 149  -free water 250 cc qid x 4 doses  -repeat sodium level in am    #Progress Note Handoff  Pending (specify):  as above   Family discussion:  plan of care was discussed with patient and family (Daughter rosy) in details.  all questions were answered.  seems to understand, and in agreement  Disposition:  long term facility

## 2022-02-23 NOTE — CHART NOTE - NSCHARTNOTEFT_GEN_A_CORE
Palliative care sign off note    As goals are clear at this time and patient is awaiting disposition, palliative care will sign off.     Call back [phone] PRN

## 2022-02-23 NOTE — CHART NOTE - NSCHARTNOTEFT_GEN_A_CORE
Pt w/ elevated BP to 129/107 w/ HR: 57. Pt due for metoprolol. Instructed RN to hold metoprolol 2/2 heart rate. Will order one time dose of hydralazine 10mg for the elevated BP.

## 2022-02-23 NOTE — PROGRESS NOTE ADULT - SUBJECTIVE AND OBJECTIVE BOX
CC.  Fever  HPI.  Patient is intubated/vented    unable to obtain ros/hx      Vital Signs Last 24 Hrs  T(C): 36.4 (02-23-22 @ 04:31), Max: 36.4 (02-23-22 @ 04:31)  T(F): 97.5 (02-23-22 @ 04:31), Max: 97.5 (02-23-22 @ 04:31)  HR: 110 (02-23-22 @ 07:42) (87 - 110)  BP: 121/73 (02-23-22 @ 06:23) (121/73 - 145/96)  BP(mean): --  RR: 18 (02-23-22 @ 04:31) (18 - 18)  SpO2: 96% (02-23-22 @ 07:42) (96% - 99%)        PHYSICAL EXAM-  GENERAL: NAD   HEAD:  Atraumatic, Normocephalic  EYES: EOMI, PERRLA, conjunctiva and sclera clear  NECK: Supple, No JVD, Normal thyroid  NERVOUS SYSTEM:  unresponsive   CHEST/LUNG: + rhonchi with good air entry.  No retractions or accessory muscle usage   HEART: Regular rate and rhythm; No murmurs, rubs, or gallops  ABDOMEN: Soft, Nontender, Nondistended; Bowel sounds present  EXTREMITIES:   No clubbing, cyanosis, or edema  SKIN: No rashes or lesions                                  10.8   14.67 )-----------( 149      ( 23 Feb 2022 06:01 )             35.9     02-23    148<H>  |  111<H>  |  18  ----------------------------<  114<H>  4.2   |  27  |  <0.5<L>    Ca    8.5      23 Feb 2022 06:01    TPro  5.1<L>  /  Alb  2.6<L>  /  TBili  0.7  /  DBili  x   /  AST  22  /  ALT  32  /  AlkPhos  109  02-23                    MEDICATIONS  (STANDING):  amantadine Syrup 100 milliGRAM(s) Oral every 12 hours  aspirin  chewable 81 milliGRAM(s) Oral daily  cefepime   IVPB 2000 milliGRAM(s) IV Intermittent every 8 hours  chlorhexidine 0.12% Liquid 15 milliLiter(s) Oral Mucosa every 12 hours  chlorhexidine 4% Liquid 1 Application(s) Topical <User Schedule>  dextrose 5%. 1000 milliLiter(s) (50 mL/Hr) IV Continuous <Continuous>  dextrose 5%. 1000 milliLiter(s) (100 mL/Hr) IV Continuous <Continuous>  dextrose 50% Injectable 25 Gram(s) IV Push once  dextrose 50% Injectable 12.5 Gram(s) IV Push once  dextrose 50% Injectable 25 Gram(s) IV Push once  enoxaparin Injectable 40 milliGRAM(s) SubCutaneous daily  glucagon  Injectable 1 milliGRAM(s) IntraMuscular once  insulin lispro (ADMELOG) corrective regimen sliding scale   SubCutaneous every 6 hours  metoprolol tartrate 25 milliGRAM(s) Oral two times a day  pantoprazole  Injectable 40 milliGRAM(s) IV Push daily  polyethylene glycol 3350 17 Gram(s) Oral daily    MEDICATIONS  (PRN):  acetaminophen     Tablet .. 650 milliGRAM(s) Oral every 6 hours PRN Temp greater or equal to 38C (100.4F), Mild Pain (1 - 3)  albuterol/ipratropium for Nebulization 3 milliLiter(s) Nebulizer every 6 hours PRN Shortness of Breath and/or Wheezing          Imaging Personally Reviewed:     [x ] YES  [ ] NO    Consultant(s) Notes Reviewed:  [x ] YES  [ ] NO    Care Discussed with Consultants/Other Providers [x ] YES  [ ] NO  Care Discussed with Consultants/Other Providers [x ] YES  [ ] No medical contraindication for discharge

## 2022-02-24 NOTE — PROGRESS NOTE ADULT - SUBJECTIVE AND OBJECTIVE BOX
CC.  Fever  HPI.  Patient is intubated/vented    unable to obtain ros/hx  spiked fever this am  blood culture was repeated.  discussed case with ID, and Pulmonary      Vital Signs Last 24 Hrs  T(C): 37.3 (24 Feb 2022 13:53), Max: 39.2 (24 Feb 2022 05:50)  T(F): 99.2 (24 Feb 2022 13:53), Max: 102.5 (24 Feb 2022 05:50)  HR: 120 (24 Feb 2022 14:37) (34 - 120)  BP: 147/64 (24 Feb 2022 13:53) (116/79 - 147/64)  BP(mean): --  RR: 16 (24 Feb 2022 05:00) (16 - 16)  SpO2: 98% (24 Feb 2022 14:37) (91% - 98%)        PHYSICAL EXAM-  GENERAL: NAD   HEAD:  Atraumatic, Normocephalic  EYES: EOMI, PERRLA, conjunctiva and sclera clear  NECK: Supple, No JVD, Normal thyroid  NERVOUS SYSTEM:  unresponsive   CHEST/LUNG: + rhonchi with good air entry.  No retractions or accessory muscle usage   HEART: Regular rate and rhythm; No murmurs, rubs, or gallops  ABDOMEN: Soft, Nontender, Nondistended; Bowel sounds present  EXTREMITIES:   No clubbing, cyanosis, or edema  SKIN: No rashes or lesions                              10.8   21.13 )-----------( 156      ( 24 Feb 2022 07:55 )             35.7     02-24    144  |  109  |  23<H>  ----------------------------<  149<H>  4.4   |  23  |  0.5<L>    Ca    8.5      24 Feb 2022 07:55    TPro  5.5<L>  /  Alb  2.7<L>  /  TBili  0.7  /  DBili  x   /  AST  27  /  ALT  33  /  AlkPhos  118<H>  02-24          Urinalysis Basic - ( 23 Feb 2022 21:10 )    Color: Yellow / Appearance: Slightly Cloudy / SG: >=1.030 / pH: x  Gluc: x / Ketone: Trace  / Bili: Negative / Urobili: 0.2 mg/dL   Blood: x / Protein: >=300 mg/dL / Nitrite: Positive   Leuk Esterase: Trace / RBC: 11-25 /HPF / WBC 10-25 /HPF   Sq Epi: x / Non Sq Epi: Occasional /HPF / Bacteria: Moderate          MEDICATIONS  (STANDING):  amantadine Syrup 100 milliGRAM(s) Oral every 12 hours  aspirin  chewable 81 milliGRAM(s) Oral daily  cefepime   IVPB 2000 milliGRAM(s) IV Intermittent every 8 hours  chlorhexidine 0.12% Liquid 15 milliLiter(s) Oral Mucosa every 12 hours  chlorhexidine 4% Liquid 1 Application(s) Topical <User Schedule>  collagenase Ointment 1 Application(s) Topical two times a day  dextrose 5%. 1000 milliLiter(s) (50 mL/Hr) IV Continuous <Continuous>  dextrose 5%. 1000 milliLiter(s) (100 mL/Hr) IV Continuous <Continuous>  dextrose 50% Injectable 25 Gram(s) IV Push once  dextrose 50% Injectable 12.5 Gram(s) IV Push once  dextrose 50% Injectable 25 Gram(s) IV Push once  enoxaparin Injectable 40 milliGRAM(s) SubCutaneous daily  glucagon  Injectable 1 milliGRAM(s) IntraMuscular once  insulin lispro (ADMELOG) corrective regimen sliding scale   SubCutaneous every 6 hours  metoprolol tartrate 25 milliGRAM(s) Oral two times a day  pantoprazole  Injectable 40 milliGRAM(s) IV Push daily  polyethylene glycol 3350 17 Gram(s) Oral daily  vancomycin  IVPB 1000 milliGRAM(s) IV Intermittent every 12 hours    MEDICATIONS  (PRN):  acetaminophen     Tablet .. 650 milliGRAM(s) Oral every 6 hours PRN Temp greater or equal to 38C (100.4F), Mild Pain (1 - 3)  albuterol/ipratropium for Nebulization 3 milliLiter(s) Nebulizer every 6 hours PRN Shortness of Breath and/or Wheezing  LORazepam   Injectable 1 milliGRAM(s) IV Push every 6 hours PRN Agitation  morphine  - Injectable 2 milliGRAM(s) IV Push every 4 hours PRN Severe Pain (7 - 10)                                       Imaging Personally Reviewed:     [x ] YES  [ ] NO    Consultant(s) Notes Reviewed:  [x ] YES  [ ] NO    Care Discussed with Consultants/Other Providers [x ] YES  [ ] NO  Care Discussed with Consultants/Other Providers [x ] YES  [ ] No medical contraindication for discharge

## 2022-02-24 NOTE — ADVANCED PRACTICE NURSE CONSULT - ASSESSMENT
Patient is a 81 year old male with a hx of HTN, BPH, recent diagnosis of COVID 19 2 days ago (unvaccinated, prescribed decadron/ zpack/ zinc) presenting to the ED S/P cardiac arrest. Patient intubated/ sedated so history obtained from chart (son left and attempted to reach). Patient began to have worsening dyspnea at home, EMS was called. Found to be in respiratory arrest by EMS and followed by EMS. ROSC was achieved after 2 rounds of CPR and epi, downtime 7 mins. Was intubated in the field.   In ED central line was placed. ABG: PH 7.15, PaO2 66, PaCO2 45, HCO3- 16. Troponins .1, BNP > 8000, D-Dimer 7368. COVID 19 positive. Patient being admitted s/p cardiac arrest to ICU.          PAST MEDICAL & SURGICAL HISTORY:  BPH (benign prostatic hyperplasia)  Mild HTN    Assessment:  Patient received in bed, vented , unresponsive. Primary rn present at bedside at time of assessment                      Skin assessed-        Pressure injury #1  Location:  R buttock extending to sacrum   Stage: DTPI  evolved  to  Unstageable pressure injury   Size:  6x2  Tissue Description : Marbleized with pink and black devitalised skin tissue    Wound Exudate : None    Wound Edge:  Intact   Periwound Condition :  Macerated       
Patient is a 81 year old male with a hx of HTN, BPH, recent diagnosis of COVID 19 2 days ago (unvaccinated, prescribed decadron/ zpack/ zinc) presenting to the ED S/P cardiac arrest. Patient intubated/ sedated so history obtained from chart (son left and attempted to reach). Patient began to have worsening dyspnea at home, EMS was called. Found to be in respiratory arrest by EMS and followed by EMS. ROSC was achieved after 2 rounds of CPR and epi, downtime 7 mins. Was intubated in the field.   In ED central line was placed. ABG: PH 7.15, PaO2 66, PaCO2 45, HCO3- 16. Troponins .1, BNP > 8000, D-Dimer 7368. COVID 19 positive. Patient being admitted s/p cardiac arrest to ICU.          PAST MEDICAL & SURGICAL HISTORY:  BPH (benign prostatic hyperplasia)  Mild HTN    Assessment:  Patient received in bed, vented , unresponsive. Primary rn present at bedside at time of assessment                      Skin assessed-        Pressure injury #1  Location:  R buttock extending to sacrum   Stage: DTPI progressing into Unstageable pressure injury   Size:  3x3  Tissue Description : Marbleized with pink and black devitalized skin tissue     Wound Exudate : None    Wound Edge:  Intact   Periwound Condition :  Macerated       
Patient is a 81 year old male with a hx of HTN, BPH, recent diagnosis of COVID 19 2 days ago (unvaccinated, prescribed decadron/ zpack/ zinc) presenting to the ED S/P cardiac arrest. Patient intubated/ sedated so history obtained from chart (son left and attempted to reach). Patient began to have worsening dyspnea at home, EMS was called. Found to be in respiratory arrest by EMS and followed by EMS. ROSC was achieved after 2 rounds of CPR and epi, downtime 7 mins. Was intubated in the field.   In ED central line was placed. ABG: PH 7.15, PaO2 66, PaCO2 45, HCO3- 16. Troponins .1, BNP > 8000, D-Dimer 7368. COVID 19 positive. Patient being admitted s/p cardiac arrest to ICU.          PAST MEDICAL & SURGICAL HISTORY:  BPH (benign prostatic hyperplasia)  Mild HTN    Assessment:  Patient received in bed, vented , unresponsive. Primary rn present at bedside at time of assessment                      Skin assessed-        Pressure injury #1  Location:  R buttock extending to sacrum   Stage: DTPI progressing into Unstageable pressure injury   Size:  3x3  Tissue Description : Marbleized with pink and black devitalized skin tissue     Wound Exudate : None    Wound Edge:  Intact   Periwound Condition :  Macerated

## 2022-02-24 NOTE — CHART NOTE - NSCHARTNOTEFT_GEN_A_CORE
Registered Dietitian Follow-Up     Patient Profile Reviewed                           Yes [X]   No []     Nutrition History Previously Obtained        Yes [X]  No []       Pertinent  Information: pt is 81 year old male with hx of HTN, BPH, unvaccinated, tested + for COVID 12 days PTA, BIBA 2/2 respiratory distress s/p cardiac arrest downtime of 7 minutes, s/p intubation.  + PNA, L sided pnemothorax, s/p chest tube. pt became difficult to ventilate 1/21/22 s/p bronchoscopy  2/2 thick secretions. 1/29 s/p cardizem drip 2/2 afib RVR s/p chest tube removal.  Poor prognosis. DNR. pt has been off sedation since 2/1, unresponsive, + anoxic brain injury. 2/3 + blood cultures followed by ID likely contaminant. s/p PEG and trach placement. 2/21 s/p chest tube removal. 2/22 + temp noted.           Diet order:  Diet, NPO with Tube Feed:   Tube Feeding Modality: Gastrostomy  Glucerna 1.2 Haroldo  Total Volume for 24 Hours (mL): 1440  Continuous  Starting Tube Feed Rate {mL per Hour}: 20  Increase Tube Feed Rate by (mL): 10     Every 4 hours  Until Goal Tube Feed Rate (mL per Hour): 60  Tube Feed Duration (in Hours): 24  Tube Feed Start Time: 16:00  Free Water Flush  Bolus   Total Volume per Flush (mL): 200   Frequency: Every 8 Hours   Total Daily Volume of Flush (mL): 600    Start Time: 17:00 (02-19-22 @ 12:42) [Active]       Anthropometrics:  - Ht. 172.7 cm  - Wt. 79.6 kgs 1/16/22 vs 71 kg today     Pertinent Lab Data: 2/24/22  wbc 21.3H  hgb 10.8L  hct 35.7L  BUn 23H  gluc 149H  alkp 118H  POCT 185H     Pertinent Meds:  MEDICATIONS  (STANDING):  amantadine Syrup 100 milliGRAM(s) Oral every 12 hours  aspirin  chewable 81 milliGRAM(s) Oral daily  cefepime   IVPB 2000 milliGRAM(s) IV Intermittent every 8 hours  dextrose 5%. 1000 milliLiter(s) (100 mL/Hr) IV Continuous <Continuous>  insulin lispro (ADMELOG) corrective regimen sliding scale   SubCutaneous every 6 hours  metoprolol tartrate 25 milliGRAM(s) Oral two times a day  pantoprazole  Injectable 40 milliGRAM(s) IV Push daily  polyethylene glycol 3350 17 Gram(s) Oral daily  vancomycin  IVPB 1000 milliGRAM(s) IV Intermittent every 12 hours    MEDICATIONS  (PRN):  acetaminophen     Tablet .. 650 milliGRAM(s) Oral every 6 hours PRN Temp greater or equal to 38C (100.4F), Mild Pain (1 - 3)  albuterol/ipratropium for Nebulization 3 milliLiter(s) Nebulizer every 6 hours PRN Shortness of Breath and/or Wheezing  LORazepam   Injectable 1 milliGRAM(s) IV Push every 6 hours PRN Agitation  morphine  - Injectable 2 milliGRAM(s) IV Push every 4 hours PRN Severe Pain (7 - 10)       Physical Findings:  - Appearance: trach to vent, unresponsive   - GI function: +BS  - Tubes: PEG   - Oral/Mouth cavity:  - Skin: R buttock extending to sacrum evolved to unstagable pressure ulcer as per wound care note      Nutrition Requirements  Weight Used: 71 kgs     Estimated Energy Needs: 2044 kcals MARIA LUISA state, 85-99g protein (1.2-1.4g/kg/BW) 1;1 kcal for estimated fluid needs         Nutrient Intake: present feeds provide 1710 kcals, 85g protein and 1440 ml fluid. + H20 flushes of 300 ml q 8hrs. meeting >80% of estimated needs           Nutrition Diagnostic #1  Problem: increased nutrient needs   Etiology:  2/2 fever and evolved wound  Statement: pt only receiving 83% of estimated kcal needs and lower end of estimated protein needs         Nutrition Intervention: recommend to increase feeds to glucerna 1.2 at 70 ml/hr will provide 2016 kcals, 99.4g protein and 1680 ml  + 600 ml H20 flushes (2280 ml total volume) meeting > 90% of estimated nutrient needs.       Goal/Expected Outcome: pt to continue to tolerate EN and meet > 90% of estimated nutrient needs      Indicator/Monitoring: Rd to monitor tolerance to EN, labs/meds, NFPF and f/u as needed within 4-6 days.

## 2022-02-24 NOTE — PROGRESS NOTE ADULT - ASSESSMENT
81 year old male with a hx of HTN, BPH, recent diagnosis of COVID 19, 2  days prior to admission -> presented to the ED S/P cardiac arrest.  Patient began to have worsening dyspnea at home, EMS was called. Found to be in cardiac arrest. ROSC was achieved after 2 rounds of CPR and epi, downtime 7 mins. Was intubated in the field.     Acute respiratory failure / Cardiac arrest / COVID-19 pneumonia / probable NSTEMI / L sided pneumothorax / anoxic brain injury / gram negative pneumonia     1. s/p Cardiac Arrest POA  intubated in field, CPR in field 7 min downtime  unresponsive afterwards, currently with trach  s/p peg tube 2/18     2. Resp Failure POA  now vent dependent via trach 40 % fio2    3. Anoxic Brain Injury POA  unresponsive  poor prognosis  family has opted DNR    4. Pseudomonas Pneumonia 2/14 noted  on cefepime, would continue for total of 14 days. may switch to levaquin by peg tube on dc  other supportive treatment    5. fever 101 today  -leukocytosis trending down  -CXR shows B/L opacity.  initial Blood culture negative.  repeat Blood culture pending   -UA +.  F/u on Urine culture and tracheal culture   -continue with vanco, and cefepime  -ID to follow up     6. severe pt calorie malnutrition  s/p peg tube 2/18, tolerating feeds     7. Left pneumothorax  chest tube removed 2/21, no pnx on cxr    8. hypernatremia  -sodium 149  -free water 250 cc qid x 4 doses  -repeat sodium level in am  improving    #Progress Note Handoff  Pending (specify):  as above   Family discussion:  plan of care was discussed with patient and family (Daughter rosy) in details.  all questions were answered.  seems to understand, and in agreement  Disposition:  long term facility  overall prognosis guarded to poor

## 2022-02-24 NOTE — PROGRESS NOTE ADULT - ASSESSMENT
IMPRESSION:  Acute hypoxic respiratory failure SP Trach  Anoxic brain injury  Cardiac Arrest  Severe COVID PNA  Sepsis on present on admission  Left PNX SP chest tube, resolved  Pseudomonas PNA, +ve DTA  Mucus plugs SP Bronchoscopy  GNR on DTA  Ischemic changes on CT Head      SUGGEST:    CNS:   heavy sedation  use morphine drip    HEENT: Oral care.      PULMONARY:   HOB @ 45 degrees.   Aspiration precautions.   continue O2 as necessary to maintain sats > 90%<94  DTA for sputum gm stain CS      CARDIOVASCULAR:   Avoid overload.  Keep I < O.    GI: GI prophylaxis.  c/w OG feeding.  Bowel regimen. General surgery for PEG.    RENAL:  Follow up lytes.  Correct as needed.  Monitor UO.  Becker care.    INFECTIOUS DISEASE:    empiric abx  Repeat Procalcitonin.   ID FU.    FU cultures.    HEMATOLOGICAL: DVT prophylaxis    ENDOCRINE:  Follow up FS.  Insulin protocol if needed    MUSCULOSKELETAL: bedrest    DNR  Very poor overall prognosis

## 2022-02-24 NOTE — PROGRESS NOTE ADULT - SUBJECTIVE AND OBJECTIVE BOX
Patient is a 81y old  Male who presents with a chief complaint of fever (24 Feb 2022 15:21)        HPI:  81 year old male with a hx of HTN, BPH, recent diagnosis of COVID 19 2 days ago (unvaccinated, prescribed decadron/ zpack/ zinc) presenting to the ED S/P cardiac arrest. Patient intubated/ sedated so history obtained from chart (son left and attempted to reach). Patient began to have worsening dyspnea at home, EMS was called. Found to be in respiratory arrest by EMS and followed by EMS. ROSC was achieved after 2 rounds of CPR and epi, downtime 7 mins. Was intubated in the field.   In ED central line was placed. ABG: PH 7.15, PaO2 66, PaCO2 45, HCO3- 16. Troponins .1, BNP > 8000, D-Dimer 7368. COVID 19 positive. Patient being admitted s/p cardiac arrest to ICU.      (15 Aquiles 2022 04:09)      Pt evaluated on rounds.  I reviewed the radiology tests and hospital record.    I reviewed previous notes on this patient.    Interval Events: No overnight events.      CAM:+    SAT:n    SBT:n      REVIEW OF SYSTEMS:   see HPI      OBJECTIVE:  ICU Vital Signs Last 24 Hrs  T(C): 37.3 (24 Feb 2022 13:53), Max: 39.2 (24 Feb 2022 05:50)  T(F): 99.2 (24 Feb 2022 13:53), Max: 102.5 (24 Feb 2022 05:50)  HR: 120 (24 Feb 2022 14:37) (34 - 120)  BP: 147/64 (24 Feb 2022 13:53) (116/79 - 147/64)  BP(mean): --  ABP: --  ABP(mean): --  RR: 16 (24 Feb 2022 05:00) (16 - 16)  SpO2: 98% (24 Feb 2022 14:37) (91% - 98%)    Mode: AC/ CMV (Assist Control/ Continuous Mandatory Ventilation), RR (machine): 24, TV (machine): 420, FiO2: 100, PEEP: 5, ITime: 1, MAP: 1, PIP: 27    02-23 @ 07:01  -  02-24 @ 07:00  --------------------------------------------------------  IN: 747 mL / OUT: 800 mL / NET: -53 mL    02-24 @ 07:01  - 02-24 @ 16:33  --------------------------------------------------------  IN: 0 mL / OUT: 200 mL / NET: -200 mL      CAPILLARY BLOOD GLUCOSE      POCT Blood Glucose.: 185 mg/dL (24 Feb 2022 11:31)        PHYSICAL EXAM:       · ENMT:   Airway patent,   Nasal mucosa clear.  Mouth with normal mucosa.   No thrush    · EYES:   Clear bilaterally,   pupils equal,   round and reactive to light.    · CARDIAC:   Normal rate,   regular rhythm.    Heart sounds S1, S2.   No murmurs, no rubs or gallops on auscultation  no edema        CAROTID:   normal systolic impulse  no bruits    · RESPIRATORY:   rales  normal chest expansion  no retractions or use of accessory muscles  palpation of chest is normal with no fremitus  percussion of chest demonstrates no hyperresonance or dullness    · GASTROINTESTINAL:  Abdomen soft,   non-tender,   + BS  liver/spleen not palpable    · MUSCULOSKELETAL:   no clubbing, cyanosis      · SKIN:   Skin normal color for race,   warm, dry   No evidence of rash.        · HEME LYMPH:   no splenomegaly.  No cervical  lymphadenopathy.  no inguinal lymphadenopathy    HOSPITAL MEDICATIONS:  MEDICATIONS  (STANDING):  amantadine Syrup 100 milliGRAM(s) Oral every 12 hours  aspirin  chewable 81 milliGRAM(s) Oral daily  cefepime   IVPB 2000 milliGRAM(s) IV Intermittent every 8 hours  chlorhexidine 0.12% Liquid 15 milliLiter(s) Oral Mucosa every 12 hours  chlorhexidine 4% Liquid 1 Application(s) Topical <User Schedule>  collagenase Ointment 1 Application(s) Topical two times a day  dextrose 5%. 1000 milliLiter(s) (50 mL/Hr) IV Continuous <Continuous>  dextrose 5%. 1000 milliLiter(s) (100 mL/Hr) IV Continuous <Continuous>  dextrose 50% Injectable 25 Gram(s) IV Push once  dextrose 50% Injectable 12.5 Gram(s) IV Push once  dextrose 50% Injectable 25 Gram(s) IV Push once  enoxaparin Injectable 40 milliGRAM(s) SubCutaneous daily  glucagon  Injectable 1 milliGRAM(s) IntraMuscular once  insulin lispro (ADMELOG) corrective regimen sliding scale   SubCutaneous every 6 hours  metoprolol tartrate 25 milliGRAM(s) Oral two times a day  pantoprazole  Injectable 40 milliGRAM(s) IV Push daily  polyethylene glycol 3350 17 Gram(s) Oral daily  vancomycin  IVPB 1000 milliGRAM(s) IV Intermittent every 12 hours    MEDICATIONS  (PRN):  acetaminophen     Tablet .. 650 milliGRAM(s) Oral every 6 hours PRN Temp greater or equal to 38C (100.4F), Mild Pain (1 - 3)  albuterol/ipratropium for Nebulization 3 milliLiter(s) Nebulizer every 6 hours PRN Shortness of Breath and/or Wheezing  LORazepam   Injectable 1 milliGRAM(s) IV Push every 6 hours PRN Agitation  morphine  - Injectable 2 milliGRAM(s) IV Push every 4 hours PRN Severe Pain (7 - 10)        LABS:                        10.8   21.13 )-----------( 156      ( 24 Feb 2022 07:55 )             35.7     02-24    144  |  109  |  23<H>  ----------------------------<  149<H>  4.4   |  23  |  0.5<L>    Ca    8.5      24 Feb 2022 07:55    TPro  5.5<L>  /  Alb  2.7<L>  /  TBili  0.7  /  DBili  x   /  AST  27  /  ALT  33  /  AlkPhos  118<H>  02-24      Urinalysis Basic - ( 23 Feb 2022 21:10 )    Color: Yellow / Appearance: Slightly Cloudy / SG: >=1.030 / pH: x  Gluc: x / Ketone: Trace  / Bili: Negative / Urobili: 0.2 mg/dL   Blood: x / Protein: >=300 mg/dL / Nitrite: Positive   Leuk Esterase: Trace / RBC: 11-25 /HPF / WBC 10-25 /HPF   Sq Epi: x / Non Sq Epi: Occasional /HPF / Bacteria: Moderate          Mode: AC/ CMV (Assist Control/ Continuous Mandatory Ventilation), RR (machine): 24, TV (machine): 420, FiO2: 100, PEEP: 5, ITime: 1, MAP: 1, PIP: 27      COVID-19 PCR: Detected (20 Feb 2022 15:33)  COVID-19 PCR: NotDetec (15 Feb 2022 07:30)  COVID-19 PCR: Detected (10 Feb 2022 13:00)  SARS-CoV-2: Detected (15 Aquiles 2022 01:25)    Mode: AC/ CMV (Assist Control/ Continuous Mandatory Ventilation)  RR (machine): 24  TV (machine): 420  FiO2: 100  PEEP: 5  ITime: 1  MAP: 1  PIP: 27          RADIOLOGY: Today I personally interpreted the latest CXR and other pertinent films.    2

## 2022-02-24 NOTE — PROGRESS NOTE ADULT - ASSESSMENT
A 81 year old male with a hx of HTN, BPH, recent diagnosis of COVID 19 2 days ago (unvaccinated, prescribed decadron/ zpack/ zinc) presenting to the ED S/P cardiac arrest. Intubated in the field    IMPRESSION  #COVID19 PNA, Severe (O2 < or = 94% on RA and requiring supplemental O2) with Cardiac arrest    CXR diffuse bilateral opacities     D-Dimer Assay, Quantitative: 7368 ng/mL DDU (01-15-22 @ 01:25)    s/p Toci 1/17  #Lactic acidosis  #Transaminitis   #Cardiac arrest- ROSC was achieved after 2 rounds of CPR and epi, downtime 7 mins  # Left sided Pneumothorax- on CXR from 2/2/22      would recommend:    1. Follow up Urine, repeat Blood and sputum culture, is in process   2. Monitor  Temp. and c/w supportive care, and if fever persista please broaden the spectrum of Abx  3. Continue Cefepime and Vancomycin until work up is done  4. Monitor and keep Vancomycin level between 15 to 20  5.. Management of Trach as per protocol  6. Aspiration precaution  7. Monitor WBC count     d/w DR. Belcher    Attending Attestation:    Spent more than 35 minutes on total encounter, more than 50 % of the visit was spent counseling and/or coordinating care by the Attending physician. A 81 year old male with a hx of HTN, BPH, recent diagnosis of COVID 19 2 days ago (unvaccinated, prescribed decadron/ zpack/ zinc) presenting to the ED S/P cardiac arrest. Intubated in the field    IMPRESSION  #COVID19 PNA, Severe (O2 < or = 94% on RA and requiring supplemental O2) with Cardiac arrest    CXR diffuse bilateral opacities     D-Dimer Assay, Quantitative: 7368 ng/mL DDU (01-15-22 @ 01:25)    s/p Toci 1/17  #Lactic acidosis  #Transaminitis   #Cardiac arrest- ROSC was achieved after 2 rounds of CPR and epi, downtime 7 mins  # Left sided Pneumothorax- on CXR from 2/2/22      would recommend:    1. Follow up Urine, repeat Blood and sputum culture, is in process   2. Monitor  Temp. and c/w supportive care, and if fever persists please broaden the spectrum of Abx  3. Continue Cefepime and Vancomycin until work up is done  4. Monitor and keep Vancomycin level between 15 to 20  5.. Management of Trach as per protocol  6. Aspiration precaution  7. Monitor WBC count     d/w DR. Belcher    Attending Attestation:    Spent more than 35 minutes on total encounter, more than 50 % of the visit was spent counseling and/or coordinating care by the Attending physician.

## 2022-02-24 NOTE — ADVANCED PRACTICE NURSE CONSULT - REASON FOR CONSULT
Pressure injury  verification and treatment recommendation 
  re-called for -   Pressure injury   assessment  and treatment recommendation 
 Follow up-   Pressure injury  verification and treatment recommendation

## 2022-02-24 NOTE — PROGRESS NOTE ADULT - SUBJECTIVE AND OBJECTIVE BOX
Patient  is seen and examined at the bed side, is Febrile. The Blood culture from 2/22/22 has no growth to date and sputum culture not finalized yet.       REVIEW OF SYSTEMS: Unable  to obtain due to mental status       Allergy Status Unknown      Vital Signs Last 24 Hrs  T(C): 38.5 (24 Feb 2022 10:23), Max: 39.2 (24 Feb 2022 05:50)  T(F): 101.3 (24 Feb 2022 10:23), Max: 102.5 (24 Feb 2022 05:50)  HR: 106 (24 Feb 2022 10:23) (34 - 114)  BP: 116/79 (24 Feb 2022 10:23) (116/79 - 158/66)  BP(mean): --  RR: 16 (24 Feb 2022 05:00) (16 - 18)  SpO2: 92% (24 Feb 2022 10:23) (91% - 92%)      PHYSICAL EXAM:   GENERAL: Not  in distress  HEENT: On vent via TRAch  CVS: s1 and s2 present  RESP: Not using accessory muscles  GI: Abdomen non distended  EXT: pedal  edema      LABS:                        10.8   21.13 )-----------( 156      ( 24 Feb 2022 07:55 )             35.7                           10.0   11.02 )-----------( 149      ( 14 Feb 2022 05:30 )             32.5       02-24    144  |  109  |  23<H>  ----------------------------<  149<H>  4.4   |  23  |  0.5<L>    Ca    8.5      24 Feb 2022 07:55    TPro  5.5<L>  /  Alb  2.7<L>  /  TBili  0.7  /  DBili  x   /  AST  27  /  ALT  33  /  AlkPhos  118<H>  02-24 02-14    142  |  104  |  16  ----------------------------<  166<H>  3.9   |  28  |  <0.5<L>    Ca    8.1<L>      14 Feb 2022 05:30  Mg     2.2     02-14    TPro  4.6<L>  /  Alb  2.4<L>  /  TBili  1.2  /  DBili  x   /  AST  20  /  ALT  25  /  AlkPhos  94  02-14      CAPILLARY BLOOD GLUCOSE  POCT Blood Glucose.: 369 mg/dL (23 Jan 2022 12:49)  POCT Blood Glucose.: 348 mg/dL (23 Jan 2022 06:38)      ABG - ( 23 Jan 2022 05:20 )  pH, Arterial: 7.30  pH, Blood: x     /  pCO2: 58    /  pO2: 268   / HCO3: 28    / Base Excess: 1.5   /  SaO2: 96.3          Urinalysis Basic - ( 23 Jan 2022 11:00 )  Color: Yellow / Appearance: Clear / SG: >=1.030 / pH: x  Gluc: x / Ketone: Negative  / Bili: Negative / Urobili: 0.2 mg/dL   Blood: x / Protein: Negative mg/dL / Nitrite: Negative   Leuk Esterase: Negative / RBC: 3-5 /HPF / WBC 1-2 /HPF   Sq Epi: x / Non Sq Epi: Occasional /HPF / Bacteria: Few        Procalcitonin, Serum (02.01.22 @ 06:25)   Procalcitonin, Serum: 0.03  Procalcitonin, Serum (01.19.22 @ 06:30) : 0.44  Procalcitonin, Serum (01.16.22 @ 06:56) : 2.32    MEDICATIONS  (STANDING):  amantadine Syrup 100 milliGRAM(s) Oral every 12 hours  aspirin  chewable 81 milliGRAM(s) Oral daily  cefepime   IVPB 2000 milliGRAM(s) IV Intermittent every 8 hours  chlorhexidine 0.12% Liquid 15 milliLiter(s) Oral Mucosa every 12 hours  chlorhexidine 4% Liquid 1 Application(s) Topical <User Schedule>  collagenase Ointment 1 Application(s) Topical two times a day  dextrose 5%. 1000 milliLiter(s) (50 mL/Hr) IV Continuous <Continuous>  dextrose 5%. 1000 milliLiter(s) (100 mL/Hr) IV Continuous <Continuous>  dextrose 50% Injectable 25 Gram(s) IV Push once  dextrose 50% Injectable 12.5 Gram(s) IV Push once  dextrose 50% Injectable 25 Gram(s) IV Push once  enoxaparin Injectable 40 milliGRAM(s) SubCutaneous daily  glucagon  Injectable 1 milliGRAM(s) IntraMuscular once  insulin lispro (ADMELOG) corrective regimen sliding scale   SubCutaneous every 6 hours  metoprolol tartrate 25 milliGRAM(s) Oral two times a day  pantoprazole  Injectable 40 milliGRAM(s) IV Push daily  polyethylene glycol 3350 17 Gram(s) Oral daily  vancomycin  IVPB 1000 milliGRAM(s) IV Intermittent every 12 hours        RADIOLOGY & ADDITIONAL TESTS:    rad< from: Xray Chest 1 View- PORTABLE-Routine (Xray Chest 1 View- PORTABLE-Routine in AM.) (02.22.22 @ 07:44) >  Bilateral opacities similar to prior      < from: Xray Chest 1 View- PORTABLE-Urgent (Xray Chest 1 View- PORTABLE-Urgent .) (02.06.22 @ 10:26) >    Support devices: Endotracheal tube, feeding tube and left-sided pigtail   chest tube are unchanged    Cardiac/mediastinum/hilum: Unchanged.    Lung parenchyma/Pleura: Bilateral opacities, unchanged. No definite  pneumothorax identified.      < from: Xray Chest 1 View- PORTABLE-Urgent (Xray Chest 1 View- PORTABLE-Urgent .) (02.02.22 @ 11:20) >    Again seen are a lack of vascular markings at the left lung apex suggestive of a pneumothorax, but remains limited in evaluation    Stable bilateral lung opacities    r< from: Xray Chest 1 View- PORTABLE-Routine (Xray Chest 1 View- PORTABLE-Routine in AM.) (01.23.22 @ 07:52) >  Bilateral opacities similar to prior  Left-sided chest tube unchanged in position.      < from: Xray Chest 1 View- PORTABLE-Routine (Xray Chest 1 View- PORTABLE-Routine in AM.) (01.22.22 @ 06:34) >  Support devices: ET tube mid trachea central venous line superior vena  cava NG tube in stomach    Cardiac/mediastinum/hilum: Unremarkable.    Lung parenchyma/Pleura: Decreased previous bilateral opacities. No  pleural effusion or air leak    Skeleton/soft tissues: Unremarkable.        MICROBIOLOGY DATA:    Culture - Sputum . (02.23.22 @ 08:00)   Gram Stain:   Moderate polymorphonuclear leukocytes per low power field   Rare Squamous epithelial cells per low power field   Few Gram Negative Rods per oil power field   Specimen Source: .Sputum Sputum Culture - Blood (02.22.22 @ 15:00)   Specimen Source: .Blood Blood-Peripheral   Culture Results:   No growth to date.   COVID-19 PCR . (02.20.22 @ 15:33)   COVID-19 PCR: Detected:   COVID-19 PCR . (02.10.22 @ 13:00)   COVID-19 PCR: Detected:   Culture - Blood in AM (01.31.22 @ 06:15)   - Coagulase negative Staphylococcus: Detec   Gram Stain:   Growth in anaerobic bottle: Gram positive cocci in pairs   Specimen Source: .Blood Blood-Peripheral   Organism: Blood Culture PCR   Culture Results: Growth in anaerobic bottle: Gram positive cocci in pairs   ***Blood Panel PCR results on this specimen are available     Culture - Bronchial (01.30.22 @ 16:00)   Gram Stain: Few polymorphonuclear leukocytes per low power field   No Squamous epithelial cells per low power field   Few Gram positive cocci in pairs per oil power field   Specimen Source: .Bronchial None   Culture Results:   Normal Respiratory Corina present     Culture - Blood (01.28.22 @ 16:37)   Specimen Source: .Blood None   Culture Results: No Growth Final     MRSA/MSSA PCR (01.23.22 @ 11:00)   MRSA PCR Result.: Negative    Respiratory Viral Panel with COVID-19 by JEN (01.15.22 @ 01:25)   Rapid RVP Result: Detected   SARS-CoV-2: Detected:                   Patient  is seen and examined at the bed side, is Febrile. The Blood culture from 2/22/22 has no growth to date and sputum culture not finalized yet.       REVIEW OF SYSTEMS: Unable  to obtain due to mental status       Allergy Status Unknown      Vital Signs Last 24 Hrs  T(C): 38.5 (24 Feb 2022 10:23), Max: 39.2 (24 Feb 2022 05:50)  T(F): 101.3 (24 Feb 2022 10:23), Max: 102.5 (24 Feb 2022 05:50)  HR: 106 (24 Feb 2022 10:23) (34 - 114)  BP: 116/79 (24 Feb 2022 10:23) (116/79 - 158/66)  BP(mean): --  RR: 16 (24 Feb 2022 05:00) (16 - 18)  SpO2: 92% (24 Feb 2022 10:23) (91% - 92%)      PHYSICAL EXAM:   GENERAL: Not  in distress  HEENT: On vent via TRAch  CVS: s1 and s2 present  RESP: Not using accessory muscles  GI: Abdomen non distended  EXT: No pedal  edema      LABS:                        10.8   21.13 )-----------( 156      ( 24 Feb 2022 07:55 )             35.7                           10.0   11.02 )-----------( 149      ( 14 Feb 2022 05:30 )             32.5       02-24    144  |  109  |  23<H>  ----------------------------<  149<H>  4.4   |  23  |  0.5<L>    Ca    8.5      24 Feb 2022 07:55    TPro  5.5<L>  /  Alb  2.7<L>  /  TBili  0.7  /  DBili  x   /  AST  27  /  ALT  33  /  AlkPhos  118<H>  02-24 02-14    142  |  104  |  16  ----------------------------<  166<H>  3.9   |  28  |  <0.5<L>    Ca    8.1<L>      14 Feb 2022 05:30  Mg     2.2     02-14    TPro  4.6<L>  /  Alb  2.4<L>  /  TBili  1.2  /  DBili  x   /  AST  20  /  ALT  25  /  AlkPhos  94  02-14      CAPILLARY BLOOD GLUCOSE  POCT Blood Glucose.: 369 mg/dL (23 Jan 2022 12:49)  POCT Blood Glucose.: 348 mg/dL (23 Jan 2022 06:38)      ABG - ( 23 Jan 2022 05:20 )  pH, Arterial: 7.30  pH, Blood: x     /  pCO2: 58    /  pO2: 268   / HCO3: 28    / Base Excess: 1.5   /  SaO2: 96.3          Urinalysis Basic - ( 23 Jan 2022 11:00 )  Color: Yellow / Appearance: Clear / SG: >=1.030 / pH: x  Gluc: x / Ketone: Negative  / Bili: Negative / Urobili: 0.2 mg/dL   Blood: x / Protein: Negative mg/dL / Nitrite: Negative   Leuk Esterase: Negative / RBC: 3-5 /HPF / WBC 1-2 /HPF   Sq Epi: x / Non Sq Epi: Occasional /HPF / Bacteria: Few        Procalcitonin, Serum (02.01.22 @ 06:25)   Procalcitonin, Serum: 0.03  Procalcitonin, Serum (01.19.22 @ 06:30) : 0.44  Procalcitonin, Serum (01.16.22 @ 06:56) : 2.32    MEDICATIONS  (STANDING):  amantadine Syrup 100 milliGRAM(s) Oral every 12 hours  aspirin  chewable 81 milliGRAM(s) Oral daily  cefepime   IVPB 2000 milliGRAM(s) IV Intermittent every 8 hours  chlorhexidine 0.12% Liquid 15 milliLiter(s) Oral Mucosa every 12 hours  chlorhexidine 4% Liquid 1 Application(s) Topical <User Schedule>  collagenase Ointment 1 Application(s) Topical two times a day  dextrose 5%. 1000 milliLiter(s) (50 mL/Hr) IV Continuous <Continuous>  dextrose 5%. 1000 milliLiter(s) (100 mL/Hr) IV Continuous <Continuous>  dextrose 50% Injectable 25 Gram(s) IV Push once  dextrose 50% Injectable 12.5 Gram(s) IV Push once  dextrose 50% Injectable 25 Gram(s) IV Push once  enoxaparin Injectable 40 milliGRAM(s) SubCutaneous daily  glucagon  Injectable 1 milliGRAM(s) IntraMuscular once  insulin lispro (ADMELOG) corrective regimen sliding scale   SubCutaneous every 6 hours  metoprolol tartrate 25 milliGRAM(s) Oral two times a day  pantoprazole  Injectable 40 milliGRAM(s) IV Push daily  polyethylene glycol 3350 17 Gram(s) Oral daily  vancomycin  IVPB 1000 milliGRAM(s) IV Intermittent every 12 hours        RADIOLOGY & ADDITIONAL TESTS:    rad< from: Xray Chest 1 View- PORTABLE-Routine (Xray Chest 1 View- PORTABLE-Routine in AM.) (02.22.22 @ 07:44) >  Bilateral opacities similar to prior      < from: Xray Chest 1 View- PORTABLE-Urgent (Xray Chest 1 View- PORTABLE-Urgent .) (02.06.22 @ 10:26) >    Support devices: Endotracheal tube, feeding tube and left-sided pigtail   chest tube are unchanged    Cardiac/mediastinum/hilum: Unchanged.    Lung parenchyma/Pleura: Bilateral opacities, unchanged. No definite  pneumothorax identified.      < from: Xray Chest 1 View- PORTABLE-Urgent (Xray Chest 1 View- PORTABLE-Urgent .) (02.02.22 @ 11:20) >    Again seen are a lack of vascular markings at the left lung apex suggestive of a pneumothorax, but remains limited in evaluation    Stable bilateral lung opacities    r< from: Xray Chest 1 View- PORTABLE-Routine (Xray Chest 1 View- PORTABLE-Routine in AM.) (01.23.22 @ 07:52) >  Bilateral opacities similar to prior  Left-sided chest tube unchanged in position.      < from: Xray Chest 1 View- PORTABLE-Routine (Xray Chest 1 View- PORTABLE-Routine in AM.) (01.22.22 @ 06:34) >  Support devices: ET tube mid trachea central venous line superior vena  cava NG tube in stomach    Cardiac/mediastinum/hilum: Unremarkable.    Lung parenchyma/Pleura: Decreased previous bilateral opacities. No  pleural effusion or air leak    Skeleton/soft tissues: Unremarkable.        MICROBIOLOGY DATA:    Culture - Sputum . (02.23.22 @ 08:00)   Gram Stain:   Moderate polymorphonuclear leukocytes per low power field   Rare Squamous epithelial cells per low power field   Few Gram Negative Rods per oil power field   Specimen Source: .Sputum Sputum Culture - Blood (02.22.22 @ 15:00)   Specimen Source: .Blood Blood-Peripheral   Culture Results:   No growth to date.   COVID-19 PCR . (02.20.22 @ 15:33)   COVID-19 PCR: Detected:   COVID-19 PCR . (02.10.22 @ 13:00)   COVID-19 PCR: Detected:   Culture - Blood in AM (01.31.22 @ 06:15)   - Coagulase negative Staphylococcus: Detec   Gram Stain:   Growth in anaerobic bottle: Gram positive cocci in pairs   Specimen Source: .Blood Blood-Peripheral   Organism: Blood Culture PCR   Culture Results: Growth in anaerobic bottle: Gram positive cocci in pairs   ***Blood Panel PCR results on this specimen are available     Culture - Bronchial (01.30.22 @ 16:00)   Gram Stain: Few polymorphonuclear leukocytes per low power field   No Squamous epithelial cells per low power field   Few Gram positive cocci in pairs per oil power field   Specimen Source: .Bronchial None   Culture Results:   Normal Respiratory Corina present     Culture - Blood (01.28.22 @ 16:37)   Specimen Source: .Blood None   Culture Results: No Growth Final     MRSA/MSSA PCR (01.23.22 @ 11:00)   MRSA PCR Result.: Negative    Respiratory Viral Panel with COVID-19 by JEN (01.15.22 @ 01:25)   Rapid RVP Result: Detected   SARS-CoV-2: Detected:

## 2022-02-25 NOTE — PROGRESS NOTE ADULT - SUBJECTIVE AND OBJECTIVE BOX
CHADWICK VENCES  81y, Male  Allergy: Allergy Status Unknown      LOS  41d    CHIEF COMPLAINT: fever (25 Feb 2022 11:02)      INTERVAL EVENTS/HPI  - No acute events overnight  - T(F): , Max: 99.4 (02-25-22 @ 05:15)  - WBC Count: 18.08 (02-25-22 @ 07:56)  WBC Count: 21.13 (02-24-22 @ 07:55)     - Creatinine, Serum: <0.5 (02-25-22 @ 07:56)  Creatinine, Serum: 0.5 (02-24-22 @ 07:55)       ROS  unable to obtain history secondary to patient's mental status and/or sedation    VITALS:  T(F): 99.4, Max: 99.4 (02-25-22 @ 05:15)  HR: 102  BP: 128/79  RR: 38Vital Signs Last 24 Hrs  T(C): 37.4 (25 Feb 2022 05:15), Max: 37.4 (25 Feb 2022 05:15)  T(F): 99.4 (25 Feb 2022 05:15), Max: 99.4 (25 Feb 2022 05:15)  HR: 102 (25 Feb 2022 10:01) (52 - 120)  BP: 128/79 (25 Feb 2022 05:15) (122/68 - 147/64)  BP(mean): --  RR: 38 (25 Feb 2022 05:15) (16 - 38)  SpO2: 98% (25 Feb 2022 10:01) (98% - 100%)    PHYSICAL EXAM:  Gen: NAD, resting in bed  HEENT: Normocephalic, atraumatic  Neck: supple, no lymphadenopathy  CV: Regular rate & regular rhythm  Lungs: decreased BS at bases, no fremitus  Abdomen: Soft, BS present  Ext: Warm, well perfused  Neuro: non focal, awake  Skin: no rash, no erythema; sacral ulcer DTI  Lines: no phlebitis    FH: Non-contributory  Social Hx: Non-contributory    TESTS & MEASUREMENTS:                        10.1   18.08 )-----------( 143      ( 25 Feb 2022 07:56 )             35.2     02-25    146  |  109  |  32<H>  ----------------------------<  164<H>  5.1<H>   |  27  |  <0.5<L>    Ca    8.6      25 Feb 2022 07:56    TPro  5.5<L>  /  Alb  2.7<L>  /  TBili  0.5  /  DBili  x   /  AST  22  /  ALT  32  /  AlkPhos  126<H>  02-25    eGFR if Non African American: 111 mL/min/1.73M2 (02-25-22 @ 07:56)  eGFR if African American: 128 mL/min/1.73M2 (02-25-22 @ 07:56)    LIVER FUNCTIONS - ( 25 Feb 2022 07:56 )  Alb: 2.7 g/dL / Pro: 5.5 g/dL / ALK PHOS: 126 U/L / ALT: 32 U/L / AST: 22 U/L / GGT: x           Urinalysis Basic - ( 23 Feb 2022 21:10 )    Color: Yellow / Appearance: Slightly Cloudy / SG: >=1.030 / pH: x  Gluc: x / Ketone: Trace  / Bili: Negative / Urobili: 0.2 mg/dL   Blood: x / Protein: >=300 mg/dL / Nitrite: Positive   Leuk Esterase: Trace / RBC: 11-25 /HPF / WBC 10-25 /HPF   Sq Epi: x / Non Sq Epi: Occasional /HPF / Bacteria: Moderate        Culture - Sputum (collected 02-23-22 @ 08:00)  Source: .Sputum Sputum  Gram Stain (02-23-22 @ 22:08):    Moderate polymorphonuclear leukocytes per low power field    Rare Squamous epithelial cells per low power field    Few Gram Negative Rods per oil power field  Preliminary Report (02-24-22 @ 16:30):    Moderate Pseudomonas aeruginosa    Culture - Blood (collected 02-22-22 @ 15:00)  Source: .Blood Blood-Peripheral  Preliminary Report (02-23-22 @ 22:02):    No growth to date.    Culture - Blood (collected 02-15-22 @ 03:10)  Source: .Blood Blood  Final Report (02-20-22 @ 20:00):    No Growth Final    Culture - Sputum (collected 02-14-22 @ 11:30)  Source: .Sputum Deep tracheal aspirate  Gram Stain (02-14-22 @ 23:16):    Moderate polymorphonuclear leukocytes per low power field    No Squamous epithelial cells per low power field    Moderate Gram Negative Rods seen per oil power field  Final Report (02-16-22 @ 21:38):    Moderate Pseudomonas aeruginosa    Normal Respiratory Corina absent  Organism: Pseudomonas aeruginosa (02-16-22 @ 21:38)  Organism: Pseudomonas aeruginosa (02-16-22 @ 21:38)      -  Amikacin: S <=16      -  Aztreonam: S <=4      -  Cefepime: S <=2      -  Ceftazidime: S 4      -  Ciprofloxacin: S <=0.25      -  Gentamicin: S 4      -  Imipenem: S 2      -  Levofloxacin: S <=0.5      -  Meropenem: S <=1      -  Piperacillin/Tazobactam: S <=8      -  Tobramycin: S <=2      Method Type: ALEXSANDRA    Culture - Blood (collected 01-31-22 @ 06:15)  Source: .Blood Blood-Peripheral  Gram Stain (02-03-22 @ 03:08):    Growth in anaerobic bottle: Gram positive cocci in pairs  Final Report (02-03-22 @ 21:57):    Growth in anaerobic bottle: Staphylococcus capitis    Coag Negative Staphylococcus    Single set isolate, possible contaminant. Contact    Microbiology if susceptibility testing clinically    indicated.    ***Blood Panel PCR results on this specimen are available    approximately 3 hours after the Gram stain result.***    Gram stain, PCR, and/or culture results may not always    correspond due to difference in methodologies.    ************************************************************    This PCR assay was performed by multiplex PCR. This    Assay tests for 66 bacterial and resistance gene targets.    Please refer to the [facility] test directory    at [email or web address] for details.  Organism: Blood Culture PCR (02-03-22 @ 21:57)  Organism: Blood Culture PCR (02-03-22 @ 21:57)      -  Coagulase negative Staphylococcus: Detec      Method Type: PCR    Culture - Bronchial (collected 01-30-22 @ 16:00)  Source: .Bronchial None  Gram Stain (01-30-22 @ 22:58):    Few polymorphonuclear leukocytes per low power field    No Squamous epithelial cells per low power field    Few Gram positive cocci in pairs per oil power field  Final Report (02-01-22 @ 19:11):    Normal Respiratory Corina present    Culture - Blood (collected 01-28-22 @ 16:37)  Source: .Blood None  Final Report (02-02-22 @ 23:00):    No Growth Final            INFECTIOUS DISEASES TESTING  COVID-19 PCR: Detected (02-20-22 @ 15:33)  COVID-19 PCR: NotDetec (02-15-22 @ 07:30)  Procalcitonin, Serum: 0.09 (02-12-22 @ 06:21)  COVID-19 PCR: Detected (02-10-22 @ 13:00)  Procalcitonin, Serum: 0.04 (02-09-22 @ 06:03)  Procalcitonin, Serum: 0.03 (02-01-22 @ 06:25)  Procalcitonin, Serum: 0.07 (01-31-22 @ 06:15)  Procalcitonin, Serum: 0.10 (01-30-22 @ 10:45)  Procalcitonin, Serum: 0.11 (01-29-22 @ 07:17)  Procalcitonin, Serum: 0.08 (01-26-22 @ 06:15)  Procalcitonin, Serum: 0.09 (01-25-22 @ 05:58)  Procalcitonin, Serum: 0.11 (01-23-22 @ 11:17)  MRSA PCR Result.: Negative (01-23-22 @ 11:00)  Procalcitonin, Serum: 0.44 (01-19-22 @ 06:30)  Procalcitonin, Serum: 2.32 (01-16-22 @ 06:56)  Rapid RVP Result: Detected (01-15-22 @ 01:25)  Procalcitonin, Serum: 0.10 (01-15-22 @ 01:25)      INFLAMMATORY MARKERS  C-Reactive Protein, Serum: <3 mg/L (02-01-22 @ 06:25)  C-Reactive Protein, Serum: <3 mg/L (01-26-22 @ 06:15)  C-Reactive Protein, Serum: 50 mg/L (01-16-22 @ 06:56)  C-Reactive Protein, Serum: 76 mg/L (01-15-22 @ 01:25)      RADIOLOGY & ADDITIONAL TESTS:  I have personally reviewed the last available Chest xray  CXR      CT      CARDIOLOGY TESTING      MEDICATIONS  amantadine Syrup 100 Oral every 12 hours  aspirin  chewable 81 Oral daily  cefepime   IVPB 2000 IV Intermittent every 8 hours  chlorhexidine 0.12% Liquid 15 Oral Mucosa every 12 hours  chlorhexidine 4% Liquid 1 Topical <User Schedule>  collagenase Ointment 1 Topical two times a day  dextrose 5%. 1000 IV Continuous <Continuous>  dextrose 5%. 1000 IV Continuous <Continuous>  dextrose 50% Injectable 25 IV Push once  dextrose 50% Injectable 12.5 IV Push once  dextrose 50% Injectable 25 IV Push once  enoxaparin Injectable 40 SubCutaneous daily  glucagon  Injectable 1 IntraMuscular once  insulin lispro (ADMELOG) corrective regimen sliding scale  SubCutaneous every 6 hours  metoprolol tartrate 25 Oral two times a day  morphine  Infusion. 1 IV Continuous <Continuous>  pantoprazole  Injectable 40 IV Push daily  polyethylene glycol 3350 17 Oral daily  vancomycin  IVPB 1000 IV Intermittent every 12 hours      WEIGHT  Weight (kg): 72.5 (02-18-22 @ 15:06)  Creatinine, Serum: <0.5 mg/dL (02-25-22 @ 07:56)      ANTIBIOTICS:  cefepime   IVPB 2000 milliGRAM(s) IV Intermittent every 8 hours  vancomycin  IVPB 1000 milliGRAM(s) IV Intermittent every 12 hours      All available historical records have been reviewed

## 2022-02-25 NOTE — PROGRESS NOTE ADULT - ASSESSMENT
81 year old male with a hx of HTN, BPH, recent diagnosis of COVID 19 2 days ago (unvaccinated, prescribed decadron/ zpack/ zinc) presenting to the ED S/P cardiac arrest. Intubated in the field    IMPRESSION  #COVID19 PNA, Severe (O2 < or = 94% on RA and requiring supplemental O2) with Cardiac arrest    CXR diffuse bilateral opacities     D-Dimer Assay, Quantitative: 7368 ng/mL DDU (01-15-22 @ 01:25)    s/p Toci 1/17    #Fevers 2/22   - Blood Cx 2/22 NG  - Sputum Cx 2/22 PSeudomonas   #Fevers 2/13 - resolved  - cXR 2/14 with bilateral opacities, worsening on Right   - sputum Cx 2/14 - Pseudomonas   - treated with course of cefepime    #Cardiac arrest- ROSC was achieved after 2 rounds of CPR and epi, downtime 7 mins  - s/p trach 2/11    #Left sided Pneumothorax- on CXR from 2/2/22    Antibiotic course  Cefepime 2/16- present     Recommendations  - repeat procalcitonin   - follow-up pseudomonas susceptibilities -- if febrile again, can start on meropenem 1g q 8 hours while awaiting susceptibilities   - check GGT and RUQ US   - stop vancomycin   - has sacral ulcer which is unstageable: - if continued fevers, would check CT Abd/pelvis     Please call or message on [employer] Teams if with any questions.  Spectra [phone]

## 2022-02-25 NOTE — PROGRESS NOTE ADULT - SUBJECTIVE AND OBJECTIVE BOX
CC.  Fever  HPI.  Patient is intubated/vented    unable to obtain ros/hx  afebrile          Vital Signs Last 24 Hrs  T(C): 37.4 (25 Feb 2022 05:15), Max: 37.4 (25 Feb 2022 05:15)  T(F): 99.4 (25 Feb 2022 05:15), Max: 99.4 (25 Feb 2022 05:15)  HR: 112 (25 Feb 2022 05:15) (52 - 120)  BP: 128/79 (25 Feb 2022 05:15) (122/68 - 147/64)  BP(mean): --  RR: 38 (25 Feb 2022 05:15) (16 - 38)  SpO2: 100% (25 Feb 2022 08:12) (98% - 100%)        PHYSICAL EXAM-  GENERAL: NAD   HEAD:  Atraumatic, Normocephalic  EYES: EOMI, PERRLA, conjunctiva and sclera clear  NECK: Supple, No JVD, Normal thyroid  NERVOUS SYSTEM:  unresponsive   CHEST/LUNG: + rhonchi with good air entry.  No retractions or accessory muscle usage   HEART: Regular rate and rhythm; No murmurs, rubs, or gallops  ABDOMEN: Soft, Nontender, Nondistended; Bowel sounds present  EXTREMITIES:   No clubbing, cyanosis, or edema  SKIN: No rashes or lesions                            10.1   18.08 )-----------( 143      ( 25 Feb 2022 07:56 )             35.2     02-25    146  |  109  |  32<H>  ----------------------------<  164<H>  5.1<H>   |  27  |  <0.5<L>    Ca    8.6      25 Feb 2022 07:56    TPro  5.5<L>  /  Alb  2.7<L>  /  TBili  0.5  /  DBili  x   /  AST  22  /  ALT  32  /  AlkPhos  126<H>  02-25          Urinalysis Basic - ( 23 Feb 2022 21:10 )    Color: Yellow / Appearance: Slightly Cloudy / SG: >=1.030 / pH: x  Gluc: x / Ketone: Trace  / Bili: Negative / Urobili: 0.2 mg/dL   Blood: x / Protein: >=300 mg/dL / Nitrite: Positive   Leuk Esterase: Trace / RBC: 11-25 /HPF / WBC 10-25 /HPF   Sq Epi: x / Non Sq Epi: Occasional /HPF / Bacteria: Moderate      MEDICATIONS  (STANDING):  amantadine Syrup 100 milliGRAM(s) Oral every 12 hours  aspirin  chewable 81 milliGRAM(s) Oral daily  cefepime   IVPB 2000 milliGRAM(s) IV Intermittent every 8 hours  chlorhexidine 0.12% Liquid 15 milliLiter(s) Oral Mucosa every 12 hours  chlorhexidine 4% Liquid 1 Application(s) Topical <User Schedule>  collagenase Ointment 1 Application(s) Topical two times a day  dextrose 5%. 1000 milliLiter(s) (50 mL/Hr) IV Continuous <Continuous>  dextrose 5%. 1000 milliLiter(s) (100 mL/Hr) IV Continuous <Continuous>  dextrose 50% Injectable 25 Gram(s) IV Push once  dextrose 50% Injectable 12.5 Gram(s) IV Push once  dextrose 50% Injectable 25 Gram(s) IV Push once  enoxaparin Injectable 40 milliGRAM(s) SubCutaneous daily  glucagon  Injectable 1 milliGRAM(s) IntraMuscular once  insulin lispro (ADMELOG) corrective regimen sliding scale   SubCutaneous every 6 hours  metoprolol tartrate 25 milliGRAM(s) Oral two times a day  pantoprazole  Injectable 40 milliGRAM(s) IV Push daily  polyethylene glycol 3350 17 Gram(s) Oral daily  vancomycin  IVPB 1000 milliGRAM(s) IV Intermittent every 12 hours    MEDICATIONS  (PRN):  acetaminophen     Tablet .. 650 milliGRAM(s) Oral every 6 hours PRN Temp greater or equal to 38C (100.4F), Mild Pain (1 - 3)  albuterol/ipratropium for Nebulization 3 milliLiter(s) Nebulizer every 6 hours PRN Shortness of Breath and/or Wheezing  LORazepam   Injectable 1 milliGRAM(s) IV Push every 6 hours PRN Agitation  morphine  - Injectable 2 milliGRAM(s) IV Push every 4 hours PRN Severe Pain (7 - 10)                                       Imaging Personally Reviewed:     [x ] YES  [ ] NO    Consultant(s) Notes Reviewed:  [x ] YES  [ ] NO    Care Discussed with Consultants/Other Providers [x ] YES  [ ] NO  Care Discussed with Consultants/Other Providers [x ] YES  [ ] No medical contraindication for discharge

## 2022-02-26 NOTE — PROGRESS NOTE ADULT - SUBJECTIVE AND OBJECTIVE BOX
CC.  Fever  HPI.  Patient is intubated/vented    unable to obtain ros/hx  afebrile   overriding the vent.  Initially started on morphine drip, discussed with critical care who recommended ativan PRN         Vital Signs Last 24 Hrs  T(C): 37 (26 Feb 2022 05:00), Max: 37.6 (25 Feb 2022 21:00)  T(F): 98.6 (26 Feb 2022 05:00), Max: 99.7 (25 Feb 2022 21:00)  HR: 112 (26 Feb 2022 05:00) (95 - 112)  BP: 123/65 (26 Feb 2022 05:00) (113/65 - 128/67)  BP(mean): --  RR: 18 (26 Feb 2022 05:00) (18 - 24)  SpO2: 97% (26 Feb 2022 02:49) (97% - 98%)        PHYSICAL EXAM-  GENERAL: NAD   HEAD:  Atraumatic, Normocephalic  EYES: EOMI, PERRLA, conjunctiva and sclera clear  NECK: Supple, No JVD, Normal thyroid  NERVOUS SYSTEM:  unresponsive   CHEST/LUNG: + rhonchi with good air entry.  No retractions or accessory muscle usage   HEART: Regular rate and rhythm; No murmurs, rubs, or gallops  ABDOMEN: Soft, Nontender, Nondistended; Bowel sounds present  EXTREMITIES:   No clubbing, cyanosis, or edema  SKIN: No rashes or lesions                                         9.0    12.95 )-----------( 148      ( 26 Feb 2022 05:51 )             31.2     02-26    148<H>  |  110  |  36<H>  ----------------------------<  193<H>  4.9   |  27  |  <0.5<L>    Ca    8.3<L>      26 Feb 2022 05:51    TPro  5.0<L>  /  Alb  2.6<L>  /  TBili  0.4  /  DBili  x   /  AST  20  /  ALT  28  /  AlkPhos  118<H>  02-26      MEDICATIONS  (STANDING):  amantadine Syrup 100 milliGRAM(s) Oral every 12 hours  aspirin  chewable 81 milliGRAM(s) Oral daily  cefepime   IVPB 2000 milliGRAM(s) IV Intermittent every 8 hours  chlorhexidine 0.12% Liquid 15 milliLiter(s) Oral Mucosa every 12 hours  chlorhexidine 4% Liquid 1 Application(s) Topical <User Schedule>  collagenase Ointment 1 Application(s) Topical two times a day  dextrose 5%. 1000 milliLiter(s) (50 mL/Hr) IV Continuous <Continuous>  dextrose 5%. 1000 milliLiter(s) (100 mL/Hr) IV Continuous <Continuous>  dextrose 50% Injectable 25 Gram(s) IV Push once  dextrose 50% Injectable 12.5 Gram(s) IV Push once  dextrose 50% Injectable 25 Gram(s) IV Push once  enoxaparin Injectable 40 milliGRAM(s) SubCutaneous daily  glucagon  Injectable 1 milliGRAM(s) IntraMuscular once  insulin lispro (ADMELOG) corrective regimen sliding scale   SubCutaneous every 6 hours  levoFLOXacin IVPB      metoprolol tartrate 25 milliGRAM(s) Oral two times a day  morphine  Infusion. 1 mG/Hr (1 mL/Hr) IV Continuous <Continuous>  pantoprazole  Injectable 40 milliGRAM(s) IV Push daily  polyethylene glycol 3350 17 Gram(s) Oral daily  vancomycin  IVPB 1000 milliGRAM(s) IV Intermittent every 12 hours    MEDICATIONS  (PRN):  acetaminophen     Tablet .. 650 milliGRAM(s) Oral every 6 hours PRN Temp greater or equal to 38C (100.4F), Mild Pain (1 - 3)  albuterol/ipratropium for Nebulization 3 milliLiter(s) Nebulizer every 6 hours PRN Shortness of Breath and/or Wheezing  LORazepam   Injectable 1 milliGRAM(s) IV Push every 2 hours PRN Agitation                                                  Imaging Personally Reviewed:     [x ] YES  [ ] NO    Consultant(s) Notes Reviewed:  [x ] YES  [ ] NO    Care Discussed with Consultants/Other Providers [x ] YES  [ ] NO  Care Discussed with Consultants/Other Providers [x ] YES  [ ] No medical contraindication for discharge

## 2022-02-26 NOTE — PROGRESS NOTE ADULT - ASSESSMENT
81 year old male with a hx of HTN, BPH, recent diagnosis of COVID 19, 2  days prior to admission -> presented to the ED S/P cardiac arrest.  Patient began to have worsening dyspnea at home, EMS was called. Found to be in cardiac arrest. ROSC was achieved after 2 rounds of CPR and epi, downtime 7 mins. Was intubated in the field.     Acute respiratory failure / Cardiac arrest / COVID-19 pneumonia / probable NSTEMI / L sided pneumothorax / anoxic brain injury / gram negative pneumonia     1. s/p Cardiac Arrest POA  intubated in field, CPR in field 7 min downtime  unresponsive afterwards, currently with trach  s/p peg tube 2/18     2. Resp Failure POA  now vent dependent via trach    -Pulmonary to follow up for vent management    3. Anoxic Brain Injury POA  unresponsive  poor prognosis  family has opted DNR    4. Pseudomonas Pneumonia 2/14 noted  on cefepime, would continue for total of 14 days. may switch to levaquin by peg tube on dc  other supportive treatment    5. fever 101 today  -leukocytosis trending down  -CXR shows B/L opacity.  initial Blood culture negative.  repeat Blood culture negative.     -UA +.  Urine culture  negative.  sputum/tracheal culture shows pseudomonas   -continue with vanco, and cefepime.  will start levaquin  -ID to follow up     6. severe pt calorie malnutrition  s/p peg tube 2/18, tolerating feeds     7. Left pneumothorax  chest tube removed 2/21, no pnx on cxr    8. hypernatremia  -sodium 149  -free water 250 cc qid x 4 doses  -repeat sodium level in am  improving    #Progress Note Handoff  Pending (specify):  as above   Family discussion:  plan of care was discussed with patient and family (Daughter rosy) in details.  all questions were answered.  seems to understand, and in agreement  Disposition:  long term facility  overall prognosis guarded to poor

## 2022-02-27 NOTE — PROGRESS NOTE ADULT - SUBJECTIVE AND OBJECTIVE BOX
CC.  Fever  HPI.  Patient is intubated/vented    unable to obtain ros/hx  afebrile   appears to be more comfortable today      Vital Signs Last 24 Hrs  T(C): 38.3 (27 Feb 2022 05:00), Max: 38.5 (26 Feb 2022 14:32)  T(F): 100.9 (27 Feb 2022 05:00), Max: 101.3 (26 Feb 2022 14:32)  HR: 110 (27 Feb 2022 07:58) (107 - 112)  BP: 114/64 (27 Feb 2022 05:00) (90/50 - 114/64)  BP(mean): --  RR: 20 (27 Feb 2022 05:00) (18 - 20)  SpO2: 97% (27 Feb 2022 07:58) (96% - 97%)      PHYSICAL EXAM-  GENERAL: NAD   HEAD:  Atraumatic, Normocephalic  EYES: EOMI, PERRLA, conjunctiva and sclera clear  NECK: Supple, No JVD, Normal thyroid  NERVOUS SYSTEM:  unresponsive   CHEST/LUNG: + rhonchi with good air entry.  No retractions or accessory muscle usage   HEART: Regular rate and rhythm; No murmurs, rubs, or gallops  ABDOMEN: Soft, Nontender, Nondistended; Bowel sounds present  EXTREMITIES:   No clubbing, cyanosis, or edema  SKIN: No rashes or lesions                            8.6    14.18 )-----------( 156      ( 27 Feb 2022 07:33 )             29.9     02-27    147<H>  |  110  |  47<H>  ----------------------------<  120<H>  5.9<H>   |  29  |  0.7    Ca    8.5      27 Feb 2022 07:33    TPro  5.1<L>  /  Alb  2.5<L>  /  TBili  0.4  /  DBili  x   /  AST  26  /  ALT  37  /  AlkPhos  129<H>  02-27                              MEDICATIONS  (STANDING):  amantadine Syrup 100 milliGRAM(s) Oral every 12 hours  aspirin  chewable 81 milliGRAM(s) Oral daily  cefepime   IVPB 2000 milliGRAM(s) IV Intermittent every 8 hours  chlorhexidine 0.12% Liquid 15 milliLiter(s) Oral Mucosa every 12 hours  chlorhexidine 4% Liquid 1 Application(s) Topical <User Schedule>  collagenase Ointment 1 Application(s) Topical two times a day  dextrose 5%. 1000 milliLiter(s) (50 mL/Hr) IV Continuous <Continuous>  dextrose 5%. 1000 milliLiter(s) (100 mL/Hr) IV Continuous <Continuous>  dextrose 50% Injectable 25 Gram(s) IV Push once  dextrose 50% Injectable 12.5 Gram(s) IV Push once  dextrose 50% Injectable 25 Gram(s) IV Push once  enoxaparin Injectable 40 milliGRAM(s) SubCutaneous daily  glucagon  Injectable 1 milliGRAM(s) IntraMuscular once  insulin lispro (ADMELOG) corrective regimen sliding scale   SubCutaneous every 6 hours  levoFLOXacin IVPB      metoprolol tartrate 25 milliGRAM(s) Oral two times a day  morphine  Infusion. 1 mG/Hr (1 mL/Hr) IV Continuous <Continuous>  pantoprazole  Injectable 40 milliGRAM(s) IV Push daily  polyethylene glycol 3350 17 Gram(s) Oral daily  vancomycin  IVPB 1000 milliGRAM(s) IV Intermittent every 12 hours    MEDICATIONS  (PRN):  acetaminophen     Tablet .. 650 milliGRAM(s) Oral every 6 hours PRN Temp greater or equal to 38C (100.4F), Mild Pain (1 - 3)  albuterol/ipratropium for Nebulization 3 milliLiter(s) Nebulizer every 6 hours PRN Shortness of Breath and/or Wheezing  LORazepam   Injectable 1 milliGRAM(s) IV Push every 2 hours PRN Agitation                                                  Imaging Personally Reviewed:     [x ] YES  [ ] NO    Consultant(s) Notes Reviewed:  [x ] YES  [ ] NO    Care Discussed with Consultants/Other Providers [x ] YES  [ ] NO  Care Discussed with Consultants/Other Providers [x ] YES  [ ] No medical contraindication for discharge

## 2022-02-27 NOTE — PROGRESS NOTE ADULT - ASSESSMENT
81 year old male with a hx of HTN, BPH, recent diagnosis of COVID 19, 2  days prior to admission -> presented to the ED S/P cardiac arrest.  Patient began to have worsening dyspnea at home, EMS was called. Found to be in cardiac arrest. ROSC was achieved after 2 rounds of CPR and epi, downtime 7 mins. Was intubated in the field.     Acute respiratory failure / Cardiac arrest / COVID-19 pneumonia / probable NSTEMI / L sided pneumothorax / anoxic brain injury / gram negative pneumonia     1. s/p Cardiac Arrest POA  intubated in field, CPR in field 7 min downtime  unresponsive afterwards, currently with trach  s/p peg tube 2/18     2. Resp Failure POA  now vent dependent via trach    -Pulmonary to follow up for vent management    3. Anoxic Brain Injury POA  unresponsive  poor prognosis  family has opted DNR    4. Pseudomonas Pneumonia 2/14 noted  Continue with levaquin    5. fever 101 today  -leukocytosis trending down  -CXR shows B/L opacity.  initial Blood culture negative.  repeat Blood culture negative.     -UA +.  Urine culture  negative.  sputum/tracheal culture shows pseudomonas   -continue with  cefepime and Levaquin  -Due to persistent fever, abdm US shows no acute process.  Will obtain Abdm CT scan   -ID to follow up     6. severe pt calorie malnutrition  s/p peg tube 2/18, tolerating feeds     7. Left pneumothorax  chest tube removed 2/21, no pnx on cxr    8. hypernatremia  -sodium 149  -free water 250 cc qid x 4 doses  -repeat sodium level in am  improving    #Progress Note Handoff  Pending (specify):  as above   Family discussion:  plan of care was discussed with patient and family (Daughter rosy) in details.  all questions were answered.  seems to understand, and in agreement  Disposition:  long term facility  overall prognosis guarded to poor

## 2022-02-28 NOTE — PROGRESS NOTE ADULT - ASSESSMENT
IMPRESSION:  Acute hypoxic respiratory failure SP Trach  Anoxic brain injury  Cardiac Arrest  Severe COVID PNA  Sepsis on present on admission  Left PNX SP chest tube, resolved  Pseudomonas PNA, +ve DTA  Mucus plugs SP Bronchoscopy  GNR on DTA  Ischemic changes on CT Head      SUGGEST:    CNS:   morphine drip  SAT    HEENT: Oral care.      PULMONARY:   HOB @ 45 degrees.   Aspiration precautions.   adjust O2 as necessary to maintain sats > 90%<94  DTA for sputum gm stain CS  no SBT  no change in Mve      CARDIOVASCULAR:   Avoid overload.  Keep I < O.    GI: GI prophylaxis.  c/w OG feeding.  Bowel regimen. General surgery for PEG.    RENAL:  Follow up lytes.  Correct as needed.  Monitor UO.  Becker care.    INFECTIOUS DISEASE:    abx per ID  trend Procalcitonin.        HEMATOLOGICAL: DVT prophylaxis    ENDOCRINE:  Follow up FS.  Insulin protocol if needed    MUSCULOSKELETAL: bedrest    DNR  Very poor overall prognosis

## 2022-02-28 NOTE — PROGRESS NOTE ADULT - ASSESSMENT
81 year old male with a hx of HTN, BPH, recent diagnosis of COVID 19, 2  days prior to admission -> presented to the ED S/P cardiac arrest.  Patient began to have worsening dyspnea at home, EMS was called. Found to be in cardiac arrest. ROSC was achieved after 2 rounds of CPR and epi, downtime 7 mins. Was intubated in the field.     Acute respiratory failure / Cardiac arrest / COVID-19 pneumonia / probable NSTEMI / L sided pneumothorax / anoxic brain injury / gram negative pneumonia     1. s/p Cardiac Arrest POA  intubated in field, CPR in field 7 min downtime  unresponsive afterwards, currently with trach  s/p peg tube 2/18     2. Resp Failure POA  now vent dependent via trach    -Pulmonary to follow up for vent management  -Wean off morphine Drip for sedation     3. Anoxic Brain Injury POA  unresponsive  poor prognosis  family has opted DNR    4. Pseudomonas Pneumonia 2/14 noted  Continue with levaquin    5. fever 101 today  -leukocytosis trending down  -CXR shows B/L opacity.  initial Blood culture negative.  repeat Blood culture negative.     -UA +.  Urine culture  negative.  sputum/tracheal culture shows pseudomonas   -continue with  cefepime and Levaquin  -afebrile.  abdm US shows no acute process.   -Abdm CT scan shows no acute process   -ID to follow up     6. severe pt calorie malnutrition  s/p peg tube 2/18, tolerating feeds     7. Left pneumothorax  chest tube removed 2/21, no pnx on cxr    8. Hypernatremia  -sodium 149  -free water 250 cc qid x 4 doses  -repeat sodium level in am  improving    #Progress Note Handoff  Pending (specify):  as above   Family discussion:  plan of care was discussed with patient and family (Daughter irine) in details.  all questions were answered.  seems to understand, and in agreement  Disposition:  long term facility  overall prognosis guarded to poor  please update caitlin Mcnair

## 2022-02-28 NOTE — PROGRESS NOTE ADULT - SUBJECTIVE AND OBJECTIVE BOX
CC.  Fever  HPI.  Patient is intubated/vented    unable to obtain ros/hx  afebrile   appears to be more comfortable today      Vital Signs Last 24 Hrs  T(C): 37.3 (28 Feb 2022 14:19), Max: 38.4 (27 Feb 2022 18:00)  T(F): 99.1 (28 Feb 2022 14:19), Max: 101.1 (27 Feb 2022 18:00)  HR: 95 (28 Feb 2022 14:19) (55 - 107)  BP: 127/63 (28 Feb 2022 14:19) (111/58 - 127/63)  BP(mean): --  RR: 18 (28 Feb 2022 14:19) (16 - 18)  SpO2: 100% (28 Feb 2022 14:02) (100% - 100%)      PHYSICAL EXAM-  GENERAL: NAD   HEAD:  Atraumatic, Normocephalic  EYES: EOMI, PERRLA, conjunctiva and sclera clear  NECK: Supple, No JVD, Normal thyroid  NERVOUS SYSTEM:  unresponsive   CHEST/LUNG: + rhonchi with good air entry.  No retractions or accessory muscle usage   HEART: Regular rate and rhythm; No murmurs, rubs, or gallops  ABDOMEN: Soft, Nontender, Nondistended; Bowel sounds present  EXTREMITIES:   No clubbing, cyanosis, or edema  SKIN: No rashes or lesions                                  8.8    13.93 )-----------( 162      ( 28 Feb 2022 05:52 )             30.5     02-28    149<H>  |  108  |  58<H>  ----------------------------<  134<H>  5.8<H>   |  30  |  0.9    Ca    8.5      28 Feb 2022 05:52    TPro  5.3<L>  /  Alb  2.4<L>  /  TBili  0.3  /  DBili  x   /  AST  28  /  ALT  33  /  AlkPhos  125<H>  02-28          MEDICATIONS  (STANDING):  ALBUTerol    90 MICROgram(s) HFA Inhaler 2 Puff(s) Inhalation every 6 hours  amantadine Syrup 100 milliGRAM(s) Oral every 12 hours  aspirin  chewable 81 milliGRAM(s) Oral daily  cefepime   IVPB 2000 milliGRAM(s) IV Intermittent every 8 hours  chlorhexidine 0.12% Liquid 15 milliLiter(s) Oral Mucosa every 12 hours  chlorhexidine 4% Liquid 1 Application(s) Topical <User Schedule>  collagenase Ointment 1 Application(s) Topical two times a day  dextrose 5%. 1000 milliLiter(s) (100 mL/Hr) IV Continuous <Continuous>  dextrose 5%. 1000 milliLiter(s) (50 mL/Hr) IV Continuous <Continuous>  dextrose 50% Injectable 25 Gram(s) IV Push once  dextrose 50% Injectable 12.5 Gram(s) IV Push once  dextrose 50% Injectable 25 Gram(s) IV Push once  enoxaparin Injectable 40 milliGRAM(s) SubCutaneous daily  glucagon  Injectable 1 milliGRAM(s) IntraMuscular once  insulin lispro (ADMELOG) corrective regimen sliding scale   SubCutaneous every 6 hours  levoFLOXacin IVPB      levoFLOXacin IVPB 750 milliGRAM(s) IV Intermittent every 24 hours  metoprolol tartrate 25 milliGRAM(s) Oral two times a day  midodrine 5 milliGRAM(s) Oral every 8 hours  morphine  Infusion. 0.5 mG/Hr (0.5 mL/Hr) IV Continuous <Continuous>  pantoprazole  Injectable 40 milliGRAM(s) IV Push daily  polyethylene glycol 3350 17 Gram(s) Oral daily    MEDICATIONS  (PRN):  acetaminophen     Tablet .. 650 milliGRAM(s) Oral every 6 hours PRN Temp greater or equal to 38C (100.4F), Mild Pain (1 - 3)  albuterol/ipratropium for Nebulization 3 milliLiter(s) Nebulizer every 6 hours PRN Shortness of Breath and/or Wheezing                       Imaging Personally Reviewed:     [x ] YES  [ ] NO    Consultant(s) Notes Reviewed:  [x ] YES  [ ] NO    Care Discussed with Consultants/Other Providers [x ] YES  [ ] NO  Care Discussed with Consultants/Other Providers [x ] YES  [ ] No medical contraindication for discharge

## 2022-02-28 NOTE — PROGRESS NOTE ADULT - SUBJECTIVE AND OBJECTIVE BOX
Patient is a 82y old  Male who presents with a chief complaint of fever (27 Feb 2022 10:34)        HPI:  81 year old male with a hx of HTN, BPH, recent diagnosis of COVID 19 2 days ago (unvaccinated, prescribed decadron/ zpack/ zinc) presenting to the ED S/P cardiac arrest. Patient intubated/ sedated so history obtained from chart (son left and attempted to reach). Patient began to have worsening dyspnea at home, EMS was called. Found to be in respiratory arrest by EMS and followed by EMS. ROSC was achieved after 2 rounds of CPR and epi, downtime 7 mins. Was intubated in the field.   In ED central line was placed. ABG: PH 7.15, PaO2 66, PaCO2 45, HCO3- 16. Troponins .1, BNP > 8000, D-Dimer 7368. COVID 19 positive. Patient being admitted s/p cardiac arrest to ICU.      (15 Aquiles 2022 04:09)      Pt evaluated on rounds.  I reviewed the radiology tests and hospital record.    I reviewed previous notes on this patient.    Interval Events: No overnight events.      CAM:+    SAT:n    SBT:n      REVIEW OF SYSTEMS:   see HPI      OBJECTIVE:  ICU Vital Signs Last 24 Hrs  T(C): 37.6 (28 Feb 2022 05:00), Max: 38.8 (27 Feb 2022 14:11)  T(F): 99.7 (28 Feb 2022 05:00), Max: 101.8 (27 Feb 2022 14:11)  HR: 55 (28 Feb 2022 07:41) (55 - 116)  BP: 125/61 (28 Feb 2022 05:00) (111/58 - 125/61)  RR: 18 (28 Feb 2022 05:00) (16 - 18)  SpO2: 100% (28 Feb 2022 07:41) (100% - 100%)    Mode: AC/ CMV (Assist Control/ Continuous Mandatory Ventilation), RR (machine): 24, TV (machine): 420, FiO2: 55, PEEP: 5, MAP: 9, PIP: 23    02-27 @ 07:01  -  02-28 @ 07:00  --------------------------------------------------------  IN: 104 mL / OUT: 500 mL / NET: -396 mL      CAPILLARY BLOOD GLUCOSE      POCT Blood Glucose.: 141 mg/dL (28 Feb 2022 07:28)        PHYSICAL EXAM:       · ENMT:   Airway patent,   Nasal mucosa clear.  Mouth with normal mucosa.   No thrush    · EYES:   Clear bilaterally,   pupils equal,   round and reactive to light.    · CARDIAC:   Normal rate,   regular rhythm.    Heart sounds S1, S2.   No murmurs, no rubs or gallops on auscultation  no edema        CAROTID:   normal systolic impulse  no bruits    · RESPIRATORY:   rales  normal chest expansion  no retractions or use of accessory muscles  palpation of chest is normal with no fremitus  percussion of chest demonstrates no hyperresonance or dullness    · GASTROINTESTINAL:  Abdomen soft,   non-tender,   + BS  liver/spleen not palpable    · MUSCULOSKELETAL:   no clubbing, cyanosis      · SKIN:   Skin normal color for race,   warm, dry   No evidence of rash.        · HEME LYMPH:   no splenomegaly.  No cervical  lymphadenopathy.  no inguinal lymphadenopathy    HOSPITAL MEDICATIONS:  MEDICATIONS  (STANDING):  ALBUTerol    90 MICROgram(s) HFA Inhaler 2 Puff(s) Inhalation every 6 hours  amantadine Syrup 100 milliGRAM(s) Oral every 12 hours  aspirin  chewable 81 milliGRAM(s) Oral daily  cefepime   IVPB 2000 milliGRAM(s) IV Intermittent every 8 hours  chlorhexidine 0.12% Liquid 15 milliLiter(s) Oral Mucosa every 12 hours  chlorhexidine 4% Liquid 1 Application(s) Topical <User Schedule>  collagenase Ointment 1 Application(s) Topical two times a day  dextrose 5%. 1000 milliLiter(s) (50 mL/Hr) IV Continuous <Continuous>  dextrose 5%. 1000 milliLiter(s) (100 mL/Hr) IV Continuous <Continuous>  dextrose 50% Injectable 25 Gram(s) IV Push once  dextrose 50% Injectable 12.5 Gram(s) IV Push once  dextrose 50% Injectable 25 Gram(s) IV Push once  enoxaparin Injectable 40 milliGRAM(s) SubCutaneous daily  glucagon  Injectable 1 milliGRAM(s) IntraMuscular once  insulin lispro (ADMELOG) corrective regimen sliding scale   SubCutaneous every 6 hours  levoFLOXacin IVPB      levoFLOXacin IVPB 750 milliGRAM(s) IV Intermittent every 24 hours  metoprolol tartrate 25 milliGRAM(s) Oral two times a day  midodrine 5 milliGRAM(s) Oral every 8 hours  morphine  Infusion. 1 mG/Hr (1 mL/Hr) IV Continuous <Continuous>  pantoprazole  Injectable 40 milliGRAM(s) IV Push daily  polyethylene glycol 3350 17 Gram(s) Oral daily    MEDICATIONS  (PRN):  acetaminophen     Tablet .. 650 milliGRAM(s) Oral every 6 hours PRN Temp greater or equal to 38C (100.4F), Mild Pain (1 - 3)  albuterol/ipratropium for Nebulization 3 milliLiter(s) Nebulizer every 6 hours PRN Shortness of Breath and/or Wheezing  LORazepam   Injectable 1 milliGRAM(s) IV Push every 2 hours PRN Agitation    sodium chloride 0.9% Bolus:   500 milliLiter(s), IV Bolus, once, infuse over 60 Minute(s), Stop After 1 Doses  Provider's Contact #: [phone]      LABS:                        8.8    13.93 )-----------( 162      ( 28 Feb 2022 05:52 )             30.5     02-28    149<H>  |  108  |  58<H>  ----------------------------<  134<H>  5.8<H>   |  30  |  0.9    Ca    8.5      28 Feb 2022 05:52    TPro  5.3<L>  /  Alb  2.4<L>  /  TBili  0.3  /  DBili  x   /  AST  28  /  ALT  33  /  AlkPhos  125<H>  02-28      Mode: AC/ CMV (Assist Control/ Continuous Mandatory Ventilation), RR (machine): 24, TV (machine): 420, FiO2: 55, PEEP: 5, MAP: 9, PIP: 23      COVID-19 PCR: Detected (20 Feb 2022 15:33)  COVID-19 PCR: NotDetec (15 Feb 2022 07:30)  COVID-19 PCR: Detected (10 Feb 2022 13:00)  SARS-CoV-2: Detected (15 Aquiles 2022 01:25)    Mode: AC/ CMV (Assist Control/ Continuous Mandatory Ventilation)  RR (machine): 24  TV (machine): 420  FiO2: 55  PEEP: 5  MAP: 9  PIP: 23          RADIOLOGY: Today I personally interpreted the latest CXR and other pertinent films.

## 2022-03-01 NOTE — PROGRESS NOTE ADULT - SUBJECTIVE AND OBJECTIVE BOX
CHADWICK VENCES  82y, Male  Allergy: Allergy Status Unknown      LOS  45d    CHIEF COMPLAINT: fever (28 Feb 2022 16:59)      INTERVAL EVENTS/HPI  - No acute events overnight  - T(F): , Max: 99.9 (02-28-22 @ 21:00)  - started on levofloxacin 2/26 as Pseduomonas carbapenem resistant   - febrile on most of 2/27, fever curve seems to be improving   - WBC Count: 13.93 (02-28-22 @ 05:52)  WBC Count: 14.18 (02-27-22 @ 07:33)     - Creatinine, Serum: 0.9 (02-28-22 @ 05:52)       ROS  unable to obtain history secondary to patient's mental status and/or sedation    VITALS:  T(F): 98.3, Max: 99.9 (02-28-22 @ 21:00)  HR: 88  BP: 147/84  RR: 18Vital Signs Last 24 Hrs  T(C): 36.8 (01 Mar 2022 05:00), Max: 37.7 (28 Feb 2022 21:00)  T(F): 98.3 (01 Mar 2022 05:00), Max: 99.9 (28 Feb 2022 21:00)  HR: 88 (01 Mar 2022 08:22) (54 - 103)  BP: 147/84 (01 Mar 2022 05:00) (112/60 - 147/84)  BP(mean): --  RR: 18 (01 Mar 2022 05:00) (16 - 18)  SpO2: 98% (01 Mar 2022 08:22) (97% - 100%)    PHYSICAL EXAM:  Gen:  vent/trach   HEENT: Normocephalic, atraumatic  Neck: supple, no lymphadenopathy  CV: Regular rate & regular rhythm  Lungs: decreased BS at bases, no fremitus  Abdomen: Soft, BS present  Ext: Warm, well perfused  Neuro: non focal, awake  Skin: no rash, no erythema  Lines: no phlebitis    FH: Non-contributory  Social Hx: Non-contributory    TESTS & MEASUREMENTS:                        8.8    13.93 )-----------( 162      ( 28 Feb 2022 05:52 )             30.5     02-28    149<H>  |  108  |  58<H>  ----------------------------<  134<H>  5.8<H>   |  30  |  0.9    Ca    8.5      28 Feb 2022 05:52    TPro  5.3<L>  /  Alb  2.4<L>  /  TBili  0.3  /  DBili  x   /  AST  28  /  ALT  33  /  AlkPhos  125<H>  02-28      LIVER FUNCTIONS - ( 28 Feb 2022 05:52 )  Alb: 2.4 g/dL / Pro: 5.3 g/dL / ALK PHOS: 125 U/L / ALT: 33 U/L / AST: 28 U/L / GGT: x               Culture - Blood (collected 02-27-22 @ 17:00)  Source: .Blood Blood-Peripheral  Preliminary Report (02-28-22 @ 23:02):    No growth to date.    Culture - Blood (collected 02-27-22 @ 17:00)  Source: .Blood Blood-Peripheral  Preliminary Report (02-28-22 @ 23:02):    No growth to date.    Culture - Blood (collected 02-25-22 @ 07:56)  Source: .Blood None  Preliminary Report (02-26-22 @ 19:02):    No growth to date.    Culture - Blood (collected 02-24-22 @ 11:15)  Source: .Blood None  Preliminary Report (02-25-22 @ 22:01):    No growth to date.    Culture - Blood (collected 02-24-22 @ 11:15)  Source: .Blood None  Preliminary Report (02-25-22 @ 22:01):    No growth to date.    Culture - Urine (collected 02-23-22 @ 21:10)  Source: Catheterized Catheterized  Final Report (02-25-22 @ 18:31):    <10,000 CFU/mL Normal Urogenital Corina    Culture - Sputum (collected 02-23-22 @ 08:00)  Source: .Sputum Sputum  Gram Stain (02-23-22 @ 22:08):    Moderate polymorphonuclear leukocytes per low power field    Rare Squamous epithelial cells per low power field    Few Gram Negative Rods per oil power field  Final Report (02-25-22 @ 20:54):    Moderate Pseudomonas aeruginosa (Carbapenem Resistant)    Normal Respiratory Corina absent  Organism: Pseudomonas aeruginosa (Carbapenem Resistant) (02-25-22 @ 20:54)  Organism: Pseudomonas aeruginosa (Carbapenem Resistant) (02-25-22 @ 20:54)      -  Amikacin: S <=16      -  Aztreonam: R >16      -  Cefepime: R >16      -  Ceftazidime: R >16      -  Ciprofloxacin: S <=0.25      -  Gentamicin: S 4      -  Imipenem: R >8      -  Levofloxacin: S <=0.5      -  Meropenem: R >8      -  Piperacillin/Tazobactam: R >64      -  Tobramycin: S <=2      Method Type: ALEXSANDRA    Culture - Blood (collected 02-22-22 @ 15:00)  Source: .Blood Blood-Peripheral  Final Report (02-27-22 @ 22:00):    No Growth Final    Culture - Blood (collected 02-15-22 @ 03:10)  Source: .Blood Blood  Final Report (02-20-22 @ 20:00):    No Growth Final    Culture - Sputum (collected 02-14-22 @ 11:30)  Source: .Sputum Deep tracheal aspirate  Gram Stain (02-14-22 @ 23:16):    Moderate polymorphonuclear leukocytes per low power field    No Squamous epithelial cells per low power field    Moderate Gram Negative Rods seen per oil power field  Final Report (02-16-22 @ 21:38):    Moderate Pseudomonas aeruginosa    Normal Respiratory Corina absent  Organism: Pseudomonas aeruginosa (02-16-22 @ 21:38)  Organism: Pseudomonas aeruginosa (02-16-22 @ 21:38)      -  Amikacin: S <=16      -  Aztreonam: S <=4      -  Cefepime: S <=2      -  Ceftazidime: S 4      -  Ciprofloxacin: S <=0.25      -  Gentamicin: S 4      -  Imipenem: S 2      -  Levofloxacin: S <=0.5      -  Meropenem: S <=1      -  Piperacillin/Tazobactam: S <=8      -  Tobramycin: S <=2      Method Type: ALEXSANDRA    Culture - Blood (collected 01-31-22 @ 06:15)  Source: .Blood Blood-Peripheral  Gram Stain (02-03-22 @ 03:08):    Growth in anaerobic bottle: Gram positive cocci in pairs  Final Report (02-03-22 @ 21:57):    Growth in anaerobic bottle: Staphylococcus capitis    Coag Negative Staphylococcus    Single set isolate, possible contaminant. Contact    Microbiology if susceptibility testing clinically    indicated.    ***Blood Panel PCR results on this specimen are available    approximately 3 hours after the Gram stain result.***    Gram stain, PCR, and/or culture results may not always    correspond due to difference in methodologies.    ************************************************************    This PCR assay was performed by multiplex PCR. This    Assay tests for 66 bacterial and resistance gene targets.    Please refer to the [facility] test directory    at [email or web address] for details.  Organism: Blood Culture PCR (02-03-22 @ 21:57)  Organism: Blood Culture PCR (02-03-22 @ 21:57)      -  Coagulase negative Staphylococcus: Detec      Method Type: PCR    Culture - Bronchial (collected 01-30-22 @ 16:00)  Source: .Bronchial None  Gram Stain (01-30-22 @ 22:58):    Few polymorphonuclear leukocytes per low power field    No Squamous epithelial cells per low power field    Few Gram positive cocci in pairs per oil power field  Final Report (02-01-22 @ 19:11):    Normal Respiratory Corina present            INFECTIOUS DISEASES TESTING  Procalcitonin, Serum: 0.51 (02-27-22 @ 07:33)  COVID-19 PCR: Detected (02-20-22 @ 15:33)  COVID-19 PCR: NotDetec (02-15-22 @ 07:30)  Procalcitonin, Serum: 0.09 (02-12-22 @ 06:21)  COVID-19 PCR: Detected (02-10-22 @ 13:00)  Procalcitonin, Serum: 0.04 (02-09-22 @ 06:03)  Procalcitonin, Serum: 0.03 (02-01-22 @ 06:25)  Procalcitonin, Serum: 0.07 (01-31-22 @ 06:15)  Procalcitonin, Serum: 0.10 (01-30-22 @ 10:45)  Procalcitonin, Serum: 0.11 (01-29-22 @ 07:17)  Procalcitonin, Serum: 0.08 (01-26-22 @ 06:15)  Procalcitonin, Serum: 0.09 (01-25-22 @ 05:58)  Procalcitonin, Serum: 0.11 (01-23-22 @ 11:17)  MRSA PCR Result.: Negative (01-23-22 @ 11:00)  Procalcitonin, Serum: 0.44 (01-19-22 @ 06:30)  Procalcitonin, Serum: 2.32 (01-16-22 @ 06:56)  Rapid RVP Result: Detected (01-15-22 @ 01:25)  Procalcitonin, Serum: 0.10 (01-15-22 @ 01:25)      INFLAMMATORY MARKERS  C-Reactive Protein, Serum: <3 mg/L (02-01-22 @ 06:25)  C-Reactive Protein, Serum: <3 mg/L (01-26-22 @ 06:15)  C-Reactive Protein, Serum: 50 mg/L (01-16-22 @ 06:56)  C-Reactive Protein, Serum: 76 mg/L (01-15-22 @ 01:25)      RADIOLOGY & ADDITIONAL TESTS:  I have personally reviewed the last available Chest xray  CXR      CT  CT Abdomen and Pelvis w/ IV Cont:   ACC: [id number] EXAM:  CT ABDOMEN AND PELVIS IC                          PROCEDURE DATE:  02/27/2022          INTERPRETATION:  CLINICAL STATEMENT: Fever    TECHNIQUE: Contiguous axial CT images were obtained from the lower chest   to the pubic symphysis .  Oral contrast not given. 90 cc of Omnipaque 350   intravenous contrast was given and 10 cc of contrast was discarded   Reformatted images in the coronal and sagittal planes were acquired.    COMPARISON CT: None.    OTHER STUDIES USED FOR CORRELATION: None.    The liver spleen pancreas kidneys except for several simple appearing   cysts and adrenal glands appear normal.  The gallbladder shows no inflammatory changes.  There is no ascites.  There is no mesenteric retroperitoneal or pelvic lymphadenopathy.  The bowel pattern is normal. The appendix is normal. There is no free   air. There is blood flow within the SMV and SMA.  No pelvic mass or inflammatory process is seen.  The bony structures appear intact.  The soft tissues of the abdominal wall appear intact. Clinically reported   decubitus ulcer is not apparent on this exam. No soft tissue abscess   formation is seen in the area scanned. Clinical correlation needed  There are bilateral pleural effusions and bilateral pulmonary opacities    IMPRESSION: No acute process seen.    --- End of Report ---            JERRY FARPlains Regional Medical CenterGIA MD; Attending Radiologist  This document has been electronically signed. Feb 27 2022  5:04PM (02-27-22 @ 15:46)      CARDIOLOGY TESTING      MEDICATIONS  ALBUTerol    90 MICROgram(s) HFA Inhaler 2 Inhalation every 6 hours  amantadine Syrup 100 Oral every 12 hours  aspirin  chewable 81 Oral daily  cefepime   IVPB 2000 IV Intermittent every 8 hours  chlorhexidine 0.12% Liquid 15 Oral Mucosa every 12 hours  chlorhexidine 4% Liquid 1 Topical <User Schedule>  collagenase Ointment 1 Topical two times a day  dextrose 5%. 1000 IV Continuous <Continuous>  dextrose 5%. 1000 IV Continuous <Continuous>  dextrose 50% Injectable 25 IV Push once  dextrose 50% Injectable 12.5 IV Push once  dextrose 50% Injectable 25 IV Push once  enoxaparin Injectable 40 SubCutaneous daily  glucagon  Injectable 1 IntraMuscular once  insulin lispro (ADMELOG) corrective regimen sliding scale  SubCutaneous every 6 hours  levoFLOXacin IVPB     levoFLOXacin IVPB 750 IV Intermittent every 24 hours  metoprolol tartrate 25 Oral two times a day  midodrine 5 Oral every 8 hours  morphine  Infusion. 0.5 IV Continuous <Continuous>  pantoprazole  Injectable 40 IV Push daily  polyethylene glycol 3350 17 Oral daily      WEIGHT  Weight (kg): 72.5 (02-18-22 @ 15:06)      ANTIBIOTICS:  cefepime   IVPB 2000 milliGRAM(s) IV Intermittent every 8 hours  levoFLOXacin IVPB      levoFLOXacin IVPB 750 milliGRAM(s) IV Intermittent every 24 hours      All available historical records have been reviewed

## 2022-03-01 NOTE — PROGRESS NOTE ADULT - ASSESSMENT
81 year old male with a hx of HTN, BPH, recent diagnosis of COVID 19, 2  days prior to admission -> presented to the ED S/P cardiac arrest.  Patient began to have worsening dyspnea at home, EMS was called. Found to be in cardiac arrest. ROSC was achieved after 2 rounds of CPR and epi, downtime 7 mins. Was intubated in the field.     Acute respiratory failure / Cardiac arrest / COVID-19 pneumonia / probable NSTEMI / L sided pneumothorax / anoxic brain injury / gram negative pneumonia     s/p Cardiac Arrest POA  intubated in field, CPR in field 7 min downtime  unresponsive afterwards, currently with trach  s/p peg tube 2/18     Resp Failure POA  now vent dependent via trach    -Pulmonary to follow up for vent management  -On morphine for vent desynchrony   - DC morphine drip, switch morphine IV pushes    Anoxic Brain Injury POA  unresponsive  poor prognosis  family has opted DNR    Pseudomonas Pneumonia 2/14 noted  Continue with levaquin    Fever  -CXR shows B/L opacity.  initial Blood culture negative.  repeat Blood culture negative.     -UA +.  Urine culture  negative.  sputum/tracheal culture shows pseudomonas   -leukocytosis trending down, check CBC  -continue with  cefepime and Levaquin  -afebrile.  abdm US shows no acute process.   -Abdm CT scan shows no acute process   -ID to follow up     severe pt calorie malnutrition  s/p peg tube 2/18, tolerating feeds     Left pneumothorax  chest tube removed 2/21, no pnx on cxr    Hypernatremia  -sodium 149  -free water 250 cc qid x 4 doses  -monitor sodium    Hyperkalemia  -Check repeat CMP    #Progress Note Handoff  Pending (specify):  as above   Family discussion:  plan of care was discussed with patient and family (Daughter irine) in details.  all questions were answered.  seems to understand, and in agreement  Disposition:  long term facility  overall prognosis guarded to poor  please update caitlin Mcnair 81 year old male with a hx of HTN, BPH, recent diagnosis of COVID 19, 2  days prior to admission -> presented to the ED S/P cardiac arrest.  Patient began to have worsening dyspnea at home, EMS was called. Found to be in cardiac arrest. ROSC was achieved after 2 rounds of CPR and epi, downtime 7 mins. Was intubated in the field.     Acute respiratory failure / Cardiac arrest / COVID-19 pneumonia / probable NSTEMI / L sided pneumothorax / anoxic brain injury / gram negative pneumonia     s/p Cardiac Arrest POA  intubated in field, CPR in field 7 min downtime  unresponsive afterwards, currently with trach  s/p peg tube 2/18     Resp Failure POA  now vent dependent via trach    -Pulmonary to follow up for vent management  -On morphine for vent desynchrony   - DC morphine drip, switch morphine IV pushes    Anoxic Brain Injury POA  unresponsive  poor prognosis  family has opted DNR    Pseudomonas Pneumonia 2/14 noted  Continue with levaquin    Fever  -CXR shows B/L opacity.  initial Blood culture negative.  repeat Blood culture negative.     -UA +.  Urine culture  negative.  sputum/tracheal culture shows pseudomonas   -leukocytosis trending down, check CBC  -continue with  cefepime and Levaquin  -afebrile.  abdm US shows no acute process.   -Abdm CT scan shows no acute process   -ID to follow up     severe pt calorie malnutrition  s/p peg tube 2/18, tolerating feeds     Left pneumothorax  chest tube removed 2/21, no pnx on cxr    Hypernatremia  -sodium 149  -free water 250 cc qid x 4 doses  -monitor sodium    Hyperkalemia  -Check repeat CMP    #Progress Note Handoff  Pending (specify):  Needs to be titrated off sedation for DC  Family discussion:  plan of care was discussed with patient and family (Daughter irine) in details.  all questions were answered.  seems to understand, and in agreement  Disposition:  long term facility  overall prognosis guarded to poor  please update caitlin Mcnair

## 2022-03-01 NOTE — PROGRESS NOTE ADULT - SUBJECTIVE AND OBJECTIVE BOX
CC.  Fever  HPI.  Patient is intubated/vented    Subjective/Overnight events  unable to obtain ros/hx    Vital Signs Last 24 Hrs  T(C): 37.3 (28 Feb 2022 14:19), Max: 38.4 (27 Feb 2022 18:00)  T(F): 99.1 (28 Feb 2022 14:19), Max: 101.1 (27 Feb 2022 18:00)  HR: 95 (28 Feb 2022 14:19) (55 - 107)  BP: 127/63 (28 Feb 2022 14:19) (111/58 - 127/63)  BP(mean): --  RR: 18 (28 Feb 2022 14:19) (16 - 18)  SpO2: 100% (28 Feb 2022 14:02) (100% - 100%)    PHYSICAL EXAM-  GENERAL: NAD   HEAD:  Atraumatic, Normocephalic  EYES: EOMI, PERRLA, conjunctiva and sclera clear  NECK: Supple, No JVD, Normal thyroid  NERVOUS SYSTEM:  unresponsive   CHEST/LUNG: + rhonchi with good air entry.  No retractions or accessory muscle usage   HEART: Regular rate and rhythm; No murmurs, rubs, or gallops  ABDOMEN: Soft, Nontender, Nondistended; Bowel sounds present  EXTREMITIES:   No clubbing, cyanosis, or edema  SKIN: No rashes or lesions                                  8.8    13.93 )-----------( 162      ( 28 Feb 2022 05:52 )             30.5     02-28    149<H>  |  108  |  58<H>  ----------------------------<  134<H>  5.8<H>   |  30  |  0.9    Ca    8.5      28 Feb 2022 05:52    TPro  5.3<L>  /  Alb  2.4<L>  /  TBili  0.3  /  DBili  x   /  AST  28  /  ALT  33  /  AlkPhos  125<H>  02-28          MEDICATIONS  (STANDING):  ALBUTerol    90 MICROgram(s) HFA Inhaler 2 Puff(s) Inhalation every 6 hours  amantadine Syrup 100 milliGRAM(s) Oral every 12 hours  aspirin  chewable 81 milliGRAM(s) Oral daily  cefepime   IVPB 2000 milliGRAM(s) IV Intermittent every 8 hours  chlorhexidine 0.12% Liquid 15 milliLiter(s) Oral Mucosa every 12 hours  chlorhexidine 4% Liquid 1 Application(s) Topical <User Schedule>  collagenase Ointment 1 Application(s) Topical two times a day  dextrose 5%. 1000 milliLiter(s) (100 mL/Hr) IV Continuous <Continuous>  dextrose 5%. 1000 milliLiter(s) (50 mL/Hr) IV Continuous <Continuous>  dextrose 50% Injectable 25 Gram(s) IV Push once  dextrose 50% Injectable 12.5 Gram(s) IV Push once  dextrose 50% Injectable 25 Gram(s) IV Push once  enoxaparin Injectable 40 milliGRAM(s) SubCutaneous daily  glucagon  Injectable 1 milliGRAM(s) IntraMuscular once  insulin lispro (ADMELOG) corrective regimen sliding scale   SubCutaneous every 6 hours  levoFLOXacin IVPB      levoFLOXacin IVPB 750 milliGRAM(s) IV Intermittent every 24 hours  metoprolol tartrate 25 milliGRAM(s) Oral two times a day  midodrine 5 milliGRAM(s) Oral every 8 hours  morphine  Infusion. 0.5 mG/Hr (0.5 mL/Hr) IV Continuous <Continuous>  pantoprazole  Injectable 40 milliGRAM(s) IV Push daily  polyethylene glycol 3350 17 Gram(s) Oral daily    MEDICATIONS  (PRN):  acetaminophen     Tablet .. 650 milliGRAM(s) Oral every 6 hours PRN Temp greater or equal to 38C (100.4F), Mild Pain (1 - 3)  albuterol/ipratropium for Nebulization 3 milliLiter(s) Nebulizer every 6 hours PRN Shortness of Breath and/or Wheezing                       Imaging Personally Reviewed:     [x ] YES  [ ] NO    Consultant(s) Notes Reviewed:  [x ] YES  [ ] NO    Care Discussed with Consultants/Other Providers [x ] YES  [ ] NO  Care Discussed with Consultants/Other Providers [x ] YES  [ ] No medical contraindication for discharge

## 2022-03-01 NOTE — PROGRESS NOTE ADULT - ASSESSMENT
81 year old male with a hx of HTN, BPH, recent diagnosis of COVID 19 2 days ago (unvaccinated, prescribed decadron/ zpack/ zinc) presenting to the ED S/P cardiac arrest. Intubated in the field    IMPRESSION  #COVID19 PNA, Severe (O2 < or = 94% on RA and requiring supplemental O2) with Cardiac arrest    CXR diffuse bilateral opacities     D-Dimer Assay, Quantitative: 7368 ng/mL DDU (01-15-22 @ 01:25)    s/p Toci 1/17    #Fevers 2/22 - VAP  - Blood Cx 2/22 NG  - Sputum Cx 2/22 PSeudomonas CR  - CT Abd/pelvis 2/27 - no intraabdominal acute process      #Fevers 2/13 - resolved  - cXR 2/14 with bilateral opacities, worsening on Right   - sputum Cx 2/14 - Pseudomonas   - treated with course of cefepime    #Cardiac arrest- ROSC was achieved after 2 rounds of CPR and epi, downtime 7 mins  - s/p trach 2/11    #Left sided Pneumothorax- on CXR from 2/2/22    Antibiotic course  Cefepime 2/16- present     Recommendations  This is a preliminary incomplete pended note, all final recommendations to follow after interview and examination of the patient.    Please call or message on [employer] Teams if with any questions.  Spectra [phone]     81 year old male with a hx of HTN, BPH, recent diagnosis of COVID 19 2 days ago (unvaccinated, prescribed decadron/ zpack/ zinc) presenting to the ED S/P cardiac arrest. Intubated in the field    IMPRESSION  #COVID19 PNA, Severe (O2 < or = 94% on RA and requiring supplemental O2) with Cardiac arrest    CXR diffuse bilateral opacities     D-Dimer Assay, Quantitative: 7368 ng/mL DDU (01-15-22 @ 01:25)    s/p Toci 1/17    #Fevers 2/22 - VAP  - Blood Cx 2/22 NG  - Sputum Cx 2/22 PSeudomonas CR  - CT Abd/pelvis 2/27 - no intraabdominal acute process      #Fevers 2/13 - resolved  - cXR 2/14 with bilateral opacities, worsening on Right   - sputum Cx 2/14 - Pseudomonas   - treated with course of cefepime    #Cardiac arrest- ROSC was achieved after 2 rounds of CPR and epi, downtime 7 mins  - s/p trach 2/11    #Left sided Pneumothorax- on CXR from 2/2/22    Antibiotic course  Cefepime 2/16- present   Levofloxacin 2/26 - present     Recommendations  - stop cefepime  - continue levofloxacin 750 mg daily   - trend fever curve  - guarded prognosis   - vent management per pulmonary     Please call or message on [employer] Teams if with any questions.  Spectra [phone]

## 2022-03-02 NOTE — PROGRESS NOTE ADULT - PROBLEM SELECTOR PROBLEM 1
Acute respiratory distress
Altered mental status

## 2022-03-02 NOTE — PROGRESS NOTE ADULT - ASSESSMENT
81 year old male with a hx of HTN, BPH, recent diagnosis of COVID 19 2 days ago (unvaccinated, prescribed decadron/ zpack/ zinc) presenting to the ED S/P cardiac arrest. Intubated in the field    IMPRESSION  #COVID19 PNA, Severe (O2 < or = 94% on RA and requiring supplemental O2) with Cardiac arrest    CXR diffuse bilateral opacities     D-Dimer Assay, Quantitative: 7368 ng/mL DDU (01-15-22 @ 01:25)    s/p Toci 1/17    #Fevers 2/22 - VAP  - Blood Cx 2/22 NG  - Sputum Cx 2/22 PSeudomonas CR  - CT Abd/pelvis 2/27 - no intraabdominal acute process      #Fevers 2/13 - resolved  - cXR 2/14 with bilateral opacities, worsening on Right   - sputum Cx 2/14 - Pseudomonas   - treated with course of cefepime    #Cardiac arrest- ROSC was achieved after 2 rounds of CPR and epi, downtime 7 mins  - s/p trach 2/11    #Left sided Pneumothorax- on CXR from 2/2/22    Antibiotic course  Cefepime 2/16- present   Levofloxacin 2/26 - present     Recommendations  - continue levofloxacin 750 mg daily   - plan for 10 day course of levofloxacin -- can give via PEG   - guarded prognosis   - vent management per pulmonary     Please call or message on [employer] Teams if with any questions.  Spectra [phone]

## 2022-03-02 NOTE — PROGRESS NOTE ADULT - PROBLEM SELECTOR PLAN 3
- goals appear to be for SNF vent placement after trach/PEG
-DNR  -Ongoing medical management  -Plan has been for LTAC placement  -Per care coordination, plan is for discharge in next 48 hours  -Will be available for additional GOC discussions as appropriate

## 2022-03-02 NOTE — PROGRESS NOTE ADULT - SUBJECTIVE AND OBJECTIVE BOX
CHADWICK VENCES  82y, Male  Allergy: Allergy Status Unknown      LOS  46d    CHIEF COMPLAINT: fever (02 Mar 2022 12:00)      INTERVAL EVENTS/HPI  - No acute events overnight  - T(F): , Max: 98.8 (03-01-22 @ 21:07)  - fever curve improved  - WBC Count: 10.77 (03-02-22 @ 08:42)  WBC Count: 11.45 (03-01-22 @ 16:50)     - Creatinine, Serum: 0.9 (03-02-22 @ 08:42)  Creatinine, Serum: 1.0 (03-01-22 @ 16:50)       ROS  unable to obtain history secondary to patient's mental status and/or sedation  ty    VITALS:  T(F): 98, Max: 98.8 (03-01-22 @ 21:07)  HR: 80  BP: 107/80  RR: 16Vital Signs Last 24 Hrs  T(C): 36.7 (02 Mar 2022 13:52), Max: 37.1 (01 Mar 2022 21:07)  T(F): 98 (02 Mar 2022 13:52), Max: 98.8 (01 Mar 2022 21:07)  HR: 80 (02 Mar 2022 13:52) (63 - 87)  BP: 107/80 (02 Mar 2022 13:52) (107/80 - 122/77)  BP(mean): --  RR: 16 (02 Mar 2022 13:52) (16 - 18)  SpO2: 99% (02 Mar 2022 08:09) (98% - 99%)    PHYSICAL EXAM:  Gen:vent/trach   HEENT: Normocephalic, atraumatic  Neck: supple, no lymphadenopathy  CV: Regular rate & regular rhythm  Lungs: decreased BS at bases, no fremitus  Abdomen: Soft, BS present  Ext: Warm, well perfused  Neuro: non focal, awake  Skin: no rash, no erythema  Lines: no phlebitis    FH: Non-contributory  Social Hx: Non-contributory    TESTS & MEASUREMENTS:                        8.6    10.77 )-----------( 192      ( 02 Mar 2022 08:42 )             29.2     03-02    144  |  107  |  70<HH>  ----------------------------<  154<H>  5.0   |  29  |  0.9    Ca    8.4<L>      02 Mar 2022 08:42    TPro  4.9<L>  /  Alb  2.3<L>  /  TBili  0.3  /  DBili  x   /  AST  28  /  ALT  33  /  AlkPhos  104  03-01      LIVER FUNCTIONS - ( 01 Mar 2022 16:50 )  Alb: 2.3 g/dL / Pro: 4.9 g/dL / ALK PHOS: 104 U/L / ALT: 33 U/L / AST: 28 U/L / GGT: x               Culture - Blood (collected 02-27-22 @ 17:00)  Source: .Blood Blood-Peripheral  Preliminary Report (02-28-22 @ 23:02):    No growth to date.    Culture - Blood (collected 02-27-22 @ 17:00)  Source: .Blood Blood-Peripheral  Preliminary Report (02-28-22 @ 23:02):    No growth to date.    Culture - Blood (collected 02-25-22 @ 07:56)  Source: .Blood None  Preliminary Report (02-26-22 @ 19:02):    No growth to date.    Culture - Blood (collected 02-24-22 @ 11:15)  Source: .Blood None  Final Report (03-01-22 @ 21:00):    No Growth Final    Culture - Blood (collected 02-24-22 @ 11:15)  Source: .Blood None  Final Report (03-01-22 @ 21:00):    No Growth Final    Culture - Urine (collected 02-23-22 @ 21:10)  Source: Catheterized Catheterized  Final Report (02-25-22 @ 18:31):    <10,000 CFU/mL Normal Urogenital Corina    Culture - Sputum (collected 02-23-22 @ 08:00)  Source: .Sputum Sputum  Gram Stain (02-23-22 @ 22:08):    Moderate polymorphonuclear leukocytes per low power field    Rare Squamous epithelial cells per low power field    Few Gram Negative Rods per oil power field  Final Report (02-25-22 @ 20:54):    Moderate Pseudomonas aeruginosa (Carbapenem Resistant)    Normal Respiratory Corina absent  Organism: Pseudomonas aeruginosa (Carbapenem Resistant) (02-25-22 @ 20:54)  Organism: Pseudomonas aeruginosa (Carbapenem Resistant) (02-25-22 @ 20:54)      -  Amikacin: S <=16      -  Aztreonam: R >16      -  Cefepime: R >16      -  Ceftazidime: R >16      -  Ciprofloxacin: S <=0.25      -  Gentamicin: S 4      -  Imipenem: R >8      -  Levofloxacin: S <=0.5      -  Meropenem: R >8      -  Piperacillin/Tazobactam: R >64      -  Tobramycin: S <=2      Method Type: ALEXSANDRA    Culture - Blood (collected 02-22-22 @ 15:00)  Source: .Blood Blood-Peripheral  Final Report (02-27-22 @ 22:00):    No Growth Final    Culture - Blood (collected 02-15-22 @ 03:10)  Source: .Blood Blood  Final Report (02-20-22 @ 20:00):    No Growth Final    Culture - Sputum (collected 02-14-22 @ 11:30)  Source: .Sputum Deep tracheal aspirate  Gram Stain (02-14-22 @ 23:16):    Moderate polymorphonuclear leukocytes per low power field    No Squamous epithelial cells per low power field    Moderate Gram Negative Rods seen per oil power field  Final Report (02-16-22 @ 21:38):    Moderate Pseudomonas aeruginosa    Normal Respiratory Corina absent  Organism: Pseudomonas aeruginosa (02-16-22 @ 21:38)  Organism: Pseudomonas aeruginosa (02-16-22 @ 21:38)      -  Amikacin: S <=16      -  Aztreonam: S <=4      -  Cefepime: S <=2      -  Ceftazidime: S 4      -  Ciprofloxacin: S <=0.25      -  Gentamicin: S 4      -  Imipenem: S 2      -  Levofloxacin: S <=0.5      -  Meropenem: S <=1      -  Piperacillin/Tazobactam: S <=8      -  Tobramycin: S <=2      Method Type: ALEXSANDRA            INFECTIOUS DISEASES TESTING  COVID-19 PCR: NotDetec (03-02-22 @ 13:30)  Procalcitonin, Serum: 0.51 (02-27-22 @ 07:33)  COVID-19 PCR: Detected (02-20-22 @ 15:33)  COVID-19 PCR: NotDetec (02-15-22 @ 07:30)  Procalcitonin, Serum: 0.09 (02-12-22 @ 06:21)  COVID-19 PCR: Detected (02-10-22 @ 13:00)  Procalcitonin, Serum: 0.04 (02-09-22 @ 06:03)  Procalcitonin, Serum: 0.03 (02-01-22 @ 06:25)  Procalcitonin, Serum: 0.07 (01-31-22 @ 06:15)  Procalcitonin, Serum: 0.10 (01-30-22 @ 10:45)  Procalcitonin, Serum: 0.11 (01-29-22 @ 07:17)  Procalcitonin, Serum: 0.08 (01-26-22 @ 06:15)  Procalcitonin, Serum: 0.09 (01-25-22 @ 05:58)  Procalcitonin, Serum: 0.11 (01-23-22 @ 11:17)  MRSA PCR Result.: Negative (01-23-22 @ 11:00)  Procalcitonin, Serum: 0.44 (01-19-22 @ 06:30)  Procalcitonin, Serum: 2.32 (01-16-22 @ 06:56)  Rapid RVP Result: Detected (01-15-22 @ 01:25)  Procalcitonin, Serum: 0.10 (01-15-22 @ 01:25)      INFLAMMATORY MARKERS  C-Reactive Protein, Serum: <3 mg/L (02-01-22 @ 06:25)  C-Reactive Protein, Serum: <3 mg/L (01-26-22 @ 06:15)  C-Reactive Protein, Serum: 50 mg/L (01-16-22 @ 06:56)  C-Reactive Protein, Serum: 76 mg/L (01-15-22 @ 01:25)      RADIOLOGY & ADDITIONAL TESTS:  I have personally reviewed the last available Chest xray  CXR      CT      CARDIOLOGY TESTING      MEDICATIONS  ALBUTerol    90 MICROgram(s) HFA Inhaler 2 Inhalation every 6 hours  amantadine Syrup 100 Oral every 12 hours  aspirin  chewable 81 Oral daily  chlorhexidine 0.12% Liquid 15 Oral Mucosa every 12 hours  chlorhexidine 4% Liquid 1 Topical <User Schedule>  collagenase Ointment 1 Topical two times a day  dextrose 5%. 1000 IV Continuous <Continuous>  dextrose 5%. 1000 IV Continuous <Continuous>  dextrose 50% Injectable 25 IV Push once  dextrose 50% Injectable 12.5 IV Push once  dextrose 50% Injectable 25 IV Push once  enoxaparin Injectable 40 SubCutaneous daily  glucagon  Injectable 1 IntraMuscular once  insulin lispro (ADMELOG) corrective regimen sliding scale  SubCutaneous every 6 hours  levoFLOXacin IVPB     levoFLOXacin IVPB 750 IV Intermittent every 24 hours  metoprolol tartrate 25 Oral two times a day  midodrine 5 Oral every 8 hours  pantoprazole  Injectable 40 IV Push daily  polyethylene glycol 3350 17 Oral daily      WEIGHT  Weight (kg): 72.5 (02-18-22 @ 15:06)  Creatinine, Serum: 0.9 mg/dL (03-02-22 @ 08:42)      ANTIBIOTICS:  levoFLOXacin IVPB      levoFLOXacin IVPB 750 milliGRAM(s) IV Intermittent every 24 hours      All available historical records have been reviewed

## 2022-03-02 NOTE — PROGRESS NOTE ADULT - ASSESSMENT
81M with PMH of HTN, BPH, recent COVID-19 presented after cardiac arrest for several minutes.  Hospital course complicated by respiratory failure requiring trach/PEG, likely anoxic brain injury, pseudomonas pneumonia, PTX.  Palliative care reconsulted in this week for symptom management and ?GOC.    Patient seen at bedside, vented. His eyes were open but he was unable to participate in exam.     He did notably have some mild increased work of breathing on exam.  No recent PRN morphine use over 24 hours

## 2022-03-02 NOTE — PROGRESS NOTE ADULT - SUBJECTIVE AND OBJECTIVE BOX
CC.  Fever  HPI.  Patient is intubated/vented    Subjective/Overnight events  unable to obtain ros/hx    Vital Signs Last 24 Hrs  T(C): 36.1 (02 Mar 2022 04:50), Max: 38 (01 Mar 2022 14:18)  T(F): 96.9 (02 Mar 2022 04:50), Max: 100.4 (01 Mar 2022 14:18)  HR: 78 (02 Mar 2022 08:09) (63 - 100)  BP: 122/77 (02 Mar 2022 04:50) (111/56 - 122/77)  BP(mean): --  RR: 18 (02 Mar 2022 04:50) (18 - 31)  SpO2: 99% (02 Mar 2022 08:09) (98% - 99%)    PHYSICAL EXAM-  GENERAL: NAD   HEAD:  Atraumatic, Normocephalic  EYES: EOMI, PERRLA, conjunctiva and sclera clear  NECK: Supple, No JVD, Normal thyroid  NERVOUS SYSTEM:  unresponsive   CHEST/LUNG: + rhonchi with good air entry.  No retractions or accessory muscle usage   HEART: Regular rate and rhythm; No murmurs, rubs, or gallops  ABDOMEN: Soft, Nontender, Nondistended; Bowel sounds present  EXTREMITIES:   No clubbing, cyanosis, or edema  SKIN: No rashes or lesions                                 8.6    10.77 )-----------( 192      ( 02 Mar 2022 08:42 )             29.2     03-02    144  |  107  |  70<HH>  ----------------------------<  154<H>  5.0   |  29  |  0.9    Ca    8.4<L>      02 Mar 2022 08:42    TPro  4.9<L>  /  Alb  2.3<L>  /  TBili  0.3  /  DBili  x   /  AST  28  /  ALT  33  /  AlkPhos  104  03-01                             MEDICATIONS  (STANDING):  ALBUTerol    90 MICROgram(s) HFA Inhaler 2 Puff(s) Inhalation every 6 hours  amantadine Syrup 100 milliGRAM(s) Oral every 12 hours  aspirin  chewable 81 milliGRAM(s) Oral daily  chlorhexidine 0.12% Liquid 15 milliLiter(s) Oral Mucosa every 12 hours  chlorhexidine 4% Liquid 1 Application(s) Topical <User Schedule>  collagenase Ointment 1 Application(s) Topical two times a day  dextrose 5%. 1000 milliLiter(s) (100 mL/Hr) IV Continuous <Continuous>  dextrose 5%. 1000 milliLiter(s) (50 mL/Hr) IV Continuous <Continuous>  dextrose 50% Injectable 25 Gram(s) IV Push once  dextrose 50% Injectable 12.5 Gram(s) IV Push once  dextrose 50% Injectable 25 Gram(s) IV Push once  enoxaparin Injectable 40 milliGRAM(s) SubCutaneous daily  glucagon  Injectable 1 milliGRAM(s) IntraMuscular once  insulin lispro (ADMELOG) corrective regimen sliding scale   SubCutaneous every 6 hours  levoFLOXacin IVPB      levoFLOXacin IVPB 750 milliGRAM(s) IV Intermittent every 24 hours  metoprolol tartrate 25 milliGRAM(s) Oral two times a day  midodrine 5 milliGRAM(s) Oral every 8 hours  morphine  - Injectable 2 milliGRAM(s) IV Push every 4 hours  pantoprazole  Injectable 40 milliGRAM(s) IV Push daily  polyethylene glycol 3350 17 Gram(s) Oral daily    MEDICATIONS  (PRN):  acetaminophen     Tablet .. 650 milliGRAM(s) Oral every 6 hours PRN Temp greater or equal to 38C (100.4F), Mild Pain (1 - 3)  albuterol/ipratropium for Nebulization 3 milliLiter(s) Nebulizer every 6 hours PRN Shortness of Breath and/or Wheezing  Imaging Personally Reviewed:     [x ] YES  [ ] NO    Consultant(s) Notes Reviewed:  [x ] YES  [ ] NO    Care Discussed with Consultants/Other Providers [x ] YES  [ ] NO  Care Discussed with Consultants/Other Providers [x ] YES  [ ] No medical contraindication for discharge

## 2022-03-02 NOTE — CHART NOTE - NSCHARTNOTEFT_GEN_A_CORE
Patient was observed to be resting comfortably.  T/C to  Sonia:  the plan is for patient to be discharged to LTAC.  Discharge was delayed due to patient having fever.  Today patient did not have fever.  Jessi is now working on discharge.  T/C to daughter, Judy Mcmillan:  writer provided update.  Support rendered.

## 2022-03-02 NOTE — PROGRESS NOTE ADULT - PROBLEM SELECTOR PLAN 2
-minimally responsive in the setting of anoxic brain injury  -no meaningful interaction on exam  -continue to monitor -Not responsive in the setting of anoxic brain injury  -continue to monitor

## 2022-03-02 NOTE — PROGRESS NOTE ADULT - PROBLEM SELECTOR PLAN 1
Patient now on trach/PEG.  Some increased work of breathing noted on exam. No recent PRN use  -encourage use of PRN morphine 2mg IV q4h PRN for DYSPNEA as well as pain
- Cont ICU management  - Palliative care  - No plan, at present, for tracheostomy
- Gauze removed, local wound care provided to neck this morning  - Cont abx per ICU team; should any purulence recommence from the neck incision please advise surgical team  - PEG placement per GI  - L chest pigtail catheter / PTX management per thoracic surgery team  - Plan of care c/w CCU team at bedside this morning
- remains unreponsive off sedation likely from anoxic brain injury  - continue to monitor

## 2022-03-02 NOTE — PROGRESS NOTE ADULT - ASSESSMENT
81 year old male with a hx of HTN, BPH, recent diagnosis of COVID 19, 2  days prior to admission -> presented to the ED S/P cardiac arrest.  Patient began to have worsening dyspnea at home, EMS was called. Found to be in cardiac arrest. ROSC was achieved after 2 rounds of CPR and epi, downtime 7 mins. Was intubated in the field.     Acute respiratory failure / Cardiac arrest / COVID-19 pneumonia / probable NSTEMI / L sided pneumothorax / anoxic brain injury / gram negative pneumonia     s/p Cardiac Arrest POA  intubated in field, CPR in field 7 min downtime  unresponsive afterwards, currently with trach  s/p peg tube 2/18     Resp Failure POA  now vent dependent via trach    -Pulmonary to follow up for vent management  -On morphine for vent desynchrony   - continue with morphine IV pushes  -stable now     Anoxic Brain Injury POA  unresponsive  poor prognosis  family has opted DNR    Pseudomonas Pneumonia 2/14 noted  Continue with levaquin    Fever  -CXR shows B/L opacity.  initial Blood culture negative.  repeat Blood culture negative.     -UA +.  Urine culture  negative.  sputum/tracheal culture shows pseudomonas   -leukocytosis trending down, check CBC  -continue with   Levaquin  -afebrile.  abdm US shows no acute process.   -Abdm CT scan shows no acute process   -ID to follow up     severe pt calorie malnutrition  s/p peg tube 2/18, tolerating feeds     Left pneumothorax  chest tube removed 2/21, no pnx on cxr    Hypernatremia  -sodium 149  -free water 250 cc qid x 4 doses  -monitor sodium    Hyperkalemia  -resolved    #Progress Note Handoff  Pending (specify):  placement  Family discussion:  plan of care was discussed with patient and family (Daughter rosy) in details.  all questions were answered.  seems to understand, and in agreement  Disposition:  long term facility  overall prognosis guarded to poor

## 2022-03-02 NOTE — PROGRESS NOTE ADULT - SUBJECTIVE AND OBJECTIVE BOX
CHADWICK VENCES             MRN-[id number]      Patient is a 82y old Male who presents with a chief complaint of fever (02 Mar 2022 12:00)     SUBJECTIVE:  -Patient seen at bedside  -He is vented and not responsive during exam  -Some mild increased work of breathing noted during exam    ROS:  UNABLE TO OBTAIN  due to: patient not responsive    PEx:   T(C): 36.7 (03-02-22 @ 13:52), Max: 37.1 (03-01-22 @ 21:07)  HR: 80 (03-02-22 @ 13:52) (63 - 87)  BP: 107/80 (03-02-22 @ 13:52) (107/80 - 122/77)  RR: 16 (03-02-22 @ 13:52) (16 - 18)  SpO2: 99% (03-02-22 @ 08:09) (98% - 99%)  Wt(kg): --            General:  found in bed in mild respiratory distress, unable to participate in em  Eyes:  no scleral icterus  ENMT: no external oral ulcers; trach in place  CVS: no tachycardia  Resp: some mild increased work of breathing  GI:  Soft  Neuro: Does no follow commands No focal deficits  Psych: Calm, AAOx0  Skin: Non jaundiced , no rash       ALLERGIES: Allergy Status Unknown      Labs:	    CBC:                        8.6    10.77 )-----------( 192      ( 02 Mar 2022 08:42 )             29.2     CMP:    03-02    144  |  107  |  70<HH>  ----------------------------<  154<H>  5.0   |  29  |  0.9    Ca    8.4<L>      02 Mar 2022 08:42    TPro  4.9<L>  /  Alb  2.3<L>  /  TBili  0.3  /  DBili  x   /  AST  28  /  ALT  33  /  AlkPhos  104  03-01       RADIOLOGY  CXR  Unchanged bilateral opacities, pleural effusions.    EKG  Previous EKG reviewed    Imaging Personally Reviewed:  [x] YES  [ ] NO    Consultant(s) Notes Reviewed:  [x] YES  [ ] NO  Care Discussed with Consultants/Other Providers [x ] YES  [ ] NO    Medications:	      MEDICATIONS  (STANDING):  ALBUTerol    90 MICROgram(s) HFA Inhaler 2 Puff(s) Inhalation every 6 hours  amantadine Syrup 100 milliGRAM(s) Oral every 12 hours  aspirin  chewable 81 milliGRAM(s) Oral daily  chlorhexidine 0.12% Liquid 15 milliLiter(s) Oral Mucosa every 12 hours  chlorhexidine 4% Liquid 1 Application(s) Topical <User Schedule>  collagenase Ointment 1 Application(s) Topical two times a day  dextrose 5%. 1000 milliLiter(s) (100 mL/Hr) IV Continuous <Continuous>  dextrose 5%. 1000 milliLiter(s) (50 mL/Hr) IV Continuous <Continuous>  dextrose 50% Injectable 25 Gram(s) IV Push once  dextrose 50% Injectable 12.5 Gram(s) IV Push once  dextrose 50% Injectable 25 Gram(s) IV Push once  enoxaparin Injectable 40 milliGRAM(s) SubCutaneous daily  glucagon  Injectable 1 milliGRAM(s) IntraMuscular once  insulin lispro (ADMELOG) corrective regimen sliding scale   SubCutaneous every 6 hours  levoFLOXacin IVPB      levoFLOXacin IVPB 750 milliGRAM(s) IV Intermittent every 24 hours  metoprolol tartrate 25 milliGRAM(s) Oral two times a day  midodrine 5 milliGRAM(s) Oral every 8 hours  pantoprazole  Injectable 40 milliGRAM(s) IV Push daily  polyethylene glycol 3350 17 Gram(s) Oral daily    MEDICATIONS  (PRN):  acetaminophen     Tablet .. 650 milliGRAM(s) Oral every 6 hours PRN Temp greater or equal to 38C (100.4F), Mild Pain (1 - 3)  albuterol/ipratropium for Nebulization 3 milliLiter(s) Nebulizer every 6 hours PRN Shortness of Breath and/or Wheezing  morphine  - Injectable 2 milliGRAM(s) IV Push every 4 hours PRN Moderate Pain (4 - 6)        ADVANCED DIRECTIVES:            DNR    DECISION MAKER: Patient [  ]  Family [ x]  Other [  ] _______    PSYCHOSOCIAL-SPIRITUAL ASSESSMENT:       Reviewed      REFERRALS	        Palliative Med        Unit SW/Case Mgmt                 CHADWICK VENCES             MRN-[id number]      Patient is a 82y old Male who presents with a chief complaint of fever (02 Mar 2022 12:00)     SUBJECTIVE:  -Patient seen at bedside  -He is vented and not responsive during exam  -Some mild increased work of breathing noted during exam    ROS:  UNABLE TO OBTAIN  due to: patient not responsive    PEx:   T(C): 36.7 (03-02-22 @ 13:52), Max: 37.1 (03-01-22 @ 21:07)  HR: 80 (03-02-22 @ 13:52) (63 - 87)  BP: 107/80 (03-02-22 @ 13:52) (107/80 - 122/77)  RR: 16 (03-02-22 @ 13:52) (16 - 18)  SpO2: 99% (03-02-22 @ 08:09) (98% - 99%)  Wt(kg): --            General:  found in bed in mild respiratory distress, unable to participate in em  Eyes:  no scleral icterus  ENMT: no external oral ulcers; trach in place  CVS: no tachycardia  Resp: some mild increased work of breathing  GI:  Soft  Neuro: Does no follow commands  Psych: Calm, AAOx0  Skin: Non jaundiced , no rash       ALLERGIES: Allergy Status Unknown      Labs:	    CBC:                        8.6    10.77 )-----------( 192      ( 02 Mar 2022 08:42 )             29.2     CMP:    03-02    144  |  107  |  70<HH>  ----------------------------<  154<H>  5.0   |  29  |  0.9    Ca    8.4<L>      02 Mar 2022 08:42    TPro  4.9<L>  /  Alb  2.3<L>  /  TBili  0.3  /  DBili  x   /  AST  28  /  ALT  33  /  AlkPhos  104  03-01       RADIOLOGY  CXR  Unchanged bilateral opacities, pleural effusions.    EKG  Previous EKG reviewed    Imaging Personally Reviewed:  [x] YES  [ ] NO    Consultant(s) Notes Reviewed:  [x] YES  [ ] NO  Care Discussed with Consultants/Other Providers [x ] YES  [ ] NO    Medications:	      MEDICATIONS  (STANDING):  ALBUTerol    90 MICROgram(s) HFA Inhaler 2 Puff(s) Inhalation every 6 hours  amantadine Syrup 100 milliGRAM(s) Oral every 12 hours  aspirin  chewable 81 milliGRAM(s) Oral daily  chlorhexidine 0.12% Liquid 15 milliLiter(s) Oral Mucosa every 12 hours  chlorhexidine 4% Liquid 1 Application(s) Topical <User Schedule>  collagenase Ointment 1 Application(s) Topical two times a day  dextrose 5%. 1000 milliLiter(s) (100 mL/Hr) IV Continuous <Continuous>  dextrose 5%. 1000 milliLiter(s) (50 mL/Hr) IV Continuous <Continuous>  dextrose 50% Injectable 25 Gram(s) IV Push once  dextrose 50% Injectable 12.5 Gram(s) IV Push once  dextrose 50% Injectable 25 Gram(s) IV Push once  enoxaparin Injectable 40 milliGRAM(s) SubCutaneous daily  glucagon  Injectable 1 milliGRAM(s) IntraMuscular once  insulin lispro (ADMELOG) corrective regimen sliding scale   SubCutaneous every 6 hours  levoFLOXacin IVPB      levoFLOXacin IVPB 750 milliGRAM(s) IV Intermittent every 24 hours  metoprolol tartrate 25 milliGRAM(s) Oral two times a day  midodrine 5 milliGRAM(s) Oral every 8 hours  pantoprazole  Injectable 40 milliGRAM(s) IV Push daily  polyethylene glycol 3350 17 Gram(s) Oral daily    MEDICATIONS  (PRN):  acetaminophen     Tablet .. 650 milliGRAM(s) Oral every 6 hours PRN Temp greater or equal to 38C (100.4F), Mild Pain (1 - 3)  albuterol/ipratropium for Nebulization 3 milliLiter(s) Nebulizer every 6 hours PRN Shortness of Breath and/or Wheezing  morphine  - Injectable 2 milliGRAM(s) IV Push every 4 hours PRN Moderate Pain (4 - 6)        ADVANCED DIRECTIVES:            DNR    DECISION MAKER: Patient [  ]  Family [ x]  Other [  ] _______    PSYCHOSOCIAL-SPIRITUAL ASSESSMENT:       Reviewed      REFERRALS	        Palliative Med        Unit [facility]/Case Mgmt

## 2022-03-03 NOTE — PROGRESS NOTE ADULT - ASSESSMENT
81 year old male with a hx of HTN, BPH, recent diagnosis of COVID 19, 2  days prior to admission -> presented to the ED S/P cardiac arrest.  Patient began to have worsening dyspnea at home, EMS was called. Found to be in cardiac arrest. ROSC was achieved after 2 rounds of CPR and epi, downtime 7 mins. Was intubated in the field.     Acute respiratory failure / Cardiac arrest / COVID-19 pneumonia / probable NSTEMI / L sided pneumothorax / anoxic brain injury / gram negative pneumonia     s/p Cardiac Arrest POA  intubated in field, CPR in field 7 min downtime  unresponsive afterwards, currently with trach  s/p peg tube 2/18     Resp Failure POA  now vent dependent via trach    -Pulmonary to follow up for vent management  -re-start morphine drip with ativan scheduled as per pulmonary for vent desynchrony        Anoxic Brain Injury POA  unresponsive  poor prognosis  family has opted DNR    Pseudomonas Pneumonia 2/14 noted  Continue with levaquin    Fever  -CXR shows B/L opacity.  initial Blood culture negative.  repeat Blood culture negative.     -UA +.  Urine culture  negative.  sputum/tracheal culture shows pseudomonas   -leukocytosis trending down, check CBC  -continue with   Levaquin  -afebrile.  abdm US shows no acute process.   -Abdm CT scan shows no acute process   -ID to follow up   -repeat cxr in am    severe pt calorie malnutrition  s/p peg tube 2/18, tolerating feeds     Left pneumothorax  chest tube removed 2/21, no pnx on cxr    Hypernatremia  -sodium 149  -free water 250 cc qid x 4 doses  -monitor sodium    Hyperkalemia  -resolved    #Progress Note Handoff  Pending (specify):  clinical improvement  Family discussion:  plan of care was discussed with patient and family (Daughter rosy) in details.  all questions were answered.  seems to understand, and in agreement  Disposition:  long term facility  overall prognosis guarded to poor

## 2022-03-03 NOTE — PROGRESS NOTE ADULT - SUBJECTIVE AND OBJECTIVE BOX
Patient is a 82y old  Male who presents with a chief complaint of fever (03 Mar 2022 10:37)        HPI:  81 year old male with a hx of HTN, BPH, recent diagnosis of COVID 19 2 days ago (unvaccinated, prescribed decadron/ zpack/ zinc) presenting to the ED S/P cardiac arrest. Patient intubated/ sedated so history obtained from chart (son left and attempted to reach). Patient began to have worsening dyspnea at home, EMS was called. Found to be in respiratory arrest by EMS and followed by EMS. ROSC was achieved after 2 rounds of CPR and epi, downtime 7 mins. Was intubated in the field.   In ED central line was placed. ABG: PH 7.15, PaO2 66, PaCO2 45, HCO3- 16. Troponins .1, BNP > 8000, D-Dimer 7368. COVID 19 positive. Patient being admitted s/p cardiac arrest to ICU.      (15 Aquiles 2022 04:09)      Pt evaluated on rounds.  I reviewed the radiology tests and hospital record.    I reviewed previous notes on this patient.    Interval Events: No overnight events.      CAM:+    SAT:n    SBT:n      REVIEW OF SYSTEMS:   see HPI      OBJECTIVE:  ICU Vital Signs Last 24 Hrs  T(C): 36.3 (03 Mar 2022 05:00), Max: 37.1 (02 Mar 2022 21:13)  T(F): 97.3 (03 Mar 2022 05:00), Max: 98.8 (02 Mar 2022 21:13)  HR: 88 (03 Mar 2022 08:51) (72 - 88)  BP: 113/63 (03 Mar 2022 05:00) (107/80 - 120/72)    RR: 18 (03 Mar 2022 05:00) (16 - 18)  SpO2: 100% (03 Mar 2022 08:51) (98% - 100%)    Mode: AC/ CMV (Assist Control/ Continuous Mandatory Ventilation), RR (machine): 24, TV (machine): 420, FiO2: 40, PEEP: 5, ITime: 0.8, MAP: 9, PIP: 29    03-02 @ 07:01  -  03-03 @ 07:00  --------------------------------------------------------  IN: 0 mL / OUT: 1100 mL / NET: -1100 mL      CAPILLARY BLOOD GLUCOSE      POCT Blood Glucose.: 144 mg/dL (03 Mar 2022 06:33)        PHYSICAL EXAM:       · ENMT:   Airway patent,   Nasal mucosa clear.  Mouth with normal mucosa.   No thrush    · EYES:   Clear bilaterally,   pupils equal,   round and reactive to light.    · CARDIAC:   Normal rate,   regular rhythm.    Heart sounds S1, S2.   No murmurs, no rubs or gallops on auscultation  no edema        CAROTID:   normal systolic impulse  no bruits    · RESPIRATORY:   rales  normal chest expansion  no retractions or use of accessory muscles  palpation of chest is normal with no fremitus  percussion of chest demonstrates no hyperresonance or dullness    · GASTROINTESTINAL:  Abdomen soft,   non-tender,   + BS  liver/spleen not palpable    · MUSCULOSKELETAL:   no clubbing, cyanosis      · SKIN:   Skin normal color for race,   warm, dry   No evidence of rash.        · HEME LYMPH:   no splenomegaly.  No cervical  lymphadenopathy.  no inguinal lymphadenopathy    HOSPITAL MEDICATIONS:  MEDICATIONS  (STANDING):  ALBUTerol    90 MICROgram(s) HFA Inhaler 2 Puff(s) Inhalation every 6 hours  amantadine Syrup 100 milliGRAM(s) Oral every 12 hours  aspirin  chewable 81 milliGRAM(s) Oral daily  chlorhexidine 0.12% Liquid 15 milliLiter(s) Oral Mucosa every 12 hours  chlorhexidine 4% Liquid 1 Application(s) Topical <User Schedule>  collagenase Ointment 1 Application(s) Topical two times a day  dextrose 5%. 1000 milliLiter(s) (50 mL/Hr) IV Continuous <Continuous>  dextrose 5%. 1000 milliLiter(s) (100 mL/Hr) IV Continuous <Continuous>  dextrose 50% Injectable 25 Gram(s) IV Push once  dextrose 50% Injectable 12.5 Gram(s) IV Push once  dextrose 50% Injectable 25 Gram(s) IV Push once  enoxaparin Injectable 40 milliGRAM(s) SubCutaneous daily  glucagon  Injectable 1 milliGRAM(s) IntraMuscular once  insulin lispro (ADMELOG) corrective regimen sliding scale   SubCutaneous every 6 hours  levoFLOXacin IVPB      levoFLOXacin IVPB 750 milliGRAM(s) IV Intermittent every 24 hours  LORazepam     Tablet 2 milliGRAM(s) Oral two times a day  metoprolol tartrate 25 milliGRAM(s) Oral two times a day  midodrine 5 milliGRAM(s) Oral every 8 hours  morphine  Infusion. 0.5 mG/Hr (0.5 mL/Hr) IV Continuous <Continuous>  pantoprazole  Injectable 40 milliGRAM(s) IV Push daily  polyethylene glycol 3350 17 Gram(s) Oral daily  sodium zirconium cyclosilicate 10 Gram(s) Oral once    MEDICATIONS  (PRN):  acetaminophen     Tablet .. 650 milliGRAM(s) Oral every 6 hours PRN Temp greater or equal to 38C (100.4F), Mild Pain (1 - 3)  albuterol/ipratropium for Nebulization 3 milliLiter(s) Nebulizer every 6 hours PRN Shortness of Breath and/or Wheezing  morphine  - Injectable 2 milliGRAM(s) IV Push every 4 hours PRN Moderate Pain (4 - 6)    sodium chloride 0.9% Bolus:   500 milliLiter(s), IV Bolus, once, infuse over 60 Minute(s), Stop After 1 Doses  Provider's Contact #: [phone]      LABS:                        8.8    10.09 )-----------( 189      ( 03 Mar 2022 06:07 )             30.0     03-03    148<H>  |  109  |  73<HH>  ----------------------------<  143<H>  5.1<H>   |  31  |  0.9    Ca    8.2<L>      03 Mar 2022 06:07    TPro  4.8<L>  /  Alb  2.4<L>  /  TBili  0.2  /  DBili  x   /  AST  19  /  ALT  27  /  AlkPhos  98  03-03            Mode: AC/ CMV (Assist Control/ Continuous Mandatory Ventilation), RR (machine): 24, TV (machine): 420, FiO2: 40, PEEP: 5, ITime: 0.8, MAP: 9, PIP: 29      COVID-19 PCR: NotDetec (02 Mar 2022 13:30)  COVID-19 PCR: Detected (20 Feb 2022 15:33)  COVID-19 PCR: NotDetec (15 Feb 2022 07:30)  COVID-19 PCR: Detected (10 Feb 2022 13:00)  SARS-CoV-2: Detected (15 Aquiles 2022 01:25)    Mode: AC/ CMV (Assist Control/ Continuous Mandatory Ventilation)  RR (machine): 24  TV (machine): 420  FiO2: 40  PEEP: 5  ITime: 0.8  MAP: 9  PIP: 29          RADIOLOGY: Today I personally interpreted the latest CXR and other pertinent films.

## 2022-03-03 NOTE — PROGRESS NOTE ADULT - ASSESSMENT
IMPRESSION:  Acute hypoxic respiratory failure SP Trach  Anoxic brain injury  Cardiac Arrest  Severe COVID PNA  Sepsis on present on admission  Left PNX SP chest tube, resolved  Pseudomonas PNA, +ve DTA  Mucus plugs SP Bronchoscopy  GNR on DTA  Ischemic changes on CT Head      SUGGEST:    CNS:   restart morphine drip  add OG Ativan    HEENT: Oral care.      PULMONARY:   HOB @ 45 degrees.   Aspiration precautions.   adjust O2 as necessary to maintain sats > 90%<94  no SBT  no change in Mve      CARDIOVASCULAR:   Avoid overload.  Keep I < O.    GI: GI prophylaxis.  c/w OG feeding.  Bowel regimen.     RENAL:  Follow up lytes.  Correct as needed.  Monitor UO.  Becker care.  free H2O    INFECTIOUS DISEASE:    abx per ID  trend Procalcitonin.        HEMATOLOGICAL: DVT prophylaxis    ENDOCRINE:  Follow up FS.  Insulin protocol if needed    MUSCULOSKELETAL: bedrest    DNR  Very poor overall prognosis    I spoke to attending this AM

## 2022-03-03 NOTE — PROGRESS NOTE ADULT - SUBJECTIVE AND OBJECTIVE BOX
CC.  Fever  HPI.  Patient is intubated/vented  Over breathing the vent again  unable to obtain ros/hx    Vital Signs Last 24 Hrs  T(C): 36.3 (03 Mar 2022 05:00), Max: 37.1 (02 Mar 2022 21:13)  T(F): 97.3 (03 Mar 2022 05:00), Max: 98.8 (02 Mar 2022 21:13)  HR: 88 (03 Mar 2022 08:51) (72 - 88)  BP: 113/63 (03 Mar 2022 05:00) (107/80 - 120/72)  BP(mean): --  RR: 18 (03 Mar 2022 05:00) (16 - 18)  SpO2: 100% (03 Mar 2022 08:51) (98% - 100%)    PHYSICAL EXAM-  GENERAL: NAD   HEAD:  Atraumatic, Normocephalic  EYES: EOMI, PERRLA, conjunctiva and sclera clear  NECK: Supple, No JVD, Normal thyroid  NERVOUS SYSTEM:  unresponsive   CHEST/LUNG: + rhonchi with good air entry.  No retractions or accessory muscle usage   HEART: Regular rate and rhythm; No murmurs, rubs, or gallops  ABDOMEN: Soft, Nontender, Nondistended; Bowel sounds present  EXTREMITIES:   No clubbing, cyanosis, or edema  SKIN: No rashes or lesions                                  8.8    10.09 )-----------( 189      ( 03 Mar 2022 06:07 )             30.0     03-03    148<H>  |  109  |  73<HH>  ----------------------------<  143<H>  5.1<H>   |  31  |  0.9    Ca    8.2<L>      03 Mar 2022 06:07    TPro  4.8<L>  /  Alb  2.4<L>  /  TBili  0.2  /  DBili  x   /  AST  19  /  ALT  27  /  AlkPhos  98  03-03                                       MEDICATIONS  (STANDING):  ALBUTerol    90 MICROgram(s) HFA Inhaler 2 Puff(s) Inhalation every 6 hours  amantadine Syrup 100 milliGRAM(s) Oral every 12 hours  aspirin  chewable 81 milliGRAM(s) Oral daily  chlorhexidine 0.12% Liquid 15 milliLiter(s) Oral Mucosa every 12 hours  chlorhexidine 4% Liquid 1 Application(s) Topical <User Schedule>  collagenase Ointment 1 Application(s) Topical two times a day  dextrose 5%. 1000 milliLiter(s) (100 mL/Hr) IV Continuous <Continuous>  dextrose 5%. 1000 milliLiter(s) (50 mL/Hr) IV Continuous <Continuous>  dextrose 50% Injectable 25 Gram(s) IV Push once  dextrose 50% Injectable 12.5 Gram(s) IV Push once  dextrose 50% Injectable 25 Gram(s) IV Push once  enoxaparin Injectable 40 milliGRAM(s) SubCutaneous daily  glucagon  Injectable 1 milliGRAM(s) IntraMuscular once  insulin lispro (ADMELOG) corrective regimen sliding scale   SubCutaneous every 6 hours  levoFLOXacin IVPB      levoFLOXacin IVPB 750 milliGRAM(s) IV Intermittent every 24 hours  metoprolol tartrate 25 milliGRAM(s) Oral two times a day  midodrine 5 milliGRAM(s) Oral every 8 hours  morphine  - Injectable 2 milliGRAM(s) IV Push every 4 hours  pantoprazole  Injectable 40 milliGRAM(s) IV Push daily  polyethylene glycol 3350 17 Gram(s) Oral daily    MEDICATIONS  (PRN):  acetaminophen     Tablet .. 650 milliGRAM(s) Oral every 6 hours PRN Temp greater or equal to 38C (100.4F), Mild Pain (1 - 3)  albuterol/ipratropium for Nebulization 3 milliLiter(s) Nebulizer every 6 hours PRN Shortness of Breath and/or Wheezing  Imaging Personally Reviewed:     [x ] YES  [ ] NO    Consultant(s) Notes Reviewed:  [x ] YES  [ ] NO    Care Discussed with Consultants/Other Providers [x ] YES  [ ] NO  Care Discussed with Consultants/Other Providers [x ] YES  [ ] No medical contraindication for discharge

## 2022-03-04 NOTE — CHART NOTE - NSCHARTNOTEFT_GEN_A_CORE
Registered Dietitian Follow-Up     Patient Profile Reviewed                           Yes [X]   No []     Nutrition History Previously Obtained        Yes [X]  No []       Pertinent Information:  pt is 81 year old male with hx of HTN, BPH, unvaccinated, tested + for COVID 12 days PTA, BIBA 2/2 respiratory distress s/p cardiac arrest downtime of 7 minutes, s/p intubation.  + PNA, L sided pnemothorax, s/p chest tube. pt became difficult to ventilate 1/21/22 s/p bronchoscopy  2/2 thick secretions. 1/29 s/p cardizem drip 2/2 afib RVR s/p chest tube removal.  Poor prognosis. DNR. pt has been off sedation since 2/1, unresponsive, + anoxic brain injury. 2/3 + blood cultures followed by ID likely contaminant. s/p PEG and trach placement. 2/21 s/p chest tube removal. 2/22 + temp noted. last temp noted on 3/1     Diet, NPO with Tube Feed:   Diet, NPO with Tube Feed:   Tube Feeding Modality: Gastrostomy  Glucerna 1.2 Haroldo  Total Volume for 24 Hours (mL): 1440  Continuous  Starting Tube Feed Rate {mL per Hour}: 20  Increase Tube Feed Rate by (mL): 10     Every 4 hours  Until Goal Tube Feed Rate (mL per Hour): 60  Tube Feed Duration (in Hours): 24  Tube Feed Start Time: 16:00  Free Water Flush Bolus   Total Volume per Flush (mL): 200   Frequency: Every 8 Hours   Total Daily Volume of Flush (mL): 600    Start Time: 17:00 (02-19-22 @ 12:42) [Active]       Anthropometrics:  - Ht. 172.7 cm  - Wt. 79.6 kgs 1/16/22 vs 72.5 kgs 2/18/22  - %wt change  - BMI   - IBW      Pertinent Lab Data: 3/4/22  wbc 11.76H  hgb 9.2L  hct 31.8L  Na 149H  k+ 5.4H  BUN 68H  alb 2.6L  POCT 136-152H     Pertinent Meds:  MEDICATIONS  (STANDING):  ALBUTerol    90 MICROgram(s) HFA Inhaler 2 Puff(s) Inhalation every 6 hours  amantadine Syrup 100 milliGRAM(s) Oral every 12 hours  aspirin  chewable 81 milliGRAM(s) Oral daily  chlorhexidine 0.12% Liquid 15 milliLiter(s) Oral Mucosa every 12 hours  chlorhexidine 4% Liquid 1 Application(s) Topical <User Schedule>  dextrose 5%. 1000 milliLiter(s) (100 mL/Hr) IV Continuous <Continuous>  dextrose 5%. 1000 milliLiter(s) (50 mL/Hr) IV Continuous <Continuous>  insulin lispro (ADMELOG) corrective regimen sliding scale   SubCutaneous every 6 hours     levoFLOXacin IVPB 750 milliGRAM(s) IV Intermittent every 24 hours  LORazepam     Tablet 2 milliGRAM(s) Oral two times a day  metoprolol tartrate 25 milliGRAM(s) Oral two times a day  midodrine 5 milliGRAM(s) Oral every 8 hours  morphine Concentrate 3 milliGRAM(s) Oral every 4 hours  pantoprazole  Injectable 40 milliGRAM(s) IV Push daily  polyethylene glycol 3350 17 Gram(s) Oral daily  sodium zirconium cyclosilicate 5 Gram(s) Oral every 12 hours    MEDICATIONS  (PRN):  acetaminophen     Tablet .. 650 milliGRAM(s) Oral every 6 hours PRN Temp greater or equal to 38C (100.4F), Mild Pain (1 - 3)  albuterol/ipratropium for Nebulization 3 milliLiter(s) Nebulizer every 6 hours PRN Shortness of Breath and/or Wheezing  morphine  - Injectable 2 milliGRAM(s) IV Push every 4 hours PRN Moderate Pain (4 - 6)       Physical Findings:  - Appearance: trach to vent, unresponsive  - GI function: + BS, + BM noted today   - Tubes: PEG  - Oral/Mouth cavity:  - Skin: R buttock extending to sacrum evolved to unstagable pressure ulcer as per wound care note        Nutrition Requirements  Weight Used:  71 kgs      Estimated nutrient  Needs:   Estimated Energy Needs: 2044 kcals MARIA LUISA state, 85-99g protein (1.2-1.4g/kg/BW) 1;1 kcal for estimated fluid needs     Nutrient Intake: present feeds provide 1710 kcals, 85g protein and 1440 ml fluid. + 600 ml H20 flushes meeting >80% of estimated needs    additional 250 ml x 4 doses as per MD order for hypernatremia.    [] Previous Nutrition Diagnosis:            [x] Ongoing          [] Resolved    increased nutrient needs remains        Nutrition Intervention: recommend to increase feeds to glucerna 1.2 at 70 ml/hr will provide 2016 kcals, 99.4g protein and 1680 ml  + 600 ml H20 flushes (2280 ml total volume) meeting > 90% of estimated nutrient needs.         Goal/Expected Outcome: pt to tolerate EN and meet > 90% of estimated nutrient needs     Indicator/Monitoring: RD to monitor tolerance to EN, labs/meds, NFPF and f/u as needed within 4-6 days

## 2022-03-04 NOTE — CONSULT NOTE ADULT - PROVIDER SPECIALTY LIST ADULT
CT Surgery
Infectious Disease
Neurology
Pain Medicine
Gastroenterology
Surgery
Cardiology
Palliative Care

## 2022-03-04 NOTE — CHART NOTE - NSCHARTNOTESELECT_GEN_ALL_CORE
Communication Resident/Event Note
Elevated BP
Event Note
Palliative Care SW Note/Event Note
Surgery/Event Note
surgery
CONTACT/Event Note
Communication/Event Note
Event Note
Nutrition/Event Note
PA NOTE
PA Note
Palliative Care SW Initial Assessment/Event Note
Palliative Care SW Note/Event Note
Palliative care/Event Note
RD Follow-up/Event Note
Transfer Note
bronchoscopy/Event Note

## 2022-03-04 NOTE — CHART NOTE - NSCHARTNOTEFT_GEN_A_CORE
PALLIATIVE MEDICINE INTERDISCIPLINARY TEAM NOTE    Provider:  [   ]Social Work   [   ]          [   ] Initial visit [   ] Follow up    Family or contact name / phone #   Met with: [   ] Patient  [   ] Family  [   ] Other:    Primary Language: [   ] English [   ] Other*:                      *Interpretation provided by:    SUPPORT DIAGNOSES            (Check all that apply)  [   ] Psychosocial spiritual assessment (PSSA)  [   ] EOL issues  [   ] Cultural / spiritual concerns  [   ] Pain / suffering  [   ] Dementia / AMS  [   ] Other:  [   ] AD issues  [   ] Grief / loss / sadness  [   ] Discharge issues  [   ] Distress / coping    PSYCHOSOCIAL ASSESSMENT OF PATIENT         (Check all that apply)  [   ] Initial Assessment            [   ] Reassessment          [   ] Not Applicable this visit    Pain/suffering acuity:  [   ] None to mild (0-3)           [   ] Moderate (4-6)        [   ] High (7-10)    Mental Status:  [   ] Alert/oriented (x3)          [   ] Confused/Altered(x2/x1)         [   ] Non-resp    Functional status:  [   ] Independent w ADLs      [   ] Needs Assistance             [   ] Bedbound/Full Care    Coping:  [   ] Coping well                     [   ] Coping w/difficulty            [   ] Poor coping    Support system:  [   ] Strong                              [   ] Adequate                        [   ] Inadequate    SPIRITUAL ASSESSMENT  Caodaism/Spiritual practice: ______Catholic_____________________    Role of organized Orthodoxy:  [   ] Important                     [   ] Some (fam tradition, cultural)               [   ] None    Effects on medical care:  [   ] Yes, _____________________________________                         [   ] None    Cultural/Nondenominational need:  [   ] Yes, _____________________________________                         [   ] None    Refer to Pastoral Care:  [   ] Yes           [   ] No, not at this time    SERVICE PROVIDED  [   ]PSSA                                                                             [   ]Discharge support / facilitation  [   ]AD / goals of care counseling                                  [   ]EOL / death / bereavement counseling  [   ]Counseling / support                                                [   ] Family meeting  [ x  ]Prayer / sacrament / ritual                                      [   ] Referral   [   ]Other                                                                       NOTE and Plan of Care (PoC): Pt was asleep, I will follow up as needed

## 2022-03-04 NOTE — PROGRESS NOTE ADULT - ASSESSMENT
81 year old male with a hx of HTN, BPH, recent diagnosis of COVID 19, 2  days prior to admission -> presented to the ED S/P cardiac arrest.  Patient began to have worsening dyspnea at home, EMS was called. Found to be in cardiac arrest. ROSC was achieved after 2 rounds of CPR and epi, downtime 7 mins. Was intubated in the field.     Acute respiratory failure / Cardiac arrest / COVID-19 pneumonia / probable NSTEMI / L sided pneumothorax / anoxic brain injury / gram negative pneumonia     s/p Cardiac Arrest with resp failure POA  intubated in field, CPR in field 7 min downtime  unresponsive afterwards, currently with trach  s/p peg tube 2/18   -Pulmonary to follow up for vent management  - started on morphine and ativan for desynchrony with vent  - exchanged morphine drip to po morphine  - pt was on 0.5mg/hr which came out to 12mg/24 hrs IV; its a 1:3 ratio so 12mg iv is converted to 36mg po. Dose is then reduced 50% for cross tolerance so then 18mg is divided into 6 doses which comes out to 3mg q 4hrs   - c/w ativan        Anoxic Brain Injury POA  unresponsive  poor prognosis  family has opted DNR    Pseudomonas Pneumonia 2/14 noted  Continue with levaquin    Fever  -CXR shows B/L opacity.  initial Blood culture negative.  repeat Blood culture negative.     -UA +.  Urine culture  negative.  sputum/tracheal culture shows pseudomonas   -leukocytosis trending down, check CBC  -continue with   Levaquin  -afebrile.  abdm US shows no acute process.   -Abdm CT scan shows no acute process   -ID to follow up   -repeat cxr in am    severe pt calorie malnutrition  s/p peg tube 2/18, tolerating feeds     Left pneumothorax  chest tube removed 2/21, no pnx on cxr    Hypernatremia  -sodium 149  -free water 250 cc qid x 4 doses  -monitor sodium    Hyperkalemia  -resolved    #Progress Note Handoff  Pending (specify):  clinical improvement  Family discussion:  plan of care was discussed with patient and family (Daughter rosy) in details.  all questions were answered.  seems to understand, and in agreement  Disposition:  long term facility  overall prognosis guarded to poor    81 year old male with a hx of HTN, BPH, recent diagnosis of COVID 19, 2  days prior to admission -> presented to the ED S/P cardiac arrest.  Patient began to have worsening dyspnea at home, EMS was called. Found to be in cardiac arrest. ROSC was achieved after 2 rounds of CPR and epi, downtime 7 mins. Was intubated in the field.     Acute respiratory failure / Cardiac arrest / COVID-19 pneumonia / probable NSTEMI / L sided pneumothorax / anoxic brain injury / gram negative pneumonia     s/p Cardiac Arrest with resp failure POA  intubated in field, CPR in field 7 min downtime  unresponsive afterwards, currently with trach  s/p peg tube 2/18   -Pulmonary to follow up for vent management  - started on morphine and ativan for desynchrony with vent  - exchanged morphine drip to po morphine  - pt was on 0.5mg/hr which came out to 12mg/24 hrs IV; its a 1:3 ratio so 12mg iv is converted to 36mg po. Dose is then reduced 50% for cross tolerance so then 18mg is divided into 6 doses which comes out to 3mg q 4hrs  - given that dose is cut in half to account for cross tolerance, upper limit can be 6mg q 4hrs while checking the resp status of the patient  - c/w ativan   - should undergo a weaning trial whenever feasible to assess if pt can come off vent; will defer to pulm when pt is medically stable to undergo weaning trials       Anoxic Brain Injury POA  unresponsive  poor prognosis  family has opted DNR    Pseudomonas Pneumonia 2/14   -CXR shows B/L opacity.  initial Blood culture negative.  repeat Blood culture negative.     -UA +.  Urine culture  negative.  sputum/tracheal culture shows pseudomonas   -continue with   Levaquin 750mg qd until 3/7  -ID to follow up     severe pt calorie malnutrition  s/p peg tube 2/18, tolerating feeds     Left pneumothorax  chest tube removed 2/21    Hypernatremia  -sodium 149  -free water 250 cc qid x 4 doses  -monitor sodium    Hyperkalemia  -fluctuating around the upper limit  - c/w hyperkalemia treatment prn    #proph  - vte: lovenox      81 year old male with a hx of HTN, BPH, recent diagnosis of COVID 19, 2  days prior to admission -> presented to the ED S/P cardiac arrest.  Patient began to have worsening dyspnea at home, EMS was called. Found to be in cardiac arrest. ROSC was achieved after 2 rounds of CPR and epi, downtime 7 mins. Was intubated in the field.     Acute respiratory failure / Cardiac arrest / COVID-19 pneumonia / probable NSTEMI / L sided pneumothorax / anoxic brain injury / gram negative pneumonia     s/p Cardiac Arrest with resp failure POA  intubated in field, CPR in field 7 min downtime  unresponsive afterwards, currently with trach  s/p peg tube 2/18   -Pulmonary to follow up for vent management  - started on morphine and ativan for desynchrony with vent  - exchanged morphine drip to po morphine  - pt was on 0.5mg/hr IV morphine which came out to 12mg/24 hrs IV; its a 1:3 ratio so 12mg iv morphine is converted to 36mg po morphine. Dose is then reduced 50% for cross tolerance so then 18mg is divided into 6 doses which comes out to 3mg q 4hrs morphine po  - given that dose is cut in half to account for cross tolerance, dose can be titrated up to the upper limit of 6mg q 4hrs morphine po while checking the resp status of the patient (less of a concern since pt is vented)  - c/w ativan   - should undergo a weaning trial whenever feasible to assess if pt can come off vent; will defer to pulm when pt is medically stable to undergo weaning trials       Anoxic Brain Injury POA  unresponsive  poor prognosis  family has opted DNR    Pseudomonas Pneumonia 2/14   -CXR shows B/L opacity.  initial Blood culture negative.  repeat Blood culture negative.     -UA +.  Urine culture  negative.  sputum/tracheal culture shows pseudomonas   -continue with   Levaquin 750mg qd until 3/7  -ID to follow up     severe pt calorie malnutrition  s/p peg tube 2/18, tolerating feeds     Left pneumothorax  chest tube removed 2/21    Hypernatremia  -sodium 149  -free water 250 cc qid x 4 doses  -monitor sodium    Hyperkalemia  -fluctuating around the upper limit  - c/w hyperkalemia treatment prn    #proph  - vte: lovenox

## 2022-03-04 NOTE — PROGRESS NOTE ADULT - ASSESSMENT
81 year old male with a hx of HTN, BPH, recent diagnosis of COVID 19 2 days ago (unvaccinated, prescribed decadron/ zpack/ zinc) presenting to the ED S/P cardiac arrest. Intubated in the field    IMPRESSION  #COVID19 PNA, Severe (O2 < or = 94% on RA and requiring supplemental O2) with Cardiac arrest    CXR diffuse bilateral opacities    D-Dimer Assay, Quantitative: 7368 ng/mL DDU (01-15-22 @ 01:25)    s/p Toci 1/17    #Fevers 2/22 - VAP  - Blood Cx 2/22 NG  - Sputum Cx 2/22 PSeudomonas CR  - CT Abd/pelvis 2/27 - no intraabdominal acute process      #Fevers 2/13 - resolved  - cXR 2/14 with bilateral opacities, worsening on Right  - sputum Cx 2/14 - Pseudomonas  - treated with course of cefepime    #Cardiac arrest- ROSC was achieved after 2 rounds of CPR and epi, downtime 7 mins  - s/p trach 2/11    #Left sided Pneumothorax- on CXR from 2/2/22    Antibiotic course  Cefepime 2/16- 3/2  Levofloxacin 2/26 - present    Recommendations  - continue levofloxacin 750 mg daily  - plan for 10 day course (end date 3/7)  - guarded prognosis  - vent management per pulmonary    Please call or message on [employer] Teams if with any questions.  Spectra [phone]

## 2022-03-04 NOTE — PROGRESS NOTE ADULT - SUBJECTIVE AND OBJECTIVE BOX
CC.  Fever  HPI.  Patient is intubated/vented. Very calm and not agitated or overbreathing. Currently on morphine drip and converting to po morphine for dispo planning. Calculations explained in the plan; please check if doses need to be adjusted for agitation that may occur overnight.       Vital Signs Last 24 Hrs  T(C): 36.3 (03 Mar 2022 05:00), Max: 37.1 (02 Mar 2022 21:13)  T(F): 97.3 (03 Mar 2022 05:00), Max: 98.8 (02 Mar 2022 21:13)  HR: 88 (03 Mar 2022 08:51) (72 - 88)  BP: 113/63 (03 Mar 2022 05:00) (107/80 - 120/72)  BP(mean): --  RR: 18 (03 Mar 2022 05:00) (16 - 18)  SpO2: 100% (03 Mar 2022 08:51) (98% - 100%)    PHYSICAL EXAM-  GENERAL: NAD   HEAD:  Atraumatic, Normocephalic  EYES: EOMI, PERRLA, conjunctiva and sclera clear  NECK: Supple, No JVD, Normal thyroid  NERVOUS SYSTEM:  unresponsive   CHEST/LUNG: + rhonchi with good air entry.  No retractions or accessory muscle usage   HEART: Regular rate and rhythm; No murmurs, rubs, or gallops  ABDOMEN: Soft, Nontender, Nondistended; Bowel sounds present  EXTREMITIES:   No clubbing, cyanosis, or edema  SKIN: No rashes or lesions                                  8.8    10.09 )-----------( 189      ( 03 Mar 2022 06:07 )             30.0     03-03    148<H>  |  109  |  73<HH>  ----------------------------<  143<H>  5.1<H>   |  31  |  0.9    Ca    8.2<L>      03 Mar 2022 06:07    TPro  4.8<L>  /  Alb  2.4<L>  /  TBili  0.2  /  DBili  x   /  AST  19  /  ALT  27  /  AlkPhos  98  03-03                                       MEDICATIONS  (STANDING):  ALBUTerol    90 MICROgram(s) HFA Inhaler 2 Puff(s) Inhalation every 6 hours  amantadine Syrup 100 milliGRAM(s) Oral every 12 hours  aspirin  chewable 81 milliGRAM(s) Oral daily  chlorhexidine 0.12% Liquid 15 milliLiter(s) Oral Mucosa every 12 hours  chlorhexidine 4% Liquid 1 Application(s) Topical <User Schedule>  collagenase Ointment 1 Application(s) Topical two times a day  dextrose 5%. 1000 milliLiter(s) (100 mL/Hr) IV Continuous <Continuous>  dextrose 5%. 1000 milliLiter(s) (50 mL/Hr) IV Continuous <Continuous>  dextrose 50% Injectable 25 Gram(s) IV Push once  dextrose 50% Injectable 12.5 Gram(s) IV Push once  dextrose 50% Injectable 25 Gram(s) IV Push once  enoxaparin Injectable 40 milliGRAM(s) SubCutaneous daily  glucagon  Injectable 1 milliGRAM(s) IntraMuscular once  insulin lispro (ADMELOG) corrective regimen sliding scale   SubCutaneous every 6 hours  levoFLOXacin IVPB      levoFLOXacin IVPB 750 milliGRAM(s) IV Intermittent every 24 hours  metoprolol tartrate 25 milliGRAM(s) Oral two times a day  midodrine 5 milliGRAM(s) Oral every 8 hours  morphine  - Injectable 2 milliGRAM(s) IV Push every 4 hours  pantoprazole  Injectable 40 milliGRAM(s) IV Push daily  polyethylene glycol 3350 17 Gram(s) Oral daily    MEDICATIONS  (PRN):  acetaminophen     Tablet .. 650 milliGRAM(s) Oral every 6 hours PRN Temp greater or equal to 38C (100.4F), Mild Pain (1 - 3)  albuterol/ipratropium for Nebulization 3 milliLiter(s) Nebulizer every 6 hours PRN Shortness of Breath and/or Wheezing  Imaging Personally Reviewed:     [x ] YES  [ ] NO    Consultant(s) Notes Reviewed:  [x ] YES  [ ] NO    Care Discussed with Consultants/Other Providers [x ] YES  [ ] NO  Care Discussed with Consultants/Other Providers [x ] YES  [ ] No medical contraindication for discharge

## 2022-03-04 NOTE — CONSULT NOTE ADULT - SUBJECTIVE AND OBJECTIVE BOX
81 year old male with a hx of HTN, BPH, recent diagnosis of COVID 19, 2  days prior to admission p/w cardiac arrest. Pt with respiratory failure s/p tracheostomy. Currently on morphine gtt 0.5 mg/hr. Pain management consulted to PO conversion as the patient is getting transferred to long term facility.     - Patient with poor prognosis and morphine IV drip is ideal as the patient is currently trach/vented. As the long term facility cannot use morphine IV drip, convert to PO.   - Titrate morphine GT 3 mg Q4H up to 6 mg.     D/W Dr. Palacios

## 2022-03-04 NOTE — PROGRESS NOTE ADULT - SUBJECTIVE AND OBJECTIVE BOX
CHADWICK VENCES  82y, Male  Allergy: Allergy Status Unknown      LOS  48d    CHIEF COMPLAINT: fever (03 Mar 2022 10:37)      INTERVAL EVENTS/HPI  - No acute events overnight  - T(F): , Max: 98.6 (03-03-22 @ 21:05)  - WBC Count: 10.09 (03-03-22 @ 06:07)  WBC Count: 10.77 (03-02-22 @ 08:42)     - Creatinine, Serum: 0.9 (03-03-22 @ 06:07)  Creatinine, Serum: 0.9 (03-02-22 @ 08:42)       ROS  unable to obtain history secondary to patient's mental status and/or sedation    VITALS:  T(F): 98.5, Max: 98.6 (03-03-22 @ 21:05)  HR: 78  BP: 146/61  RR: 18Vital Signs Last 24 Hrs  T(C): 36.9 (04 Mar 2022 05:00), Max: 37 (03 Mar 2022 21:05)  T(F): 98.5 (04 Mar 2022 05:00), Max: 98.6 (03 Mar 2022 21:05)  HR: 78 (04 Mar 2022 05:00) (32 - 88)  BP: 146/61 (04 Mar 2022 05:00) (102/56 - 146/61)  BP(mean): --  RR: 18 (04 Mar 2022 05:00) (18 - 18)  SpO2: 98% (04 Mar 2022 01:58) (96% - 100%)    PHYSICAL EXAM:  Gen: vent/trach  HEENT: Normocephalic, atraumatic  Neck: supple, no lymphadenopathy  CV: Regular rate & regular rhythm  Lungs: decreased BS at bases, no fremitus  Abdomen: Soft, BS present  Ext: Warm, well perfused  Neuro: non focal, awake  Skin: no rash, no erythema  Lines: no phlebitis    FH: Non-contributory  Social Hx: Non-contributory    TESTS & MEASUREMENTS:                        8.8    10.09 )-----------( 189      ( 03 Mar 2022 06:07 )             30.0    03-03    148<H>  |  109  |  73<HH>  ----------------------------<  143<H>  5.1<H>   |  31  |  0.9    Ca    8.2<L>      03 Mar 2022 06:07    TPro  4.8<L>  /  Alb  2.4<L>  /  TBili  0.2  /  DBili  x   /  AST  19  /  ALT  27  /  AlkPhos  98  03-03      LIVER FUNCTIONS - ( 03 Mar 2022 06:07 )  Alb: 2.4 g/dL / Pro: 4.8 g/dL / ALK PHOS: 98 U/L / ALT: 27 U/L / AST: 19 U/L / GGT: x              Culture - Blood (collected 02-27-22 @ 17:00)  Source: .Blood Blood-Peripheral  Preliminary Report (02-28-22 @ 23:02):    No growth to date.    Culture - Blood (collected 02-27-22 @ 17:00)  Source: .Blood Blood-Peripheral  Preliminary Report (02-28-22 @ 23:02):    No growth to date.    Culture - Blood (collected 02-25-22 @ 07:56)  Source: .Blood None  Final Report (03-02-22 @ 19:00):    No Growth Final    Culture - Blood (collected 02-24-22 @ 11:15)  Source: .Blood None  Final Report (03-01-22 @ 21:00):    No Growth Final    Culture - Blood (collected 02-24-22 @ 11:15)  Source: .Blood None  Final Report (03-01-22 @ 21:00):    No Growth Final    Culture - Urine (collected 02-23-22 @ 21:10)  Source: Catheterized Catheterized  Final Report (02-25-22 @ 18:31):    <10,000 CFU/mL Normal Urogenital Corina    Culture - Sputum (collected 02-23-22 @ 08:00)  Source: .Sputum Sputum  Gram Stain (02-23-22 @ 22:08):    Moderate polymorphonuclear leukocytes per low power field    Rare Squamous epithelial cells per low power field    Few Gram Negative Rods per oil power field  Final Report (02-25-22 @ 20:54):    Moderate Pseudomonas aeruginosa (Carbapenem Resistant)    Normal Respiratory Corina absent  Organism: Pseudomonas aeruginosa (Carbapenem Resistant) (02-25-22 @ 20:54)  Organism: Pseudomonas aeruginosa (Carbapenem Resistant) (02-25-22 @ 20:54)      -  Amikacin: S <=16      -  Aztreonam: R >16      -  Cefepime: R >16      -  Ceftazidime: R >16      -  Ciprofloxacin: S <=0.25      -  Gentamicin: S 4      -  Imipenem: R >8      -  Levofloxacin: S <=0.5      -  Meropenem: R >8      -  Piperacillin/Tazobactam: R >64      -  Tobramycin: S <=2      Method Type: ALEXSANDRA    Culture - Blood (collected 02-22-22 @ 15:00)  Source: .Blood Blood-Peripheral  Final Report (02-27-22 @ 22:00):    No Growth Final    Culture - Blood (collected 02-15-22 @ 03:10)  Source: .Blood Blood  Final Report (02-20-22 @ 20:00):    No Growth Final    Culture - Sputum (collected 02-14-22 @ 11:30)  Source: .Sputum Deep tracheal aspirate  Gram Stain (02-14-22 @ 23:16):    Moderate polymorphonuclear leukocytes per low power field    No Squamous epithelial cells per low power field    Moderate Gram Negative Rods seen per oil power field  Final Report (02-16-22 @ 21:38):    Moderate Pseudomonas aeruginosa    Normal Respiratory Corina absent  Organism: Pseudomonas aeruginosa (02-16-22 @ 21:38)  Organism: Pseudomonas aeruginosa (02-16-22 @ 21:38)      -  Amikacin: S <=16      -  Aztreonam: S <=4      -  Cefepime: S <=2      -  Ceftazidime: S 4      -  Ciprofloxacin: S <=0.25      -  Gentamicin: S 4      -  Imipenem: S 2      -  Levofloxacin: S <=0.5      -  Meropenem: S <=1      -  Piperacillin/Tazobactam: S <=8      -  Tobramycin: S <=2      Method Type: ALEXSANDRA            INFECTIOUS DISEASES TESTING  COVID-19 PCR: NotDetec (03-02-22 @ 13:30)  Procalcitonin, Serum: 0.51 (02-27-22 @ 07:33)  COVID-19 PCR: Detected (02-20-22 @ 15:33)  COVID-19 PCR: NotDetec (02-15-22 @ 07:30)  Procalcitonin, Serum: 0.09 (02-12-22 @ 06:21)  COVID-19 PCR: Detected (02-10-22 @ 13:00)  Procalcitonin, Serum: 0.04 (02-09-22 @ 06:03)  Procalcitonin, Serum: 0.03 (02-01-22 @ 06:25)  Procalcitonin, Serum: 0.07 (01-31-22 @ 06:15)  Procalcitonin, Serum: 0.10 (01-30-22 @ 10:45)  Procalcitonin, Serum: 0.11 (01-29-22 @ 07:17)  Procalcitonin, Serum: 0.08 (01-26-22 @ 06:15)  Procalcitonin, Serum: 0.09 (01-25-22 @ 05:58)  Procalcitonin, Serum: 0.11 (01-23-22 @ 11:17)  MRSA PCR Result.: Negative (01-23-22 @ 11:00)  Procalcitonin, Serum: 0.44 (01-19-22 @ 06:30)  Procalcitonin, Serum: 2.32 (01-16-22 @ 06:56)  Rapid RVP Result: Detected (01-15-22 @ 01:25)  Procalcitonin, Serum: 0.10 (01-15-22 @ 01:25)      INFLAMMATORY MARKERS  C-Reactive Protein, Serum: <3 mg/L (02-01-22 @ 06:25)  C-Reactive Protein, Serum: <3 mg/L (01-26-22 @ 06:15)  C-Reactive Protein, Serum: 50 mg/L (01-16-22 @ 06:56)  C-Reactive Protein, Serum: 76 mg/L (01-15-22 @ 01:25)      RADIOLOGY & ADDITIONAL TESTS:  I have personally reviewed the last available Chest xray  CXR      CT      CARDIOLOGY TESTING      MEDICATIONS  ALBUTerol    90 MICROgram(s) HFA Inhaler 2 Inhalation every 6 hours  amantadine Syrup 100 Oral every 12 hours  aspirin  chewable 81 Oral daily  chlorhexidine 0.12% Liquid 15 Oral Mucosa every 12 hours  chlorhexidine 4% Liquid 1 Topical <User Schedule>  collagenase Ointment 1 Topical two times a day  dextrose 5%. 1000 IV Continuous <Continuous>  dextrose 5%. 1000 IV Continuous <Continuous>  dextrose 50% Injectable 25 IV Push once  dextrose 50% Injectable 12.5 IV Push once  dextrose 50% Injectable 25 IV Push once  enoxaparin Injectable 40 SubCutaneous daily  glucagon  Injectable 1 IntraMuscular once  insulin lispro (ADMELOG) corrective regimen sliding scale  SubCutaneous every 6 hours  levoFLOXacin IVPB    levoFLOXacin IVPB 750 IV Intermittent every 24 hours  LORazepam     Tablet 2 Oral two times a day  metoprolol tartrate 25 Oral two times a day  midodrine 5 Oral every 8 hours  morphine  Infusion. 0.5 IV Continuous <Continuous>  pantoprazole  Injectable 40 IV Push daily  polyethylene glycol 3350 17 Oral daily      WEIGHT  Weight (kg): 72.5 (02-18-22 @ 15:06)      ANTIBIOTICS:  levoFLOXacin IVPB      levoFLOXacin IVPB 750 milliGRAM(s) IV Intermittent every 24 hours      All available historical records have been reviewed

## 2022-03-04 NOTE — CONSULT NOTE ADULT - CONSULT REASON
Converting morphine IV to PO
pigtail positioning
PEG tube placement
eval for trach
GOC, symptoms
AMS
covid cardiac arrest post troponin
covid
WDL

## 2022-03-04 NOTE — CONSULT NOTE ADULT - CONSULT REQUESTED DATE/TIME
10-Feb-2022 12:40
15-Aquiles-2022 00:00
04-Feb-2022 14:53
15-Aquiles-2022 07:33
01-Feb-2022 11:00
28-Jan-2022 13:22
04-Mar-2022 17:30
03-Feb-2022

## 2022-03-05 NOTE — PROGRESS NOTE ADULT - SUBJECTIVE AND OBJECTIVE BOX
CC.  Fever  HPI.  Patient is intubated/vented. Very calm and not agitated or overbreathing. Currently stable on po morphine doses. Weaning trials as per pulm.      Vital Signs Last 24 Hrs  T(C): 36.3 (03 Mar 2022 05:00), Max: 37.1 (02 Mar 2022 21:13)  T(F): 97.3 (03 Mar 2022 05:00), Max: 98.8 (02 Mar 2022 21:13)  HR: 88 (03 Mar 2022 08:51) (72 - 88)  BP: 113/63 (03 Mar 2022 05:00) (107/80 - 120/72)  BP(mean): --  RR: 18 (03 Mar 2022 05:00) (16 - 18)  SpO2: 100% (03 Mar 2022 08:51) (98% - 100%)    PHYSICAL EXAM-  GENERAL: NAD   HEAD:  Atraumatic, Normocephalic  EYES: EOMI, PERRLA, conjunctiva and sclera clear  NECK: Supple, No JVD, Normal thyroid  NERVOUS SYSTEM:  unresponsive   CHEST/LUNG: + rhonchi with good air entry.  No retractions or accessory muscle usage   HEART: Regular rate and rhythm; No murmurs, rubs, or gallops  ABDOMEN: Soft, Nontender, Nondistended; Bowel sounds present  EXTREMITIES:   No clubbing, cyanosis, or edema  SKIN: No rashes or lesions                                  8.8    10.09 )-----------( 189      ( 03 Mar 2022 06:07 )             30.0     03-03    148<H>  |  109  |  73<HH>  ----------------------------<  143<H>  5.1<H>   |  31  |  0.9    Ca    8.2<L>      03 Mar 2022 06:07    TPro  4.8<L>  /  Alb  2.4<L>  /  TBili  0.2  /  DBili  x   /  AST  19  /  ALT  27  /  AlkPhos  98  03-03                                       MEDICATIONS  (STANDING):  ALBUTerol    90 MICROgram(s) HFA Inhaler 2 Puff(s) Inhalation every 6 hours  amantadine Syrup 100 milliGRAM(s) Oral every 12 hours  aspirin  chewable 81 milliGRAM(s) Oral daily  chlorhexidine 0.12% Liquid 15 milliLiter(s) Oral Mucosa every 12 hours  chlorhexidine 4% Liquid 1 Application(s) Topical <User Schedule>  collagenase Ointment 1 Application(s) Topical two times a day  dextrose 5%. 1000 milliLiter(s) (100 mL/Hr) IV Continuous <Continuous>  dextrose 5%. 1000 milliLiter(s) (50 mL/Hr) IV Continuous <Continuous>  dextrose 50% Injectable 25 Gram(s) IV Push once  dextrose 50% Injectable 12.5 Gram(s) IV Push once  dextrose 50% Injectable 25 Gram(s) IV Push once  enoxaparin Injectable 40 milliGRAM(s) SubCutaneous daily  glucagon  Injectable 1 milliGRAM(s) IntraMuscular once  insulin lispro (ADMELOG) corrective regimen sliding scale   SubCutaneous every 6 hours  levoFLOXacin IVPB      levoFLOXacin IVPB 750 milliGRAM(s) IV Intermittent every 24 hours  metoprolol tartrate 25 milliGRAM(s) Oral two times a day  midodrine 5 milliGRAM(s) Oral every 8 hours  morphine  - Injectable 2 milliGRAM(s) IV Push every 4 hours  pantoprazole  Injectable 40 milliGRAM(s) IV Push daily  polyethylene glycol 3350 17 Gram(s) Oral daily    MEDICATIONS  (PRN):  acetaminophen     Tablet .. 650 milliGRAM(s) Oral every 6 hours PRN Temp greater or equal to 38C (100.4F), Mild Pain (1 - 3)  albuterol/ipratropium for Nebulization 3 milliLiter(s) Nebulizer every 6 hours PRN Shortness of Breath and/or Wheezing  Imaging Personally Reviewed:     [x ] YES  [ ] NO    Consultant(s) Notes Reviewed:  [x ] YES  [ ] NO    Care Discussed with Consultants/Other Providers [x ] YES  [ ] NO  Care Discussed with Consultants/Other Providers [x ] YES  [ ] No medical contraindication for discharge

## 2022-03-05 NOTE — PROGRESS NOTE ADULT - ASSESSMENT
81 year old male with a hx of HTN, BPH, recent diagnosis of COVID 19, 2  days prior to admission -> presented to the ED S/P cardiac arrest.  Patient began to have worsening dyspnea at home, EMS was called. Found to be in cardiac arrest. ROSC was achieved after 2 rounds of CPR and epi, downtime 7 mins. Was intubated in the field.     Acute respiratory failure / Cardiac arrest / COVID-19 pneumonia / probable NSTEMI / L sided pneumothorax / anoxic brain injury / gram negative pneumonia     s/p Cardiac Arrest with resp failure POA  intubated in field, CPR in field 7 min downtime  unresponsive afterwards, currently with trach  s/p peg tube 2/18   -Pulmonary to follow up for vent management  - started on morphine and ativan for desynchrony with vent  - exchanged morphine drip to po morphine  - pt was on 0.5mg/hr IV morphine which came out to 12mg/24 hrs IV; its a 1:3 ratio so 12mg iv morphine is converted to 36mg po morphine. Dose is then reduced 50% for cross tolerance so then 18mg is divided into 6 doses which comes out to 3mg q 4hrs morphine po  - given that dose is cut in half to account for cross tolerance, dose can be titrated up to the upper limit of 6mg q 4hrs morphine po while checking the resp status of the patient (less of a concern since pt is vented)  - c/w ativan   - should undergo a weaning trial whenever feasible to assess if pt can come off vent; will defer to pulm when pt is medically stable to undergo weaning trials       Anoxic Brain Injury POA  unresponsive  poor prognosis  family has opted DNR    Pseudomonas Pneumonia 2/14   -CXR shows B/L opacity.  initial Blood culture negative.  repeat Blood culture negative.     -UA +.  Urine culture  negative.  sputum/tracheal culture shows pseudomonas   -continue with   Levaquin 750mg qd until 3/7  -ID to follow up     severe pt calorie malnutrition  s/p peg tube 2/18, tolerating feeds     Left pneumothorax  chest tube removed 2/21    Hypernatremia  -sodium 149  -free water 250 cc qid x 4 doses  -monitor sodium    Hyperkalemia  -fluctuating around the upper limit  - c/w hyperkalemia treatment prn    #proph  - vte: lovenox

## 2022-03-06 NOTE — PROGRESS NOTE ADULT - ASSESSMENT
IMPRESSION:  Acute hypoxic respiratory failure SP Trach  Anoxic brain injury  Cardiac Arrest  Severe COVID PNA  Sepsis on present on admission  Left PNX SP chest tube, resolved  Pseudomonas PNA, +ve DTA  Mucus plugs SP Bronchoscopy  GNR on DTA  Ischemic changes on CT Head      SUGGEST:    CNS:   cont morphine   add OG Ativan  would try to sedate orally on one agent if possible (ie increase Ativan and decrease morphine)    HEENT: Oral care.      PULMONARY:   HOB @ 45 degrees.   Aspiration precautions.   adjust O2 as necessary to maintain sats > 90%<94  no SBT not weanable from vent  no change in Mve      CARDIOVASCULAR:   Avoid overload.  Keep I < O.    GI: GI prophylaxis.  c/w OG feeding.  Bowel regimen.     RENAL:  Follow up lytes.  Correct as needed.  Monitor UO.  Becker care.  free H2O    INFECTIOUS DISEASE:    abx per ID        HEMATOLOGICAL: DVT prophylaxis    ENDOCRINE:  Follow up FS.  Insulin protocol if needed    MUSCULOSKELETAL: bedrest    DNR  Very poor overall prognosis    eval for LTAC or OP vent unit

## 2022-03-06 NOTE — PROGRESS NOTE ADULT - SUBJECTIVE AND OBJECTIVE BOX
Patient is a 82y old  Male who presents with a chief complaint of fever (06 Mar 2022 09:04)        HPI:  81 year old male with a hx of HTN, BPH, recent diagnosis of COVID 19 2 days ago (unvaccinated, prescribed decadron/ zpack/ zinc) presenting to the ED S/P cardiac arrest. Patient intubated/ sedated so history obtained from chart (son left and attempted to reach). Patient began to have worsening dyspnea at home, EMS was called. Found to be in respiratory arrest by EMS and followed by EMS. ROSC was achieved after 2 rounds of CPR and epi, downtime 7 mins. Was intubated in the field.   In ED central line was placed. ABG: PH 7.15, PaO2 66, PaCO2 45, HCO3- 16. Troponins .1, BNP > 8000, D-Dimer 7368. COVID 19 positive. Patient being admitted s/p cardiac arrest to ICU.      (15 Aquiles 2022 04:09)      Pt evaluated on rounds.  I reviewed the radiology tests and hospital record.    I reviewed previous notes on this patient.    Interval Events: No overnight events.      CAM:+    SAT:+    SBT:no      REVIEW OF SYSTEMS:   see HPI      OBJECTIVE:  ICU Vital Signs Last 24 Hrs  T(C): 35.9 (06 Mar 2022 05:52), Max: 36 (05 Mar 2022 21:04)  T(F): 96.7 (06 Mar 2022 05:52), Max: 96.8 (05 Mar 2022 21:04)  HR: 80 (06 Mar 2022 05:52) (63 - 85)  BP: 112/74 (06 Mar 2022 05:52) (112/74 - 132/58)    RR: 18 (06 Mar 2022 05:52) (18 - 18)  SpO2: 97% (05 Mar 2022 20:01) (97% - 97%)    Mode: AC/ CMV (Assist Control/ Continuous Mandatory Ventilation), RR (machine): 24, TV (machine): 420, FiO2: 35, PEEP: 5, MAP: 11, PIP: 26    03-05 @ 07:01  -  03-06 @ 07:00  --------------------------------------------------------  IN: 0 mL / OUT: 1200 mL / NET: -1200 mL      CAPILLARY BLOOD GLUCOSE      POCT Blood Glucose.: 135 mg/dL (05 Mar 2022 20:44)        PHYSICAL EXAM:       · ENMT:   Airway patent,   Nasal mucosa clear.  Mouth with normal mucosa.   No thrush    · EYES:   Clear bilaterally,   pupils equal,   round and reactive to light.    · CARDIAC:   Normal rate,   regular rhythm.    Heart sounds S1, S2.   No murmurs, no rubs or gallops on auscultation  no edema        CAROTID:   normal systolic impulse  no bruits    · RESPIRATORY:   rales  normal chest expansion  no retractions or use of accessory muscles  palpation of chest is normal with no fremitus  percussion of chest demonstrates no hyperresonance or dullness    · GASTROINTESTINAL:  Abdomen soft,   non-tender,   + BS  liver/spleen not palpable    · MUSCULOSKELETAL:   no clubbing, cyanosis      · SKIN:   Skin normal color for race,   warm, dry   No evidence of rash.        · HEME LYMPH:   no splenomegaly.  No cervical  lymphadenopathy.  no inguinal lymphadenopathy    HOSPITAL MEDICATIONS:  MEDICATIONS  (STANDING):  ALBUTerol    90 MICROgram(s) HFA Inhaler 2 Puff(s) Inhalation every 6 hours  amantadine Syrup 100 milliGRAM(s) Oral every 12 hours  aspirin  chewable 81 milliGRAM(s) Oral daily  chlorhexidine 0.12% Liquid 15 milliLiter(s) Oral Mucosa every 12 hours  chlorhexidine 4% Liquid 1 Application(s) Topical <User Schedule>  collagenase Ointment 1 Application(s) Topical two times a day  dextrose 5%. 1000 milliLiter(s) (50 mL/Hr) IV Continuous <Continuous>  dextrose 5%. 1000 milliLiter(s) (100 mL/Hr) IV Continuous <Continuous>  dextrose 5%. 1000 milliLiter(s) (100 mL/Hr) IV Continuous <Continuous>  dextrose 50% Injectable 25 Gram(s) IV Push once  dextrose 50% Injectable 12.5 Gram(s) IV Push once  dextrose 50% Injectable 25 Gram(s) IV Push once  enoxaparin Injectable 40 milliGRAM(s) SubCutaneous daily  glucagon  Injectable 1 milliGRAM(s) IntraMuscular once  insulin lispro (ADMELOG) corrective regimen sliding scale   SubCutaneous every 6 hours  levoFLOXacin IVPB      levoFLOXacin IVPB 750 milliGRAM(s) IV Intermittent every 24 hours  LORazepam     Tablet 2 milliGRAM(s) Oral two times a day  metoprolol tartrate 25 milliGRAM(s) Oral two times a day  midodrine 5 milliGRAM(s) Oral every 8 hours  morphine Concentrate 3 milliGRAM(s) Oral every 4 hours  pantoprazole  Injectable 40 milliGRAM(s) IV Push daily  polyethylene glycol 3350 17 Gram(s) Oral daily  sodium zirconium cyclosilicate 10 Gram(s) Oral once    MEDICATIONS  (PRN):  acetaminophen     Tablet .. 650 milliGRAM(s) Oral every 6 hours PRN Temp greater or equal to 38C (100.4F), Mild Pain (1 - 3)  albuterol/ipratropium for Nebulization 3 milliLiter(s) Nebulizer every 6 hours PRN Shortness of Breath and/or Wheezing  morphine  - Injectable 2 milliGRAM(s) IV Push every 4 hours PRN Moderate Pain (4 - 6)    sodium chloride 0.9% Bolus:   500 milliLiter(s), IV Bolus, once, infuse over 60 Minute(s), Stop After 1 Doses  Provider's Contact #: [phone]      LABS:                        9.8    14.11 )-----------( 171      ( 06 Mar 2022 09:07 )             33.2     03-06    144  |  104  |  58<H>  ----------------------------<  93  5.1<H>   |  31  |  0.8    Ca    8.0<L>      06 Mar 2022 09:07    TPro  5.5<L>  /  Alb  2.7<L>  /  TBili  0.4  /  DBili  x   /  AST  25  /  ALT  26  /  AlkPhos  112  03-06            Mode: AC/ CMV (Assist Control/ Continuous Mandatory Ventilation), RR (machine): 24, TV (machine): 420, FiO2: 35, PEEP: 5, MAP: 11, PIP: 26      COVID-19 PCR: NotDetec (02 Mar 2022 13:30)  COVID-19 PCR: Detected (20 Feb 2022 15:33)  COVID-19 PCR: NotDetec (15 Feb 2022 07:30)  COVID-19 PCR: Detected (10 Feb 2022 13:00)  SARS-CoV-2: Detected (15 Aquiles 2022 01:25)    Mode: AC/ CMV (Assist Control/ Continuous Mandatory Ventilation)  RR (machine): 24  TV (machine): 420  FiO2: 35  PEEP: 5  MAP: 11  PIP: 26          RADIOLOGY: Today I personally interpreted the latest CXR and other pertinent films.

## 2022-03-06 NOTE — PROGRESS NOTE ADULT - SUBJECTIVE AND OBJECTIVE BOX
CC.  Fever  HPI.  Patient is intubated/vented. Very calm and not agitated or overbreathing. Currently stable on po morphine doses. Weaning trials as per pulm.      Vital Signs Last 24 Hrs  T(C): 35.9 (06 Mar 2022 05:52), Max: 36 (05 Mar 2022 21:04)  T(F): 96.7 (06 Mar 2022 05:52), Max: 96.8 (05 Mar 2022 21:04)  HR: 80 (06 Mar 2022 05:52) (63 - 85)  BP: 112/74 (06 Mar 2022 05:52) (112/74 - 132/58)  BP(mean): --  RR: 18 (06 Mar 2022 05:52) (18 - 18)  SpO2: 97% (05 Mar 2022 20:01) (97% - 97%)    PHYSICAL EXAM-  GENERAL: NAD   HEAD:  Atraumatic, Normocephalic  EYES: EOMI, PERRLA, conjunctiva and sclera clear  NECK: Supple, No JVD, Normal thyroid  NERVOUS SYSTEM:  unresponsive   CHEST/LUNG: + rhonchi with good air entry.  No retractions or accessory muscle usage   HEART: Regular rate and rhythm; No murmurs, rubs, or gallops  ABDOMEN: Soft, Nontender, Nondistended; Bowel sounds present  EXTREMITIES:   No clubbing, cyanosis, or edema  SKIN: No rashes or lesions                                  8.8    10.09 )-----------( 189      ( 03 Mar 2022 06:07 )             30.0     03-03    148<H>  |  109  |  73<HH>  ----------------------------<  143<H>  5.1<H>   |  31  |  0.9    Ca    8.2<L>      03 Mar 2022 06:07    TPro  4.8<L>  /  Alb  2.4<L>  /  TBili  0.2  /  DBili  x   /  AST  19  /  ALT  27  /  AlkPhos  98  03-03                                       MEDICATIONS  (STANDING):  ALBUTerol    90 MICROgram(s) HFA Inhaler 2 Puff(s) Inhalation every 6 hours  amantadine Syrup 100 milliGRAM(s) Oral every 12 hours  aspirin  chewable 81 milliGRAM(s) Oral daily  chlorhexidine 0.12% Liquid 15 milliLiter(s) Oral Mucosa every 12 hours  chlorhexidine 4% Liquid 1 Application(s) Topical <User Schedule>  collagenase Ointment 1 Application(s) Topical two times a day  dextrose 5%. 1000 milliLiter(s) (100 mL/Hr) IV Continuous <Continuous>  dextrose 5%. 1000 milliLiter(s) (50 mL/Hr) IV Continuous <Continuous>  dextrose 50% Injectable 25 Gram(s) IV Push once  dextrose 50% Injectable 12.5 Gram(s) IV Push once  dextrose 50% Injectable 25 Gram(s) IV Push once  enoxaparin Injectable 40 milliGRAM(s) SubCutaneous daily  glucagon  Injectable 1 milliGRAM(s) IntraMuscular once  insulin lispro (ADMELOG) corrective regimen sliding scale   SubCutaneous every 6 hours  levoFLOXacin IVPB      levoFLOXacin IVPB 750 milliGRAM(s) IV Intermittent every 24 hours  metoprolol tartrate 25 milliGRAM(s) Oral two times a day  midodrine 5 milliGRAM(s) Oral every 8 hours  morphine  - Injectable 2 milliGRAM(s) IV Push every 4 hours  pantoprazole  Injectable 40 milliGRAM(s) IV Push daily  polyethylene glycol 3350 17 Gram(s) Oral daily    MEDICATIONS  (PRN):  acetaminophen     Tablet .. 650 milliGRAM(s) Oral every 6 hours PRN Temp greater or equal to 38C (100.4F), Mild Pain (1 - 3)  albuterol/ipratropium for Nebulization 3 milliLiter(s) Nebulizer every 6 hours PRN Shortness of Breath and/or Wheezing  Imaging Personally Reviewed:     [x ] YES  [ ] NO    Consultant(s) Notes Reviewed:  [x ] YES  [ ] NO    Care Discussed with Consultants/Other Providers [x ] YES  [ ] NO  Care Discussed with Consultants/Other Providers [x ] YES  [ ] No medical contraindication for discharge

## 2022-03-07 NOTE — PROGRESS NOTE ADULT - SUBJECTIVE AND OBJECTIVE BOX
CC.  Fever  HPI.  Patient is intubated/vented. Very calm and not agitated or overbreathing. HR was noted to be low yesterday afternoon but corrected. Could be improper readings. Pt is DNR/DNI.       Vital Signs Last 24 Hrs  T(C): 35.9 (06 Mar 2022 05:52), Max: 36 (05 Mar 2022 21:04)  T(F): 96.7 (06 Mar 2022 05:52), Max: 96.8 (05 Mar 2022 21:04)  HR: 80 (06 Mar 2022 05:52) (63 - 85)  BP: 112/74 (06 Mar 2022 05:52) (112/74 - 132/58)  BP(mean): --  RR: 18 (06 Mar 2022 05:52) (18 - 18)  SpO2: 97% (05 Mar 2022 20:01) (97% - 97%)    PHYSICAL EXAM-  GENERAL: NAD   HEAD:  Atraumatic, Normocephalic  EYES: EOMI, PERRLA, conjunctiva and sclera clear  NECK: Supple, No JVD, Normal thyroid  NERVOUS SYSTEM:  unresponsive   CHEST/LUNG: + rhonchi with good air entry.  No retractions or accessory muscle usage   HEART: Regular rate and rhythm; No murmurs, rubs, or gallops  ABDOMEN: Soft, Nontender, Nondistended; Bowel sounds present  EXTREMITIES:   No clubbing, cyanosis, or edema  SKIN: No rashes or lesions                                  8.8    10.09 )-----------( 189      ( 03 Mar 2022 06:07 )             30.0     03-03    148<H>  |  109  |  73<HH>  ----------------------------<  143<H>  5.1<H>   |  31  |  0.9    Ca    8.2<L>      03 Mar 2022 06:07    TPro  4.8<L>  /  Alb  2.4<L>  /  TBili  0.2  /  DBili  x   /  AST  19  /  ALT  27  /  AlkPhos  98  03-03                                       MEDICATIONS  (STANDING):  ALBUTerol    90 MICROgram(s) HFA Inhaler 2 Puff(s) Inhalation every 6 hours  amantadine Syrup 100 milliGRAM(s) Oral every 12 hours  aspirin  chewable 81 milliGRAM(s) Oral daily  chlorhexidine 0.12% Liquid 15 milliLiter(s) Oral Mucosa every 12 hours  chlorhexidine 4% Liquid 1 Application(s) Topical <User Schedule>  collagenase Ointment 1 Application(s) Topical two times a day  dextrose 5%. 1000 milliLiter(s) (100 mL/Hr) IV Continuous <Continuous>  dextrose 5%. 1000 milliLiter(s) (50 mL/Hr) IV Continuous <Continuous>  dextrose 50% Injectable 25 Gram(s) IV Push once  dextrose 50% Injectable 12.5 Gram(s) IV Push once  dextrose 50% Injectable 25 Gram(s) IV Push once  enoxaparin Injectable 40 milliGRAM(s) SubCutaneous daily  glucagon  Injectable 1 milliGRAM(s) IntraMuscular once  insulin lispro (ADMELOG) corrective regimen sliding scale   SubCutaneous every 6 hours  levoFLOXacin IVPB      levoFLOXacin IVPB 750 milliGRAM(s) IV Intermittent every 24 hours  metoprolol tartrate 25 milliGRAM(s) Oral two times a day  midodrine 5 milliGRAM(s) Oral every 8 hours  morphine  - Injectable 2 milliGRAM(s) IV Push every 4 hours  pantoprazole  Injectable 40 milliGRAM(s) IV Push daily  polyethylene glycol 3350 17 Gram(s) Oral daily    MEDICATIONS  (PRN):  acetaminophen     Tablet .. 650 milliGRAM(s) Oral every 6 hours PRN Temp greater or equal to 38C (100.4F), Mild Pain (1 - 3)  albuterol/ipratropium for Nebulization 3 milliLiter(s) Nebulizer every 6 hours PRN Shortness of Breath and/or Wheezing  Imaging Personally Reviewed:     [x ] YES  [ ] NO    Consultant(s) Notes Reviewed:  [x ] YES  [ ] NO    Care Discussed with Consultants/Other Providers [x ] YES  [ ] NO  Care Discussed with Consultants/Other Providers [x ] YES  [ ] No medical contraindication for discharge

## 2022-03-08 NOTE — PROGRESS NOTE ADULT - REASON FOR ADMISSION
cardiac arrest
fever
s/p cardiac arrest, covid
CPA
cardiac arrest
fever
cardiac arrest
fever
respiratory failure, s/p cardiac arrest
cardiac arrest
fever
MI

## 2022-03-08 NOTE — PROGRESS NOTE ADULT - SUBJECTIVE AND OBJECTIVE BOX
CHADWICK VENCES  82y, Male  Allergy: Allergy Status Unknown      LOS  52d    CHIEF COMPLAINT: fever (08 Mar 2022 07:58)      INTERVAL EVENTS/HPI  - No acute events overnight  - T(F): , Max: 99.2 (03-07-22 @ 17:00)  - WBC Count: 14.11 (03-06-22 @ 09:07)     -      ROS  unable to obtain history secondary to patient's mental status and/or sedation    VITALS:  T(F): 97.3, Max: 99.2 (03-07-22 @ 17:00)  HR: 82  BP: 134/62  RR: 18Vital Signs Last 24 Hrs  T(C): 36.3 (08 Mar 2022 05:34), Max: 37.3 (07 Mar 2022 17:00)  T(F): 97.3 (08 Mar 2022 05:34), Max: 99.2 (07 Mar 2022 17:00)  HR: 82 (08 Mar 2022 10:53) (64 - 94)  BP: 134/62 (08 Mar 2022 05:34) (131/73 - 145/83)  BP(mean): --  RR: 18 (08 Mar 2022 05:34) (18 - 18)  SpO2: 97% (08 Mar 2022 10:53) (95% - 100%)    PHYSICAL EXAM:  Gen: vent/trach   HEENT: Normocephalic, atraumatic  Neck: supple, no lymphadenopathy  CV: Regular rate & regular rhythm  Lungs: decreased BS at bases, no fremitus  Abdomen: Soft, BS present  Ext: Warm, well perfused  Neuro: non focal, awake  Skin: no rash, no erythema  Lines: no phlebitis    FH: Non-contributory  Social Hx: Non-contributory    TESTS & MEASUREMENTS:                  Culture - Blood (collected 02-27-22 @ 17:00)  Source: .Blood Blood-Peripheral  Final Report (03-04-22 @ 23:00):    No Growth Final    Culture - Blood (collected 02-27-22 @ 17:00)  Source: .Blood Blood-Peripheral  Final Report (03-04-22 @ 23:00):    No Growth Final    Culture - Blood (collected 02-25-22 @ 07:56)  Source: .Blood None  Final Report (03-02-22 @ 19:00):    No Growth Final    Culture - Blood (collected 02-24-22 @ 11:15)  Source: .Blood None  Final Report (03-01-22 @ 21:00):    No Growth Final    Culture - Blood (collected 02-24-22 @ 11:15)  Source: .Blood None  Final Report (03-01-22 @ 21:00):    No Growth Final    Culture - Urine (collected 02-23-22 @ 21:10)  Source: Catheterized Catheterized  Final Report (02-25-22 @ 18:31):    <10,000 CFU/mL Normal Urogenital Corina    Culture - Sputum (collected 02-23-22 @ 08:00)  Source: .Sputum Sputum  Gram Stain (02-23-22 @ 22:08):    Moderate polymorphonuclear leukocytes per low power field    Rare Squamous epithelial cells per low power field    Few Gram Negative Rods per oil power field  Final Report (02-25-22 @ 20:54):    Moderate Pseudomonas aeruginosa (Carbapenem Resistant)    Normal Respiratory Corina absent  Organism: Pseudomonas aeruginosa (Carbapenem Resistant) (02-25-22 @ 20:54)  Organism: Pseudomonas aeruginosa (Carbapenem Resistant) (02-25-22 @ 20:54)      -  Amikacin: S <=16      -  Aztreonam: R >16      -  Cefepime: R >16      -  Ceftazidime: R >16      -  Ciprofloxacin: S <=0.25      -  Gentamicin: S 4      -  Imipenem: R >8      -  Levofloxacin: S <=0.5      -  Meropenem: R >8      -  Piperacillin/Tazobactam: R >64      -  Tobramycin: S <=2      Method Type: ALEXSANDRA    Culture - Blood (collected 02-22-22 @ 15:00)  Source: .Blood Blood-Peripheral  Final Report (02-27-22 @ 22:00):    No Growth Final    Culture - Blood (collected 02-15-22 @ 03:10)  Source: .Blood Blood  Final Report (02-20-22 @ 20:00):    No Growth Final    Culture - Sputum (collected 02-14-22 @ 11:30)  Source: .Sputum Deep tracheal aspirate  Gram Stain (02-14-22 @ 23:16):    Moderate polymorphonuclear leukocytes per low power field    No Squamous epithelial cells per low power field    Moderate Gram Negative Rods seen per oil power field  Final Report (02-16-22 @ 21:38):    Moderate Pseudomonas aeruginosa    Normal Respiratory Corina absent  Organism: Pseudomonas aeruginosa (02-16-22 @ 21:38)  Organism: Pseudomonas aeruginosa (02-16-22 @ 21:38)      -  Amikacin: S <=16      -  Aztreonam: S <=4      -  Cefepime: S <=2      -  Ceftazidime: S 4      -  Ciprofloxacin: S <=0.25      -  Gentamicin: S 4      -  Imipenem: S 2      -  Levofloxacin: S <=0.5      -  Meropenem: S <=1      -  Piperacillin/Tazobactam: S <=8      -  Tobramycin: S <=2      Method Type: [facility]            INFECTIOUS DISEASES TESTING  COVID-19 PCR: NotDetec (03-02-22 @ 13:30)  Procalcitonin, Serum: 0.51 (02-27-22 @ 07:33)  COVID-19 PCR: Detected (02-20-22 @ 15:33)  COVID-19 PCR: NotDetec (02-15-22 @ 07:30)  Procalcitonin, Serum: 0.09 (02-12-22 @ 06:21)  COVID-19 PCR: Detected (02-10-22 @ 13:00)  Procalcitonin, Serum: 0.04 (02-09-22 @ 06:03)  Procalcitonin, Serum: 0.03 (02-01-22 @ 06:25)  Procalcitonin, Serum: 0.07 (01-31-22 @ 06:15)  Procalcitonin, Serum: 0.10 (01-30-22 @ 10:45)  Procalcitonin, Serum: 0.11 (01-29-22 @ 07:17)  Procalcitonin, Serum: 0.08 (01-26-22 @ 06:15)  Procalcitonin, Serum: 0.09 (01-25-22 @ 05:58)  Procalcitonin, Serum: 0.11 (01-23-22 @ 11:17)  MRSA PCR Result.: Negative (01-23-22 @ 11:00)  Procalcitonin, Serum: 0.44 (01-19-22 @ 06:30)  Procalcitonin, Serum: 2.32 (01-16-22 @ 06:56)  Rapid RVP Result: Detected (01-15-22 @ 01:25)  Procalcitonin, Serum: 0.10 (01-15-22 @ 01:25)      INFLAMMATORY MARKERS  C-Reactive Protein, Serum: <3 mg/L (02-01-22 @ 06:25)  C-Reactive Protein, Serum: <3 mg/L (01-26-22 @ 06:15)  C-Reactive Protein, Serum: 50 mg/L (01-16-22 @ 06:56)  C-Reactive Protein, Serum: 76 mg/L (01-15-22 @ 01:25)      RADIOLOGY & ADDITIONAL TESTS:  I have personally reviewed the last available Chest xray  CXR      CT      CARDIOLOGY TESTING      MEDICATIONS  ALBUTerol    90 MICROgram(s) HFA Inhaler 2 Inhalation every 6 hours  amantadine Syrup 100 Oral every 12 hours  aspirin  chewable 81 Oral daily  chlorhexidine 0.12% Liquid 15 Oral Mucosa every 12 hours  chlorhexidine 4% Liquid 1 Topical <User Schedule>  collagenase Ointment 1 Topical two times a day  dextrose 5%. 1000 IV Continuous <Continuous>  dextrose 5%. 1000 IV Continuous <Continuous>  dextrose 5%. 1000 IV Continuous <Continuous>  dextrose 50% Injectable 25 IV Push once  dextrose 50% Injectable 12.5 IV Push once  dextrose 50% Injectable 25 IV Push once  enoxaparin Injectable 40 SubCutaneous daily  glucagon  Injectable 1 IntraMuscular once  insulin lispro (ADMELOG) corrective regimen sliding scale  SubCutaneous every 6 hours  LORazepam     Tablet 2 Oral two times a day  midodrine 5 Oral every 8 hours  morphine Concentrate 3 Oral every 4 hours  pantoprazole  Injectable 40 IV Push daily  polyethylene glycol 3350 17 Oral daily  sodium zirconium cyclosilicate 10 Oral once      WEIGHT  Weight (kg): 72.5 (02-18-22 @ 15:06)      ANTIBIOTICS:      All available historical records have been reviewed

## 2022-03-08 NOTE — DISCHARGE NOTE NURSING/CASE MANAGEMENT/SOCIAL WORK - NSDCPEFALRISK_GEN_ALL_CORE
For information on Fall & Injury Prevention, visit: [email or web address] OR  [email or web address] OR  https://www.cdc.gov/steadi/patient.html

## 2022-03-08 NOTE — PROGRESS NOTE ADULT - ASSESSMENT
81 year old male with a hx of HTN, BPH, recent diagnosis of COVID 19 2 days ago (unvaccinated, prescribed decadron/ zpack/ zinc) presenting to the ED S/P cardiac arrest. Intubated in the field    IMPRESSION  #COVID19 PNA, Severe (O2 < or = 94% on RA and requiring supplemental O2) with Cardiac arrest    CXR diffuse bilateral opacities    D-Dimer Assay, Quantitative: 7368 ng/mL DDU (01-15-22 @ 01:25)    s/p Toci 1/17    #Fevers 2/22 - VAP  - Blood Cx 2/22 NG  - Sputum Cx 2/22 PSeudomonas CR  - CT Abd/pelvis 2/27 - no intraabdominal acute process      #Fevers 2/13 - resolved  - cXR 2/14 with bilateral opacities, worsening on Right  - sputum Cx 2/14 - Pseudomonas  - treated with course of cefepime    #Cardiac arrest- ROSC was achieved after 2 rounds of CPR and epi, downtime 7 mins  - s/p trach 2/11    #Left sided Pneumothorax- on CXR from 2/2/22    Antibiotic course  Cefepime 2/16- 3/2  Levofloxacin 2/26 -3/7    Recommendations  - completed course of antibiotics   - recall as needed    Please call or message on Microsoft Teams if with any questions.  Spectra [phone]

## 2022-03-08 NOTE — PROGRESS NOTE ADULT - PROVIDER SPECIALTY LIST ADULT
Critical Care
Hospitalist
Infectious Disease
Pulmonology
Surgery
Cardiology
Critical Care
Gastroenterology
Gastroenterology
Hospitalist
Infectious Disease
Neurology
Pulmonology
Surgery
CT Surgery
Cardiology
Cardiology
Critical Care
Gastroenterology
Gastroenterology
Hospitalist
Infectious Disease
Infectious Disease
Neurology
Neurology
Pulmonology
Surgery
Hospitalist
Infectious Disease
Pulmonology
Infectious Disease
Surgery
Surgery
Palliative Care
Palliative Care

## 2022-03-08 NOTE — DISCHARGE NOTE NURSING/CASE MANAGEMENT/SOCIAL WORK - PATIENT PORTAL LINK FT
You can access the FollowMyHealth Patient Portal offered by [facility] by registering at the following website: [email or web address]. By joining [facility]’s FollowMyHealth portal, you will also be able to view your health information using other applications (apps) compatible with our system.

## 2022-03-08 NOTE — PROGRESS NOTE ADULT - SUBJECTIVE AND OBJECTIVE BOX
CC.  Fever  HPI.  Patient is intubated/vented. Very calm and not agitated or overbreathing. Daughter updated every other day. Pt is DNR/DNI.       Vital Signs Last 24 Hrs  T(C): 35.9 (06 Mar 2022 05:52), Max: 36 (05 Mar 2022 21:04)  T(F): 96.7 (06 Mar 2022 05:52), Max: 96.8 (05 Mar 2022 21:04)  HR: 80 (06 Mar 2022 05:52) (63 - 85)  BP: 112/74 (06 Mar 2022 05:52) (112/74 - 132/58)  BP(mean): --  RR: 18 (06 Mar 2022 05:52) (18 - 18)  SpO2: 97% (05 Mar 2022 20:01) (97% - 97%)    PHYSICAL EXAM-  GENERAL: NAD   HEAD:  Atraumatic, Normocephalic  EYES: EOMI, PERRLA, conjunctiva and sclera clear  NECK: Supple, No JVD, Normal thyroid  NERVOUS SYSTEM:  unresponsive   CHEST/LUNG: + rhonchi with good air entry.  No retractions or accessory muscle usage   HEART: Regular rate and rhythm; No murmurs, rubs, or gallops  ABDOMEN: Soft, Nontender, Nondistended; Bowel sounds present  EXTREMITIES:   No clubbing, cyanosis, or edema  SKIN: No rashes or lesions                                  8.8    10.09 )-----------( 189      ( 03 Mar 2022 06:07 )             30.0     03-03    148<H>  |  109  |  73<HH>  ----------------------------<  143<H>  5.1<H>   |  31  |  0.9    Ca    8.2<L>      03 Mar 2022 06:07    TPro  4.8<L>  /  Alb  2.4<L>  /  TBili  0.2  /  DBili  x   /  AST  19  /  ALT  27  /  AlkPhos  98  03-03                                       MEDICATIONS  (STANDING):  ALBUTerol    90 MICROgram(s) HFA Inhaler 2 Puff(s) Inhalation every 6 hours  amantadine Syrup 100 milliGRAM(s) Oral every 12 hours  aspirin  chewable 81 milliGRAM(s) Oral daily  chlorhexidine 0.12% Liquid 15 milliLiter(s) Oral Mucosa every 12 hours  chlorhexidine 4% Liquid 1 Application(s) Topical <User Schedule>  collagenase Ointment 1 Application(s) Topical two times a day  dextrose 5%. 1000 milliLiter(s) (100 mL/Hr) IV Continuous <Continuous>  dextrose 5%. 1000 milliLiter(s) (50 mL/Hr) IV Continuous <Continuous>  dextrose 50% Injectable 25 Gram(s) IV Push once  dextrose 50% Injectable 12.5 Gram(s) IV Push once  dextrose 50% Injectable 25 Gram(s) IV Push once  enoxaparin Injectable 40 milliGRAM(s) SubCutaneous daily  glucagon  Injectable 1 milliGRAM(s) IntraMuscular once  insulin lispro (ADMELOG) corrective regimen sliding scale   SubCutaneous every 6 hours  levoFLOXacin IVPB      levoFLOXacin IVPB 750 milliGRAM(s) IV Intermittent every 24 hours  metoprolol tartrate 25 milliGRAM(s) Oral two times a day  midodrine 5 milliGRAM(s) Oral every 8 hours  morphine  - Injectable 2 milliGRAM(s) IV Push every 4 hours  pantoprazole  Injectable 40 milliGRAM(s) IV Push daily  polyethylene glycol 3350 17 Gram(s) Oral daily    MEDICATIONS  (PRN):  acetaminophen     Tablet .. 650 milliGRAM(s) Oral every 6 hours PRN Temp greater or equal to 38C (100.4F), Mild Pain (1 - 3)  albuterol/ipratropium for Nebulization 3 milliLiter(s) Nebulizer every 6 hours PRN Shortness of Breath and/or Wheezing  Imaging Personally Reviewed:     [x ] YES  [ ] NO    Consultant(s) Notes Reviewed:  [x ] YES  [ ] NO    Care Discussed with Consultants/Other Providers [x ] YES  [ ] NO  Care Discussed with Consultants/Other Providers [x ] YES  [ ] No medical contraindication for discharge

## 2022-03-08 NOTE — PROGRESS NOTE ADULT - TIME BILLING
direct pt care
I have personally seen and examined this patient.    I have reviewed all pertinent clinical information and reviewed all relevant imaging and diagnostic studies personally.   I counseled the patient about diagnostic testing and treatment plan. All questions were answered.   I discussed recommendations with the primary team.
Coordination of care
I have personally seen and examined this patient.    I have reviewed all pertinent clinical information and reviewed all relevant imaging and diagnostic studies personally.   I counseled the patient about diagnostic testing and treatment plan. All questions were answered.   I discussed recommendations with the primary team.
direct pt care
Coordination of care
I have personally seen and examined this patient.    I have reviewed all pertinent clinical information and reviewed all relevant imaging and diagnostic studies personally.   I counseled the patient about diagnostic testing and treatment plan. All questions were answered.   I discussed recommendations with the primary team.
direct pt care
I have personally seen and examined this patient.    I have reviewed all pertinent clinical information and reviewed all relevant imaging and diagnostic studies personally.   I counseled the patient about diagnostic testing and treatment plan. All questions were answered.   I discussed recommendations with the primary team.
I have personally seen and examined this patient.    I have reviewed all pertinent clinical information and reviewed all relevant imaging and diagnostic studies personally.   I counseled the patient about diagnostic testing and treatment plan. All questions were answered.   I discussed recommendations with the primary team.

## 2022-04-28 NOTE — PROCEDURAL SAFETY CHECKLIST WITH OR WITHOUT SEDATION - NSCNFIRMBLDLOSS_GEN_ALL_CORE
[de-identified] :  1/26/16\par \par Follow-up today. Symptoms continue. Some days better than others. Gait is improving following recent ankle surgery. OOW.
n/a

## 2022-06-08 NOTE — DISCHARGE NOTE PROVIDER - EXTENDED VTE YES NO FOR MLM ASPIRIN
Subjective:   Patient seen at bedside this AM. Reports feeling well, without complaints. Denies chest pain, SOB. Tolerating diet without N/V.     24h Events:   - Overnight, no acute events    Objective:  Vital Signs  T(C): 37.2 (06-07 @ 21:42), Max: 37.2 (06-07 @ 21:42)  HR: 80 (06-07 @ 21:42) (77 - 88)  BP: 132/67 (06-07 @ 21:42) (121/62 - 144/70)  RR: 18 (06-07 @ 21:42) (18 - 18)  SpO2: 100% (06-07 @ 21:42) (96% - 100%)  06-06-22 @ 07:01  -  06-07-22 @ 07:00  --------------------------------------------------------  IN:  Total IN: 0 mL    OUT:    Gastrostomy Tube (mL): 100 mL    Voided (mL): 2070 mL  Total OUT: 2170 mL    Total NET: -2170 mL      06-07-22 @ 07:01  -  06-08-22 @ 00:10  --------------------------------------------------------  IN:  Total IN: 0 mL    OUT:    Gastrostomy Tube (mL): 25 mL    Stool (mL): 6 mL    Voided (mL): 1100 mL  Total OUT: 1131 mL    Total NET: -1131 mL      Physical Exam:  General: Appears well, NAD  CHEST: breathing comfortably  CV: appears well perfused  Abdomen: soft, nontender, nondistended, no rebound or guarding, GJ tube in place, G tube output bilious  Extremities: Grossly symmetric        Labs:                        7.8    4.60  )-----------( 219      ( 07 Jun 2022 05:05 )             24.0   06-07    137  |  105  |  38<H>  ----------------------------<  131<H>  4.1   |  22  |  0.94    Ca    7.9<L>      07 Jun 2022 05:05  Phos  3.1     06-07  Mg     2.00     06-07      CAPILLARY BLOOD GLUCOSE      POCT Blood Glucose.: 147 mg/dL (07 Jun 2022 21:03)  POCT Blood Glucose.: 146 mg/dL (07 Jun 2022 17:37)  POCT Blood Glucose.: 150 mg/dL (07 Jun 2022 12:09)  POCT Blood Glucose.: 148 mg/dL (07 Jun 2022 05:14)  POCT Blood Glucose.: 167 mg/dL (07 Jun 2022 01:22)      Medications:   MEDICATIONS  (STANDING):  chlorhexidine 2% Cloths 1 Application(s) Topical daily  dextrose 50% Injectable 25 Gram(s) IV Push once  dextrose 50% Injectable 12.5 Gram(s) IV Push once  diphenhydrAMINE Injectable 25 milliGRAM(s) IV Push at bedtime  enoxaparin Injectable 40 milliGRAM(s) SubCutaneous every 24 hours  insulin lispro (ADMELOG) corrective regimen sliding scale   SubCutaneous every 6 hours  metoclopramide Injectable 10 milliGRAM(s) IV Push three times a day  pantoprazole  Injectable 40 milliGRAM(s) IV Push daily  Parenteral Nutrition - Adult 1 Each (42 mL/Hr) TPN Continuous <Continuous>    MEDICATIONS  (PRN):  HYDROmorphone  Injectable 0.5 milliGRAM(s) IV Push every 3 hours PRN Severe Pain (7 - 10)  HYDROmorphone  Injectable 0.25 milliGRAM(s) IV Push every 3 hours PRN Moderate Pain (4 - 6)  nalbuphine Injectable 2.5 milliGRAM(s) IV Push every 6 hours PRN Pruritus  naloxone Injectable 0.1 milliGRAM(s) IV Push every 3 minutes PRN For ANY of the following changes in patient status:  A. RR LESS THAN 10 breaths per minute, B. Oxygen saturation LESS THAN 90%, C. Sedation score of 6      Imaging:     , Subjective:   Patient seen at bedside this AM. No N/V. 7x soft non-watery BMs, overnight.      Objective:  Vital Signs  T(C): 37.2 (06-07 @ 21:42), Max: 37.2 (06-07 @ 21:42)  HR: 80 (06-07 @ 21:42) (77 - 88)  BP: 132/67 (06-07 @ 21:42) (121/62 - 144/70)  RR: 18 (06-07 @ 21:42) (18 - 18)  SpO2: 100% (06-07 @ 21:42) (96% - 100%)  06-06-22 @ 07:01  -  06-07-22 @ 07:00  --------------------------------------------------------  IN:  Total IN: 0 mL    OUT:    Gastrostomy Tube (mL): 100 mL    Voided (mL): 2070 mL  Total OUT: 2170 mL    Total NET: -2170 mL      06-07-22 @ 07:01  -  06-08-22 @ 00:10  --------------------------------------------------------  IN:  Total IN: 0 mL    OUT:    Gastrostomy Tube (mL): 25 mL    Stool (mL): 6 mL    Voided (mL): 1100 mL  Total OUT: 1131 mL    Total NET: -1131 mL      Physical Exam:  General: Appears well, NAD  CHEST: breathing comfortably  CV: appears well perfused  Abdomen: soft, nontender, nondistended, no rebound or guarding, GJ tube in place, G tube output bilious, clamped today, prevena taken down, incision c/d/i  Extremities: Grossly symmetric    Labs:                        7.8    4.60  )-----------( 219      ( 07 Jun 2022 05:05 )             24.0   06-07    137  |  105  |  38<H>  ----------------------------<  131<H>  4.1   |  22  |  0.94    Ca    7.9<L>      07 Jun 2022 05:05  Phos  3.1     06-07  Mg     2.00     06-07      CAPILLARY BLOOD GLUCOSE      POCT Blood Glucose.: 147 mg/dL (07 Jun 2022 21:03)  POCT Blood Glucose.: 146 mg/dL (07 Jun 2022 17:37)  POCT Blood Glucose.: 150 mg/dL (07 Jun 2022 12:09)  POCT Blood Glucose.: 148 mg/dL (07 Jun 2022 05:14)  POCT Blood Glucose.: 167 mg/dL (07 Jun 2022 01:22)      Medications:   MEDICATIONS  (STANDING):  chlorhexidine 2% Cloths 1 Application(s) Topical daily  dextrose 50% Injectable 25 Gram(s) IV Push once  dextrose 50% Injectable 12.5 Gram(s) IV Push once  diphenhydrAMINE Injectable 25 milliGRAM(s) IV Push at bedtime  enoxaparin Injectable 40 milliGRAM(s) SubCutaneous every 24 hours  insulin lispro (ADMELOG) corrective regimen sliding scale   SubCutaneous every 6 hours  metoclopramide Injectable 10 milliGRAM(s) IV Push three times a day  pantoprazole  Injectable 40 milliGRAM(s) IV Push daily  Parenteral Nutrition - Adult 1 Each (42 mL/Hr) TPN Continuous <Continuous>    MEDICATIONS  (PRN):  HYDROmorphone  Injectable 0.5 milliGRAM(s) IV Push every 3 hours PRN Severe Pain (7 - 10)  HYDROmorphone  Injectable 0.25 milliGRAM(s) IV Push every 3 hours PRN Moderate Pain (4 - 6)  nalbuphine Injectable 2.5 milliGRAM(s) IV Push every 6 hours PRN Pruritus  naloxone Injectable 0.1 milliGRAM(s) IV Push every 3 minutes PRN For ANY of the following changes in patient status:  A. RR LESS THAN 10 breaths per minute, B. Oxygen saturation LESS THAN 90%, C. Sedation score of 6

## 2022-10-23 PROBLEM — N40.0 BENIGN PROSTATIC HYPERPLASIA WITHOUT LOWER URINARY TRACT SYMPTOMS: Chronic | Status: ACTIVE | Noted: 2022-01-01

## 2022-10-23 PROBLEM — I10 ESSENTIAL (PRIMARY) HYPERTENSION: Chronic | Status: ACTIVE | Noted: 2022-01-01

## 2022-10-23 NOTE — H&P ADULT - ASSESSMENT
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 At baseline, patient is unresponsive, makes random non-purposeful movement, had trach(Vent dependent) and PEG.    #Sepsis at nursing home  #Suspected Pyelonephritis  #Suspected Ventilator associated PNA  #H/o Severe Covid-19 PNA  #H/o Gram -ve PNA  -As per SNF documents, patient was being treated for sepsis of unknown origin with Meropenem 1g q8 started on 10/21 for 10 days  -UA grossly positive with WBC 13K  -CXR shows worsening b/l opacities, due to previous COVID PNA vs CHF vs VAP  -H/o multiple pressure ulcer  -Right midline seems NOT infected  -F/u Blood cx, Urine cx, Sputum cx, MRSA, Urine strep, Urine legionella, Procal, ESR, CRP, D-dimer, Ferritin, RVP panel  -c/w Meropenem, start Vancomycin  -ID consult, Wound care RN consult  -May require Burn consult if infection of pressure ulcer observed    #Metabolic alkalosis 2/2 Contraction alkalosis  #High BUN 2/2 Dehydration  -Start Free water 200 q6, increase as needed  -Start tube feeds  -If worsening nephrology consult    #Suspected CHF exacerbation vs PNA  -CXR shows increased b/l opacity  -B/L pitting edema  -c/w Home dose bumex for now  -F/u TTE  -F/u BNP    #Hypernatremia  -Na 152 on admission, 2.2L free water deficit  -Correct NO more than 10 in 24hrs  -Start Free water 200 q6- Increase PRN  -BMP q12    #Hyperkalemia  -Was on potassium supplementation at [facility]  -Start [facility]  -Monitor BMP    #Anoxic brain injury  #H/o Cardiac arrest  -Unresponsive, very poor prognosis  -DNR only for now     #Misc  -DVT prophylaxis: Eliquis  -GI prophylaxis: Protonix  -Diet: NPO with tube feed- Glucerna, 200q6 free water- Nutrition consult  -Code status: DNR  -Activity: Bed bound  -Dispo: SNF when stable    -Med rec confirmed from nursing home charts 83 YO M w/Pmhx of Cardiac arrest leading to anoxic brain injury(s/p Trach and PEG), H/o Severe Covid PNA, H/o Gram -ve PNA, HTN and BPH presented for abnormal labs in the nursing home. At baseline, patient is unresponsive, makes random non-purposeful movement, had trach(Vent dependent) and PEG.    #Sepsis at nursing home  #Suspected Pyelonephritis  #Suspected Ventilator associated PNA  #H/o Severe Covid-19 PNA  #H/o Gram -ve PNA  -As per SNF documents, patient was being treated for sepsis of unknown origin with Meropenem 1g q8 started on 10/21 for 10 days  -UA grossly positive with WBC 13K  -CXR shows worsening b/l opacities, due to previous COVID PNA vs CHF vs VAP  -H/o multiple pressure ulcer  -Right midline seems NOT infected  -F/u Blood cx, Urine cx, Sputum cx, MRSA, Urine strep, Urine legionella, Procal, ESR, CRP, D-dimer, Ferritin, RVP panel  -c/w Meropenem, start Vancomycin  -ID consult, Wound care RN consult  -May require Burn consult if infection of pressure ulcer observed    #Metabolic alkalosis 2/2 Contraction alkalosis  #High BUN 2/2 Dehydration  -Start Free water 200 q6, increase as needed  -Start tube feeds  -If worsening nephrology consult    #Suspected CHF exacerbation vs PNA  -CXR shows increased b/l opacity  -B/L pitting edema  -c/w Home dose bumex for now  -F/u TTE  -F/u BNP    #Hypernatremia  -Na 152 on admission, 2.2L free water deficit  -Correct NO more than 10 in 24hrs  -Start Free water 200 q6- Increase PRN  -BMP q12    #Hyperkalemia  -Was on potassium supplementation at SNF  -Start Lokelma  -Monitor BMP    #NSTEMI type 2  -Trops elevated but lower from previous admission  -F/u EKG  -Trend trops till peak    #Left eye conjunctivitis  -start ofloxacin eye drops    #Anoxic brain injury  #H/o Cardiac arrest  -Unresponsive, very poor prognosis  -DNR only for now     #Misc  -DVT prophylaxis: Eliquis  -GI prophylaxis: Protonix  -Diet: NPO with tube feed- Glucerna, 200q6 free water- Nutrition consult  -Code status: DNR  -Activity: Bed bound  -Dispo: SNF when stable    -Med rec confirmed from nursing home charts 83 YO M w/Pmhx of Cardiac arrest leading to anoxic brain injury(s/p Trach and PEG), H/o Severe Covid PNA, H/o Gram -ve PNA, HTN and BPH presented for abnormal labs in the nursing home. At baseline, patient is unresponsive, makes random non-purposeful movement, had trach(Vent dependent) and PEG.    #Sepsis at nursing home  #Suspected Pyelonephritis  #Suspected Ventilator associated PNA  #H/o Severe Covid-19 PNA  #H/o Gram -ve PNA  -As per SNF documents, patient was being treated for sepsis of unknown origin with Meropenem 1g q8 started on 10/21 for 10 days  -UA grossly positive with WBC 13K  -CXR shows worsening b/l opacities, due to previous COVID PNA vs CHF vs VAP  -H/o multiple pressure ulcer  -Right midline seems NOT infected  -F/u Blood cx, Urine cx, Sputum cx, MRSA, Urine strep, Urine legionella, Procal, ESR, CRP, D-dimer, Ferritin, RVP panel  -c/w Meropenem, start Vancomycin  -ID consult, Wound care RN consult  -May require Burn consult if infection of pressure ulcer observed    #Metabolic alkalosis 2/2 Contraction alkalosis  #High BUN 2/2 Dehydration  -Start Free water 200 q6, increase as needed  -Start tube feeds  -If worsening nephrology consult    #Anemia- likely 2/2 chronic ds  -Hold Eliquis  -F/u B12, folate and iron profile  -Start supplementation as required    #Suspected CHF exacerbation vs PNA  -CXR shows increased b/l opacity  -B/L pitting edema  -c/w Home dose bumex for now  -F/u TTE  -F/u BNP    #Hypernatremia  -Na 152 on admission, 2.2L free water deficit  -Correct NO more than 10 in 24hrs  -Start Free water 200 q6- Increase PRN  -BMP q12    #Hyperkalemia  -Was on potassium supplementation at [facility]  -Start Lokelma  -Monitor BMP    #NSTEMI type 2  -Trops elevated but lower from previous admission  -F/u EKG  -Trend trops till peak    #Left eye conjunctivitis  -start ofloxacin eye drops    #Anoxic brain injury  #H/o Cardiac arrest  -Unresponsive, very poor prognosis  -DNR only for now     #Misc  -DVT prophylaxis: Eliquis on hold, start SCD after duplex -ve  -GI prophylaxis: Protonix  -Diet: NPO with tube feed- Glucerna, 200q6 free water- Nutrition consult  -Code status: DNR  -Activity: Bed bound  -Dispo: SNF when stable    -Med rec confirmed from nursing home charts 83 YO M w/Pmhx of Cardiac arrest leading to anoxic brain injury(s/p Trach and PEG), H/o Severe Covid PNA, H/o Gram -ve PNA, HTN and BPH presented for abnormal labs in the nursing home. At baseline, patient is unresponsive, makes random non-purposeful movement, had trach(Vent dependent) and PEG.    #Sepsis at nursing home  #Suspected Pyelonephritis  #Suspected Ventilator associated PNA  #H/o Severe Covid-19 PNA  #H/o Gram -ve PNA  -As per SNF documents, patient was being treated for sepsis of unknown origin with Meropenem 1g q8 started on 10/21 for 10 days  -UA grossly positive with WBC 13K  -CXR shows worsening b/l opacities, due to previous COVID PNA vs CHF vs VAP  -H/o multiple pressure ulcer  -Right midline seems NOT infected  -F/u Blood cx, Urine cx, Sputum cx, MRSA, Urine strep, Urine legionella, Procal, ESR, CRP, D-dimer, Ferritin, RVP panel  -c/w Meropenem, start Vancomycin(Ordered Jair for only 2 doses for now)  -ID consult, Wound care RN consult  -May require Burn consult if infection of pressure ulcer observed    #Metabolic alkalosis 2/2 Contraction alkalosis  #High BUN 2/2 Dehydration  -Start Free water 200 q6, increase as needed  -Start tube feeds  -If worsening nephrology consult    #Anemia- likely 2/2 chronic ds  -Hold Eliquis  -F/u B12, folate and iron profile  -Start supplementation as required    #Suspected CHF exacerbation vs PNA  -CXR shows increased b/l opacity  -B/L pitting edema  -c/w Home dose bumex for now  -F/u TTE  -F/u BNP    #Hypernatremia  -Na 152 on admission, 2.2L free water deficit  -Correct NO more than 10 in 24hrs  -Start Free water 200 q6- Increase PRN  -BMP q12    #Hyperkalemia  -Was on potassium supplementation at [facility]  -Start Lokelma  -Monitor BMP    #NSTEMI type 2  -Trops elevated but lower from previous admission  -F/u EKG  -Trend trops till peak    #Left eye conjunctivitis  -start ofloxacin eye drops    #Anoxic brain injury  #H/o Cardiac arrest  -Unresponsive, very poor prognosis  -DNR only for now     #Misc  -DVT prophylaxis: Eliquis on hold, start SCD after duplex -ve  -GI prophylaxis: Protonix  -Diet: NPO with tube feed- Glucerna, 200q6 free water- Nutrition consult  -Code status: DNR  -Activity: Bed bound  -Dispo: SNF when stable    -Med rec confirmed from nursing home charts 81 YO M w/Pmhx of Cardiac arrest leading to anoxic brain injury(s/p Trach and PEG), H/o Severe Covid PNA, H/o Gram -ve PNA, HTN and BPH presented for abnormal labs in the nursing home. At baseline, patient is unresponsive, makes random non-purposeful movement, had trach(Vent dependent) and PEG.    #Sepsis at nursing home  #Suspected Pyelonephritis  #Suspected Ventilator associated gram negative PNA  #Functional quadriplegia  #H/o Severe Covid-19 PNA  #H/o Gram -ve PNA  -As per SNF documents, patient was being treated for sepsis of unknown origin with Meropenem 1g q8 started on 10/21 for 10 days  -UA grossly positive with WBC 13K  -CXR shows worsening b/l opacities, due to previous COVID PNA vs CHF vs VAP  -H/o multiple pressure ulcer  -Right midline seems NOT infected  -F/u Blood cx, Urine cx, Sputum cx, MRSA, Urine strep, Urine legionella, Procal, ESR, CRP, D-dimer, Ferritin, RVP panel  -c/w Meropenem, start Vancomycin(Ordered Jair for only 2 doses for now)  -ID consult, Wound care RN consult  -May require Burn consult if infection of pressure ulcer observed    #Metabolic alkalosis 2/2 Contraction alkalosis  #High BUN 2/2 Dehydration/[place]  -Start Free water 200 q6, increase as needed  -Start tube feeds  -If worsening nephrology consult    #Anemia- likely 2/2 chronic ds  -Hold Eliquis  -F/u B12, folate and iron profile  -Start supplementation as required    #Suspected CHFpEF exacerbation   -CXR shows increased b/l opacity  -B/L pitting edema  -c/w Home dose bumex for now  -F/u TTE  -F/u BNP    #Hypernatremia  -Na 152 on admission, 2.2L free water deficit  -Correct NO more than 10 in 24hrs  -Start Free water 200 q6- Increase PRN  -BMP q12    #Hyperkalemia  -Was on potassium supplementation at [facility]  -Start [facility]  -Monitor BMP    #NSTEMI type 2  -Trops elevated but lower from previous admission  -F/u EKG  -Trend trops till peak    #Left eye conjunctivitis  -start ofloxacin eye drops    #Anoxic brain injury  #H/o Cardiac arrest  -Unresponsive, very poor prognosis  -DNR only for now     #Misc  -DVT prophylaxis: Eliquis on hold, start SCD after duplex -ve  -GI prophylaxis: Protonix  -Diet: NPO with tube feed- Glucerna, 200q6 free water- Nutrition consult  -Code status: DNR  -Activity: Bed bound  -Dispo: SNF when stable    -Med rec confirmed from nursing home charts

## 2022-10-23 NOTE — H&P ADULT - NSHPLABSRESULTS_GEN_ALL_CORE
7.0    13.04 )-----------( 249      ( 23 Oct 2022 04:15 )             25.5     10-23    152<H>  |  105  |  94<HH>  ----------------------------<  102<H>  5.3<H>   |  43<HH>  |  0.8    Ca    7.8<L>      23 Oct 2022 04:15    TPro  5.6<L>  /  Alb  2.3<L>  /  TBili  0.3  /  DBili  x   /  AST  21  /  ALT  9   /  AlkPhos  80  10-23 7.0    13.04 )-----------( 249      ( 23 Oct 2022 04:15 )             25.5     10-23    152<H>  |  105  |  94<HH>  ----------------------------<  102<H>  5.3<H>   |  43<HH>  |  0.8    Ca    7.8<L>      23 Oct 2022 04:15    TPro  5.6<L>  /  Alb  2.3<L>  /  TBili  0.3  /  DBili  x   /  AST  21  /  ALT  9   /  AlkPhos  80  10-23    ekg: nsr prolonged qt lvh (int by me)    cxr  increased bilateral opacities    duplex  negative dvt

## 2022-10-23 NOTE — H&P ADULT - ATTENDING COMMENTS
as above    Palliative care visit appreciated    Hgb 6.4  Elevated BUN    Supportive care  To adjust

## 2022-10-23 NOTE — CONSULT NOTE ADULT - SUBJECTIVE AND OBJECTIVE BOX
Patient is a 82y old  Male who presents with a chief complaint of abnormal labs at SNF    HPI:  81 YO M w/Pmhx of Cardiac arrest leading to anoxic brain injury(s/p Trach and PEG), H/o Severe Covid PNA, H/o Gram -ve PNA, HTN and BPH presented for abnormal labs in the nursing home. At baseline, patient is unresponsive, makes random non-purposeful movement, had trach(Vent dependent) and PEG.    On 10/22, labs in the SNF showed anemia and uremia, also patient was tachycardic to 101 and tachypneic to 22, was sent to ED for evaluation.    In the MOLST form from the SNF, patient is DNR/DNI and CMO, but when I called the daughter Judy [place], she wanted patient to be DNR, but also wanted all other intervention to be done for now.    In the ED,  patient had WBC 13K, Hb 7, Na 152, K 5.3, Bicarb 43, BUN 94, Trop 0.38, ABG showed metabolic alkalosis, UA was grossly positive(with pus in the urine), CXR showed increased B/L opacity.    Vital Signs Last 24 Hrs:  T(F): 98.7 (23 Oct 2022 00:05), Max: 98.7 (23 Oct 2022 00:05)  HR: 96 (23 Oct 2022 02:25) (96 - 100)  BP: 110/72 (23 Oct 2022 00:05) (110/72 - 110/72)  RR: 24 (23 Oct 2022 00:05) (24 - 24)  SpO2: 100% (23 Oct 2022 02:25) (100% - 100%) on Vent     (23 Oct 2022 08:00)      PAST MEDICAL & SURGICAL HISTORY:  BPH (benign prostatic hyperplasia)      Mild HTN      Cardiac arrest      Anoxic brain injury      2019 novel coronavirus disease (COVID-19)          SOCIAL HX:   Smoking                         ETOH                            Other    FAMILY HISTORY:  :  No known cardiovacular family hisotry     Review Of Systems:     All ROS are negative except per HPI       Allergies    Allergy Status Unknown    Intolerances          PHYSICAL EXAM    ICU Vital Signs Last 24 Hrs  T(C): 37.1 (23 Oct 2022 00:05), Max: 37.1 (23 Oct 2022 00:05)  T(F): 98.7 (23 Oct 2022 00:05), Max: 98.7 (23 Oct 2022 00:05)  HR: 89 (23 Oct 2022 08:50) (89 - 100)  BP: 110/72 (23 Oct 2022 00:05) (110/72 - 110/72)  BP(mean): --  ABP: --  ABP(mean): --  RR: 24 (23 Oct 2022 00:05) (24 - 24)  SpO2: 100% (23 Oct 2022 08:50) (100% - 100%)    O2 Parameters below as of 23 Oct 2022 00:05  Patient On (Oxygen Delivery Method): ventilator  O2 Flow (L/min): 8  O2 Concentration (%): 70      Physical Exam: GENERAL: ill appearing. Trach and PEG  EYES: Mild pus in left eye, non-reactive pupil  CHEST/LUNG: B/L Coarse lung sounds  HEART: s1 and s2 heard  ABDOMEN:  Mild distension  EXTREMITIES: B/L dependent pitting edema 2-3+  NERVOUS SYSTEM:  unresponsive            LABS:                          7.0    13.04 )-----------( 249      ( 23 Oct 2022 04:15 )             25.5                                               10-23    152<H>  |  105  |  94<HH>  ----------------------------<  102<H>  5.3<H>   |  43<HH>  |  0.8    Ca    7.8<L>      23 Oct 2022 04:15    TPro  5.6<L>  /  Alb  2.3<L>  /  TBili  0.3  /  DBili  x   /  AST  21  /  ALT  9   /  AlkPhos  80  10-23      PT/INR - ( 23 Oct 2022 04:15 )   PT: 16.10 sec;   INR: 1.40 ratio         PTT - ( 23 Oct 2022 04:15 )  PTT:29.3 sec                                       Urinalysis Basic - ( 23 Oct 2022 05:45 )    Color: Yellow / Appearance: Slightly Turbid / S.017 / pH: x  Gluc: x / Ketone: Negative  / Bili: Negative / Urobili: <2 mg/dL   Blood: x / Protein: 30 mg/dL / Nitrite: Negative   Leuk Esterase: Large / RBC: 2 /HPF /  /HPF   Sq Epi: x / Non Sq Epi: 0 /HPF / Bacteria: Negative        CARDIAC MARKERS ( 23 Oct 2022 04:15 )  x     / 0.38 ng/mL / x     / x     / x                                                LIVER FUNCTIONS - ( 23 Oct 2022 04:15 )  Alb: 2.3 g/dL / Pro: 5.6 g/dL / ALK PHOS: 80 U/L / ALT: 9 U/L / AST: 21 U/L / GGT: x                                                                                               Mode: AC/ CMV (Assist Control/ Continuous Mandatory Ventilation)  RR (machine): 20  TV (machine): 400  FiO2: 60  PEEP: 8  ITime: 1  MAP: 14  PIP: 30                                      ABG - ( 23 Oct 2022 03:34 )  pH, Arterial: 7.56  pH, Blood: x     /  pCO2: 54    /  pO2: 173   / HCO3: 48    / Base Excess: 22.5  /  SaO2: 100.0               X-Rays reviewed                                                                                     ECHO    CXR interpreted by me     MEDICATIONS  (STANDING):  apixaban 2.5 milliGRAM(s) Enteral Tube every 12 hours  atorvastatin 10 milliGRAM(s) Oral at bedtime  chlorhexidine 0.12% Liquid 15 milliLiter(s) Oral Mucosa every 12 hours  collagenase Ointment 1 Application(s) Topical two times a day  cyanocobalamin 1000 MICROGram(s) Oral daily  dextrose 5%. 1000 milliLiter(s) (100 mL/Hr) IV Continuous <Continuous>  dextrose 5%. 1000 milliLiter(s) (50 mL/Hr) IV Continuous <Continuous>  dextrose 50% Injectable 25 Gram(s) IV Push once  dextrose 50% Injectable 12.5 Gram(s) IV Push once  dextrose 50% Injectable 25 Gram(s) IV Push once  epoetin carline (PROCRIT) SubCutaneous Injection - Peds 37642 Unit(s) SubCutaneous <User Schedule>  folic acid 1 milliGRAM(s) Oral daily  furosemide   Injectable 60 milliGRAM(s) IV Push every 12 hours  glucagon  Injectable 1 milliGRAM(s) IntraMuscular once  insulin lispro (ADMELOG) corrective regimen sliding scale   SubCutaneous three times a day before meals  levothyroxine 125 MICROGram(s) Oral daily  metoprolol tartrate 12.5 milliGRAM(s) Oral every 12 hours  midodrine. 5 milliGRAM(s) Oral three times a day  pantoprazole   Suspension 40 milliGRAM(s) Oral before breakfast  tamsulosin 0.4 milliGRAM(s) Oral at bedtime    MEDICATIONS  (PRN):  acetaminophen     Tablet .. 650 milliGRAM(s) Oral every 6 hours PRN Temp greater or equal to 38C (100.4F), Mild Pain (1 - 3)  albuterol/ipratropium for Nebulization 3 milliLiter(s) Nebulizer every 6 hours PRN Shortness of Breath and/or Wheezing  dextrose Oral Gel 15 Gram(s) Oral once PRN Blood Glucose LESS THAN 70 milliGRAM(s)/deciliter        CXR at admission appears to have slightly improved RLL infiltrate from prior film in 2022

## 2022-10-23 NOTE — H&P ADULT - CONVERSATION DETAILS
The daughter already knew about DNR/DNI. I explained to her about poor prognosis of the patient, for now she wants patient to be DNR, but wants everything else to be done.     I specifically asked about, if she wants central line(if patient needs that in future), she will think about that when the time comes.    For now, patient is DNR only.

## 2022-10-23 NOTE — ED PROVIDER NOTE - NSICDXPASTMEDICALHX_GEN_ALL_CORE_FT
PAST MEDICAL HISTORY:  2019 novel coronavirus disease (COVID-19)     Anoxic brain injury     BPH (benign prostatic hyperplasia)     Cardiac arrest     Mild HTN

## 2022-10-23 NOTE — H&P ADULT - NSHPPHYSICALEXAM_GEN_ALL_CORE
GENERAL: Very Ill looking, Trach and PEG  EYES: Mild pus in left eye, non-reactive pupil  CHEST/LUNG: B/L Crackled fine and coarse  HEART: s1 and s2 heard  ABDOMEN:  Mild distension  EXTREMITIES: B/L pitting edema 2-3+  NERVOUS SYSTEM:  Alert & Oriented X0 GENERAL: Very Ill looking, Trach and PEG  EYES: Mild pus in left eye, non-reactive pupil  DERM: trach in neck  CHEST/LUNG: B/L Crackled fine and coarse  HEART: s1 and s2 heard  ABDOMEN:  Mild distension  EXTREMITIES: B/L pitting edema 2-3+  NERVOUS SYSTEM:  no purposeful movements or withdrawing to painful stim  PSYCH: no clear evidence of mood or mental status

## 2022-10-23 NOTE — CONSULT NOTE ADULT - CRITICAL CARE ATTENDING COMMENT
This patient is critically ill due to the following:  * Respiratory instability requiring management of invasive ventilation  * Multiple organ failure requiring complex decision-making, and there is a high probability of imminent or life-threatening deterioration in the patient’s condition  * The patient required frequent reassessments and monitoring to ensure response to interventions and therapies.    Critical care time includes time spent evaluating and treating the patient's acute illness as well as time spent reviewing labs, radiology,  and discussing the case with a multidisciplinary team in an effort to prevent further life threatening deterioration or end organ damage. This time is independent of any procedures performed.

## 2022-10-23 NOTE — ED PROVIDER NOTE - PHYSICAL EXAMINATION
CONSTITUTIONAL: non verbal   SKIN: anasarca with un-stagable sacral/lower back ulcer  HEAD: Normocephalic; atraumatic.  EYES: normal sclera and conjunctiva   ENT: No nasal discharge;+ trach collar  NECK: Supple; non tender.  CARD: S1, S2 normal;Regular rate and rhythm.   RESP: No wheezes  ABD: soft ntnd  LYMPH: No acute cervical adenopathy.  NEURO: baseline mental status, nonverbal   PSYCH: nonverbal, not responsive

## 2022-10-23 NOTE — ED PROVIDER NOTE - OBJECTIVE STATEMENT
82-year-old male with past medical history of cardiac arrest with anoxic brain injury status post trach/PEG with history of COVID-pneumonia presented today with abnormal labs per nursing home.  Patient is at baseline unresponsive and nonverbal.  Patient was noted to have decreased hemoglobin to 7.0 with previous labs showing increased hemoglobin.  Patient unable to provide further review of systems secondary to his mental status.

## 2022-10-23 NOTE — ED ADULT NURSE NOTE - OBJECTIVE STATEMENT
patient nonverbal and on a vent. Patient per new [facility] patient was sent in due to low hemoglobin 7.0.

## 2022-10-23 NOTE — ED ADULT NURSE NOTE - NSIMPLEMENTINTERV_GEN_ALL_ED
Implemented All Fall with Harm Risk Interventions:  [place] to call system. Call bell, personal items and telephone within reach. Instruct patient to call for assistance. Room bathroom lighting operational. Non-slip footwear when patient is off stretcher. Physically safe environment: no spills, clutter or unnecessary equipment. Stretcher in lowest position, wheels locked, appropriate side rails in place. Provide visual cue, wrist band, yellow gown, etc. Monitor gait and stability. Monitor for mental status changes and reorient to person, place, and time. Review medications for side effects contributing to fall risk. Reinforce activity limits and safety measures with patient and family. Provide visual clues: red socks.

## 2022-10-23 NOTE — CONSULT NOTE ADULT - ASSESSMENT
IMPRESSION:  Sepsis from urinary source      PLAN:    CNS: At baseline, unresponsive    HEENT: Oral care    PULMONARY:  HOB @ 45 degrees.  Continue current vent settings.  Appears chronically hypoventilated given serum bicarb >40 and PCO2 > 50 despite current vent settings. Can use 1x 500mg Acetazolemide to alleviate metabolic alkalosis    CARDIOVASCULAR: No acute concerns, PTA ASA & metoprolol    GI: GI prophylaxis.  Feeding.  Bowel regimen.  Increase free water in TF.    RENAL:  Follow up lytes.  Correct as needed  CT a/p or renal ultrasound to evaluate for pyelo vs renal abscess    INFECTIOUS DISEASE: Follow up cultures, Abx per ID    HEMATOLOGICAL:  DVT prophylaxis.    ENDOCRINE:  Follow up FS.  Insulin protocol if needed.    MUSCULOSKELETAL: pressure ulcer ppx, regular turning      Dispo: SDU

## 2022-10-23 NOTE — ED PROVIDER NOTE - CLINICAL SUMMARY MEDICAL DECISION MAKING FREE TEXT BOX
82-year-old male presented today with abnormal labs.  Patient hemoglobin to be 7.0.  Patient has not any changes in his baseline mental status.  Patient will be admitted for hyponatremia, anemia.  Digital rectal exam did not display evidence of melena or hematochezia. Patient given small volume of fluids due to anasarca and started on antibiotics due to increasing white count per nursing home with cultures pending.

## 2022-10-23 NOTE — H&P ADULT - HISTORY OF PRESENT ILLNESS
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 At baseline, patient is unresponsive, makes random non-purposeful movement, had trach(Vent dependent) and PEG.    On 10/22, labs in the SNF showed anemia and uremia, also patient was tachycardic to 101 and tachypneic to 22, was sent to ED for evaluation.    In the MOLST form from the SNF, patient is DNR/DNI and CMO, but when I called the daughter Judy Jackson, she wanted patient to be DNR, but also wanted all other intervention to be done for now.    In the ED,  patient had WBC 13K, Hb 7, Na 152, K 5.3, Bicarb 43, BUN 94, Trop 0.38, ABG showed metabolic alkalosis, UA was grossly positive(with pus in the urine), CXR showed increased B/L opacity.    Vital Signs Last 24 Hrs:  T(F): 98.7 (23 Oct 2022 00:05), Max: 98.7 (23 Oct 2022 00:05)  HR: 96 (23 Oct 2022 02:25) (96 - 100)  BP: 110/72 (23 Oct 2022 00:05) (110/72 - 110/72)  RR: 24 (23 Oct 2022 00:05) (24 - 24)  SpO2: 100% (23 Oct 2022 02:25) (100% - 100%) on Vent

## 2022-10-23 NOTE — ED PROVIDER NOTE - INPATIENT RESIDENT/ACP NOTIFIED
Goal review completed:     Problem: Coping with Symptoms  Goal: Practice Coping Skills  Description: Pt will practice using 4 coping strategies to manage stress and reduce symptoms this review period.   Outcome: Improving    Pt has attended 10/10 OT groups offered over the previous reporting period. Pt identified binary puzzles, tea, and chewing gum as coping strategies/tools that can be utilized while in the hospital. Also identified removing self from situation, praying, and reading as coping skills. Pt aware of resources on the unit and engaged in unit programming though this morning is perseverating on staff interactions with other patients and benefits from reminders/conversation of utilizing coping skills in the moment. Pt also requested and was provided a weighted blanket.    Care plan goals have been reviewed. Continue care plan and interventions for further progress related to utilizing coping skills. Patient has made progress towards goals by identifying several coping skills that can be used in the hospital and in the community, however will benefit from further practice/implementation of coping skills. Continue to establish rapport and encourage group participation with focus on goal area/s for further progress. Continue to correspond with interdisciplinary team regarding ongoing treatment plan, potential changes in patient's status, and discharge planning.    [facility]  Occupational Therapy    Weighted [place]    Data:  Pt set up with a 12 lb weighted blanket and given weighted blanket information handout.  Education and instruction were provided on the use of a weighted blanket which included purpose, how to use, precautions, and resources.      Assessment:  Pt responded well to sensory input from weighted blanket and would benefit from exploring other sensory strategies and preferences.     Plan:  Continue to monitor and check for effectiveness, tolerance and use of weighted blanket.  Monitor medical changes that may contraindicate use. Explore resources and options for purchasing, insurance coverage or making a weighted blanket.    Pt has the following items checked out: 10 lb weighted blanket    Date:12/20/2021      MAR

## 2022-10-24 NOTE — CONSULT NOTE ADULT - SUBJECTIVE AND OBJECTIVE BOX
CHADWICK JACKSON  82y, Male  Allergy: Allergy Status Unknown    CHIEF COMPLAINT: sepsis (24 Oct 2022 06:11)    HPI:  HPI:  81 YO M w/Pmhx of Cardiac arrest leading to anoxic brain injury(s/p Trach and PEG), H/o Severe Covid PNA, H/o Gram -ve PNA, HTN and BPH presented for abnormal labs in the nursing home. At baseline, patient is unresponsive, makes random non-purposeful movement, had trach(Vent dependent) and PEG.    On 10/22, labs in the SNF showed anemia and uremia, also patient was tachycardic to 101 and tachypneic to 22, was sent to ED for evaluation.    In the MOLST form from the SNF, patient is DNR/DNI and CMO, but when I called the daughter Judy Jackson, she wanted patient to be DNR, but also wanted all other intervention to be done for now.    In the ED,  patient had WBC 13K, Hb 7, Na 152, K 5.3, Bicarb 43, BUN 94, Trop 0.38, ABG showed metabolic alkalosis, UA was grossly positive(with pus in the urine), CXR showed increased B/L opacity.    Vital Signs Last 24 Hrs:  T(F): 98.7 (23 Oct 2022 00:05), Max: 98.7 (23 Oct 2022 00:05)  HR: 96 (23 Oct 2022 02:25) (96 - 100)  BP: 110/72 (23 Oct 2022 00:05) (110/72 - 110/72)  RR: 24 (23 Oct 2022 00:05) (24 - 24)  SpO2: 100% (23 Oct 2022 02:25) (100% - 100%) on Vent     (23 Oct 2022 08:00)      Infectious Diseases History:  Old Micro Data/Cultures:     FAMILY HISTORY:    PAST MEDICAL & SURGICAL HISTORY:  BPH (benign prostatic hyperplasia)      Mild HTN      Cardiac arrest      Anoxic brain injury      2019 novel coronavirus disease (COVID-19)          SOCIAL HISTORY  Social History:  unable to assess (15 Aquiles 2022 04:09)      Recent Travel:  Other Exposures:     ROS  General: Denies rigors, nightsweats  HEENT: Denies headache, rhinorrhea, sore throat, eye pain  CV: Denies CP, palpitations  PULM: Denies wheezing, hemoptysis  GI: Denies hematemesis, hematochezia, melena  : Denies discharge, hematuria  MSK: Denies arthralgias, myalgias  SKIN: Denies rash, lesions  NEURO: Denies paresthesias, weakness  PSYCH: Denies depression, anxiety    VITALS:  T(F): 99.4, Max: 100.6 (10--22 @ 06:34)  HR: 92  BP: 89/50  RR: 20Vital Signs Last 24 Hrs  T(C): 37.4 (24 Oct 2022 08:40), Max: 38.1 (24 Oct 2022 06:34)  T(F): 99.4 (24 Oct 2022 08:40), Max: 100.6 (24 Oct 2022 06:34)  HR: 92 (24 Oct 2022 08:40) (89 - 97)  BP: 89/50 (24 Oct 2022 08:40) (81/52 - 95/71)  BP(mean): --  RR: 20 (24 Oct 2022 08:40) (20 - 32)  SpO2: 100% (24 Oct 2022 08:40) (100% - 100%)    Parameters below as of 24 Oct 2022 08:40  Patient On (Oxygen Delivery Method): tracheostomy collar    O2 Concentration (%): 60    PHYSICAL EXAM:  Gen: NAD, resting in bed  HEENT: Normocephalic, atraumatic  Neck: supple, no lymphadenopathy  CV: Regular rate & regular rhythm  Lungs: CTAB  Abdomen: Soft, BS present  Ext: Warm, well perfused  Neuro: non focal, awake  Skin: no rash, no lesions  Lines: no phlebitis    TESTS & MEASUREMENTS:                        6.4    15.26 )-----------( 259      ( 24 Oct 2022 04:40 )             23.7     10-24    150<H>  |  103  |  80<HH>  ----------------------------<  96  4.0   |  39<H>  |  0.8    Ca    8.1<L>      24 Oct 2022 04:40    TPro  5.3<L>  /  Alb  2.0<L>  /  TBili  0.4  /  DBili  x   /  AST  12  /  ALT  7   /  AlkPhos  72  10-24      LIVER FUNCTIONS - ( 24 Oct 2022 04:40 )  Alb: 2.0 g/dL / Pro: 5.3 g/dL / ALK PHOS: 72 U/L / ALT: 7 U/L / AST: 12 U/L / GGT: x           Urinalysis Basic - ( 23 Oct 2022 05:45 )    Color: Yellow / Appearance: Slightly Turbid / S.017 / pH: x  Gluc: x / Ketone: Negative  / Bili: Negative / Urobili: <2 mg/dL   Blood: x / Protein: 30 mg/dL / Nitrite: Negative   Leuk Esterase: Large / RBC: 2 /HPF /  /HPF   Sq Epi: x / Non Sq Epi: 0 /HPF / Bacteria: Negative          Lactate, Blood: 1.0 mmol/L (10-23-22 @ 04:15)      INFECTIOUS DISEASES TESTING  MRSA PCR Result.: Negative (22 @ 11:00)      RADIOLOGY & ADDITIONAL TESTS:  I have personally reviewed the last available Chest xray  CXR  Xray Chest 1 View- PORTABLE-Urgent:   ACC: [id number] EXAM:  XR CHEST PORTABLE URGENT 1V                          PROCEDURE DATE:  10/23/2022          INTERPRETATION:  Clinical History / Reason for exam: Septic    Comparison : Chest radiograph 3/4/2022.    Technique/Positioning: Frontal,portable.    Findings:    Support devices: A tracheostomy tube is present. Telemetry leads overlie   patient.    Cardiac/mediastinum/hilum: Stable cardiomediastinal silhouette    Lung parenchyma/Pleura: Increasing bilateral lung opacities    Skeleton/soft tissues: Degenerative change    Impression:    Increasing bilateral lung opacities        --- End of Report ---            AISHA RAMOS MD; Attending Radiologist  This document has been electronically signed. Oct 23 2022  7:07AM (10-23-22 @ 06:38)      CT  CT Abdomen and Pelvis w/ IV Cont:   ACC: [id number] EXAM:  CT ABDOMEN AND PELVIS IC                          PROCEDURE DATE:  10/23/2022          INTERPRETATION:  CLINICAL STATEMENT: Sepsis. Concern for pyelonephritis    TECHNIQUE: Contiguous axial CT images were obtained from the lower chest   to the pubic symphysis following administration of 100cc Optiray 320   intravenous contrast.  Oral contrast was not administered.  Reformatted   images in the coronal and sagittal planes were acquired.    COMPARISON CT: 2022    OTHER STUDIES USED FOR CORRELATION: None.      FINDINGS:    LOWER CHEST: Partially imaged moderate to large size pleural effusions   bilaterally, right greater than left with superimposed   atelectasis/consolidations..    HEPATOBILIARY: No suspicious parenchymal lesion or biliary ductal   dilatation. Subcentimeter hypodensities too small to further characterize.    SPLEEN: Unremarkable.    PANCREAS: Unremarkable.    ADRENAL GLANDS: Unremarkable.    KIDNEYS: Left perinephric and periureteral inflammation. No   hydronephrosis bilaterally. Bilateral cysts and additional subcentimeter   hypodensities too small to further characterize..    ABDOMINOPELVIC NODES: Unremarkable.    PELVIC ORGANS: Becker catheter in urinary bladder. Air within bladder,   likely secondary to instrumentation. Bladder wall thickening with   surrounding inflammation..    PERITONEUM/MESENTERY/BOWEL: Trace pelvic ascites. No bowel obstruction or   pneumoperitoneum. Percutaneous gastrostomy catheter in place.   Nonobstructive loop of colon in left inguinal hernia. Normal caliber   appendix. Colonic diverticulosis..    BONES/SOFT TISSUES: Marked degenerative changes of left hip, new from   prior. Areas of left hip joint fluid present.    OTHER: Atherosclerotic calcifications with focal aneurysmal dilatation   infrarenal abdominal aorta measuring up to 3.0 cm, unchanged      IMPRESSION:    Since 2022;    1. Left perinephric/periureteral inflammatory changes without   hydronephrosis. Findings most consistent with pyelonephritis. No   drainable fluid collection.    2. Partially imaged moderate to large size pleural effusions bilaterally,   right greater than left.    3. New marked degenerative changes of left hip. Correlate for signs of   septic arthritis.    4. Bladder wall thickening with surrounding inflammation consistent with   cystitis in appropriate clinical setting.    --- End of Report ---            ELLIOT LANDAU MD; Attending Radiologist  This document has been electronically signed. Oct 23 2022  1:48PM (10-23-22 @ 11:36)      CARDIOLOGY TESTING  12 Lead ECG:   Ventricular Rate 94 BPM    Atrial Rate 94 BPM    P-R Interval 158 ms    QRS Duration 116 ms    Q-T Interval 392 ms    QTC Calculation(Bazett) 490 ms    P Axis -1 degrees    R Axis -18 degrees    T Axis 166 degrees    Diagnosis Line Normal sinus rhythm  Left ventricular hypertrophy with QRS widening and repolarization abnormality  Prolonged QT  Abnormal ECG    Confirmed by Lucia Braun MDfer ([id number]) on 10/23/2022 3:00:38 PM (10-23-22 @ 09:58)      All available historical records have been reviewed    MEDICATIONS  atorvastatin 10  buMETAnide 2  chlorhexidine 0.12% Liquid 15  collagenase Ointment 1  cyanocobalamin 1000  dextrose 5%. 1000  dextrose 5%. 1000  dextrose 50% Injectable 25  dextrose 50% Injectable 12.5  dextrose 50% Injectable 25  epoetin carline (PROCRIT) SubCutaneous Injection - Peds 51224  ferrous    sulfate 325  folic acid 1  glucagon  Injectable 1  insulin lispro (ADMELOG) corrective regimen sliding scale   lactated ringers. 1000  levothyroxine 125  metoprolol tartrate 12.5  midodrine. 5  ofloxacin 0.3% Solution 1  pantoprazole   Suspension 40  sodium zirconium cyclosilicate 10  tamsulosin 0.4  vancomycin  IVPB 1000      ANTIBIOTICS:  vancomycin  IVPB 1000 milliGRAM(s) IV Intermittent every 12 hours      All available historical data has been reviewed       CHADWICK JACKSON  82y, Male  Allergy: Allergy Status Unknown    CHIEF COMPLAINT: sepsis (24 Oct 2022 06:11)    HPI:  HPI:  83 YO M w/Pmhx of Cardiac arrest leading to anoxic brain injury(s/p Trach and PEG), H/o Severe Covid PNA, H/o Gram -ve PNA, HTN and BPH presented for abnormal labs in the nursing home. At baseline, patient is unresponsive, makes random non-purposeful movement, had trach(Vent dependent) and PEG.    On 10/22, labs in the SNF showed anemia and uremia, also patient was tachycardic to 101 and tachypneic to 22, was sent to ED for evaluation.    In the MOLST form from the SNF, patient is DNR/DNI and CMO, but when I called the daughter Judy Jackson, she wanted patient to be DNR, but also wanted all other intervention to be done for now.    In the ED,  patient had WBC 13K, Hb 7, Na 152, K 5.3, Bicarb 43, BUN 94, Trop 0.38, ABG showed metabolic alkalosis, UA was grossly positive(with pus in the urine), CXR showed increased B/L opacity.    Vital Signs Last 24 Hrs:  T(F): 98.7 (23 Oct 2022 00:05), Max: 98.7 (23 Oct 2022 00:05)  HR: 96 (23 Oct 2022 02:25) (96 - 100)  BP: 110/72 (23 Oct 2022 00:05) (110/72 - 110/72)  RR: 24 (23 Oct 2022 00:05) (24 - 24)  SpO2: 100% (23 Oct 2022 02:25) (100% - 100%) on Vent     (23 Oct 2022 08:00)      Infectious Diseases History:  Old Micro Data/Cultures:     FAMILY HISTORY:    PAST MEDICAL & SURGICAL HISTORY:  BPH (benign prostatic hyperplasia)      Mild HTN      Cardiac arrest      Anoxic brain injury      2019 novel coronavirus disease (COVID-19)          SOCIAL HISTORY  Social History:  unable to assess (15 Aquiles 2022 04:09)      Recent Travel:  Other Exposures:     ROS  General: Denies rigors, nightsweats  HEENT: Denies headache, rhinorrhea, sore throat, eye pain  CV: Denies CP, palpitations  PULM: Denies wheezing, hemoptysis  GI: Denies hematemesis, hematochezia, melena  : Denies discharge, hematuria  MSK: Denies arthralgias, myalgias  SKIN: Denies rash, lesions  NEURO: Denies paresthesias, weakness  PSYCH: Denies depression, anxiety    VITALS:  T(F): 99.4, Max: 100.6 (10--22 @ 06:34)  HR: 92  BP: 89/50  RR: 20Vital Signs Last 24 Hrs  T(C): 37.4 (24 Oct 2022 08:40), Max: 38.1 (24 Oct 2022 06:34)  T(F): 99.4 (24 Oct 2022 08:40), Max: 100.6 (24 Oct 2022 06:34)  HR: 92 (24 Oct 2022 08:40) (89 - 97)  BP: 89/50 (24 Oct 2022 08:40) (81/52 - 95/71)  BP(mean): --  RR: 20 (24 Oct 2022 08:40) (20 - 32)  SpO2: 100% (24 Oct 2022 08:40) (100% - 100%)    Parameters below as of 24 Oct 2022 08:40  Patient On (Oxygen Delivery Method): tracheostomy collar    O2 Concentration (%): 60    PHYSICAL EXAM:  Gen: awake, spontaneous eye opening  CV: Regular rate & regular rhythm  Lungs: decreased air entry bilaterally with bilateral rhonchi  Abdomen: distended, soft, patient does not grimace on palpation  Neuro: gross neuro exam can not be properly assessed  Skin: bilateral lower limb edema +2  Lines: no phlebitis    TESTS & MEASUREMENTS:                        6.4    15.26 )-----------( 259      ( 24 Oct 2022 04:40 )             23.7     10-24    150<H>  |  103  |  80<HH>  ----------------------------<  96  4.0   |  39<H>  |  0.8    Ca    8.1<L>      24 Oct 2022 04:40    TPro  5.3<L>  /  Alb  2.0<L>  /  TBili  0.4  /  DBili  x   /  AST  12  /  ALT  7   /  AlkPhos  72  10-24      LIVER FUNCTIONS - ( 24 Oct 2022 04:40 )  Alb: 2.0 g/dL / Pro: 5.3 g/dL / ALK PHOS: 72 U/L / ALT: 7 U/L / AST: 12 U/L / GGT: x           Urinalysis Basic - ( 23 Oct 2022 05:45 )    Color: Yellow / Appearance: Slightly Turbid / S.017 / pH: x  Gluc: x / Ketone: Negative  / Bili: Negative / Urobili: <2 mg/dL   Blood: x / Protein: 30 mg/dL / Nitrite: Negative   Leuk Esterase: Large / RBC: 2 /HPF /  /HPF   Sq Epi: x / Non Sq Epi: 0 /HPF / Bacteria: Negative          Lactate, Blood: 1.0 mmol/L (10-23-22 @ 04:15)      INFECTIOUS DISEASES TESTING  MRSA PCR Result.: Negative (22 @ 11:00)      RADIOLOGY & ADDITIONAL TESTS:  I have personally reviewed the last available Chest xray  CXR  Xray Chest 1 View- PORTABLE-Urgent:   ACC: [id number] EXAM:  XR CHEST PORTABLE URGENT 1V                          PROCEDURE DATE:  10/23/2022          INTERPRETATION:  Clinical History / Reason for exam: Septic    Comparison : Chest radiograph 3/4/2022.    Technique/Positioning: Frontal,portable.    Findings:    Support devices: A tracheostomy tube is present. Telemetry leads overlie   patient.    Cardiac/mediastinum/hilum: Stable cardiomediastinal silhouette    Lung parenchyma/Pleura: Increasing bilateral lung opacities    Skeleton/soft tissues: Degenerative change    Impression:    Increasing bilateral lung opacities        --- End of Report ---            AISHA RAMOS MD; Attending Radiologist  This document has been electronically signed. Oct 23 2022  7:07AM (10-23-22 @ 06:38)      CT  CT Abdomen and Pelvis w/ IV Cont:   ACC: [id number] EXAM:  CT ABDOMEN AND PELVIS IC                          PROCEDURE DATE:  10/23/2022          INTERPRETATION:  CLINICAL STATEMENT: Sepsis. Concern for pyelonephritis    TECHNIQUE: Contiguous axial CT images were obtained from the lower chest   to the pubic symphysis following administration of 100cc Optiray 320   intravenous contrast.  Oral contrast was not administered.  Reformatted   images in the coronal and sagittal planes were acquired.    COMPARISON CT: 2022    OTHER STUDIES USED FOR CORRELATION: None.      FINDINGS:    LOWER CHEST: Partially imaged moderate to large size pleural effusions   bilaterally, right greater than left with superimposed   atelectasis/consolidations..    HEPATOBILIARY: No suspicious parenchymal lesion or biliary ductal   dilatation. Subcentimeter hypodensities too small to further characterize.    SPLEEN: Unremarkable.    PANCREAS: Unremarkable.    ADRENAL GLANDS: Unremarkable.    KIDNEYS: Left perinephric and periureteral inflammation. No   hydronephrosis bilaterally. Bilateral cysts and additional subcentimeter   hypodensities too small to further characterize..    ABDOMINOPELVIC NODES: Unremarkable.    PELVIC ORGANS: Becker catheter in urinary bladder. Air within bladder,   likely secondary to instrumentation. Bladder wall thickening with   surrounding inflammation..    PERITONEUM/MESENTERY/BOWEL: Trace pelvic ascites. No bowel obstruction or   pneumoperitoneum. Percutaneous gastrostomy catheter in place.   Nonobstructive loop of colon in left inguinal hernia. Normal caliber   appendix. Colonic diverticulosis..    BONES/SOFT TISSUES: Marked degenerative changes of left hip, new from   prior. Areas of left hip joint fluid present.    OTHER: Atherosclerotic calcifications with focal aneurysmal dilatation   infrarenal abdominal aorta measuring up to 3.0 cm, unchanged      IMPRESSION:    Since 2022;    1. Left perinephric/periureteral inflammatory changes without   hydronephrosis. Findings most consistent with pyelonephritis. No   drainable fluid collection.    2. Partially imaged moderate to large size pleural effusions bilaterally,   right greater than left.    3. New marked degenerative changes of left hip. Correlate for signs of   septic arthritis.    4. Bladder wall thickening with surrounding inflammation consistent with   cystitis in appropriate clinical setting.    --- End of Report ---            ELLIOT LANDAU MD; Attending Radiologist  This document has been electronically signed. Oct 23 2022  1:48PM (10-23-22 @ 11:36)      CARDIOLOGY TESTING  12 Lead ECG:   Ventricular Rate 94 BPM    Atrial Rate 94 BPM    P-R Interval 158 ms    QRS Duration 116 ms    Q-T Interval 392 ms    QTC Calculation(Bazett) 490 ms    P Axis -1 degrees    R Axis -18 degrees    T Axis 166 degrees    Diagnosis Line Normal sinus rhythm  Left ventricular hypertrophy with QRS widening and repolarization abnormality  Prolonged QT  Abnormal ECG    Confirmed by Lotus Braun MD ([id number]) on 10/23/2022 3:00:38 PM (10-23-22 @ 09:58)      All available historical records have been reviewed    MEDICATIONS  atorvastatin 10  buMETAnide 2  chlorhexidine 0.12% Liquid 15  collagenase Ointment 1  cyanocobalamin 1000  dextrose 5%. 1000  dextrose 5%. 1000  dextrose 50% Injectable 25  dextrose 50% Injectable 12.5  dextrose 50% Injectable 25  epoetin carline (PROCRIT) SubCutaneous Injection - Peds 99815  ferrous    sulfate 325  folic acid 1  glucagon  Injectable 1  insulin lispro (ADMELOG) corrective regimen sliding scale   lactated ringers. 1000  levothyroxine 125  metoprolol tartrate 12.5  midodrine. 5  ofloxacin 0.3% Solution 1  pantoprazole   Suspension 40  sodium zirconium cyclosilicate 10  tamsulosin 0.4  vancomycin  IVPB 1000      ANTIBIOTICS:  vancomycin  IVPB 1000 milliGRAM(s) IV Intermittent every 12 hours      All available historical data has been reviewed

## 2022-10-24 NOTE — CONSULT NOTE ADULT - ASSESSMENT
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 GI is consulted for anemia and concenrn for gi bleeding.    # Chronic macrocytic anemia:  - patient is hemodynamically stable, trached, vented with peg tube in place  - labs reviewed: Hb baseline around 9, on admission 7, down to 6.4  - imaging reviewed  - MAYRA: brown stool  - PEG tube flushed and irrigated with retun of gastric juice, no evidence of bleeding     recommendations:  - monitor H/H with active type and screen and keep Hb >7  - recommend GOC discussion  - in the absence of signs of GI bleeding endoscopic procedure is not indicated as the risks out weights the benefits  - please recall GI with any signs of gi bleeding   83 YO M w/Pmhx of Cardiac arrest leading to anoxic brain injury(s/p Trach and PEG), H/o Severe Covid PNA, H/o Gram -ve PNA, HTN and BPH presented for abnormal labs in the nursing home. GI is consulted for anemia and diagnose acute gi bleeding.    # Chronic macrocytic anemia:   - patient is hemodynamically stable, trached, vented with peg tube in place  - labs reviewed: Hb baseline around 9, on admission 7, down to 6.4  - imaging reviewed  - MAYRA: brown stool  - PEG tube flushed and irrigated with retun of gastric juice, no evidence of bleeding     recommendations:  - monitor H/H with active type and screen and keep Hb >7  - recommend GOC discussion  - in the absence of signs of GI bleeding endoscopic procedure is not indicated as the risks out weights the benefits  - please recall GI with any signs of gi bleeding

## 2022-10-24 NOTE — CONSULT NOTE ADULT - PROBLEM SELECTOR RECOMMENDATION 9
Pt wit h/o cardiac arrest/anoxic brain injury. Has some awareness and daughter feels she can communicate non verbally with him. He tracks to her voice

## 2022-10-24 NOTE — CONSULT NOTE ADULT - ASSESSMENT
IMP:  - sepsis  - pyelonephritis  - chronic vent dependence, h/o anoxic brain injury  - anemia, unclear etiology - receiving transfusion when seen earlier  - [place]/ dehydration, with hypernatremia  - question of DM    feeds at NH are Diabetasource AC at 75 ml/h x 20 hours      plus 4 Prosource daily  pt also receives larger doses of folic acid, B12, vitamin C, zinc, iron and phos     SUGGEST:  - stop all the supplemental vitamins and obtain levels:       zinc, B12, folic acid, iron studies  - poc glucose testing before each feeding and Lispro pre-feed  - change feeds to Glucerna 1.2 360 ml via GT over 45 min       feed at 7am, noon, 5pm, 10pm - in order to "match" timing of poc glucose testing and insulin treatments  - give water 100 ml before & after each feed (and d/c the 200 ml q6h by pump) - this = 800 ml water, & the feeds themselves provide 1166 ml free water/d  - pt is given a delayed release PPI capsule via GT at NH - this should be d/c immediately. If PPI indicated, must use solutab. At discharge would d/c PPI (as NH will not obtain proper med form) and give Pepcid, as this med is compatible with enteral tube use.  d/w resident

## 2022-10-24 NOTE — CONSULT NOTE ADULT - SUBJECTIVE AND OBJECTIVE BOX
Gastroenterology Consultation:    Patient is a 82y old  Male who presents with a chief complaint of sepsis (24 Oct 2022 06:11)        Admitted on: 10-23-22      HPI:  83 YO M w/Pmhx of Cardiac arrest leading to anoxic brain injury(s/p Trach and PEG), H/o Severe Covid PNA, H/o Gram -ve PNA, HTN and BPH presented for abnormal labs in the nursing home. At baseline, patient is unresponsive, makes random non-purposeful movement, had trach(Vent dependent) and PEG.    On 10/22, labs in the SNF showed anemia and uremia, also patient was tachycardic to 101 and tachypneic to 22, was sent to ED for evaluation.    In the MOLST form from the SNF, patient is DNR/DNI and CMO, but when I called the daughter Judy Jackson, she wanted patient to be DNR, but also wanted all other intervention to be done for now.    In the ED,  patient had WBC 13K, Hb 7, Na 152, K 5.3, Bicarb 43, BUN 94, Trop 0.38, ABG showed metabolic alkalosis, UA was grossly positive(with pus in the urine), CXR showed increased B/L opacity.    Vital Signs Last 24 Hrs:  T(F): 98.7 (23 Oct 2022 00:05), Max: 98.7 (23 Oct 2022 00:05)  HR: 96 (23 Oct 2022 02:25) (96 - 100)  BP: 110/72 (23 Oct 2022 00:05) (110/72 - 110/72)  RR: 24 (23 Oct 2022 00:05) (24 - 24)  SpO2: 100% (23 Oct 2022 02:25) (100% - 100%) on Vent     (23 Oct 2022 08:00)        Prior EGD: < from: EGD w/ PEG Placement (02.18.22 @ 15:30) >  Impressions:    Normal mucosa in the whole esophagus.    Erythema in the stomach compatible with non-erosive gastritis.    Normal mucosa in the whole examined duodenum.       < end of copied text >      Prior Colonoscopy: none on chart      PAST MEDICAL & SURGICAL HISTORY:  BPH (benign prostatic hyperplasia)      Mild HTN      Cardiac arrest      Anoxic brain injury      2019 novel coronavirus disease (COVID-19)            FAMILY HISTORY:  -ve for gi malignancy     Social History:  Tobacco: -ve  Alcohol: -ve  Drugs: -ve    Home Medications:  amantadine 50 mg/5 mL oral syrup: 10 milliliter(s) orally 2 times a day (23 Oct 2022 08:35)  atorvastatin 10 mg oral tablet: 1 tab(s) by gastrostomy tube once a day (23 Oct 2022 08:35)  bumetanide 2 mg oral tablet: 1 tab(s) by gastrostomy tube once a day (23 Oct 2022 08:35)  chlorhexidine 0.5% topical liquid:  (23 Oct 2022 08:35)  collagenase 250 units/g topical ointment: 1 application topically 2 times a day (23 Oct 2022 08:35)  Eliquis 2.5 mg oral tablet: 1 tab(s) by gastrostomy tube 2 times a day (23 Oct 2022 08:35)  Epogen 20,000 units/mL injectable solution: 1 milliliter(s) injectable 3 times a week (23 Oct 2022 08:35)  ferrous sulfate 220 mg/5 mL (44 mg/5 mL elemental iron) oral elixir: 7.5 milliliter(s) by gastrostomy tube 3 times a day (23 Oct 2022 08:35)  folic acid 1 mg oral tablet: 1 tab(s) orally once a day (23 Oct 2022 08:35)  HumuLIN R 100 units/mL injectable solution:  (23 Oct 2022 08:35)  ipratropium-albuterol 0.5 mg-2.5 mg/3 mL inhalation solution: 3 milliliter(s) inhaled 4 times a day (23 Oct 2022 08:35)  levothyroxine 125 mcg (0.125 mg) oral tablet: 1 tab(s) by gastrostomy tube once a day (23 Oct 2022 08:35)  meropenem 1000 mg intravenous injection: 1 gram(s) intravenous 3 times a day (23 Oct 2022 08:35)  Metoprolol Tartrate 25 mg oral tablet: 0.5 tab(s) by gastrostomy tube 2 times a day (23 Oct 2022 08:35)  midodrine 5 mg oral tablet: 1 tab(s) orally 3 times a day, As Needed (23 Oct 2022 08:35)  omeprazole 20 mg oral delayed release tablet: 1 tab(s) by gastrostomy tube once a day (23 Oct 2022 08:35)  PHOS-NaK oral powder for reconstitution: 1 packet(s) orally 2 times a day (23 Oct 2022 08:35)  potassium chloride 40 mEq/15 mL oral liquid: 7.5 milliliter(s) by gastrostomy tube once a day (23 Oct 2022 08:35)  tamsulosin 0.4 mg oral capsule: 1 cap(s) orally once a day (23 Oct 2022 08:35)  Tylenol 325 mg oral tablet: 2 tab(s) orally every 6 hours, As Needed (23 Oct 2022 08:35)  Vitamin B-12 1000 mcg oral tablet: 1 tab(s) orally once a day (23 Oct 2022 08:35)  Vitamin C 500 mg oral tablet: 1 tab(s) orally once a day (23 Oct 2022 08:35)  zinc sulfate 220 mg oral tablet: 1 tab(s) by gastrostomy tube once a day (23 Oct 2022 08:35)        MEDICATIONS  (STANDING):  atorvastatin 10 milliGRAM(s) Oral at bedtime  buMETAnide 2 milliGRAM(s) Oral daily  chlorhexidine 0.12% Liquid 15 milliLiter(s) Oral Mucosa every 12 hours  collagenase Ointment 1 Application(s) Topical two times a day  cyanocobalamin 1000 MICROGram(s) Oral daily  dextrose 5%. 1000 milliLiter(s) (100 mL/Hr) IV Continuous <Continuous>  dextrose 5%. 1000 milliLiter(s) (50 mL/Hr) IV Continuous <Continuous>  dextrose 50% Injectable 25 Gram(s) IV Push once  dextrose 50% Injectable 12.5 Gram(s) IV Push once  dextrose 50% Injectable 25 Gram(s) IV Push once  epoetin carline (PROCRIT) SubCutaneous Injection - Peds 76728 Unit(s) SubCutaneous <User Schedule>  ferrous    sulfate 325 milliGRAM(s) Oral daily  folic acid 1 milliGRAM(s) Oral daily  glucagon  Injectable 1 milliGRAM(s) IntraMuscular once  insulin lispro (ADMELOG) corrective regimen sliding scale   SubCutaneous three times a day before meals  levothyroxine 125 MICROGram(s) Oral daily  metoprolol tartrate 12.5 milliGRAM(s) Oral every 12 hours  midodrine. 5 milliGRAM(s) Oral three times a day  ofloxacin 0.3% Solution 1 Drop(s) Left EYE two times a day  pantoprazole   Suspension 40 milliGRAM(s) Oral before breakfast  sodium zirconium cyclosilicate 10 Gram(s) Oral two times a day  tamsulosin 0.4 milliGRAM(s) Oral at bedtime  vancomycin  IVPB 1000 milliGRAM(s) IV Intermittent every 12 hours    MEDICATIONS  (PRN):  acetaminophen     Tablet .. 650 milliGRAM(s) Oral every 6 hours PRN Temp greater or equal to 38C (100.4F), Mild Pain (1 - 3)  albuterol/ipratropium for Nebulization 3 milliLiter(s) Nebulizer every 6 hours PRN Shortness of Breath and/or Wheezing  dextrose Oral Gel 15 Gram(s) Oral once PRN Blood Glucose LESS THAN 70 milliGRAM(s)/deciliter      Allergies  Allergy Status Unknown      Review of Systems: limited           Physical Examination:  T(C): 37 (10-24-22 @ 12:47), Max: 38.1 (10-24-22 @ 06:34)  HR: 98 (10-24-22 @ 12:47) (89 - 98)  BP: 96/53 (10-24-22 @ 12:47) (81/52 - 96/53)  RR: 20 (10-24-22 @ 12:47) (20 - 32)  SpO2: 100% (10-24-22 @ 12:47) (99% - 100%)      10-23-22 @ 07:01  -  10-24-22 @ 07:00  --------------------------------------------------------  IN: 0 mL / OUT: 550 mL / NET: -550 mL          GENERAL: AAOx0, no acute distress.  HEAD:  Atraumatic, Normocephalic  EYES: conjunctiva and sclera clear  NECK: Supple, no JVD or thyromegaly  CHEST/LUNG: Clear to auscultation bilaterally; No wheeze, rhonchi, or rales  HEART: Regular rate and rhythm; normal S1, S2, No murmurs.  ABDOMEN: Soft, nontender, nondistended; Bowel sounds present, peg tube in place with surrounding site looks clean   NEUROLOGY: No asterixis or tremor.   SKIN: Intact, no jaundice  MAYRA: brown stool       Data:                        6.4    15.26 )-----------( 259      ( 24 Oct 2022 04:40 )             23.7     Hgb Trend:  6.4  10-24-22 @ 04:40  7.0  10-23-22 @ 04:15        10-24    150<H>  |  103  |  80<HH>  ----------------------------<  96  4.0   |  39<H>  |  0.8    Ca    8.1<L>      24 Oct 2022 04:40    TPro  5.3<L>  /  Alb  2.0<L>  /  TBili  0.4  /  DBili  x   /  AST  12  /  ALT  7   /  AlkPhos  72  10-24    Liver panel trend:  TBili 0.4   /   AST 12   /   ALT 7   /   AlkP 72   /   Tptn 5.3   /   Alb 2.0    /   DBili --      10-24  TBili 0.3   /   AST 21   /   ALT 9   /   AlkP 80   /   Tptn 5.6   /   Alb 2.3    /   DBili --      10-23      PT/INR - ( 24 Oct 2022 04:40 )   PT: 15.00 sec;   INR: 1.31 ratio         PTT - ( 24 Oct 2022 04:40 )  PTT:29.6 sec    Culture - Urine (collected 23 Oct 2022 05:45)  Source: Clean Catch Clean Catch (Midstream)  Preliminary Report (24 Oct 2022 11:22):    >100,000 CFU/ml Gram Negative Rods    Culture - Blood (collected 23 Oct 2022 05:05)  Source: .Blood Blood  Preliminary Report (24 Oct 2022 13:03):    No growth to date.        Gastroenterology Consultation:    Patient is a 82y old  Male who presents with a chief complaint of sepsis (24 Oct 2022 06:11)        Admitted on: 10-23-22      HPI:  83 YO M w/Pmhx of Cardiac arrest leading to anoxic brain injury(s/p Trach and PEG), H/o Severe Covid PNA, H/o Gram -ve PNA, HTN and BPH presented for abnormal labs in the nursing home. At baseline, patient is unresponsive, makes random non-purposeful movement, had trach(Vent dependent) and PEG.    On 10/22, labs in the SNF showed anemia and uremia, also patient was tachycardic to 101 and tachypneic to 22, was sent to ED for evaluation.    In the MOLST form from the SNF, patient is DNR/DNI and CMO, but when I called the daughter Judy Jackson, she wanted patient to be DNR, but also wanted all other intervention to be done for now.    In the ED,  patient had WBC 13K, Hb 7, Na 152, K 5.3, Bicarb 43, BUN 94, Trop 0.38, ABG showed metabolic alkalosis, UA was grossly positive(with pus in the urine), CXR showed increased B/L opacity.    Vital Signs Last 24 Hrs:  T(F): 98.7 (23 Oct 2022 00:05), Max: 98.7 (23 Oct 2022 00:05)  HR: 96 (23 Oct 2022 02:25) (96 - 100)  BP: 110/72 (23 Oct 2022 00:05) (110/72 - 110/72)  RR: 24 (23 Oct 2022 00:05) (24 - 24)  SpO2: 100% (23 Oct 2022 02:25) (100% - 100%) on Vent     (23 Oct 2022 08:00)        Prior EGD: < from: EGD w/ PEG Placement (02.18.22 @ 15:30) >  Impressions:    Normal mucosa in the whole esophagus.    Erythema in the stomach compatible with non-erosive gastritis.    Normal mucosa in the whole examined duodenum.       < end of copied text >      Prior Colonoscopy: none on chart      PAST MEDICAL & SURGICAL HISTORY:  BPH (benign prostatic hyperplasia)      Mild HTN      Cardiac arrest      Anoxic brain injury      2019 novel coronavirus disease (COVID-19)            FAMILY HISTORY:  -ve for gi malignancy     Social History:  Tobacco: -ve  Alcohol: -ve  Drugs: -ve    Home Medications:  amantadine 50 mg/5 mL oral syrup: 10 milliliter(s) orally 2 times a day (23 Oct 2022 08:35)  atorvastatin 10 mg oral tablet: 1 tab(s) by gastrostomy tube once a day (23 Oct 2022 08:35)  bumetanide 2 mg oral tablet: 1 tab(s) by gastrostomy tube once a day (23 Oct 2022 08:35)  chlorhexidine 0.5% topical liquid:  (23 Oct 2022 08:35)  collagenase 250 units/g topical ointment: 1 application topically 2 times a day (23 Oct 2022 08:35)  Eliquis 2.5 mg oral tablet: 1 tab(s) by gastrostomy tube 2 times a day (23 Oct 2022 08:35)  Epogen 20,000 units/mL injectable solution: 1 milliliter(s) injectable 3 times a week (23 Oct 2022 08:35)  ferrous sulfate 220 mg/5 mL (44 mg/5 mL elemental iron) oral elixir: 7.5 milliliter(s) by gastrostomy tube 3 times a day (23 Oct 2022 08:35)  folic acid 1 mg oral tablet: 1 tab(s) orally once a day (23 Oct 2022 08:35)  HumuLIN R 100 units/mL injectable solution:  (23 Oct 2022 08:35)  ipratropium-albuterol 0.5 mg-2.5 mg/3 mL inhalation solution: 3 milliliter(s) inhaled 4 times a day (23 Oct 2022 08:35)  levothyroxine 125 mcg (0.125 mg) oral tablet: 1 tab(s) by gastrostomy tube once a day (23 Oct 2022 08:35)  meropenem 1000 mg intravenous injection: 1 gram(s) intravenous 3 times a day (23 Oct 2022 08:35)  Metoprolol Tartrate 25 mg oral tablet: 0.5 tab(s) by gastrostomy tube 2 times a day (23 Oct 2022 08:35)  midodrine 5 mg oral tablet: 1 tab(s) orally 3 times a day, As Needed (23 Oct 2022 08:35)  omeprazole 20 mg oral delayed release tablet: 1 tab(s) by gastrostomy tube once a day (23 Oct 2022 08:35)  PHOS-NaK oral powder for reconstitution: 1 packet(s) orally 2 times a day (23 Oct 2022 08:35)  potassium chloride 40 mEq/15 mL oral liquid: 7.5 milliliter(s) by gastrostomy tube once a day (23 Oct 2022 08:35)  tamsulosin 0.4 mg oral capsule: 1 cap(s) orally once a day (23 Oct 2022 08:35)  Tylenol 325 mg oral tablet: 2 tab(s) orally every 6 hours, As Needed (23 Oct 2022 08:35)  Vitamin B-12 1000 mcg oral tablet: 1 tab(s) orally once a day (23 Oct 2022 08:35)  Vitamin C 500 mg oral tablet: 1 tab(s) orally once a day (23 Oct 2022 08:35)  zinc sulfate 220 mg oral tablet: 1 tab(s) by gastrostomy tube once a day (23 Oct 2022 08:35)        MEDICATIONS  (STANDING):  atorvastatin 10 milliGRAM(s) Oral at bedtime  buMETAnide 2 milliGRAM(s) Oral daily  chlorhexidine 0.12% Liquid 15 milliLiter(s) Oral Mucosa every 12 hours  collagenase Ointment 1 Application(s) Topical two times a day  cyanocobalamin 1000 MICROGram(s) Oral daily  dextrose 5%. 1000 milliLiter(s) (100 mL/Hr) IV Continuous <Continuous>  dextrose 5%. 1000 milliLiter(s) (50 mL/Hr) IV Continuous <Continuous>  dextrose 50% Injectable 25 Gram(s) IV Push once  dextrose 50% Injectable 12.5 Gram(s) IV Push once  dextrose 50% Injectable 25 Gram(s) IV Push once  epoetin carline (PROCRIT) SubCutaneous Injection - Peds 43478 Unit(s) SubCutaneous <User Schedule>  ferrous    sulfate 325 milliGRAM(s) Oral daily  folic acid 1 milliGRAM(s) Oral daily  glucagon  Injectable 1 milliGRAM(s) IntraMuscular once  insulin lispro (ADMELOG) corrective regimen sliding scale   SubCutaneous three times a day before meals  levothyroxine 125 MICROGram(s) Oral daily  metoprolol tartrate 12.5 milliGRAM(s) Oral every 12 hours  midodrine. 5 milliGRAM(s) Oral three times a day  ofloxacin 0.3% Solution 1 Drop(s) Left EYE two times a day  pantoprazole   Suspension 40 milliGRAM(s) Oral before breakfast  sodium zirconium cyclosilicate 10 Gram(s) Oral two times a day  tamsulosin 0.4 milliGRAM(s) Oral at bedtime  vancomycin  IVPB 1000 milliGRAM(s) IV Intermittent every 12 hours    MEDICATIONS  (PRN):  acetaminophen     Tablet .. 650 milliGRAM(s) Oral every 6 hours PRN Temp greater or equal to 38C (100.4F), Mild Pain (1 - 3)  albuterol/ipratropium for Nebulization 3 milliLiter(s) Nebulizer every 6 hours PRN Shortness of Breath and/or Wheezing  dextrose Oral Gel 15 Gram(s) Oral once PRN Blood Glucose LESS THAN 70 milliGRAM(s)/deciliter      Allergies  Allergy Status Unknown      Review of Systems: limited due to patient's impaired mental status; cannot be obtained from patient.          Physical Examination:  T(C): 37 (10-24-22 @ 12:47), Max: 38.1 (10-24-22 @ 06:34)  HR: 98 (10-24-22 @ 12:47) (89 - 98)  BP: 96/53 (10-24-22 @ 12:47) (81/52 - 96/53)  RR: 20 (10-24-22 @ 12:47) (20 - 32)  SpO2: 100% (10-24-22 @ 12:47) (99% - 100%)      10-23-22 @ 07:01  -  10-24-22 @ 07:00  --------------------------------------------------------  IN: 0 mL / OUT: 550 mL / NET: -550 mL          GENERAL: AAOx0, no acute distress.  HEAD:  Atraumatic, Normocephalic  EYES: conjunctiva and sclera clear  NECK: Supple, no JVD or thyromegaly  CHEST/LUNG: Clear to auscultation bilaterally; No wheeze, rhonchi, or rales  HEART: Regular rate and rhythm; normal S1, S2, No murmurs.  ABDOMEN: Soft, nontender, nondistended; Bowel sounds present, peg tube in place with surrounding site looks clean   NEUROLOGY: No asterixis or tremor.   SKIN: Intact, no jaundice  MAYRA: brown stool       Data:                        6.4    15.26 )-----------( 259      ( 24 Oct 2022 04:40 ) This data was reviewed.              23.7     Hgb Trend: labs below reviewed  .  This data was reviewed.   6.4  10-24-22 @ 04:40  7.0  10-23-22 @ 04:15        10-24    150<H>  |  103  |  80<HH>  ----------------------------<  96 This data was reviewed.   4.0   |  39<H>  |  0.8    Ca    8.1<L>      24 Oct 2022 04:40    TPro  5.3<L>  /  Alb  2.0<L>  /  TBili  0.4  /  DBili  x   /  AST  12  /  ALT  7   /  AlkPhos  72  10-24    Liver panel trend: This data was reviewed.   TBili 0.4   /   AST 12   /   ALT 7   /   AlkP 72   /   Tptn 5.3   /   Alb 2.0    /   DBili --      10-24  TBili 0.3   /   AST 21   /   ALT 9   /   AlkP 80   /   Tptn 5.6   /   Alb 2.3    /   DBili --      10-23      PT/INR - ( 24 Oct 2022 04:40 )   PT: 15.00 sec;   INR: 1.31 ratio         PTT - ( 24 Oct 2022 04:40 )  PTT:29.6 sec    Culture - Urine (collected 23 Oct 2022 05:45)  Source: Clean Catch Clean Catch (Midstream)  Preliminary Report (24 Oct 2022 11:22):    >100,000 CFU/ml Gram Negative Rods    Culture - Blood (collected 23 Oct 2022 05:05)  Source: .Blood Blood  Preliminary Report (24 Oct 2022 13:03):    No growth to date.

## 2022-10-24 NOTE — CONSULT NOTE ADULT - CONVERSATION DETAILS
Palliative care role introduced. Discussed patient's current condition and treatment. Daughter Judy knows he has very advanced illness, but feels he has been stable for months in the SNF and they can communicate. She became tearful at any discussion of his condition worsening.     She wants them to try to treat current infection, meanwhile she is open to palliative care following for support. If condition worsens she would be open to further GOC discussions then. Pt is DNR

## 2022-10-24 NOTE — PROGRESS NOTE ADULT - ASSESSMENT
IMPRESSION:    sepsis present on admission  UTI  Anemia  hypernatremia  chronic hypoxic respiratory failure SP Trach  Anoxic brain injury  hx Cardiac Arrest  hx Severe COVID PNA      SUGGEST:    CNS:  as needed morphine for comfort    HEENT: Oral care.      PULMONARY: aspiration precaution, dec FIO2 TO 40%, Keep Sao2 92 to 96%, , Dec RR 16      CARDIOVASCULAR:   Avoid overload.  Free water    GI: PPI q 12, MAYRA for blood    RENAL:  Follow up lytes.  Correct as needed.  Monitor UO.  Becker care.  free H2O    INFECTIOUS DISEASE:    abx per ID    HEMATOLOGICAL: DVT prophylaxis, LE doppler, transfuse    ENDOCRINE:  Follow up FS.  Insulin protocol if needed    MUSCULOSKELETAL: bedrest    DNR  Very poor overall prognosis    sdu

## 2022-10-24 NOTE — CONSULT NOTE ADULT - NS ATTEND AMEND GEN_ALL_CORE FT
Patient with history as above  Spoke with daughter Judy at bedside  Discussed that patient is trached at baseline, and can track with eyes  Palliative care re-introduced to Judy  States she is waiting for update on condition    Will be available for C discussions moving forward    High risk patient requiring IV antbiotics in the setting of sepsis with need for intensive monitoring

## 2022-10-24 NOTE — CONSULT NOTE ADULT - ASSESSMENT
ASSESSMENT  82yMale with w/Pmhx of Cardiac arrest leading to anoxic brain injury (s/p Trach and PEG), H/o Severe Covid PNA, H/o Gram -ve PNA, HTN, and BPH presenting for abnormal labs at nursing home, found to be in severe sepsis.    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 All final recommendations to follow pending discussion with ID Attending     RECOMMENDATIONS  - Continue Meropenem 1g Q8H  - Continue Vancomycin  - MRSA swab  - F/u procal  - F/u urine culture  - F/u blood culture  - Vent management as per pulm  - ***    INCOMPLETE NOTE. All final recommendations to follow pending discussion with ID Attending  ASSESSMENT  82yMale with w/Pmhx of Cardiac arrest leading to anoxic brain injury (s/p Trach and PEG), H/o Severe Covid PNA, H/o Gram -ve PNA, HTN, and BPH presenting for abnormal labs at nursing home, found to be in severe sepsis.    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 All final recommendations to follow pending discussion with ID Attending     RECOMMENDATIONS  - Continue Meropenem 1g Q8H  - Continue Vancomycin  - MRSA swab  - F/u procal  - F/u urine culture  - F/u blood culture  - Vent management as per pulm  - ***    INCOMPLETE NOTE. All final recommendations to follow pending discussion with ID Attending  ASSESSMENT  82yMale with w/Pmhx of Cardiac arrest leading to anoxic brain injury (s/p Trach and PEG), H/o Severe Covid PNA, H/o Gram -ve PNA, HTN, and BPH presenting for abnormal labs at nursing home, found to be in severe sepsis.    IMPRESSION  #Severe Sepsis on admission not requiring pressors  #Left pyelonephritis with no hydronephrosis  #Bilateral pleural effusion more on right in the setting of acute decompensated heart failure  #New marked degenerative changes of left hip. Correlate for signs of septic arthritis.    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 All final recommendations to follow pending discussion with ID Attending     RECOMMENDATIONS  - Continue Meropenem 1g Q8H  - Continue Vancomycin  - MRSA swab  - F/u procal  - F/u urine culture  - F/u blood culture  - Vent management as per pulm  - ***    INCOMPLETE NOTE. All final recommendations to follow pending discussion with ID Attending  ASSESSMENT  82yMale with w/Pmhx of Cardiac arrest leading to anoxic brain injury (s/p Trach and PEG), H/o Severe Covid PNA, H/o Gram -ve PNA, HTN, and BPH presenting for abnormal labs at nursing home, found to be in severe sepsis.    IMPRESSION  #Severe Sepsis on admission not requiring pressors  #Left pyelonephritis with no hydronephrosis  #Bilateral pleural effusion more on right in the setting of acute decompensated heart failure  #New marked degenerative changes of left hip. Correlate for signs of septic arthritis.    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

## 2022-10-24 NOTE — CONSULT NOTE ADULT - SUBJECTIVE AND OBJECTIVE BOX
NUTRITION SUPPORT TEAM  -  CONSULT NOTE     ADMISSION HPI:  81 YO M w/Pmhx of Cardiac arrest leading to anoxic brain injury(s/p Trach and PEG), H/o Severe Covid PNA, H/o Gram -ve PNA, HTN and BPH presented for abnormal labs in the nursing home. At baseline, patient is unresponsive, makes random non-purposeful movement, had trach(Vent dependent) and PEG.    On 10/22, labs in the SNF showed anemia and uremia, also patient was tachycardic to 101 and tachypneic to 22, was sent to ED for evaluation.    In the MOLST form from the SNF, patient is DNR/DNI and CMO, but when I called the daughter Judy Jackson, she wanted patient to be DNR, but also wanted all other intervention to be done for now.    In the ED,  patient had WBC 13K, Hb 7, Na 152, K 5.3, Bicarb 43, BUN 94, Trop 0.38, ABG showed metabolic alkalosis, UA was grossly positive(with pus in the urine), CXR showed increased B/L opacity.    Vital Signs Last 24 Hrs:  T(F): 98.7 (23 Oct 2022 00:05), Max: 98.7 (23 Oct 2022 00:05)  HR: 96 (23 Oct 2022 02:25) (96 - 100)  BP: 110/72 (23 Oct 2022 00:05) (110/72 - 110/72)  RR: 24 (23 Oct 2022 00:05) (24 - 24)  SpO2: 100% (23 Oct 2022 02:25) (100% - 100%) on Vent   (23 Oct 2022 08:00)    NUTRITION SUPPORT NOTE:      REVIEW OF SYSTEMS:  Negative except as noted above.     PAST MEDICAL/SURGICAL HISTORY:   BPH (benign prostatic hyperplasia)  Mild HTN  Cardiac arrest  Anoxic brain injury  2019 novel coronavirus disease (COVID-19)  ALLERGIES:  Allergy Status Unknown      VITALS:  T(F): 99.4 (10-24 @ 08:40), Max: 100.6 (10-24 @ 06:34)  HR: 96 (10-24 @ 09:00) (89 - 96)  BP: 89/50 (10-24 @ 08:40) (83/51 - 93/50)  RR: 20 (10-24 @ 08:40) (20 - 32)  SpO2: 99% (10-24 @ 09:00) (99% - 100%)    Mode: AC/ CMV (Assist Control/ Continuous Mandatory Ventilation)  RR (machine): 20  TV (machine): 400  FiO2: 60  PEEP: 8  ITime: 1  MAP: 14  PIP: 32  ABG - ( 23 Oct 2022 03:34 )  pH, Arterial: 7.56  pH, Blood: x     /  pCO2: 54    /  pO2: 173   / HCO3: 48    / Base Excess: 22.5  /  SaO2: 100.0     HEIGHT/WEIGHT/BMI:   Height (cm): 172.7 (10-23), 172.7 (02-18)  Weight (kg): 74.8 (10-23), 72.5 (02-18)  BMI (kg/m2): 25.1 (10-23), 24.3 (02-18)    I/Os:     10-23-22 @ 07:01  -  10-24-22 @ 07:00  --------------------------------------------------------  IN:  Total IN: 0 mL-----------  ????????????  OUT:    Voided (mL): 550 mL  Total OUT: 550 mL  Total NET: -550 mL          PHYSICAL EXAM:   GENERAL: NAD, well-groomed, well-developed  HEENT: Moist mucous membranes, Good dentition, No lesions  ABDOMEN: Soft, Nontender, Nondistended  EXTREMITIES:  No clubbing, cyanosis, or edema  SKIN: warm and well perfused; No obvious rashes or lesions  IV ACCESS:   ENTERAL ACCESS:     STANDING MEDICATIONS:   atorvastatin 10 milliGRAM(s) Oral at bedtime  buMETAnide 2 milliGRAM(s) Oral daily  chlorhexidine 0.12% Liquid 15 milliLiter(s) Oral Mucosa every 12 hours  collagenase Ointment 1 Application(s) Topical two times a day  cyanocobalamin 1000 MICROGram(s) Oral daily  epoetin carline (PROCRIT) SubCutaneous Injection - Peds 38408 Unit(s) SubCutaneous <User Schedule>  ferrous    sulfate 325 milliGRAM(s) Oral daily  folic acid 1 milliGRAM(s) Oral daily  insulin lispro (ADMELOG) corrective regimen sliding scale   SubCutaneous three times a day before meals  levothyroxine 125 MICROGram(s) Oral daily  metoprolol tartrate 12.5 milliGRAM(s) Oral every 12 hours  midodrine. 5 milliGRAM(s) Oral three times a day  ofloxacin 0.3% Solution 1 Drop(s) Left EYE two times a day  pantoprazole   Suspension 40 milliGRAM(s) Oral before breakfast  sodium zirconium cyclosilicate 10 Gram(s) Oral two times a day  tamsulosin 0.4 milliGRAM(s) Oral at bedtime  vancomycin  IVPB 1000 milliGRAM(s) IV Intermittent every 12 hours    Home Medications:  amantadine 50 mg/5 mL oral syrup: 10 milliliter(s) orally 2 times a day (23 Oct 2022 08:35)  atorvastatin 10 mg oral tablet: 1 tab(s) by gastrostomy tube once a day (23 Oct 2022 08:35)  bumetanide 2 mg oral tablet: 1 tab(s) by gastrostomy tube once a day (23 Oct 2022 08:35)  chlorhexidine 0.5% topical liquid:  (23 Oct 2022 08:35)  collagenase 250 units/g topical ointment: 1 application topically 2 times a day (23 Oct 2022 08:35)  Eliquis 2.5 mg oral tablet: 1 tab(s) by gastrostomy tube 2 times a day (23 Oct 2022 08:35)  Epogen 20,000 units/mL injectable solution: 1 milliliter(s) injectable 3 times a week (23 Oct 2022 08:35)  ferrous sulfate 220 mg/5 mL (44 mg/5 mL elemental iron) oral elixir: 7.5 milliliter(s) by gastrostomy tube 3 times a day (23 Oct 2022 08:35)  folic acid 1 mg oral tablet: 1 tab(s) orally once a day (23 Oct 2022 08:35)  HumuLIN R 100 units/mL injectable solution:  (23 Oct 2022 08:35)  ipratropium-albuterol 0.5 mg-2.5 mg/3 mL inhalation solution: 3 milliliter(s) inhaled 4 times a day (23 Oct 2022 08:35)  levothyroxine 125 mcg (0.125 mg) oral tablet: 1 tab(s) by gastrostomy tube once a day (23 Oct 2022 08:35)  meropenem 1000 mg intravenous injection: 1 gram(s) intravenous 3 times a day (23 Oct 2022 08:35)  Metoprolol Tartrate 25 mg oral tablet: 0.5 tab(s) by gastrostomy tube 2 times a day (23 Oct 2022 08:35)  midodrine 5 mg oral tablet: 1 tab(s) orally 3 times a day, As Needed (23 Oct 2022 08:35)  omeprazole 20 mg oral delayed release tablet: 1 tab(s) by gastrostomy tube once a day (23 Oct 2022 08:35)  PHOS-NaK oral powder for reconstitution: 1 packet(s) orally 2 times a day (23 Oct 2022 08:35)  potassium chloride 40 mEq/15 mL oral liquid: 7.5 milliliter(s) by gastrostomy tube once a day (23 Oct 2022 08:35)  tamsulosin 0.4 mg oral capsule: 1 cap(s) orally once a day (23 Oct 2022 08:35)---------how is this given via tube?  Vitamin B-12 1000 mcg oral tablet: 1 tab(s) orally once a day (23 Oct 2022 08:35)  Vitamin C 500 mg oral tablet: 1 tab(s) orally once a day (23 Oct 2022 08:35)  zinc sulfate 220 mg oral tablet: 1 tab(s) by gastrostomy tube once a day (23 Oct 2022 08:35)        LABS:                         6.4    15.26 )-----------( 259      ( 24 Oct 2022 04:40 )             23.7     150<H>  |  103  |  80<HH>  ----------------------------<  96          (10-24-22 @ 04:40)  4.0   |  39<H>  |  0.8    Ca    8.1<L>          (10-24-22 @ 04:40)    TPro  5.3<L>  /  Alb  2.0<L>  /  TBili  0.4  /  DBili  x   /  AST  12  /  ALT  7   /  AlkPhos  72       10-24-22 @ 04:40    NO PHOS LEVEL  Triglycerides, Serum: 146 mg/dL (10-24 @ 04:40)    A1c: 4.7 % (10-24-22 @ 04:40)    Blood Glucose (Past 24 hours):  123 mg/dL (10-24 @ 08:49)  103 mg/dL (10-23 @ 17:40)  113 mg/dL (10-23 @ 12:34)    DIET:   Diet, NPO with Tube Feed:   Tube Feeding Modality: Gastrostomy  Glucerna 1.2 Haroldo  Total Volume for 24 Hours (mL): 1680  Continuous  Starting Tube Feed Rate mL per Hour: 20  Increase Tube Feed Rate by (mL): 20     Every 6 hours  Until Goal Tube Feed Rate (mL per Hour): 70  Tube Feed Duration (in Hours): 24  Tube Feed Start Time: 17:00  Free Water Flush Instructions:  200 ml q6hrs (10-23-22 @ 17:02) [Active]    RADIOLOGY:    NUTRITION SUPPORT TEAM  -  CONSULT NOTE     ADMISSION HPI:  81 YO M w/Pmhx of Cardiac arrest leading to anoxic brain injury(s/p Trach and PEG), H/o Severe Covid PNA, H/o Gram -ve PNA, HTN and BPH presented for abnormal labs in the nursing home. At baseline, patient is unresponsive, makes random non-purposeful movement, had trach(Vent dependent) and PEG.    On 10/22, labs in the SNF showed anemia and uremia, also patient was tachycardic to 101 and tachypneic to 22, was sent to ED for evaluation.    In the MOLST form from the SNF, patient is DNR/DNI and CMO, but when I called the daughter Judy Jackson, she wanted patient to be DNR, but also wanted all other intervention to be done for now.    In the ED,  patient had WBC 13K, Hb 7, Na 152, K 5.3, Bicarb 43, BUN 94, Trop 0.38, ABG showed metabolic alkalosis, UA was grossly positive(with pus in the urine), CXR showed increased B/L opacity.    Vital Signs Last 24 Hrs:  T(F): 98.7 (23 Oct 2022 00:05), Max: 98.7 (23 Oct 2022 00:05)  HR: 96 (23 Oct 2022 02:25) (96 - 100)  BP: 110/72 (23 Oct 2022 00:05) (110/72 - 110/72)  RR: 24 (23 Oct 2022 00:05) (24 - 24)  SpO2: 100% (23 Oct 2022 02:25) (100% - 100%) on Vent   (23 Oct 2022 08:00)    NUTRITION SUPPORT NOTE:  consult requested for enteral feeding Rx  d/w admitting resident  concern with low Hb and with several of his multiple chronic meds    REVIEW OF SYSTEMS:  Negative except as noted above.     PAST MEDICAL/SURGICAL HISTORY:   BPH (benign prostatic hyperplasia)  Mild HTN  Cardiac arrest  Anoxic brain injury  2019 novel coronavirus disease (COVID-19)  ALLERGIES:  Allergy Status Unknown      VITALS:  T(F): 99.4 (10-24 @ 08:40), Max: 100.6 (10-24 @ 06:34)  HR: 96 (10-24 @ 09:00) (89 - 96)  BP: 89/50 (10-24 @ 08:40) (83/51 - 93/50)  RR: 20 (10-24 @ 08:40) (20 - 32)  SpO2: 99% (10-24 @ 09:00) (99% - 100%)    Mode: AC/ CMV (Assist Control/ Continuous Mandatory Ventilation)  RR (machine): 20  TV (machine): 400  FiO2: 60  PEEP: 8  ITime: 1  MAP: 14  PIP: 32  ABG - ( 23 Oct 2022 03:34 )  pH, Arterial: 7.56  pH, Blood: x     /  pCO2: 54    /  pO2: 173   / HCO3: 48    / Base Excess: 22.5  /  SaO2: 100.0     HEIGHT/WEIGHT/BMI:   Height (cm): 172.7 (10-23), 172.7 (02-18)  Weight (kg): 74.8 (10-23), 72.5 (02-18)  BMI (kg/m2): 25.1 (10-23), 24.3 (02-18)    I/Os:     10-23-22 @ 07:01  -  10-24-22 @ 07:00  --------------------------------------------------------  IN:  Total IN: 0 mL-----------  ????????????  OUT:    Voided (mL): 550 mL  Total OUT: 550 mL  Total NET: -550 mL    PHYSICAL EXAM:   GENERAL: vent dependent via trach  ill appearing, eyes partly open, pt unresponsive to verbal stimuli or to exam  HEENT: poor dentition  ABDOMEN: Soft, Nontender, Nondistended, protuberant, GT in place (no dressing, no leak or drainage, no erythema)  EXTREMITIES:  No clubbing, cyanosis, or edema, decreased muscle mass c/w bedbound state  SKIN: warm and well perfused; No obvious rashes or lesions  IV ACCESS: r forearm  ENTERAL ACCESS: GT    STANDING MEDICATIONS:   atorvastatin 10 milliGRAM(s) Oral at bedtime  buMETAnide 2 milliGRAM(s) Oral daily  chlorhexidine 0.12% Liquid 15 milliLiter(s) Oral Mucosa every 12 hours  collagenase Ointment 1 Application(s) Topical two times a day  cyanocobalamin 1000 MICROGram(s) Oral daily  epoetin carline (PROCRIT) SubCutaneous Injection - Peds 15003 Unit(s) SubCutaneous <User Schedule>  ferrous    sulfate 325 milliGRAM(s) Oral daily  folic acid 1 milliGRAM(s) Oral daily  insulin lispro (ADMELOG) corrective regimen sliding scale   SubCutaneous three times a day before meals  levothyroxine 125 MICROGram(s) Oral daily  metoprolol tartrate 12.5 milliGRAM(s) Oral every 12 hours  midodrine. 5 milliGRAM(s) Oral three times a day  ofloxacin 0.3% Solution 1 Drop(s) Left EYE two times a day  pantoprazole   Suspension 40 milliGRAM(s) Oral before breakfast  sodium zirconium cyclosilicate 10 Gram(s) Oral two times a day  tamsulosin 0.4 milliGRAM(s) Oral at bedtime  vancomycin  IVPB 1000 milliGRAM(s) IV Intermittent every 12 hours    Home Medications:  amantadine 50 mg/5 mL oral syrup: 10 milliliter(s) orally 2 times a day (23 Oct 2022 08:35)  atorvastatin 10 mg oral tablet: 1 tab(s) by gastrostomy tube once a day (23 Oct 2022 08:35)  bumetanide 2 mg oral tablet: 1 tab(s) by gastrostomy tube once a day (23 Oct 2022 08:35)  chlorhexidine 0.5% topical liquid:  (23 Oct 2022 08:35)  collagenase 250 units/g topical ointment: 1 application topically 2 times a day (23 Oct 2022 08:35)  Eliquis 2.5 mg oral tablet: 1 tab(s) by gastrostomy tube 2 times a day (23 Oct 2022 08:35)  Epogen 20,000 units/mL injectable solution: 1 milliliter(s) injectable 3 times a week (23 Oct 2022 08:35)  ferrous sulfate 220 mg/5 mL (44 mg/5 mL elemental iron) oral elixir: 7.5 milliliter(s) by gastrostomy tube 3 times a day (23 Oct 2022 08:35)  folic acid 1 mg oral tablet: 1 tab(s) orally once a day (23 Oct 2022 08:35)  HumuLIN R 100 units/mL injectable solution:  (23 Oct 2022 08:35)  ipratropium-albuterol 0.5 mg-2.5 mg/3 mL inhalation solution: 3 milliliter(s) inhaled 4 times a day (23 Oct 2022 08:35)  levothyroxine 125 mcg (0.125 mg) oral tablet: 1 tab(s) by gastrostomy tube once a day (23 Oct 2022 08:35)  meropenem 1000 mg intravenous injection: 1 gram(s) intravenous 3 times a day (23 Oct 2022 08:35)  Metoprolol Tartrate 25 mg oral tablet: 0.5 tab(s) by gastrostomy tube 2 times a day (23 Oct 2022 08:35)  midodrine 5 mg oral tablet: 1 tab(s) orally 3 times a day, As Needed (23 Oct 2022 08:35)  omeprazole 20 mg oral delayed release tablet: 1 tab(s) by gastrostomy tube once a day (23 Oct 2022 08:35)  PHOS-NaK oral powder for reconstitution: 1 packet(s) orally 2 times a day (23 Oct 2022 08:35)  potassium chloride 40 mEq/15 mL oral liquid: 7.5 milliliter(s) by gastrostomy tube once a day (23 Oct 2022 08:35)  tamsulosin 0.4 mg oral capsule: 1 cap(s) orally once a day (23 Oct 2022 08:35)---------how is this given via tube?  Vitamin B-12 1000 mcg oral tablet: 1 tab(s) orally once a day (23 Oct 2022 08:35)  Vitamin C 500 mg oral tablet: 1 tab(s) orally once a day (23 Oct 2022 08:35)  zinc sulfate 220 mg oral tablet: 1 tab(s) by gastrostomy tube once a day (23 Oct 2022 08:35)        LABS:                         6.4    15.26 )-----------( 259      ( 24 Oct 2022 04:40 )             23.7     150<H>  |  103  |  80<HH>  ----------------------------<  96          (10-24-22 @ 04:40)  4.0   |  39<H>  |  0.8    Ca    8.1<L>          (10-24-22 @ 04:40)    TPro  5.3<L>  /  Alb  2.0<L>  /  TBili  0.4  /  DBili  x   /  AST  12  /  ALT  7   /  AlkPhos  72       10-24-22 @ 04:40    NO PHOS LEVEL  Triglycerides, Serum: 146 mg/dL (10-24 @ 04:40)    A1c: 4.7 % (10-24-22 @ 04:40)    Blood Glucose (Past 24 hours):  123 mg/dL (10-24 @ 08:49)  103 mg/dL (10-23 @ 17:40)  113 mg/dL (10-23 @ 12:34)    DIET:   Diet, NPO with Tube Feed:   Tube Feeding Modality: Gastrostomy  Glucerna 1.2 Haroldo  Total Volume for 24 Hours (mL): 1680  Continuous  Starting Tube Feed Rate mL per Hour: 20  Increase Tube Feed Rate by (mL): 20     Every 6 hours  Until Goal Tube Feed Rate (mL per Hour): 70  Tube Feed Duration (in Hours): 24  Tube Feed Start Time: 17:00  Free Water Flush Instructions:  200 ml q6hrs (10-23-22 @ 17:02) [Active]    RADIOLOGY:   < from: CT Abdomen and Pelvis w/ IV Cont (10.23.22 @ 11:36) >  1. Left perinephric/periureteral inflammatory changes without   hydronephrosis. Findings most consistent with pyelonephritis. No   drainable fluid collection.    2. Partially imaged moderate to large size pleural effusions bilaterally,   right greater than left.    3. New marked degenerative changes of left hip. Correlate for signs of   septic arthritis.    4. Bladder wall thickening with surrounding inflammation consistent with   cystitis in appropriate clinical setting.    < end of copied text >

## 2022-10-24 NOTE — CONSULT NOTE ADULT - ASSESSMENT
82yMale being evaluated for goals of care and symptom management. Pt has very advanced illness with trach and PEG from SNF is vent dependent at baseline. Family known to palliative care service, previous meetings in March of 2022. Daughter emotional see C note above. No pain or dyspnea to manage. In SNF was on Ativan 0.5mg via PEG Q 12  for agitation / restlessness.             See Recs below.    Please call [phone] with questions or concerns 24/7.   We will continue to follow.    82yMale being evaluated for goals of care and symptom management. Pt has very advanced illness with trach and PEG from SNF is vent dependent at baseline. Family known to palliative care service, previous meetings in March of 2022. Daughter emotional see C note above. No pain or dyspnea to manage. In SNF was on Ativan 0.5mg via PEG Q 12  for agitation / restlessness.       See Recs below.    Please call [phone] with questions or concerns 24/7.   We will continue to follow.

## 2022-10-24 NOTE — CONSULT NOTE ADULT - SUBJECTIVE AND OBJECTIVE BOX
HPI:  81 YO M w/Pmhx of Cardiac arrest leading to anoxic brain injury(s/p Trach and PEG), H/o Severe Covid PNA, H/o Gram -ve PNA, HTN and BPH presented for abnormal labs in the nursing home. At baseline, patient is unresponsive, makes random non-purposeful movement, had trach(Vent dependent) and PEG.    On 10/22, labs in the SNF showed anemia and uremia, also patient was tachycardic to 101 and tachypneic to 22, was sent to ED for evaluation.    In the MOLST form from the SNF, patient is DNR/DNI and CMO, but when I called the daughter Judy Jackson, she wanted patient to be DNR, but also wanted all other intervention to be done for now.    In the ED,  patient had WBC 13K, Hb 7, Na 152, K 5.3, Bicarb 43, BUN 94, Trop 0.38, ABG showed metabolic alkalosis, UA was grossly positive(with pus in the urine), CXR showed increased B/L opacity.    Palliative care consulted to support family, have seen patient in the past.     PERTINENT PM/SXH:   BPH (benign prostatic hyperplasia)    Mild HTN    Cardiac arrest    Anoxic brain injury    2019 novel coronavirus disease (COVID-19)        FAMILY HISTORY:    ITEMS NOT CHECKED ARE NOT PRESENT    SOCIAL HISTORY:   Significant other/partner[ ]  Children[x ]  Uatsdin/Spirituality:  Substance hx:  [ ]   Tobacco hx:  [ ]   Alcohol hx: [ ]   Living Situation: [ ]Home  [x ]Long term care  [ ]Rehab [ ]Other  Home Services: [ ] HHA [ ] Visting RN [ ] Hospice  Occupation:  Home Opioid hx:  [ ] Y [x ] N [x ] I-Stop Reference No:  This report was requested by: Anna Rey | Reference #: [id number]    You have not added a ERNESTINA number. Keeping your ERNESTINA number(s) up to date on the My ERNESTINA # page will enable the separation of your prescriptions from others in the search results.    Others' Prescriptions  Patient Name: Jonny JacksonBirth Date: 1940  Address: [address] [place] [id number]: Male  Rx Written	Rx Dispensed	Drug	Quantity	Days Supply	Prescriber Name	Prescriber Ernestina #	Payment Method	Dispenser  10/11/2022	10/12/2022	lorazepam 0.5 mg tablet	15	30	Meenu Johnson DO	[id number]	Medicare	[facility]  2022	lorazepam 0.5 mg tablet	15	30	Meenu Johnson DO	[id number]	Laird	[facility]  2022	lorazepam 0.5 mg tablet	30	30	Meenu Johnson DO	[id number]	Laird	[facility]  * - Drugs marked with an asterisk are compound drugs. If the compound drug is made up of more than one controlled substance, then each controlled substance will be a separate row in the  ADVANCE DIRECTIVES:    DNR  MOLST  [x ]  Living Will  [ ]   DECISION MAKER(s):  [ ] Health Care Proxy(s)  [ ] Surrogate(s)  [ ] Guardian           Name(s): Phone Number(s):  Judy Ibrahim daughter [phone]  Charanjit son [phone]  BASELINE (I)ADL(s) (prior to admission):  [place]: [ ]Total  [ ] Moderate [ x]Dependent  Palliative Performance Status Version 2:         %    http://npcrc.org/files/news/palliative_performance_scale_ppsv2.pdf    Allergies    Allergy Status Unknown    Intolerances    MEDICATIONS  (STANDING):  atorvastatin 10 milliGRAM(s) Oral at bedtime  buMETAnide 2 milliGRAM(s) Oral daily  chlorhexidine 0.12% Liquid 15 milliLiter(s) Oral Mucosa every 12 hours  collagenase Ointment 1 Application(s) Topical two times a day  dextrose 5%. 1000 milliLiter(s) (100 mL/Hr) IV Continuous <Continuous>  dextrose 5%. 1000 milliLiter(s) (50 mL/Hr) IV Continuous <Continuous>  dextrose 50% Injectable 25 Gram(s) IV Push once  dextrose 50% Injectable 12.5 Gram(s) IV Push once  dextrose 50% Injectable 25 Gram(s) IV Push once  epoetin carline (PROCRIT) SubCutaneous Injection - Peds 97241 Unit(s) SubCutaneous <User Schedule>  glucagon  Injectable 1 milliGRAM(s) IntraMuscular once  insulin lispro (ADMELOG) corrective regimen sliding scale   SubCutaneous three times a day before meals  levothyroxine 125 MICROGram(s) Oral daily  meropenem  IVPB      meropenem  IVPB 1000 milliGRAM(s) IV Intermittent once  meropenem  IVPB 1000 milliGRAM(s) IV Intermittent every 8 hours  metoprolol tartrate 12.5 milliGRAM(s) Oral every 12 hours  midodrine. 5 milliGRAM(s) Oral three times a day  ofloxacin 0.3% Solution 1 Drop(s) Left EYE two times a day  pantoprazole   Suspension 40 milliGRAM(s) Oral before breakfast  sodium zirconium cyclosilicate 10 Gram(s) Oral two times a day  tamsulosin 0.4 milliGRAM(s) Oral at bedtime    MEDICATIONS  (PRN):  acetaminophen     Tablet .. 650 milliGRAM(s) Oral every 6 hours PRN Temp greater or equal to 38C (100.4F), Mild Pain (1 - 3)  albuterol/ipratropium for Nebulization 3 milliLiter(s) Nebulizer every 6 hours PRN Shortness of Breath and/or Wheezing  dextrose Oral Gel 15 Gram(s) Oral once PRN Blood Glucose LESS THAN 70 milliGRAM(s)/deciliter    PRESENT SYMPTOMS: [x ]Unable to obtain due to poor mentation   Source if other than patient:  [ ]Family   [ ]Team     Pain: [ ]yes [ ]no  QOL impact -   Location -                    Aggravating factors -  Quality -  Radiation -  Timing-  Severity (0-10 scale):  Minimal acceptable level (0-10 scale):     CPOT:    https://www.[facility].org/getattachment/[email or web address]-[email or web address]-8717i4615r1e/Critical-Care-Pain-Observation-Tool-(CPOT)      PAIN AD Score:     http://geriatrictoolkit.[place].[facility]/cog/painad.pdf (press ctrl +  left click to view)    Dyspnea:                           [ ]Mild [ ]Moderate [ ]Severe  Anxiety:                             [ ]Mild [ ]Moderate [ ]Severe  Fatigue:                             [ ]Mild [ ]Moderate [ ]Severe  Nausea:                             [ ]Mild [ ]Moderate [ ]Severe  Loss of appetite:              [ ]Mild [ ]Moderate [ ]Severe  Constipation:                    [ ]Mild [ ]Moderate [ ]Severe    Other Symptoms:  [ ]All other review of systems negative     Palliative Performance Status Version 2:         %    http://npcrc.org/files/news/palliative_performance_scale_ppsv2.pdf  PHYSICAL EXAM:  Vital Signs Last 24 Hrs  T(C): 36.6 (24 Oct 2022 15:34), Max: 38.1 (24 Oct 2022 06:34)  T(F): 97.8 (24 Oct 2022 15:34), Max: 100.6 (24 Oct 2022 06:34)  HR: 95 (24 Oct 2022 15:34) (89 - 98)  BP: 89/53 (24 Oct 2022 15:34) (81/52 - 96/53)  BP(mean): --  RR: 20 (24 Oct 2022 15:34) (20 - 32)  SpO2: 100% (24 Oct 2022 15:34) (99% - 100%)    Parameters below as of 24 Oct 2022 15:34  Patient On (Oxygen Delivery Method): tracheostomy collar     I&O's Summary    23 Oct 2022 07:01  -  24 Oct 2022 07:00  --------------------------------------------------------  IN: 0 mL / OUT: 550 mL / NET: -550 mL      GENERAL:  [x ]Alert  [ ]Oriented x   [ ]Lethargic  [ ]Cachexia  [ ]Unarousable  [ ]Verbal  [ x]Non-Verbal  Behavioral:   [ ] Anxiety  [ ] Delirium [ ] Agitation [ ] Other  HEENT:  [ ]Normal   [x ]Dry mouth   [ ]ET Tube/Trach  [ ]Oral lesions  PULMONARY:   [ ]Clear [ x]Tachypnea  [ ]Audible excessive secretions   [ ]Rhonchi        [ ]Right [ ]Left [ ]Bilateral  [ ]Crackles        [ ]Right [ ]Left [ ]Bilateral  [ ]Wheezing     [ ]Right [ ]Left [ ]Bilateral  [ ]Diminished breath sounds [ ]right [ ]left [ ]bilateral  CARDIOVASCULAR:    [ ]Regular [ ]Irregular [ ]Tachy  [ ]Tico [ ]Murmur [ ]Other  GASTROINTESTINAL:  [x ]Soft  [ ]Distended   [ ]+BS  [ ]Non tender [ ]Tender  [x ]PEG [ ]OGT/ NGT  Last BM:   GENITOURINARY:  [ ]Normal [ ] Incontinent   [ ]Oliguria/Anuria   [x ]Becker  MUSCULOSKELETAL:   [ ]Normal   [ x]Weakness  [x ]Bed/Wheelchair bound [ ]Edema  NEUROLOGIC:   [ ]No focal deficits  [x ]Cognitive impairment  [ x]Dysphagia [ ]Dysarthria [ ]Paresis [ ]Other   SKIN:   [ ]Normal    [ ]Rash  [ ]Pressure ulcer(s)       Present on admission [ ]y [ ]n    CRITICAL CARE:  [ ] Shock Present  [ ]Septic [ ]Cardiogenic [ ]Neurologic [ ]Hypovolemic  [ ]  Vasopressors [ ]  Inotropes   [x ]Respiratory failure present [ x]Mechanical ventilation [ ]Non-invasive ventilatory support [ ]High flow  [ ]Acute  [ x]Chronic [ ]Hypoxic  [ ]Hypercarbic [ ]Other  [ ]Other organ failure     LABS:                        6.4    15.26 )-----------( 259      ( 24 Oct 2022 04:40 )             23.7   10-24    150<H>  |  103  |  80<HH>  ----------------------------<  96  4.0   |  39<H>  |  0.8    Ca    8.1<L>      24 Oct 2022 04:40    TPro  5.3<L>  /  Alb  2.0<L>  /  TBili  0.4  /  DBili  x   /  AST  12  /  ALT  7   /  AlkPhos  72  10-24  PT/INR - ( 24 Oct 2022 04:40 )   PT: 15.00 sec;   INR: 1.31 ratio         PTT - ( 24 Oct 2022 04:40 )  PTT:29.6 sec    Urinalysis Basic - ( 23 Oct 2022 05:45 )    Color: Yellow / Appearance: Slightly Turbid / S.017 / pH: x  Gluc: x / Ketone: Negative  / Bili: Negative / Urobili: <2 mg/dL   Blood: x / Protein: 30 mg/dL / Nitrite: Negative   Leuk Esterase: Large / RBC: 2 /HPF /  /HPF   Sq Epi: x / Non Sq Epi: 0 /HPF / Bacteria: Negative      RADIOLOGY & ADDITIONAL STUDIES:  < from: 12 Lead ECG (10.23.22 @ 09:58) >  Ventricular Rate 94 BPM    Atrial Rate 94 BPM    P-R Interval 158 ms    QRS Duration 116 ms    Q-T Interval 392 ms    QTC Calculation(Bazett) 490 ms    P Axis -1 degrees    R Axis -18 degrees    T Axis 166 degrees    Diagnosis Line Normal sinus rhythm  Left ventricular hypertrophy with QRS widening and repolarization abnormality  Prolonged QT  Abnormal ECG    Confirmed by Lotus Braun MD ([id number]) on 10/23/2022 3:00:38     < end of copied text >      REFERRALS:   [ ]Chaplaincy  [ ]Hospice  [ ]Child Life  [ ]Social Work  [ ]Case management [ ]Holistic Therapy     Goals of Care Document:      CC: abnormal labs    HPI:  83 YO M w/Pmhx of Cardiac arrest leading to anoxic brain injury(s/p Trach and PEG), H/o Severe Covid PNA, H/o Gram -ve PNA, HTN and BPH presented for abnormal labs in the nursing home. At baseline, patient is unresponsive, makes random non-purposeful movement, had trach(Vent dependent) and PEG.    On 10/22, labs in the SNF showed anemia and uremia, also patient was tachycardic to 101 and tachypneic to 22, was sent to ED for evaluation.    In the MOLST form from the SNF, patient is DNR/DNI and CMO, but when I called the daughter Judy Jackson, she wanted patient to be DNR, but also wanted all other intervention to be done for now.    In the ED,  patient had WBC 13K, Hb 7, Na 152, K 5.3, Bicarb 43, BUN 94, Trop 0.38, ABG showed metabolic alkalosis, UA was grossly positive(with pus in the urine), CXR showed increased B/L opacity.    Palliative care consulted to support family, have seen patient in the past.     PERTINENT PM/SXH:   BPH (benign prostatic hyperplasia)    Mild HTN    Cardiac arrest    Anoxic brain injury    2019 novel coronavirus disease (COVID-19)    FAMILY HISTORY:  Unable to determine from patient as obtunded    ITEMS NOT CHECKED ARE NOT PRESENT    SOCIAL HISTORY:   Significant other/partner[ ]  Children[x ]  Buddhist/Spirituality:  Substance hx:  [ ]   Tobacco hx:  [ ]   Alcohol hx: [ ]   Living Situation: [ ]Home  [x ]Long term care  [ ]Rehab [ ]Other  Home Services: [ ] HHA [ ] Nicholas RN [ ] Hospice  Occupation:  Home Opioid hx:  [ ] Y [x ] N [x ] I-Stop Reference No:  This report was requested by: Anna Rey | Reference #: [id number]    Others' Prescriptions  Patient Name: Jonny JacksonBirth Date: 1940  Address: [address] [place] [id number]: Male  Rx Written	Rx Dispensed	Drug	Quantity	Days Supply	Prescriber Name	Prescriber Ernestina #	Payment Method	Dispenser  10/11/2022	10/12/2022	lorazepam 0.5 mg tablet	15	30	Meenu Johnson DO	[id number]	Medicare	[facility]  2022	lorazepam 0.5 mg tablet	15	30	Meenu Johnson DO	[id number]	Laird	[facility]  2022	lorazepam 0.5 mg tablet	30	30	Meenu Johnson DO	[id number]	Laird	[facility]    ADVANCE DIRECTIVES:    DNR  MOLST  [x ]  Living Will  [ ]   DECISION MAKER(s):  [ ] Health Care Proxy(s)  [ ] Surrogate(s)  [ ] Guardian           Name(s): Phone Number(s):  Judy Ibrahim daughter [phone]  Charanjit son [phone]  BASELINE (I)ADL(s) (prior to admission):  [place]: [ ]Total  [ ] Moderate [ x]Dependent  Palliative Performance Status Version 2:         20%    http://[email or web address]/files/[email or web address]_performance_scale_[email or web address].pdf    Allergies  Allergy Status Unknown      MEDICATIONS  (STANDING):  atorvastatin 10 milliGRAM(s) Oral at bedtime  buMETAnide 2 milliGRAM(s) Oral daily  chlorhexidine 0.12% Liquid 15 milliLiter(s) Oral Mucosa every 12 hours  collagenase Ointment 1 Application(s) Topical two times a day  dextrose 5%. 1000 milliLiter(s) (100 mL/Hr) IV Continuous <Continuous>  dextrose 5%. 1000 milliLiter(s) (50 mL/Hr) IV Continuous <Continuous>  dextrose 50% Injectable 25 Gram(s) IV Push once  dextrose 50% Injectable 12.5 Gram(s) IV Push once  dextrose 50% Injectable 25 Gram(s) IV Push once  epoetin carline (PROCRIT) SubCutaneous Injection - Peds 61580 Unit(s) SubCutaneous <User Schedule>  glucagon  Injectable 1 milliGRAM(s) IntraMuscular once  insulin lispro (ADMELOG) corrective regimen sliding scale   SubCutaneous three times a day before meals  levothyroxine 125 MICROGram(s) Oral daily  meropenem  IVPB      meropenem  IVPB 1000 milliGRAM(s) IV Intermittent once  meropenem  IVPB 1000 milliGRAM(s) IV Intermittent every 8 hours  metoprolol tartrate 12.5 milliGRAM(s) Oral every 12 hours  midodrine. 5 milliGRAM(s) Oral three times a day  ofloxacin 0.3% Solution 1 Drop(s) Left EYE two times a day  pantoprazole   Suspension 40 milliGRAM(s) Oral before breakfast  sodium zirconium cyclosilicate 10 Gram(s) Oral two times a day  tamsulosin 0.4 milliGRAM(s) Oral at bedtime    MEDICATIONS  (PRN):  acetaminophen     Tablet .. 650 milliGRAM(s) Oral every 6 hours PRN Temp greater or equal to 38C (100.4F), Mild Pain (1 - 3)  albuterol/ipratropium for Nebulization 3 milliLiter(s) Nebulizer every 6 hours PRN Shortness of Breath and/or Wheezing  dextrose Oral Gel 15 Gram(s) Oral once PRN Blood Glucose LESS THAN 70 milliGRAM(s)/deciliter    PRESENT SYMPTOMS: [x ]Unable to obtain due to poor mentation   Source if other than patient:  [ ]Family   [ ]Team     Pain: [ ]yes [ ]no  QOL impact -   Location -                    Aggravating factors -  Quality -  Radiation -  Timing-  Severity (0-10 scale):  Minimal acceptable level (0-10 scale):     PAIN AD Score: 1    Dyspnea:                           [ ]Mild [ ]Moderate [ ]Severe  Anxiety:                             [ ]Mild [ ]Moderate [ ]Severe  Fatigue:                             [ ]Mild [ ]Moderate [ ]Severe  Nausea:                             [ ]Mild [ ]Moderate [ ]Severe  Loss of appetite:              [ ]Mild [ ]Moderate [ ]Severe  Constipation:                    [ ]Mild [ ]Moderate [ ]Severe    Other Symptoms:  [ ]All other review of systems negative     Palliative Performance Status Version 2:         10%    http://[email or web address]/files/news/palliative_performance_scale_[email or web address].pdf    PHYSICAL EXAM:  Vital Signs Last 24 Hrs  T(C): 36.6 (24 Oct 2022 15:34), Max: 38.1 (24 Oct 2022 06:34)  T(F): 97.8 (24 Oct 2022 15:34), Max: 100.6 (24 Oct 2022 06:34)  HR: 95 (24 Oct 2022 15:34) (89 - 98)  BP: 89/53 (24 Oct 2022 15:34) (81/52 - 96/53)  BP(mean): --  RR: 20 (24 Oct 2022 15:34) (20 - 32)  SpO2: 100% (24 Oct 2022 15:34) (99% - 100%)    Parameters below as of 24 Oct 2022 15:34  Patient On (Oxygen Delivery Method): tracheostomy collar     I&O's Summary    23 Oct 2022 07:01  -  24 Oct 2022 07:00  --------------------------------------------------------  IN: 0 mL / OUT: 550 mL / NET: -550 mL      GENERAL:  [x ]Alert  [ ]Oriented x   [ ]Lethargic  [ ]Cachexia  [ ]Unarousable  [ ]Verbal  [ x]Non-Verbal  Behavioral:   [ ] Anxiety  [ ] Delirium [ ] Agitation [ ] Other [x]calm  Eyes: closed  ENMT:  [ ]Normal   [x ]Dry mouth   [ ]ET Tube/Trach  [ x]No Oral lesions  PULMONARY:   [ ]Clear [ x]Tachypnea  [ ]Audible excessive secretions   [ ]Rhonchi        [ ]Right [ ]Left [ ]Bilateral  [ ]Crackles        [ ]Right [ ]Left [ ]Bilateral  [ ]Wheezing     [ ]Right [ ]Left [ ]Bilateral  [ ]Diminished breath sounds [ ]right [ ]left [ ]bilateral  CARDIOVASCULAR:    [ ]Regular [ ]Irregular [x ]Not Tachy  [ ]Tico [ ]Murmur [ ]Other  GASTROINTESTINAL:  [x ]Soft  [ ]Distended   [ ]+BS  [ ]Non tender [ ]Tender  [x ]PEG [ ]OGT/ NGT  Last BM:   GENITOURINARY:  [ ]Normal [ ] Incontinent   [ ]Oliguria/Anuria   [x ]Becker  MUSCULOSKELETAL:   [ ]Normal   [ x]Weakness  [x ]Bed/Wheelchair bound [ ]Edema  NEUROLOGIC:   [ ]No focal deficits  [x ]Cognitive impairment  [ x]Dysphagia [ ]Dysarthria [ ]Paresis [ ]Other   SKIN:   [ ]Normal    [x ]No Rash  [ ]Pressure ulcer(s)       Present on admission [ ]y [ ]n    CRITICAL CARE:  [ ] Shock Present  [ ]Septic [ ]Cardiogenic [ ]Neurologic [ ]Hypovolemic  [ ]  Vasopressors [ ]  Inotropes   [x ]Respiratory failure present [ x]Mechanical ventilation [ ]Non-invasive ventilatory support [ ]High flow  [ ]Acute  [ x]Chronic [ ]Hypoxic  [ ]Hypercarbic [ ]Other  [ ]Other organ failure     LABS:  reviewed                        6.4    15.26 )-----------( 259      ( 24 Oct 2022 04:40 )             23.7   10-24    150<H>  |  103  |  80<HH>  ----------------------------<  96  4.0   |  39<H>  |  0.8    Ca    8.1<L>      24 Oct 2022 04:40    TPro  5.3<L>  /  Alb  2.0<L>  /  TBili  0.4  /  DBili  x   /  AST  12  /  ALT  7   /  AlkPhos  72  10-24  PT/INR - ( 24 Oct 2022 04:40 )   PT: 15.00 sec;   INR: 1.31 ratio         PTT - ( 24 Oct 2022 04:40 )  PTT:29.6 sec    Urinalysis Basic - ( 23 Oct 2022 05:45 )    Color: Yellow / Appearance: Slightly Turbid / S.017 / pH: x  Gluc: x / Ketone: Negative  / Bili: Negative / Urobili: <2 mg/dL   Blood: x / Protein: 30 mg/dL / Nitrite: Negative   Leuk Esterase: Large / RBC: 2 /HPF /  /HPF   Sq Epi: x / Non Sq Epi: 0 /HPF / Bacteria: Negative      RADIOLOGY & ADDITIONAL STUDIES:  reviewed  < from: CT Abdomen and Pelvis w/ IV Cont (10.23.22 @ 11:36) >  IMPRESSION:    Since 2022;    1. Left perinephric/periureteral inflammatory changes without   hydronephrosis. Findings most consistent with pyelonephritis. No   drainable fluid collection.    2. Partially imaged moderate to large size pleural effusions bilaterally,   right greater than left.    3. New marked degenerative changes of left hip. Correlate for signs of   septic arthritis.    4. Bladder wall thickening with surrounding inflammation consistent with   cystitis in appropriate clinical setting.    < end of copied text >      EKG reviewed  < from: 12 Lead ECG (10.23.22 @ 09:58) >  Ventricular Rate 94 BPM    Atrial Rate 94 BPM    P-R Interval 158 ms    QRS Duration 116 ms    Q-T Interval 392 ms    QTC Calculation(Bazett) 490 ms    P Axis -1 degrees    R Axis -18 degrees    T Axis 166 degrees    Diagnosis Line Normal sinus rhythm  Left ventricular hypertrophy with QRS widening and repolarization abnormality  Prolonged QT  Abnormal ECG    Confirmed by Lotus Braun MD ([id number]) on 10/23/2022 3:00:38     < end of copied text >      REFERRALS:   [ ]Chaplaincy  [ ]Hospice  [ ]Child Life  [ ]Social Work  [ ]Case management [ ]Holistic Therapy     Goals of Care Document:

## 2022-10-24 NOTE — ED ADULT NURSE REASSESSMENT NOTE - NS ED NURSE REASSESS COMMENT FT1
Received report from prior RN. Pt on Tele/O2 monitor. Becker in place. Fall precautions maintained, BA in place. Comfort measures offered. Will f/u.
patient came to the ED with a nonstagable sacral ulcer, MD aware.  Will continue to monitor.
Received pt in stretcher responsive to pain on cardiac monitor, trach to vent. Settings mode AC, rate 20, , FiO2 60% and peep 8. Pt noted on IV fluids and Peg tube feeding via gravity. Pt noted with Peg tube to epigastric area, single lumen PICC line to right upper arm and 20 gauge to right forearm. 16Fr cardoso catheter intact and patent.

## 2022-10-24 NOTE — PROGRESS NOTE ADULT - SUBJECTIVE AND OBJECTIVE BOX
Over Night Events: events noted, vent dependant, afebrile, dec hb    PHYSICAL EXAM    ICU Vital Signs Last 24 Hrs  T(C): 37.1 (23 Oct 2022 23:50), Max: 37.3 (23 Oct 2022 17:00)  T(F): 98.8 (23 Oct 2022 23:50), Max: 99.2 (23 Oct 2022 17:00)  HR: 92 (24 Oct 2022 04:37) (89 - 97)  BP: 93/50 (24 Oct 2022 04:37) (81/52 - 95/71)  RR: 30 (24 Oct 2022 02:19) (20 - 30)  SpO2: 100% (24 Oct 2022 04:00) (100% - 100%)    O2 Parameters below as of 23 Oct 2022 23:50  Patient On (Oxygen Delivery Method): trach to vent            General: ill looking  HEENT: trach         Lungs: dec bs both bases  Cardiovascular: YOANA 2.6  Abdomen: Soft, Positive BS  ulcer  not following commands      10-23-22 @ 07:01  -  10-24-22 @ 06:11  --------------------------------------------------------  IN:  Total IN: 0 mL    OUT:    Voided (mL): 550 mL  Total OUT: 550 mL    Total NET: -550 mL          LABS:                          6.4    15.26 )-----------( 259      ( 24 Oct 2022 04:40 )             23.7                                               10-24    150<H>  |  103  |  80<HH>  ----------------------------<  96  4.0   |  39<H>  |  0.8    Ca    8.1<L>      24 Oct 2022 04:40    TPro  5.3<L>  /  Alb  2.0<L>  /  TBili  0.4  /  DBili  x   /  AST  12  /  ALT  7   /  AlkPhos  72  10-24      PT/INR - ( 24 Oct 2022 04:40 )   PT: 15.00 sec;   INR: 1.31 ratio         PTT - ( 24 Oct 2022 04:40 )  PTT:29.6 sec                                       Urinalysis Basic - ( 23 Oct 2022 05:45 )    Color: Yellow / Appearance: Slightly Turbid / S.017 / pH: x  Gluc: x / Ketone: Negative  / Bili: Negative / Urobili: <2 mg/dL   Blood: x / Protein: 30 mg/dL / Nitrite: Negative   Leuk Esterase: Large / RBC: 2 /HPF /  /HPF   Sq Epi: x / Non Sq Epi: 0 /HPF / Bacteria: Negative        CARDIAC MARKERS ( 24 Oct 2022 04:40 )  x     / 0.43 ng/mL / x     / x     / x      CARDIAC MARKERS ( 24 Oct 2022 00:00 )  x     / 0.45 ng/mL / x     / x     / x      CARDIAC MARKERS ( 23 Oct 2022 04:15 )  x     / 0.38 ng/mL / x     / x     / x                                                LIVER FUNCTIONS - ( 24 Oct 2022 04:40 )  Alb: 2.0 g/dL / Pro: 5.3 g/dL / ALK PHOS: 72 U/L / ALT: 7 U/L / AST: 12 U/L / GGT: x                                                                                               Mode: AC/ CMV (Assist Control/ Continuous Mandatory Ventilation)  RR (machine): 20  TV (machine): 400  FiO2: 60  PEEP: 8  ITime: 1  MAP: 15  PIP: 32                                      ABG - ( 23 Oct 2022 03:34 )  pH, Arterial: 7.56  pH, Blood: x     /  pCO2: 54    /  pO2: 173   / HCO3: 48    / Base Excess: 22.5  /  SaO2: 100.0               MEDICATIONS  (STANDING):  atorvastatin 10 milliGRAM(s) Oral at bedtime  buMETAnide 2 milliGRAM(s) Oral daily  chlorhexidine 0.12% Liquid 15 milliLiter(s) Oral Mucosa every 12 hours  collagenase Ointment 1 Application(s) Topical two times a day  cyanocobalamin 1000 MICROGram(s) Oral daily  dextrose 5%. 1000 milliLiter(s) (100 mL/Hr) IV Continuous <Continuous>  dextrose 5%. 1000 milliLiter(s) (50 mL/Hr) IV Continuous <Continuous>  dextrose 50% Injectable 25 Gram(s) IV Push once  dextrose 50% Injectable 12.5 Gram(s) IV Push once  dextrose 50% Injectable 25 Gram(s) IV Push once  epoetin carline (PROCRIT) SubCutaneous Injection - Peds 07371 Unit(s) SubCutaneous <User Schedule>  ferrous    sulfate 325 milliGRAM(s) Oral daily  folic acid 1 milliGRAM(s) Oral daily  glucagon  Injectable 1 milliGRAM(s) IntraMuscular once  insulin lispro (ADMELOG) corrective regimen sliding scale   SubCutaneous three times a day before meals  lactated ringers. 1000 milliLiter(s) (75 mL/Hr) IV Continuous <Continuous>  levothyroxine 125 MICROGram(s) Oral daily  metoprolol tartrate 12.5 milliGRAM(s) Oral every 12 hours  midodrine. 5 milliGRAM(s) Oral three times a day  ofloxacin 0.3% Solution 1 Drop(s) Left EYE two times a day  pantoprazole   Suspension 40 milliGRAM(s) Oral before breakfast  sodium zirconium cyclosilicate 10 Gram(s) Oral two times a day  tamsulosin 0.4 milliGRAM(s) Oral at bedtime  vancomycin  IVPB 1000 milliGRAM(s) IV Intermittent every 12 hours    MEDICATIONS  (PRN):  acetaminophen     Tablet .. 650 milliGRAM(s) Oral every 6 hours PRN Temp greater or equal to 38C (100.4F), Mild Pain (1 - 3)  albuterol/ipratropium for Nebulization 3 milliLiter(s) Nebulizer every 6 hours PRN Shortness of Breath and/or Wheezing  dextrose Oral Gel 15 Gram(s) Oral once PRN Blood Glucose LESS THAN 70 milliGRAM(s)/deciliter      CXR/ CT AP reviewed

## 2022-10-25 NOTE — PHARMACOTHERAPY INTERVENTION NOTE - COMMENTS
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 In addition to starting ampicillin-sulbactam 9g IV q8h infused over 4 hours, recommended increasing dose of meropenem from 1g IV q8h infused over 30 min to 2g IV q8h infused over 3 hours until susceptibilities return.

## 2022-10-25 NOTE — PROGRESS NOTE ADULT - ASSESSMENT
IMPRESSION:    sepsis present on admission  UTI/ acineto  Anemia  hypernatremia  chronic hypoxic respiratory failure SP Trach  Anoxic brain injury  hx Cardiac Arrest  hx Severe COVID PNA      SUGGEST:    CNS:  as needed morphine for comfort    HEENT: Oral care.      PULMONARY: aspiration precaution, dec FIO2 TO 40%, Keep Sao2 92 to 96%, not weanable      CARDIOVASCULAR:   Avoid overload.  Free water    GI: PPI q 12, MAYRA for blood    RENAL:  Follow up lytes.  Correct as needed.  Monitor UO.  Becker care.  free H2O    INFECTIOUS DISEASE:    abx per ID    HEMATOLOGICAL: DVT prophylaxis, LE doppler, transfuse keep hb more than 7    ENDOCRINE:  Follow up FS.  Insulin protocol if needed    MUSCULOSKELETAL: bedrest    DNR  Very poor overall prognosis

## 2022-10-25 NOTE — PROGRESS NOTE ADULT - SUBJECTIVE AND OBJECTIVE BOX
CHADWICK VENCES 82y Male  MRN#: [id number]   Hospital Day: 2d    SUBJECTIVE  Patient is a 82y old Male who presents with a chief complaint of sepsis (25 Oct 2022 06:02)  Currently admitted to medicine with the primary diagnosis of Hypernatremia      INTERVAL HPI AND OVERNIGHT EVENTS:  Patient was examined and seen at bedside. This morning he is resting comfortably in bed and reports no issues or overnight events.  b/l extremity swelling, ROS deferred, patient obtunded and on trach vent.    REVIEW OF SYMPTOMS:  deferred given mental status     OBJECTIVE  PAST MEDICAL & SURGICAL HISTORY  BPH (benign prostatic hyperplasia)  Mild HTN  Cardiac arrest  Anoxic brain injury  2019 novel coronavirus disease (COVID-19)    ALLERGIES:  Allergy Status Unknown    MEDICATIONS:  STANDING MEDICATIONS  atorvastatin 10 milliGRAM(s) Oral at bedtime  buMETAnide 2 milliGRAM(s) Oral daily  chlorhexidine 0.12% Liquid 15 milliLiter(s) Oral Mucosa every 12 hours  collagenase Ointment 1 Application(s) Topical two times a day  dextrose 5%. 1000 milliLiter(s) IV Continuous <Continuous>  dextrose 5%. 1000 milliLiter(s) IV Continuous <Continuous>  dextrose 50% Injectable 25 Gram(s) IV Push once  dextrose 50% Injectable 12.5 Gram(s) IV Push once  dextrose 50% Injectable 25 Gram(s) IV Push once  epoetin carline-epbx (RETACRIT) Injectable 88647 Unit(s) SubCutaneous <User Schedule>  glucagon  Injectable 1 milliGRAM(s) IntraMuscular once  insulin lispro (ADMELOG) corrective regimen sliding scale   SubCutaneous three times a day before meals  levothyroxine 125 MICROGram(s) Oral daily  meropenem  IVPB      meropenem  IVPB 1000 milliGRAM(s) IV Intermittent every 8 hours  metoprolol tartrate 12.5 milliGRAM(s) Oral every 12 hours  midodrine. 5 milliGRAM(s) Oral three times a day  ofloxacin 0.3% Solution 1 Drop(s) Left EYE two times a day  pantoprazole   Suspension 40 milliGRAM(s) Oral before breakfast  tamsulosin 0.4 milliGRAM(s) Oral at bedtime    PRN MEDICATIONS  acetaminophen     Tablet .. 650 milliGRAM(s) Oral every 6 hours PRN  albuterol/ipratropium for Nebulization 3 milliLiter(s) Nebulizer every 6 hours PRN  dextrose Oral Gel 15 Gram(s) Oral once PRN      VITAL SIGNS: Last 24 Hours  T(C): 36.3 (25 Oct 2022 07:51), Max: 36.6 (24 Oct 2022 15:34)  T(F): 97.4 (25 Oct 2022 07:51), Max: 97.8 (24 Oct 2022 15:34)  HR: 88 (25 Oct 2022 07:51) (88 - 95)  BP: 102/59 (25 Oct 2022 07:51) (89/53 - 103/56)  BP(mean): --  RR: 20 (25 Oct 2022 07:51) (18 - 20)  SpO2: 97% (25 Oct 2022 08:33) (97% - 100%)    PHYSICAL EXAM:  GENERAL: NAD,  HEENT - NC/AT, trached on vent - site clean   CHEST/LUNG: b/l air entry, mechanical BS  HEART: Regular rate and rhythm;   ABDOMEN: Soft, Nontender, Nondistended; Bowel sounds present, PEG site clean   EXTREMITIES:  2+b/l extremity edema   NEURO: aox 0, bedbound on trach   SKIN: wet, no rash    LABS:                        6.4    15.26 )-----------( 259      ( 24 Oct 2022 04:40 )             23.7     10-24    150<H>  |  103  |  80<HH>  ----------------------------<  96  4.0   |  39<H>  |  0.8    Ca    8.1<L>      24 Oct 2022 04:40    TPro  5.3<L>  /  Alb  2.0<L>  /  TBili  0.4  /  DBili  x   /  AST  12  /  ALT  7   /  AlkPhos  72  10-24    PT/INR - ( 24 Oct 2022 04:40 )   PT: 15.00 sec;   INR: 1.31 ratio         PTT - ( 24 Oct 2022 04:40 )  PTT:29.6 sec    Culture - Blood (collected 24 Oct 2022 00:00)  Source: .Blood Blood-Peripheral  Preliminary Report (25 Oct 2022 09:01):    No growth to date.    Culture - Urine (collected 23 Oct 2022 05:45)  Source: Clean Catch Clean Catch (Midstream)  Preliminary Report (24 Oct 2022 19:53):    >100,000 CFU/ml Acinetobacter baumannii/nosocomialis group    Culture - Blood (collected 23 Oct 2022 05:05)  Source: .Blood Blood  Preliminary Report (24 Oct 2022 13:03):    No growth to date.      CARDIAC MARKERS ( 24 Oct 2022 11:32 )  x     / 0.41 ng/mL / x     / x     / x      CARDIAC MARKERS ( 24 Oct 2022 04:40 )  x     / 0.43 ng/mL / x     / x     / x      CARDIAC MARKERS ( 24 Oct 2022 00:00 )  x     / 0.45 ng/mL / x     / x     / x          RADIOLOGY:    < from: Xray Chest 1 View- PORTABLE-Urgent (Xray Chest 1 View- PORTABLE-Urgent .) (10.23.22 @ 06:38) >  Increasing bilateral lung opacities    < end of copied text >  < from: CT Abdomen and Pelvis w/ IV Cont (10.23.22 @ 11:36) >    IMPRESSION:    Since February 27, 2022;    1. Left perinephric/periureteral inflammatory changes without   hydronephrosis. Findings most consistent with pyelonephritis. No   drainable fluid collection.    2. Partially imaged moderate to large size pleural effusions bilaterally,   right greater than left.    3. New marked degenerative changes of left hip. Correlate for signs of   septic arthritis.    4. Bladder wall thickening with surrounding inflammation consistent with   cystitis in appropriate clinical setting.    < end of copied text >  < from: [facility] Duplex Lower Ext Vein Scan, Bilat (10.24.22 @ 13:49) >    Impression:  No evidence of deep venous thrombosis or superficial thrombophlebitis in   the bilateral lower extremities.    < end of copied text >

## 2022-10-25 NOTE — PROGRESS NOTE ADULT - ASSESSMENT
a 81 YO M w/Pmhx of Cardiac arrest leading to anoxic brain injury(s/p Trach and PEG), H/o Severe Covid PNA, H/o Gram -ve PNA, HTN and BPH presented for abnormal labs in the nursing home,( baseline, patient is unresponsive, makes random non-purposeful movement, had trach(Vent dependent) and with PEG tube for feeds), On 10/22, labs in the SNF showed anemia and uremia, also patient was tachycardic to 101 and tachypneic to 22, was sent to ED for evaluation. patient is DNR and CMO( daughter Judy Jackson, she wanted all intervention to be done for now), patient found to have B/l pna on cxr and ct showing Left perinephric/periureteral inflammatory changes without hydronephrosis. Findings most consistent with pyelonephritis. No drainable fluid collection. moderate to large size pleural effusions bilaterally, right greater than left. New marked degenerative changes of left hip. Correlate for signs of septic arthritis. Bladder wall thickening with surrounding inflammation consistent with cystitis in appropriate clinical setting. ID and pulmonary consulted. patient started on iv hydration, s/p 2 units prbc, ID, GI and pulmonary consulted. eliquis on hold until GI bleed is ruled out. admitted for PNA, acute pyelonephritis and GI bleed.     B/l pneumonia   acute pyonephritis   moderate to large size pleural effusions bilaterally  anoxic brain injury(s/p Trach and PEG)  HTN and BPH    Plan:  - s/p 2 units prbc f/u post transfusion CBC  - Continue Iv fluids as needed  - consulted ID, pulmonary, and GI Rec's appr.   - c/w meropenem as per ID rec's  - Wound care  Rec's appre  - Tylenol and pain management prn  - continue current medication, antihypertensive (with holding parameters),  - insulin sliding scale (goal Fs 140-180), POC glucose qid  - monitor vitals closely, daily am labs  - precaution (fall/aspiration/seizure)  - telemetry     DVT prophylaxis: SCD's  GI prophylaxis: Protonix   diet: peg feeds   code status: on vent/ DNR, daughter wants all to be done  Goals of care/disposition: NH    #Progress Note Handoff  Pending (specify): f/u ID,GI and pulmonary recs   Disposition: [facility] once medically optimized      a 81 YO M w/Pmhx of Cardiac arrest leading to anoxic brain injury(s/p Trach and PEG), H/o Severe Covid PNA, H/o Gram -ve PNA, HTN and BPH presented for abnormal labs in the nursing home,( baseline, patient is unresponsive, makes random non-purposeful movement, had trach(Vent dependent) and with PEG tube for feeds), On 10/22, labs in the SNF showed anemia and uremia, also patient was tachycardic to 101 and tachypneic to 22, was sent to ED for evaluation. patient is DNR and CMO( daughter Judy Jackson, she wanted all intervention to be done for now), patient found to have B/l pna on cxr and ct showing Left perinephric/periureteral inflammatory changes without hydronephrosis. Findings most consistent with pyelonephritis. No drainable fluid collection. moderate to large size pleural effusions bilaterally, right greater than left. New marked degenerative changes of left hip. Correlate for signs of septic arthritis. Bladder wall thickening with surrounding inflammation consistent with cystitis in appropriate clinical setting. ID and pulmonary consulted. patient started on iv hydration, s/p 2 units prbc, ID, GI and pulmonary consulted. eliquis on hold until GI bleed is ruled out. admitted for PNA, acute pyelonephritis and GI bleed.     B/l pneumonia   acute pyonephritis   severe anemia   moderate to large size pleural effusions bilaterally  anoxic brain injury(s/p Trach and PEG)  b/l extremity edema   HTN and BPH    Plan:  - s/p 2 units prbc f/u post transfusion CBC  - Continue Iv fluids as needed  - consulted ID, pulmonary, and GI Rec's appr.   - c/w meropenem as per ID rec's  - Wound care  Rec's appre  - Tylenol and pain management prn  - continue current medication, antihypertensive (with holding parameters),  - insulin sliding scale (goal Fs 140-180), POC glucose qid  - monitor vitals closely, daily am labs  - precaution (fall/aspiration/seizure)  - telemetry     DVT prophylaxis: SCD's  GI prophylaxis: Protonix   diet: peg feeds   code status: on vent/ DNR, daughter wants all to be done  Goals of care/disposition: NH    #Progress Note Handoff  Pending (specify): f/u ID,GI and pulmonary recs   Disposition: [facility] once medically optimized

## 2022-10-25 NOTE — PROGRESS NOTE ADULT - SUBJECTIVE AND OBJECTIVE BOX
Over Night Events: events noted, ID/ GI reviewed, afebrile    PHYSICAL EXAM    ICU Vital Signs Last 24 Hrs  T(C): 36.6 (24 Oct 2022 15:34), Max: 38.1 (24 Oct 2022 06:34)  T(F): 97.8 (24 Oct 2022 15:34), Max: 100.6 (24 Oct 2022 06:34)  HR: 91 (25 Oct 2022 03:20) (91 - 98)  BP: 103/56 (25 Oct 2022 03:20) (83/51 - 103/56)  RR: 18 (25 Oct 2022 03:20) (18 - 32)  SpO2: 98% (25 Oct 2022 03:20) (98% - 100%)    O2 Parameters below as of 25 Oct 2022 03:20  Patient On (Oxygen Delivery Method): tracheostomy collar    O2 Concentration (%): 60        General: ill looking  HEENT: trach             Lungs: Bilateral rhonchi  Cardiovascular: YOANA 2.6  Abdomen: Soft, Positive BS  Not following commands  sacral ulcer      10-23-22 @ 07:01  -  10-24-22 @ 07:00  --------------------------------------------------------  IN:  Total IN: 0 mL    OUT:    Voided (mL): 550 mL  Total OUT: 550 mL    Total NET: -550 mL      10-24-22 @ 07:01  -  10-25-22 @ 06:03  --------------------------------------------------------  IN:    Free Water: 400 mL    Glucerna: 1000 mL  Total IN: 1400 mL    OUT:    Indwelling Catheter - Urethral (mL): 500 mL  Total OUT: 500 mL    Total NET: 900 mL          LABS:                          6.4    15.26 )-----------( 259      ( 24 Oct 2022 04:40 )             23.7                                               10-24    150<H>  |  103  |  80<HH>  ----------------------------<  96  4.0   |  39<H>  |  0.8    Ca    8.1<L>      24 Oct 2022 04:40    TPro  5.3<L>  /  Alb  2.0<L>  /  TBili  0.4  /  DBili  x   /  AST  12  /  ALT  7   /  AlkPhos  72  10-24      PT/INR - ( 24 Oct 2022 04:40 )   PT: 15.00 sec;   INR: 1.31 ratio         PTT - ( 24 Oct 2022 04:40 )  PTT:29.6 sec                                           CARDIAC MARKERS ( 24 Oct 2022 11:32 )  x     / 0.41 ng/mL / x     / x     / x      CARDIAC MARKERS ( 24 Oct 2022 04:40 )  x     / 0.43 ng/mL / x     / x     / x      CARDIAC MARKERS ( 24 Oct 2022 00:00 )  x     / 0.45 ng/mL / x     / x     / x                                                LIVER FUNCTIONS - ( 24 Oct 2022 04:40 )  Alb: 2.0 g/dL / Pro: 5.3 g/dL / ALK PHOS: 72 U/L / ALT: 7 U/L / AST: 12 U/L / GGT: x                                                  Culture - Urine (collected 23 Oct 2022 05:45)  Source: Clean Catch Clean Catch (Midstream)  Preliminary Report (24 Oct 2022 19:53):    >100,000 CFU/ml Acinetobacter baumannii/nosocomialis group    Culture - Blood (collected 23 Oct 2022 05:05)  Source: .Blood Blood  Preliminary Report (24 Oct 2022 13:03):    No growth to date.                                                   Mode: AC/ CMV (Assist Control/ Continuous Mandatory Ventilation)  RR (machine): 20  TV (machine): 400  FiO2: 60  PEEP: 8  MAP: 14  PIP: 29                                          MEDICATIONS  (STANDING):  atorvastatin 10 milliGRAM(s) Oral at bedtime  buMETAnide 2 milliGRAM(s) Oral daily  chlorhexidine 0.12% Liquid 15 milliLiter(s) Oral Mucosa every 12 hours  collagenase Ointment 1 Application(s) Topical two times a day  dextrose 5%. 1000 milliLiter(s) (100 mL/Hr) IV Continuous <Continuous>  dextrose 5%. 1000 milliLiter(s) (50 mL/Hr) IV Continuous <Continuous>  dextrose 50% Injectable 25 Gram(s) IV Push once  dextrose 50% Injectable 12.5 Gram(s) IV Push once  dextrose 50% Injectable 25 Gram(s) IV Push once  epoetin carline-epbx (RETACRIT) Injectable 23602 Unit(s) SubCutaneous <User Schedule>  glucagon  Injectable 1 milliGRAM(s) IntraMuscular once  insulin lispro (ADMELOG) corrective regimen sliding scale   SubCutaneous three times a day before meals  levothyroxine 125 MICROGram(s) Oral daily  meropenem  IVPB      meropenem  IVPB 1000 milliGRAM(s) IV Intermittent every 8 hours  metoprolol tartrate 12.5 milliGRAM(s) Oral every 12 hours  midodrine. 5 milliGRAM(s) Oral three times a day  ofloxacin 0.3% Solution 1 Drop(s) Left EYE two times a day  pantoprazole   Suspension 40 milliGRAM(s) Oral before breakfast  sodium zirconium cyclosilicate 10 Gram(s) Oral two times a day  tamsulosin 0.4 milliGRAM(s) Oral at bedtime    MEDICATIONS  (PRN):  acetaminophen     Tablet .. 650 milliGRAM(s) Oral every 6 hours PRN Temp greater or equal to 38C (100.4F), Mild Pain (1 - 3)  albuterol/ipratropium for Nebulization 3 milliLiter(s) Nebulizer every 6 hours PRN Shortness of Breath and/or Wheezing  dextrose Oral Gel 15 Gram(s) Oral once PRN Blood Glucose LESS THAN 70 milliGRAM(s)/deciliter      Xrays:                                                                                     ECHO

## 2022-10-25 NOTE — CHART NOTE - NSCHARTNOTEFT_GEN_A_CORE
Chart Reviewed.     Urine Cx growing MDR Acinetobacter baumanii - susceptible to aminoglycosides.     Will stop meropenem   Ordered Unasyn 9g q 8 hours extended infusion + Amikacin 20 mg/kg x 1.  Amikacin level ordered with AM labs  Trend Creatinine daily     Please call or message on [employer] Teams if with any questions.  Spectra [phone]

## 2022-10-26 NOTE — PROGRESS NOTE ADULT - SUBJECTIVE AND OBJECTIVE BOX
CHADWICK VENCES 82y Male  MRN#: [id number]     Hospital Day: 3d    Pt is currently admitted with the primary diagnosis of  HYPERNATREMIA ANEMIA        SUBJECTIVE     Patient was seen this morning. He's unresponsive at baseline                                          ----------------------------------------------------------  OBJECTIVE  PAST MEDICAL & SURGICAL HISTORY  BPH (benign prostatic hyperplasia)    Mild HTN    Cardiac arrest    Anoxic brain injury    2019 novel coronavirus disease (COVID-19)                                              -----------------------------------------------------------  ALLERGIES:  Allergy Status Unknown                                            ------------------------------------------------------------    HOME MEDICATIONS  Home Medications:  amantadine 50 mg/5 mL oral syrup: 10 milliliter(s) orally 2 times a day (23 Oct 2022 08:35)  atorvastatin 10 mg oral tablet: 1 tab(s) by gastrostomy tube once a day (23 Oct 2022 08:35)  bumetanide 2 mg oral tablet: 1 tab(s) by gastrostomy tube once a day (23 Oct 2022 08:35)  chlorhexidine 0.5% topical liquid:  (23 Oct 2022 08:35)  collagenase 250 units/g topical ointment: 1 application topically 2 times a day (23 Oct 2022 08:35)  Eliquis 2.5 mg oral tablet: 1 tab(s) by gastrostomy tube 2 times a day (23 Oct 2022 08:35)  Epogen 20,000 units/mL injectable solution: 1 milliliter(s) injectable 3 times a week (23 Oct 2022 08:35)  ferrous sulfate 220 mg/5 mL (44 mg/5 mL elemental iron) oral elixir: 7.5 milliliter(s) by gastrostomy tube 3 times a day (23 Oct 2022 08:35)  folic acid 1 mg oral tablet: 1 tab(s) orally once a day (23 Oct 2022 08:35)  HumuLIN R 100 units/mL injectable solution:  (23 Oct 2022 08:35)  ipratropium-albuterol 0.5 mg-2.5 mg/3 mL inhalation solution: 3 milliliter(s) inhaled 4 times a day (23 Oct 2022 08:35)  levothyroxine 125 mcg (0.125 mg) oral tablet: 1 tab(s) by gastrostomy tube once a day (23 Oct 2022 08:35)  meropenem 1000 mg intravenous injection: 1 gram(s) intravenous 3 times a day (23 Oct 2022 08:35)  Metoprolol Tartrate 25 mg oral tablet: 0.5 tab(s) by gastrostomy tube 2 times a day (23 Oct 2022 08:35)  midodrine 5 mg oral tablet: 1 tab(s) orally 3 times a day, As Needed (23 Oct 2022 08:35)  omeprazole 20 mg oral delayed release tablet: 1 tab(s) by gastrostomy tube once a day (23 Oct 2022 08:35)  PHOS-NaK oral powder for reconstitution: 1 packet(s) orally 2 times a day (23 Oct 2022 08:35)  potassium chloride 40 mEq/15 mL oral liquid: 7.5 milliliter(s) by gastrostomy tube once a day (23 Oct 2022 08:35)  tamsulosin 0.4 mg oral capsule: 1 cap(s) orally once a day (23 Oct 2022 08:35)  Tylenol 325 mg oral tablet: 2 tab(s) orally every 6 hours, As Needed (23 Oct 2022 08:35)  Vitamin B-12 1000 mcg oral tablet: 1 tab(s) orally once a day (23 Oct 2022 08:35)  Vitamin C 500 mg oral tablet: 1 tab(s) orally once a day (23 Oct 2022 08:35)  zinc sulfate 220 mg oral tablet: 1 tab(s) by gastrostomy tube once a day (23 Oct 2022 08:35)                           MEDICATIONS:  STANDING MEDICATIONS  ampicillin/sulbactam  IVPB 9 Gram(s) IV Intermittent every 8 hours  atorvastatin 10 milliGRAM(s) Oral at bedtime  chlorhexidine 0.12% Liquid 15 milliLiter(s) Oral Mucosa every 12 hours  chlorhexidine 2% Cloths 1 Application(s) Topical daily  collagenase Ointment 1 Application(s) Topical two times a day  dextrose 5%. 1000 milliLiter(s) IV Continuous <Continuous>  dextrose 5%. 1000 milliLiter(s) IV Continuous <Continuous>  dextrose 50% Injectable 25 Gram(s) IV Push once  dextrose 50% Injectable 12.5 Gram(s) IV Push once  dextrose 50% Injectable 25 Gram(s) IV Push once  epoetin carline-epbx (RETACRIT) Injectable 09419 Unit(s) SubCutaneous <User Schedule>  glucagon  Injectable 1 milliGRAM(s) IntraMuscular once  insulin lispro (ADMELOG) corrective regimen sliding scale   SubCutaneous three times a day before meals  levothyroxine 125 MICROGram(s) Oral daily  metoprolol tartrate 12.5 milliGRAM(s) Oral every 12 hours  midodrine. 10 milliGRAM(s) Oral every 8 hours  ofloxacin 0.3% Solution 1 Drop(s) Left EYE two times a day  pantoprazole   Suspension 40 milliGRAM(s) Oral every 12 hours  potassium chloride   Powder 40 milliEquivalent(s) Enteral Tube once  tamsulosin 0.4 milliGRAM(s) Oral at bedtime    PRN MEDICATIONS  acetaminophen     Tablet .. 650 milliGRAM(s) Oral every 6 hours PRN  albuterol/ipratropium for Nebulization 3 milliLiter(s) Nebulizer every 6 hours PRN  dextrose Oral Gel 15 Gram(s) Oral once PRN                                            ------------------------------------------------------------  VITAL SIGNS: Last 24 Hours  T(C): 36.6 (26 Oct 2022 08:00), Max: 36.8 (26 Oct 2022 03:00)  T(F): 97.8 (26 Oct 2022 08:00), Max: 98.2 (26 Oct 2022 03:00)  HR: 79 (26 Oct 2022 08:00) (62 - 92)  BP: 80/54 (26 Oct 2022 08:00) (80/54 - 92/53)  BP(mean): 63 (26 Oct 2022 08:00) (63 - 66)  RR: 20 (26 Oct 2022 08:00) (18 - 20)  SpO2: 100% (26 Oct 2022 08:00) (97% - 100%)      10-25-22 @ 07:01  -  10-26-22 @ 07:00  --------------------------------------------------------  IN: 1270 mL / OUT: 400 mL / NET: 870 mL                                             --------------------------------------------------------------  LABS:                        9.3    16.49 )-----------( 232      ( 26 Oct 2022 06:00 )             31.9     10-26    153<H>  |  106  |  63<HH>  ----------------------------<  108<H>  3.5   |  40<H>  |  0.7    Ca    8.2<L>      26 Oct 2022 06:00  Mg     2.5     10-26    TPro  5.3<L>  /  Alb  1.8<L>  /  TBili  0.5  /  DBili  x   /  AST  15  /  ALT  8   /  AlkPhos  76  10-26          Culture - Blood (collected 24 Oct 2022 00:00)  Source: .Blood Blood-Peripheral  Preliminary Report (25 Oct 2022 09:01):    No growth to date.        PHYSICAL EXAM:  NERVOUS SYSTEM:  unresponsive at baseline  CHEST/LUNG: diffuse rhonchi   HEART: regular rate and rhythm;   ABDOMEN: Soft,   EXTREMITIES: bilateral edema.                                          --------------------------------------------------------------

## 2022-10-26 NOTE — PHARMACOTHERAPY INTERVENTION NOTE - COMMENTS
Patient is on high dose Unasyn 9g q8h infused over 4 hours for MDR acinetobacter UTI. Unasyn is usually prepared in NS diluent. However, pt has hypernatremia (sodium is 153). Recommended to change diluent to D5W. Unasyn is stable in D5W for only 2 hours at room temperature and 4 hours refrigerated per [employer] and the Unasyn package insert. Therefore, upon discussing with the pharmacy team and Dr. Fernández, the team agreed to change the diluent to D5W and to infuse each dose over 2 hours. This would order to account for the short stability of Unasyn in D5W.  Patient is on high dose Unasyn 9g q8h infused over 4 hours for MDR acinetobacter UTI. Unasyn is usually prepared in NS diluent. However, pt has hypernatremia (sodium is 153). Recommended to change diluent to D5W. Unasyn is stable in D5W for only 2 hours at room temperature and 4 hours refrigerated per [facility] and the Unasyn package insert. Therefore, upon discussing with the pharmacy team and Dr. Fernández, the team agreed to change the diluent to D5W and to infuse each dose over 2 hours. This would order to account for the short stability of Unasyn in D5W. The order must be prepared in 500mL of D5W, as the concentration of Unasyn should not exceed 30mg/mL for the stability of 2 hours at room temperature per the package insert.

## 2022-10-26 NOTE — CHART NOTE - NSCHARTNOTEFT_GEN_A_CORE
NUTRITION SUPPORT TEAM  -  PROGRESS NOTE     Interval Events:        REVIEW OF SYSTEMS:  Negative except as noted above.     VITALS:  T(F): 97.8 (10-26 @ 08:00), Max: 98.2 (10-26 @ 03:00)  HR: 79 (10-26 @ 08:00) (79 - 92)  BP: 80/54 (10-26 @ 08:00) (80/54 - 92/53)  RR: 20 (10-26 @ 08:00) (18 - 20)  SpO2: 100% (10-26 @ 08:00) (99% - 100%)    HEIGHT/WEIGHT/BMI:   Height (cm): 172.7 (10-23), 172.7 (02-18)  Weight (kg): 74.8 (10-23), 72.5 (02-18)  BMI (kg/m2): 25.1 (10-23), 24.3 (02-18)    I/Os:     10-25-22 @ 07:01  -  10-26-22 @ 07:00  --------------------------------------------------------  IN:    Free Water: 300 mL    Glucerna: 720 mL    IV PiggyBack: 250 mL  Total IN: 1270 mL    OUT:    Indwelling Catheter - Urethral (mL): 400 mL  Total OUT: 400 mL    Total NET: 870 mL    MEDICATIONS:   acetaminophen     Tablet .. 650 milliGRAM(s) Oral every 6 hours PRN  albuterol/ipratropium for Nebulization 3 milliLiter(s) Nebulizer every 6 hours PRN  ampicillin/sulbactam  IVPB 9 Gram(s) IV Intermittent every 8 hours  atorvastatin 10 milliGRAM(s) Oral at bedtime  chlorhexidine 0.12% Liquid 15 milliLiter(s) Oral Mucosa every 12 hours  chlorhexidine 2% Cloths 1 Application(s) Topical daily  collagenase Ointment 1 Application(s) Topical two times a day  dextrose 5%. 1000 milliLiter(s) IV Continuous <Continuous>  dextrose 5%. 1000 milliLiter(s) IV Continuous <Continuous>  dextrose 50% Injectable 25 Gram(s) IV Push once  dextrose 50% Injectable 12.5 Gram(s) IV Push once  dextrose 50% Injectable 25 Gram(s) IV Push once  dextrose Oral Gel 15 Gram(s) Oral once PRN  epoetin carline-epbx (RETACRIT) Injectable 65722 Unit(s) SubCutaneous <User Schedule>  glucagon  Injectable 1 milliGRAM(s) IntraMuscular once  insulin lispro (ADMELOG) corrective regimen sliding scale   SubCutaneous three times a day before meals  levothyroxine 125 MICROGram(s) Oral daily  metoprolol tartrate 12.5 milliGRAM(s) Oral every 12 hours  midodrine. 10 milliGRAM(s) Oral every 8 hours  ofloxacin 0.3% Solution 1 Drop(s) Left EYE two times a day  pantoprazole   Suspension 40 milliGRAM(s) Oral every 12 hours  potassium chloride    Tablet ER 40 milliEquivalent(s) Oral once  tamsulosin 0.4 milliGRAM(s) Oral at bedtime    LABS:                         9.3    16.49 )-----------( 232      ( 26 Oct 2022 06:00 )             31.9     153<H>  |  106  |  63<HH>  ----------------------------<  108<H>          (10-26-22 @ 06:00)  3.5   |  40<H>  |  0.7    Ca    8.2<L>          (10-26-22 @ 06:00)  Mg     2.5         (10-26-22 @ 06:00)    TPro  5.3<L>  /  Alb  1.8<L>  /  TBili  0.5  /  DBili  x   /  AST  15  /  ALT  8   /  AlkPhos  76       10-26-22 @ 06:00    Triglycerides, Serum: 146 mg/dL (10-24 @ 04:40)    Blood Glucose (Past 24 hours):  115 mg/dL (10-26 @ 07:51)  120 mg/dL (10-25 @ 22:29)  91 mg/dL (10-25 @ 16:52)  125 mg/dL (10-25 @ 11:28)    DIET:   Diet, NPO with Tube Feed:   Tube Feeding Modality: Gastrostomy  Glucerna 1.2 Haroldo  Total Volume for 24 Hours (mL): 1440  Bolus  Total Volume of Bolus (mL):  360  Tube Feed Frequency: Every 6 hours   Tube Feed Start Time: 14:30  Bolus Feed Rate (mL per Hour): 500   Bolus Feed Duration (in Hours): 0.75  Bolus Feed Instructions:  feed at 7am, noon, 5pm, 10pm  check poc glucose and give any necesary insulin before feeds only  Free Water Flush Instructions:  100 ml water before & after each feeding (10-24-22 @ 14:02) [Active]    IMP:  - sepsis  - pyelonephritis  - chronic vent dependence, h/o anoxic brain injury  - anemia, unclear etiology - receiving transfusion when seen earlier  - [place]/ dehydration, with hypernatremia  - question of DM    feeds at NH are Diabetasource AC at 75 ml/h x 20 hours      plus 4 Prosource daily  pt also receives larger doses of folic acid, B12, vitamin C, zinc, iron and phos     SUGGEST:  - obtain zinc, B12, folic acid, iron studies  - poc glucose testing before each feeding and Lispro pre-feed  - cont Glucerna 1.2 360 ml via GT over 45 min       feed at 7am, noon, 5pm, 10pm - in order to "match" timing of poc glucose testing and insulin treatments  - give water 100 ml before & after each feed (and d/c the 200 ml q6h by pump) - this = 800 ml water, & the feeds themselves provide 1166 ml free water/d  - pt is given a delayed release PPI capsule via GT at NH. At discharge would d/c PPI (as NH will not obtain proper med form) and give Pepcid, as this med is compatible with enteral tube use. NUTRITION SUPPORT TEAM  -  PROGRESS NOTE     Interval Events:    +trach, vented  GT in placed and tolerating feeds well      REVIEW OF SYSTEMS:  Negative except as noted above.     VITALS:  T(F): 97.8 (10-26 @ 08:00), Max: 98.2 (10-26 @ 03:00)  HR: 79 (10-26 @ 08:00) (79 - 92)  BP: 80/54 (10-26 @ 08:00) (80/54 - 92/53)  RR: 20 (10-26 @ 08:00) (18 - 20)  SpO2: 100% (10-26 @ 08:00) (99% - 100%)    HEIGHT/WEIGHT/BMI:   Height (cm): 172.7 (10-23), 172.7 (02-18)  Weight (kg): 74.8 (10-23), 72.5 (02-18)  BMI (kg/m2): 25.1 (10-23), 24.3 (02-18)    I/Os:     10-25-22 @ 07:01  -  10-26-22 @ 07:00  --------------------------------------------------------  IN:    Free Water: 300 mL    Glucerna: 720 mL    IV PiggyBack: 250 mL  Total IN: 1270 mL    OUT:    Indwelling Catheter - Urethral (mL): 400 mL  Total OUT: 400 mL    Total NET: 870 mL    MEDICATIONS:   acetaminophen     Tablet .. 650 milliGRAM(s) Oral every 6 hours PRN  albuterol/ipratropium for Nebulization 3 milliLiter(s) Nebulizer every 6 hours PRN  ampicillin/sulbactam  IVPB 9 Gram(s) IV Intermittent every 8 hours  atorvastatin 10 milliGRAM(s) Oral at bedtime  chlorhexidine 0.12% Liquid 15 milliLiter(s) Oral Mucosa every 12 hours  chlorhexidine 2% Cloths 1 Application(s) Topical daily  collagenase Ointment 1 Application(s) Topical two times a day  dextrose 5%. 1000 milliLiter(s) IV Continuous <Continuous>  dextrose 5%. 1000 milliLiter(s) IV Continuous <Continuous>  dextrose 50% Injectable 25 Gram(s) IV Push once  dextrose 50% Injectable 12.5 Gram(s) IV Push once  dextrose 50% Injectable 25 Gram(s) IV Push once  dextrose Oral Gel 15 Gram(s) Oral once PRN  epoetin carline-epbx (RETACRIT) Injectable 20365 Unit(s) SubCutaneous <User Schedule>  glucagon  Injectable 1 milliGRAM(s) IntraMuscular once  insulin lispro (ADMELOG) corrective regimen sliding scale   SubCutaneous three times a day before meals  levothyroxine 125 MICROGram(s) Oral daily  metoprolol tartrate 12.5 milliGRAM(s) Oral every 12 hours  midodrine. 10 milliGRAM(s) Oral every 8 hours  ofloxacin 0.3% Solution 1 Drop(s) Left EYE two times a day  pantoprazole   Suspension 40 milliGRAM(s) Oral every 12 hours  potassium chloride    Tablet ER 40 milliEquivalent(s) Oral once  tamsulosin 0.4 milliGRAM(s) Oral at bedtime    LABS:                         9.3    16.49 )-----------( 232      ( 26 Oct 2022 06:00 )             31.9     153<H>  |  106  |  63<HH>  ----------------------------<  108<H>          (10-26-22 @ 06:00)  3.5   |  40<H>  |  0.7    Ca    8.2<L>          (10-26-22 @ 06:00)  Mg     2.5         (10-26-22 @ 06:00)    TPro  5.3<L>  /  Alb  1.8<L>  /  TBili  0.5  /  DBili  x   /  AST  15  /  ALT  8   /  AlkPhos  76       10-26-22 @ 06:00    Triglycerides, Serum: 146 mg/dL (10-24 @ 04:40)    Blood Glucose (Past 24 hours):  115 mg/dL (10-26 @ 07:51)  120 mg/dL (10-25 @ 22:29)  91 mg/dL (10-25 @ 16:52)  125 mg/dL (10-25 @ 11:28)    DIET:   Diet, NPO with Tube Feed:   Tube Feeding Modality: Gastrostomy  Glucerna 1.2 Haroldo  Total Volume for 24 Hours (mL): 1440  Bolus  Total Volume of Bolus (mL):  360  Tube Feed Frequency: Every 6 hours   Tube Feed Start Time: 14:30  Bolus Feed Rate (mL per Hour): 500   Bolus Feed Duration (in Hours): 0.75  Bolus Feed Instructions:  feed at 7am, noon, 5pm, 10pm  check poc glucose and give any necesary insulin before feeds only  Free Water Flush Instructions:  100 ml water before & after each feeding (10-24-22 @ 14:02) [Active]    IMP:  - sepsis  - pyelonephritis  - chronic vent dependence, h/o anoxic brain injury  - anemia, unclear etiology - receiving transfusion when seen earlier  - [place]/ dehydration, with hypernatremia  - question of DM    feeds at NH are Diabetasource AC at 75 ml/h x 20 hours      plus 4 Prosource daily  pt also receives larger doses of folic acid, B12, vitamin C, zinc, iron and phos     SUGGEST:  - obtain zinc, B12, folic acid, iron studies  - poc glucose testing before each feeding and Lispro pre-feed  - cont Glucerna 1.2 360 ml via GT over 45 min       feed at 7am, noon, 5pm, 10pm - in order to "match" timing of poc glucose testing and insulin treatments  - give water 100 ml before & after each feed (and d/c the 200 ml q6h by pump) - this = 800 ml water, & the feeds themselves provide 1166 ml free water/d  - pt is given a delayed release PPI capsule via GT at NH. At discharge would d/c PPI (as NH will not obtain proper med form) and give Pepcid, as this med is compatible with enteral tube use. NUTRITION SUPPORT TEAM  -  PROGRESS NOTE     Interval Events:    +trach, vented  GT in placed and tolerating feeds well    REVIEW OF SYSTEMS:  Negative except as noted above.     VITALS:  T(F): 97.8 (10-26 @ 08:00), Max: 98.2 (10-26 @ 03:00)  HR: 79 (10-26 @ 08:00) (79 - 92)  BP: 80/54 (10-26 @ 08:00) (80/54 - 92/53)  RR: 20 (10-26 @ 08:00) (18 - 20)  SpO2: 100% (10-26 @ 08:00) (99% - 100%)    HEIGHT/WEIGHT/BMI:   Height (cm): 172.7 (10-23), 172.7 (02-18)  Weight (kg): 74.8 (10-23), 72.5 (02-18)  BMI (kg/m2): 25.1 (10-23), 24.3 (02-18)    I/Os:     10-25-22 @ 07:01  -  10-26-22 @ 07:00  --------------------------------------------------------  IN:    Free Water: 300 mL    Glucerna: 720 mL    IV PiggyBack: 250 mL  Total IN: 1270 mL    OUT:    Indwelling Catheter - Urethral (mL): 400 mL  Total OUT: 400 mL    Total NET: 870 mL    MEDICATIONS:   acetaminophen     Tablet .. 650 milliGRAM(s) Oral every 6 hours PRN  albuterol/ipratropium for Nebulization 3 milliLiter(s) Nebulizer every 6 hours PRN  ampicillin/sulbactam  IVPB 9 Gram(s) IV Intermittent every 8 hours  atorvastatin 10 milliGRAM(s) Oral at bedtime  chlorhexidine 0.12% Liquid 15 milliLiter(s) Oral Mucosa every 12 hours  chlorhexidine 2% Cloths 1 Application(s) Topical daily  collagenase Ointment 1 Application(s) Topical two times a day  epoetin carline-epbx (RETACRIT) Injectable 11263 Unit(s) SubCutaneous <User Schedule>  insulin lispro (ADMELOG) corrective regimen sliding scale   SubCutaneous three times a day before meals  levothyroxine 125 MICROGram(s) Oral daily  metoprolol tartrate 12.5 milliGRAM(s) Oral every 12 hours  midodrine. 10 milliGRAM(s) Oral every 8 hours  ofloxacin 0.3% Solution 1 Drop(s) Left EYE two times a day  pantoprazole   Suspension 40 milliGRAM(s) Oral every 12 hours  potassium chloride    Tablet ER 40 milliEquivalent(s) Oral once  tamsulosin 0.4 milliGRAM(s) Oral at bedtime    LABS:                         9.3    16.49 )-----------( 232      ( 26 Oct 2022 06:00 )             31.9     153<H>  |  106  |  63<HH>  ----------------------------<  108<H>          (10-26-22 @ 06:00)  3.5   |  40<H>  |  0.7    Ca    8.2<L>          (10-26-22 @ 06:00)  Mg     2.5         (10-26-22 @ 06:00)    TPro  5.3<L>  /  Alb  1.8<L>  /  TBili  0.5  /  DBili  x   /  AST  15  /  ALT  8   /  AlkPhos  76       10-26-22 @ 06:00    Triglycerides, Serum: 146 mg/dL (10-24 @ 04:40)    Blood Glucose (Past 24 hours):  115 mg/dL (10-26 @ 07:51)  120 mg/dL (10-25 @ 22:29)  91 mg/dL (10-25 @ 16:52)  125 mg/dL (10-25 @ 11:28)    DIET:   Diet, NPO with Tube Feed:   Tube Feeding Modality: Gastrostomy  Glucerna 1.2 Haroldo  Total Volume for 24 Hours (mL): 1440  Bolus  Total Volume of Bolus (mL):  360  Tube Feed Frequency: Every 6 hours   Tube Feed Start Time: 14:30  Bolus Feed Rate (mL per Hour): 500   Bolus Feed Duration (in Hours): 0.75  Bolus Feed Instructions:  feed at 7am, noon, 5pm, 10pm  check poc glucose and give any necesary insulin before feeds only  Free Water Flush Instructions:  100 ml water before & after each feeding (10-24-22 @ 14:02) [Active]    IMP:  - sepsis  - pyelonephritis  - chronic vent dependence, h/o anoxic brain injury  - anemia, unclear etiology - receiving transfusion when seen earlier  - [place]/ dehydration, with hypernatremia  - question of DM    feeds at NH are Diabetasource AC at 75 ml/h x 20 hours      plus 4 Prosource daily  pt also receives larger doses of folic acid, B12, vitamin C, zinc, iron and phos     SUGGEST:  - obtain zinc, B12, folic acid, iron studies  - poc glucose testing before each feeding and Lispro pre-feed  - cont Glucerna 1.2 360 ml via GT over 45 min       feed at 7am, noon, 5pm, 10pm - in order to "match" timing of poc glucose testing and insulin treatments  - give water 100 ml before & after each feed (and d/c the 200 ml q6h by pump) - this = 800 ml water, & the feeds themselves provide 1166 ml free water/d  - pt is given a delayed release PPI capsule via GT at NH. At discharge would d/c PPI (as NH will not obtain proper med form) and give Pepcid, as this med is compatible with enteral tube use.

## 2022-10-26 NOTE — PROGRESS NOTE ADULT - SUBJECTIVE AND OBJECTIVE BOX
T H I S   I S    N O  T   A    F I N A L I Z E D   N O T CHADWICK RUEDA  82y, Male  Allergy: Allergy Status Unknown    Hospital Day: 3d    Patient seen and examined earlier today.     PMH/PSH:  PAST MEDICAL & SURGICAL HISTORY:  BPH (benign prostatic hyperplasia)      Mild HTN      Cardiac arrest      Anoxic brain injury      2019 novel coronavirus disease (COVID-19)          LAST 24-Hr EVENTS:    VITALS:  T(F): 97.9 (10-26-22 @ 12:00), Max: 98.2 (10-26-22 @ 03:00)  HR: 82 (10-26-22 @ 12:00)  BP: 91/51 (10-26-22 @ 12:00) (80/54 - 92/53)  RR: 20 (10-26-22 @ 12:00)  SpO2: 100% (10-26-22 @ 12:00)  FiO2: 60        TESTS & MEASUREMENTS:  Weight/BMI  74.8 (10-23-22 @ 00:05)  25.1 (10-23-22 @ 00:05)    10-24-22 @ 07:01  -  10-25-22 @ 07:00  --------------------------------------------------------  IN: 1400 mL / OUT: 500 mL / NET: 900 mL    10-25-22 @ 07:01  -  10-26-22 @ 07:00  --------------------------------------------------------  IN: 1270 mL / OUT: 400 mL / NET: 870 mL                            9.3    16.49 )-----------( 232      ( 26 Oct 2022 06:00 )             31.9       INR: 1.31 ratio (10-24-22 @ 04:40)  INR: 1.40 ratio (10-23-22 @ 04:15)    10-26    153<H>  |  106  |  63<HH>  ----------------------------<  108<H>  3.5   |  40<H>  |  0.7    Ca    8.2<L>      26 Oct 2022 06:00  Mg     2.5     10-26    TPro  5.3<L>  /  Alb  1.8<L>  /  TBili  0.5  /  DBili  x   /  AST  15  /  ALT  8   /  AlkPhos  76  10-26    LIVER FUNCTIONS - ( 26 Oct 2022 06:00 )  Alb: 1.8 g/dL / Pro: 5.3 g/dL / ALK PHOS: 76 U/L / ALT: 8 U/L / AST: 15 U/L / GGT: x                 Culture - Blood (collected 10-24-22 @ 00:00)  Source: .Blood Blood-Peripheral  Preliminary Report (10-25-22 @ 09:01):    No growth to date.    Culture - Urine (collected 10-23-22 @ 05:45)  Source: Clean Catch Clean Catch (Midstream)  Final Report (10-25-22 @ 17:20):    >100,000 CFU/ml Acinetobacter baumannii/nosocomialis group  Organism: Acinetobacter baumannii/nosocomialis group  Acinetobacter baumannii/nosocomialis group (10-25-22 @ 17:20)  Organism: Acinetobacter baumannii/nosocomialis group (10-25-22 @ 17:20)      -  Piperacillin/Tazobactam: R      Method Type: KB  Organism: Acinetobacter baumannii/nosocomialis group (10-25-22 @ 17:20)      -  Amikacin: S <=16      -  Ampicillin/Sulbactam: R >16/8      -  Cefepime: R >16      -  Ceftazidime: S 4      -  Ceftriaxone: I 32      -  Ciprofloxacin: R >2      -  Gentamicin: S 4      -  Imipenem: R >8      -  Levofloxacin: R >4      -  Meropenem: R >8      -  Tobramycin: S <=2      -  Trimethoprim/Sulfamethoxazole: R >2/38      Method Type: ALEXSANDRA    Culture - Blood (collected 10-23-22 @ 05:05)  Source: .Blood Blood  Preliminary Report (10-24-22 @ 13:03):    No growth to date.        Procalcitonin, Serum: 5.93 ng/mL (10-24-22 @ 00:00)      Ferritin, Serum: 577 ng/mL (10-24-22 @ 04:40)  Ferritin, Serum: 577 ng/mL (10-24-22 @ 00:00)    Serum Pro-Brain Natriuretic Peptide: 15235 pg/mL (10-24-22 @ 00:00)    COVID-19 PCR: NotDetec (10-23-22 @ 05:45)        A1C with Estimated Average Glucose Result: 4.7 % (10-24-22 @ 04:40)  A1C with Estimated Average Glucose Result: 6.2 % (01-24-22 @ 06:25)      Indwelling Urethral Catheter:     Connect To:  Straight Drainage/Gravity    Indication:  Urine Output Monitoring in Critically Ill (10-23-22 @ 05:58) (not performed)      RADIOLOGY, ECG, & ADDITIONAL TESTS:  12 Lead ECG:   Ventricular Rate 94 BPM    Atrial Rate 94 BPM    P-R Interval 158 ms    QRS Duration 116 ms    Q-T Interval 392 ms    QTC Calculation(Bazett) 490 ms    P Axis -1 degrees    R Axis -18 degrees    T Axis 166 degrees    Diagnosis Line Normal sinus rhythm  Left ventricular hypertrophy with QRS widening and repolarization abnormality  Prolonged QT  Abnormal ECG    Confirmed by Lotus Braun MD ([id number]) on 10/23/2022 3:00:38 PM (10-23-22 @ 09:58)      RECENT DIAGNOSTIC ORDERS:  Xray Chest 1 View- PORTABLE-Routine: Routine   Indication: pna  Transport: Portable (10-26-22 @ 12:57)  Troponin T, Serum: 23:30 (10-26-22 @ 11:50)  VA Duplex Upper Ext Vein Scan, Bilat: 10:00 (10-26-22 @ 10:06)  Magnesium, Serum: 23:30 (10-26-22 @ 09:39)  Basic Metabolic Panel: 23:30 (10-26-22 @ 09:39)  Complete Blood Count + Automated Diff: 23:30 (10-26-22 @ 09:39)  Comprehensive Metabolic Panel: 23:30 (10-26-22 @ 09:36)      MEDICATIONS:  MEDICATIONS  (STANDING):  ampicillin/sulbactam  IVPB 9 Gram(s) IV Intermittent every 8 hours  atorvastatin 10 milliGRAM(s) Oral at bedtime  chlorhexidine 0.12% Liquid 15 milliLiter(s) Oral Mucosa every 12 hours  chlorhexidine 2% Cloths 1 Application(s) Topical daily  collagenase Ointment 1 Application(s) Topical two times a day  dextrose 5%. 1000 milliLiter(s) (100 mL/Hr) IV Continuous <Continuous>  dextrose 5%. 1000 milliLiter(s) (50 mL/Hr) IV Continuous <Continuous>  dextrose 50% Injectable 25 Gram(s) IV Push once  dextrose 50% Injectable 12.5 Gram(s) IV Push once  dextrose 50% Injectable 25 Gram(s) IV Push once  epoetin craline-epbx (RETACRIT) Injectable 34691 Unit(s) SubCutaneous <User Schedule>  glucagon  Injectable 1 milliGRAM(s) IntraMuscular once  insulin lispro (ADMELOG) corrective regimen sliding scale   SubCutaneous three times a day before meals  levothyroxine 125 MICROGram(s) Oral daily  metoprolol tartrate 12.5 milliGRAM(s) Oral every 12 hours  midodrine. 10 milliGRAM(s) Oral every 8 hours  ofloxacin 0.3% Solution 1 Drop(s) Left EYE two times a day  pantoprazole   Suspension 40 milliGRAM(s) Oral every 12 hours  potassium chloride   Powder 40 milliEquivalent(s) Enteral Tube once  tamsulosin 0.4 milliGRAM(s) Oral at bedtime    MEDICATIONS  (PRN):  acetaminophen     Tablet .. 650 milliGRAM(s) Oral every 6 hours PRN Temp greater or equal to 38C (100.4F), Mild Pain (1 - 3)  albuterol/ipratropium for Nebulization 3 milliLiter(s) Nebulizer every 6 hours PRN Shortness of Breath and/or Wheezing  dextrose Oral Gel 15 Gram(s) Oral once PRN Blood Glucose LESS THAN 70 milliGRAM(s)/deciliter      HOME MEDICATIONS:  amantadine 50 mg/5 mL oral syrup (10-23)  atorvastatin 10 mg oral tablet (10-23)  bumetanide 2 mg oral tablet (10-23)  chlorhexidine 0.5% topical liquid (10-23)  collagenase 250 units/g topical ointment (10-23)  Eliquis 2.5 mg oral tablet (10-23)  Epogen 20,000 units/mL injectable solution (10-23)  ferrous sulfate 220 mg/5 mL (44 mg/5 mL elemental iron) oral elixir (10-23)  folic acid 1 mg oral tablet (10-23)  HumuLIN R 100 units/mL injectable solution (10-23)  ipratropium-albuterol 0.5 mg-2.5 mg/3 mL inhalation solution (10-23)  levothyroxine 125 mcg (0.125 mg) oral tablet (10-23)  meropenem 1000 mg intravenous injection (10-23)  Metoprolol Tartrate 25 mg oral tablet (10-23)  midodrine 5 mg oral tablet (10-23)  omeprazole 20 mg oral delayed release tablet (10-23)  PHOS-NaK oral powder for reconstitution (10-23)  potassium chloride 40 mEq/15 mL oral liquid (10-23)  tamsulosin 0.4 mg oral capsule (10-23)  Tylenol 325 mg oral tablet (10-23)  Vitamin B-12 1000 mcg oral tablet (10-23)  Vitamin C 500 mg oral tablet (10-23)  zinc sulfate 220 mg oral tablet (10-23)      PHYSICAL EXAM:  GENERAL:   CHEST/LUNG:   HEART:   ABDOMEN:   EXTREMITIES:               CHADWICK VENCES  82y, Male  Allergy: Allergy Status Unknown    Hospital Day: 3d    Patient seen and examined earlier today. patient is non verbal     PMH/PSH:  PAST MEDICAL & SURGICAL HISTORY:  BPH (benign prostatic hyperplasia)      Mild HTN      Cardiac arrest      Anoxic brain injury      2019 novel coronavirus disease (COVID-19)          LAST 24-Hr EVENTS:    VITALS:  T(F): 97.9 (10-26-22 @ 12:00), Max: 98.2 (10-26-22 @ 03:00)  HR: 82 (10-26-22 @ 12:00)  BP: 91/51 (10-26-22 @ 12:00) (80/54 - 92/53)  RR: 20 (10-26-22 @ 12:00)  SpO2: 100% (10-26-22 @ 12:00)  FiO2: 60        TESTS & MEASUREMENTS:  Weight/BMI  74.8 (10-23-22 @ 00:05)  25.1 (10-23-22 @ 00:05)    10-24-22 @ 07:01  -  10-25-22 @ 07:00  --------------------------------------------------------  IN: 1400 mL / OUT: 500 mL / NET: 900 mL    10-25-22 @ 07:01  -  10-26-22 @ 07:00  --------------------------------------------------------  IN: 1270 mL / OUT: 400 mL / NET: 870 mL                            9.3    16.49 )-----------( 232      ( 26 Oct 2022 06:00 )             31.9       INR: 1.31 ratio (10-24-22 @ 04:40)  INR: 1.40 ratio (10-23-22 @ 04:15)    10-26    153<H>  |  106  |  63<HH>  ----------------------------<  108<H>  3.5   |  40<H>  |  0.7    Ca    8.2<L>      26 Oct 2022 06:00  Mg     2.5     10-26    TPro  5.3<L>  /  Alb  1.8<L>  /  TBili  0.5  /  DBili  x   /  AST  15  /  ALT  8   /  AlkPhos  76  10-26    LIVER FUNCTIONS - ( 26 Oct 2022 06:00 )  Alb: 1.8 g/dL / Pro: 5.3 g/dL / ALK PHOS: 76 U/L / ALT: 8 U/L / AST: 15 U/L / GGT: x                 Culture - Blood (collected 10-24-22 @ 00:00)  Source: .Blood Blood-Peripheral  Preliminary Report (10-25-22 @ 09:01):    No growth to date.    Culture - Urine (collected 10-23-22 @ 05:45)  Source: Clean Catch Clean Catch (Midstream)  Final Report (10-25-22 @ 17:20):    >100,000 CFU/ml Acinetobacter baumannii/nosocomialis group  Organism: Acinetobacter baumannii/nosocomialis group  Acinetobacter baumannii/nosocomialis group (10-25-22 @ 17:20)  Organism: Acinetobacter baumannii/nosocomialis group (10-25-22 @ 17:20)      -  Piperacillin/Tazobactam: R      Method Type: KB  Organism: Acinetobacter baumannii/nosocomialis group (10-25-22 @ 17:20)      -  Amikacin: S <=16      -  Ampicillin/Sulbactam: R >16/8      -  Cefepime: R >16      -  Ceftazidime: S 4      -  Ceftriaxone: I 32      -  Ciprofloxacin: R >2      -  Gentamicin: S 4      -  Imipenem: R >8      -  Levofloxacin: R >4      -  Meropenem: R >8      -  Tobramycin: S <=2      -  Trimethoprim/Sulfamethoxazole: R >2/38      Method Type: ALEXSANDRA    Culture - Blood (collected 10-23-22 @ 05:05)  Source: .Blood Blood  Preliminary Report (10-24-22 @ 13:03):    No growth to date.        Procalcitonin, Serum: 5.93 ng/mL (10-24-22 @ 00:00)      Ferritin, Serum: 577 ng/mL (10-24-22 @ 04:40)  Ferritin, Serum: 577 ng/mL (10-24-22 @ 00:00)    Serum Pro-Brain Natriuretic Peptide: 27254 pg/mL (10-24-22 @ 00:00)    COVID-19 PCR: NotDetec (10-23-22 @ 05:45)        A1C with Estimated Average Glucose Result: 4.7 % (10-24-22 @ 04:40)  A1C with Estimated Average Glucose Result: 6.2 % (01-24-22 @ 06:25)      Indwelling Urethral Catheter:     Connect To:  Straight Drainage/Gravity    Indication:  Urine Output Monitoring in Critically Ill (10-23-22 @ 05:58) (not performed)      RADIOLOGY, ECG, & ADDITIONAL TESTS:  12 Lead ECG:   Ventricular Rate 94 BPM    Atrial Rate 94 BPM    P-R Interval 158 ms    QRS Duration 116 ms    Q-T Interval 392 ms    QTC Calculation(Bazett) 490 ms    P Axis -1 degrees    R Axis -18 degrees    T Axis 166 degrees    Diagnosis Line Normal sinus rhythm  Left ventricular hypertrophy with QRS widening and repolarization abnormality  Prolonged QT  Abnormal ECG    Confirmed by Lotus Braun MD ([id number]) on 10/23/2022 3:00:38 PM (10-23-22 @ 09:58)      RECENT DIAGNOSTIC ORDERS:  Xray Chest 1 View- PORTABLE-Routine: Routine   Indication: pna  Transport: Portable (10-26-22 @ 12:57)  Troponin T, Serum: 23:30 (10-26-22 @ 11:50)  VA Duplex Upper Ext Vein Scan, Bilat: 10:00 (10-26-22 @ 10:06)  Magnesium, Serum: 23:30 (10-26-22 @ 09:39)  Basic Metabolic Panel: 23:30 (10-26-22 @ 09:39)  Complete Blood Count + Automated Diff: 23:30 (10-26-22 @ 09:39)  Comprehensive Metabolic Panel: 23:30 (10-26-22 @ 09:36)      MEDICATIONS:  MEDICATIONS  (STANDING):  ampicillin/sulbactam  IVPB 9 Gram(s) IV Intermittent every 8 hours  atorvastatin 10 milliGRAM(s) Oral at bedtime  chlorhexidine 0.12% Liquid 15 milliLiter(s) Oral Mucosa every 12 hours  chlorhexidine 2% Cloths 1 Application(s) Topical daily  collagenase Ointment 1 Application(s) Topical two times a day  dextrose 5%. 1000 milliLiter(s) (100 mL/Hr) IV Continuous <Continuous>  dextrose 5%. 1000 milliLiter(s) (50 mL/Hr) IV Continuous <Continuous>  dextrose 50% Injectable 25 Gram(s) IV Push once  dextrose 50% Injectable 12.5 Gram(s) IV Push once  dextrose 50% Injectable 25 Gram(s) IV Push once  epoetin carline-epbx (RETACRIT) Injectable 42998 Unit(s) SubCutaneous <User Schedule>  glucagon  Injectable 1 milliGRAM(s) IntraMuscular once  insulin lispro (ADMELOG) corrective regimen sliding scale   SubCutaneous three times a day before meals  levothyroxine 125 MICROGram(s) Oral daily  metoprolol tartrate 12.5 milliGRAM(s) Oral every 12 hours  midodrine. 10 milliGRAM(s) Oral every 8 hours  ofloxacin 0.3% Solution 1 Drop(s) Left EYE two times a day  pantoprazole   Suspension 40 milliGRAM(s) Oral every 12 hours  potassium chloride   Powder 40 milliEquivalent(s) Enteral Tube once  tamsulosin 0.4 milliGRAM(s) Oral at bedtime    MEDICATIONS  (PRN):  acetaminophen     Tablet .. 650 milliGRAM(s) Oral every 6 hours PRN Temp greater or equal to 38C (100.4F), Mild Pain (1 - 3)  albuterol/ipratropium for Nebulization 3 milliLiter(s) Nebulizer every 6 hours PRN Shortness of Breath and/or Wheezing  dextrose Oral Gel 15 Gram(s) Oral once PRN Blood Glucose LESS THAN 70 milliGRAM(s)/deciliter      HOME MEDICATIONS:  amantadine 50 mg/5 mL oral syrup (10-23)  atorvastatin 10 mg oral tablet (10-23)  bumetanide 2 mg oral tablet (10-23)  chlorhexidine 0.5% topical liquid (10-23)  collagenase 250 units/g topical ointment (10-23)  Eliquis 2.5 mg oral tablet (10-23)  Epogen 20,000 units/mL injectable solution (10-23)  ferrous sulfate 220 mg/5 mL (44 mg/5 mL elemental iron) oral elixir (10-23)  folic acid 1 mg oral tablet (10-23)  HumuLIN R 100 units/mL injectable solution (10-23)  ipratropium-albuterol 0.5 mg-2.5 mg/3 mL inhalation solution (10-23)  levothyroxine 125 mcg (0.125 mg) oral tablet (10-23)  meropenem 1000 mg intravenous injection (10-23)  Metoprolol Tartrate 25 mg oral tablet (10-23)  midodrine 5 mg oral tablet (10-23)  omeprazole 20 mg oral delayed release tablet (10-23)  PHOS-NaK oral powder for reconstitution (10-23)  potassium chloride 40 mEq/15 mL oral liquid (10-23)  tamsulosin 0.4 mg oral capsule (10-23)  Tylenol 325 mg oral tablet (10-23)  Vitamin B-12 1000 mcg oral tablet (10-23)  Vitamin C 500 mg oral tablet (10-23)  zinc sulfate 220 mg oral tablet (10-23)      PHYSICAL EXAM:  GENERAL: non verbal, non communicative, Trach+ on mechanical vent   CHEST/LUNG: scattrred rhonchi b/l   HEART:  regular rhythm   ABDOMEN:  soft, non tender , distended, + PEG tube   EXTREMITIES:  Anasarca, b/l feet dressings

## 2022-10-26 NOTE — PROGRESS NOTE ADULT - SUBJECTIVE AND OBJECTIVE BOX
Over Night Events: events noted, vent dependant, ID reviewed    PHYSICAL EXAM    ICU Vital Signs Last 24 Hrs  T(C): 36.8 (26 Oct 2022 03:00), Max: 36.8 (26 Oct 2022 03:00)  T(F): 98.2 (26 Oct 2022 03:00), Max: 98.2 (26 Oct 2022 03:00)  HR: 89 (26 Oct 2022 06:00) (62 - 92)  BP: 87/52 (26 Oct 2022 06:00) (87/52 - 102/59)  BP(mean): 65 (26 Oct 2022 06:00) (65 - 66)  RR: 20 (26 Oct 2022 06:00) (18 - 20)  SpO2: 99% (26 Oct 2022 06:00) (97% - 100%)    O2 Parameters below as of 26 Oct 2022 06:00  Patient On (Oxygen Delivery Method): ventilator    O2 Concentration (%): 60        General: ill looking  HEENT: trach          Lungs: dec bs both bases  Cardiovascular: Regular   Abdomen: Soft, Positive BS  sacral ulcer  not following commands      10-25-22 @ 07:01  -  10-26-22 @ 07:00  --------------------------------------------------------  IN:    Free Water: 300 mL    Glucerna: 720 mL    IV PiggyBack: 250 mL  Total IN: 1270 mL    OUT:    Indwelling Catheter - Urethral (mL): 400 mL  Total OUT: 400 mL    Total NET: 870 mL          LABS:                          9.3    16.49 )-----------( 232      ( 26 Oct 2022 06:00 )             31.9                                                                                                  CARDIAC MARKERS ( 24 Oct 2022 11:32 )  x     / 0.41 ng/mL / x     / x     / x                                                                                         Culture - Blood (collected 24 Oct 2022 00:00)  Source: .Blood Blood-Peripheral  Preliminary Report (25 Oct 2022 09:01):    No growth to date.                                                   Mode: AC/ CMV (Assist Control/ Continuous Mandatory Ventilation)  RR (machine): 20  TV (machine): 400  FiO2: 60  PEEP: 8  ITime: 1  MAP: 15  PIP: 30                                          MEDICATIONS  (STANDING):  ampicillin/sulbactam  IVPB 9 Gram(s) IV Intermittent every 8 hours  atorvastatin 10 milliGRAM(s) Oral at bedtime  buMETAnide 2 milliGRAM(s) Oral daily  chlorhexidine 0.12% Liquid 15 milliLiter(s) Oral Mucosa every 12 hours  collagenase Ointment 1 Application(s) Topical two times a day  dextrose 5%. 1000 milliLiter(s) (100 mL/Hr) IV Continuous <Continuous>  dextrose 5%. 1000 milliLiter(s) (50 mL/Hr) IV Continuous <Continuous>  dextrose 50% Injectable 25 Gram(s) IV Push once  dextrose 50% Injectable 12.5 Gram(s) IV Push once  dextrose 50% Injectable 25 Gram(s) IV Push once  epoetin carline-epbx (RETACRIT) Injectable 18474 Unit(s) SubCutaneous <User Schedule>  glucagon  Injectable 1 milliGRAM(s) IntraMuscular once  insulin lispro (ADMELOG) corrective regimen sliding scale   SubCutaneous three times a day before meals  levothyroxine 125 MICROGram(s) Oral daily  metoprolol tartrate 12.5 milliGRAM(s) Oral every 12 hours  midodrine. 5 milliGRAM(s) Oral three times a day  ofloxacin 0.3% Solution 1 Drop(s) Left EYE two times a day  pantoprazole   Suspension 40 milliGRAM(s) Oral before breakfast  tamsulosin 0.4 milliGRAM(s) Oral at bedtime    MEDICATIONS  (PRN):  acetaminophen     Tablet .. 650 milliGRAM(s) Oral every 6 hours PRN Temp greater or equal to 38C (100.4F), Mild Pain (1 - 3)  albuterol/ipratropium for Nebulization 3 milliLiter(s) Nebulizer every 6 hours PRN Shortness of Breath and/or Wheezing  dextrose Oral Gel 15 Gram(s) Oral once PRN Blood Glucose LESS THAN 70 milliGRAM(s)/deciliter

## 2022-10-26 NOTE — CHART NOTE - NSCHARTNOTEFT_GEN_A_CORE
RD c/s noted for PI, however, NST now following pt, please refer to their notes for recommendations.

## 2022-10-26 NOTE — PROGRESS NOTE ADULT - ASSESSMENT
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 At baseline, patient is unresponsive, makes random non-purposeful movement, had trach(Vent dependent) and PEG.    #Sepsis at nursing home  #Suspected Pyelonephritis  #Suspected Ventilator associated gram negative PNA  #Functional quadriplegia  #H/o Severe Covid-19 PNA  #H/o Gram -ve PNA  -As per SNF documents, patient was being treated for sepsis of unknown origin with Meropenem 1g q8 started on 10/21 for 10 days  -UA grossly positive with WBC 13K  -CXR shows worsening b/l opacities, due to previous COVID PNA vs CHF vs VAP  -H/o multiple pressure ulcer  -Right midline seems NOT infected  -F/u Blood cx, Urine cx, Sputum cx, MRSA, Urine strep, Urine legionella, Procal, ESR, CRP, D-dimer, Ferritin, RVP panel  -c/w Meropenem, start Vancomycin(Ordered Jair for only 2 doses for now)  -ID consult, Wound care RN consult  -May require Burn consult if infection of pressure ulcer observed    #Metabolic alkalosis 2/2 Contraction alkalosis  #High BUN 2/2 Dehydration/[place]  -Start Free water 200 q6, increase as needed  -Start tube feeds  -If worsening nephrology consult    #Anemia- likely 2/2 chronic ds  -Hold Eliquis  -F/u B12, folate and iron profile  -Start supplementation as required    #Suspected CHFpEF exacerbation   -CXR shows increased b/l opacity  -B/L pitting edema  -c/w Home dose bumex for now  -F/u TTE  -F/u BNP    #Hypernatremia  -Na 152 on admission, 2.2L free water deficit  -Correct NO more than 10 in 24hrs  -Start Free water 200 q6- Increase PRN  -BMP q12    #Hyperkalemia  -Was on potassium supplementation at [facility]  -Start [facility]  -Monitor BMP    #NSTEMI type 2  -Trops elevated but lower from previous admission  -Trend trops till peak    #Left eye conjunctivitis  -start ofloxacin eye drops    #Anoxic brain injury  #H/o Cardiac arrest  -Unresponsive, very poor prognosis  -DNR only for now     #Misc  -DVT prophylaxis: Eliquis on hold, start SCD after duplex -ve  -GI prophylaxis: Protonix  -Diet: NPO with tube feed-  -Code status: DNR  -Activity: Bed bound  -Dispo: SNF when stable       81 YO M w/Pmhx of Cardiac arrest leading to anoxic brain injury(s/p Trach and PEG), H/o Severe Covid PNA, H/o Gram -ve PNA, HTN and BPH presented for abnormal labs in the nursing home. At baseline, patient is unresponsive, makes random non-purposeful movement, had trach(Vent dependent) and PEG.    #Sepsis at nursing home  #Suspected Pyelonephritis  #Suspected Ventilator associated gram negative PNA  #Functional quadriplegia  #H/o Severe Covid-19 PNA  #H/o Gram -ve PNA  -As per SNF documents, patient was being treated for sepsis of unknown origin with Meropenem 1g q8 started on 10/21 for 10 days  -UA grossly positive with WBC 13K  -CXR shows worsening b/l opacities, due to previous COVID PNA vs CHF vs VAP  -H/o multiple pressure ulcer  -ID: Unasyn & amikacin   -Burn consult if infection of pressure ulcer observed    #Hypernatremia    #Anemia- likely 2/2 chronic ds  -Hold Eliquis  -took 2 PRBCs  -repeat duplex Upper extremity (as per daughter, patient has DVT in left U.E since September, started on eliquis since then)    #Suspected CHFpEF exacerbation   -CXR shows increased b/l opacity  -B/L pitting edema  -c/w Home dose bumex for now  -F/u TTE  -F/u BNP    #Hypernatremia  -Na 152 on admission, 2.2L free water deficit  -Correct NO more than 10 in 24hrs  -Start Free water 200 q6- Increase PRN  -BMP q12    #Hyperkalemia  -Was on potassium supplementation at SNF  -Start Lokelma  -Monitor BMP    #NSTEMI type 2  -Trops elevated but lower from previous admission  -Trend trops till peak    #Left eye conjunctivitis  -start ofloxacin eye drops    #Anoxic brain injury  #H/o Cardiac arrest  -Unresponsive, very poor prognosis  -DNR only for now     #Misc  -DVT prophylaxis: Eliquis on hold, start SCD after duplex -ve  -GI prophylaxis: Protonix  -Diet: NPO with tube feed-  -Code status: DNR  -Activity: Bed bound  -Dispo: SNF when stable     81 YO M w/Pmhx of Cardiac arrest leading to anoxic brain injury(s/p Trach and PEG), H/o Severe Covid PNA, H/o Gram -ve PNA, HTN and BPH presented for abnormal labs in the nursing home. At baseline, patient is unresponsive, makes random non-purposeful movement, had trach(Vent dependent) and PEG.    #Sepsis at nursing home  #Suspected Pyelonephritis  #Suspected Ventilator associated gram negative PNA  #Functional quadriplegia  #H/o Severe Covid-19 PNA  #H/o Gram -ve PNA  -As per SNF documents, patient was being treated for sepsis of unknown origin with Meropenem 1g q8 started on 10/21 for 10 days  -UA grossly positive with WBC 13K  -CXR shows worsening b/l opacities, due to previous COVID PNA vs CHF vs VAP  -H/o multiple pressure ulcer  -ID: Unasyn & amikacin   -Burn consult decubitus ulcer & malleolus ulcer as another possible source of infection    #Hypernatremia  - Na:153, 2.8L free water deficit   - BMP Q12hrs  - free water per PEG tube     #Anemia- likely 2/2 chronic ds  -Hold Eliquis  -took 2 PRBCs  -repeat duplex Upper extremity (as per daughter, patient has DVT in left U.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  -midodrine 10mg Q8hrs    #NSTEMI type 2  -Trops elevated but lower from previous admission  -Trend trops till peak    #Left eye conjunctivitis  -start ofloxacin eye drops    #Anoxic brain injury  #H/o Cardiac arrest  -Unresponsive, very poor prognosis  -DNR only for now     #Misc  -DVT prophylaxis:  SCD   -GI prophylaxis: Protonix  -Diet: NPO with tube feed-  -Code status: DNR  -Activity: Bed bound  -Dispo: SNF when stable     83 YO M w/Pmhx of Cardiac arrest leading to anoxic brain injury(s/p Trach and PEG), H/o Severe Covid PNA, H/o Gram -ve PNA, HTN and BPH presented for abnormal labs in the nursing home. At baseline, patient is unresponsive, makes random non-purposeful movement, had trach(Vent dependent) and PEG.    #Sepsis at nursing home  #Suspected Pyelonephritis  #Suspected Ventilator associated gram negative PNA  #Functional quadriplegia  #H/o Severe Covid-19 PNA  #H/o Gram -ve PNA  -As per SNF documents, patient was being treated for sepsis of unknown origin with Meropenem 1g q8 started on 10/21 for 10 days  -UA grossly positive with WBC 13K  -CXR shows worsening b/l opacities, due to previous COVID PNA vs CHF vs VAP  -H/o multiple pressure ulcer  -ID: switched from Meropenem to Unasyn & amikacin on 10/25  -Burn consult decubitus ulcer & malleolus ulcer as another possible source of infection    #Hypernatremia  - Na:153, 2.8L free water deficit   - BMP Q12hrs  - free water per PEG tube     #Anemia- likely 2/2 chronic ds  -Hold Eliquis  -took 2 PRBCs  -repeat duplex Upper extremity (as per daughter, patient has 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  -midodrine 10mg Q8hrs    #NSTEMI type 2  -Trops elevated but lower from previous admission  -Trend trops till peak    #Left eye conjunctivitis  -start ofloxacin eye drops    #Anoxic brain injury  #H/o Cardiac arrest  -Unresponsive, very poor prognosis  -DNR only for now     #Misc  -DVT prophylaxis:  SCD   -GI prophylaxis: Protonix  -Diet: NPO with tube feed  -Code status: DNR  -Activity: Bed bound  -Dispo: SNF when stable

## 2022-10-26 NOTE — CONSULT NOTE ADULT - ASSESSMENT
ASSESSMENT:  Stage 4 pressure ulcer top sacrum and Unstageable pressure ulcer to b/l Hips  Infected    RECOMMENDATION:  Wound care - Santyl/Wet Kerlex with 1/4 Dakins/DPD BID  Possible Surgical debridement   IV abx as indicated/ per ID  Offloading/ positional changes

## 2022-10-26 NOTE — CONSULT NOTE ADULT - SUBJECTIVE AND OBJECTIVE BOX
82y  Male  HPI:  81 YO M w/Pmhx of Cardiac arrest leading to anoxic brain injury(s/p Trach and PEG), H/o Severe Covid PNA, H/o Gram -ve PNA, HTN and BPH presented for abnormal labs in the nursing home. At baseline, patient is unresponsive, makes random non-purposeful movement, had trach(Vent dependent) and PEG.    On 10/22, labs in the SNF showed anemia and uremia, also patient was tachycardic to 101 and tachypneic to 22, was sent to ED for evaluation.    In the MOLST form from the SNF, patient is DNR/DNI and CMO, but when I called the daughter Judy Jackson, she wanted patient to be DNR, but also wanted all other intervention to be done for now.    In the ED,  patient had WBC 13K, Hb 7, Na 152, K 5.3, Bicarb 43, BUN 94, Trop 0.38, ABG showed metabolic alkalosis, UA was grossly positive(with pus in the urine), CXR showed increased B/L opacity.    Vital Signs Last 24 Hrs:  T(F): 98.7 (23 Oct 2022 00:05), Max: 98.7 (23 Oct 2022 00:05)  HR: 96 (23 Oct 2022 02:25) (96 - 100)  BP: 110/72 (23 Oct 2022 00:05) (110/72 - 110/72)  RR: 24 (23 Oct 2022 00:05) (24 - 24)  SpO2: 100% (23 Oct 2022 02:25) (100% - 100%) on Vent     (23 Oct 2022 08:00)    Hospital course***  Allergies    Allergy Status Unknown    Intolerances      PAST MEDICAL & SURGICAL HISTORY:  BPH (benign prostatic hyperplasia)      Mild HTN      Cardiac arrest      Anoxic brain injury      2019 novel coronavirus disease (COVID-19)          Labs:                        9.3    16.49 )-----------( 232      ( 26 Oct 2022 06:00 )             31.9           PE:  PHYSICAL EXAM: Pt on [facility] vent, NAD  Full thickness wound to sacrum and b/l Hips with necrotic tissue and granulation tissue  left hip[ 100% necrotic tissue  sacrum bone exposed  serosang dc

## 2022-10-26 NOTE — CONSULT NOTE ADULT - TIME BILLING
Coordination of care
wound eval and treat
I have personally seen and examined this patient.    I have reviewed all pertinent clinical information and reviewed all relevant imaging and diagnostic studies personally.   I counseled the patient about diagnostic testing and treatment plan. All questions were answered.   I discussed recommendations with the primary team.

## 2022-10-26 NOTE — PATIENT PROFILE ADULT - FALL HARM RISK - HARM RISK INTERVENTIONS

## 2022-10-26 NOTE — PROGRESS NOTE ADULT - SUBJECTIVE AND OBJECTIVE BOX
CHADWICK VENCES  82y, Male  Allergy: Allergy Status Unknown      LOS  3d    CHIEF COMPLAINT: sepsis (26 Oct 2022 07:14)      INTERVAL EVENTS/HPI  - No acute events overnight  - T(F): , Max: 98.2 (10-26-22 @ 03:00)  - BP remains soft  - WBC Count: 16.49 (10-26-22 @ 06:00)  WBC Count: 15.72 (10-25-22 @ 17:44)     - Creatinine, Serum: 0.7 (10-26-22 @ 06:00)       ROS  unable to obtain history secondary to patient's mental status and/or sedation    VITALS:  T(F): 97.9, Max: 98.2 (10-26-22 @ 03:00)  HR: 82  BP: 91/51  RR: 20Vital Signs Last 24 Hrs  T(C): 36.6 (26 Oct 2022 12:00), Max: 36.8 (26 Oct 2022 03:00)  T(F): 97.9 (26 Oct 2022 12:00), Max: 98.2 (26 Oct 2022 03:00)  HR: 82 (26 Oct 2022 12:00) (62 - 92)  BP: 91/51 (26 Oct 2022 12:00) (80/54 - 92/53)  BP(mean): 66 (26 Oct 2022 12:00) (63 - 66)  RR: 20 (26 Oct 2022 12:00) (18 - 20)  SpO2: 100% (26 Oct 2022 12:00) (97% - 100%)    Parameters below as of 26 Oct 2022 12:00  Patient On (Oxygen Delivery Method): ventilator        PHYSICAL EXAM:  Gen: vent/trach   HEENT: Normocephalic, atraumatic  Neck: supple, no lymphadenopathy  CV: Regular rate & regular rhythm  Lungs: decreased BS at bases, no fremitus  Abdomen: Soft, BS present  Ext: Warm, well perfused  Neuro: non focal, awake  Skin: no rash, no erythema  Lines: no phlebitis    FH: Non-contributory  Social Hx: Non-contributory    TESTS & MEASUREMENTS:                        9.3    16.49 )-----------( 232      ( 26 Oct 2022 06:00 )             31.9     10-26    153<H>  |  106  |  63<HH>  ----------------------------<  108<H>  3.5   |  40<H>  |  0.7    Ca    8.2<L>      26 Oct 2022 06:00  Mg     2.5     10-26    TPro  5.3<L>  /  Alb  1.8<L>  /  TBili  0.5  /  DBili  x   /  AST  15  /  ALT  8   /  AlkPhos  76  10-26      LIVER FUNCTIONS - ( 26 Oct 2022 06:00 )  Alb: 1.8 g/dL / Pro: 5.3 g/dL / ALK PHOS: 76 U/L / ALT: 8 U/L / AST: 15 U/L / GGT: x               Culture - Blood (collected 10-24-22 @ 00:00)  Source: .Blood Blood-Peripheral  Preliminary Report (10-25-22 @ 09:01):    No growth to date.    Culture - Urine (collected 10-23-22 @ 05:45)  Source: Clean Catch Clean Catch (Midstream)  Final Report (10-25-22 @ 17:20):    >100,000 CFU/ml Acinetobacter baumannii/nosocomialis group  Organism: Acinetobacter baumannii/nosocomialis group  Acinetobacter baumannii/nosocomialis group (10-25-22 @ 17:20)  Organism: Acinetobacter baumannii/nosocomialis group (10-25-22 @ 17:20)      -  Piperacillin/Tazobactam: R      Method Type: KB  Organism: Acinetobacter baumannii/nosocomialis group (10-25-22 @ 17:20)      -  Amikacin: S <=16      -  Ampicillin/Sulbactam: R >16/8      -  Cefepime: R >16      -  Ceftazidime: S 4      -  Ceftriaxone: I 32      -  Ciprofloxacin: R >2      -  Gentamicin: S 4      -  Imipenem: R >8      -  Levofloxacin: R >4      -  Meropenem: R >8      -  Tobramycin: S <=2      -  Trimethoprim/Sulfamethoxazole: R >2/38      Method Type: ALEXSANDRA    Culture - Blood (collected 10-23-22 @ 05:05)  Source: .Blood Blood  Preliminary Report (10-24-22 @ 13:03):    No growth to date.        Lactate, Blood: 1.0 mmol/L (10-23-22 @ 04:15)      INFECTIOUS DISEASES TESTING  MRSA PCR Result.: Negative (10-26-22 @ 09:14)  Legionella Antigen, Urine: Negative (10-24-22 @ 02:44)  Procalcitonin, Serum: 5.93 (10-24-22 @ 00:00)  COVID-19 PCR: NotDetec (10-23-22 @ 05:45)  COVID-19 PCR: NotDetec (03-02-22 @ 13:30)  Procalcitonin, Serum: 0.51 (02-27-22 @ 07:33)  COVID-19 PCR: Detected (02-20-22 @ 15:33)  COVID-19 PCR: NotDetec (02-15-22 @ 07:30)  Procalcitonin, Serum: 0.09 (02-12-22 @ 06:21)  COVID-19 PCR: Detected (02-10-22 @ 13:00)  Procalcitonin, Serum: 0.04 (02-09-22 @ 06:03)  Procalcitonin, Serum: 0.03 (02-01-22 @ 06:25)  Procalcitonin, Serum: 0.07 (01-31-22 @ 06:15)  Procalcitonin, Serum: 0.10 (01-30-22 @ 10:45)  Procalcitonin, Serum: 0.11 (01-29-22 @ 07:17)  Procalcitonin, Serum: 0.08 (01-26-22 @ 06:15)  Procalcitonin, Serum: 0.09 (01-25-22 @ 05:58)  Procalcitonin, Serum: 0.11 (01-23-22 @ 11:17)  MRSA PCR Result.: Negative (01-23-22 @ 11:00)  Procalcitonin, Serum: 0.44 (01-19-22 @ 06:30)  Procalcitonin, Serum: 2.32 (01-16-22 @ 06:56)  Rapid RVP Result: Detected (01-15-22 @ 01:25)  Procalcitonin, Serum: 0.10 (01-15-22 @ 01:25)      INFLAMMATORY MARKERS  C-Reactive Protein, Serum: 80.3 mg/L (10-24-22 @ 00:00)      RADIOLOGY & ADDITIONAL TESTS:  I have personally reviewed the last available Chest xray  CXR      CT      CARDIOLOGY TESTING  12 Lead ECG:   Ventricular Rate 94 BPM    Atrial Rate 94 BPM    P-R Interval 158 ms    QRS Duration 116 ms    Q-T Interval 392 ms    QTC Calculation(Bazett) 490 ms    P Axis -1 degrees    R Axis -18 degrees    T Axis 166 degrees    Diagnosis Line Normal sinus rhythm  Left ventricular hypertrophy with QRS widening and repolarization abnormality  Prolonged QT  Abnormal ECG    Confirmed by Lotus Braun MD ([id number]) on 10/23/2022 3:00:38 PM (10-23-22 @ 09:58)      MEDICATIONS  ampicillin/sulbactam  IVPB 9 IV Intermittent every 8 hours  atorvastatin 10 Oral at bedtime  chlorhexidine 0.12% Liquid 15 Oral Mucosa every 12 hours  chlorhexidine 2% Cloths 1 Topical daily  collagenase Ointment 1 Topical two times a day  dextrose 5%. 1000 IV Continuous <Continuous>  dextrose 5%. 1000 IV Continuous <Continuous>  dextrose 50% Injectable 25 IV Push once  dextrose 50% Injectable 12.5 IV Push once  dextrose 50% Injectable 25 IV Push once  epoetin carline-epbx (RETACRIT) Injectable 41139 SubCutaneous <User Schedule>  glucagon  Injectable 1 IntraMuscular once  insulin lispro (ADMELOG) corrective regimen sliding scale  SubCutaneous three times a day before meals  levothyroxine 125 Oral daily  metoprolol tartrate 12.5 Oral every 12 hours  midodrine. 10 Oral every 8 hours  ofloxacin 0.3% Solution 1 Left EYE two times a day  pantoprazole   Suspension 40 Oral every 12 hours  potassium chloride   Powder 40 Enteral Tube once  tamsulosin 0.4 Oral at bedtime      WEIGHT  Weight (kg): 74.8 (10-23-22 @ 00:05)  Creatinine, Serum: 0.7 mg/dL (10-26-22 @ 06:00)      ANTIBIOTICS:  ampicillin/sulbactam  IVPB 9 Gram(s) IV Intermittent every 8 hours      All available historical records have been reviewed

## 2022-10-26 NOTE — PROGRESS NOTE ADULT - ASSESSMENT
IMPRESSION:    sepsis present on admission  UTI/ acineto  Anemia  hypernatremia  chronic hypoxic respiratory failure SP Trach  Anoxic brain injury  hx Cardiac Arrest  hx Severe COVID PNA      SUGGEST:    CNS:  as needed morphine for comfort    HEENT: Oral care.      PULMONARY: aspiration precaution, dec FIO2 TO 40%, Keep Sao2 92 to 96%, not weanable      CARDIOVASCULAR:   Avoid overload.  Free water, midodrine 10 q 8    GI: PPI q 12, MAYRA for blood    RENAL:  Follow up lytes.  Correct as needed.  Monitor UO.  Becker care.  free H2O    INFECTIOUS DISEASE:    abx per ID    HEMATOLOGICAL: DVT prophylaxis, LE doppler, transfuse keep hb more than 7    ENDOCRINE:  Follow up FS.  Insulin protocol if needed    MUSCULOSKELETAL: bedrest    DNR  Very poor overall prognosis    vent unit

## 2022-10-26 NOTE — PROGRESS NOTE ADULT - ASSESSMENT
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     #Sepsis on admission  - Blood Cx 10/23 NG    #UTI  - CT abd/pelvis 10/23 with radiologic evidence of Left Pyelonephritis  - Urine Cx 10/23 MDR Acinetobacter baumanii      #Bilateral Pleural Effusion - unable to rule out pneumonia, although suspect urinary source  #Cardiac Arrest with Anoxic brain injury s/p trach/PEG    Recommendations  - continue unasyn 9g q 8 hours  - appreciate ID pharmacy recommendations --  Amikacin 1250mg IV q48h on 10/27 at 2200  - trend creatinine closely   - repeat CXR  - monitor BP   - poor prognosis with MDR infection     Please call or message on Microsoft Teams if with any questions.  Spectra [phone].

## 2022-10-26 NOTE — PATIENT PROFILE ADULT - NSPROIMPLANTSMEDDEV_GEN_A_NUR
Patient is 7 weeks pregnant per LMP, , with history of one prior SAB.  Patient is now reporting vaginal bleeding, small amount brown to pink with ABD cramping.  Orders place for <14 week US.  Patient transferred to scheduling to set up appointment to be seen.     None

## 2022-10-26 NOTE — PHARMACOTHERAPY INTERVENTION NOTE - COMMENTS
Patient receiving amikacin and high-dose ampicillin-sulbactam for MDR acinetobacter UTI. Patient received dose of amikacin 1500mg (~20mg/kg using ABW) last night, and level about 7.25 hours after end of infusion was 26.6 mg/L. Per [place] nomogram, and considering that patient should be dosed with a smaller weight-based dose utilizing IBW going forward, recommended starting amikacin 1250mg (~18mg/kg using IBW) IV q48h on 10/27 at 2200. Recommended monitoring renal function closely and obtaining a trough level prior to subsequent dose if there are any acute changes in renal function.

## 2022-10-27 NOTE — PROGRESS NOTE ADULT - SUBJECTIVE AND OBJECTIVE BOX
CHADWICK VENCES  82y, Male  Allergy: Allergy Status Unknown      LOS  4d    CHIEF COMPLAINT: sepsis (27 Oct 2022 07:27)      INTERVAL EVENTS/HPI  - No acute events overnight  - T(F): , Max: 97.8 (10-26-22 @ 21:51)  - WBC Count: 11.73 (10-27-22 @ 01:12)  WBC Count: 16.49 (10-26-22 @ 06:00)     - Creatinine, Serum: 0.8 (10-27-22 @ 01:12)  Creatinine, Serum: 0.7 (10-26-22 @ 06:00)       ROS  unable to obtain history secondary to patient's mental status and/or sedation      VITALS:  T(F): 96.2, Max: 97.8 (10-26-22 @ 21:51)  HR: 82  BP: 97/62  RR: 20Vital Signs Last 24 Hrs  T(C): 35.7 (27 Oct 2022 17:14), Max: 36.6 (26 Oct 2022 21:51)  T(F): 96.2 (27 Oct 2022 17:14), Max: 97.8 (26 Oct 2022 21:51)  HR: 82 (27 Oct 2022 17:16) (78 - 86)  BP: 97/62 (27 Oct 2022 17:14) (91/63 - 120/76)  BP(mean): 75 (27 Oct 2022 17:14) (70 - 82)  RR: 20 (27 Oct 2022 11:16) (20 - 20)  SpO2: 99% (27 Oct 2022 17:16) (99% - 100%)    Parameters below as of 27 Oct 2022 11:16  Patient On (Oxygen Delivery Method): ventilator        PHYSICAL EXAM:  Gen: vent/trach  HEENT: Normocephalic, atraumatic  Neck: supple, no lymphadenopathy  CV: Regular rate & regular rhythm  Lungs: decreased BS at bases, no fremitus  Abdomen: Soft, BS present  Ext: Warm, well perfused  Neuro: non focal, awake  Skin: stagve IV sacral ulcer with left hip necrotic ulcer  Lines: no phlebitis    FH: Non-contributory  Social Hx: Non-contributory    TESTS & MEASUREMENTS:                        8.3    11.73 )-----------( 257      ( 27 Oct 2022 01:12 )             28.4     10-27    151<H>  |  105  |  59<H>  ----------------------------<  131<H>  5.1<H>   |  34<H>  |  0.8    Ca    8.2<L>      27 Oct 2022 01:12  Mg     2.6     10-27    TPro  5.6<L>  /  Alb  1.9<L>  /  TBili  0.4  /  DBili  x   /  AST  46<H>  /  ALT  9   /  AlkPhos  79  10-27      LIVER FUNCTIONS - ( 27 Oct 2022 01:12 )  Alb: 1.9 g/dL / Pro: 5.6 g/dL / ALK PHOS: 79 U/L / ALT: 9 U/L / AST: 46 U/L / GGT: x               Culture - Blood (collected 10-24-22 @ 00:00)  Source: .Blood Blood-Peripheral  Preliminary Report (10-25-22 @ 09:01):    No growth to date.    Culture - Urine (collected 10-23-22 @ 05:45)  Source: Clean Catch Clean Catch (Midstream)  Final Report (10-25-22 @ 17:20):    >100,000 CFU/ml Acinetobacter baumannii/nosocomialis group  Organism: Acinetobacter baumannii/nosocomialis group  Acinetobacter baumannii/nosocomialis group (10-25-22 @ 17:20)  Organism: Acinetobacter baumannii/nosocomialis group (10-25-22 @ 17:20)      -  Piperacillin/Tazobactam: R      Method Type: KB  Organism: Acinetobacter baumannii/nosocomialis group (10-25-22 @ 17:20)      -  Amikacin: S <=16      -  Ampicillin/Sulbactam: R >16/8      -  Cefepime: R >16      -  Ceftazidime: S 4      -  Ceftriaxone: I 32      -  Ciprofloxacin: R >2      -  Gentamicin: S 4      -  Imipenem: R >8      -  Levofloxacin: R >4      -  Meropenem: R >8      -  Tobramycin: S <=2      -  Trimethoprim/Sulfamethoxazole: R >2/38      Method Type: ALEXSANDRA    Culture - Blood (collected 10-23-22 @ 05:05)  Source: .Blood Blood  Preliminary Report (10-24-22 @ 13:03):    No growth to date.        Lactate, Blood: 1.0 mmol/L (10-23-22 @ 04:15)      INFECTIOUS DISEASES TESTING  MRSA PCR Result.: Negative (10-26-22 @ 09:14)  Legionella Antigen, Urine: Negative (10-24-22 @ 02:44)  Procalcitonin, Serum: 5.93 (10-24-22 @ 00:00)  COVID-19 PCR: NotDetec (10-23-22 @ 05:45)  COVID-19 PCR: NotDetec (03-02-22 @ 13:30)  Procalcitonin, Serum: 0.51 (02-27-22 @ 07:33)  COVID-19 PCR: Detected (02-20-22 @ 15:33)  COVID-19 PCR: NotDetec (02-15-22 @ 07:30)  Procalcitonin, Serum: 0.09 (02-12-22 @ 06:21)  COVID-19 PCR: Detected (02-10-22 @ 13:00)  Procalcitonin, Serum: 0.04 (02-09-22 @ 06:03)  Procalcitonin, Serum: 0.03 (02-01-22 @ 06:25)  Procalcitonin, Serum: 0.07 (01-31-22 @ 06:15)  Procalcitonin, Serum: 0.10 (01-30-22 @ 10:45)  Procalcitonin, Serum: 0.11 (01-29-22 @ 07:17)  Procalcitonin, Serum: 0.08 (01-26-22 @ 06:15)  Procalcitonin, Serum: 0.09 (01-25-22 @ 05:58)  Procalcitonin, Serum: 0.11 (01-23-22 @ 11:17)  MRSA PCR Result.: Negative (01-23-22 @ 11:00)  Procalcitonin, Serum: 0.44 (01-19-22 @ 06:30)  Procalcitonin, Serum: 2.32 (01-16-22 @ 06:56)  Rapid RVP Result: Detected (01-15-22 @ 01:25)  Procalcitonin, Serum: 0.10 (01-15-22 @ 01:25)      INFLAMMATORY MARKERS  C-Reactive Protein, Serum: 80.3 mg/L (10-24-22 @ 00:00)      RADIOLOGY & ADDITIONAL TESTS:  I have personally reviewed the last available Chest xray  CXR      CT      CARDIOLOGY TESTING  12 Lead ECG:   Ventricular Rate 94 BPM    Atrial Rate 94 BPM    P-R Interval 158 ms    QRS Duration 116 ms    Q-T Interval 392 ms    QTC Calculation(Bazett) 490 ms    P Axis -1 degrees    R Axis -18 degrees    T Axis 166 degrees    Diagnosis Line Normal sinus rhythm  Left ventricular hypertrophy with QRS widening and repolarization abnormality  Prolonged QT  Abnormal ECG    Confirmed by Lotus Braun MD ([id number]) on 10/23/2022 3:00:38 PM (10-23-22 @ 09:58)      MEDICATIONS  amiKACIN  IVPB 1250 IV Intermittent <User Schedule>  ampicillin/sulbactam  IVPB 9 IV Intermittent every 8 hours  atorvastatin 10 Oral at bedtime  chlorhexidine 0.12% Liquid 15 Oral Mucosa every 12 hours  chlorhexidine 2% Cloths 1 Topical daily  collagenase Ointment 1 Topical two times a day  Dakins Solution - 1/2 Strength 1 Topical two times a day  dextrose 5%. 1000 IV Continuous <Continuous>  dextrose 5%. 1000 IV Continuous <Continuous>  dextrose 50% Injectable 25 IV Push once  dextrose 50% Injectable 12.5 IV Push once  dextrose 50% Injectable 25 IV Push once  epoetin carline-epbx (RETACRIT) Injectable 27523 SubCutaneous <User Schedule>  glucagon  Injectable 1 IntraMuscular once  heparin   Injectable 5000 SubCutaneous every 12 hours  insulin lispro (ADMELOG) corrective regimen sliding scale  SubCutaneous three times a day before meals  levothyroxine 125 Oral daily  metoprolol tartrate 12.5 Oral every 12 hours  midodrine. 10 Oral every 8 hours  ofloxacin 0.3% Solution 1 Left EYE two times a day  pantoprazole   Suspension 40 Oral every 12 hours  tamsulosin 0.4 Oral at bedtime      WEIGHT  Weight (kg): 74.8 (10-23-22 @ 00:05)  Creatinine, Serum: 0.8 mg/dL (10-27-22 @ 01:12)      ANTIBIOTICS:  amiKACIN  IVPB 1250 milliGRAM(s) IV Intermittent <User Schedule>  ampicillin/sulbactam  IVPB 9 Gram(s) IV Intermittent every 8 hours      All available historical records have been reviewed

## 2022-10-27 NOTE — PROGRESS NOTE ADULT - SUBJECTIVE AND OBJECTIVE BOX
T H I S   I S    N O  T   A    F I N A L I Z E D   N O T CHADWICK RUEDA  82y, Male  Allergy: Allergy Status Unknown    Hospital Day: 4d    Patient seen and examined earlier today.     PMH/PSH:  PAST MEDICAL & SURGICAL HISTORY:  BPH (benign prostatic hyperplasia)      Mild HTN      Cardiac arrest      Anoxic brain injury      2019 novel coronavirus disease (COVID-19)          LAST 24-Hr EVENTS:    VITALS:  T(F): 97.4 (10-27-22 @ 11:16), Max: 97.8 (10-26-22 @ 21:51)  HR: 86 (10-27-22 @ 11:16)  BP: 102/74 (10-27-22 @ 11:16) (85/51 - 120/76)  RR: 20 (10-27-22 @ 11:16)  SpO2: 100% (10-27-22 @ 11:16)  FiO2: 60        TESTS & MEASUREMENTS:  Weight/BMI  74.8 (10-23-22 @ 00:05)  25.1 (10-23-22 @ 00:05)    10-25-22 @ 07:01  -  10-26-22 @ 07:00  --------------------------------------------------------  IN: 1270 mL / OUT: 400 mL / NET: 870 mL    10-26-22 @ 07:01  -  10-27-22 @ 07:00  --------------------------------------------------------  IN: 1680 mL / OUT: 1750 mL / NET: -70 mL    10-27-22 @ 07:01  -  10-27-22 @ 12:46  --------------------------------------------------------  IN: 560 mL / OUT: 0 mL / NET: 560 mL                            8.3    11.73 )-----------( 257      ( 27 Oct 2022 01:12 )             28.4       INR: 1.31 ratio (10-24-22 @ 04:40)  INR: 1.40 ratio (10-23-22 @ 04:15)    10-27    151<H>  |  105  |  59<H>  ----------------------------<  131<H>  5.1<H>   |  34<H>  |  0.8    Ca    8.2<L>      27 Oct 2022 01:12  Mg     2.6     10-27    TPro  5.6<L>  /  Alb  1.9<L>  /  TBili  0.4  /  DBili  x   /  AST  46<H>  /  ALT  9   /  AlkPhos  79  10-27    LIVER FUNCTIONS - ( 27 Oct 2022 01:12 )  Alb: 1.9 g/dL / Pro: 5.6 g/dL / ALK PHOS: 79 U/L / ALT: 9 U/L / AST: 46 U/L / GGT: x           CARDIAC MARKERS ( 27 Oct 2022 01:12 )  x     / 0.30 ng/mL / x     / x     / x            Culture - Blood (collected 10-24-22 @ 00:00)  Source: .Blood Blood-Peripheral  Preliminary Report (10-25-22 @ 09:01):    No growth to date.    Culture - Urine (collected 10-23-22 @ 05:45)  Source: Clean Catch Clean Catch (Midstream)  Final Report (10-25-22 @ 17:20):    >100,000 CFU/ml Acinetobacter baumannii/nosocomialis group  Organism: Acinetobacter baumannii/nosocomialis group  Acinetobacter baumannii/nosocomialis group (10-25-22 @ 17:20)  Organism: Acinetobacter baumannii/nosocomialis group (10-25-22 @ 17:20)      -  Piperacillin/Tazobactam: R      Method Type: KB  Organism: Acinetobacter baumannii/nosocomialis group (10-25-22 @ 17:20)      -  Amikacin: S <=16      -  Ampicillin/Sulbactam: R >16/8      -  Cefepime: R >16      -  Ceftazidime: S 4      -  Ceftriaxone: I 32      -  Ciprofloxacin: R >2      -  Gentamicin: S 4      -  Imipenem: R >8      -  Levofloxacin: R >4      -  Meropenem: R >8      -  Tobramycin: S <=2      -  Trimethoprim/Sulfamethoxazole: R >2/38      Method Type: ALEXSANDRA    Culture - Blood (collected 10-23-22 @ 05:05)  Source: .Blood Blood  Preliminary Report (10-24-22 @ 13:03):    No growth to date.        Procalcitonin, Serum: 5.93 ng/mL (10-24-22 @ 00:00)      Ferritin, Serum: 577 ng/mL (10-24-22 @ 04:40)  Ferritin, Serum: 577 ng/mL (10-24-22 @ 00:00)    Serum Pro-Brain Natriuretic Peptide: 53850 pg/mL (10-24-22 @ 00:00)    COVID-19 PCR: NotDetec (10-23-22 @ 05:45)        A1C with Estimated Average Glucose Result: 4.7 % (10-24-22 @ 04:40)  A1C with Estimated Average Glucose Result: 6.2 % (01-24-22 @ 06:25)      Indwelling Urethral Catheter:     Connect To:  Straight Drainage/Gravity    Indication:  Urine Output Monitoring in Critically Ill (10-27-22 @ 08:59) (not performed)  Indwelling Urethral Catheter:     Connect To:  Straight Drainage/Gravity    Indication:  Urine Output Monitoring in Critically Ill (10-23-22 @ 05:58) (not performed)      RADIOLOGY, ECG, & ADDITIONAL TESTS:  12 Lead ECG:   Ventricular Rate 94 BPM    Atrial Rate 94 BPM    P-R Interval 158 ms    QRS Duration 116 ms    Q-T Interval 392 ms    QTC Calculation(Bazett) 490 ms    P Axis -1 degrees    R Axis -18 degrees    T Axis 166 degrees    Diagnosis Line Normal sinus rhythm  Left ventricular hypertrophy with QRS widening and repolarization abnormality  Prolonged QT  Abnormal ECG    Confirmed by Lotus Braun MD ([id number]) on 10/23/2022 3:00:38 PM (10-23-22 @ 09:58)      RECENT DIAGNOSTIC ORDERS:  Ferritin, Serum: 23:30 (10-26-22 @ 17:48)  Folate, Serum: 23:30 (10-26-22 @ 17:48)  Vitamin B12, Serum: 23:30 (10-26-22 @ 17:48)      MEDICATIONS:  MEDICATIONS  (STANDING):  amiKACIN  IVPB 1250 milliGRAM(s) IV Intermittent <User Schedule>  ampicillin/sulbactam  IVPB 9 Gram(s) IV Intermittent every 8 hours  atorvastatin 10 milliGRAM(s) Oral at bedtime  chlorhexidine 0.12% Liquid 15 milliLiter(s) Oral Mucosa every 12 hours  chlorhexidine 2% Cloths 1 Application(s) Topical daily  collagenase Ointment 1 Application(s) Topical two times a day  Dakins Solution - 1/2 Strength 1 Application(s) Topical two times a day  dextrose 5%. 1000 milliLiter(s) (50 mL/Hr) IV Continuous <Continuous>  dextrose 5%. 1000 milliLiter(s) (100 mL/Hr) IV Continuous <Continuous>  dextrose 50% Injectable 25 Gram(s) IV Push once  dextrose 50% Injectable 12.5 Gram(s) IV Push once  dextrose 50% Injectable 25 Gram(s) IV Push once  epoetin carline-epbx (RETACRIT) Injectable 26407 Unit(s) SubCutaneous <User Schedule>  glucagon  Injectable 1 milliGRAM(s) IntraMuscular once  heparin   Injectable 5000 Unit(s) SubCutaneous every 12 hours  insulin lispro (ADMELOG) corrective regimen sliding scale   SubCutaneous three times a day before meals  levothyroxine 125 MICROGram(s) Oral daily  metoprolol tartrate 12.5 milliGRAM(s) Oral every 12 hours  midodrine. 10 milliGRAM(s) Oral every 8 hours  ofloxacin 0.3% Solution 1 Drop(s) Left EYE two times a day  pantoprazole   Suspension 40 milliGRAM(s) Oral every 12 hours  tamsulosin 0.4 milliGRAM(s) Oral at bedtime    MEDICATIONS  (PRN):  acetaminophen     Tablet .. 650 milliGRAM(s) Oral every 6 hours PRN Temp greater or equal to 38C (100.4F), Mild Pain (1 - 3)  albuterol/ipratropium for Nebulization 3 milliLiter(s) Nebulizer every 6 hours PRN Shortness of Breath and/or Wheezing  dextrose Oral Gel 15 Gram(s) Oral once PRN Blood Glucose LESS THAN 70 milliGRAM(s)/deciliter      HOME MEDICATIONS:  amantadine 50 mg/5 mL oral syrup (10-23)  atorvastatin 10 mg oral tablet (10-23)  bumetanide 2 mg oral tablet (10-23)  chlorhexidine 0.5% topical liquid (10-23)  collagenase 250 units/g topical ointment (10-23)  Eliquis 2.5 mg oral tablet (10-23)  Epogen 20,000 units/mL injectable solution (10-23)  ferrous sulfate 220 mg/5 mL (44 mg/5 mL elemental iron) oral elixir (10-23)  folic acid 1 mg oral tablet (10-23)  HumuLIN R 100 units/mL injectable solution (10-23)  ipratropium-albuterol 0.5 mg-2.5 mg/3 mL inhalation solution (10-23)  levothyroxine 125 mcg (0.125 mg) oral tablet (10-23)  meropenem 1000 mg intravenous injection (10-23)  Metoprolol Tartrate 25 mg oral tablet (10-23)  midodrine 5 mg oral tablet (10-23)  omeprazole 20 mg oral delayed release tablet (10-23)  PHOS-NaK oral powder for reconstitution (10-23)  potassium chloride 40 mEq/15 mL oral liquid (10-23)  tamsulosin 0.4 mg oral capsule (10-23)  Tylenol 325 mg oral tablet (10-23)  Vitamin B-12 1000 mcg oral tablet (10-23)  Vitamin C 500 mg oral tablet (10-23)  zinc sulfate 220 mg oral tablet (10-23)      PHYSICAL EXAM:  GENERAL:   CHEST/LUNG:   HEART:   ABDOMEN:   EXTREMITIES:               CHADWICK VENCES  82y, Male  Allergy: Allergy Status Unknown    Hospital Day: 4d    Patient seen and examined earlier today. patient is non verbal      PMH/PSH:  PAST MEDICAL & SURGICAL HISTORY:  BPH (benign prostatic hyperplasia)      Mild HTN      Cardiac arrest      Anoxic brain injury      2019 novel coronavirus disease (COVID-19)          LAST 24-Hr EVENTS:    VITALS:  T(F): 97.4 (10-27-22 @ 11:16), Max: 97.8 (10-26-22 @ 21:51)  HR: 86 (10-27-22 @ 11:16)  BP: 102/74 (10-27-22 @ 11:16) (85/51 - 120/76)  RR: 20 (10-27-22 @ 11:16)  SpO2: 100% (10-27-22 @ 11:16)  FiO2: 60        TESTS & MEASUREMENTS:  Weight/BMI  74.8 (10-23-22 @ 00:05)  25.1 (10-23-22 @ 00:05)    10-25-22 @ 07:01  -  10-26-22 @ 07:00  --------------------------------------------------------  IN: 1270 mL / OUT: 400 mL / NET: 870 mL    10-26-22 @ 07:01  -  10-27-22 @ 07:00  --------------------------------------------------------  IN: 1680 mL / OUT: 1750 mL / NET: -70 mL    10-27-22 @ 07:01  -  10-27-22 @ 12:46  --------------------------------------------------------  IN: 560 mL / OUT: 0 mL / NET: 560 mL                            8.3    11.73 )-----------( 257      ( 27 Oct 2022 01:12 )             28.4       INR: 1.31 ratio (10-24-22 @ 04:40)  INR: 1.40 ratio (10-23-22 @ 04:15)    10-27    151<H>  |  105  |  59<H>  ----------------------------<  131<H>  5.1<H>   |  34<H>  |  0.8    Ca    8.2<L>      27 Oct 2022 01:12  Mg     2.6     10-27    TPro  5.6<L>  /  Alb  1.9<L>  /  TBili  0.4  /  DBili  x   /  AST  46<H>  /  ALT  9   /  AlkPhos  79  10-27    LIVER FUNCTIONS - ( 27 Oct 2022 01:12 )  Alb: 1.9 g/dL / Pro: 5.6 g/dL / ALK PHOS: 79 U/L / ALT: 9 U/L / AST: 46 U/L / GGT: x           CARDIAC MARKERS ( 27 Oct 2022 01:12 )  x     / 0.30 ng/mL / x     / x     / x            Culture - Blood (collected 10-24-22 @ 00:00)  Source: .Blood Blood-Peripheral  Preliminary Report (10-25-22 @ 09:01):    No growth to date.    Culture - Urine (collected 10-23-22 @ 05:45)  Source: Clean Catch Clean Catch (Midstream)  Final Report (10-25-22 @ 17:20):    >100,000 CFU/ml Acinetobacter baumannii/nosocomialis group  Organism: Acinetobacter baumannii/nosocomialis group  Acinetobacter baumannii/nosocomialis group (10-25-22 @ 17:20)  Organism: Acinetobacter baumannii/nosocomialis group (10-25-22 @ 17:20)      -  Piperacillin/Tazobactam: R      Method Type: KB  Organism: Acinetobacter baumannii/nosocomialis group (10-25-22 @ 17:20)      -  Amikacin: S <=16      -  Ampicillin/Sulbactam: R >16/8      -  Cefepime: R >16      -  Ceftazidime: S 4      -  Ceftriaxone: I 32      -  Ciprofloxacin: R >2      -  Gentamicin: S 4      -  Imipenem: R >8      -  Levofloxacin: R >4      -  Meropenem: R >8      -  Tobramycin: S <=2      -  Trimethoprim/Sulfamethoxazole: R >2/38      Method Type: ALEXSANDRA    Culture - Blood (collected 10-23-22 @ 05:05)  Source: .Blood Blood  Preliminary Report (10-24-22 @ 13:03):    No growth to date.        Procalcitonin, Serum: 5.93 ng/mL (10-24-22 @ 00:00)      Ferritin, Serum: 577 ng/mL (10-24-22 @ 04:40)  Ferritin, Serum: 577 ng/mL (10-24-22 @ 00:00)    Serum Pro-Brain Natriuretic Peptide: 25221 pg/mL (10-24-22 @ 00:00)    COVID-19 PCR: NotDetec (10-23-22 @ 05:45)        A1C with Estimated Average Glucose Result: 4.7 % (10-24-22 @ 04:40)  A1C with Estimated Average Glucose Result: 6.2 % (01-24-22 @ 06:25)      Indwelling Urethral Catheter:     Connect To:  Straight Drainage/Gravity    Indication:  Urine Output Monitoring in Critically Ill (10-27-22 @ 08:59) (not performed)  Indwelling Urethral Catheter:     Connect To:  Straight Drainage/Gravity    Indication:  Urine Output Monitoring in Critically Ill (10-23-22 @ 05:58) (not performed)      RADIOLOGY, ECG, & ADDITIONAL TESTS:  12 Lead ECG:   Ventricular Rate 94 BPM    Atrial Rate 94 BPM    P-R Interval 158 ms    QRS Duration 116 ms    Q-T Interval 392 ms    QTC Calculation(Bazett) 490 ms    P Axis -1 degrees    R Axis -18 degrees    T Axis 166 degrees    Diagnosis Line Normal sinus rhythm  Left ventricular hypertrophy with QRS widening and repolarization abnormality  Prolonged QT  Abnormal ECG    Confirmed by Lucia Braun MDfer ([id number]) on 10/23/2022 3:00:38 PM (10-23-22 @ 09:58)      RECENT DIAGNOSTIC ORDERS:  Ferritin, Serum: 23:30 (10-26-22 @ 17:48)  Folate, Serum: 23:30 (10-26-22 @ 17:48)  Vitamin B12, Serum: 23:30 (10-26-22 @ 17:48)      MEDICATIONS:  MEDICATIONS  (STANDING):  amiKACIN  IVPB 1250 milliGRAM(s) IV Intermittent <User Schedule>  ampicillin/sulbactam  IVPB 9 Gram(s) IV Intermittent every 8 hours  atorvastatin 10 milliGRAM(s) Oral at bedtime  chlorhexidine 0.12% Liquid 15 milliLiter(s) Oral Mucosa every 12 hours  chlorhexidine 2% Cloths 1 Application(s) Topical daily  collagenase Ointment 1 Application(s) Topical two times a day  Dakins Solution - 1/2 Strength 1 Application(s) Topical two times a day  dextrose 5%. 1000 milliLiter(s) (50 mL/Hr) IV Continuous <Continuous>  dextrose 5%. 1000 milliLiter(s) (100 mL/Hr) IV Continuous <Continuous>  dextrose 50% Injectable 25 Gram(s) IV Push once  dextrose 50% Injectable 12.5 Gram(s) IV Push once  dextrose 50% Injectable 25 Gram(s) IV Push once  epoetin carline-epbx (RETACRIT) Injectable 42137 Unit(s) SubCutaneous <User Schedule>  glucagon  Injectable 1 milliGRAM(s) IntraMuscular once  heparin   Injectable 5000 Unit(s) SubCutaneous every 12 hours  insulin lispro (ADMELOG) corrective regimen sliding scale   SubCutaneous three times a day before meals  levothyroxine 125 MICROGram(s) Oral daily  metoprolol tartrate 12.5 milliGRAM(s) Oral every 12 hours  midodrine. 10 milliGRAM(s) Oral every 8 hours  ofloxacin 0.3% Solution 1 Drop(s) Left EYE two times a day  pantoprazole   Suspension 40 milliGRAM(s) Oral every 12 hours  tamsulosin 0.4 milliGRAM(s) Oral at bedtime    MEDICATIONS  (PRN):  acetaminophen     Tablet .. 650 milliGRAM(s) Oral every 6 hours PRN Temp greater or equal to 38C (100.4F), Mild Pain (1 - 3)  albuterol/ipratropium for Nebulization 3 milliLiter(s) Nebulizer every 6 hours PRN Shortness of Breath and/or Wheezing  dextrose Oral Gel 15 Gram(s) Oral once PRN Blood Glucose LESS THAN 70 milliGRAM(s)/deciliter      HOME MEDICATIONS:  amantadine 50 mg/5 mL oral syrup (10-23)  atorvastatin 10 mg oral tablet (10-23)  bumetanide 2 mg oral tablet (10-23)  chlorhexidine 0.5% topical liquid (10-23)  collagenase 250 units/g topical ointment (10-23)  Eliquis 2.5 mg oral tablet (10-23)  Epogen 20,000 units/mL injectable solution (10-23)  ferrous sulfate 220 mg/5 mL (44 mg/5 mL elemental iron) oral elixir (10-23)  folic acid 1 mg oral tablet (10-23)  HumuLIN R 100 units/mL injectable solution (10-23)  ipratropium-albuterol 0.5 mg-2.5 mg/3 mL inhalation solution (10-23)  levothyroxine 125 mcg (0.125 mg) oral tablet (10-23)  meropenem 1000 mg intravenous injection (10-23)  Metoprolol Tartrate 25 mg oral tablet (10-23)  midodrine 5 mg oral tablet (10-23)  omeprazole 20 mg oral delayed release tablet (10-23)  PHOS-NaK oral powder for reconstitution (10-23)  potassium chloride 40 mEq/15 mL oral liquid (10-23)  tamsulosin 0.4 mg oral capsule (10-23)  Tylenol 325 mg oral tablet (10-23)  Vitamin B-12 1000 mcg oral tablet (10-23)  Vitamin C 500 mg oral tablet (10-23)  zinc sulfate 220 mg oral tablet (10-23)      PHYSICAL EXAM:    GENERAL: non verbal, non communicative, Trach+ on mechanical vent   CHEST/LUNG: scattrred rhonchi b/l   HEART:  regular rhythm   ABDOMEN:  soft, non tender , distended, + PEG tube   EXTREMITIES:  Anasarca, b/l feet dressings

## 2022-10-27 NOTE — PROGRESS NOTE ADULT - ASSESSMENT
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     #Sepsis on admission  - Blood Cx 10/23 NG    #UTI  - CT abd/pelvis 10/23 with radiologic evidence of Left Pyelonephritis  - Urine Cx 10/23 MDR Acinetobacter baumanii      #Sacral Ulcer   #Right Heel Ulcer    #Bilateral Pleural Effusion - unable to rule out pneumonia, although suspect urinary source  #Cardiac Arrest with Anoxic brain injury s/p trach/PEG    Recommendations  - continue unasyn 9g q 8 hours  - appreciate ID pharmacy recommendations --  Amikacin 1250mg IV q48h on 10/27 at 2200  - trend creatinine closely   - monitor BP   - planned for debridement of heel and sacral ulcer  - poor prognosis with MDR infection     Please call or message on [employer] Teams if with any questions.  Spectra [phone].

## 2022-10-27 NOTE — PROGRESS NOTE ADULT - ASSESSMENT
81 YO M w/Pmhx of Cardiac arrest leading to anoxic brain injury(s/p Trach and PEG), H/o Severe Covid PNA, H/o Gram -ve PNA, HTN and BPH presented for abnormal labs in the nursing home,( baseline, patient is unresponsive, makes random non-purposeful movement, had trach(Vent dependent) and with PEG tube for feeds), On 10/22, labs in the SNF showed anemia and uremia, also patient was tachycardic to 101 and tachypneic to 22, was sent to ED for evaluation. patient is DNR and CMO( daughter Judy Jackson, she wanted all intervention to be done for now), patient found to have B/l pna on cxr and ct showing Left perinephric/periureteral inflammatory changes without hydronephrosis. Findings most consistent with pyelonephritis. No drainable fluid collection. moderate to large size pleural effusions bilaterally, right greater than left. New marked degenerative changes of left hip. Correlate for signs of septic arthritis. Bladder wall thickening with surrounding inflammation consistent with cystitis in appropriate clinical setting. ID and pulmonary consulted. patient started on iv hydration, s/p 2 units prbc, ID, GI and pulmonary consulted. eliquis on hold until GI bleed is ruled out. admitted for PNA, acute pyelonephritis and GI bleed.     sepsis present on admission  UTI possible Pyelonephritis / Acinetobacter baumannii/nosocomialis group   Anemia  hypernatremia  chronic hypoxic respiratory failure SP Trach  hx Cardiac Arrest, anoxic brain injury(s/p Trach and PEG)  hx Severe COVID PNA  severe anemia- s/p transfusion - has hx of chronic Macrocytic anemia   bilaterally pleural effusion  Anasarca   HTN and BPH  stage 4 pressure ulcer top sacrum and Unstageable pressure ulcer to b/l Hips- infected   RT and left heel  wounds   elevated troponin - trended down       Plan:  - s/p 2 units prbc f/u post transfusion CBC  Hgb 7.0--> 6.4-->10-->9.3-> 8.3   - Continue Iv fluids as needed  Pulmonary cc on board   ID follow up appreciated continue unasyn 9g q 8 hours ( confirmed high dose from ID),  Amikacin 1250mg IV q48h on 10/27 at 2200  - consulted ID, pulmonary, and GI Rec's appr.   - Wound care  Rec's appre  - Tylenol and pain management prn  - continue current medication,   antihypertensive on lopressor now  (with holding parameters),  c/w midodrine   holding bumex ( 10/26) given hypernatremia and low blood pressure   - insulin sliding scale (goal Fs 140-180), POC glucose qid  - monitor vitals closely, daily am labs  - precaution (fall/aspiration/seizure)  - telemetry   LE duplex negative for DVT    UE venous duplex negative for DVT    F/U B12 and folate, Iron and ferretin   monitor blood pressure and maintain MAP > 65   Burn team consult appreciated - wound care and possible Surgical debridement   Offloading/ positional changes  repeat BMP for Na today and avoid overcorrection   Podiatry team consulted - input appreciated   LE arterial duplex negative - scheduled for right heel debridement 10/28  NPO midnight  medical/cardiology clearance appreciated  Consult cardio for clearance given high risk patient and elevated troponin           DVT prophylaxis: SCD's  - he was on eliquis ( daughter discussed with the team and reported that patient has DVT in left U.E since September, started on eliquis since then)   start heparin sq and monitor today     GI prophylaxis: Protonix   diet: peg feeds   code status: on vent/ DNR     Pending: podiatry , NPO midnight,  resume AG if Hgb stable , ID, Burn team     Goals of care/disposition: NH   poor overall prognosis  team discussed with the patient daughter

## 2022-10-27 NOTE — PROGRESS NOTE ADULT - ASSESSMENT
IMPRESSION:    sepsis present on admission  UTI/ acineto  Anemia  hypernatremia  chronic hypoxic respiratory failure SP Trach  Anoxic brain injury  hx Cardiac Arrest  hx Severe COVID PNA  sacral ulcer      SUGGEST:    CNS:  as needed morphine for comfort    HEENT: Oral care.      PULMONARY: aspiration precaution, dec FIO2 TO 40%, Keep Sao2 92 to 96%, not weanable      CARDIOVASCULAR:   Avoid overload.  Free water, midodrine 10 q 8    GI: PPI q 12, MAYRA for blood    RENAL:  Follow up lytes.  Correct as needed.  Monitor UO.  Becker care.  free H2O    INFECTIOUS DISEASE:    abx per ID    HEMATOLOGICAL: DVT prophylaxis,  transfuse keep hb more than 7    ENDOCRINE:  Follow up FS.  Insulin protocol if needed    MUSCULOSKELETAL: bedrest    DNR  Very poor overall prognosis    vent unit

## 2022-10-27 NOTE — PROGRESS NOTE ADULT - SUBJECTIVE AND OBJECTIVE BOX
CHADWICK VENCES 82y Male  MRN#: [id number]     Hospital Day: 4d    Pt is currently admitted with the primary diagnosis of  HYPERNATREMIA ANEMIA        SUBJECTIVE     Patient was seen this morning. stable.                                             ----------------------------------------------------------  OBJECTIVE  PAST MEDICAL & SURGICAL HISTORY  BPH (benign prostatic hyperplasia)    Mild HTN    Cardiac arrest    Anoxic brain injury    2019 novel coronavirus disease (COVID-19)                                              -----------------------------------------------------------  ALLERGIES:  Allergy Status Unknown                                            ------------------------------------------------------------    HOME MEDICATIONS  Home Medications:  amantadine 50 mg/5 mL oral syrup: 10 milliliter(s) orally 2 times a day (23 Oct 2022 08:35)  atorvastatin 10 mg oral tablet: 1 tab(s) by gastrostomy tube once a day (23 Oct 2022 08:35)  bumetanide 2 mg oral tablet: 1 tab(s) by gastrostomy tube once a day (23 Oct 2022 08:35)  chlorhexidine 0.5% topical liquid:  (23 Oct 2022 08:35)  collagenase 250 units/g topical ointment: 1 application topically 2 times a day (23 Oct 2022 08:35)  Eliquis 2.5 mg oral tablet: 1 tab(s) by gastrostomy tube 2 times a day (23 Oct 2022 08:35)  Epogen 20,000 units/mL injectable solution: 1 milliliter(s) injectable 3 times a week (23 Oct 2022 08:35)  ferrous sulfate 220 mg/5 mL (44 mg/5 mL elemental iron) oral elixir: 7.5 milliliter(s) by gastrostomy tube 3 times a day (23 Oct 2022 08:35)  folic acid 1 mg oral tablet: 1 tab(s) orally once a day (23 Oct 2022 08:35)  HumuLIN R 100 units/mL injectable solution:  (23 Oct 2022 08:35)  ipratropium-albuterol 0.5 mg-2.5 mg/3 mL inhalation solution: 3 milliliter(s) inhaled 4 times a day (23 Oct 2022 08:35)  levothyroxine 125 mcg (0.125 mg) oral tablet: 1 tab(s) by gastrostomy tube once a day (23 Oct 2022 08:35)  meropenem 1000 mg intravenous injection: 1 gram(s) intravenous 3 times a day (23 Oct 2022 08:35)  Metoprolol Tartrate 25 mg oral tablet: 0.5 tab(s) by gastrostomy tube 2 times a day (23 Oct 2022 08:35)  midodrine 5 mg oral tablet: 1 tab(s) orally 3 times a day, As Needed (23 Oct 2022 08:35)  omeprazole 20 mg oral delayed release tablet: 1 tab(s) by gastrostomy tube once a day (23 Oct 2022 08:35)  PHOS-NaK oral powder for reconstitution: 1 packet(s) orally 2 times a day (23 Oct 2022 08:35)  potassium chloride 40 mEq/15 mL oral liquid: 7.5 milliliter(s) by gastrostomy tube once a day (23 Oct 2022 08:35)  tamsulosin 0.4 mg oral capsule: 1 cap(s) orally once a day (23 Oct 2022 08:35)  Tylenol 325 mg oral tablet: 2 tab(s) orally every 6 hours, As Needed (23 Oct 2022 08:35)  Vitamin B-12 1000 mcg oral tablet: 1 tab(s) orally once a day (23 Oct 2022 08:35)  Vitamin C 500 mg oral tablet: 1 tab(s) orally once a day (23 Oct 2022 08:35)  zinc sulfate 220 mg oral tablet: 1 tab(s) by gastrostomy tube once a day (23 Oct 2022 08:35)                           MEDICATIONS:  STANDING MEDICATIONS  amiKACIN  IVPB 1250 milliGRAM(s) IV Intermittent <User Schedule>  ampicillin/sulbactam  IVPB 9 Gram(s) IV Intermittent every 8 hours  atorvastatin 10 milliGRAM(s) Oral at bedtime  chlorhexidine 0.12% Liquid 15 milliLiter(s) Oral Mucosa every 12 hours  chlorhexidine 2% Cloths 1 Application(s) Topical daily  collagenase Ointment 1 Application(s) Topical two times a day  Dakins Solution - 1/2 Strength 1 Application(s) Topical two times a day  dextrose 5%. 1000 milliLiter(s) IV Continuous <Continuous>  dextrose 5%. 1000 milliLiter(s) IV Continuous <Continuous>  dextrose 50% Injectable 25 Gram(s) IV Push once  dextrose 50% Injectable 12.5 Gram(s) IV Push once  dextrose 50% Injectable 25 Gram(s) IV Push once  epoetin carline-epbx (RETACRIT) Injectable 00934 Unit(s) SubCutaneous <User Schedule>  glucagon  Injectable 1 milliGRAM(s) IntraMuscular once  heparin   Injectable 5000 Unit(s) SubCutaneous every 12 hours  insulin lispro (ADMELOG) corrective regimen sliding scale   SubCutaneous three times a day before meals  levothyroxine 125 MICROGram(s) Oral daily  metoprolol tartrate 12.5 milliGRAM(s) Oral every 12 hours  midodrine. 10 milliGRAM(s) Oral every 8 hours  ofloxacin 0.3% Solution 1 Drop(s) Left EYE two times a day  pantoprazole   Suspension 40 milliGRAM(s) Oral every 12 hours  tamsulosin 0.4 milliGRAM(s) Oral at bedtime    PRN MEDICATIONS  acetaminophen     Tablet .. 650 milliGRAM(s) Oral every 6 hours PRN  albuterol/ipratropium for Nebulization 3 milliLiter(s) Nebulizer every 6 hours PRN  dextrose Oral Gel 15 Gram(s) Oral once PRN                                            ------------------------------------------------------------  VITAL SIGNS: Last 24 Hours  T(C): 35.7 (27 Oct 2022 17:14), Max: 36.6 (26 Oct 2022 21:51)  T(F): 96.2 (27 Oct 2022 17:14), Max: 97.8 (26 Oct 2022 21:51)  HR: 82 (27 Oct 2022 17:16) (78 - 86)  BP: 97/62 (27 Oct 2022 17:14) (91/63 - 120/76)  BP(mean): 75 (27 Oct 2022 17:14) (70 - 82)  RR: 20 (27 Oct 2022 11:16) (20 - 20)  SpO2: 99% (27 Oct 2022 17:16) (99% - 100%)      10-26-22 @ 07:01  -  10-27-22 @ 07:00  --------------------------------------------------------  IN: 1680 mL / OUT: 1750 mL / NET: -70 mL    10-27-22 @ 07:01  -  10-27-22 @ 18:42  --------------------------------------------------------  IN: 560 mL / OUT: 350 mL / NET: 210 mL                                             --------------------------------------------------------------  LABS:                        8.3    11.73 )-----------( 257      ( 27 Oct 2022 01:12 )             28.4     10-27    151<H>  |  105  |  59<H>  ----------------------------<  131<H>  5.1<H>   |  34<H>  |  0.8    Ca    8.2<L>      27 Oct 2022 01:12  Mg     2.6     10-27    TPro  5.6<L>  /  Alb  1.9<L>  /  TBili  0.4  /  DBili  x   /  AST  46<H>  /  ALT  9   /  AlkPhos  79  10-27          Troponin T, Serum: 0.30 ng/mL *HH* (10-27-22 @ 01:12)          CARDIAC MARKERS ( 27 Oct 2022 01:12 )  x     / 0.30 ng/mL / x     / x     / x                                                  -------------------------------------------------------------  RADIOLOGY:                                            --------------------------------------------------------------    PHYSICAL EXAM:  GENERAL: unresponsive - baseline  CHEST/LUNG: Clear   HEART: regular rate and rhythm;   ABDOMEN: Soft, , distended,  EXTREMITIES: bilateral upper & lower extremity edema                                         --------------------------------------------------------------

## 2022-10-27 NOTE — PROGRESS NOTE ADULT - SUBJECTIVE AND OBJECTIVE BOX
Over Night Events: events noted, vent dependant, ID/ burn/ palliative noted    PHYSICAL EXAM    ICU Vital Signs Last 24 Hrs  T(C): 35.7 (27 Oct 2022 04:55), Max: 36.6 (26 Oct 2022 08:00)  T(F): 96.2 (27 Oct 2022 04:55), Max: 97.9 (26 Oct 2022 12:00)  HR: 83 (27 Oct 2022 04:55) (79 - 85)  BP: 120/76 (27 Oct 2022 04:55) (80/54 - 120/76)  BP(mean): 63 (26 Oct 2022 17:24) (63 - 66)  RR: 20 (27 Oct 2022 04:55) (20 - 20)  SpO2: 100% (27 Oct 2022 04:55) (99% - 100%)    O2 Parameters below as of 26 Oct 2022 12:00  Patient On (Oxygen Delivery Method): ventilator            General: ill looking  HEENT: trach          Lungs: Bilateral rhonchi  Cardiovascular: YOANA 2.6  Abdomen: Soft, Positive BS  Sacral ulcer  non focal    10-26-22 @ 07:01  -  10-27-22 @ 07:00  --------------------------------------------------------  IN:    Free Water: 300 mL    Glucerna: 360 mL  Total IN: 660 mL    OUT:    Indwelling Catheter - Urethral (mL): 850 mL    Voided (mL): 900 mL  Total OUT: 1750 mL    Total NET: -1090 mL          LABS:                          8.3    11.73 )-----------( 257      ( 27 Oct 2022 01:12 )             28.4                                               10-27    151<H>  |  105  |  59<H>  ----------------------------<  131<H>  5.1<H>   |  34<H>  |  0.8    Ca    8.2<L>      27 Oct 2022 01:12  Mg     2.6     10-27    TPro  5.6<L>  /  Alb  1.9<L>  /  TBili  0.4  /  DBili  x   /  AST  46<H>  /  ALT  9   /  AlkPhos  79  10-27                                                 CARDIAC MARKERS ( 27 Oct 2022 01:12 )  x     / 0.30 ng/mL / x     / x     / x                                                LIVER FUNCTIONS - ( 27 Oct 2022 01:12 )  Alb: 1.9 g/dL / Pro: 5.6 g/dL / ALK PHOS: 79 U/L / ALT: 9 U/L / AST: 46 U/L / GGT: x                                                                                               Mode: AC/ CMV (Assist Control/ Continuous Mandatory Ventilation)  RR (machine): 20  TV (machine): 400  FiO2: 60  PEEP: 8  ITime: 1  MAP: 14  PIP: 25                                          MEDICATIONS  (STANDING):  amiKACIN  IVPB 1250 milliGRAM(s) IV Intermittent <User Schedule>  ampicillin/sulbactam  IVPB 9 Gram(s) IV Intermittent every 8 hours  atorvastatin 10 milliGRAM(s) Oral at bedtime  chlorhexidine 0.12% Liquid 15 milliLiter(s) Oral Mucosa every 12 hours  chlorhexidine 2% Cloths 1 Application(s) Topical daily  collagenase Ointment 1 Application(s) Topical two times a day  Dakins Solution - 1/2 Strength 1 Application(s) Topical two times a day  dextrose 5%. 1000 milliLiter(s) (100 mL/Hr) IV Continuous <Continuous>  dextrose 5%. 1000 milliLiter(s) (50 mL/Hr) IV Continuous <Continuous>  dextrose 50% Injectable 25 Gram(s) IV Push once  dextrose 50% Injectable 12.5 Gram(s) IV Push once  dextrose 50% Injectable 25 Gram(s) IV Push once  epoetin carline-epbx (RETACRIT) Injectable 97039 Unit(s) SubCutaneous <User Schedule>  glucagon  Injectable 1 milliGRAM(s) IntraMuscular once  insulin lispro (ADMELOG) corrective regimen sliding scale   SubCutaneous three times a day before meals  levothyroxine 125 MICROGram(s) Oral daily  metoprolol tartrate 12.5 milliGRAM(s) Oral every 12 hours  midodrine. 10 milliGRAM(s) Oral every 8 hours  ofloxacin 0.3% Solution 1 Drop(s) Left EYE two times a day  pantoprazole   Suspension 40 milliGRAM(s) Oral every 12 hours  tamsulosin 0.4 milliGRAM(s) Oral at bedtime    MEDICATIONS  (PRN):  acetaminophen     Tablet .. 650 milliGRAM(s) Oral every 6 hours PRN Temp greater or equal to 38C (100.4F), Mild Pain (1 - 3)  albuterol/ipratropium for Nebulization 3 milliLiter(s) Nebulizer every 6 hours PRN Shortness of Breath and/or Wheezing  dextrose Oral Gel 15 Gram(s) Oral once PRN Blood Glucose LESS THAN 70 milliGRAM(s)/deciliter      Xrays:                                                                                     ECHO

## 2022-10-27 NOTE — PROGRESS NOTE ADULT - ASSESSMENT
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 At baseline, patient is unresponsive, makes random non-purposeful movement, had trach(Vent dependent) and PEG.    #Sepsis at nursing home  #Suspected Pyelonephritis  #Suspected Ventilator associated gram negative PNA  #Functional quadriplegia  #H/o Severe Covid-19 PNA  #H/o Gram -ve PNA  -As per SNF documents, patient was being treated for sepsis of unknown origin with Meropenem 1g q8 started on 10/21 for 10 days  -UA grossly positive with WBC 13K  -CXR shows worsening b/l opacities, due to previous COVID PNA vs CHF vs VAP    #Decubitus ulcer & Heel ulcer  -H/o multiple pressure ulcers  -ID: switched from Meropenem to Unasyn & amikacin on 10/25  -Burn: might consider debridement for decubitus ulcer  -Podiatry: debridement of heel ulcer on 10/28  -cardio clearance for debridement needed as per podiatry     #Hypernatremia  - free water per PEG tube     #Anemia- likely 2/2 chronic ds  -Hold Eliquis, on prophylactic heparin  -took 2 PRBCs  -repeat duplex Upper extremity (as per daughter, patient has DVT in left U.E since September, started on eliquis since then)    #CHFpEF exacerbation   -BNP:53635  -B/L pitting edema  -consider continuing Bumex tomorrow as BP more stable  -midodrine 10mg Q8hrs    #NSTEMI type 2  -Trops elevated but lower from previous admission  -Trend trops till peak    #Left eye conjunctivitis  -ofloxacin eye drops    #Anoxic brain injury  #H/o Cardiac arrest  -Unresponsive, very poor prognosis  -DNR only for now     #Misc  -DVT prophylaxis:  heparin BID subq  -GI prophylaxis: Protonix BID  -Diet: NPO with tube feed  -Code status: DNR  -Activity: Bed bound  -Dispo: transfer to vent unit

## 2022-10-27 NOTE — CHART NOTE - NSCHARTNOTEFT_GEN_A_CORE
As goals are clear and family wishes for ongoing medical management, palliative care will sign off.    Please call back [phone] PRn As goals are clear and family wishes for ongoing medical management, palliative care will sign off.    Discussed with Dr. Bermudez  Please call back [phone] PRn

## 2022-10-27 NOTE — CONSULT NOTE ADULT - SUBJECTIVE AND OBJECTIVE BOX
Podiatry Consult Note    Subjective:  CHADWICK JACKSON  Seen Bedside 82y Male  .   Patient is a 82y old  Male who presents with a chief complaint of sepsis (27 Oct 2022 07:27)    HPI:  83 YO M w/Pmhx of Cardiac arrest leading to anoxic brain injury(s/p Trach and PEG), H/o Severe Covid PNA, H/o Gram -ve PNA, HTN and BPH presented for abnormal labs in the nursing home. At baseline, patient is unresponsive, makes random non-purposeful movement, had trach(Vent dependent) and PEG.    On 10/22, labs in the SNF showed anemia and uremia, also patient was tachycardic to 101 and tachypneic to 22, was sent to ED for evaluation.    In the MOLST form from the SNF, patient is DNR/DNI and CMO, but when I called the daughter Judy Jackson, she wanted patient to be DNR, but also wanted all other intervention to be done for now.    In the ED,  patient had WBC 13K, Hb 7, Na 152, K 5.3, Bicarb 43, BUN 94, Trop 0.38, ABG showed metabolic alkalosis, UA was grossly positive(with pus in the urine), CXR showed increased B/L opacity.    Vital Signs Last 24 Hrs:  T(F): 98.7 (23 Oct 2022 00:05), Max: 98.7 (23 Oct 2022 00:05)  HR: 96 (23 Oct 2022 02:25) (96 - 100)  BP: 110/72 (23 Oct 2022 00:05) (110/72 - 110/72)  RR: 24 (23 Oct 2022 00:05) (24 - 24)  SpO2: 100% (23 Oct 2022 02:25) (100% - 100%) on Vent     (23 Oct 2022 08:00)      Past Medical History and Surgical History  PAST MEDICAL & SURGICAL HISTORY:  BPH (benign prostatic hyperplasia)      Mild HTN      Cardiac arrest      Anoxic brain injury      2019 novel coronavirus disease (COVID-19)           Review of Systems:  [X] Ten point review of systems is otherwise negative except as noted     Objective:  Vital Signs Last 24 Hrs  T(C): 36.3 (27 Oct 2022 11:16), Max: 36.6 (26 Oct 2022 21:51)  T(F): 97.4 (27 Oct 2022 11:16), Max: 97.8 (26 Oct 2022 21:51)  HR: 86 (27 Oct 2022 11:16) (81 - 86)  BP: 102/74 (27 Oct 2022 11:16) (85/51 - 120/76)  BP(mean): 82 (27 Oct 2022 11:16) (63 - 82)  RR: 20 (27 Oct 2022 11:16) (20 - 20)  SpO2: 100% (27 Oct 2022 11:16) (99% - 100%)    Parameters below as of 27 Oct 2022 11:16  Patient On (Oxygen Delivery Method): ventilator                            8.3    11.73 )-----------( 257      ( 27 Oct 2022 01:12 )             28.4                 10-27    151<H>  |  105  |  59<H>  ----------------------------<  131<H>  5.1<H>   |  34<H>  |  0.8    Ca    8.2<L>      27 Oct 2022 01:12  Mg     2.6     10-27    TPro  5.6<L>  /  Alb  1.9<L>  /  TBili  0.4  /  DBili  x   /  AST  46<H>  /  ALT  9   /  AlkPhos  79  10-27        Physical Exam - Lower Extremity Focused:   Derm: Right heel plantar ulcer w/fibrogranular base with serosanguinous drainage and extensive malodor; overlying eschar and necrotic tissue  Left heel pressure ulcer with underlying hematoma; no local signs of infection  Vascular: DP and PT Pulses Diminished; Foot is Warm to Warm to the touch;   Neuro: Protective Sensation Diminished / Moderately Neuropathic   MSK: Pain On Palpation at Wound Site       Assessment:  Right Heel plantar ulcer   Left heel decubitus ulcer     Plan:  Chart reviewed and Patient evaluated. All Questions and Concerns Addressed and Answered  XR Imaging Right  Foot; Pending Results  Local Wound Care; Wound Flushed w/ NS; Wound Packed w/ Betadine Soaked Gauze / DSD / Kerlix   Weight Bearing Status; WBAT   Request; Lower Extremity Arterial Duplex B/L;  Request: Cardio clearance prior to OR  Surgical debridement Right foot scheduled for Friday 10/28 afternoon   Please optimize lab values prior to OR  Please make pt NPO MN 10/27  Discussed Plan w/ Dr. Boateng     Podiatry

## 2022-10-28 NOTE — PROGRESS NOTE ADULT - SUBJECTIVE AND OBJECTIVE BOX
Over Night Events: events noted, vent dependant, ID, podiatry reviwwed  PHYSICAL EXAM    ICU Vital Signs Last 24 Hrs  T(C): 35.9 (28 Oct 2022 04:00), Max: 36.3 (27 Oct 2022 11:16)  T(F): 96.7 (28 Oct 2022 04:00), Max: 97.4 (27 Oct 2022 11:16)  HR: 77 (28 Oct 2022 04:00) (77 - 86)  BP: 97/60 (28 Oct 2022 04:00) (93/63 - 102/74)  BP(mean): 74 (28 Oct 2022 04:00) (74 - 82)  RR: 20 (28 Oct 2022 04:00) (20 - 20)  SpO2: 100% (28 Oct 2022 04:00) (99% - 100%)    O2 Parameters below as of 28 Oct 2022 04:00  Patient On (Oxygen Delivery Method): ventilator    O2 Concentration (%): 60        General: ill looking  HEENT: trach             Lungs: dec bs both bases  Cardiovascular: Regular   Abdomen: Soft, Positive BS  foul smelling sacral ulcer  le wound      10-27-22 @ 07:01  -  10-28-22 @ 07:00  --------------------------------------------------------  IN:    Free Water: 600 mL    Glucerna: 1080 mL    IV PiggyBack: 1000 mL  Total IN: 2680 mL    OUT:    Indwelling Catheter - Urethral (mL): 1300 mL  Total OUT: 1300 mL    Total NET: 1380 mL          LABS:                          9.8    12.86 )-----------( 183      ( 28 Oct 2022 00:34 )             34.0                                               10-28    155<H>  |  107  |  52<H>  ----------------------------<  146<H>  3.7   |  36<H>  |  0.7    Ca    7.8<L>      28 Oct 2022 00:34  Mg     2.4     10-28    TPro  5.6<L>  /  Alb  1.9<L>  /  TBili  0.4  /  DBili  x   /  AST  46<H>  /  ALT  9   /  AlkPhos  79  10-27      PT/INR - ( 28 Oct 2022 00:34 )   PT: 14.00 sec;   INR: 1.22 ratio         PTT - ( 28 Oct 2022 00:34 )  PTT:30.7 sec                                           CARDIAC MARKERS ( 28 Oct 2022 00:34 )  x     / 0.26 ng/mL / x     / x     / x      CARDIAC MARKERS ( 27 Oct 2022 01:12 )  x     / 0.30 ng/mL / x     / x     / x                                                LIVER FUNCTIONS - ( 27 Oct 2022 01:12 )  Alb: 1.9 g/dL / Pro: 5.6 g/dL / ALK PHOS: 79 U/L / ALT: 9 U/L / AST: 46 U/L / GGT: x                                                                                               Mode: AC/ CMV (Assist Control/ Continuous Mandatory Ventilation)  RR (machine): 20  TV (machine): 400  FiO2: 60  PEEP: 8  ITime: 1  MAP: 16  PIP: 34                                          MEDICATIONS  (STANDING):  amiKACIN  IVPB 1250 milliGRAM(s) IV Intermittent <User Schedule>  ampicillin/sulbactam  IVPB 9 Gram(s) IV Intermittent every 8 hours  atorvastatin 10 milliGRAM(s) Oral at bedtime  chlorhexidine 0.12% Liquid 15 milliLiter(s) Oral Mucosa every 12 hours  chlorhexidine 2% Cloths 1 Application(s) Topical daily  collagenase Ointment 1 Application(s) Topical two times a day  Dakins Solution - 1/2 Strength 1 Application(s) Topical two times a day  dextrose 5%. 1000 milliLiter(s) (50 mL/Hr) IV Continuous <Continuous>  dextrose 5%. 1000 milliLiter(s) (100 mL/Hr) IV Continuous <Continuous>  dextrose 50% Injectable 25 Gram(s) IV Push once  dextrose 50% Injectable 12.5 Gram(s) IV Push once  dextrose 50% Injectable 25 Gram(s) IV Push once  epoetin carline-epbx (RETACRIT) Injectable 52055 Unit(s) SubCutaneous <User Schedule>  glucagon  Injectable 1 milliGRAM(s) IntraMuscular once  heparin   Injectable 5000 Unit(s) SubCutaneous every 12 hours  insulin lispro (ADMELOG) corrective regimen sliding scale   SubCutaneous three times a day before meals  levothyroxine 125 MICROGram(s) Oral daily  metoprolol tartrate 12.5 milliGRAM(s) Oral every 12 hours  midodrine. 10 milliGRAM(s) Oral every 8 hours  ofloxacin 0.3% Solution 1 Drop(s) Left EYE two times a day  pantoprazole   Suspension 40 milliGRAM(s) Oral every 12 hours  tamsulosin 0.4 milliGRAM(s) Oral at bedtime    MEDICATIONS  (PRN):  acetaminophen     Tablet .. 650 milliGRAM(s) Oral every 6 hours PRN Temp greater or equal to 38C (100.4F), Mild Pain (1 - 3)  albuterol/ipratropium for Nebulization 3 milliLiter(s) Nebulizer every 6 hours PRN Shortness of Breath and/or Wheezing  dextrose Oral Gel 15 Gram(s) Oral once PRN Blood Glucose LESS THAN 70 milliGRAM(s)/deciliter

## 2022-10-28 NOTE — PROGRESS NOTE ADULT - ASSESSMENT
IMPRESSION:    sepsis present on admission  UTI/ acineto  Anemia  hypernatremia  chronic hypoxic respiratory failure SP Trach  Anoxic brain injury  hx Cardiac Arrest  hx Severe COVID PNA  sacral ulcer      SUGGEST:    CNS:  as needed morphine for comfort    HEENT: Oral care.      PULMONARY: aspiration precaution, dec FIO2 TO 40%, Keep Sao2 92 to 96%, not weanable      CARDIOVASCULAR:   Avoid overload.  Free water, midodrine 10 q 8    GI: PPI q 12, feeding    RENAL:  Follow up lytes.  Correct as needed.  Monitor UO.  Becker care.  free H2O    INFECTIOUS DISEASE:    abx per ID    HEMATOLOGICAL: DVT prophylaxis,  transfuse keep hb more than 7    ENDOCRINE:  Follow up FS.  Insulin protocol if needed    MUSCULOSKELETAL: burn for debridement    DNR  Very poor overall prognosis    vent unit

## 2022-10-28 NOTE — PROGRESS NOTE ADULT - ASSESSMENT
· Assessment	  83 YO M w/Pmhx of Cardiac arrest leading to anoxic brain injury(s/p Trach and PEG), H/o Severe Covid PNA, H/o Gram -ve PNA, HTN and BPH presented for abnormal labs in the nursing home. At baseline, patient is unresponsive, makes random non-purposeful movement, had trach(Vent dependent) and PEG.    #Sepsis at nursing home  #Suspected Pyelonephritis  #Suspected Ventilator associated gram negative PNA  #Functional quadriplegia  #H/o Severe Covid-19 PNA  #H/o Gram -ve PNA  -As per SNF documents, patient was being treated for sepsis of unknown origin with Meropenem 1g q8 started on 10/21 for 10 days  -UA grossly positive with WBC 13K  -CXR shows worsening b/l opacities, due to previous COVID PNA vs CHF vs VAP    #Decubitus ulcer & Heel ulcer  -H/o multiple pressure ulcers  -ID: switched from Meropenem to Unasyn & amikacin on 10/25  -Burn: might consider debridement for decubitus ulcer, will follow up next week  -Podiatry: debridement of heel ulcer on 11/3    #Hypernatremia  - free water per PEG tube   - D5W, repeat Na at 4pm. Decide on D5W afterwards    #Anemia- likely 2/2 chronic ds  -Hold Eliquis, on prophylactic heparin  -took 2 PRBCs  -repeat duplex Upper extremity (as per daughter, patient has DVT in left U.E since September, started on eliquis since then)    #CHFpEF exacerbation   -BNP:70911  -B/L pitting edema  -consider continuing Bumex tomorrow if patient was not hypernatremic   -midodrine 10mg Q8hrs    #NSTEMI type 2  -Trops elevated but lower from previous admission  -troponin peaked & started trending down    #Left eye conjunctivitis  -ofloxacin eye drops    #Anoxic brain injury  #H/o Cardiac arrest  -Unresponsive, very poor prognosis  -DNR only for now     #Misc  -DVT prophylaxis:  heparin BID subq  -GI prophylaxis: Protonix BID  -Diet: NPO with tube feed  -Code status: DNR  -Activity: Bed bound  -Dispo: transfer to vent unit

## 2022-10-28 NOTE — CONSULT NOTE ADULT - SUBJECTIVE AND OBJECTIVE BOX
Outpt cardiologist: unable to obtain     HPI:  83 YO M w/Pmhx of Cardiac arrest leading to anoxic brain injury(s/p Trach and PEG), H/o Severe Covid PNA, H/o Gram -ve PNA, HTN and BPH presented for abnormal labs in the nursing home. At baseline, patient is unresponsive, makes random non-purposeful movement, had trach(Vent dependent) and PEG.    On 10/22, labs in the SNF showed anemia and uremia, also patient was tachycardic to 101 and tachypneic to 22, was sent to ED for evaluation.    In the MOLST form from the SNF, patient is DNR/DNI and CMO, but when I called the daughter Judy Jackson, she wanted patient to be DNR, but also wanted all other intervention to be done for now.    In the ED,  patient had WBC 13K, Hb 7, Na 152, K 5.3, Bicarb 43, BUN 94, Trop 0.38, ABG showed metabolic alkalosis, UA was grossly positive(with pus in the urine), CXR showed increased B/L opacity.    Vital Signs Last 24 Hrs:  T(F): 98.7 (23 Oct 2022 00:05), Max: 98.7 (23 Oct 2022 00:05)  HR: 96 (23 Oct 2022 02:25) (96 - 100)  BP: 110/72 (23 Oct 2022 00:05) (110/72 - 110/72)  RR: 24 (23 Oct 2022 00:05) (24 - 24)  SpO2: 100% (23 Oct 2022 02:25) (100% - 100%) on Vent     (23 Oct 2022 08:00)      PAST MEDICAL & SURGICAL HISTORY  BPH (benign prostatic hyperplasia)    Mild HTN    Cardiac arrest    Anoxic brain injury    2019 novel coronavirus disease (COVID-19)        FAMILY HISTORY:  FAMILY HISTORY:      SOCIAL HISTORY:  Social History:      ALLERGIES:  Allergy Status Unknown      MEDICATIONS:  amiKACIN  IVPB 1250 milliGRAM(s) IV Intermittent <User Schedule>  ampicillin/sulbactam  IVPB 9 Gram(s) IV Intermittent every 8 hours  atorvastatin 10 milliGRAM(s) Oral at bedtime  chlorhexidine 0.12% Liquid 15 milliLiter(s) Oral Mucosa every 12 hours  chlorhexidine 2% Cloths 1 Application(s) Topical daily  collagenase Ointment 1 Application(s) Topical two times a day  Dakins Solution - 1/2 Strength 1 Application(s) Topical two times a day  dextrose 5%. 1000 milliLiter(s) (100 mL/Hr) IV Continuous <Continuous>  dextrose 5%. 1000 milliLiter(s) (50 mL/Hr) IV Continuous <Continuous>  dextrose 50% Injectable 25 Gram(s) IV Push once  dextrose 50% Injectable 12.5 Gram(s) IV Push once  dextrose 50% Injectable 25 Gram(s) IV Push once  epoetin carline-epbx (RETACRIT) Injectable 13832 Unit(s) SubCutaneous <User Schedule>  glucagon  Injectable 1 milliGRAM(s) IntraMuscular once  heparin   Injectable 5000 Unit(s) SubCutaneous every 12 hours  insulin lispro (ADMELOG) corrective regimen sliding scale   SubCutaneous three times a day before meals  levothyroxine 125 MICROGram(s) Oral daily  metoprolol tartrate 12.5 milliGRAM(s) Oral every 12 hours  midodrine. 10 milliGRAM(s) Oral every 8 hours  ofloxacin 0.3% Solution 1 Drop(s) Left EYE two times a day  pantoprazole   Suspension 40 milliGRAM(s) Oral every 12 hours  tamsulosin 0.4 milliGRAM(s) Oral at bedtime    PRN:  acetaminophen     Tablet .. 650 milliGRAM(s) Oral every 6 hours PRN  albuterol/ipratropium for Nebulization 3 milliLiter(s) Nebulizer every 6 hours PRN  dextrose Oral Gel 15 Gram(s) Oral once PRN      HOME MEDICATIONS:  Home Medications:  amantadine 50 mg/5 mL oral syrup: 10 milliliter(s) orally 2 times a day (23 Oct 2022 08:35)  atorvastatin 10 mg oral tablet: 1 tab(s) by gastrostomy tube once a day (23 Oct 2022 08:35)  bumetanide 2 mg oral tablet: 1 tab(s) by gastrostomy tube once a day (23 Oct 2022 08:35)  chlorhexidine 0.5% topical liquid:  (23 Oct 2022 08:35)  collagenase 250 units/g topical ointment: 1 application topically 2 times a day (23 Oct 2022 08:35)  Eliquis 2.5 mg oral tablet: 1 tab(s) by gastrostomy tube 2 times a day (23 Oct 2022 08:35)  Epogen 20,000 units/mL injectable solution: 1 milliliter(s) injectable 3 times a week (23 Oct 2022 08:35)  ferrous sulfate 220 mg/5 mL (44 mg/5 mL elemental iron) oral elixir: 7.5 milliliter(s) by gastrostomy tube 3 times a day (23 Oct 2022 08:35)  folic acid 1 mg oral tablet: 1 tab(s) orally once a day (23 Oct 2022 08:35)  HumuLIN R 100 units/mL injectable solution:  (23 Oct 2022 08:35)  ipratropium-albuterol 0.5 mg-2.5 mg/3 mL inhalation solution: 3 milliliter(s) inhaled 4 times a day (23 Oct 2022 08:35)  levothyroxine 125 mcg (0.125 mg) oral tablet: 1 tab(s) by gastrostomy tube once a day (23 Oct 2022 08:35)  meropenem 1000 mg intravenous injection: 1 gram(s) intravenous 3 times a day (23 Oct 2022 08:35)  Metoprolol Tartrate 25 mg oral tablet: 0.5 tab(s) by gastrostomy tube 2 times a day (23 Oct 2022 08:35)  midodrine 5 mg oral tablet: 1 tab(s) orally 3 times a day, As Needed (23 Oct 2022 08:35)  omeprazole 20 mg oral delayed release tablet: 1 tab(s) by gastrostomy tube once a day (23 Oct 2022 08:35)  PHOS-NaK oral powder for reconstitution: 1 packet(s) orally 2 times a day (23 Oct 2022 08:35)  potassium chloride 40 mEq/15 mL oral liquid: 7.5 milliliter(s) by gastrostomy tube once a day (23 Oct 2022 08:35)  tamsulosin 0.4 mg oral capsule: 1 cap(s) orally once a day (23 Oct 2022 08:35)  Tylenol 325 mg oral tablet: 2 tab(s) orally every 6 hours, As Needed (23 Oct 2022 08:35)  Vitamin B-12 1000 mcg oral tablet: 1 tab(s) orally once a day (23 Oct 2022 08:35)  Vitamin C 500 mg oral tablet: 1 tab(s) orally once a day (23 Oct 2022 08:35)  zinc sulfate 220 mg oral tablet: 1 tab(s) by gastrostomy tube once a day (23 Oct 2022 08:35)      VITALS:   T(F): 96.5 (10-28 @ 00:07), Max: 98.2 (10-26 @ 03:00)  HR: 78 (10-28 @ 00:07) (62 - 94)  BP: 99/67 (10-28 @ 00:07) (80/54 - 120/76)  BP(mean): 74 (10-27 @ 20:00) (63 - 82)  RR: 20 (10-28 @ 00:07) (18 - 20)  SpO2: 100% (10-28 @ 00:07) (97% - 100%)    I&O's Summary    26 Oct 2022 07:01  -  27 Oct 2022 07:00  --------------------------------------------------------  IN: 1680 mL / OUT: 1750 mL / NET: -70 mL    27 Oct 2022 07:01  -  28 Oct 2022 00:44  --------------------------------------------------------  IN: 1120 mL / OUT: 700 mL / NET: 420 mL        REVIEW OF SYSTEMS:  Unable to assess    PHYSICAL EXAM:  General: trach and peg  HEENT: EOMI  Cardio: regular, S1, S2, no murmur  Pulm: dec bibasilar breath sounds   Abdomen: Soft, non-tender, non-distended. Normoactive bowel sounds  Extremities: No edema b/l le  Neuro: unable to assess    LABS:                        8.3    11.73 )-----------( 257      ( 27 Oct 2022 01:12 )             28.4     10-27    151<H>  |  105  |  59<H>  ----------------------------<  131<H>  5.1<H>   |  34<H>  |  0.8    Ca    8.2<L>      27 Oct 2022 01:12  Mg     2.6     10-27    TPro  5.6<L>  /  Alb  1.9<L>  /  TBili  0.4  /  DBili  x   /  AST  46<H>  /  ALT  9   /  AlkPhos  79  10-27      Troponin T, Serum: 0.30 ng/mL *HH* (10-27-22 @ 01:12)    CARDIAC MARKERS ( 27 Oct 2022 01:12 )  x     / 0.30 ng/mL / x     / x     / x            Troponin trend:      10-24 Chol 80 LDL -- HDL 25<L> Trig 146  COVID-19 PCR: NotDetec (23 Oct 2022 05:45)      RADIOLOGY:  - CXR: Increased bilateral opacities Outpt cardiologist: unable to obtain     HPI:  81 YO M w/Pmhx of Cardiac arrest leading to anoxic brain injury(s/p Trach and PEG), H/o Severe Covid PNA, H/o Gram -ve PNA, HTN and BPH presented for abnormal labs in the nursing home. At baseline, patient is unresponsive, makes random non-purposeful movement, had trach(Vent dependent) and PEG.    On 10/22, labs in the SNF showed anemia and uremia, also patient was tachycardic to 101 and tachypneic to 22, was sent to ED for evaluation.    In the MOLST form from the SNF, patient is DNR/DNI and CMO, but when I called the daughter Judy Jackson, she wanted patient to be DNR, but also wanted all other intervention to be done for now.    In the ED,  patient had WBC 13K, Hb 7, Na 152, K 5.3, Bicarb 43, BUN 94, Trop 0.38, ABG showed metabolic alkalosis, UA was grossly positive(with pus in the urine), CXR showed increased B/L opacity.    Vital Signs Last 24 Hrs:  T(F): 98.7 (23 Oct 2022 00:05), Max: 98.7 (23 Oct 2022 00:05)  HR: 96 (23 Oct 2022 02:25) (96 - 100)  BP: 110/72 (23 Oct 2022 00:05) (110/72 - 110/72)  RR: 24 (23 Oct 2022 00:05) (24 - 24)  SpO2: 100% (23 Oct 2022 02:25) (100% - 100%) on Vent     (23 Oct 2022 08:00)      PAST MEDICAL & SURGICAL HISTORY  BPH (benign prostatic hyperplasia)    Mild HTN    Cardiac arrest    Anoxic brain injury    2019 novel coronavirus disease (COVID-19)        FAMILY HISTORY:  FAMILY HISTORY:      SOCIAL HISTORY:  Social History:      ALLERGIES:  Allergy Status Unknown      MEDICATIONS:  amiKACIN  IVPB 1250 milliGRAM(s) IV Intermittent <User Schedule>  ampicillin/sulbactam  IVPB 9 Gram(s) IV Intermittent every 8 hours  atorvastatin 10 milliGRAM(s) Oral at bedtime  chlorhexidine 0.12% Liquid 15 milliLiter(s) Oral Mucosa every 12 hours  chlorhexidine 2% Cloths 1 Application(s) Topical daily  collagenase Ointment 1 Application(s) Topical two times a day  Dakins Solution - 1/2 Strength 1 Application(s) Topical two times a day  dextrose 5%. 1000 milliLiter(s) (100 mL/Hr) IV Continuous <Continuous>  dextrose 5%. 1000 milliLiter(s) (50 mL/Hr) IV Continuous <Continuous>  dextrose 50% Injectable 25 Gram(s) IV Push once  dextrose 50% Injectable 12.5 Gram(s) IV Push once  dextrose 50% Injectable 25 Gram(s) IV Push once  epoetin carline-epbx (RETACRIT) Injectable 85075 Unit(s) SubCutaneous <User Schedule>  glucagon  Injectable 1 milliGRAM(s) IntraMuscular once  heparin   Injectable 5000 Unit(s) SubCutaneous every 12 hours  insulin lispro (ADMELOG) corrective regimen sliding scale   SubCutaneous three times a day before meals  levothyroxine 125 MICROGram(s) Oral daily  metoprolol tartrate 12.5 milliGRAM(s) Oral every 12 hours  midodrine. 10 milliGRAM(s) Oral every 8 hours  ofloxacin 0.3% Solution 1 Drop(s) Left EYE two times a day  pantoprazole   Suspension 40 milliGRAM(s) Oral every 12 hours  tamsulosin 0.4 milliGRAM(s) Oral at bedtime    PRN:  acetaminophen     Tablet .. 650 milliGRAM(s) Oral every 6 hours PRN  albuterol/ipratropium for Nebulization 3 milliLiter(s) Nebulizer every 6 hours PRN  dextrose Oral Gel 15 Gram(s) Oral once PRN      HOME MEDICATIONS:  Home Medications:  amantadine 50 mg/5 mL oral syrup: 10 milliliter(s) orally 2 times a day (23 Oct 2022 08:35)  atorvastatin 10 mg oral tablet: 1 tab(s) by gastrostomy tube once a day (23 Oct 2022 08:35)  bumetanide 2 mg oral tablet: 1 tab(s) by gastrostomy tube once a day (23 Oct 2022 08:35)  chlorhexidine 0.5% topical liquid:  (23 Oct 2022 08:35)  collagenase 250 units/g topical ointment: 1 application topically 2 times a day (23 Oct 2022 08:35)  Eliquis 2.5 mg oral tablet: 1 tab(s) by gastrostomy tube 2 times a day (23 Oct 2022 08:35)  Epogen 20,000 units/mL injectable solution: 1 milliliter(s) injectable 3 times a week (23 Oct 2022 08:35)  ferrous sulfate 220 mg/5 mL (44 mg/5 mL elemental iron) oral elixir: 7.5 milliliter(s) by gastrostomy tube 3 times a day (23 Oct 2022 08:35)  folic acid 1 mg oral tablet: 1 tab(s) orally once a day (23 Oct 2022 08:35)  HumuLIN R 100 units/mL injectable solution:  (23 Oct 2022 08:35)  ipratropium-albuterol 0.5 mg-2.5 mg/3 mL inhalation solution: 3 milliliter(s) inhaled 4 times a day (23 Oct 2022 08:35)  levothyroxine 125 mcg (0.125 mg) oral tablet: 1 tab(s) by gastrostomy tube once a day (23 Oct 2022 08:35)  meropenem 1000 mg intravenous injection: 1 gram(s) intravenous 3 times a day (23 Oct 2022 08:35)  Metoprolol Tartrate 25 mg oral tablet: 0.5 tab(s) by gastrostomy tube 2 times a day (23 Oct 2022 08:35)  midodrine 5 mg oral tablet: 1 tab(s) orally 3 times a day, As Needed (23 Oct 2022 08:35)  omeprazole 20 mg oral delayed release tablet: 1 tab(s) by gastrostomy tube once a day (23 Oct 2022 08:35)  PHOS-NaK oral powder for reconstitution: 1 packet(s) orally 2 times a day (23 Oct 2022 08:35)  potassium chloride 40 mEq/15 mL oral liquid: 7.5 milliliter(s) by gastrostomy tube once a day (23 Oct 2022 08:35)  tamsulosin 0.4 mg oral capsule: 1 cap(s) orally once a day (23 Oct 2022 08:35)  Tylenol 325 mg oral tablet: 2 tab(s) orally every 6 hours, As Needed (23 Oct 2022 08:35)  Vitamin B-12 1000 mcg oral tablet: 1 tab(s) orally once a day (23 Oct 2022 08:35)  Vitamin C 500 mg oral tablet: 1 tab(s) orally once a day (23 Oct 2022 08:35)  zinc sulfate 220 mg oral tablet: 1 tab(s) by gastrostomy tube once a day (23 Oct 2022 08:35)      VITALS:   T(F): 96.5 (10-28 @ 00:07), Max: 98.2 (10-26 @ 03:00)  HR: 78 (10-28 @ 00:07) (62 - 94)  BP: 99/67 (10-28 @ 00:07) (80/54 - 120/76)  BP(mean): 74 (10-27 @ 20:00) (63 - 82)  RR: 20 (10-28 @ 00:07) (18 - 20)  SpO2: 100% (10-28 @ 00:07) (97% - 100%)    I&O's Summary    26 Oct 2022 07:01  -  27 Oct 2022 07:00  --------------------------------------------------------  IN: 1680 mL / OUT: 1750 mL / NET: -70 mL    27 Oct 2022 07:01  -  28 Oct 2022 00:44  --------------------------------------------------------  IN: 1120 mL / OUT: 700 mL / NET: 420 mL        REVIEW OF SYSTEMS:  Unable to assess    PHYSICAL EXAM:  General: trach and peg  HEENT: EOMI  Cardio: regular, S1, S2, no murmur  Pulm: dec bibasilar breath sounds   Abdomen: Soft, non-tender, non-distended. Normoactive bowel sounds; (+) cardoso with cloudy urine   Extremities: No edema b/l le  Neuro: unable to assess, response to pain only    LABS:                        8.3    11.73 )-----------( 257      ( 27 Oct 2022 01:12 )             28.4     10-27    151<H>  |  105  |  59<H>  ----------------------------<  131<H>  5.1<H>   |  34<H>  |  0.8    Ca    8.2<L>      27 Oct 2022 01:12  Mg     2.6     10-27    TPro  5.6<L>  /  Alb  1.9<L>  /  TBili  0.4  /  DBili  x   /  AST  46<H>  /  ALT  9   /  AlkPhos  79  10-27      Troponin T, Serum: 0.30 ng/mL *HH* (10-27-22 @ 01:12)    CARDIAC MARKERS ( 27 Oct 2022 01:12 )  x     / 0.30 ng/mL / x     / x     / x            Troponin trend:      10-24 Chol 80 LDL -- HDL 25<L> Trig 146  COVID-19 PCR: NotDetec (23 Oct 2022 05:45)      RADIOLOGY:  - CXR: Increased bilateral opacities

## 2022-10-28 NOTE — CONSULT NOTE ADULT - ASSESSMENT
82 year old male with a hx of Cardiac arrest leading to anoxic brain injury (s/p Trach and PEG), H/o Severe Covid PNA, HTN, BPH presented for abnormal labs in the nursing home. At baseline, patient is unresponsive, makes random non-purposeful movement.    Cardiology consulted for pre-op risk stratification for heel ulcer debridement     # Acute decompensated HFpEF    # Hx of Cardiac arrest with anoxic brain injury   # NSTEMI (type 2)  # Sepsis secondary to Pyelonephritis vs VAP  # Functional quadriplegia  # H/o Severe Covid-19 PNA  # Infected sacral and heel decubitus ulcer  - trop: 0.45 --> 0.43 --> 0.41 --> 0.30  - BNP: 17131  - CXR: Increased bilateral opacities  - check echo   - c/w Unasyn & amikacin  - c/w metoprolol 12.5q12 (hold if HR <60 or SBP <90)  - start IV lasix 20mg q12   - METS <4  - RCRI: 4  - optimize volume status   - given above pt at mod to high risk for MACE for low risk procedure   - DNR   82 year old male with a hx of Cardiac arrest leading to anoxic brain injury (s/p Trach and PEG), H/o Severe Covid PNA, HTN, BPH presented for abnormal labs in the nursing home. At baseline, patient is unresponsive, makes random non-purposeful movement.    Cardiology consulted for pre-op risk stratification for heel ulcer debridement     # Acute decompensated HFpEF    # Hx of Cardiac arrest with anoxic brain injury   # NSTEMI (type 2)  # Sepsis secondary to Pyelonephritis vs VAP  # Functional quadriplegia  # H/o Severe Covid-19 PNA  # Infected sacral and heel decubitus ulcer  - 97/60; HR: 92  - trop: 0.45 --> 0.43 --> 0.41 --> 0.30  - BNP: 83275  - CXR: Increased bilateral opacities  - check echo   - c/w Unasyn & amikacin  - c/w metoprolol 12.5q12 (hold if HR <60 or SBP <90)  - start IV lasix 20mg q12   - METS <4  - RCRI: 4  - optimize volume status   - given above pt at mod to high risk for MACE for low risk procedure   - DNR

## 2022-10-28 NOTE — PROGRESS NOTE ADULT - ASSESSMENT
Problem: Knowledge deficit - Parent/Caregiver  Goal: Family demonstrates familiarity with NICU environment  Outcome: PROGRESSING AS EXPECTED  Note: POB at bedside. Education packet given to parents and updated on plan of care. Answered all questions.      Problem: Oxygenation/Respiratory Function  Goal: Optimized air exchange  Outcome: PROGRESSING AS EXPECTED  Note: Infant continues on BCPAP 5 cmH2O w/FIO2 @ 21%. Infant tolerating well throughout shift.      Problem: Nutrition/Feeding  Goal: Tolerating transition to enteral feedings  Outcome: PROGRESSING AS EXPECTED  Note: Infant tolerating trophic feeds without any emesis so far this shift.       83 YO M w/Pmhx of Cardiac arrest leading to anoxic brain injury(s/p Trach and PEG), H/o Severe Covid PNA, H/o Gram -ve PNA, HTN and BPH presented for abnormal labs in the nursing home,( baseline, patient is unresponsive, makes random non-purposeful movement, had trach(Vent dependent) and with PEG tube for feeds), On 10/22, labs in the SNF showed anemia and uremia, also patient was tachycardic to 101 and tachypneic to 22, was sent to ED for evaluation. patient is DNR and CMO( daughter Judy Jackson, she wanted all intervention to be done for now), patient found to have B/l pna on cxr and ct showing Left perinephric/periureteral inflammatory changes without hydronephrosis. Findings most consistent with pyelonephritis. No drainable fluid collection. moderate to large size pleural effusions bilaterally, right greater than left. New marked degenerative changes of left hip. Correlate for signs of septic arthritis. Bladder wall thickening with surrounding inflammation consistent with cystitis in appropriate clinical setting. ID and pulmonary consulted. patient started on iv hydration, s/p 2 units prbc, ID, GI and pulmonary consulted. eliquis on hold until GI bleed is ruled out. admitted for PNA, acute pyelonephritis and GI bleed.     sepsis present on admission  UTI possible Pyelonephritis / Acinetobacter baumannii/nosocomialis group   Anemia  hypernatremia  chronic hypoxic respiratory failure SP Trach  hx Cardiac Arrest, anoxic brain injury(s/p Trach and PEG)  hx Severe COVID PNA  severe anemia- s/p transfusion - has hx of chronic Macrocytic anemia   bilaterally pleural effusion  Anasarca   HTN and BPH  stage 4 pressure ulcer top sacrum and Unstageable pressure ulcer to b/l Hips- infected   RT and left heel  wounds   elevated troponin - trended down       Plan:  - s/p 2 units prbc f/u post transfusion CBC  Hgb 7.0--> 6.4-->10-->9.3-> 8.3   - Continue Iv fluids as needed  Pulmonary cc on board   ID follow up appreciated continue unasyn 9g q 8 hours ( confirmed high dose from ID),  Amikacin 1250mg IV q48h on 10/27 at 2200  ID follow up noted continue unasyn 9g q 8 hours + Amikacin 1250 mg q 48 hours , - follow-up susceptibilities for Polymixin and Cefidericol susceptibilities (lab called)   - consulted ID, pulmonary, and GI Rec's appr.   - Wound care  Rec's appre  - Tylenol and pain management prn  - continue current medication,   antihypertensive on lopressor now  (with holding parameters),  c/w midodrine   holding bumex ( 10/26) given hypernatremia and low blood pressure   - insulin sliding scale (goal Fs 140-180), POC glucose qid  - monitor vitals closely, daily am labs  - precaution (fall/aspiration/seizure)  - telemetry   LE duplex negative for DVT    UE venous duplex negative for DVT    F/U B12 and folate, Iron and ferretin   monitor blood pressure and maintain MAP > 65   Burn team consult appreciated - wound care and possible Surgical debridement   Offloading/ positional changes  repeat BMP for Na today and avoid overcorrection   Podiatry team consulted - input appreciated   LE arterial duplex negative - he was scheduled for right heel debridement 10/28 but now for Thurs 11/3. 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

## 2022-10-28 NOTE — PROGRESS NOTE ADULT - SUBJECTIVE AND OBJECTIVE BOX
CHADWICK VENCES  82y, Male  Allergy: Allergy Status Unknown      LOS  5d    CHIEF COMPLAINT: sepsis (28 Oct 2022 07:41)      INTERVAL EVENTS/HPI  - No acute events overnight  - T(F): , Max: 97.4 (10-27-22 @ 11:16)  - WBC Count: 12.86 (10-28-22 @ 00:34)  WBC Count: 11.73 (10-27-22 @ 01:12)     - Creatinine, Serum: 0.7 (10-28-22 @ 00:34)  Creatinine, Serum: 0.8 (10-27-22 @ 01:12)       ROS  unable to obtain history secondary to patient's mental status and/or sedation    VITALS:  T(F): 96.6, Max: 97.4 (10-27-22 @ 11:16)  HR: 85  BP: 88/63  RR: 20Vital Signs Last 24 Hrs  T(C): 35.9 (28 Oct 2022 08:05), Max: 36.3 (27 Oct 2022 11:16)  T(F): 96.6 (28 Oct 2022 08:05), Max: 97.4 (27 Oct 2022 11:16)  HR: 85 (28 Oct 2022 08:05) (77 - 86)  BP: 88/63 (28 Oct 2022 08:05) (88/63 - 102/74)  BP(mean): 70 (28 Oct 2022 08:05) (70 - 82)  RR: 20 (28 Oct 2022 08:05) (20 - 20)  SpO2: 100% (28 Oct 2022 08:05) (99% - 100%)    Parameters below as of 28 Oct 2022 08:05  Patient On (Oxygen Delivery Method): ventilator        PHYSICAL EXAM:  Gen: trach/vent   HEENT: Normocephalic, atraumatic  Neck: supple, no lymphadenopathy  CV: Regular rate & regular rhythm  Lungs: decreased BS at bases, no fremitus  Abdomen: Soft, BS present  Ext: Warm, well perfused  Neuro: non focal, awake  Skin: no rash, no erythema  Lines: no phlebitis    FH: Non-contributory  Social Hx: Non-contributory    TESTS & MEASUREMENTS:                        9.8    12.86 )-----------( 183      ( 28 Oct 2022 00:34 )             34.0     10-28    155<H>  |  107  |  52<H>  ----------------------------<  146<H>  3.7   |  36<H>  |  0.7    Ca    7.8<L>      28 Oct 2022 00:34  Mg     2.4     10-28    TPro  5.6<L>  /  Alb  1.9<L>  /  TBili  0.4  /  DBili  x   /  AST  46<H>  /  ALT  9   /  AlkPhos  79  10-27      LIVER FUNCTIONS - ( 27 Oct 2022 01:12 )  Alb: 1.9 g/dL / Pro: 5.6 g/dL / ALK PHOS: 79 U/L / ALT: 9 U/L / AST: 46 U/L / GGT: x               Culture - Blood (collected 10-24-22 @ 00:00)  Source: .Blood Blood-Peripheral  Preliminary Report (10-25-22 @ 09:01):    No growth to date.    Culture - Urine (collected 10-23-22 @ 05:45)  Source: Clean Catch Clean Catch (Midstream)  Final Report (10-25-22 @ 17:20):    >100,000 CFU/ml Acinetobacter baumannii/nosocomialis group  Organism: Acinetobacter baumannii/nosocomialis group  Acinetobacter baumannii/nosocomialis group (10-27-22 @ 19:16)  Organism: Acinetobacter baumannii/nosocomialis group (10-27-22 @ 19:16)      -  Piperacillin/Tazobactam: R      Method Type: KB  Organism: Acinetobacter baumannii/nosocomialis group (10-27-22 @ 19:16)      -  Amikacin: S <=16      -  Ampicillin/Sulbactam: R >16/8      -  Cefepime: R >16      -  Ceftazidime: S 4      -  Ceftriaxone: I 32      -  Ciprofloxacin: R >2      -  Gentamicin: S 4      -  Imipenem: R >8      -  Levofloxacin: R >4      -  Meropenem: R >8      -  Tigecycline: <=2      -  Tobramycin: S <=2      -  Trimethoprim/Sulfamethoxazole: R >2/38      Method Type: ALEXSANDRA    Culture - Blood (collected 10-23-22 @ 05:05)  Source: .Blood Blood  Preliminary Report (10-24-22 @ 13:03):    No growth to date.            INFECTIOUS DISEASES TESTING  MRSA PCR Result.: Negative (10-26-22 @ 09:14)  Legionella Antigen, Urine: Negative (10-24-22 @ 02:44)  Procalcitonin, Serum: 5.93 (10-24-22 @ 00:00)  COVID-19 PCR: NotDetec (10-23-22 @ 05:45)  COVID-19 PCR: NotDetec (03-02-22 @ 13:30)  Procalcitonin, Serum: 0.51 (02-27-22 @ 07:33)  COVID-19 PCR: Detected (02-20-22 @ 15:33)  COVID-19 PCR: NotDetec (02-15-22 @ 07:30)  Procalcitonin, Serum: 0.09 (02-12-22 @ 06:21)  COVID-19 PCR: Detected (02-10-22 @ 13:00)  Procalcitonin, Serum: 0.04 (02-09-22 @ 06:03)  Procalcitonin, Serum: 0.03 (02-01-22 @ 06:25)  Procalcitonin, Serum: 0.07 (01-31-22 @ 06:15)  Procalcitonin, Serum: 0.10 (01-30-22 @ 10:45)  Procalcitonin, Serum: 0.11 (01-29-22 @ 07:17)  Procalcitonin, Serum: 0.08 (01-26-22 @ 06:15)  Procalcitonin, Serum: 0.09 (01-25-22 @ 05:58)  Procalcitonin, Serum: 0.11 (01-23-22 @ 11:17)  MRSA PCR Result.: Negative (01-23-22 @ 11:00)  Procalcitonin, Serum: 0.44 (01-19-22 @ 06:30)  Procalcitonin, Serum: 2.32 (01-16-22 @ 06:56)  Rapid RVP Result: Detected (01-15-22 @ 01:25)  Procalcitonin, Serum: 0.10 (01-15-22 @ 01:25)      INFLAMMATORY MARKERS  C-Reactive Protein, Serum: 80.3 mg/L (10-24-22 @ 00:00)      RADIOLOGY & ADDITIONAL TESTS:  I have personally reviewed the last available Chest xray  CXR      CT      CARDIOLOGY TESTING  12 Lead ECG:   Ventricular Rate 94 BPM    Atrial Rate 94 BPM    P-R Interval 158 ms    QRS Duration 116 ms    Q-T Interval 392 ms    QTC Calculation(Bazett) 490 ms    P Axis -1 degrees    R Axis -18 degrees    T Axis 166 degrees    Diagnosis Line Normal sinus rhythm  Left ventricular hypertrophy with QRS widening and repolarization abnormality  Prolonged QT  Abnormal ECG    Confirmed by Lotus Braun MD ([id number]) on 10/23/2022 3:00:38 PM (10-23-22 @ 09:58)      MEDICATIONS  amiKACIN  IVPB 1250 IV Intermittent <User Schedule>  ampicillin/sulbactam  IVPB 9 IV Intermittent every 8 hours  atorvastatin 10 Oral at bedtime  chlorhexidine 0.12% Liquid 15 Oral Mucosa every 12 hours  chlorhexidine 2% Cloths 1 Topical daily  collagenase Ointment 1 Topical two times a day  Dakins Solution - 1/2 Strength 1 Topical two times a day  dextrose 5%. 1000 IV Continuous <Continuous>  dextrose 5%. 1000 IV Continuous <Continuous>  dextrose 5%. 1000 IV Continuous <Continuous>  dextrose 50% Injectable 25 IV Push once  dextrose 50% Injectable 12.5 IV Push once  dextrose 50% Injectable 25 IV Push once  epoetin carline-epbx (RETACRIT) Injectable 37019 SubCutaneous <User Schedule>  glucagon  Injectable 1 IntraMuscular once  heparin   Injectable 5000 SubCutaneous every 12 hours  insulin lispro (ADMELOG) corrective regimen sliding scale  SubCutaneous three times a day before meals  levothyroxine 125 Oral daily  metoprolol tartrate 12.5 Oral every 12 hours  midodrine. 10 Oral every 8 hours  ofloxacin 0.3% Solution 1 Left EYE two times a day  pantoprazole   Suspension 40 Oral every 12 hours  tamsulosin 0.4 Oral at bedtime      WEIGHT  Weight (kg): 74.8 (10-23-22 @ 00:05)  Creatinine, Serum: 0.7 mg/dL (10-28-22 @ 00:34)      ANTIBIOTICS:  amiKACIN  IVPB 1250 milliGRAM(s) IV Intermittent <User Schedule>  ampicillin/sulbactam  IVPB 9 Gram(s) IV Intermittent every 8 hours      All available historical records have been reviewed

## 2022-10-28 NOTE — PROGRESS NOTE ADULT - SUBJECTIVE AND OBJECTIVE BOX
T H I S   I S    N O  T   A    F I N A L I Z E D   N O T CHADWICK RUEDA  82y, Male  Allergy: Allergy Status Unknown    Hospital Day: 5d    Patient seen and examined earlier today.     PMH/PSH:  PAST MEDICAL & SURGICAL HISTORY:  BPH (benign prostatic hyperplasia)      Mild HTN      Cardiac arrest      Anoxic brain injury      2019 novel coronavirus disease (COVID-19)          LAST 24-Hr EVENTS:    VITALS:  T(F): 97.4 (10-28-22 @ 11:51), Max: 97.4 (10-27-22 @ 20:00)  HR: 84 (10-28-22 @ 11:51)  BP: 96/64 (10-28-22 @ 11:51) (88/63 - 99/67)  RR: 20 (10-28-22 @ 11:51)  SpO2: 100% (10-28-22 @ 11:51)  FiO2: 60        TESTS & MEASUREMENTS:  Weight/BMI      10-26-22 @ 07:01  -  10-27-22 @ 07:00  --------------------------------------------------------  IN: 1680 mL / OUT: 1750 mL / NET: -70 mL    10-27-22 @ 07:01  -  10-28-22 @ 07:00  --------------------------------------------------------  IN: 2680 mL / OUT: 1300 mL / NET: 1380 mL                            9.8    12.86 )-----------( 183      ( 28 Oct 2022 00:34 )             34.0     PT/INR - ( 28 Oct 2022 00:34 )   PT: 14.00 sec;   INR: 1.22 ratio         PTT - ( 28 Oct 2022 00:34 )  PTT:30.7 sec  INR: 1.22 ratio (10-28-22 @ 00:34)  INR: 1.31 ratio (10-24-22 @ 04:40)    10-28    155<H>  |  107  |  52<H>  ----------------------------<  146<H>  3.7   |  36<H>  |  0.7    Ca    7.8<L>      28 Oct 2022 00:34  Mg     2.4     10-28    TPro  5.6<L>  /  Alb  1.9<L>  /  TBili  0.4  /  DBili  x   /  AST  46<H>  /  ALT  9   /  AlkPhos  79  10-27    LIVER FUNCTIONS - ( 27 Oct 2022 01:12 )  Alb: 1.9 g/dL / Pro: 5.6 g/dL / ALK PHOS: 79 U/L / ALT: 9 U/L / AST: 46 U/L / GGT: x           CARDIAC MARKERS ( 28 Oct 2022 00:34 )  x     / 0.26 ng/mL / x     / x     / x      CARDIAC MARKERS ( 27 Oct 2022 01:12 )  x     / 0.30 ng/mL / x     / x     / x            Culture - Blood (collected 10-24-22 @ 00:00)  Source: .Blood Blood-Peripheral  Preliminary Report (10-25-22 @ 09:01):    No growth to date.    Culture - Urine (collected 10-23-22 @ 05:45)  Source: Clean Catch Clean Catch (Midstream)  Final Report (10-25-22 @ 17:20):    >100,000 CFU/ml Acinetobacter baumannii/nosocomialis group  Organism: Acinetobacter baumannii/nosocomialis group  Acinetobacter baumannii/nosocomialis group (10-27-22 @ 19:16)  Organism: Acinetobacter baumannii/nosocomialis group (10-27-22 @ 19:16)      -  Piperacillin/Tazobactam: R      Method Type: KB  Organism: Acinetobacter baumannii/nosocomialis group (10-27-22 @ 19:16)      -  Amikacin: S <=16      -  Ampicillin/Sulbactam: R >16/8      -  Cefepime: R >16      -  Ceftazidime: S 4      -  Ceftriaxone: I 32      -  Ciprofloxacin: R >2      -  Gentamicin: S 4      -  Imipenem: R >8      -  Levofloxacin: R >4      -  Meropenem: R >8      -  Tigecycline: <=2      -  Tobramycin: S <=2      -  Trimethoprim/Sulfamethoxazole: R >2/38      Method Type: ALEXSANDRA    Culture - Blood (collected 10-23-22 @ 05:05)  Source: .Blood Blood  Final Report (10-28-22 @ 13:01):    No Growth Final        Procalcitonin, Serum: 5.93 ng/mL (10-24-22 @ 00:00)      Ferritin, Serum: 780 ng/mL (10-27-22 @ 01:12)  Ferritin, Serum: 577 ng/mL (10-24-22 @ 04:40)  Ferritin, Serum: 577 ng/mL (10-24-22 @ 00:00)    Serum Pro-Brain Natriuretic Peptide: 68049 pg/mL (10-24-22 @ 00:00)    COVID-19 PCR: NotDetec (10-23-22 @ 05:45)        A1C with Estimated Average Glucose Result: 4.7 % (10-24-22 @ 04:40)  A1C with Estimated Average Glucose Result: 6.2 % (01-24-22 @ 06:25)      Indwelling Urethral Catheter:     Connect To:  Straight Drainage/Gravity    Indication:  Urine Output Monitoring in Critically Ill (10-27-22 @ 08:59) (not performed)  Indwelling Urethral Catheter:     Connect To:  Straight Drainage/Gravity    Indication:  Urine Output Monitoring in Critically Ill (10-23-22 @ 05:58) (not performed)      RADIOLOGY, ECG, & ADDITIONAL TESTS:  12 Lead ECG:   Ventricular Rate 94 BPM    Atrial Rate 94 BPM    P-R Interval 158 ms    QRS Duration 116 ms    Q-T Interval 392 ms    QTC Calculation(Bazett) 490 ms    P Axis -1 degrees    R Axis -18 degrees    T Axis 166 degrees    Diagnosis Line Normal sinus rhythm  Left ventricular hypertrophy with QRS widening and repolarization abnormality  Prolonged QT  Abnormal ECG    Confirmed by Lotus Braun MD ([id number]) on 10/23/2022 3:00:38 PM (10-23-22 @ 09:58)      RECENT DIAGNOSTIC ORDERS:  Magnesium, Serum: 23:30 (10-28-22 @ 13:30)  Hepatic Function Panel: 23:30 (10-28-22 @ 13:30)  Basic Metabolic Panel: 23:30 (10-28-22 @ 13:30)  Complete Blood Count + Automated Diff: 23:30 (10-28-22 @ 13:30)  Basic Metabolic Panel: 16:00 (10-28-22 @ 09:00)      MEDICATIONS:  MEDICATIONS  (STANDING):  amiKACIN  IVPB 1250 milliGRAM(s) IV Intermittent <User Schedule>  ampicillin/sulbactam  IVPB 9 Gram(s) IV Intermittent every 8 hours  atorvastatin 10 milliGRAM(s) Oral at bedtime  chlorhexidine 0.12% Liquid 15 milliLiter(s) Oral Mucosa every 12 hours  chlorhexidine 2% Cloths 1 Application(s) Topical daily  collagenase Ointment 1 Application(s) Topical two times a day  Dakins Solution - 1/2 Strength 1 Application(s) Topical two times a day  dextrose 5%. 1000 milliLiter(s) (50 mL/Hr) IV Continuous <Continuous>  dextrose 5%. 1000 milliLiter(s) (50 mL/Hr) IV Continuous <Continuous>  dextrose 5%. 1000 milliLiter(s) (100 mL/Hr) IV Continuous <Continuous>  dextrose 50% Injectable 25 Gram(s) IV Push once  dextrose 50% Injectable 12.5 Gram(s) IV Push once  dextrose 50% Injectable 25 Gram(s) IV Push once  epoetin carline-epbx (RETACRIT) Injectable 28232 Unit(s) SubCutaneous <User Schedule>  glucagon  Injectable 1 milliGRAM(s) IntraMuscular once  heparin   Injectable 5000 Unit(s) SubCutaneous every 12 hours  insulin lispro (ADMELOG) corrective regimen sliding scale   SubCutaneous three times a day before meals  levothyroxine 125 MICROGram(s) Oral daily  metoprolol tartrate 12.5 milliGRAM(s) Oral every 12 hours  midodrine. 10 milliGRAM(s) Oral every 8 hours  ofloxacin 0.3% Solution 1 Drop(s) Left EYE two times a day  pantoprazole   Suspension 40 milliGRAM(s) Oral every 12 hours  tamsulosin 0.4 milliGRAM(s) Oral at bedtime    MEDICATIONS  (PRN):  acetaminophen     Tablet .. 650 milliGRAM(s) Oral every 6 hours PRN Temp greater or equal to 38C (100.4F), Mild Pain (1 - 3)  albuterol/ipratropium for Nebulization 3 milliLiter(s) Nebulizer every 6 hours PRN Shortness of Breath and/or Wheezing  dextrose Oral Gel 15 Gram(s) Oral once PRN Blood Glucose LESS THAN 70 milliGRAM(s)/deciliter      HOME MEDICATIONS:  amantadine 50 mg/5 mL oral syrup (10-23)  atorvastatin 10 mg oral tablet (10-23)  bumetanide 2 mg oral tablet (10-23)  chlorhexidine 0.5% topical liquid (10-23)  collagenase 250 units/g topical ointment (10-23)  Eliquis 2.5 mg oral tablet (10-23)  Epogen 20,000 units/mL injectable solution (10-23)  ferrous sulfate 220 mg/5 mL (44 mg/5 mL elemental iron) oral elixir (10-23)  folic acid 1 mg oral tablet (10-23)  HumuLIN R 100 units/mL injectable solution (10-23)  ipratropium-albuterol 0.5 mg-2.5 mg/3 mL inhalation solution (10-23)  levothyroxine 125 mcg (0.125 mg) oral tablet (10-23)  meropenem 1000 mg intravenous injection (10-23)  Metoprolol Tartrate 25 mg oral tablet (10-23)  midodrine 5 mg oral tablet (10-23)  omeprazole 20 mg oral delayed release tablet (10-23)  PHOS-NaK oral powder for reconstitution (10-23)  potassium chloride 40 mEq/15 mL oral liquid (10-23)  tamsulosin 0.4 mg oral capsule (10-23)  Tylenol 325 mg oral tablet (10-23)  Vitamin B-12 1000 mcg oral tablet (10-23)  Vitamin C 500 mg oral tablet (10-23)  zinc sulfate 220 mg oral tablet (10-23)      PHYSICAL EXAM:  GENERAL:   CHEST/LUNG:   HEART:   ABDOMEN:   EXTREMITIES:               CHADWICK VENCES  82y, Male  Allergy: Allergy Status Unknown    Hospital Day: 5d    Patient seen and examined earlier today.  he is non verbal, daughter at bedside later     PMH/PSH:  PAST MEDICAL & SURGICAL HISTORY:  BPH (benign prostatic hyperplasia)      Mild HTN      Cardiac arrest      Anoxic brain injury      2019 novel coronavirus disease (COVID-19)          LAST 24-Hr EVENTS:    VITALS:  T(F): 97.4 (10-28-22 @ 11:51), Max: 97.4 (10-27-22 @ 20:00)  HR: 84 (10-28-22 @ 11:51)  BP: 96/64 (10-28-22 @ 11:51) (88/63 - 99/67)  RR: 20 (10-28-22 @ 11:51)  SpO2: 100% (10-28-22 @ 11:51)  FiO2: 60        TESTS & MEASUREMENTS:  Weight/BMI      10-26-22 @ 07:01  -  10-27-22 @ 07:00  --------------------------------------------------------  IN: 1680 mL / OUT: 1750 mL / NET: -70 mL    10-27-22 @ 07:01  -  10-28-22 @ 07:00  --------------------------------------------------------  IN: 2680 mL / OUT: 1300 mL / NET: 1380 mL                            9.8    12.86 )-----------( 183      ( 28 Oct 2022 00:34 )             34.0     PT/INR - ( 28 Oct 2022 00:34 )   PT: 14.00 sec;   INR: 1.22 ratio         PTT - ( 28 Oct 2022 00:34 )  PTT:30.7 sec  INR: 1.22 ratio (10-28-22 @ 00:34)  INR: 1.31 ratio (10-24-22 @ 04:40)    10-28    155<H>  |  107  |  52<H>  ----------------------------<  146<H>  3.7   |  36<H>  |  0.7    Ca    7.8<L>      28 Oct 2022 00:34  Mg     2.4     10-28    TPro  5.6<L>  /  Alb  1.9<L>  /  TBili  0.4  /  DBili  x   /  AST  46<H>  /  ALT  9   /  AlkPhos  79  10-27    LIVER FUNCTIONS - ( 27 Oct 2022 01:12 )  Alb: 1.9 g/dL / Pro: 5.6 g/dL / ALK PHOS: 79 U/L / ALT: 9 U/L / AST: 46 U/L / GGT: x           CARDIAC MARKERS ( 28 Oct 2022 00:34 )  x     / 0.26 ng/mL / x     / x     / x      CARDIAC MARKERS ( 27 Oct 2022 01:12 )  x     / 0.30 ng/mL / x     / x     / x            Culture - Blood (collected 10-24-22 @ 00:00)  Source: .Blood Blood-Peripheral  Preliminary Report (10-25-22 @ 09:01):    No growth to date.    Culture - Urine (collected 10-23-22 @ 05:45)  Source: Clean Catch Clean Catch (Midstream)  Final Report (10-25-22 @ 17:20):    >100,000 CFU/ml Acinetobacter baumannii/nosocomialis group  Organism: Acinetobacter baumannii/nosocomialis group  Acinetobacter baumannii/nosocomialis group (10-27-22 @ 19:16)  Organism: Acinetobacter baumannii/nosocomialis group (10-27-22 @ 19:16)      -  Piperacillin/Tazobactam: R      Method Type: KB  Organism: Acinetobacter baumannii/nosocomialis group (10-27-22 @ 19:16)      -  Amikacin: S <=16      -  Ampicillin/Sulbactam: R >16/8      -  Cefepime: R >16      -  Ceftazidime: S 4      -  Ceftriaxone: I 32      -  Ciprofloxacin: R >2      -  Gentamicin: S 4      -  Imipenem: R >8      -  Levofloxacin: R >4      -  Meropenem: R >8      -  Tigecycline: <=2      -  Tobramycin: S <=2      -  Trimethoprim/Sulfamethoxazole: R >2/38      Method Type: ALEXSANDRA    Culture - Blood (collected 10-23-22 @ 05:05)  Source: .Blood Blood  Final Report (10-28-22 @ 13:01):    No Growth Final        Procalcitonin, Serum: 5.93 ng/mL (10-24-22 @ 00:00)      Ferritin, Serum: 780 ng/mL (10-27-22 @ 01:12)  Ferritin, Serum: 577 ng/mL (10-24-22 @ 04:40)  Ferritin, Serum: 577 ng/mL (10-24-22 @ 00:00)    Serum Pro-Brain Natriuretic Peptide: 82414 pg/mL (10-24-22 @ 00:00)    COVID-19 PCR: NotDetec (10-23-22 @ 05:45)        A1C with Estimated Average Glucose Result: 4.7 % (10-24-22 @ 04:40)  A1C with Estimated Average Glucose Result: 6.2 % (01-24-22 @ 06:25)      Indwelling Urethral Catheter:     Connect To:  Straight Drainage/Gravity    Indication:  Urine Output Monitoring in Critically Ill (10-27-22 @ 08:59) (not performed)  Indwelling Urethral Catheter:     Connect To:  Straight Drainage/Gravity    Indication:  Urine Output Monitoring in Critically Ill (10-23-22 @ 05:58) (not performed)      RADIOLOGY, ECG, & ADDITIONAL TESTS:  12 Lead ECG:   Ventricular Rate 94 BPM    Atrial Rate 94 BPM    P-R Interval 158 ms    QRS Duration 116 ms    Q-T Interval 392 ms    QTC Calculation(Bazett) 490 ms    P Axis -1 degrees    R Axis -18 degrees    T Axis 166 degrees    Diagnosis Line Normal sinus rhythm  Left ventricular hypertrophy with QRS widening and repolarization abnormality  Prolonged QT  Abnormal ECG    Confirmed by Lotus Braun MD ([id number]) on 10/23/2022 3:00:38 PM (10-23-22 @ 09:58)      RECENT DIAGNOSTIC ORDERS:  Magnesium, Serum: 23:30 (10-28-22 @ 13:30)  Hepatic Function Panel: 23:30 (10-28-22 @ 13:30)  Basic Metabolic Panel: 23:30 (10-28-22 @ 13:30)  Complete Blood Count + Automated Diff: 23:30 (10-28-22 @ 13:30)  Basic Metabolic Panel: 16:00 (10-28-22 @ 09:00)      MEDICATIONS:  MEDICATIONS  (STANDING):  amiKACIN  IVPB 1250 milliGRAM(s) IV Intermittent <User Schedule>  ampicillin/sulbactam  IVPB 9 Gram(s) IV Intermittent every 8 hours  atorvastatin 10 milliGRAM(s) Oral at bedtime  chlorhexidine 0.12% Liquid 15 milliLiter(s) Oral Mucosa every 12 hours  chlorhexidine 2% Cloths 1 Application(s) Topical daily  collagenase Ointment 1 Application(s) Topical two times a day  Dakins Solution - 1/2 Strength 1 Application(s) Topical two times a day  dextrose 5%. 1000 milliLiter(s) (50 mL/Hr) IV Continuous <Continuous>  dextrose 5%. 1000 milliLiter(s) (50 mL/Hr) IV Continuous <Continuous>  dextrose 5%. 1000 milliLiter(s) (100 mL/Hr) IV Continuous <Continuous>  dextrose 50% Injectable 25 Gram(s) IV Push once  dextrose 50% Injectable 12.5 Gram(s) IV Push once  dextrose 50% Injectable 25 Gram(s) IV Push once  epoetin carline-epbx (RETACRIT) Injectable 66627 Unit(s) SubCutaneous <User Schedule>  glucagon  Injectable 1 milliGRAM(s) IntraMuscular once  heparin   Injectable 5000 Unit(s) SubCutaneous every 12 hours  insulin lispro (ADMELOG) corrective regimen sliding scale   SubCutaneous three times a day before meals  levothyroxine 125 MICROGram(s) Oral daily  metoprolol tartrate 12.5 milliGRAM(s) Oral every 12 hours  midodrine. 10 milliGRAM(s) Oral every 8 hours  ofloxacin 0.3% Solution 1 Drop(s) Left EYE two times a day  pantoprazole   Suspension 40 milliGRAM(s) Oral every 12 hours  tamsulosin 0.4 milliGRAM(s) Oral at bedtime    MEDICATIONS  (PRN):  acetaminophen     Tablet .. 650 milliGRAM(s) Oral every 6 hours PRN Temp greater or equal to 38C (100.4F), Mild Pain (1 - 3)  albuterol/ipratropium for Nebulization 3 milliLiter(s) Nebulizer every 6 hours PRN Shortness of Breath and/or Wheezing  dextrose Oral Gel 15 Gram(s) Oral once PRN Blood Glucose LESS THAN 70 milliGRAM(s)/deciliter      HOME MEDICATIONS:  amantadine 50 mg/5 mL oral syrup (10-23)  atorvastatin 10 mg oral tablet (10-23)  bumetanide 2 mg oral tablet (10-23)  chlorhexidine 0.5% topical liquid (10-23)  collagenase 250 units/g topical ointment (10-23)  Eliquis 2.5 mg oral tablet (10-23)  Epogen 20,000 units/mL injectable solution (10-23)  ferrous sulfate 220 mg/5 mL (44 mg/5 mL elemental iron) oral elixir (10-23)  folic acid 1 mg oral tablet (10-23)  HumuLIN R 100 units/mL injectable solution (10-23)  ipratropium-albuterol 0.5 mg-2.5 mg/3 mL inhalation solution (10-23)  levothyroxine 125 mcg (0.125 mg) oral tablet (10-23)  meropenem 1000 mg intravenous injection (10-23)  Metoprolol Tartrate 25 mg oral tablet (10-23)  midodrine 5 mg oral tablet (10-23)  omeprazole 20 mg oral delayed release tablet (10-23)  PHOS-NaK oral powder for reconstitution (10-23)  potassium chloride 40 mEq/15 mL oral liquid (10-23)  tamsulosin 0.4 mg oral capsule (10-23)  Tylenol 325 mg oral tablet (10-23)  Vitamin B-12 1000 mcg oral tablet (10-23)  Vitamin C 500 mg oral tablet (10-23)  zinc sulfate 220 mg oral tablet (10-23)      PHYSICAL EXAM:  GENERAL: non verbal, non communicative, Trach+ on mechanical vent   CHEST/LUNG: scattrred rhonchi b/l   HEART:  regular rhythm   ABDOMEN:  soft, non tender , distended, + PEG tube   EXTREMITIES:  Anasarca, b/l feet dressings

## 2022-10-28 NOTE — CHART NOTE - NSCHARTNOTEFT_GEN_A_CORE
Surgery excisional debridement soft tissue/bone right foot rescheduled for Thurs 11/3. Attending discussed with patient's daughter who wants to speak to patient's PCP first. All risks and benefits of surgery and possible delay explained, daughter expressed full understanding and is in agreement with plan.     Please make pt NPO MN 11/2  Please optimize all lab values prior to OR    Podiatry   [phone]

## 2022-10-28 NOTE — PROGRESS NOTE ADULT - SUBJECTIVE AND OBJECTIVE BOX
CHADWICK VENCES 82y Male  MRN#: [id number]     Hospital Day: 5d    Pt is currently admitted with the primary diagnosis of  HYPERNATREMIA ANEMIA        SUBJECTIVE     Patient was seen this morning.                                           ----------------------------------------------------------  OBJECTIVE  PAST MEDICAL & SURGICAL HISTORY  BPH (benign prostatic hyperplasia)    Mild HTN    Cardiac arrest    Anoxic brain injury    2019 novel coronavirus disease (COVID-19)                                              -----------------------------------------------------------  ALLERGIES:  Allergy Status Unknown                                            ------------------------------------------------------------    HOME MEDICATIONS  Home Medications:  amantadine 50 mg/5 mL oral syrup: 10 milliliter(s) orally 2 times a day (23 Oct 2022 08:35)  atorvastatin 10 mg oral tablet: 1 tab(s) by gastrostomy tube once a day (23 Oct 2022 08:35)  bumetanide 2 mg oral tablet: 1 tab(s) by gastrostomy tube once a day (23 Oct 2022 08:35)  chlorhexidine 0.5% topical liquid:  (23 Oct 2022 08:35)  collagenase 250 units/g topical ointment: 1 application topically 2 times a day (23 Oct 2022 08:35)  Eliquis 2.5 mg oral tablet: 1 tab(s) by gastrostomy tube 2 times a day (23 Oct 2022 08:35)  Epogen 20,000 units/mL injectable solution: 1 milliliter(s) injectable 3 times a week (23 Oct 2022 08:35)  ferrous sulfate 220 mg/5 mL (44 mg/5 mL elemental iron) oral elixir: 7.5 milliliter(s) by gastrostomy tube 3 times a day (23 Oct 2022 08:35)  folic acid 1 mg oral tablet: 1 tab(s) orally once a day (23 Oct 2022 08:35)  HumuLIN R 100 units/mL injectable solution:  (23 Oct 2022 08:35)  ipratropium-albuterol 0.5 mg-2.5 mg/3 mL inhalation solution: 3 milliliter(s) inhaled 4 times a day (23 Oct 2022 08:35)  levothyroxine 125 mcg (0.125 mg) oral tablet: 1 tab(s) by gastrostomy tube once a day (23 Oct 2022 08:35)  meropenem 1000 mg intravenous injection: 1 gram(s) intravenous 3 times a day (23 Oct 2022 08:35)  Metoprolol Tartrate 25 mg oral tablet: 0.5 tab(s) by gastrostomy tube 2 times a day (23 Oct 2022 08:35)  midodrine 5 mg oral tablet: 1 tab(s) orally 3 times a day, As Needed (23 Oct 2022 08:35)  omeprazole 20 mg oral delayed release tablet: 1 tab(s) by gastrostomy tube once a day (23 Oct 2022 08:35)  PHOS-NaK oral powder for reconstitution: 1 packet(s) orally 2 times a day (23 Oct 2022 08:35)  potassium chloride 40 mEq/15 mL oral liquid: 7.5 milliliter(s) by gastrostomy tube once a day (23 Oct 2022 08:35)  tamsulosin 0.4 mg oral capsule: 1 cap(s) orally once a day (23 Oct 2022 08:35)  Tylenol 325 mg oral tablet: 2 tab(s) orally every 6 hours, As Needed (23 Oct 2022 08:35)  Vitamin B-12 1000 mcg oral tablet: 1 tab(s) orally once a day (23 Oct 2022 08:35)  Vitamin C 500 mg oral tablet: 1 tab(s) orally once a day (23 Oct 2022 08:35)  zinc sulfate 220 mg oral tablet: 1 tab(s) by gastrostomy tube once a day (23 Oct 2022 08:35)                           MEDICATIONS:  STANDING MEDICATIONS  amiKACIN  IVPB 1250 milliGRAM(s) IV Intermittent <User Schedule>  ampicillin/sulbactam  IVPB 9 Gram(s) IV Intermittent every 8 hours  atorvastatin 10 milliGRAM(s) Oral at bedtime  chlorhexidine 0.12% Liquid 15 milliLiter(s) Oral Mucosa every 12 hours  chlorhexidine 2% Cloths 1 Application(s) Topical daily  collagenase Ointment 1 Application(s) Topical two times a day  Dakins Solution - 1/2 Strength 1 Application(s) Topical two times a day  dextrose 5%. 1000 milliLiter(s) IV Continuous <Continuous>  dextrose 5%. 1000 milliLiter(s) IV Continuous <Continuous>  dextrose 5%. 1000 milliLiter(s) IV Continuous <Continuous>  dextrose 50% Injectable 25 Gram(s) IV Push once  dextrose 50% Injectable 12.5 Gram(s) IV Push once  dextrose 50% Injectable 25 Gram(s) IV Push once  epoetin carline-epbx (RETACRIT) Injectable 26371 Unit(s) SubCutaneous <User Schedule>  glucagon  Injectable 1 milliGRAM(s) IntraMuscular once  heparin   Injectable 5000 Unit(s) SubCutaneous every 12 hours  insulin lispro (ADMELOG) corrective regimen sliding scale   SubCutaneous three times a day before meals  levothyroxine 125 MICROGram(s) Oral daily  metoprolol tartrate 12.5 milliGRAM(s) Oral every 12 hours  midodrine. 10 milliGRAM(s) Oral every 8 hours  ofloxacin 0.3% Solution 1 Drop(s) Left EYE two times a day  pantoprazole   Suspension 40 milliGRAM(s) Oral every 12 hours  tamsulosin 0.4 milliGRAM(s) Oral at bedtime    PRN MEDICATIONS  acetaminophen     Tablet .. 650 milliGRAM(s) Oral every 6 hours PRN  albuterol/ipratropium for Nebulization 3 milliLiter(s) Nebulizer every 6 hours PRN  dextrose Oral Gel 15 Gram(s) Oral once PRN                                            ------------------------------------------------------------  VITAL SIGNS: Last 24 Hours  T(C): 36.3 (28 Oct 2022 11:51), Max: 36.3 (27 Oct 2022 20:00)  T(F): 97.4 (28 Oct 2022 11:51), Max: 97.4 (27 Oct 2022 20:00)  HR: 84 (28 Oct 2022 11:51) (77 - 85)  BP: 96/64 (28 Oct 2022 11:51) (88/63 - 99/67)  BP(mean): 76 (28 Oct 2022 11:51) (70 - 76)  RR: 20 (28 Oct 2022 11:51) (20 - 20)  SpO2: 100% (28 Oct 2022 11:51) (99% - 100%)      10-27-22 @ 07:01  -  10-28-22 @ 07:00  --------------------------------------------------------  IN: 2680 mL / OUT: 1300 mL / NET: 1380 mL                                             --------------------------------------------------------------  LABS:                        9.8    12.86 )-----------( 183      ( 28 Oct 2022 00:34 )             34.0     10-28    155<H>  |  107  |  52<H>  ----------------------------<  146<H>  3.7   |  36<H>  |  0.7    Ca    7.8<L>      28 Oct 2022 00:34  Mg     2.4     10-28    TPro  5.6<L>  /  Alb  1.9<L>  /  TBili  0.4  /  DBili  x   /  AST  46<H>  /  ALT  9   /  AlkPhos  79  10-27    PT/INR - ( 28 Oct 2022 00:34 )   PT: 14.00 sec;   INR: 1.22 ratio         PTT - ( 28 Oct 2022 00:34 )  PTT:30.7 sec      Troponin T, Serum: 0.26 ng/mL *HH* (10-28-22 @ 00:34)          CARDIAC MARKERS ( 28 Oct 2022 00:34 )  x     / 0.26 ng/mL / x     / x     / x      CARDIAC MARKERS ( 27 Oct 2022 01:12 )  x     / 0.30 ng/mL / x     / x     / x                                                  --------------------------------------------------------------    PHYSICAL EXAM:  GENERAL: baseline unresponsive   CHEST/LUNG: Clear  HEART: regular rate and rhythm;   ABDOMEN: Soft, Nontender, Nondistended, Bowel sounds present  EXTREMITIES: No edema. No clubbing. No cyanosis                                         --------------------------------------------------------------

## 2022-10-28 NOTE — PROGRESS NOTE ADULT - ASSESSMENT
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     #Sepsis on admission  - Blood Cx 10/23 NG    #UTI  - CT abd/pelvis 10/23 with radiologic evidence of Left Pyelonephritis  - Urine Cx 10/23 MDR Acinetobacter baumanii      #Sacral Ulcer   #Right Heel Ulcer    #Bilateral Pleural Effusion - unable to rule out pneumonia, although suspect urinary source  #Cardiac Arrest with Anoxic brain injury s/p trach/PEG    Recommendations  - continue unasyn 9g q 8 hours + Amikacin 1250 mg q 48 hours   - follow-up susceptibilities for Polymixin and Cefidericol susceptibilities (lab called)   - trend creatine closely   - planned for debridement of heel and sacral ulcer  - poor prognosis with MDR infection     Please call or message on [employer] Teams if with any questions.  Spectra [phone].

## 2022-10-29 NOTE — PROGRESS NOTE ADULT - ASSESSMENT
IMPRESSION:    sepsis present on admission  UTI/ acinetobacter   Hypernatremia  Chronic hypoxic respiratory failure SP Trach  Anoxic brain injury  HO Cardiac Arrest  HO Severe COVID PNA  Sacral ulcer      SUGGEST:    CNS:  As needed morphine for comfort    HEENT: Oral care.  Trach care     PULMONARY: aspiration precaution.  Wean FIO2.  Keep Sao2 92 to 96%.  Vent dependent     CARDIOVASCULAR:   Avoid overload.  Free water, midodrine 10 q 8.  Diuresis as tolerated.      GI: PPI q 12, feeding.  Bowel Regimen.  Free H2O     RENAL:  Follow up lytes.  Correct as needed.     INFECTIOUS DISEASE:  ABX per ID    HEMATOLOGICAL: DVT prophylaxis, MOnitor CBC and Coags     ENDOCRINE:  Follow up FS.  Insulin protocol if needed    MUSCULOSKELETAL: Wound care per burn     DNR    Very poor overall prognosis    Vent unit

## 2022-10-29 NOTE — PROGRESS NOTE ADULT - SUBJECTIVE AND OBJECTIVE BOX
CHADWICK VENCES 82y Male  MRN#: [id number]   Hospital Day: 6d    SUBJECTIVE  Patient is a 82y old Male who presents with a chief complaint of sepsis (28 Oct 2022 07:41)  Currently admitted to medicine with the primary diagnosis of Hypernatremia      INTERVAL HPI AND OVERNIGHT EVENTS:  Patient was examined and seen at bedside. This morning he is resting comfortably in bed and reports no issues or overnight events.    OBJECTIVE  PAST MEDICAL & SURGICAL HISTORY  BPH (benign prostatic hyperplasia)    Mild HTN    Cardiac arrest    Anoxic brain injury    2019 novel coronavirus disease (COVID-19)      ALLERGIES:  Allergy Status Unknown    MEDICATIONS:  STANDING MEDICATIONS  amiKACIN  IVPB 1250 milliGRAM(s) IV Intermittent <User Schedule>  ampicillin/sulbactam  IVPB 9 Gram(s) IV Intermittent every 8 hours  ascorbic acid 500 milliGRAM(s) Oral daily  atorvastatin 10 milliGRAM(s) Oral at bedtime  chlorhexidine 0.12% Liquid 15 milliLiter(s) Oral Mucosa every 12 hours  chlorhexidine 2% Cloths 1 Application(s) Topical daily  collagenase Ointment 1 Application(s) Topical two times a day  Dakins Solution - 1/2 Strength 1 Application(s) Topical two times a day  dextrose 5%. 1000 milliLiter(s) IV Continuous <Continuous>  dextrose 5%. 1000 milliLiter(s) IV Continuous <Continuous>  dextrose 50% Injectable 25 Gram(s) IV Push once  dextrose 50% Injectable 12.5 Gram(s) IV Push once  dextrose 50% Injectable 25 Gram(s) IV Push once  epoetin carline-epbx (RETACRIT) Injectable 26618 Unit(s) SubCutaneous <User Schedule>  furosemide   Injectable 40 milliGRAM(s) IV Push once  glucagon  Injectable 1 milliGRAM(s) IntraMuscular once  heparin   Injectable 5000 Unit(s) SubCutaneous every 12 hours  insulin lispro (ADMELOG) corrective regimen sliding scale   SubCutaneous three times a day before meals  levothyroxine 125 MICROGram(s) Oral daily  metoprolol tartrate 12.5 milliGRAM(s) Oral every 12 hours  midodrine. 10 milliGRAM(s) Oral every 8 hours  ofloxacin 0.3% Solution 1 Drop(s) Left EYE two times a day  pantoprazole   Suspension 40 milliGRAM(s) Oral every 12 hours  zinc sulfate 220 milliGRAM(s) Oral daily    PRN MEDICATIONS  acetaminophen     Tablet .. 650 milliGRAM(s) Oral every 6 hours PRN  albuterol/ipratropium for Nebulization 3 milliLiter(s) Nebulizer every 6 hours PRN  dextrose Oral Gel 15 Gram(s) Oral once PRN      VITAL SIGNS: Last 24 Hours  T(C): 36.4 (29 Oct 2022 05:00), Max: 37.3 (28 Oct 2022 15:54)  T(F): 97.5 (29 Oct 2022 05:00), Max: 99.2 (28 Oct 2022 15:54)  HR: 93 (29 Oct 2022 08:53) (77 - 103)  BP: 97/50 (29 Oct 2022 05:00) (89/54 - 97/60)  BP(mean): 65 (29 Oct 2022 00:00) (65 - 76)  RR: 18 (29 Oct 2022 05:00) (18 - 20)  SpO2: 96% (29 Oct 2022 08:53) (96% - 100%)    LABS:                        9.1    11.26 )-----------( 177      ( 29 Oct 2022 00:50 )             31.4     10-29    147<H>  |  102  |  43<H>  ----------------------------<  109<H>  3.4<L>   |  37<H>  |  0.7    Ca    7.5<L>      29 Oct 2022 00:50  Mg     2.2     10-29    TPro  4.9<L>  /  Alb  1.8<L>  /  TBili  0.4  /  DBili  0.2  /  AST  11  /  ALT  7   /  AlkPhos  67  10-29    PT/INR - ( 28 Oct 2022 00:34 )   PT: 14.00 sec;   INR: 1.22 ratio         PTT - ( 28 Oct 2022 00:34 )  PTT:30.7 sec          CARDIAC MARKERS ( 28 Oct 2022 00:34 )  x     / 0.26 ng/mL / x     / x     / x          RADIOLOGY:  < from: Xray Foot AP + Lateral, Right (10.28.22 @ 11:12) >    IMPRESSION:  No radiographic evidence of osteomyelitis.      < end of copied text >  < from: VA Duplex Lower Extrem Arterial, Bilat (10.27.22 @ 16:30) >  Impression:    Normal arterial flow in the bilateral lower extremities.    < end of copied text >  < from: VA Duplex Upper Ext Vein Scan, Bilat (10.26.22 @ 17:54) >  Impression:    No evidence of deep or superficial thrombosis in the bilateral upper   extremities. Internal jugular veins not visualized bilaterally due to the   presence of trach collar.    < end of copied text >      PHYSICAL EXAM:  CONSTITUTIONAL: functionally quadriplegic.  No acute distress, Does not follow commands (baseline)  HEAD: Atraumatic, normocephalic  EYES: EOM intact, PERRLA, conjunctiva and sclera clear  ENT: moist mucous membranes  PULMONARY: Clear to auscultation bilaterally. Chronically vented.   CARDIOVASCULAR: Regular rate and rhythm  GASTROINTESTINAL: Soft, non-tender, non-distended; bowel sounds present. Chronic PEG.   MUSCULOSKELETAL: R sacral ulcer, unstageable ; R hip ulcer, unstageable ; R heel ulcer, unstageable ; L buttock, unstageable  NEUROLOGY: Does not follow commands. No eye tracking.   SKIN: warm and dry    ASSESSMENT and PLAN    83 y/o male  w/Pmhx of Cardiac arrest leading to anoxic brain injury (s/p Trach and PEG), H/o Severe Covid PNA, H/o Gram -ve PNA, HTN and BPH presented for abnormal labs in the nursing home. At baseline, patient is unresponsive, makes random non-purposeful movement, had trach (Vent dependent) and PEG.      #Sepsis at nursing home  - As per SNF documents, patient was being treated for sepsis of unknown origin with Meropenem 1g q8 started on 10/21 for 10 days  - Suspected Pyelonephritis. UA grossly positive with WBC 13k  - Urine cultures positive for Acinetobacter Baumannii. WBC 11.2k today, downtrending.  - Chest X-ray shows worsening bilateral opacities. Suspected PNA, unclear origin.   - Serum AM cortisol levels ordered to evaluate adrenal function.   - Chest and Abdominal X-rays today.    #Decubitus ulcer & Heel ulcer  - H/o multiple pressure ulcers (Sacral, R hip, R heel, L heel, R glut)  - ID: switched from Meropenem to Unasyn & amikacin on 10/25  - Started on Vit C and Zinc  - Burn: might consider debridement for decubitus ulcer, will follow up next week  - Podiatry: debridement of heel ulcer on 11/3  - wound care consult placed.     #Hypernatremia  - d/c IV fluids   - 100 mL free water per PEG tube q8hr     #Anemia- likely 2/2 chronic ds  -Hold Eliquis, on prophylactic heparin  -s/p 2 units PRBCs    #CHFpEF exacerbation   - B/L pitting edema  - Lasix 40mg IV one time dose ordered.   - midodrine 10mg Q8hrs due to hypotension    #NSTEMI type 2  -Trops elevated but lower from previous admission  - Repeat ECHO and EKG  - troponin started trending down    #Left eye conjunctivitis  -ofloxacin eye drops    #Anoxic brain injury  - secondary to  Cardiac arrest  -Unresponsive, very poor prognosis  -DNR only for now     #Misc  -DVT prophylaxis:  heparin BID subq  -GI prophylaxis: Protonix BID  -Diet: NPO with tube feed  -Code status: DNR  -Activity: Bed bound  -Dispo: transfer to vent unit

## 2022-10-29 NOTE — PROGRESS NOTE ADULT - ATTENDING COMMENTS
Patient is an 83 y/o Male  with PMH  of h/o Cardiac arrest leading to anoxic brain injury (s/p Trach vent dependent and PEG), H/o Severe Covid PNA, H/o Gram -ve PNA, HTN and BPH presented for Sepsis due to Acute left sided pyelonephritis due to Acinetobacter infection. Hypernatremia, dehydration, [place]. Large pleural effusions b/l.  New marked degenerative changes of left hip.  Patient also found to have multiple pressure decubiti stage 4 and unstagable. Patient was initially admitted to SDU than on 10/29 downgraded to vent unit for further management and tx.    #Sepsis was present on admission due to   # Acute UTI/left sided Pyelonephritis / Acinetobacter baumannii  -as per ID continue IV Unasyn, IV Amikacin tx.  - follow-up susceptibilities for Polymixin and Cefidericol susceptibilities (lab called)   - 10/23 -blood cxs - no growth    # Hypernatremia- improved post IVF hydration, continue daily BMP monitoring    # Volume overload status with diffuse anasarca/extensive pulmonary congestion with effusions  # Troponins elevated on admission due to type 2 demand cardiac ischemic event  - trops trended down, 12 lead EKG- abn T wave changes noted  -  repeat Echo to assess valvular heart function, LVEF%  -due to multiple medical comorbidities patient is not a candidate for aggressive tx, will continue conservative medical management  -10/29- with improved sodium levels, will need to resume on IV diuresis, will give lasix 40 mg IV once daily as tolerated  -strict I/O, daily weight      # Acute on chronic hypoxic respiratory failure SP Trach/vent dependent / b/l pleural effusions /Anasarca  #hx Cardiac Arrest, anoxic brain injury(s/p Trach and PEG)  # hx Severe COVID PNA  -10/29- worsening hypoxia with increased FIO2 to 60%- sat 97%  -10/29 repeated CXR - ARDS like picture with diffuse extensive congestion and infiltrates, will restart on IV diuresis if b/p remains stable with close  serum sodium level monitoring     # severe anemia- s/p transfusion - has hx of chronic Macrocytic anemia     #h/o HTN, currently pt. is Hypotensive requiring Midodrine tx.  - obtain serum am cortisol level  - lopressor to be continued with holding parameters  -awaiting 2d echo   -maintain MAP above 64    #  BPH- pt. has chronic cardoso, due to hypotension will d/c flomax  # multiple stage 4 pressure ulcers top sacrum and Unstageable pressure ulcer to b/l Hips/b/l  heels- infected   - daily wound care , Burn team on board, Podiatry on board, planning for possible heel debridement if patient will remain medically stable  -Offloading/ positional changes    DVT proph- Heparin subc. tx./ GI proph- PPI twice daily    Code status: DNR  10/29 GOC- d/w patient's Daughter patient's very poor prognosis, offered to d/w family possibly CMO transition.     #Progress Note Handoff: restarted on diuresis, monitor electrolytes, monitor pulse ox, repeat 2d echo, patient's remains in very poor prognosis status.   Family discussion: current patient's prognosis and treatment plan was d/w patient's daughter by tel. , she is in agreement with tx. plan Disposition: to be determined    Total time spent to complete patient's bedside assessment, review medical chart, discuss medical plan of care with covering medical team was more than 35 minutes with >50% of time spent face to face with patient, discussion with patient/family and/or coordination of care

## 2022-10-29 NOTE — PROGRESS NOTE ADULT - SUBJECTIVE AND OBJECTIVE BOX
Patient is a 82y old  Male who presents with a chief complaint of pyelonephritis (29 Oct 2022 11:16)        Over Night Events:  ON MV>          ROS:     All ROS are negative except HPI         PHYSICAL EXAM    ICU Vital Signs Last 24 Hrs  T(C): 37 (29 Oct 2022 14:30), Max: 37.3 (28 Oct 2022 20:56)  T(F): 98.6 (29 Oct 2022 14:30), Max: 99.1 (28 Oct 2022 20:56)  HR: 94 (29 Oct 2022 14:54) (77 - 94)  BP: 106/66 (29 Oct 2022 14:30) (89/54 - 106/66)  BP(mean): 65 (29 Oct 2022 00:00) (65 - 65)  ABP: --  ABP(mean): --  RR: 18 (29 Oct 2022 14:30) (18 - 18)  SpO2: 98% (29 Oct 2022 14:54) (96% - 100%)    O2 Parameters below as of 29 Oct 2022 14:30  Patient On (Oxygen Delivery Method): ventilator            CONSTITUTIONAL:  In NAD    ENT:   Airway patent,   Mouth with normal mucosa.   Trach     EYES:   Pupils equal,   Round and reactive to light.    CARDIAC:   Normal rate,   Regular rhythm.        RESPIRATORY:   No wheezing  Bilateral BS  Normal chest expansion  Not tachypneic,  No use of accessory muscles    GASTROINTESTINAL:  Abdomen soft,   Non-tender,   No guarding,   + BS    MUSCULOSKELETAL:   Range of motion is not limited,  No clubbing, cyanosis    NEUROLOGICAL:   Does not follow commands     SKIN:   Skin normal color for race,   No evidence of rash.        10-28-22 @ 07:01  -  10-29-22 @ 07:00  --------------------------------------------------------  IN:    dextrose 5%: 350 mL    Free Water: 400 mL    Glucerna: 720 mL  Total IN: 1470 mL    OUT:    Indwelling Catheter - Urethral (mL): 700 mL  Total OUT: 700 mL    Total NET: 770 mL          LABS:                            9.1    11.26 )-----------( 177      ( 29 Oct 2022 00:50 )             31.4                                               10-29    147<H>  |  102  |  43<H>  ----------------------------<  109<H>  3.4<L>   |  37<H>  |  0.7    Ca    7.5<L>      29 Oct 2022 00:50  Mg     2.2     10-29    TPro  4.9<L>  /  Alb  1.8<L>  /  TBili  0.4  /  DBili  0.2  /  AST  11  /  ALT  7   /  AlkPhos  67  10-29      PT/INR - ( 28 Oct 2022 00:34 )   PT: 14.00 sec;   INR: 1.22 ratio         PTT - ( 28 Oct 2022 00:34 )  PTT:30.7 sec                                           CARDIAC MARKERS ( 28 Oct 2022 00:34 )  x     / 0.26 ng/mL / x     / x     / x                                                LIVER FUNCTIONS - ( 29 Oct 2022 00:50 )  Alb: 1.8 g/dL / Pro: 4.9 g/dL / ALK PHOS: 67 U/L / ALT: 7 U/L / AST: 11 U/L / GGT: x                                                                                               Mode: AC/ CMV (Assist Control/ Continuous Mandatory Ventilation)  RR (machine): 20  TV (machine): 400  FiO2: 50  PEEP: 8  ITime: 1  MAP: 14  PIP: 29                                          MEDICATIONS  (STANDING):  amiKACIN  IVPB 1250 milliGRAM(s) IV Intermittent <User Schedule>  ampicillin/sulbactam  IVPB 9 Gram(s) IV Intermittent every 8 hours  ascorbic acid 500 milliGRAM(s) Oral daily  atorvastatin 10 milliGRAM(s) Oral at bedtime  chlorhexidine 0.12% Liquid 15 milliLiter(s) Oral Mucosa every 12 hours  chlorhexidine 2% Cloths 1 Application(s) Topical daily  collagenase Ointment 1 Application(s) Topical two times a day  Dakins Solution - 1/2 Strength 1 Application(s) Topical two times a day  dextrose 5%. 1000 milliLiter(s) (100 mL/Hr) IV Continuous <Continuous>  dextrose 5%. 1000 milliLiter(s) (50 mL/Hr) IV Continuous <Continuous>  dextrose 50% Injectable 25 Gram(s) IV Push once  dextrose 50% Injectable 12.5 Gram(s) IV Push once  dextrose 50% Injectable 25 Gram(s) IV Push once  epoetin carline-epbx (RETACRIT) Injectable 43501 Unit(s) SubCutaneous <User Schedule>  glucagon  Injectable 1 milliGRAM(s) IntraMuscular once  heparin   Injectable 5000 Unit(s) SubCutaneous every 12 hours  insulin lispro (ADMELOG) corrective regimen sliding scale   SubCutaneous three times a day before meals  levothyroxine 125 MICROGram(s) Oral daily  metoprolol tartrate 12.5 milliGRAM(s) Oral every 12 hours  midodrine. 10 milliGRAM(s) Oral every 8 hours  ofloxacin 0.3% Solution 1 Drop(s) Left EYE two times a day  pantoprazole   Suspension 40 milliGRAM(s) Oral every 12 hours  zinc sulfate 220 milliGRAM(s) Oral daily    MEDICATIONS  (PRN):  acetaminophen     Tablet .. 650 milliGRAM(s) Oral every 6 hours PRN Temp greater or equal to 38C (100.4F), Mild Pain (1 - 3)  albuterol/ipratropium for Nebulization 3 milliLiter(s) Nebulizer every 6 hours PRN Shortness of Breath and/or Wheezing  dextrose Oral Gel 15 Gram(s) Oral once PRN Blood Glucose LESS THAN 70 milliGRAM(s)/deciliter      New X-rays reviewed:                                                                                  ECHO

## 2022-10-29 NOTE — PROGRESS NOTE ADULT - SUBJECTIVE AND OBJECTIVE BOX
CHADWICK VENCES 82y Male  MRN#: [id number]   Hospital Day: 6d    SUBJECTIVE  Patient is a 82y old Male who presents with a chief complaint of sepsis (28 Oct 2022 07:41)  Currently admitted to medicine with the primary diagnosis of Hypernatremia      INTERVAL HPI AND OVERNIGHT EVENTS:  Patient was examined and seen at bedside. This morning he is resting comfortably in bed and reports no issues or overnight events.    OBJECTIVE  PAST MEDICAL & SURGICAL HISTORY  BPH (benign prostatic hyperplasia)    Mild HTN    Cardiac arrest    Anoxic brain injury    2019 novel coronavirus disease (COVID-19)      ALLERGIES:  Allergy Status Unknown    MEDICATIONS:  STANDING MEDICATIONS  amiKACIN  IVPB 1250 milliGRAM(s) IV Intermittent <User Schedule>  ampicillin/sulbactam  IVPB 9 Gram(s) IV Intermittent every 8 hours  ascorbic acid 500 milliGRAM(s) Oral daily  atorvastatin 10 milliGRAM(s) Oral at bedtime  chlorhexidine 0.12% Liquid 15 milliLiter(s) Oral Mucosa every 12 hours  chlorhexidine 2% Cloths 1 Application(s) Topical daily  collagenase Ointment 1 Application(s) Topical two times a day  Dakins Solution - 1/2 Strength 1 Application(s) Topical two times a day  dextrose 5%. 1000 milliLiter(s) IV Continuous <Continuous>  dextrose 5%. 1000 milliLiter(s) IV Continuous <Continuous>  dextrose 5%. 1000 milliLiter(s) IV Continuous <Continuous>  dextrose 50% Injectable 25 Gram(s) IV Push once  dextrose 50% Injectable 12.5 Gram(s) IV Push once  dextrose 50% Injectable 25 Gram(s) IV Push once  epoetin carline-epbx (RETACRIT) Injectable 47330 Unit(s) SubCutaneous <User Schedule>  glucagon  Injectable 1 milliGRAM(s) IntraMuscular once  heparin   Injectable 5000 Unit(s) SubCutaneous every 12 hours  insulin lispro (ADMELOG) corrective regimen sliding scale   SubCutaneous three times a day before meals  levothyroxine 125 MICROGram(s) Oral daily  metoprolol tartrate 12.5 milliGRAM(s) Oral every 12 hours  midodrine. 10 milliGRAM(s) Oral every 8 hours  ofloxacin 0.3% Solution 1 Drop(s) Left EYE two times a day  pantoprazole   Suspension 40 milliGRAM(s) Oral every 12 hours  tamsulosin 0.4 milliGRAM(s) Oral at bedtime  zinc sulfate 220 milliGRAM(s) Oral daily    PRN MEDICATIONS  acetaminophen     Tablet .. 650 milliGRAM(s) Oral every 6 hours PRN  albuterol/ipratropium for Nebulization 3 milliLiter(s) Nebulizer every 6 hours PRN  dextrose Oral Gel 15 Gram(s) Oral once PRN      VITAL SIGNS: Last 24 Hours  T(C): 36.4 (29 Oct 2022 05:00), Max: 37.3 (28 Oct 2022 15:54)  T(F): 97.5 (29 Oct 2022 05:00), Max: 99.2 (28 Oct 2022 15:54)  HR: 93 (29 Oct 2022 08:53) (77 - 103)  BP: 97/50 (29 Oct 2022 05:00) (89/54 - 97/60)  BP(mean): 65 (29 Oct 2022 00:00) (65 - 76)  RR: 18 (29 Oct 2022 05:00) (18 - 20)  SpO2: 96% (29 Oct 2022 08:53) (96% - 100%)    LABS:                        9.1    11.26 )-----------( 177      ( 29 Oct 2022 00:50 )             31.4     10-29    147<H>  |  102  |  43<H>  ----------------------------<  109<H>  3.4<L>   |  37<H>  |  0.7    Ca    7.5<L>      29 Oct 2022 00:50  Mg     2.2     10-29    TPro  4.9<L>  /  Alb  1.8<L>  /  TBili  0.4  /  DBili  0.2  /  AST  11  /  ALT  7   /  AlkPhos  67  10-29    PT/INR - ( 28 Oct 2022 00:34 )   PT: 14.00 sec;   INR: 1.22 ratio         PTT - ( 28 Oct 2022 00:34 )  PTT:30.7 sec          CARDIAC MARKERS ( 28 Oct 2022 00:34 )  x     / 0.26 ng/mL / x     / x     / x          RADIOLOGY:  < from: Xray Foot AP + Lateral, Right (10.28.22 @ 11:12) >  IMPRESSION:  No radiographic evidence of osteomyelitis.    < end of copied text >  < from: VA Duplex Lower Extrem Arterial, Bilat (10.27.22 @ 16:30) >  Impression:    Normal arterial flow in the bilateral lower extremities.    < end of copied text >  < from: Xray Chest 1 View- PORTABLE-Routine (Xray Chest 1 View- PORTABLE-Routine .) (10.26.22 @ 13:55) >    Impression:  Increased bilateral opacities      < end of copied text >      PHYSICAL EXAM:  CONSTITUTIONAL: No acute distress, AAOx3  HEAD: Atraumatic, normocephalic  EYES: EOM intact, PERRLA, conjunctiva and sclera clear  ENT: moist mucous membranes  PULMONARY: Clear to auscultation bilaterally  CARDIOVASCULAR: Regular rate and rhythm  GASTROINTESTINAL: Soft, non-tender, non-distended; bowel sounds present  MUSCULOSKELETAL: no edema  NEUROLOGY: non-focal  SKIN: warm and dry

## 2022-10-30 NOTE — PROGRESS NOTE ADULT - ASSESSMENT
IMPRESSION:    sepsis present on admission  UTI/ acinetobacter   Hypernatremia  Chronic hypoxic respiratory failure SP Trach  Anoxic brain injury  HO Cardiac Arrest  HO Severe COVID PNA  Sacral ulcer      SUGGEST:    CNS:  As needed morphine for comfort    HEENT: Oral care.  Trach care     PULMONARY: aspiration precaution.  Wean FIO2.  Keep Sao2 92 to 96%.  Vent dependent     CARDIOVASCULAR:   Avoid overload.  midodrine 10 q 8.     GI: PPI q 12, feeding.  Bowel Regimen.    RENAL:  Follow up lytes.  Correct as needed.     INFECTIOUS DISEASE:  ABX per ID    HEMATOLOGICAL: DVT prophylaxis, MOnitor CBC and Coags     ENDOCRINE:  Follow up FS.  Insulin protocol if needed    MUSCULOSKELETAL: Wound care per burn     DNR    Very poor overall prognosis    Vent unit

## 2022-10-30 NOTE — PROGRESS NOTE ADULT - SUBJECTIVE AND OBJECTIVE BOX
Patient is a 82y old  Male who presents with a chief complaint of pneumonia/pyelonephritis (30 Oct 2022 11:33)        Over Night Events:  On MV.  Off pressors.          ROS:     All ROS are negative except HPI         PHYSICAL EXAM    ICU Vital Signs Last 24 Hrs  T(C): 37.1 (30 Oct 2022 14:37), Max: 37.1 (30 Oct 2022 14:37)  T(F): 98.7 (30 Oct 2022 14:37), Max: 98.7 (30 Oct 2022 14:37)  HR: 94 (30 Oct 2022 17:28) (63 - 98)  BP: 102/66 (30 Oct 2022 17:28) (102/66 - 136/64)  BP(mean): --  ABP: --  ABP(mean): --  RR: 18 (30 Oct 2022 14:37) (18 - 18)  SpO2: 100% (30 Oct 2022 14:37) (97% - 100%)    O2 Parameters below as of 30 Oct 2022 14:37  Patient On (Oxygen Delivery Method): ventilator            CONSTITUTIONAL:  IN NAD    ENT:   Airway patent,   Mouth with normal mucosa.   Trach     EYES:   Pupils equal,   Round and reactive to light.    CARDIAC:   Normal rate,   Regular rhythm.    No edema      RESPIRATORY:   No wheezing  Bilateral BS  Normal chest expansion  Not tachypneic,  No use of accessory muscles    GASTROINTESTINAL:  Abdomen soft,   Non-tender,   No guarding,   + BS    MUSCULOSKELETAL:   Range of motion is not limited,  No clubbing, cyanosis    NEUROLOGICAL:   Does not follow commands .    SKIN:   Skin normal color for race,       PSYCHIATRIC:   No apparent risk to self or others.        10-29-22 @ 07:01  -  10-30-22 @ 07:00  --------------------------------------------------------  IN:  Total IN: 0 mL    OUT:    Indwelling Catheter - Urethral (mL): 1450 mL  Total OUT: 1450 mL    Total NET: -1450 mL      10-30-22 @ 07:01  -  10-30-22 @ 20:07  --------------------------------------------------------  IN:    Free Water: 400 mL    Glucerna: 720 mL  Total IN: 1120 mL    OUT:    Indwelling Catheter - Urethral (mL): 300 mL  Total OUT: 300 mL    Total NET: 820 mL          LABS:                            8.3    14.74 )-----------( 176      ( 30 Oct 2022 06:12 )             28.4                                               10-30    143  |  96<L>  |  41<H>  ----------------------------<  112<H>  3.4<L>   |  34<H>  |  0.8    Ca    7.4<L>      30 Oct 2022 06:12  Mg     2.0     10-30    TPro  4.9<L>  /  Alb  1.8<L>  /  TBili  0.4  /  DBili  0.2  /  AST  11  /  ALT  7   /  AlkPhos  67  10-29                                                                                           LIVER FUNCTIONS - ( 29 Oct 2022 00:50 )  Alb: 1.8 g/dL / Pro: 4.9 g/dL / ALK PHOS: 67 U/L / ALT: 7 U/L / AST: 11 U/L / GGT: x                                                                                               Mode: AC/ CMV (Assist Control/ Continuous Mandatory Ventilation)  RR (machine): 20  TV (machine): 400  FiO2: 50  PEEP: 8  ITime: 1  MAP: 14  PIP: 28                                          MEDICATIONS  (STANDING):  amiKACIN  IVPB 1250 milliGRAM(s) IV Intermittent <User Schedule>  ampicillin/sulbactam  IVPB 9 Gram(s) IV Intermittent every 8 hours  ascorbic acid 500 milliGRAM(s) Oral daily  atorvastatin 10 milliGRAM(s) Oral at bedtime  calcium carbonate   Suspension 750 milliGRAM(s) Enteral Tube three times a day  chlorhexidine 0.12% Liquid 15 milliLiter(s) Oral Mucosa every 12 hours  chlorhexidine 2% Cloths 1 Application(s) Topical daily  collagenase Ointment 1 Application(s) Topical two times a day  Dakins Solution - 1/2 Strength 1 Application(s) Topical two times a day  dextrose 5%. 1000 milliLiter(s) (100 mL/Hr) IV Continuous <Continuous>  dextrose 5%. 1000 milliLiter(s) (50 mL/Hr) IV Continuous <Continuous>  dextrose 50% Injectable 25 Gram(s) IV Push once  dextrose 50% Injectable 12.5 Gram(s) IV Push once  dextrose 50% Injectable 25 Gram(s) IV Push once  epoetin carline-epbx (RETACRIT) Injectable 24799 Unit(s) SubCutaneous <User Schedule>  glucagon  Injectable 1 milliGRAM(s) IntraMuscular once  heparin   Injectable 5000 Unit(s) SubCutaneous every 12 hours  insulin lispro (ADMELOG) corrective regimen sliding scale   SubCutaneous three times a day before meals  levothyroxine 125 MICROGram(s) Oral daily  metoprolol tartrate 12.5 milliGRAM(s) Oral every 12 hours  midodrine. 10 milliGRAM(s) Oral every 8 hours  ofloxacin 0.3% Solution 1 Drop(s) Left EYE two times a day  pantoprazole   Suspension 40 milliGRAM(s) Oral every 12 hours  zinc sulfate 220 milliGRAM(s) Oral daily    MEDICATIONS  (PRN):  acetaminophen     Tablet .. 650 milliGRAM(s) Oral every 6 hours PRN Temp greater or equal to 38C (100.4F), Mild Pain (1 - 3)  ALBUTerol    90 MICROgram(s) HFA Inhaler 4 Puff(s) Inhalation every 6 hours PRN Shortness of Breath and/or Wheezing  albuterol/ipratropium for Nebulization 3 milliLiter(s) Nebulizer every 6 hours PRN Shortness of Breath and/or Wheezing  dextrose Oral Gel 15 Gram(s) Oral once PRN Blood Glucose LESS THAN 70 milliGRAM(s)/deciliter      New X-rays reviewed:                                                                                  ECHO

## 2022-10-30 NOTE — PROGRESS NOTE ADULT - SUBJECTIVE AND OBJECTIVE BOX
VANGIECHADWICK  [facility] T2-3A 020 A ([facility] T2-3[facility])    Patient was evaluated and examined  by bedside, remains non-verbal, on vent support, persistent diffuse body edema, no fever      REVIEW OF SYSTEMS: unable to obtain, patient is lethargic, non-verbal        T(C): , Max: 37 (10-29-22 @ 14:30)  HR: 98 (10-30-22 @ 08:41)  BP: 136/64 (10-30-22 @ 00:18)  RR: 18 (10-29-22 @ 20:00)  SpO2: 99% (10-30-22 @ 08:41)  CAPILLARY BLOOD GLUCOSE      POCT Blood Glucose.: 118 mg/dL (30 Oct 2022 07:50)  POCT Blood Glucose.: 134 mg/dL (29 Oct 2022 22:12)  POCT Blood Glucose.: 124 mg/dL (29 Oct 2022 16:34)      PHYSICAL EXAM:  General: NAD, lethargic, non-verbal,  patient is laying comfortably in bed on vent support  HEENT: AT, NC, Supple, NO JVD, NO CB, trach present  Lungs: decreased breath sounds  B/L, no wheezing, no rhonchi  CVS: normal S1, S2, RRR, NO M/G/R  Abdomen: abdominal wall edema, abdominal distention, bowel sounds present, peg present  : testicular and penile edema, cardoso present  Extremities: diffuse extensive plus 3 pitting  edema present, no clubbing, no cyanosis, positive peripheral pulses b/l  Neuro: lethargic, non-verbal state, not responding to verbal stimuli  Skin: extensive decubiti present on b/l buttocks, sacral/heels, lower extremities present      LAB  CBC  Date: 10-30-22 @ 06:12  Mean cell Dlegwvvfci99.9  Mean cell Hemoglobin Conc29.2  Mean cell Volum 95.6  Platelet count-Automate 176  RBC Count 2.97  Red Cell Distrib Width19.2  WBC Count14.74  % Albumin, Urine--  Hematocrit 28.4  Hemoglobin 8.3  CBC  Date: 10-29-22 @ 00:50  Mean cell Hwjjofkgsj35.3  Mean cell Hemoglobin Conc29.0  Mean cell Volum 97.8  Platelet count-Automate 177  RBC Count 3.21  Red Cell Distrib Width19.7  WBC Count11.26  % Albumin, Urine--  Hematocrit 31.4  Hemoglobin 9.1          BMP  10-30-22 @ 06:12  Blood Gas Arterial-Calcium,Ionized--  Blood Urea Nitrogen, Serum 41 mg/dL<H> [10 - 20]  Carbon Dioxide, Serum34 mmol/L<H> [17 - 32]  Chloride, Serum96 mmol/L<L> [98 - 110]  Creatinie, Serum0.8 mg/dL [0.7 - 1.5]  Glucose, Xgzyx021 mg/dL<H> [70 - 99]  Potassium, Serum3.4 mmol/L<L> [3.5 - 5.0]  Sodium, Serum 143 mmol/L [135 - 146]  BMP  10-29-22 @ 00:50  Blood Gas Arterial-Calcium,Ionized--  Blood Urea Nitrogen, Serum 43 mg/dL<H> [10 - 20]  Carbon Dioxide, Serum37 mmol/L<H> [17 - 32]  Chloride, Xiypb190 mmol/L [98 - 110]  Creatinie, Serum0.7 mg/dL [0.7 - 1.5]  Glucose, Jznyv518 mg/dL<H> [70 - 99]  Potassium, Serum3.4 mmol/L<L> [3.5 - 5.0]  Sodium, Serum 147 mmol/L<H> [135 - 146]          Microbiology:    Culture - Blood (collected 10-24-22 @ 00:00)  Source: .Blood Blood-Peripheral  Final Report (10-29-22 @ 09:00):    No Growth Final    Culture - Urine (collected 10-23-22 @ 05:45)  Source: Clean Catch Clean Catch (Midstream)  Final Report (10-28-22 @ 23:54):    >100,000 CFU/ml Acinetobacter baumannii/nosocomialis group    Cefiderocol interpretations based on FDA breakpoints.  Organism: Acinetobacter baumannii/nosocomialis group  Acinetobacter baumannii/nosocomialis group  Acinetobacter baumannii/nosocomialis group (10-28-22 @ 23:54)  Organism: Acinetobacter baumannii/nosocomialis group (10-28-22 @ 23:54)      -  Polymyxin B: R 4      Method Type: ETEST  Organism: Acinetobacter baumannii/nosocomialis group (10-28-22 @ 23:54)      -  Cefiderocol: S      -  Piperacillin/Tazobactam: R      Method Type: KB  Organism: Acinetobacter baumannii/nosocomialis group (10-28-22 @ 23:54)      -  Amikacin: S <=16      -  Ampicillin/Sulbactam: R >16/8      -  Cefepime: R >16      -  Ceftazidime: S 4      -  Ceftriaxone: I 32      -  Ciprofloxacin: R >2      -  Gentamicin: S 4      -  Imipenem: R >8      -  Levofloxacin: R >4      -  Meropenem: R >8      -  Tigecycline: <=2      -  Tobramycin: S <=2      -  Trimethoprim/Sulfamethoxazole: R >2/38      Method Type: ALEXSANDRA    Culture - Blood (collected 10-23-22 @ 05:05)  Source: .Blood Blood  Final Report (10-28-22 @ 13:01):    No Growth Final        Medications:  acetaminophen     Tablet .. 650 milliGRAM(s) Oral every 6 hours PRN  ALBUTerol    90 MICROgram(s) HFA Inhaler 4 Puff(s) Inhalation every 6 hours PRN  albuterol/ipratropium for Nebulization 3 milliLiter(s) Nebulizer every 6 hours PRN  amiKACIN  IVPB 1250 milliGRAM(s) IV Intermittent <User Schedule>  ampicillin/sulbactam  IVPB 9 Gram(s) IV Intermittent every 8 hours  ascorbic acid 500 milliGRAM(s) Oral daily  atorvastatin 10 milliGRAM(s) Oral at bedtime  calcium carbonate   Suspension 750 milliGRAM(s) Enteral Tube three times a day  chlorhexidine 0.12% Liquid 15 milliLiter(s) Oral Mucosa every 12 hours  chlorhexidine 2% Cloths 1 Application(s) Topical daily  collagenase Ointment 1 Application(s) Topical two times a day  Dakins Solution - 1/2 Strength 1 Application(s) Topical two times a day  dextrose 5%. 1000 milliLiter(s) IV Continuous <Continuous>  dextrose 5%. 1000 milliLiter(s) IV Continuous <Continuous>  dextrose 50% Injectable 25 Gram(s) IV Push once  dextrose 50% Injectable 12.5 Gram(s) IV Push once  dextrose 50% Injectable 25 Gram(s) IV Push once  dextrose Oral Gel 15 Gram(s) Oral once PRN  epoetin carline-epbx (RETACRIT) Injectable 09482 Unit(s) SubCutaneous <User Schedule>  furosemide   Injectable 40 milliGRAM(s) IV Push once  glucagon  Injectable 1 milliGRAM(s) IntraMuscular once  heparin   Injectable 5000 Unit(s) SubCutaneous every 12 hours  insulin lispro (ADMELOG) corrective regimen sliding scale   SubCutaneous three times a day before meals  levothyroxine 125 MICROGram(s) Oral daily  metoprolol tartrate 12.5 milliGRAM(s) Oral every 12 hours  midodrine. 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 Hypernatremia, dehydration, [place]. Large pleural effusions b/l.  New marked degenerative changes of left hip.  Patient also found to have multiple pressure decubiti stage 4 and unstagable. Patient was initially admitted to SDU than on 10/29 downgraded to vent unit for further management and tx.    #Sepsis was present on admission due to   # Acute UTI/left sided Pyelonephritis / Acinetobacter baumannii  -as per ID continue IV Unasyn, IV Amikacin tx.  - follow-up susceptibilities for Polymixin and Cefidericol susceptibilities (lab called)   - 10/23 -blood cxs - no growth    # Hypernatremia- improved post IVF hydration, d/c IVF  -today's Sodium level 143, continue daily BMP monitoring    # Volume overload status with diffuse anasarca/extensive pulmonary congestion with effusions  # Troponins elevated on admission due to type 2 demand cardiac ischemic event  - trops trended down, 12 lead EKG- abn T wave changes noted  -  repeat Echo to assess valvular heart function, LVEF%  -due to multiple medical comorbidities patient is not a candidate for aggressive tx, will continue conservative medical management  -10/29- with improved sodium levels, will need to resume on IV diuresis, will give lasix 40 mg IV once daily as tolerated  -10/30- will give 1 more dose of IV Lasix 40 mg , with close BMP monitoring   -strict I/O, daily weight      # Acute on chronic hypoxic respiratory failure SP Trach/vent dependent / b/l pleural effusions /Anasarca  #hx Cardiac Arrest, anoxic brain injury(s/p Trach and PEG)  # hx Severe COVID PNA  -10/29 repeated CXR - ARDS like picture with diffuse extensive congestion and infiltrates,  restarted on IV diuresis   -10/30- FIO2 to 30%- sat 98%    # severe anemia- s/p transfusion - has hx of chronic Macrocytic anemia   -hg - 8.3    #h/o HTN, currently pt. is Hypotensive requiring Midodrine tx.  - obtain serum am cortisol level  - lopressor to be continued with holding parameters  -awaiting 2d echo   -maintain MAP above 64    #  BPH- pt. has chronic cardoso, due to hypotension d/c flomax  # multiple stage 4 pressure ulcers top sacrum and Unstageable pressure ulcer to b/l Hips/b/l  heels- infected   - daily wound care , Burn team on board, Podiatry on board, planning for possible heel debridement if patient will remain medically stable  -Offloading/ positional changes    DVT proph- Heparin subc. tx./ GI proph- PPI twice daily    Code status: DNR  10/29 GOC- d/w patient's Daughter patient's very poor prognosis, offered to d/w family possibly CMO transition.     #Progress Note Handoff: continue on diuresis, monitor electrolytes, monitor pulse ox, repeat 2d echo, patient's remains in very poor prognosis status.   Family discussion: current patient's prognosis and treatment plan was d/w patient's daughter by tel. , she is in agreement with tx. plan Disposition: to be determined    Total time spent to complete patient's bedside assessment, review medical chart, discuss medical plan of care with covering medical team was more than 35 minutes with >50% of time spent face to face with patient, discussion with patient/family and/or coordination of care .

## 2022-10-31 NOTE — PROGRESS NOTE ADULT - SUBJECTIVE AND OBJECTIVE BOX
CHADWICK VENCES  82y, Male  Allergy: Allergy Status Unknown      LOS  8d    CHIEF COMPLAINT: sob. dec hb (31 Oct 2022 06:00)      INTERVAL EVENTS/HPI  - No acute events overnight  - T(F): , Max: 98.7 (10-30-22 @ 14:37  - WBC Count: 13.14 (10-31-22 @ 06:22)  WBC Count: 14.74 (10-30-22 @ 06:12)     - Creatinine, Serum: 0.8 (10-31-22 @ 06:22)  Creatinine, Serum: 0.8 (10-30-22 @ 06:12)       ROS  unable to obtain history secondary to patient's mental status and/or sedation      VITALS:  T(F): 98, Max: 98.7 (10-30-22 @ 14:37)  HR: 91  BP: 106/65  RR: 18Vital Signs Last 24 Hrs  T(C): 36.7 (31 Oct 2022 04:30), Max: 37.1 (30 Oct 2022 14:37)  T(F): 98 (31 Oct 2022 04:30), Max: 98.7 (30 Oct 2022 14:37)  HR: 91 (31 Oct 2022 04:30) (89 - 98)  BP: 106/65 (31 Oct 2022 04:30) (102/66 - 106/65)  BP(mean): --  RR: 18 (31 Oct 2022 04:30) (18 - 18)  SpO2: 98% (31 Oct 2022 04:30) (97% - 100%)    Parameters below as of 31 Oct 2022 00:11  Patient On (Oxygen Delivery Method): ventilator  O2 Flow (L/min): 60      PHYSICAL EXAM:  Gen: vent/trach   HEENT: Normocephalic, atraumatic  Neck: supple, no lymphadenopathy  CV: Regular rate & regular rhythm  Lungs: decreased BS at bases, no fremitus  Abdomen: Soft, BS present  Ext: Warm, well perfused  Neuro: non focal, awake  Skin: full thickness wound to sacrum - bilateral hips with necrotic tissue, left hip mostly necrotic tissue - noted bone exposure of sacrum   Lines: no phlebitis    FH: Non-contributory  Social Hx: Non-contributory    TESTS & MEASUREMENTS:                        8.7    13.14 )-----------( 168      ( 31 Oct 2022 06:22 )             29.6     10-31    140  |  94<L>  |  37<H>  ----------------------------<  116<H>  3.6   |  37<H>  |  0.8    Ca    7.4<L>      31 Oct 2022 06:22  Mg     2.0     10-30              Culture - Blood (collected 10-24-22 @ 00:00)  Source: .Blood Blood-Peripheral  Final Report (10-29-22 @ 09:00):    No Growth Final    Culture - Urine (collected 10-23-22 @ 05:45)  Source: Clean Catch Clean Catch (Midstream)  Final Report (10-28-22 @ 23:54):    >100,000 CFU/ml Acinetobacter baumannii/nosocomialis group    Cefiderocol interpretations based on FDA breakpoints.  Organism: Acinetobacter baumannii/nosocomialis group  Acinetobacter baumannii/nosocomialis group  Acinetobacter baumannii/nosocomialis group (10-28-22 @ 23:54)  Organism: Acinetobacter baumannii/nosocomialis group (10-28-22 @ 23:54)      -  Polymyxin B: R 4      Method Type: ETEST  Organism: Acinetobacter baumannii/nosocomialis group (10-28-22 @ 23:54)      -  Cefiderocol: S      -  Piperacillin/Tazobactam: R      Method Type: KB  Organism: Acinetobacter baumannii/nosocomialis group (10-28-22 @ 23:54)      -  Amikacin: S <=16      -  Ampicillin/Sulbactam: R >16/8      -  Cefepime: R >16      -  Ceftazidime: S 4      -  Ceftriaxone: I 32      -  Ciprofloxacin: R >2      -  Gentamicin: S 4      -  Imipenem: R >8      -  Levofloxacin: R >4      -  Meropenem: R >8      -  Tigecycline: <=2      -  Tobramycin: S <=2      -  Trimethoprim/Sulfamethoxazole: R >2/38      Method Type: ALEXSANDRA    Culture - Blood (collected 10-23-22 @ 05:05)  Source: .Blood Blood  Final Report (10-28-22 @ 13:01):    No Growth Final            INFECTIOUS DISEASES TESTING  COVID-19 PCR: NotDetec (10-29-22 @ 18:42)  MRSA PCR Result.: Negative (10-26-22 @ 09:14)  Legionella Antigen, Urine: Negative (10-24-22 @ 02:44)  Procalcitonin, Serum: 5.93 (10-24-22 @ 00:00)  COVID-19 PCR: NotDetec (10-23-22 @ 05:45)  COVID-19 PCR: NotDetec (03-02-22 @ 13:30)  Procalcitonin, Serum: 0.51 (02-27-22 @ 07:33)  COVID-19 PCR: Detected (02-20-22 @ 15:33)  COVID-19 PCR: NotDetec (02-15-22 @ 07:30)  Procalcitonin, Serum: 0.09 (02-12-22 @ 06:21)  COVID-19 PCR: Detected (02-10-22 @ 13:00)  Procalcitonin, Serum: 0.04 (02-09-22 @ 06:03)  Procalcitonin, Serum: 0.03 (02-01-22 @ 06:25)  Procalcitonin, Serum: 0.07 (01-31-22 @ 06:15)  Procalcitonin, Serum: 0.10 (01-30-22 @ 10:45)  Procalcitonin, Serum: 0.11 (01-29-22 @ 07:17)  Procalcitonin, Serum: 0.08 (01-26-22 @ 06:15)  Procalcitonin, Serum: 0.09 (01-25-22 @ 05:58)  Procalcitonin, Serum: 0.11 (01-23-22 @ 11:17)  MRSA PCR Result.: Negative (01-23-22 @ 11:00)  Procalcitonin, Serum: 0.44 (01-19-22 @ 06:30)  Procalcitonin, Serum: 2.32 (01-16-22 @ 06:56)  Rapid RVP Result: Detected (01-15-22 @ 01:25)  Procalcitonin, Serum: 0.10 (01-15-22 @ 01:25)      INFLAMMATORY MARKERS  C-Reactive Protein, Serum: 80.3 mg/L (10-24-22 @ 00:00)      RADIOLOGY & ADDITIONAL TESTS:  I have personally reviewed the last available Chest xray  CXR      CT      CARDIOLOGY TESTING  12 Lead ECG:   Ventricular Rate 93 BPM    Atrial Rate 93 BPM    P-R Interval 192 ms    QRS Duration 130 ms    Q-T Interval 386 ms    QTC Calculation(Bazett) 479 ms    P Axis 32 degrees    R Axis 20 degrees    T Axis 265 degrees    Diagnosis Line Sinus rhythm withPremature atrial complexes  Non-specific intra-ventricular conduction block  T wave abnormality, consider inferior ischemia  Abnormal ECG    Confirmed by Malachi Francois ([id number]) on 10/29/2022 12:34:39 PM (10-29-22 @ 09:00)  12 Lead ECG:   Ventricular Rate 94 BPM    Atrial Rate 94 BPM    P-R Interval 158 ms    QRS Duration 116 ms    Q-T Interval 392 ms    QTC Calculation(Bazett) 490 ms    P Axis -1 degrees    R Axis -18 degrees    T Axis 166 degrees    Diagnosis Line Normal sinus rhythm  Left ventricular hypertrophy with QRS widening and repolarization abnormality  Prolonged QT  Abnormal ECG    Confirmed by Lotus Braun MD ([id number]) on 10/23/2022 3:00:38 PM (10-23-22 @ 09:58)      MEDICATIONS  amiKACIN  IVPB 1250 IV Intermittent <User Schedule>  ampicillin/sulbactam  IVPB 9 IV Intermittent every 8 hours  ascorbic acid 500 Oral daily  atorvastatin 10 Oral at bedtime  calcium carbonate   Suspension 750 Enteral Tube three times a day  chlorhexidine 0.12% Liquid 15 Oral Mucosa every 12 hours  chlorhexidine 2% Cloths 1 Topical daily  collagenase Ointment 1 Topical two times a day  Dakins Solution - 1/2 Strength 1 Topical two times a day  dextrose 5%. 1000 IV Continuous <Continuous>  dextrose 5%. 1000 IV Continuous <Continuous>  dextrose 50% Injectable 25 IV Push once  dextrose 50% Injectable 12.5 IV Push once  dextrose 50% Injectable 25 IV Push once  epoetin carline-epbx (RETACRIT) Injectable 91372 SubCutaneous <User Schedule>  furosemide   Injectable 40 IV Push once  glucagon  Injectable 1 IntraMuscular once  heparin   Injectable 5000 SubCutaneous every 12 hours  insulin lispro (ADMELOG) corrective regimen sliding scale  SubCutaneous three times a day before meals  levothyroxine 125 Oral daily  metoprolol tartrate 12.5 Oral every 12 hours  midodrine. 10 Oral every 8 hours  ofloxacin 0.3% Solution 1 Left EYE two times a day  pantoprazole   Suspension 40 Oral every 12 hours  zinc sulfate 220 Oral daily      WEIGHT  Weight (kg): 90.8 (10-29-22 @ 02:30)  Creatinine, Serum: 0.8 mg/dL (10-31-22 @ 06:22)      ANTIBIOTICS:  amiKACIN  IVPB 1250 milliGRAM(s) IV Intermittent <User Schedule>  ampicillin/sulbactam  IVPB 9 Gram(s) IV Intermittent every 8 hours      All available historical records have been reviewed

## 2022-10-31 NOTE — PROGRESS NOTE ADULT - SUBJECTIVE AND OBJECTIVE BOX
CHADWICK VENCES 82y Male  MRN#: [id number]   Hospital Day: 8d    SUBJECTIVE  Patient is a 82y old Male who presents with a chief complaint of resp. failure (31 Oct 2022 12:01)  Currently admitted to medicine with the primary diagnosis of Hypernatremia      INTERVAL HPI AND OVERNIGHT EVENTS:  Patient was examined and seen at bedside. This morning he is resting comfortably in bed and reports no issues or overnight events.    OBJECTIVE  PAST MEDICAL & SURGICAL HISTORY  BPH (benign prostatic hyperplasia)    Mild HTN    Cardiac arrest    Anoxic brain injury    2019 novel coronavirus disease (COVID-19)      ALLERGIES:  Allergy Status Unknown    MEDICATIONS:  STANDING MEDICATIONS  amiKACIN  IVPB 1250 milliGRAM(s) IV Intermittent <User Schedule>  ampicillin/sulbactam  IVPB 9 Gram(s) IV Intermittent every 8 hours  ascorbic acid 500 milliGRAM(s) Oral daily  atorvastatin 10 milliGRAM(s) Oral at bedtime  calcium carbonate   Suspension 750 milliGRAM(s) Enteral Tube three times a day  chlorhexidine 0.12% Liquid 15 milliLiter(s) Oral Mucosa every 12 hours  chlorhexidine 2% Cloths 1 Application(s) Topical daily  collagenase Ointment 1 Application(s) Topical two times a day  Dakins Solution - 1/2 Strength 1 Application(s) Topical two times a day  dextrose 5%. 1000 milliLiter(s) IV Continuous <Continuous>  dextrose 5%. 1000 milliLiter(s) IV Continuous <Continuous>  dextrose 50% Injectable 25 Gram(s) IV Push once  dextrose 50% Injectable 12.5 Gram(s) IV Push once  dextrose 50% Injectable 25 Gram(s) IV Push once  epoetin carline-epbx (RETACRIT) Injectable 15133 Unit(s) SubCutaneous <User Schedule>  furosemide   Injectable 40 milliGRAM(s) IV Push once  glucagon  Injectable 1 milliGRAM(s) IntraMuscular once  heparin   Injectable 5000 Unit(s) SubCutaneous every 12 hours  insulin lispro (ADMELOG) corrective regimen sliding scale   SubCutaneous three times a day before meals  levothyroxine 125 MICROGram(s) Oral daily  metoprolol tartrate 12.5 milliGRAM(s) Oral every 12 hours  midodrine. 10 milliGRAM(s) Oral every 8 hours  ofloxacin 0.3% Solution 1 Drop(s) Left EYE two times a day  pantoprazole   Suspension 40 milliGRAM(s) Oral every 12 hours  zinc sulfate 220 milliGRAM(s) Oral daily    PRN MEDICATIONS  acetaminophen     Tablet .. 650 milliGRAM(s) Oral every 6 hours PRN  ALBUTerol    90 MICROgram(s) HFA Inhaler 4 Puff(s) Inhalation every 6 hours PRN  albuterol/ipratropium for Nebulization 3 milliLiter(s) Nebulizer every 6 hours PRN  dextrose Oral Gel 15 Gram(s) Oral once PRN      VITAL SIGNS: Last 24 Hours  T(C): 36.9 (31 Oct 2022 12:00), Max: 36.9 (31 Oct 2022 12:00)  T(F): 98.5 (31 Oct 2022 12:00), Max: 98.5 (31 Oct 2022 12:00)  HR: 91 (31 Oct 2022 12:00) (89 - 98)  BP: 113/73 (31 Oct 2022 12:00) (102/66 - 113/73)  BP(mean): --  RR: 18 (31 Oct 2022 04:30) (18 - 18)  SpO2: 98% (31 Oct 2022 12:00) (97% - 100%)    LABS:                        8.7    13.14 )-----------( 168      ( 31 Oct 2022 06:22 )             29.6     10-31    140  |  94<L>  |  37<H>  ----------------------------<  116<H>  3.6   |  37<H>  |  0.8    Ca    7.4<L>      31 Oct 2022 06:22  Mg     2.0     10-30      RADIOLOGY:    < from: Xray Kidney Ureter Bladder (10.30.22 @ 06:39) >  Findings/  impression:    Inferior abdomen not visualized. Previously noted PEG tube not   identified. No evidence of abnormal bowel dilatation. Degenerative   changes.    < end of copied text >  < from: Xray Abdomen 1 View PORTABLE -Routine (Xray Abdomen 1 View PORTABLE -Routine .) (10.29.22 @ 14:08) >    IMPRESSION: Normal bowel gas pattern      < end of copied text >  < from: Xray Chest 1 View-PORTABLE IMMEDIATE (Xray Chest 1 View-PORTABLE IMMEDIATE .) (10.29.22 @ 09:23) >  IMPRESSION:    Increasing bilateral opacities.    < end of copied text >      PHYSICAL EXAM:  CONSTITUTIONAL: Functionally quadriplegic. No acute distress, A&O x0 at baseline  HEAD: Atraumatic, normocephalic  EYES: EOM intact, PERRLA, conjunctiva and sclera clear  ENT: moist mucous membranes  PULMONARY: Chronically vented.  CARDIOVASCULAR: Regular rate and rhythm  GASTROINTESTINAL: Soft, non-tender, non-distended; bowel sounds present  MUSCULOSKELETAL: Lower leg edema.   NEUROLOGY: non-focal  SKIN: warm and dry      ASSESSMENT and PLAN    81 y/o male  w/Pmhx of Cardiac arrest leading to anoxic brain injury (s/p Trach and PEG), H/o Severe Covid PNA, H/o Gram -ve PNA, HTN and BPH presented for abnormal labs in the nursing home. At baseline, patient is unresponsive, makes random non-purposeful movement, had trach (Vent dependent) and PEG.      #Sepsis at nursing home  - As per SNF documents, patient was being treated for sepsis of unknown origin with Meropenem 1g q8 started on 10/21 for 10 days  - Suspected Pyelonephritis. UA grossly positive with WBC 13k  - Urine cultures positive for Acinetobacter Baumannii. WBC 13k toda  - Chest X-ray shows worsening bilateral opacities. Suspected PNA, unclear origin.   - Dr. Acuña had goals of care conversation with family given poor prognosis. Pt DNR but family would like to continue medical treatment.      #Decubitus ulcer & Heel ulcer  - H/o multiple pressure ulcers (Sacral, R hip, R heel, L heel, R glut)  - c/w Unasyn & amikacin (started on 10/25)  - Started on Vit C and Zinc  - Burn: might consider debridement for decubitus ulcer, will follow up next week  - Podiatry: debridement of heel ulcer on 11/3  - wound care consult placed.     #Hypernatremia  - 100 mL free water per PEG tube q8hr   - Daily BMP's to monitor Na levels.  - Daily IV lasix 40mg if Na allows    #Anemia- likely 2/2 chronic ds  -Hold Eliquis, on prophylactic heparin  -s/p 2 units PRBCs    #CHFpEF exacerbation   - B/L pitting edema   - Lasix 40mg IV daily based on Na levels   - midodrine 10mg Q8hrs due to hypotension    #NSTEMI type 2  -Trops elevated but lower from previous admission  - Repeat ECHO and EKG  - troponin started trending down    #Left eye conjunctivitis  -ofloxacin eye drops    #Anoxic brain injury  - secondary to  Cardiac arrest  -Unresponsive, very poor prognosis  -DNR only for now     #Misc  -DVT prophylaxis:  heparin BID subq  - Ca supplemented.   -GI prophylaxis: Protonix BID  -Diet: NPO with tube feed  -Code status: DNR  -Activity: Bed bound  -Dispo: transfer to vent unit

## 2022-10-31 NOTE — PROGRESS NOTE ADULT - ASSESSMENT
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     #Sepsis on admission  - Blood Cx 10/23 NG    #UTI  - CT abd/pelvis 10/23 with radiologic evidence of Left Pyelonephritis  - Urine Cx 10/23 MDR Acinetobacter baumanii      #Sacral Ulcer   #Right Heel Ulcer    #Bilateral Pleural Effusion - unable to rule out pneumonia, although suspect urinary source  #Cardiac Arrest with Anoxic brain injury s/p trach/PEG    Recommendations  - continue unasyn 9g q 8 hours + Amikacin 1250 mg q 48 hours   - trend creatine closely   - will follow-up plans for debridement of heel and sacral ulcer  - continue to trend WBC   - poor prognosis with MDR infection     Please call or message on [employer] Teams if with any questions.  Spectra [phone].

## 2022-10-31 NOTE — PROGRESS NOTE ADULT - SUBJECTIVE AND OBJECTIVE BOX
VANGIECHADWICK  [facility] T2-3A 020 A ([facility] T2-3A)    Patient was evaluated and examined  by bedside, remains on vent support, not responding to verbal stimuli, hypotensive on midodrine tx, peg feedings, remains in volume overload status with extensive diffuse body anasarca.       REVIEW OF SYSTEMS: unable to obtain, patient is non-verbal , confused , lethargic         T(C): , Max: 37.1 (10-30-22 @ 14:37)  HR: 91 (10-31-22 @ 04:30)  BP: 106/65 (10-31-22 @ 04:30)  RR: 18 (10-31-22 @ 04:30)  SpO2: 98% (10-31-22 @ 04:30)  CAPILLARY BLOOD GLUCOSE      POCT Blood Glucose.: 140 mg/dL (31 Oct 2022 07:55)  POCT Blood Glucose.: 133 mg/dL (31 Oct 2022 06:34)  POCT Blood Glucose.: 119 mg/dL (30 Oct 2022 22:01)  POCT Blood Glucose.: 137 mg/dL (30 Oct 2022 16:34)  POCT Blood Glucose.: 91 mg/dL (30 Oct 2022 12:04)      PHYSICAL EXAM:  General: NAD, lethargic, non-verbal,  patient is laying comfortably in bed on vent support  HEENT: AT, NC, Supple, NO JVD, NO CB, trach present  Lungs: decreased breath sounds  B/L, no wheezing, no rhonchi  CVS: normal S1, S2, RRR, NO M/G/R  Abdomen: abdominal wall edema, abdominal distention, bowel sounds present, peg present  : testicular and penile edema, cardoso present  Extremities: diffuse extensive plus 3 pitting  edema present, no clubbing, no cyanosis, positive peripheral pulses b/l  Neuro: lethargic, non-verbal state, not responding to verbal stimuli  Skin: extensive decubiti present on b/l buttocks, sacral/heels, lower extremities present        LAB  CBC  Date: 10-31-22 @ 06:22  Mean cell Mxzopjcdlv18.9  Mean cell Hemoglobin Conc29.4  Mean cell Volum 94.9  Platelet count-Automate 168  RBC Count 3.12  Red Cell Distrib Width19.3  WBC Count13.14  % Albumin, Urine--  Hematocrit 29.6  Hemoglobin 8.7  CBC  Date: 10-30-22 @ 06:12  Mean cell Evstqcnrwc69.9  Mean cell Hemoglobin Conc29.2  Mean cell Volum 95.6  Platelet count-Automate 176  RBC Count 2.97  Red Cell Distrib Width19.2  WBC Count14.74  % Albumin, Urine--  Hematocrit 28.4  Hemoglobin 8.3      BMP  10-31-22 @ 06:22  Blood Gas Arterial-Calcium,Ionized--  Blood Urea Nitrogen, Serum 37 mg/dL<H> [10 - 20]  Carbon Dioxide, Serum37 mmol/L<H> [17 - 32]  Chloride, Serum94 mmol/L<L> [98 - 110]  Creatinie, Serum0.8 mg/dL [0.7 - 1.5]  Glucose, [id number] mg/dL<H> [70 - 99]  Potassium, Serum3.6 mmol/L [3.5 - 5.0]  Sodium, Serum 140 mmol/L [135 - 146]  BMP  10-30-22 @ 06:12  Blood Gas Arterial-Calcium,Ionized--  Blood Urea Nitrogen, Serum 41 mg/dL<H> [10 - 20]  Carbon Dioxide, Serum34 mmol/L<H> [17 - 32]  Chloride, Serum96 mmol/L<L> [98 - 110]  Creatinie, Serum0.8 mg/dL [0.7 - 1.5]  Glucose, [id number] mg/dL<H> [70 - 99]  Potassium, Serum3.4 mmol/L<L> [3.5 - 5.0]  Sodium, Serum 143 mmol/L [135 - 146]        Microbiology:    Culture - Blood (collected 10-24-22 @ 00:00)  Source: .Blood Blood-Peripheral  Final Report (10-29-22 @ 09:00):    No Growth Final    Culture - Urine (collected 10-23-22 @ 05:45)  Source: Clean Catch Clean Catch (Midstream)  Final Report (10-28-22 @ 23:54):    >100,000 CFU/ml Acinetobacter baumannii/nosocomialis group    Cefiderocol interpretations based on FDA breakpoints.  Organism: Acinetobacter baumannii/nosocomialis group  Acinetobacter baumannii/nosocomialis group  Acinetobacter baumannii/nosocomialis group (10-28-22 @ 23:54)  Organism: Acinetobacter baumannii/nosocomialis group (10-28-22 @ 23:54)      -  Polymyxin B: R 4      Method Type: ETEST  Organism: Acinetobacter baumannii/nosocomialis group (10-28-22 @ 23:54)      -  Cefiderocol: S      -  Piperacillin/Tazobactam: R      Method Type: KB  Organism: Acinetobacter baumannii/nosocomialis group (10-28-22 @ 23:54)      -  Amikacin: S <=16      -  Ampicillin/Sulbactam: R >16/8      -  Cefepime: R >16      -  Ceftazidime: S 4      -  Ceftriaxone: I 32      -  Ciprofloxacin: R >2      -  Gentamicin: S 4      -  Imipenem: R >8      -  Levofloxacin: R >4      -  Meropenem: R >8      -  Tigecycline: <=2      -  Tobramycin: S <=2      -  Trimethoprim/Sulfamethoxazole: R >2/38      Method Type: ALEXSANDRA    Culture - Blood (collected 10-23-22 @ 05:05)  Source: .Blood Blood  Final Report (10-28-22 @ 13:01):    No Growth Final        Medications:  acetaminophen     Tablet .. 650 milliGRAM(s) Oral every 6 hours PRN  ALBUTerol    90 MICROgram(s) HFA Inhaler 4 Puff(s) Inhalation every 6 hours PRN  albuterol/ipratropium for Nebulization 3 milliLiter(s) Nebulizer every 6 hours PRN  amiKACIN  IVPB 1250 milliGRAM(s) IV Intermittent <User Schedule>  ampicillin/sulbactam  IVPB 9 Gram(s) IV Intermittent every 8 hours  ascorbic acid 500 milliGRAM(s) Oral daily  atorvastatin 10 milliGRAM(s) Oral at bedtime  calcium carbonate   Suspension 750 milliGRAM(s) Enteral Tube three times a day  chlorhexidine 0.12% Liquid 15 milliLiter(s) Oral Mucosa every 12 hours  chlorhexidine 2% Cloths 1 Application(s) Topical daily  collagenase Ointment 1 Application(s) Topical two times a day  Dakins Solution - 1/2 Strength 1 Application(s) Topical two times a day  dextrose 5%. 1000 milliLiter(s) IV Continuous <Continuous>  dextrose 5%. 1000 milliLiter(s) IV Continuous <Continuous>  dextrose 50% Injectable 25 Gram(s) IV Push once  dextrose 50% Injectable 12.5 Gram(s) IV Push once  dextrose 50% Injectable 25 Gram(s) IV Push once  dextrose Oral Gel 15 Gram(s) Oral once PRN  epoetin carline-epbx (RETACRIT) Injectable 37319 Unit(s) SubCutaneous <User Schedule>  furosemide   Injectable 40 milliGRAM(s) IV Push once  glucagon  Injectable 1 milliGRAM(s) IntraMuscular once  heparin   Injectable 5000 Unit(s) SubCutaneous every 12 hours  insulin lispro (ADMELOG) corrective regimen sliding scale   SubCutaneous three times a day before meals  levothyroxine 125 MICROGram(s) Oral daily  metoprolol tartrate 12.5 milliGRAM(s) Oral every 12 hours  midodrine. 10 milliGRAM(s) Oral every 8 hours  ofloxacin 0.3% Solution 1 Drop(s) Left EYE two times a day  pantoprazole   Suspension 40 milliGRAM(s) Oral every 12 hours  zinc sulfate 220 milliGRAM(s) Oral daily        Assessment and Plan:  Patient is an 81 y/o Male  with PMH  of h/o Cardiac arrest leading to anoxic brain injury (s/p Trach vent dependent and PEG), H/o Severe Covid PNA, H/o Gram -ve PNA, HTN and BPH presented for Sepsis due to Acute left sided pyelonephritis due to Acinetobacter infection. Hypernatremia, dehydration, [place]. Large pleural effusions b/l.  New marked degenerative changes of left hip.  Patient also found to have multiple pressure decubiti stage 4 and unstagable. Patient was initially admitted to SDU than on 10/29 downgraded to vent unit for further management and tx.    #Sepsis was present on admission due to   # Acute UTI/left sided Pyelonephritis / Acinetobacter baumannii  -as per ID continue IV Unasyn, IV Amikacin tx.  - follow-up susceptibilities for Polymixin and Cefidericol susceptibilities (lab called)   - 10/23 -blood cxs - no growth    # Hypernatremia- improved post IVF hydration  -today's Sodium level 140, continue daily BMP monitoring    # Volume overload status with diffuse anasarca/extensive pulmonary congestion with effusions  # Troponins elevated on admission due to type 2 demand cardiac ischemic event  - trops trended down, 12 lead EKG- abn T wave changes noted  -  repeat Echo to assess valvular heart function, LVEF%  -due to multiple medical comorbidities patient is not a candidate for aggressive tx, will continue conservative medical management  -from 10/29- started on daily IV Lasix 40 mg tx, for volume OD status with diffuse body anasarca  -strict I/O, daily weight      # Acute on chronic hypoxic respiratory failure SP Trach on 50 to 60% FIO2 at baseline /vent dependent / b/l pleural effusions /Anasarca  #hx Cardiac Arrest, anoxic brain injury(s/p Trach and PEG)  # hx Severe COVID PNA  -10/29 repeated CXR - ARDS like picture with diffuse extensive congestion and infiltrates,  restarted on IV diuresis   -10/31- FIO2 to 60%- sat 98%  VENT ORDERS: Mechanical Vent - PRVC Settings: Routine Ventilator Mode:  AC/CMV  Targeted SpO2 Range (%): 88-97   Tidal Volume:  400  Respiratory Rate:  20   PEEP:  8   FiO2:  60  Mechanical Vent O2 - Further Vent Management as per Pulmonary team       # severe anemia- s/p transfusion - has hx of chronic Macrocytic anemia -hg - 8.7, continue monitoring CBC    #h/o HTN, currently pt. is Hypotensive requiring Midodrine tx.  - obtain serum am cortisol level- collected on 10/31  - lopressor to be continued with holding parameters  -awaiting 2d echo   -maintain MAP above 64    #  BPH- pt. has chronic cardoso, due to hypotension d/c flomax  # multiple stage 4 pressure ulcers top sacrum and Unstageable pressure ulcer to b/l Hips/b/l  heels- infected   - daily wound care , Burn team on board, Podiatry on board, planning for possible heel debridement if patient will remain medically stable  -Offloading/ positional changes    DVT proph- Heparin subc. tx./ GI proph- PPI twice daily    Code status: DNR  10/29 and 10/30 [facility]- d/w patient's Daughter patient's very poor prognosis, offered to d/w family possibly CMO transition. Patient's daughter is aware of patient's poor prognosis and for now requests patient to be continued with aggressive medical management.    #Progress Note Handoff: continue on diuresis, monitor electrolytes, monitor pulse ox, repeat 2d echo, patient's remains in very poor prognosis status.   Family discussion: current patient's prognosis and treatment plan was d/w patient's daughter by tel. , she is in agreement with tx. plan Disposition: to be determined    Total time spent to complete patient's bedside assessment, review medical chart, discuss medical plan of care with covering medical team was more than 35 minutes with >50% of time spent face to face with patient, discussion with patient/family and/or coordination of care .

## 2022-10-31 NOTE — PROGRESS NOTE ADULT - SUBJECTIVE AND OBJECTIVE BOX
Over Night Events: events noted, vent dependant on 60%, Afebrile, CXR/ Flat plate reviewed    PHYSICAL EXAM    ICU Vital Signs Last 24 Hrs  T(C): 36.7 (31 Oct 2022 04:30), Max: 37.1 (30 Oct 2022 14:37)  T(F): 98 (31 Oct 2022 04:30), Max: 98.7 (30 Oct 2022 14:37)  HR: 91 (31 Oct 2022 04:30) (89 - 98)  BP: 106/65 (31 Oct 2022 04:30) (102/66 - 106/65)  RR: 18 (31 Oct 2022 04:30) (18 - 18)  SpO2: 98% (31 Oct 2022 04:30) (97% - 100%)    O2 Parameters below as of 31 Oct 2022 00:11  Patient On (Oxygen Delivery Method): ventilator  O2 Flow (L/min): 60          General: ill looking  HEENT trach  Lungs: Bilateral Rhonchi  Cardiovascular: Regular   Abdomen: mildly distended  sacral ulcer  not following commands      10-29-22 @ 07:01  -  10-30-22 @ 07:00  --------------------------------------------------------  IN:  Total IN: 0 mL    OUT:    Indwelling Catheter - Urethral (mL): 1450 mL  Total OUT: 1450 mL    Total NET: -1450 mL      10-30-22 @ 07:01  -  10-31-22 @ 06:01  --------------------------------------------------------  IN:    Free Water: 400 mL    Glucerna: 720 mL  Total IN: 1120 mL    OUT:    Indwelling Catheter - Urethral (mL): 300 mL  Total OUT: 300 mL    Total NET: 820 mL          LABS:                          8.3    14.74 )-----------( 176      ( 30 Oct 2022 06:12 )             28.4                                               10-30    143  |  96<L>  |  41<H>  ----------------------------<  112<H>  3.4<L>   |  34<H>  |  0.8    Ca    7.4<L>      30 Oct 2022 06:12  Mg     2.0     10-30                                                                                                                                                                                   Mode: AC/ CMV (Assist Control/ Continuous Mandatory Ventilation)  RR (machine): 20  TV (machine): 400  FiO2: 50  PEEP: 8  ITime: 1  MAP: 15  PIP: 32                                          MEDICATIONS  (STANDING):  amiKACIN  IVPB 1250 milliGRAM(s) IV Intermittent <User Schedule>  ampicillin/sulbactam  IVPB 9 Gram(s) IV Intermittent every 8 hours  ascorbic acid 500 milliGRAM(s) Oral daily  atorvastatin 10 milliGRAM(s) Oral at bedtime  calcium carbonate   Suspension 750 milliGRAM(s) Enteral Tube three times a day  chlorhexidine 0.12% Liquid 15 milliLiter(s) Oral Mucosa every 12 hours  chlorhexidine 2% Cloths 1 Application(s) Topical daily  collagenase Ointment 1 Application(s) Topical two times a day  Dakins Solution - 1/2 Strength 1 Application(s) Topical two times a day  dextrose 5%. 1000 milliLiter(s) (100 mL/Hr) IV Continuous <Continuous>  dextrose 5%. 1000 milliLiter(s) (50 mL/Hr) IV Continuous <Continuous>  dextrose 50% Injectable 25 Gram(s) IV Push once  dextrose 50% Injectable 12.5 Gram(s) IV Push once  dextrose 50% Injectable 25 Gram(s) IV Push once  epoetin carline-epbx (RETACRIT) Injectable 87460 Unit(s) SubCutaneous <User Schedule>  glucagon  Injectable 1 milliGRAM(s) IntraMuscular once  heparin   Injectable 5000 Unit(s) SubCutaneous every 12 hours  insulin lispro (ADMELOG) corrective regimen sliding scale   SubCutaneous three times a day before meals  levothyroxine 125 MICROGram(s) Oral daily  metoprolol tartrate 12.5 milliGRAM(s) Oral every 12 hours  midodrine. 10 milliGRAM(s) Oral every 8 hours  ofloxacin 0.3% Solution 1 Drop(s) Left EYE two times a day  pantoprazole   Suspension 40 milliGRAM(s) Oral every 12 hours  zinc sulfate 220 milliGRAM(s) Oral daily    MEDICATIONS  (PRN):  acetaminophen     Tablet .. 650 milliGRAM(s) Oral every 6 hours PRN Temp greater or equal to 38C (100.4F), Mild Pain (1 - 3)  ALBUTerol    90 MICROgram(s) HFA Inhaler 4 Puff(s) Inhalation every 6 hours PRN Shortness of Breath and/or Wheezing  albuterol/ipratropium for Nebulization 3 milliLiter(s) Nebulizer every 6 hours PRN Shortness of Breath and/or Wheezing  dextrose Oral Gel 15 Gram(s) Oral once PRN Blood Glucose LESS THAN 70 milliGRAM(s)/deciliter      CXR reviewed

## 2022-11-01 NOTE — PROGRESS NOTE ADULT - SUBJECTIVE AND OBJECTIVE BOX
CHADWICK VENCES 82y Male  MRN#: [id number]   Hospital Day: 9d    SUBJECTIVE  Patient is a 82y old Male who presents with a chief complaint of sepsis (01 Nov 2022 11:51)  Currently admitted to medicine with the primary diagnosis of Hypernatremia      INTERVAL HPI AND OVERNIGHT EVENTS:  Patient was examined and seen at bedside. This morning he is resting comfortably in bed and reports no issues or overnight events.    OBJECTIVE  PAST MEDICAL & SURGICAL HISTORY  BPH (benign prostatic hyperplasia)    Mild HTN    Cardiac arrest    Anoxic brain injury    2019 novel coronavirus disease (COVID-19)      ALLERGIES:  Allergy Status Unknown    MEDICATIONS:  STANDING MEDICATIONS  amiKACIN  IVPB 1250 milliGRAM(s) IV Intermittent <User Schedule>  ampicillin/sulbactam  IVPB 9 Gram(s) IV Intermittent every 8 hours  ascorbic acid 500 milliGRAM(s) Oral daily  atorvastatin 10 milliGRAM(s) Oral at bedtime  calcium carbonate   Suspension 750 milliGRAM(s) Enteral Tube three times a day  chlorhexidine 0.12% Liquid 15 milliLiter(s) Oral Mucosa every 12 hours  chlorhexidine 2% Cloths 1 Application(s) Topical daily  collagenase Ointment 1 Application(s) Topical two times a day  Dakins Solution - 1/2 Strength 1 Application(s) Topical two times a day  dextrose 5%. 1000 milliLiter(s) IV Continuous <Continuous>  dextrose 5%. 1000 milliLiter(s) IV Continuous <Continuous>  dextrose 50% Injectable 25 Gram(s) IV Push once  dextrose 50% Injectable 12.5 Gram(s) IV Push once  dextrose 50% Injectable 25 Gram(s) IV Push once  epoetin carline-epbx (RETACRIT) Injectable 13323 Unit(s) SubCutaneous <User Schedule>  glucagon  Injectable 1 milliGRAM(s) IntraMuscular once  heparin   Injectable 5000 Unit(s) SubCutaneous every 12 hours  insulin lispro (ADMELOG) corrective regimen sliding scale   SubCutaneous three times a day before meals  levothyroxine 125 MICROGram(s) Oral daily  metoprolol tartrate 12.5 milliGRAM(s) Oral every 12 hours  midodrine. 10 milliGRAM(s) Oral every 8 hours  ofloxacin 0.3% Solution 1 Drop(s) Left EYE two times a day  pantoprazole   Suspension 40 milliGRAM(s) Oral every 12 hours  zinc sulfate 220 milliGRAM(s) Oral daily    PRN MEDICATIONS  acetaminophen     Tablet .. 650 milliGRAM(s) Oral every 6 hours PRN  ALBUTerol    90 MICROgram(s) HFA Inhaler 4 Puff(s) Inhalation every 6 hours PRN  albuterol/ipratropium for Nebulization 3 milliLiter(s) Nebulizer every 6 hours PRN  dextrose Oral Gel 15 Gram(s) Oral once PRN  ketorolac   Injectable 15 milliGRAM(s) IV Push every 6 hours PRN  morphine  - Injectable 2 milliGRAM(s) IV Push every 6 hours PRN      VITAL SIGNS: Last 24 Hours  T(C): 35.9 (01 Nov 2022 12:15), Max: 36.8 (01 Nov 2022 04:40)  T(F): 96.6 (01 Nov 2022 12:15), Max: 98.3 (01 Nov 2022 04:40)  HR: 80 (01 Nov 2022 13:55) (77 - 94)  BP: 110/68 (01 Nov 2022 12:15) (97/66 - 110/68)  BP(mean): --  RR: 18 (01 Nov 2022 04:40) (18 - 18)  SpO2: 100% (01 Nov 2022 13:55) (96% - 100%)    LABS:                        9.7    17.96 )-----------( 168      ( 01 Nov 2022 05:37 )             32.8     10-31    140  |  94<L>  |  37<H>  ----------------------------<  116<H>  3.6   |  37<H>  |  0.8    Ca    7.4<L>      31 Oct 2022 06:22      RADIOLOGY:  < from: Xray Chest 1 View- PORTABLE-Urgent (Xray Chest 1 View- PORTABLE-Urgent .) (11.01.22 @ 10:06) >    Impression:    Right worse than left near-complete opacification, similar to prior.    < end of copied text >      PHYSICAL EXAM:  CONSTITUTIONAL: Fluid overloaded. Occasional resp distress. Not A&O  HEAD: Atraumatic, normocephalic  EYES: EOM intact, PERRLA, conjunctiva and sclera clear  ENT: moist mucous membranes  PULMONARY: Resp distress  CARDIOVASCULAR: Regular rate and rhythm  GASTROINTESTINAL: Soft, non-tender, non-distended; bowel sounds present  MUSCULOSKELETAL: no edema  NEUROLOGY: non-focal  SKIN: warm and dry      ASSESSMENT and PLAN    81 y/o male  w/Pmhx of Cardiac arrest leading to anoxic brain injury (s/p Trach and PEG), H/o Severe Covid PNA, H/o Gram -ve PNA, HTN and BPH presented for abnormal labs in the nursing home. At baseline, patient is unresponsive, makes random non-purposeful movement, had trach (Vent dependent) and PEG.      #Sepsis at nursing home  - As per SNF documents, patient was being treated for sepsis of unknown origin with Meropenem 1g q8 started on 10/21 for 10 days  - Suspected Pyelonephritis. UA grossly positive with WBC 13k  - Urine cultures positive for Acinetobacter Baumannii. WBC 13k toda  - Chest X-ray shows worsening bilateral opacities. Suspected PNA, unclear origin.   - Patient requiring constant suctioning. Occasional desaturation events. Family made aware.    - Dr. Acuña had goals of care conversation with family given poor prognosis. Pt DNR but family would like to continue medical treatment.      #Decubitus ulcer & Heel ulcer  - H/o multiple pressure ulcers (Sacral, R hip, R heel, L heel, R glut)  - c/w Unasyn & amikacin (started on 10/25)  - Started on Vit C and Zinc  - Burn: might consider debridement for decubitus ulcer, will follow up next week  - Podiatry: debridement of heel ulcer on 11/3  - wound care consult placed.     #Hypernatremia  - 100 mL free water per PEG tube q8hr   - Daily BMP's to monitor Na levels.  - Daily IV lasix 40mg if Na allows    #Anemia- likely 2/2 chronic ds  -Hold Eliquis, on prophylactic heparin  -s/p 2 units PRBCs    #CHFpEF exacerbation   - B/L pitting edema   - c/w Lasix 40mg IV daily   - midodrine 10mg Q8hrs due to hypotension  - HF team consulted, given patient's bad prognosis no intervention attempted.    #NSTEMI type 2  -Trops elevated but lower from previous admission  - Repeat ECHO and EKG  - troponin started trending down    #Left eye conjunctivitis  -ofloxacin eye drops    #Anoxic brain injury  - secondary to  Cardiac arrest  -Unresponsive, very poor prognosis  -DNR only for now     #Misc  -DVT prophylaxis:  heparin BID subq  - Ca supplemented.   -GI prophylaxis: Protonix BID  -Diet: NPO with tube feed  -Code status: DNR  -Activity: Bed bound  -Dispo: transfer to vent unit

## 2022-11-01 NOTE — PROGRESS NOTE ADULT - ASSESSMENT
A/P:  81 y/o Male  with PMH  of h/o Cardiac arrest leading to anoxic brain injury (s/p Trach vent dependent and PEG), H/o Severe Covid PNA, H/o Gram -ve PNA, HTN and BPH presented for Sepsis due to Acute left sided pyelonephritis due to Acinetobacter infection. Hypernatremia, dehydration, [place]. Large pleural effusions b/l.  New marked degenerative changes of left hip.  Patient also found to have multiple pressure decubiti stage 4 and unstagable. Patient was initially admitted to SDU than on 10/29 downgraded to vent unit for further management and tx.    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  #troponemia--likely type 2 demand ischemia   #Acute on chronic respiratory Failure s/p Trach (baseline FiO2 50-60%) and PEG  #h/o severe COVID-19 pneumonia  #h/o cardiac Arrest- MI- anoxic brain injury  -ID following--continue with iv abx (unasyn and amikacin)  -f/u bcx (10/23 neg to date) and urine cx (A baumannii)  -f/u culture susceptibilities  -monitor wbc and fever curve (wbc up to 17.9 today)  -bcx neg to date  -repeat ECHO 10/31: EF 20%, grade 2 diastolic dysfunction, mild pulm hypertension=---this is  worsening heart function----will f/u with Heart failure team for additional recommendations  -continue with diuresis---will give lasix iv 20 mg today and monitor i/o and daily weights  -vent support : PRVC Settings: Routine Ventilator Mode:  AC/CMV  Targeted SpO2 Range (%): 88-97---current FiO2 100%  -continue medical management (patient with multiple medical comorbidities) and overall very poor prognosis- DNR  -episodes of desaturation this morning- aggressive suction/ vent support/ monitor o2 sat  -f/.u with pulmonary team  -stat CXR with persistant bilateral congestive changes/effusions- relatively unchanged from prior cxr  -f/u bmp   -additional iv lasix 20 mg x1 today---conitnue to monitor closely      # Hypernatremia- improved post IVF hydration  -today's Sodium level 140, continue daily BMP monitoring      #h/o HTN, currently pt. is Hypotensive requiring Midodrine tx.  - lopressor to be continued with holding parameters  -maintain MAP >65  -on midodrine 10 mg via peg q8hr    #  BPH- pt. has chronic cardoso, due to hypotension d/c flomax  # multiple stage 4 pressure ulcers top sacrum and Unstageable pressure ulcer to b/l Hips/b/l  heels- infected   - daily wound care continue  -burn team and podiatry following  -may need further heel debridement if patient becomes more medically stable  -at this time continue frequent position change and local wound care    DVT/GI ppx  overall very poor prognosis  Code status: DNR      11/1-spoke with daughter Judy ([phone])---updated on current clinical status and understands overall very poor prognosis-  informed on increasing oxygen needs (FiO2 of 100% now with periods of desaturation this morning)- says she will not be coming into hospital today and to continue to notify her of any changes- patient to remain DNR but wants to continue medical managment of acute issues    #Progress Note Handoff: remains very poor prognosis for recovery- remains on vent support and continue management as per above      Total time spent to complete patient's bedside assessment, review medical chart, discuss medical plan of care with covering medical team was more than 35 minutes with >50% of time spent face to face with patient, discussion with patient/family and/or coordination of care      Cimzia Pregnancy And Lactation Text: This medication crosses the placenta but can be considered safe in certain situations. Cimzia may be excreted in breast milk.

## 2022-11-01 NOTE — PROGRESS NOTE ADULT - ASSESSMENT
IMPRESSION:    sepsis present on admission  UTI/ acinetobacter   Acute on chronic hypoxemic resp failure  Chronic hypoxic respiratory failure SP Trach  Anoxic brain injury  HO Cardiac Arrest  HO Severe COVID PNA  Sacral ulcer      SUGGEST:    CNS:  As needed morphine for comfort    HEENT: Oral care.  Trach care     PULMONARY: aspiration precaution.  Wean FIO2.  Keep Sao2 92 to 96%.  Vent dependent . repeat CXR    CARDIOVASCULAR:   Avoid overload.  midodrine 10 q 8. diuresis as tolerated    GI: PPI q 12, feeding.  Bowel Regimen.    RENAL:  Follow up lytes.  Correct as needed.     INFECTIOUS DISEASE:  ABX per ID    HEMATOLOGICAL: DVT prophylaxis, MOnitor CBC and Coags     ENDOCRINE:  Follow up FS.  Insulin protocol if needed    MUSCULOSKELETAL: Wound care per burn     DNR  Palliative care eval  Very poor overall prognosis    Vent unit

## 2022-11-01 NOTE — PROGRESS NOTE ADULT - SUBJECTIVE AND OBJECTIVE BOX
Over Night Events: events noted, vent dependant, ID reviewed    PHYSICAL EXAM    ICU Vital Signs Last 24 Hrs  T(C): 36.8 (01 Nov 2022 04:40), Max: 36.9 (31 Oct 2022 12:00)  T(F): 98.3 (01 Nov 2022 04:40), Max: 98.5 (31 Oct 2022 12:00)  HR: 86 (01 Nov 2022 04:40) (84 - 93)  BP: 109/67 (01 Nov 2022 04:40) (97/66 - 113/73  RR: 18 (01 Nov 2022 04:40) (18 - 18)  SpO2: 100% (01 Nov 2022 04:40) (98% - 100%)    O2 Parameters below as of 01 Nov 2022 04:40  Patient On (Oxygen Delivery Method): ventilator            General: ill looking  HEENT: trach  Lungs: Bilateral Rhonchi  Cardiovascular: YOANA 2.6  Abdomen: Soft, Positive BS  sacral ulcer      10-30-22 @ 07:01  -  10-31-22 @ 07:00  --------------------------------------------------------  IN:    Free Water: 400 mL    Glucerna: 720 mL  Total IN: 1120 mL    OUT:    Indwelling Catheter - Urethral (mL): 1300 mL  Total OUT: 1300 mL    Total NET: -180 mL      10-31-22 @ 07:01  -  11-01-22 @ 05:52  --------------------------------------------------------  IN:    Free Water: 600 mL    Glucerna: 1080 mL  Total IN: 1680 mL    OUT:    Indwelling Catheter - Urethral (mL): 775 mL    Rectal Tube (mL): 200 mL  Total OUT: 975 mL    Total NET: 705 mL          LABS:                          8.7    13.14 )-----------( 168      ( 31 Oct 2022 06:22 )             29.6                                               10-31    140  |  94<L>  |  37<H>  ----------------------------<  116<H>  3.6   |  37<H>  |  0.8    Ca    7.4<L>      31 Oct 2022 06:22  Mg     2.0     10-30                                                                                                                                                                                   Mode: AC/ CMV (Assist Control/ Continuous Mandatory Ventilation)  RR (machine): 20  TV (machine): 400  FiO2: 60  PEEP: 8  ITime: 1  MAP: 16  PIP: 34                                          MEDICATIONS  (STANDING):  amiKACIN  IVPB 1250 milliGRAM(s) IV Intermittent <User Schedule>  ampicillin/sulbactam  IVPB 9 Gram(s) IV Intermittent every 8 hours  ascorbic acid 500 milliGRAM(s) Oral daily  atorvastatin 10 milliGRAM(s) Oral at bedtime  calcium carbonate   Suspension 750 milliGRAM(s) Enteral Tube three times a day  chlorhexidine 0.12% Liquid 15 milliLiter(s) Oral Mucosa every 12 hours  chlorhexidine 2% Cloths 1 Application(s) Topical daily  collagenase Ointment 1 Application(s) Topical two times a day  Dakins Solution - 1/2 Strength 1 Application(s) Topical two times a day  dextrose 5%. 1000 milliLiter(s) (100 mL/Hr) IV Continuous <Continuous>  dextrose 5%. 1000 milliLiter(s) (50 mL/Hr) IV Continuous <Continuous>  dextrose 50% Injectable 25 Gram(s) IV Push once  dextrose 50% Injectable 12.5 Gram(s) IV Push once  dextrose 50% Injectable 25 Gram(s) IV Push once  epoetin carline-epbx (RETACRIT) Injectable 62220 Unit(s) SubCutaneous <User Schedule>  furosemide    Tablet 40 milliGRAM(s) Oral daily  glucagon  Injectable 1 milliGRAM(s) IntraMuscular once  heparin   Injectable 5000 Unit(s) SubCutaneous every 12 hours  insulin lispro (ADMELOG) corrective regimen sliding scale   SubCutaneous three times a day before meals  levothyroxine 125 MICROGram(s) Oral daily  metoprolol tartrate 12.5 milliGRAM(s) Oral every 12 hours  midodrine. 10 milliGRAM(s) Oral every 8 hours  ofloxacin 0.3% Solution 1 Drop(s) Left EYE two times a day  pantoprazole   Suspension 40 milliGRAM(s) Oral every 12 hours  zinc sulfate 220 milliGRAM(s) Oral daily    MEDICATIONS  (PRN):  acetaminophen     Tablet .. 650 milliGRAM(s) Oral every 6 hours PRN Temp greater or equal to 38C (100.4F), Mild Pain (1 - 3)  ALBUTerol    90 MICROgram(s) HFA Inhaler 4 Puff(s) Inhalation every 6 hours PRN Shortness of Breath and/or Wheezing  albuterol/ipratropium for Nebulization 3 milliLiter(s) Nebulizer every 6 hours PRN Shortness of Breath and/or Wheezing  dextrose Oral Gel 15 Gram(s) Oral once PRN Blood Glucose LESS THAN 70 milliGRAM(s)/deciliter

## 2022-11-01 NOTE — PROGRESS NOTE ADULT - SUBJECTIVE AND OBJECTIVE BOX
Patient is a 82y old  Male who presents with a chief complaint of anemia/ sepsis (01 Nov 2022 05:52)    HPI:  81 YO M w/Pmhx of Cardiac arrest leading to anoxic brain injury(s/p Trach and PEG), H/o Severe Covid PNA, H/o Gram -ve PNA, HTN and BPH presented for abnormal labs in the nursing home. At baseline, patient is unresponsive, makes random non-purposeful movement, had trach(Vent dependent) and PEG.    On 10/22, labs in the SNF showed anemia and uremia, also patient was tachycardic to 101 and tachypneic to 22, was sent to ED for evaluation.    In the MOLST form from the SNF, patient is DNR/DNI and CMO, but when I called the daughter Judy Jackson, she wanted patient to be DNR, but also wanted all other intervention to be done for now.    In the ED,  patient had WBC 13K, Hb 7, Na 152, K 5.3, Bicarb 43, BUN 94, Trop 0.38, ABG showed metabolic alkalosis, UA was grossly positive(with pus in the urine), CXR showed increased B/L opacity.   (23 Oct 2022 08:00)    PAST MEDICAL & SURGICAL HISTORY:  BPH (benign prostatic hyperplasia)  Mild HTN  Cardiac arrest  Anoxic brain injury  2019 novel coronavirus disease (COVID-19)      patient seen and examined independently on morning rounds for the first time today, chart reviewed and discussed with the medicine resident and on interdisciplinary rounds.    having brief episodes of desaturation on vent- unresponsive- overall very poor prognosis- continued to have generalized anasarca    DNR    Hospital Day #9    Vital Signs Last 24 Hrs  T(C): 36.8 (01 Nov 2022 04:40), Max: 36.9 (31 Oct 2022 12:00)  T(F): 98.3 (01 Nov 2022 04:40), Max: 98.5 (31 Oct 2022 12:00)  HR: 77 (01 Nov 2022 07:41) (77 - 93)  BP: 109/67 (01 Nov 2022 04:40) (97/66 - 113/73)  BP(mean): --  RR: 18 (01 Nov 2022 04:40) (18 - 18)  SpO2: 100% (01 Nov 2022 07:41) (98% - 100%)    Parameters below as of 01 Nov 2022 04:40  Patient On (Oxygen Delivery Method): ventilator    PE:  GEN-lethargic, nonverbal- trach on MV support- unresponsive- eyes closed- generalized anasarca  PULM- decreased air entry bilat bases, no wheezing  CVS- +s1/s2 RRR  GI- soft NT ND +PEG site c/d/i, abdominal generalized edema  EXT- 3+ edema bilateral upper and lower ext    - cardoso- generalized scrotal and penile swelling                          9.7    17.96 )-----------( 168      ( 01 Nov 2022 05:37 )             32.8     10-31    140  |  94<L>  |  37<H>  ----------------------------<  116<H>  3.6   |  37<H>  |  0.8    Ca    7.4<L>      31 Oct 2022 06:22      MEDICATIONS  (STANDING):  amiKACIN  IVPB 1250 milliGRAM(s) IV Intermittent <User Schedule>  ampicillin/sulbactam  IVPB 9 Gram(s) IV Intermittent every 8 hours  ascorbic acid 500 milliGRAM(s) Oral daily  atorvastatin 10 milliGRAM(s) Oral at bedtime  calcium carbonate   Suspension 750 milliGRAM(s) Enteral Tube three times a day  chlorhexidine 0.12% Liquid 15 milliLiter(s) Oral Mucosa every 12 hours  chlorhexidine 2% Cloths 1 Application(s) Topical daily  collagenase Ointment 1 Application(s) Topical two times a day  Dakins Solution - 1/2 Strength 1 Application(s) Topical two times a day  dextrose 5%. 1000 milliLiter(s) (50 mL/Hr) IV Continuous <Continuous>  dextrose 5%. 1000 milliLiter(s) (100 mL/Hr) IV Continuous <Continuous>  dextrose 50% Injectable 25 Gram(s) IV Push once  dextrose 50% Injectable 12.5 Gram(s) IV Push once  dextrose 50% Injectable 25 Gram(s) IV Push once  epoetin carline-epbx (RETACRIT) Injectable 53323 Unit(s) SubCutaneous <User Schedule>  furosemide    Tablet 40 milliGRAM(s) Oral daily  glucagon  Injectable 1 milliGRAM(s) IntraMuscular once  heparin   Injectable 5000 Unit(s) SubCutaneous every 12 hours  insulin lispro (ADMELOG) corrective regimen sliding scale   SubCutaneous three times a day before meals  levothyroxine 125 MICROGram(s) Oral daily  metoprolol tartrate 12.5 milliGRAM(s) Oral every 12 hours  midodrine. 10 milliGRAM(s) Oral every 8 hours  ofloxacin 0.3% Solution 1 Drop(s) Left EYE two times a day  pantoprazole   Suspension 40 milliGRAM(s) Oral every 12 hours  zinc sulfate 220 milliGRAM(s) Oral daily

## 2022-11-02 NOTE — PROGRESS NOTE ADULT - SUBJECTIVE AND OBJECTIVE BOX
Over Night Events: events noted, vent dependant, worsening status, CXR worse, increase oxygen requirement    PHYSICAL EXAM    ICU Vital Signs Last 24 Hrs  T(C): 36.6 (02 Nov 2022 04:57), Max: 36.6 (02 Nov 2022 04:57)  T(F): 97.8 (02 Nov 2022 04:57), Max: 97.8 (02 Nov 2022 04:57)  HR: 92 (02 Nov 2022 04:57) (77 - 94)  BP: 94/60 (02 Nov 2022 04:57) (94/60 - 110/68)  RR: 19 (02 Nov 2022 04:57) (19 - 19)  SpO2: 99% (02 Nov 2022 04:57) (96% - 100%)    O2 Parameters below as of 02 Nov 2022 04:57  Patient On (Oxygen Delivery Method): ventilator            General: ill looking  HEENT: trach  Lungs: Bilateral rhonci  Cardiovascular: Regular   Abdomen: Soft, Positive BS  unresponsive  sacral ulcer      10-31-22 @ 07:01  -  11-01-22 @ 07:00  --------------------------------------------------------  IN:    Free Water: 800 mL    Glucerna: 1440 mL  Total IN: 2240 mL    OUT:    Indwelling Catheter - Urethral (mL): 775 mL    Rectal Tube (mL): 200 mL  Total OUT: 975 mL    Total NET: 1265 mL      11-01-22 @ 07:01  -  11-02-22 @ 05:48  --------------------------------------------------------  IN:    Free Water: 200 mL    Glucerna: 360 mL  Total IN: 560 mL    OUT:    Indwelling Catheter - Urethral (mL): 1825 mL  Total OUT: 1825 mL    Total NET: -1265 mL          LABS:                          9.7    17.96 )-----------( 168      ( 01 Nov 2022 05:37 )             32.8                                               10-31    140  |  94<L>  |  37<H>  ----------------------------<  116<H>  3.6   |  37<H>  |  0.8    Ca    7.4<L>      31 Oct 2022 06:22                                                                                                                                                                                   Mode: AC/ CMV (Assist Control/ Continuous Mandatory Ventilation)  RR (machine): 20  TV (machine): 400  FiO2: 60  PEEP: 8  ITime: 1  MAP: 14  PIP: 26                                          MEDICATIONS  (STANDING):  amiKACIN  IVPB 1250 milliGRAM(s) IV Intermittent <User Schedule>  ampicillin/sulbactam  IVPB 9 Gram(s) IV Intermittent every 8 hours  ascorbic acid 500 milliGRAM(s) Oral daily  atorvastatin 10 milliGRAM(s) Oral at bedtime  calcium carbonate   Suspension 750 milliGRAM(s) Enteral Tube three times a day  chlorhexidine 0.12% Liquid 15 milliLiter(s) Oral Mucosa every 12 hours  chlorhexidine 2% Cloths 1 Application(s) Topical daily  collagenase Ointment 1 Application(s) Topical two times a day  Dakins Solution - 1/2 Strength 1 Application(s) Topical two times a day  dextrose 5%. 1000 milliLiter(s) (50 mL/Hr) IV Continuous <Continuous>  dextrose 5%. 1000 milliLiter(s) (100 mL/Hr) IV Continuous <Continuous>  dextrose 50% Injectable 25 Gram(s) IV Push once  dextrose 50% Injectable 12.5 Gram(s) IV Push once  dextrose 50% Injectable 25 Gram(s) IV Push once  epoetin carline-epbx (RETACRIT) Injectable 23381 Unit(s) SubCutaneous <User Schedule>  furosemide   Injectable 40 milliGRAM(s) IV Push daily  glucagon  Injectable 1 milliGRAM(s) IntraMuscular once  heparin   Injectable 5000 Unit(s) SubCutaneous every 12 hours  insulin lispro (ADMELOG) corrective regimen sliding scale   SubCutaneous three times a day before meals  levothyroxine 125 MICROGram(s) Oral daily  metoprolol tartrate 12.5 milliGRAM(s) Oral every 12 hours  midodrine. 10 milliGRAM(s) Oral every 8 hours  ofloxacin 0.3% Solution 1 Drop(s) Left EYE two times a day  pantoprazole   Suspension 40 milliGRAM(s) Oral every 12 hours  zinc sulfate 220 milliGRAM(s) Oral daily    MEDICATIONS  (PRN):  acetaminophen     Tablet .. 650 milliGRAM(s) Oral every 6 hours PRN Temp greater or equal to 38C (100.4F), Mild Pain (1 - 3)  ALBUTerol    90 MICROgram(s) HFA Inhaler 4 Puff(s) Inhalation every 6 hours PRN Shortness of Breath and/or Wheezing  albuterol/ipratropium for Nebulization 3 milliLiter(s) Nebulizer every 6 hours PRN Shortness of Breath and/or Wheezing  dextrose Oral Gel 15 Gram(s) Oral once PRN Blood Glucose LESS THAN 70 milliGRAM(s)/deciliter  ketorolac   Injectable 15 milliGRAM(s) IV Push every 6 hours PRN Moderate Pain (4 - 6)  morphine  - Injectable 2 milliGRAM(s) IV Push every 6 hours PRN Severe Pain (7 - 10)

## 2022-11-02 NOTE — PROGRESS NOTE ADULT - SUBJECTIVE AND OBJECTIVE BOX
CHADWICK VENCES 82y Male  MRN#: [id number]   Hospital Day: 10d    SUBJECTIVE  Patient is a 82y old Male who presents with a chief complaint of anemia/ sepsis (02 Nov 2022 05:48)  Currently admitted to medicine with the primary diagnosis of Hypernatremia      INTERVAL HPI AND OVERNIGHT EVENTS:  Patient was examined and seen at bedside. This morning he is resting comfortably in bed and reports no issues or overnight events.    OBJECTIVE  PAST MEDICAL & SURGICAL HISTORY  BPH (benign prostatic hyperplasia)    Mild HTN    Cardiac arrest    Anoxic brain injury    2019 novel coronavirus disease (COVID-19)      ALLERGIES:  Allergy Status Unknown    MEDICATIONS:  STANDING MEDICATIONS  amiKACIN  IVPB 1250 milliGRAM(s) IV Intermittent <User Schedule>  ampicillin/sulbactam  IVPB 9 Gram(s) IV Intermittent every 8 hours  ascorbic acid 500 milliGRAM(s) Oral daily  atorvastatin 10 milliGRAM(s) Oral at bedtime  calcium carbonate   Suspension 750 milliGRAM(s) Enteral Tube three times a day  chlorhexidine 0.12% Liquid 15 milliLiter(s) Oral Mucosa every 12 hours  chlorhexidine 2% Cloths 1 Application(s) Topical daily  collagenase Ointment 1 Application(s) Topical two times a day  Dakins Solution - 1/2 Strength 1 Application(s) Topical two times a day  dextrose 5%. 1000 milliLiter(s) IV Continuous <Continuous>  dextrose 5%. 1000 milliLiter(s) IV Continuous <Continuous>  dextrose 50% Injectable 25 Gram(s) IV Push once  dextrose 50% Injectable 12.5 Gram(s) IV Push once  dextrose 50% Injectable 25 Gram(s) IV Push once  epoetin carline-epbx (RETACRIT) Injectable 94039 Unit(s) SubCutaneous <User Schedule>  furosemide   Injectable 40 milliGRAM(s) IV Push daily  glucagon  Injectable 1 milliGRAM(s) IntraMuscular once  heparin   Injectable 5000 Unit(s) SubCutaneous every 12 hours  insulin lispro (ADMELOG) corrective regimen sliding scale   SubCutaneous three times a day before meals  levothyroxine 125 MICROGram(s) Oral daily  metoprolol tartrate 12.5 milliGRAM(s) Oral every 12 hours  midodrine. 10 milliGRAM(s) Oral every 8 hours  ofloxacin 0.3% Solution 1 Drop(s) Left EYE two times a day  pantoprazole   Suspension 40 milliGRAM(s) Oral every 12 hours  zinc sulfate 220 milliGRAM(s) Oral daily    PRN MEDICATIONS  acetaminophen     Tablet .. 650 milliGRAM(s) Oral every 6 hours PRN  ALBUTerol    90 MICROgram(s) HFA Inhaler 4 Puff(s) Inhalation every 6 hours PRN  albuterol/ipratropium for Nebulization 3 milliLiter(s) Nebulizer every 6 hours PRN  dextrose Oral Gel 15 Gram(s) Oral once PRN  ketorolac   Injectable 15 milliGRAM(s) IV Push every 6 hours PRN  morphine  - Injectable 2 milliGRAM(s) IV Push every 6 hours PRN      VITAL SIGNS: Last 24 Hours  T(C): 36.6 (02 Nov 2022 04:57), Max: 36.6 (02 Nov 2022 04:57)  T(F): 97.8 (02 Nov 2022 04:57), Max: 97.8 (02 Nov 2022 04:57)  HR: 92 (02 Nov 2022 05:10) (80 - 94)  BP: 94/60 (02 Nov 2022 04:57) (94/60 - 110/68)  BP(mean): --  RR: 19 (02 Nov 2022 04:57) (19 - 19)  SpO2: 100% (02 Nov 2022 05:10) (96% - 100%)    LABS:                        9.7    17.96 )-----------( 168      ( 01 Nov 2022 05:37 )             32.8                         RADIOLOGY:      PHYSICAL EXAM:  CONSTITUTIONAL: No acute distress, AAOx3  HEAD: Atraumatic, normocephalic  EYES: EOM intact, PERRLA, conjunctiva and sclera clear  ENT: moist mucous membranes  PULMONARY: Clear to auscultation bilaterally  CARDIOVASCULAR: Regular rate and rhythm  GASTROINTESTINAL: Soft, non-tender, non-distended; bowel sounds present  MUSCULOSKELETAL: no edema  NEUROLOGY: non-focal  SKIN: warm and dry     CHADWICK VENCES 82y Male  MRN#: [id number]   Hospital Day: 10d    SUBJECTIVE  Patient is a 82y old Male who presents with a chief complaint of anemia/ sepsis (02 Nov 2022 05:48)  Currently admitted to medicine with the primary diagnosis of Hypernatremia      INTERVAL HPI AND OVERNIGHT EVENTS:  Patient was examined and seen at bedside. This morning he is resting comfortably in bed and reports no issues or overnight events.    OBJECTIVE  PAST MEDICAL & SURGICAL HISTORY  BPH (benign prostatic hyperplasia)    Mild HTN    Cardiac arrest    Anoxic brain injury    2019 novel coronavirus disease (COVID-19)      ALLERGIES:  Allergy Status Unknown    MEDICATIONS:  STANDING MEDICATIONS  amiKACIN  IVPB 1250 milliGRAM(s) IV Intermittent <User Schedule>  ampicillin/sulbactam  IVPB 9 Gram(s) IV Intermittent every 8 hours  ascorbic acid 500 milliGRAM(s) Oral daily  atorvastatin 10 milliGRAM(s) Oral at bedtime  calcium carbonate   Suspension 750 milliGRAM(s) Enteral Tube three times a day  chlorhexidine 0.12% Liquid 15 milliLiter(s) Oral Mucosa every 12 hours  chlorhexidine 2% Cloths 1 Application(s) Topical daily  collagenase Ointment 1 Application(s) Topical two times a day  Dakins Solution - 1/2 Strength 1 Application(s) Topical two times a day  dextrose 5%. 1000 milliLiter(s) IV Continuous <Continuous>  dextrose 5%. 1000 milliLiter(s) IV Continuous <Continuous>  dextrose 50% Injectable 25 Gram(s) IV Push once  dextrose 50% Injectable 12.5 Gram(s) IV Push once  dextrose 50% Injectable 25 Gram(s) IV Push once  epoetin carline-epbx (RETACRIT) Injectable 88426 Unit(s) SubCutaneous <User Schedule>  furosemide   Injectable 40 milliGRAM(s) IV Push daily  glucagon  Injectable 1 milliGRAM(s) IntraMuscular once  heparin   Injectable 5000 Unit(s) SubCutaneous every 12 hours  insulin lispro (ADMELOG) corrective regimen sliding scale   SubCutaneous three times a day before meals  levothyroxine 125 MICROGram(s) Oral daily  metoprolol tartrate 12.5 milliGRAM(s) Oral every 12 hours  midodrine. 10 milliGRAM(s) Oral every 8 hours  ofloxacin 0.3% Solution 1 Drop(s) Left EYE two times a day  pantoprazole   Suspension 40 milliGRAM(s) Oral every 12 hours  zinc sulfate 220 milliGRAM(s) Oral daily    PRN MEDICATIONS  acetaminophen     Tablet .. 650 milliGRAM(s) Oral every 6 hours PRN  ALBUTerol    90 MICROgram(s) HFA Inhaler 4 Puff(s) Inhalation every 6 hours PRN  albuterol/ipratropium for Nebulization 3 milliLiter(s) Nebulizer every 6 hours PRN  dextrose Oral Gel 15 Gram(s) Oral once PRN  ketorolac   Injectable 15 milliGRAM(s) IV Push every 6 hours PRN  morphine  - Injectable 2 milliGRAM(s) IV Push every 6 hours PRN      VITAL SIGNS: Last 24 Hours  T(C): 36.6 (02 Nov 2022 04:57), Max: 36.6 (02 Nov 2022 04:57)  T(F): 97.8 (02 Nov 2022 04:57), Max: 97.8 (02 Nov 2022 04:57)  HR: 92 (02 Nov 2022 05:10) (80 - 94)  BP: 94/60 (02 Nov 2022 04:57) (94/60 - 110/68)  BP(mean): --  RR: 19 (02 Nov 2022 04:57) (19 - 19)  SpO2: 100% (02 Nov 2022 05:10) (96% - 100%)    LABS:                        9.7    17.96 )-----------( 168      ( 01 Nov 2022 05:37 )             32.8             RADIOLOGY:      PHYSICAL EXAM:  CONSTITUTIONAL: Fluid overloaded. Occasional resp distress. Not A&O  HEAD: Atraumatic, normocephalic  EYES: EOM intact, PERRLA, conjunctiva and sclera clear  ENT: moist mucous membranes  PULMONARY: Resp distress  CARDIOVASCULAR: Regular rate and rhythm  GASTROINTESTINAL: Soft, non-tender, non-distended; bowel sounds present  MUSCULOSKELETAL: no edema  NEUROLOGY: non-focal  SKIN: warm and dry      ASSESSMENT and PLAN    83 y/o male  w/Pmhx of Cardiac arrest leading to anoxic brain injury (s/p Trach and PEG), H/o Severe Covid PNA, H/o Gram -ve PNA, HTN and BPH presented for abnormal labs in the nursing home. At baseline, patient is unresponsive, makes random non-purposeful movement, had trach (Vent dependent) and PEG. Patient had several desaturation events since yesterday. Grossly overloaded. STAT x-ray and ABG ordered today. ABG could not be performed due to overloaded status.       #Sepsis at nursing home  - As per SNF documents, patient was being treated for sepsis of unknown origin with Meropenem 1g q8 started on 10/21 for 10 days  - Suspected Pyelonephritis. UA grossly positive with WBC 13k  - Urine cultures positive for Acinetobacter Baumannii. WBC 13k toda  - Chest X-ray shows worsening bilateral opacities. Suspected PNA, unclear origin.   - Patient requiring constant suctioning. Occasional desaturation events. Family made aware, will discuss goals of care amongst themselves  - Pt DNR but family would like to continue medical treatment for now  - F/u on Chest x-ray     #Decubitus ulcer & Heel ulcer  - H/o multiple pressure ulcers (Sacral, R hip, R heel, L heel, R glut)  - c/w Unasyn & amikacin (started on 10/25)  - Started on Vit C and Zinc  - Burn evaluated sacral ulcer. Continue current wound care.  - Podiatry: debridement of heel ulcer on 11/3  - wound care consult placed.     #Hypernatremia  - 100 mL free water per PEG tube q8hr   - Daily BMP's to monitor Na levels.  - IV lasix 40mg BID , monitor Na     #Anemia- likely 2/2 chronic ds  -Hold Eliquis, on prophylactic heparin  -s/p 2 units PRBCs    #CHFpEF exacerbation   - B/L pitting edema   - c/w Lasix 40mg IV BID  - midodrine 10mg Q8hrs due to hypotension  - HF team consulted, given patient's bad prognosis no intervention attempted.    #NSTEMI type 2  -Trops elevated but lower from previous admission  - Repeat ECHO and EKG  - troponin started trending down    #Left eye conjunctivitis  -resolved    #Anoxic brain injury  - secondary to  Cardiac arrest  -Unresponsive, very poor prognosis  -DNR only for now     #Misc  -DVT prophylaxis:  heparin BID subq  - Ca supplemented.   -GI prophylaxis: Protonix BID  -Diet: NPO with tube feed  -Code status: DNR  -Activity: Bed bound  -Dispo: transfer to vent unit       CHADWICK VENCES 82y Male  MRN#: [id number]   Hospital Day: 10d    SUBJECTIVE  Patient is a 82y old Male who presents with a chief complaint of anemia/ sepsis (02 Nov 2022 05:48)  Currently admitted to medicine with the primary diagnosis of Hypernatremia      INTERVAL HPI AND OVERNIGHT EVENTS:  Patient was examined and seen at bedside. This morning he is resting comfortably in bed and reports no issues or overnight events.    OBJECTIVE  PAST MEDICAL & SURGICAL HISTORY  BPH (benign prostatic hyperplasia)    Mild HTN    Cardiac arrest    Anoxic brain injury    2019 novel coronavirus disease (COVID-19)      ALLERGIES:  Allergy Status Unknown    MEDICATIONS:  STANDING MEDICATIONS  amiKACIN  IVPB 1250 milliGRAM(s) IV Intermittent <User Schedule>  ampicillin/sulbactam  IVPB 9 Gram(s) IV Intermittent every 8 hours  ascorbic acid 500 milliGRAM(s) Oral daily  atorvastatin 10 milliGRAM(s) Oral at bedtime  calcium carbonate   Suspension 750 milliGRAM(s) Enteral Tube three times a day  chlorhexidine 0.12% Liquid 15 milliLiter(s) Oral Mucosa every 12 hours  chlorhexidine 2% Cloths 1 Application(s) Topical daily  collagenase Ointment 1 Application(s) Topical two times a day  Dakins Solution - 1/2 Strength 1 Application(s) Topical two times a day  dextrose 5%. 1000 milliLiter(s) IV Continuous <Continuous>  dextrose 5%. 1000 milliLiter(s) IV Continuous <Continuous>  dextrose 50% Injectable 25 Gram(s) IV Push once  dextrose 50% Injectable 12.5 Gram(s) IV Push once  dextrose 50% Injectable 25 Gram(s) IV Push once  epoetin carline-epbx (RETACRIT) Injectable 89422 Unit(s) SubCutaneous <User Schedule>  furosemide   Injectable 40 milliGRAM(s) IV Push daily  glucagon  Injectable 1 milliGRAM(s) IntraMuscular once  heparin   Injectable 5000 Unit(s) SubCutaneous every 12 hours  insulin lispro (ADMELOG) corrective regimen sliding scale   SubCutaneous three times a day before meals  levothyroxine 125 MICROGram(s) Oral daily  metoprolol tartrate 12.5 milliGRAM(s) Oral every 12 hours  midodrine. 10 milliGRAM(s) Oral every 8 hours  ofloxacin 0.3% Solution 1 Drop(s) Left EYE two times a day  pantoprazole   Suspension 40 milliGRAM(s) Oral every 12 hours  zinc sulfate 220 milliGRAM(s) Oral daily    PRN MEDICATIONS  acetaminophen     Tablet .. 650 milliGRAM(s) Oral every 6 hours PRN  ALBUTerol    90 MICROgram(s) HFA Inhaler 4 Puff(s) Inhalation every 6 hours PRN  albuterol/ipratropium for Nebulization 3 milliLiter(s) Nebulizer every 6 hours PRN  dextrose Oral Gel 15 Gram(s) Oral once PRN  ketorolac   Injectable 15 milliGRAM(s) IV Push every 6 hours PRN  morphine  - Injectable 2 milliGRAM(s) IV Push every 6 hours PRN      VITAL SIGNS: Last 24 Hours  T(C): 36.6 (02 Nov 2022 04:57), Max: 36.6 (02 Nov 2022 04:57)  T(F): 97.8 (02 Nov 2022 04:57), Max: 97.8 (02 Nov 2022 04:57)  HR: 92 (02 Nov 2022 05:10) (80 - 94)  BP: 94/60 (02 Nov 2022 04:57) (94/60 - 110/68)  BP(mean): --  RR: 19 (02 Nov 2022 04:57) (19 - 19)  SpO2: 100% (02 Nov 2022 05:10) (96% - 100%)    LABS:                        9.7    17.96 )-----------( 168      ( 01 Nov 2022 05:37 )             32.8             RADIOLOGY:      PHYSICAL EXAM:  CONSTITUTIONAL: Fluid overloaded. Occasional resp distress. Not A&O  HEAD: Atraumatic, normocephalic  EYES: EOM intact, PERRLA, conjunctiva and sclera clear  ENT: moist mucous membranes  PULMONARY: Resp distress  CARDIOVASCULAR: Regular rate and rhythm  GASTROINTESTINAL: Soft, non-tender, non-distended; bowel sounds present  MUSCULOSKELETAL: no edema  NEUROLOGY: non-focal  SKIN: warm and dry      ASSESSMENT and PLAN    83 y/o male  w/Pmhx of Cardiac arrest leading to anoxic brain injury (s/p Trach and PEG), H/o Severe Covid PNA, H/o Gram -ve PNA, HTN and BPH presented for abnormal labs in the nursing home. At baseline, patient is unresponsive, makes random non-purposeful movement, had trach (Vent dependent) and PEG. Patient had several desaturation events since yesterday. Grossly overloaded. STAT x-ray and ABG ordered today. ABG could not be performed due to overloaded status.       #Sepsis at nursing home  - As per SNF documents, patient was being treated for sepsis of unknown origin with Meropenem 1g q8 started on 10/21 for 10 days  - Suspected Pyelonephritis. UA grossly positive with WBC 13k  - Urine cultures positive for Acinetobacter Baumannii. WBC 13k toda  - Chest X-ray shows worsening bilateral opacities. Suspected PNA, unclear origin.   - Patient requiring constant suctioning. Occasional desaturation events. Family made aware, will discuss goals of care amongst themselves  - Pt DNR but family would like to continue medical treatment for now  - F/u on Chest x-ray   - Albuterol PRN and q8hr  - Chest PT ordered    #Decubitus ulcer & Heel ulcer  - H/o multiple pressure ulcers (Sacral, R hip, R heel, L heel, R glut)  - c/w Unasyn & amikacin (started on 10/25)  - Started on Vit C and Zinc  - Burn evaluated sacral ulcer. Continue current wound care.  - Podiatry: debridement of heel ulcer on 11/3  - wound care consult placed.     #Hypernatremia  - 100 mL free water per PEG tube q8hr   - Daily BMP's to monitor Na levels.  - IV lasix 40mg BID , monitor Na     #Anemia- likely 2/2 chronic ds  -Hold Eliquis, on prophylactic heparin  -s/p 2 units PRBCs    #CHFpEF exacerbation   - B/L pitting edema   - c/w Lasix 40mg IV BID  - midodrine 10mg Q8hrs due to hypotension  - HF team consulted, given patient's bad prognosis no intervention attempted.  - Monitor urine in and out    #NSTEMI type 2  -Trops elevated but lower from previous admission  - Repeat ECHO and EKG  - troponin started trending down    #Left eye conjunctivitis  -resolved    #Anoxic brain injury  - secondary to  Cardiac arrest  -Unresponsive, very poor prognosis  -DNR only for now     #Misc  -DVT prophylaxis:  heparin BID subq  - Ca supplemented.   -GI prophylaxis: Protonix BID  -Diet: NPO with tube feed  -Code status: DNR  -Activity: Bed bound  -Dispo: transfer to vent unit

## 2022-11-02 NOTE — PROGRESS NOTE ADULT - ASSESSMENT
A/P: s/p Sacrum 4 sacral wound and unstageable Rt Hip wound    cont wound care: Santyl/Wet Kerelx w/ 1/4 Dakins/DPD BID  freq T &P  Pain cpontrol  rest of care as per primary team

## 2022-11-02 NOTE — PROGRESS NOTE ADULT - ATTENDING COMMENTS
83 y/o Male  with PMH  of h/o Cardiac arrest leading to anoxic brain injury (s/p Trach vent dependent and PEG), H/o Severe Covid PNA, H/o Gram -ve PNA, HTN and BPH presented for Sepsis due to Acute left sided pyelonephritis due to Acinetobacter infection. Hypernatremia, dehydration, [place]. Large pleural effusions b/l.  New marked degenerative changes of left hip.  Patient also found to have multiple pressure decubiti stage 4 and unstagable. Patient was initially admitted to SDU than on 10/29 downgraded to vent unit for further management and tx.    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  #troponemia--likely type 2 demand ischemia   #Acute on chronic respiratory Failure s/p Trach (baseline FiO2 50-60%) and PEG  #h/o severe COVID-19 pneumonia  #h/o cardiac Arrest- MI- anoxic brain injury  -ID following--continue with iv abx (unasyn and amikacin)  -f/u bcx (10/23 neg to date) and urine cx (A baumannii: sens to Amikacin, resistant to Unasyn)  -monitor wbc and fever curve   -repeat ECHO 10/31: EF 20%, grade 2 diastolic dysfunction, mild pulm hypertension=---this is  worsening heart function----will f/u with Heart failure team for additional recommendations  -continue with diuresis- lasix iv 40 mg BID for now. Strictly monitor i/o and daily weights  -vent support : PRVC Settings: Routine Ventilator Mode:  AC/CMV  Targeted SpO2 Range (%): 88-97---current FiO2 70%  -continue medical management (patient with multiple medical comorbidities) and overall very poor prognosis- DNR  -episodes of desaturation this morning- aggressive suction/ vent support/ monitor o2 sat/ Chest PT/Albuterol  -f/.u with pulmonary team  -stat CXR with persistant bilateral congestive changes/effusions- relatively unchanged from prior cxr      # Hypernatremia- improved with increasing PEG free water.     #h/o HTN, currently pt. is Hypotensive requiring Midodrine tx.  - lopressor to be continued with holding parameters  -maintain MAP >65  -on midodrine 10 mg via peg q8hr    #  BPH- pt. has chronic cardoso, due to hypotension d/dejon flomax  # multiple stage 4 pressure ulcers top sacrum and Unstageable pressure ulcer to b/l Hips/b/l  heels- infected   - daily wound care continue  -burn team to debride likely this week.  -podiatry to debride 11/3?   -continue frequent position change and local wound care    DVT/GI ppx  overall very poor prognosis  Code status: DNR      11/2-spoke with daughter Judy ([phone]) at bedside. INformed of poor prognosis and poor QOL.  She said she will discuss with her sister and maybe reconsider [facility].     #Progress Note Handoff: remains very poor prognosis for recovery- remains on vent support and continue management as per above

## 2022-11-02 NOTE — PROGRESS NOTE ADULT - ASSESSMENT
IMPRESSION:    sepsis present on admission  UTI/ acinetobacter   Acute on chronic hypoxemic resp failure worsening  Chronic hypoxic respiratory failure SP Trach  Anoxic brain injury  HO Cardiac Arrest  HO Severe COVID PNA  Sacral ulcer      SUGGEST:    CNS:  As needed morphine for comfort    HEENT: Oral care.  Trach care     PULMONARY: aspiration precaution.  Wean FIO2.  Keep Sao2 92 to 96%.  Vent dependent. on 80%, solumedrol 60 q 12    CARDIOVASCULAR:   Avoid overload.  midodrine 10 q 8. lasix 40 iv q 12, if needed levophed    GI: PPI q 12, feeding.  Bowel Regimen.    RENAL:  Follow up lytes.  Correct as needed.     INFECTIOUS DISEASE:  ABX per ID    HEMATOLOGICAL: DVT prophylaxis, MOnitor CBC and Coags     ENDOCRINE:  Follow up FS.  Insulin protocol if needed    MUSCULOSKELETAL: Wound care per burn     DNR  Palliative care eval  Very poor overall prognosis  poor MICU candidate

## 2022-11-02 NOTE — CHART NOTE - NSCHARTNOTEFT_GEN_A_CORE
Excisional debridement of right heel for Thurs 11/3 cancelled, pending consent from patient's daughter. Will reschedule for later date if consent is obtained.     Podiatry   [phone]

## 2022-11-02 NOTE — CHART NOTE - NSCHARTNOTESELECT_GEN_ALL_CORE
Event Note
Nutrition Support/Nutrition Services
Palliative Care SW Initial Assessment/Event Note
Event Note
Event Note
Nutrition/Event Note
PallCare/Event Note

## 2022-11-02 NOTE — PROGRESS NOTE ADULT - SUBJECTIVE AND OBJECTIVE BOX
Patient is a 82y old  Male who presents with a chief complaint of anemia/ sepsis (02 Nov 2022 05:48)    Pt seen for f/u sacrum stage 4 wound and Rt Hip Unstageable wounds    Vital Signs Last 24 Hrs  T(C): 36.6 (02 Nov 2022 04:57), Max: 36.6 (02 Nov 2022 04:57)  T(F): 97.8 (02 Nov 2022 04:57), Max: 97.8 (02 Nov 2022 04:57)  HR: 89 (02 Nov 2022 08:02) (80 - 92)  BP: 94/60 (02 Nov 2022 04:57) (94/60 - 94/65)  BP(mean): --  RR: 19 (02 Nov 2022 04:57) (19 - 19)  SpO2: 100% (02 Nov 2022 08:02) (99% - 100%)    Parameters below as of 02 Nov 2022 04:57  Patient On (Oxygen Delivery Method): ventilator    Meds:  MEDICATIONS  (STANDING):  amiKACIN  IVPB 1250 milliGRAM(s) IV Intermittent <User Schedule>  ampicillin/sulbactam  IVPB 9 Gram(s) IV Intermittent every 8 hours  ascorbic acid 500 milliGRAM(s) Oral daily  atorvastatin 10 milliGRAM(s) Oral at bedtime  calcium carbonate   Suspension 750 milliGRAM(s) Enteral Tube three times a day  chlorhexidine 0.12% Liquid 15 milliLiter(s) Oral Mucosa every 12 hours  chlorhexidine 2% Cloths 1 Application(s) Topical daily  collagenase Ointment 1 Application(s) Topical two times a day  Dakins Solution - 1/2 Strength 1 Application(s) Topical two times a day  dextrose 5%. 1000 milliLiter(s) (50 mL/Hr) IV Continuous <Continuous>  dextrose 5%. 1000 milliLiter(s) (100 mL/Hr) IV Continuous <Continuous>  dextrose 50% Injectable 25 Gram(s) IV Push once  dextrose 50% Injectable 12.5 Gram(s) IV Push once  dextrose 50% Injectable 25 Gram(s) IV Push once  epoetin carline-epbx (RETACRIT) Injectable 10022 Unit(s) SubCutaneous <User Schedule>  furosemide   Injectable 40 milliGRAM(s) IV Push two times a day  glucagon  Injectable 1 milliGRAM(s) IntraMuscular once  heparin   Injectable 5000 Unit(s) SubCutaneous every 12 hours  insulin lispro (ADMELOG) corrective regimen sliding scale   SubCutaneous three times a day before meals  levothyroxine 125 MICROGram(s) Oral daily  methylPREDNISolone sodium succinate Injectable 60 milliGRAM(s) IV Push two times a day  metoprolol tartrate 12.5 milliGRAM(s) Oral every 12 hours  midodrine. 10 milliGRAM(s) Oral every 8 hours  ofloxacin 0.3% Solution 1 Drop(s) Left EYE two times a day  pantoprazole   Suspension 40 milliGRAM(s) Oral every 12 hours  zinc sulfate 220 milliGRAM(s) Oral daily    MEDICATIONS  (PRN):  acetaminophen     Tablet .. 650 milliGRAM(s) Oral every 6 hours PRN Temp greater or equal to 38C (100.4F), Mild Pain (1 - 3)  ALBUTerol    90 MICROgram(s) HFA Inhaler 4 Puff(s) Inhalation every 6 hours PRN Shortness of Breath and/or Wheezing  albuterol/ipratropium for Nebulization 3 milliLiter(s) Nebulizer every 6 hours PRN Shortness of Breath and/or Wheezing  dextrose Oral Gel 15 Gram(s) Oral once PRN Blood Glucose LESS THAN 70 milliGRAM(s)/deciliter  ketorolac   Injectable 15 milliGRAM(s) IV Push every 6 hours PRN Moderate Pain (4 - 6)  morphine  - Injectable 2 milliGRAM(s) IV Push every 6 hours PRN Severe Pain (7 - 10)          Labs:                        8.8    13.53 )-----------( 129      ( 02 Nov 2022 12:02 )             29.8         [facility]    full thickness wound to right hip unstageable with 100% necrotic tissue, serosang dc, no erythema, no foul odor    sacrum large full thickness wound approx 12x 12 cm with granulation tissue and small areas of necrotic tissue, necrotic bone and tendon exposed. serosang dc, no odor, no erythema

## 2022-11-03 NOTE — PROGRESS NOTE ADULT - SUBJECTIVE AND OBJECTIVE BOX
CHADWICK VENCES  82y, Male  Allergy: Allergy Status Unknown      LOS  11d    CHIEF COMPLAINT: anemia/ sepsis (03 Nov 2022 06:17)      INTERVAL EVENTS/HPI  - No acute events overnight  - T(F): , Max: 97.1 (11-02-22 @ 14:10)  - WBC Count: 13.53 (11-02-22 @ 12:02)  WBC Count: 17.96 (11-01-22 @ 05:37)     - Creatinine, Serum: 0.8 (11-02-22 @ 07:09)       ROS  unable to obtain history secondary to patient's mental status and/or sedation    VITALS:  T(F): 96.7, Max: 97.1 (11-02-22 @ 14:10)  HR: 88  BP: 113/67  RR: 20Vital Signs Last 24 Hrs  T(C): 35.9 (03 Nov 2022 05:23), Max: 36.2 (02 Nov 2022 14:10)  T(F): 96.7 (03 Nov 2022 05:23), Max: 97.1 (02 Nov 2022 14:10)  HR: 88 (03 Nov 2022 05:23) (77 - 91)  BP: 113/67 (03 Nov 2022 05:23) (104/65 - 115/69)  BP(mean): --  RR: 20 (03 Nov 2022 05:23) (20 - 20)  SpO2: 100% (03 Nov 2022 05:23) (100% - 100%)    Parameters below as of 03 Nov 2022 05:23  Patient On (Oxygen Delivery Method): ventilator        PHYSICAL EXAM:  Gen: vent/trach   HEENT: Normocephalic, atraumatic  Neck: supple, no lymphadenopathy  CV: Regular rate & regular rhythm  Lungs: decreased BS at bases, no fremitus  Abdomen: Soft, BS present  Ext: Warm, well perfused  Neuro: non focal, awake  Skin: no rash, no erythema  Lines: no phlebitis    FH: Non-contributory  Social Hx: Non-contributory    TESTS & MEASUREMENTS:                        8.8    13.53 )-----------( 129      ( 02 Nov 2022 12:02 )             29.8     11-02    132<L>  |  88<L>  |  37<H>  ----------------------------<  120<H>  4.3   |  35<H>  |  0.8    Ca    7.2<L>      02 Nov 2022 07:09  Mg     2.1     11-02              Culture - Blood (collected 10-24-22 @ 00:00)  Source: .Blood Blood-Peripheral  Final Report (10-29-22 @ 09:00):    No Growth Final    Culture - Urine (collected 10-23-22 @ 05:45)  Source: Clean Catch Clean Catch (Midstream)  Final Report (10-28-22 @ 23:54):    >100,000 CFU/ml Acinetobacter baumannii/nosocomialis group    Cefiderocol interpretations based on FDA breakpoints.  Organism: Acinetobacter baumannii/nosocomialis group  Acinetobacter baumannii/nosocomialis group  Acinetobacter baumannii/nosocomialis group (10-28-22 @ 23:54)  Organism: Acinetobacter baumannii/nosocomialis group (10-28-22 @ 23:54)      -  Polymyxin B: R 4      Method Type: ETEST  Organism: Acinetobacter baumannii/nosocomialis group (10-28-22 @ 23:54)      -  Cefiderocol: S      -  Piperacillin/Tazobactam: R      Method Type: KB  Organism: Acinetobacter baumannii/nosocomialis group (10-28-22 @ 23:54)      -  Amikacin: S <=16      -  Ampicillin/Sulbactam: R >16/8      -  Cefepime: R >16      -  Ceftazidime: S 4      -  Ceftriaxone: I 32      -  Ciprofloxacin: R >2      -  Gentamicin: S 4      -  Imipenem: R >8      -  Levofloxacin: R >4      -  Meropenem: R >8      -  Tigecycline: <=2      -  Tobramycin: S <=2      -  Trimethoprim/Sulfamethoxazole: R >2/38      Method Type: ALEXSANDRA    Culture - Blood (collected 10-23-22 @ 05:05)  Source: .Blood Blood  Final Report (10-28-22 @ 13:01):    No Growth Final            INFECTIOUS DISEASES TESTING  COVID-19 PCR: NotDetec (10-29-22 @ 18:42)  MRSA PCR Result.: Negative (10-26-22 @ 09:14)  Legionella Antigen, Urine: Negative (10-24-22 @ 02:44)  Procalcitonin, Serum: 5.93 (10-24-22 @ 00:00)  COVID-19 PCR: NotDetec (10-23-22 @ 05:45)  COVID-19 PCR: NotDetec (03-02-22 @ 13:30)  Procalcitonin, Serum: 0.51 (02-27-22 @ 07:33)  COVID-19 PCR: Detected (02-20-22 @ 15:33)  COVID-19 PCR: NotDetec (02-15-22 @ 07:30)  Procalcitonin, Serum: 0.09 (02-12-22 @ 06:21)  COVID-19 PCR: Detected (02-10-22 @ 13:00)  Procalcitonin, Serum: 0.04 (02-09-22 @ 06:03)  Procalcitonin, Serum: 0.03 (02-01-22 @ 06:25)  Procalcitonin, Serum: 0.07 (01-31-22 @ 06:15)  Procalcitonin, Serum: 0.10 (01-30-22 @ 10:45)  Procalcitonin, Serum: 0.11 (01-29-22 @ 07:17)  Procalcitonin, Serum: 0.08 (01-26-22 @ 06:15)  Procalcitonin, Serum: 0.09 (01-25-22 @ 05:58)  Procalcitonin, Serum: 0.11 (01-23-22 @ 11:17)  MRSA PCR Result.: Negative (01-23-22 @ 11:00)  Procalcitonin, Serum: 0.44 (01-19-22 @ 06:30)  Procalcitonin, Serum: 2.32 (01-16-22 @ 06:56)  Rapid RVP Result: Detected (01-15-22 @ 01:25)  Procalcitonin, Serum: 0.10 (01-15-22 @ 01:25)      INFLAMMATORY MARKERS  C-Reactive Protein, Serum: 80.3 mg/L (10-24-22 @ 00:00)      RADIOLOGY & ADDITIONAL TESTS:  I have personally reviewed the last available Chest xray  CXR  Xray Chest 1 View- PORTABLE-Urgent:   ACC: [id number] EXAM:  XR CHEST PORTABLE URGENT 1V                          PROCEDURE DATE:  11/01/2022          INTERPRETATION:  Clinical History / Reason for exam: Shortness of breath    Comparison : Chest radiograph dated 10/29/2022.    Technique/Positioning: AP view of the chest.    Findings:    Support devices: Tracheostomy tube.    Cardiac/mediastinum/hilum: Obscured.    Lung parenchyma/Pleura: Near-complete opacification bilaterally, right   worse than left, is similar to 10/29/2022. No pneumothorax evident.    Skeleton/soft tissues: No interval change.    Impression:    Right worse than left near-complete opacification, similar to prior.        --- End of Report ---            KEAGAN CAGLE MD; Attending Radiologist  This document has been electronically signed. Nov 1 2022 12:44PM (11-01-22 @ 10:06)      CT      CARDIOLOGY TESTING  12 Lead ECG:   Ventricular Rate 93 BPM    Atrial Rate 93 BPM    P-R Interval 192 ms    QRS Duration 130 ms    Q-T Interval 386 ms    QTC Calculation(Bazett) 479 ms    P Axis 32 degrees    R Axis 20 degrees    T Axis 265 degrees    Diagnosis Line Sinus rhythm withPremature atrial complexes  Non-specific intra-ventricular conduction block  T wave abnormality, consider inferior ischemia  Abnormal ECG    Confirmed by Malachi Francois ([id number]) on 10/29/2022 12:34:39 PM (10-29-22 @ 09:00)  12 Lead ECG:   Ventricular Rate 94 BPM    Atrial Rate 94 BPM    P-R Interval 158 ms    QRS Duration 116 ms    Q-T Interval 392 ms    QTC Calculation(Bazett) 490 ms    P Axis -1 degrees    R Axis -18 degrees    T Axis 166 degrees    Diagnosis Line Normal sinus rhythm  Left ventricular hypertrophy with QRS widening and repolarization abnormality  Prolonged QT  Abnormal ECG    Confirmed by Lotus Braun MD ([id number]) on 10/23/2022 3:00:38 PM (10-23-22 @ 09:58)      MEDICATIONS  ALBUTerol    90 MICROgram(s) HFA Inhaler 2 Inhalation every 12 hours  amiKACIN  IVPB 1250 IV Intermittent <User Schedule>  ampicillin/sulbactam  IVPB 9 IV Intermittent every 8 hours  ascorbic acid 500 Oral daily  atorvastatin 10 Oral at bedtime  calcium carbonate   Suspension 750 Enteral Tube three times a day  chlorhexidine 0.12% Liquid 15 Oral Mucosa every 12 hours  chlorhexidine 2% Cloths 1 Topical daily  collagenase Ointment 1 Topical two times a day  Dakins Solution - 1/2 Strength 1 Topical two times a day  dextrose 5%. 1000 IV Continuous <Continuous>  dextrose 5%. 1000 IV Continuous <Continuous>  dextrose 50% Injectable 25 IV Push once  dextrose 50% Injectable 12.5 IV Push once  dextrose 50% Injectable 25 IV Push once  epoetin carline-epbx (RETACRIT) Injectable 92775 SubCutaneous <User Schedule>  furosemide   Injectable 40 IV Push two times a day  glucagon  Injectable 1 IntraMuscular once  heparin   Injectable 5000 SubCutaneous every 12 hours  insulin lispro (ADMELOG) corrective regimen sliding scale  SubCutaneous three times a day before meals  levothyroxine 125 Oral daily  methylPREDNISolone sodium succinate Injectable 60 IV Push two times a day  metoprolol tartrate 12.5 Oral every 12 hours  midodrine. 10 Oral every 8 hours  ofloxacin 0.3% Solution 1 Left EYE two times a day  pantoprazole   Suspension 40 Oral every 12 hours  zinc sulfate 220 Oral daily      WEIGHT  Weight (kg): 90.8 (10-29-22 @ 02:30)      ANTIBIOTICS:  amiKACIN  IVPB 1250 milliGRAM(s) IV Intermittent <User Schedule>  ampicillin/sulbactam  IVPB 9 Gram(s) IV Intermittent every 8 hours      All available historical records have been reviewed

## 2022-11-03 NOTE — PROGRESS NOTE ADULT - SUBJECTIVE AND OBJECTIVE BOX
Podiatry Progress Note    Subjective:  CHAWDICK VENCES is a  82y Male.   Seen bedside.   Patient is a 82y old  Male who presents with a chief complaint of anemia/ sepsis (03 Nov 2022 06:17)      Past Medical History and Surgical History  PAST MEDICAL & SURGICAL HISTORY:  BPH (benign prostatic hyperplasia)      Mild HTN      Cardiac arrest      Anoxic brain injury      2019 novel coronavirus disease (COVID-19)           Objective:  Vital Signs Last 24 Hrs  T(C): 35.9 (03 Nov 2022 11:55), Max: 36.2 (02 Nov 2022 14:10)  T(F): 96.7 (03 Nov 2022 11:55), Max: 97.1 (02 Nov 2022 14:10)  HR: 94 (03 Nov 2022 11:55) (77 - 94)  BP: 118/76 (03 Nov 2022 11:55) (104/65 - 118/76)  BP(mean): --  RR: 20 (03 Nov 2022 11:55) (20 - 20)  SpO2: 99% (03 Nov 2022 11:55) (99% - 100%)    Parameters below as of 03 Nov 2022 05:23  Patient On (Oxygen Delivery Method): ventilator                            8.8    13.53 )-----------( 129      ( 02 Nov 2022 12:02 )             29.8                 11-02    132<L>  |  88<L>  |  37<H>  ----------------------------<  120<H>  4.3   |  35<H>  |  0.8    Ca    7.2<L>      02 Nov 2022 07:09  Mg     2.1     11-02    Radiology:  XR-RightFoot 10/27/22  IMPRESSION:  No radiographic evidence of osteomyelitis.    Physical Exam - Lower Extremity Focused:   Derm: Right heel plantar ulcer w/fibrogranular base with serosanguinous drainage and  malodor; overlying eschar and necrotic tissue  Left heel pressure ulcer with underlying hematoma; no local signs of infection  Vascular: DP and PT Pulses Diminished; Foot is Warm to Warm to the touch;   Neuro: Protective Sensation Diminished / Moderately Neuropathic   MSK: Pain On Palpation at Wound Site       Assessment:  Right Heel plantar ulcer; stable  Left heel decubitus ulcer; stable    Plan:  Chart reviewed and Patient evaluated. All Questions and Concerns Addressed and Answered  XR Imaging Right  Foot; results as stated above  Local Wound Care; Wound Flushed w/ NS; Wound Packed w/ Betadine Soaked Gauze / DSD / Kerlix q24h  Continue with LWC daily q24h until patient is dsc;   Weight Bearing Status; WBAT b/l   Attending spoke with patients daughter on the phone and there is NO plan for surgical debridement;  No surgery indicated at this time;   Patient stable per Podiatry standpoint; Will re-consult with family in the future as outpatient as needed.   Patient may follow up as outpatient post-dsc with Dr. Boateng at our clinic at [address] [place] [address].   Discussed Plan w/ Dr. Boateng     Podiatry

## 2022-11-03 NOTE — PROGRESS NOTE ADULT - ATTENDING COMMENTS
81 y/o Male  with PMH  of h/o Cardiac arrest leading to anoxic brain injury (s/p Trach vent dependent and PEG), H/o Severe Covid PNA, H/o Gram -ve PNA, HTN and BPH presented for Sepsis due to Acute left sided pyelonephritis due to Acinetobacter infection. Hypernatremia, dehydration, [place]. Large pleural effusions b/l.  New marked degenerative changes of left hip.  Patient also found to have multiple pressure decubiti stage 4 and unstagable. Patient was initially admitted to SDU than on 10/29 downgraded to vent unit for further management and tx.    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  #troponemia--likely type 2 demand ischemia   #Acute on chronic respiratory Failure s/p Trach (baseline FiO2 50-60%) and PEG  #h/o severe COVID-19 pneumonia  #h/o cardiac Arrest- MI- anoxic brain injury  -ID following--continue with iv abx (unasyn and amikacin)  -f/u bcx (10/23 neg to date) and urine cx (A baumannii: sens to Amikacin, resistant to Unasyn)  -monitor wbc and fever curve   -repeat ECHO 10/31: EF 20%, grade 2 diastolic dysfunction, mild pulm hypertension=---this is  worsening heart function----will f/u with Heart failure team for additional recommendations  -continue with diuresis- lasix iv 40 mg BID for now. Strictly monitor i/o and daily weights  -vent support : PRVC Settings: Routine Ventilator Mode:  AC/CMV  Targeted SpO2 Range (%): 88-97---current FiO2 70%  -continue medical management (patient with multiple medical comorbidities) and overall very poor prognosis- DNR  -episodes of desaturation this morning- aggressive suction/ vent support/ monitor o2 sat/ Chest PT/Albuterol  -f/.u with pulmonary team  -stat CXR with persistant bilateral congestive changes/effusions- relatively unchanged from prior cxr      # Hypernatremia- improved with increasing PEG free water.     #h/o HTN, currently pt. is Hypotensive requiring Midodrine tx.  - lopressor to be continued with holding parameters  -maintain MAP >65  -on midodrine 10 mg via peg q8hr    #  BPH- pt. has chronic cardoso, due to hypotension d/dejon flomax  # multiple stage 4 pressure ulcers top sacrum and Unstageable pressure ulcer to b/l Hips/b/l  heels- infected   - daily wound care continue  -burn team to debride likely this week.  -podiatry to debride 11/3?   -continue frequent position change and local wound care  - family deferred foot ulcer dbx by podiatry for now.     DVT/GI ppx  overall very poor prognosis  Code status: DNR        11/2-spoke with daughter Judy ([phone]) at bedside. INformed of poor prognosis and poor QOL.  She said she will discuss with her sister and maybe reconsider [facility]. will f/u    #Progress Note Handoff: remains very poor prognosis for recovery- remains on vent support and continue management as per above . 81 y/o Male  with PMH  of h/o Cardiac arrest leading to anoxic brain injury (s/p Trach vent dependent and PEG), H/o Severe Covid PNA, H/o Gram -ve PNA, HTN and BPH presented for Sepsis due to Acute left sided pyelonephritis due to Acinetobacter infection. Hypernatremia, dehydration, [place]. Large pleural effusions b/l.  New marked degenerative changes of left hip.  Patient also found to have multiple pressure decubiti stage 4 and unstagable. Patient was initially admitted to SDU than on 10/29 downgraded to vent unit for further management and tx.    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  #troponemia--likely type 2 demand ischemia   #Acute on chronic respiratory Failure s/p Trach (baseline FiO2 50-60%) and PEG  #h/o severe COVID-19 pneumonia  #h/o cardiac Arrest- MI- anoxic brain injury  -ID following--continue with iv abx (unasyn and amikacin)  -f/u bcx (10/23 neg to date) and urine cx (A baumannii: sens to Amikacin, resistant to Unasyn)  -monitor wbc and fever curve   -repeat ECHO 10/31: EF 20%, grade 2 diastolic dysfunction, mild pulm hypertension=---this is  worsening heart function----will f/u with Heart failure team for additional recommendations  -continue with diuresis- lasix iv 40 mg BID for now. Strictly monitor i/o and daily weights  -vent support : PRVC Settings: Routine Ventilator Mode:  AC/CMV  Targeted SpO2 Range (%): 88-97---current FiO2 70%  -continue medical management (patient with multiple medical comorbidities) and overall very poor prognosis- DNR  -episodes of desaturation this morning- aggressive suction/ vent support/ monitor o2 sat/ Chest PT/Albuterol BID/Solumedrol BID/Scopolamine patch.   -f/.u with pulmonary team  -stat CXR with persistant bilateral congestive changes/effusions- relatively unchanged from prior cxr      # Hypernatremia- improved with increasing PEG free water.     #h/o HTN, currently pt. is Hypotensive requiring Midodrine tx.  - lopressor to be continued with holding parameters  -maintain MAP >65  -on midodrine 10 mg via peg q8hr    #  BPH- pt. has chronic cardoso, due to hypotension d/dejon flomax  # multiple stage 4 pressure ulcers top sacrum and Unstageable pressure ulcer to b/l Hips/b/l  heels- infected   - daily wound care continue  -burn team to debride likely this week.  -podiatry to debride 11/3?   -continue frequent position change and local wound care  - family deferred foot ulcer dbx by podiatry for now.     DVT/GI ppx  overall very poor prognosis  Code status: DNR        11/2-spoke with daughter Judy ([phone]) at bedside. INformed of poor prognosis and poor QOL.  She said she will discuss with her sister and maybe reconsider [facility]. will f/u    #Progress Note Handoff: remains very poor prognosis for recovery- remains on vent support and continue management as per above .

## 2022-11-03 NOTE — PROGRESS NOTE ADULT - SUBJECTIVE AND OBJECTIVE BOX
Over Night Events: events noted, vent dependant on 70%, leak noted    PHYSICAL EXAM    ICU Vital Signs Last 24 Hrs  T(C): 35.9 (03 Nov 2022 05:23), Max: 36.2 (02 Nov 2022 14:10)  T(F): 96.7 (03 Nov 2022 05:23), Max: 97.1 (02 Nov 2022 14:10)  HR: 88 (03 Nov 2022 05:23) (77 - 91)  BP: 113/67 (03 Nov 2022 05:23) (104/65 - 115/69  RR: 20 (03 Nov 2022 05:23) (20 - 20)  SpO2: 100% (03 Nov 2022 05:23) (100% - 100%)    O2 Parameters below as of 03 Nov 2022 05:23  Patient On (Oxygen Delivery Method): ventilator             General: ill lookking  HEENT: trach  Lungs: Bilateral rhonchi  Cardiovascular: YOANA 2.6  Abdomen: Soft, Positive BS  not following commands  sacral ulcer      11-01-22 @ 07:01  -  11-02-22 @ 07:00  --------------------------------------------------------  IN:    Free Water: 400 mL    Glucerna: 720 mL  Total IN: 1120 mL    OUT:    Indwelling Catheter - Urethral (mL): 1825 mL  Total OUT: 1825 mL    Total NET: -705 mL      11-02-22 @ 07:01  -  11-03-22 @ 06:17  --------------------------------------------------------  IN:    Free Water: 200 mL    Glucerna: 360 mL  Total IN: 560 mL    OUT:    Indwelling Catheter - Urethral (mL): 2100 mL  Total OUT: 2100 mL    Total NET: -1540 mL          LABS:                          8.8    13.53 )-----------( 129      ( 02 Nov 2022 12:02 )             29.8                                               11-02    132<L>  |  88<L>  |  37<H>  ----------------------------<  120<H>  4.3   |  35<H>  |  0.8    Ca    7.2<L>      02 Nov 2022 07:09  Mg     2.1     11-02                                                                                                                                                                                   Mode: AC/ CMV (Assist Control/ Continuous Mandatory Ventilation)  RR (machine): 20  TV (machine): 400  FiO2: 70  PEEP: 8  ITime: 1  MAP: 16  PIP: 33                                          MEDICATIONS  (STANDING):  ALBUTerol    90 MICROgram(s) HFA Inhaler 2 Puff(s) Inhalation every 12 hours  amiKACIN  IVPB 1250 milliGRAM(s) IV Intermittent <User Schedule>  ampicillin/sulbactam  IVPB 9 Gram(s) IV Intermittent every 8 hours  ascorbic acid 500 milliGRAM(s) Oral daily  atorvastatin 10 milliGRAM(s) Oral at bedtime  calcium carbonate   Suspension 750 milliGRAM(s) Enteral Tube three times a day  chlorhexidine 0.12% Liquid 15 milliLiter(s) Oral Mucosa every 12 hours  chlorhexidine 2% Cloths 1 Application(s) Topical daily  collagenase Ointment 1 Application(s) Topical two times a day  Dakins Solution - 1/2 Strength 1 Application(s) Topical two times a day  dextrose 5%. 1000 milliLiter(s) (50 mL/Hr) IV Continuous <Continuous>  dextrose 5%. 1000 milliLiter(s) (100 mL/Hr) IV Continuous <Continuous>  dextrose 50% Injectable 25 Gram(s) IV Push once  dextrose 50% Injectable 12.5 Gram(s) IV Push once  dextrose 50% Injectable 25 Gram(s) IV Push once  epoetin carline-epbx (RETACRIT) Injectable 92484 Unit(s) SubCutaneous <User Schedule>  furosemide   Injectable 40 milliGRAM(s) IV Push two times a day  glucagon  Injectable 1 milliGRAM(s) IntraMuscular once  heparin   Injectable 5000 Unit(s) SubCutaneous every 12 hours  insulin lispro (ADMELOG) corrective regimen sliding scale   SubCutaneous three times a day before meals  levothyroxine 125 MICROGram(s) Oral daily  methylPREDNISolone sodium succinate Injectable 60 milliGRAM(s) IV Push two times a day  metoprolol tartrate 12.5 milliGRAM(s) Oral every 12 hours  midodrine. 10 milliGRAM(s) Oral every 8 hours  ofloxacin 0.3% Solution 1 Drop(s) Left EYE two times a day  pantoprazole   Suspension 40 milliGRAM(s) Oral every 12 hours  zinc sulfate 220 milliGRAM(s) Oral daily    MEDICATIONS  (PRN):  acetaminophen     Tablet .. 650 milliGRAM(s) Oral every 6 hours PRN Temp greater or equal to 38C (100.4F), Mild Pain (1 - 3)  ALBUTerol    90 MICROgram(s) HFA Inhaler 4 Puff(s) Inhalation every 6 hours PRN Shortness of Breath and/or Wheezing  albuterol/ipratropium for Nebulization 3 milliLiter(s) Nebulizer every 6 hours PRN Shortness of Breath and/or Wheezing  dextrose Oral Gel 15 Gram(s) Oral once PRN Blood Glucose LESS THAN 70 milliGRAM(s)/deciliter  ketorolac   Injectable 15 milliGRAM(s) IV Push every 6 hours PRN Moderate Pain (4 - 6)  morphine  - Injectable 2 milliGRAM(s) IV Push every 6 hours PRN Severe Pain (7 - 10)      Xrays:                                                                                     ECHO

## 2022-11-03 NOTE — PROGRESS NOTE ADULT - ASSESSMENT
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    #Sepsis on admission  - Blood Cx 10/23 NG    #UTI  - CT abd/pelvis 10/23 with radiologic evidence of Left Pyelonephritis  - Urine Cx 10/23 MDR Acinetobacter baumanii      #Sacral Ulcer  #Right Heel Ulcer    #Bilateral Pleural Effusion - unable to rule out pneumonia, although suspect urinary source  #Cardiac Arrest with Anoxic brain injury s/p trach/PEG    Recommendations  - heel ulcer debridement cancelled -- noted GOC discussions  - continue unasyn 9g q 8 hours + Amikacin 1250 mg q 48 hours  - trend creatine closely  - continue to trend WBC  - poor prognosis with MDR infection    Please call or message on [employer] Teams if with any questions.  Spectra [phone].

## 2022-11-03 NOTE — PROGRESS NOTE ADULT - SUBJECTIVE AND OBJECTIVE BOX
CHADWICK VENCES 82y Male  MRN#: [id number]   Hospital Day: 11d    SUBJECTIVE  Patient is a 82y old Male who presents with a chief complaint of anemia/ sepsis (03 Nov 2022 06:17)  Currently admitted to medicine with the primary diagnosis of Hypernatremia      INTERVAL HPI AND OVERNIGHT EVENTS:  Patient was examined and seen at bedside. This morning he is resting comfortably in bed and reports no issues or overnight events.    OBJECTIVE  PAST MEDICAL & SURGICAL HISTORY  BPH (benign prostatic hyperplasia)    Mild HTN    Cardiac arrest    Anoxic brain injury    2019 novel coronavirus disease (COVID-19)      ALLERGIES:  Allergy Status Unknown    MEDICATIONS:  STANDING MEDICATIONS  ALBUTerol    90 MICROgram(s) HFA Inhaler 2 Puff(s) Inhalation every 12 hours  amiKACIN  IVPB 1250 milliGRAM(s) IV Intermittent <User Schedule>  ampicillin/sulbactam  IVPB 9 Gram(s) IV Intermittent every 8 hours  ascorbic acid 500 milliGRAM(s) Oral daily  atorvastatin 10 milliGRAM(s) Oral at bedtime  calcium carbonate   Suspension 750 milliGRAM(s) Enteral Tube three times a day  chlorhexidine 0.12% Liquid 15 milliLiter(s) Oral Mucosa every 12 hours  chlorhexidine 2% Cloths 1 Application(s) Topical daily  collagenase Ointment 1 Application(s) Topical two times a day  Dakins Solution - 1/2 Strength 1 Application(s) Topical two times a day  dextrose 5%. 1000 milliLiter(s) IV Continuous <Continuous>  dextrose 5%. 1000 milliLiter(s) IV Continuous <Continuous>  dextrose 50% Injectable 25 Gram(s) IV Push once  dextrose 50% Injectable 12.5 Gram(s) IV Push once  dextrose 50% Injectable 25 Gram(s) IV Push once  epoetin carline-epbx (RETACRIT) Injectable 11953 Unit(s) SubCutaneous <User Schedule>  furosemide   Injectable 40 milliGRAM(s) IV Push two times a day  glucagon  Injectable 1 milliGRAM(s) IntraMuscular once  heparin   Injectable 5000 Unit(s) SubCutaneous every 12 hours  insulin lispro (ADMELOG) corrective regimen sliding scale   SubCutaneous three times a day before meals  levothyroxine 125 MICROGram(s) Oral daily  methylPREDNISolone sodium succinate Injectable 60 milliGRAM(s) IV Push two times a day  metoprolol tartrate 12.5 milliGRAM(s) Oral every 12 hours  midodrine. 10 milliGRAM(s) Oral every 8 hours  ofloxacin 0.3% Solution 1 Drop(s) Left EYE two times a day  pantoprazole   Suspension 40 milliGRAM(s) Oral every 12 hours  scopolamine 1 mG/72 Hr(s) Patch 1 Patch Transdermal every 72 hours  zinc sulfate 220 milliGRAM(s) Oral daily    PRN MEDICATIONS  acetaminophen     Tablet .. 650 milliGRAM(s) Oral every 6 hours PRN  ALBUTerol    90 MICROgram(s) HFA Inhaler 4 Puff(s) Inhalation every 6 hours PRN  albuterol/ipratropium for Nebulization 3 milliLiter(s) Nebulizer every 6 hours PRN  dextrose Oral Gel 15 Gram(s) Oral once PRN  ketorolac   Injectable 15 milliGRAM(s) IV Push every 6 hours PRN  morphine  - Injectable 2 milliGRAM(s) IV Push every 6 hours PRN      VITAL SIGNS: Last 24 Hours  T(C): 35.9 (03 Nov 2022 05:23), Max: 36.2 (02 Nov 2022 14:10)  T(F): 96.7 (03 Nov 2022 05:23), Max: 97.1 (02 Nov 2022 14:10)  HR: 79 (03 Nov 2022 07:30) (77 - 91)  BP: 113/67 (03 Nov 2022 05:23) (104/65 - 115/69)  BP(mean): --  RR: 20 (03 Nov 2022 05:23) (20 - 20)  SpO2: 100% (03 Nov 2022 07:30) (100% - 100%)    LABS:                        8.8    13.53 )-----------( 129      ( 02 Nov 2022 12:02 )             29.8     11-02    132<L>  |  88<L>  |  37<H>  ----------------------------<  120<H>  4.3   |  35<H>  |  0.8    Ca    7.2<L>      02 Nov 2022 07:09  Mg     2.1     11-02      RADIOLOGY:  < from: Xray Chest 1 View-PORTABLE IMMEDIATE (Xray Chest 1 View-PORTABLE IMMEDIATE .) (11.02.22 @ 15:51) >  INDINGS/  IMPRESSION:    Stable tracheostomy. Bilateral effusions/opacities are stable to   minimally decreased. No pneumothorax.    Stable cardiomediastinal silhouette.    Unchanged osseous structures.    < end of copied text >  < from: Xray Chest 1 View- PORTABLE-Urgent (Xray Chest 1 View- PORTABLE-Urgent .) (11.01.22 @ 10:06) >    Impression:    Right worse than left near-complete opacification, similar to prior.      < end of copied text >        PHYSICAL EXAM:  CONSTITUTIONAL: No acute distress, No mental status at baseline.   HEAD: Atraumatic, normocephalic  EYES: EOM intact, PERRLA, conjunctiva and sclera clear  ENT: moist mucous membranes  PULMONARY: vented  CARDIOVASCULAR: Regular rate and rhythm  GASTROINTESTINAL: Soft, non-tender, non-distended; bowel sounds present  MUSCULOSKELETAL: no edema  NEUROLOGY: non-focal  SKIN: warm and dry     CHADWICK VENCES 82y Male  MRN#: [id number]   Hospital Day: 11d    SUBJECTIVE  Patient is a 82y old Male who presents with a chief complaint of anemia/ sepsis (03 Nov 2022 06:17)  Currently admitted to medicine with the primary diagnosis of Hypernatremia      INTERVAL HPI AND OVERNIGHT EVENTS:  Patient was examined and seen at bedside. This morning he is resting comfortably in bed and reports no issues or overnight events.    OBJECTIVE  PAST MEDICAL & SURGICAL HISTORY  BPH (benign prostatic hyperplasia)    Mild HTN    Cardiac arrest    Anoxic brain injury    2019 novel coronavirus disease (COVID-19)      ALLERGIES:  Allergy Status Unknown    MEDICATIONS:  STANDING MEDICATIONS  ALBUTerol    90 MICROgram(s) HFA Inhaler 2 Puff(s) Inhalation every 12 hours  amiKACIN  IVPB 1250 milliGRAM(s) IV Intermittent <User Schedule>  ampicillin/sulbactam  IVPB 9 Gram(s) IV Intermittent every 8 hours  ascorbic acid 500 milliGRAM(s) Oral daily  atorvastatin 10 milliGRAM(s) Oral at bedtime  calcium carbonate   Suspension 750 milliGRAM(s) Enteral Tube three times a day  chlorhexidine 0.12% Liquid 15 milliLiter(s) Oral Mucosa every 12 hours  chlorhexidine 2% Cloths 1 Application(s) Topical daily  collagenase Ointment 1 Application(s) Topical two times a day  Dakins Solution - 1/2 Strength 1 Application(s) Topical two times a day  dextrose 5%. 1000 milliLiter(s) IV Continuous <Continuous>  dextrose 5%. 1000 milliLiter(s) IV Continuous <Continuous>  dextrose 50% Injectable 25 Gram(s) IV Push once  dextrose 50% Injectable 12.5 Gram(s) IV Push once  dextrose 50% Injectable 25 Gram(s) IV Push once  epoetin carline-epbx (RETACRIT) Injectable 10610 Unit(s) SubCutaneous <User Schedule>  furosemide   Injectable 40 milliGRAM(s) IV Push two times a day  glucagon  Injectable 1 milliGRAM(s) IntraMuscular once  heparin   Injectable 5000 Unit(s) SubCutaneous every 12 hours  insulin lispro (ADMELOG) corrective regimen sliding scale   SubCutaneous three times a day before meals  levothyroxine 125 MICROGram(s) Oral daily  methylPREDNISolone sodium succinate Injectable 60 milliGRAM(s) IV Push two times a day  metoprolol tartrate 12.5 milliGRAM(s) Oral every 12 hours  midodrine. 10 milliGRAM(s) Oral every 8 hours  ofloxacin 0.3% Solution 1 Drop(s) Left EYE two times a day  pantoprazole   Suspension 40 milliGRAM(s) Oral every 12 hours  scopolamine 1 mG/72 Hr(s) Patch 1 Patch Transdermal every 72 hours  zinc sulfate 220 milliGRAM(s) Oral daily    PRN MEDICATIONS  acetaminophen     Tablet .. 650 milliGRAM(s) Oral every 6 hours PRN  ALBUTerol    90 MICROgram(s) HFA Inhaler 4 Puff(s) Inhalation every 6 hours PRN  albuterol/ipratropium for Nebulization 3 milliLiter(s) Nebulizer every 6 hours PRN  dextrose Oral Gel 15 Gram(s) Oral once PRN  ketorolac   Injectable 15 milliGRAM(s) IV Push every 6 hours PRN  morphine  - Injectable 2 milliGRAM(s) IV Push every 6 hours PRN      VITAL SIGNS: Last 24 Hours  T(C): 35.9 (03 Nov 2022 05:23), Max: 36.2 (02 Nov 2022 14:10)  T(F): 96.7 (03 Nov 2022 05:23), Max: 97.1 (02 Nov 2022 14:10)  HR: 79 (03 Nov 2022 07:30) (77 - 91)  BP: 113/67 (03 Nov 2022 05:23) (104/65 - 115/69)  BP(mean): --  RR: 20 (03 Nov 2022 05:23) (20 - 20)  SpO2: 100% (03 Nov 2022 07:30) (100% - 100%)    LABS:                        8.8    13.53 )-----------( 129      ( 02 Nov 2022 12:02 )             29.8     11-02    132<L>  |  88<L>  |  37<H>  ----------------------------<  120<H>  4.3   |  35<H>  |  0.8    Ca    7.2<L>      02 Nov 2022 07:09  Mg     2.1     11-02      RADIOLOGY:  < from: Xray Chest 1 View-PORTABLE IMMEDIATE (Xray Chest 1 View-PORTABLE IMMEDIATE .) (11.02.22 @ 15:51) >  INDINGS/  IMPRESSION:    Stable tracheostomy. Bilateral effusions/opacities are stable to   minimally decreased. No pneumothorax.    Stable cardiomediastinal silhouette.    Unchanged osseous structures.    < end of copied text >  < from: Xray Chest 1 View- PORTABLE-Urgent (Xray Chest 1 View- PORTABLE-Urgent .) (11.01.22 @ 10:06) >    Impression:    Right worse than left near-complete opacification, similar to prior.      < end of copied text >        PHYSICAL EXAM:  CONSTITUTIONAL: No acute distress, No mental status at baseline.   HEAD: Atraumatic, normocephalic  EYES: EOM intact, PERRLA, conjunctiva and sclera clear  ENT: moist mucous membranes  PULMONARY: vented  CARDIOVASCULAR: Regular rate and rhythm  GASTROINTESTINAL: Soft, non-tender, non-distended; bowel sounds present  MUSCULOSKELETAL: no edema  NEUROLOGY: non-focal  SKIN: warm and dry        ASSESSMENT and PLAN    81 y/o male  w/Pmhx of Cardiac arrest leading to anoxic brain injury (s/p Trach and PEG), H/o Severe Covid PNA, H/o Gram -ve PNA, HTN and BPH presented for abnormal labs in the nursing home. At baseline, patient is unresponsive, makes random non-purposeful movement, had trach (Vent dependent) and PEG. Patient had several desaturation events since yesterday. Grossly overloaded. STAT x-ray and ABG ordered but could not be performed due to overloaded status. X-       #Sepsis at nursing home  - As per SNF documents, patient was being treated for sepsis of unknown origin with Meropenem 1g q8 started on 10/21 for 10 days  - Suspected Pyelonephritis. UA grossly positive with WBC 13k  - Urine cultures positive for Acinetobacter Baumannii. WBC 13k toda  - Chest X-ray shows worsening bilateral opacities. Suspected PNA, unclear origin.   - Patient requiring constant suctioning. Occasional desaturation events. Family made aware, will discuss goals of care amongst themselves  - Pt DNR but family would like to continue medical treatment for now  - Chest x-ray unchanged  - Albuterol PRN and q8hr  - Chest PT ordered    #Decubitus ulcer & Heel ulcer  - H/o multiple pressure ulcers (Sacral, R hip, R heel, L heel, R glut)  - c/w Unasyn & amikacin (started on 10/25) per ID  - Started on Vit C and Zinc  - Burn evaluated sacral ulcer. Continue current wound care.  - Podiatry assess heel ulcer, no debridement needed at this time.   - wound care consult placed.     #Hypernatremia  - 100 mL free water per PEG tube q8hr   - Daily BMP's to monitor Na levels.  - IV lasix 40mg BID , monitor Na     #Anemia- likely 2/2 chronic ds  -Hold Eliquis, on prophylactic heparin  -s/p 2 units PRBCs    #CHFpEF exacerbation   - B/L pitting edema   - c/w Lasix 40mg IV BID  - midodrine 10mg Q8hrs due to hypotension  - HF team consulted, given patient's bad prognosis no intervention attempted.  - Monitor urine in and out    #NSTEMI type 2  -Trops elevated but lower from previous admission  - Repeat ECHO and EKG  - troponin started trending down    #Left eye conjunctivitis  -resolved    #Anoxic brain injury  - secondary to  Cardiac arrest  -Does not follow commands, very poor prognosis  -DNR only for now     #Misc  -DVT prophylaxis:  heparin BID subq  - Ca supplemented.   -GI prophylaxis: Protonix BID  -Diet: NPO with tube feed  -Code status: DNR  -Activity: Bed bound  -Dispo: transfer to vent unit

## 2022-11-03 NOTE — PROGRESS NOTE ADULT - ATTENDING COMMENTS
case d/w with patients daughter-->still wants to hold off on surgery  local wound care: right heel with betadine and gauze dressing  if pts family decides on surgery, please reconsult podiatry

## 2022-11-03 NOTE — PROGRESS NOTE ADULT - ASSESSMENT
IMPRESSION:    sepsis present on admission  UTI/ acinetobacter   Acute on chronic hypoxemic resp failure worsening  pul edema  Chronic hypoxic respiratory failure SP Trach  Anoxic brain injury  HO Cardiac Arrest  HO Severe COVID PNA  Sacral ulcer      SUGGEST:    CNS:  As needed morphine for comfort    HEENT: Oral care.  Trach care     PULMONARY: aspiration precaution.  Wean FIO2.  Keep Sao2 92 to 96%.  Vent dependent. on 80%, solumedrol 60 q 12, sx to change trach XLT    CARDIOVASCULAR:   Avoid overload.  midodrine 10 q 8. lasix 40 iv q 12, if needed levophed    GI: PPI q 12, feeding.  Bowel Regimen.    RENAL:  Follow up lytes.  Correct as needed.     INFECTIOUS DISEASE:  ABX per ID    HEMATOLOGICAL: DVT prophylaxis, MOnitor CBC and Coags     ENDOCRINE:  Follow up FS.  Insulin protocol if needed    MUSCULOSKELETAL: Wound care per burn     DNR  Palliative care eval  Very poor overall prognosis  poor MICU candidate

## 2022-11-04 NOTE — PROGRESS NOTE ADULT - SUBJECTIVE AND OBJECTIVE BOX
CHADWICK VENCES 82y Male  MRN#: [id number]   Hospital Day: 12d    SUBJECTIVE  Patient is a 82y old Male who presents with a chief complaint of sob (04 Nov 2022 05:57)  Currently admitted to medicine with the primary diagnosis of Hypernatremia      INTERVAL HPI AND OVERNIGHT EVENTS:  Patient was examined and seen at bedside. This morning he is resting comfortably in bed and reports no issues or overnight events.    OBJECTIVE  PAST MEDICAL & SURGICAL HISTORY  BPH (benign prostatic hyperplasia)    Mild HTN    Cardiac arrest    Anoxic brain injury    2019 novel coronavirus disease (COVID-19)      ALLERGIES:  Allergy Status Unknown    MEDICATIONS:  STANDING MEDICATIONS  ALBUTerol    90 MICROgram(s) HFA Inhaler 2 Puff(s) Inhalation every 12 hours  amiKACIN  IVPB 1250 milliGRAM(s) IV Intermittent <User Schedule>  ampicillin/sulbactam  IVPB 9 Gram(s) IV Intermittent every 8 hours  ascorbic acid 500 milliGRAM(s) Oral daily  atorvastatin 10 milliGRAM(s) Oral at bedtime  calcium carbonate   Suspension 750 milliGRAM(s) Enteral Tube three times a day  chlorhexidine 0.12% Liquid 15 milliLiter(s) Oral Mucosa every 12 hours  chlorhexidine 2% Cloths 1 Application(s) Topical daily  collagenase Ointment 1 Application(s) Topical two times a day  Dakins Solution - 1/2 Strength 1 Application(s) Topical two times a day  dextrose 5%. 1000 milliLiter(s) IV Continuous <Continuous>  dextrose 5%. 1000 milliLiter(s) IV Continuous <Continuous>  dextrose 50% Injectable 25 Gram(s) IV Push once  dextrose 50% Injectable 12.5 Gram(s) IV Push once  dextrose 50% Injectable 25 Gram(s) IV Push once  epoetin carline-epbx (RETACRIT) Injectable 92597 Unit(s) SubCutaneous <User Schedule>  furosemide   Injectable 60 milliGRAM(s) IV Push two times a day  glucagon  Injectable 1 milliGRAM(s) IntraMuscular once  heparin   Injectable 5000 Unit(s) SubCutaneous every 12 hours  insulin lispro (ADMELOG) corrective regimen sliding scale   SubCutaneous three times a day before meals  levothyroxine 125 MICROGram(s) Oral daily  methylPREDNISolone sodium succinate Injectable 60 milliGRAM(s) IV Push two times a day  metoprolol tartrate 12.5 milliGRAM(s) Oral every 12 hours  midodrine. 10 milliGRAM(s) Oral every 8 hours  ofloxacin 0.3% Solution 1 Drop(s) Left EYE two times a day  pantoprazole   Suspension 40 milliGRAM(s) Oral every 12 hours  scopolamine 1 mG/72 Hr(s) Patch 1 Patch Transdermal every 72 hours  zinc sulfate 220 milliGRAM(s) Oral daily    PRN MEDICATIONS  acetaminophen     Tablet .. 650 milliGRAM(s) Oral every 6 hours PRN  ALBUTerol    90 MICROgram(s) HFA Inhaler 4 Puff(s) Inhalation every 6 hours PRN  albuterol/ipratropium for Nebulization 3 milliLiter(s) Nebulizer every 6 hours PRN  dextrose Oral Gel 15 Gram(s) Oral once PRN  ketorolac   Injectable 15 milliGRAM(s) IV Push every 6 hours PRN  morphine  - Injectable 2 milliGRAM(s) IV Push every 6 hours PRN      VITAL SIGNS: Last 24 Hours  T(C): 36.7 (04 Nov 2022 05:31), Max: 36.7 (04 Nov 2022 05:31)  T(F): 98 (04 Nov 2022 05:31), Max: 98 (04 Nov 2022 05:31)  HR: 88 (04 Nov 2022 05:31) (88 - 94)  BP: 118/75 (04 Nov 2022 05:31) (118/75 - 127/74)  BP(mean): --  RR: 18 (04 Nov 2022 05:31) (18 - 18)  SpO2: 97% (04 Nov 2022 14:48) (97% - 100%)    LABS:                        6.7    10.86 )-----------( 152      ( 04 Nov 2022 13:56 )             21.8     11-04    133<L>  |  87<L>  |  49<H>  ----------------------------<  144<H>  4.6   |  34<H>  |  1.0    Ca    7.8<L>      04 Nov 2022 13:56  Mg     2.2     11-04            Lactate, Blood: 1.7 mmol/L (11-04-22 @ 13:56)          RADIOLOGY:  - Reviewed. No concern for OM of the foot at this time.     PHYSICAL EXAM:  CONSTITUTIONAL: No acute distress, No mental status at baseline.   HEAD: Atraumatic, normocephalic  EYES: EOM intact, PERRLA, conjunctiva and sclera clear  ENT: moist mucous membranes  PULMONARY: vented  CARDIOVASCULAR: Regular rate and rhythm  GASTROINTESTINAL: Soft, non-tender, non-distended; bowel sounds present  MUSCULOSKELETAL: Extremely overloaded in upper and lower extremities.   NEUROLOGY: non-focal  SKIN: warm and dry        ASSESSMENT and PLAN    81 y/o male  w/Pmhx of Cardiac arrest leading to anoxic brain injury (s/p Trach and PEG), H/o Severe Covid PNA, H/o Gram -ve PNA, HTN and BPH presented for abnormal labs in the nursing home. At baseline, patient is unresponsive, makes random non-purposeful movement, had trach (Vent dependent) and PEG. Patient had several desaturation events since yesterday. Grossly overloaded. STAT x-ray and ABG ordered but could not be performed due to overloaded status. X-       #Sepsis at nursing home  - As per SNF documents, patient was being treated for sepsis of unknown origin with Meropenem 1g q8 started on 10/21 for 10 days  - Suspected Pyelonephritis. UA grossly positive with WBC 13k  - Urine cultures positive for Acinetobacter Baumannii. WBC 10k today  - Chest X-ray shows worsening bilateral opacities. Suspected PNA, unclear origin.   - Patient requiring constant suctioning. Occasional desaturation events. Family made aware, will discuss goals of care amongst themselves  - Pt DNR but family would like to continue medical treatment for now  - Chest x-ray unchanged  - Albuterol PRN and q8hr  - Chest PT ordered  - Goals of care conversation with daughter today. Continue medical care.     #Decubitus ulcer & Heel ulcer  - H/o multiple pressure ulcers (Sacral, R hip, R heel, L heel, R glut)  - c/w Unasyn & amikacin (started on 10/25) per ID  - Started on Vit C and Zinc  - Burn evaluated sacral ulcer. Continue current wound care.  - Podiatry assess heel ulcer, no debridement at this time.   - wound care consult placed.     #Hypernatremia  - 100 mL free water per PEG tube q8hr   - Daily BMP's to monitor Na levels.  - IV lasix 40mg BID , monitor Na     #Anemia- likely 2/2 chronic ds  - Hg 6.7 today. 1 unit of PRBC's ordered.  - 8PM Type and screen ordered.     #CHFpEF exacerbation   - Extremely overloaded on exam  - increased to Lasix 60mg IV BID  - midodrine 10mg Q8hrs due to hypotension  - HF team consulted, given patient's bad prognosis no intervention attempted.  - Monitor strict urine in and out    #NSTEMI type 2  -Trops elevated but lower from previous admission  - Repeat ECHO and EKG  - troponin started trending down    #Left eye conjunctivitis  -resolved    #Anoxic brain injury  - secondary to  Cardiac arrest  -Does not follow commands, very poor prognosis  -DNR only for now     #Misc  -DVT prophylaxis:  heparin BID subq  - Ca supplemented.   -GI prophylaxis: Protonix BID  -Diet: NPO with tube feed  -Code status: DNR  -Activity: Bed bound

## 2022-11-04 NOTE — PROGRESS NOTE ADULT - ASSESSMENT
IMPRESSION:    sepsis present on admission  UTI/ acinetobacter   Acute on chronic hypoxemic resp failure worsening  pul edema  Chronic hypoxic respiratory failure SP Trach  Anoxic brain injury  HO Cardiac Arrest  HO Severe COVID PNA  Sacral ulcer      SUGGEST:    CNS:  As needed morphine for comfort    HEENT: Oral care.  Trach care     PULMONARY: aspiration precaution.  Wean FIO2.  Keep Sao2 92 to 96%.  Vent dependent. on 80%, solumedrol 60 q 12    CARDIOVASCULAR:   Avoid overload.  midodrine 10 q 8. lasix 40 iv q 12    GI: PPI q 12, feeding.  Bowel Regimen.    RENAL:  Follow up lytes.  Correct as needed.     INFECTIOUS DISEASE:  ABX per ID    HEMATOLOGICAL: DVT prophylaxis, MOnitor CBC and Coags     ENDOCRINE:  Follow up FS.  Insulin protocol if needed    MUSCULOSKELETAL: Wound care per burn     DNR  Palliative care eval  Very poor overall prognosis  poor MICU candidate

## 2022-11-04 NOTE — PROGRESS NOTE ADULT - SUBJECTIVE AND OBJECTIVE BOX
Over Night Events: events noted, vent dependant, afebrile, high FIO2    PHYSICAL EXAM    ICU Vital Signs Last 24 Hrs  T(C): 36.7 (04 Nov 2022 05:31), Max: 36.7 (04 Nov 2022 05:31)  T(F): 98 (04 Nov 2022 05:31), Max: 98 (04 Nov 2022 05:31)  HR: 88 (04 Nov 2022 05:31) (79 - 94)  BP: 118/75 (04 Nov 2022 05:31) (118/75 - 127/74)  RR: 18 (04 Nov 2022 05:31) (18 - 20)  SpO2: 100% (03 Nov 2022 21:53) (99% - 100%)    O2 Parameters below as of 03 Nov 2022 21:53  Patient On (Oxygen Delivery Method): ventilator  O2 Flow (L/min): 70          General: ill looking  HEENT: trach  Lungs: Bilateral rhonchi  Cardiovascular: YOANA 2.6  Abdomen: Soft, Positive BS  Not following commands      11-02-22 @ 07:01  -  11-03-22 @ 07:00  --------------------------------------------------------  IN:    Free Water: 400 mL    Glucerna: 720 mL  Total IN: 1120 mL    OUT:    Indwelling Catheter - Urethral (mL): 2100 mL    Rectal Tube (mL): 300 mL  Total OUT: 2400 mL    Total NET: -1280 mL      11-03-22 @ 07:01  -  11-04-22 @ 05:58  --------------------------------------------------------  IN:    Free Water: 200 mL    Glucerna: 360 mL  Total IN: 560 mL    OUT:    Indwelling Catheter - Urethral (mL): 400 mL  Total OUT: 400 mL    Total NET: 160 mL          LABS:                          8.8    13.53 )-----------( 129      ( 02 Nov 2022 12:02 )             29.8                                               11-02    132<L>  |  88<L>  |  37<H>  ----------------------------<  120<H>  4.3   |  35<H>  |  0.8    Ca    7.2<L>      02 Nov 2022 07:09  Mg     2.1     11-02                                                                                                                                                                                   Mode: AC/ CMV (Assist Control/ Continuous Mandatory Ventilation)  RR (machine): 20  TV (machine): 400  FiO2: 70  PEEP: 8  ITime: 1  MAP: 15  PIP: 32                                          MEDICATIONS  (STANDING):  ALBUTerol    90 MICROgram(s) HFA Inhaler 2 Puff(s) Inhalation every 12 hours  amiKACIN  IVPB 1250 milliGRAM(s) IV Intermittent <User Schedule>  ampicillin/sulbactam  IVPB 9 Gram(s) IV Intermittent every 8 hours  ascorbic acid 500 milliGRAM(s) Oral daily  atorvastatin 10 milliGRAM(s) Oral at bedtime  calcium carbonate   Suspension 750 milliGRAM(s) Enteral Tube three times a day  chlorhexidine 0.12% Liquid 15 milliLiter(s) Oral Mucosa every 12 hours  chlorhexidine 2% Cloths 1 Application(s) Topical daily  collagenase Ointment 1 Application(s) Topical two times a day  Dakins Solution - 1/2 Strength 1 Application(s) Topical two times a day  dextrose 5%. 1000 milliLiter(s) (50 mL/Hr) IV Continuous <Continuous>  dextrose 5%. 1000 milliLiter(s) (100 mL/Hr) IV Continuous <Continuous>  dextrose 50% Injectable 25 Gram(s) IV Push once  dextrose 50% Injectable 12.5 Gram(s) IV Push once  dextrose 50% Injectable 25 Gram(s) IV Push once  epoetin carline-epbx (RETACRIT) Injectable 06160 Unit(s) SubCutaneous <User Schedule>  furosemide   Injectable 40 milliGRAM(s) IV Push two times a day  glucagon  Injectable 1 milliGRAM(s) IntraMuscular once  heparin   Injectable 5000 Unit(s) SubCutaneous every 12 hours  insulin lispro (ADMELOG) corrective regimen sliding scale   SubCutaneous three times a day before meals  levothyroxine 125 MICROGram(s) Oral daily  methylPREDNISolone sodium succinate Injectable 60 milliGRAM(s) IV Push two times a day  metoprolol tartrate 12.5 milliGRAM(s) Oral every 12 hours  midodrine. 10 milliGRAM(s) Oral every 8 hours  ofloxacin 0.3% Solution 1 Drop(s) Left EYE two times a day  pantoprazole   Suspension 40 milliGRAM(s) Oral every 12 hours  scopolamine 1 mG/72 Hr(s) Patch 1 Patch Transdermal every 72 hours  zinc sulfate 220 milliGRAM(s) Oral daily    MEDICATIONS  (PRN):  acetaminophen     Tablet .. 650 milliGRAM(s) Oral every 6 hours PRN Temp greater or equal to 38C (100.4F), Mild Pain (1 - 3)  ALBUTerol    90 MICROgram(s) HFA Inhaler 4 Puff(s) Inhalation every 6 hours PRN Shortness of Breath and/or Wheezing  albuterol/ipratropium for Nebulization 3 milliLiter(s) Nebulizer every 6 hours PRN Shortness of Breath and/or Wheezing  dextrose Oral Gel 15 Gram(s) Oral once PRN Blood Glucose LESS THAN 70 milliGRAM(s)/deciliter  ketorolac   Injectable 15 milliGRAM(s) IV Push every 6 hours PRN Moderate Pain (4 - 6)  morphine  - Injectable 2 milliGRAM(s) IV Push every 6 hours PRN Severe Pain (7 - 10)

## 2022-11-04 NOTE — PROGRESS NOTE ADULT - ATTENDING COMMENTS
81 y/o Male  with PMH  of h/o Cardiac arrest leading to anoxic brain injury (s/p Trach vent dependent and PEG), H/o Severe Covid PNA, H/o Gram -ve PNA, HTN and BPH presented for Sepsis due to Acute left sided pyelonephritis due to Acinetobacter infection. Hypernatremia, dehydration, [place]. Large pleural effusions b/l.  New marked degenerative changes of left hip.  Patient also found to have multiple pressure decubiti stage 4 and unstagable. Patient was initially admitted to SDU than on 10/29 downgraded to vent unit for further management and tx.    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  #troponemia--likely type 2 demand ischemia   #Acute on chronic respiratory Failure s/p Trach (baseline FiO2 50-60%) and PEG  #h/o severe COVID-19 pneumonia  #h/o cardiac Arrest- MI- anoxic brain injury  -ID following--continue with iv abx (unasyn and amikacin)  -f/u bcx (10/23 neg to date) and urine cx (A baumannii: sens to Amikacin, resistant to Unasyn)  -monitor wbc and fever curve   -repeat ECHO 10/31: EF 20%, grade 2 diastolic dysfunction, mild pulm hypertension=---this is  worsening heart function----will f/u with Heart failure team for additional recommendations  -continue with diuresis- lasix iv 40 mg BID for now. Strictly monitor i/o and daily weights  -vent support : PRVC Settings: Routine Ventilator Mode:  AC/CMV  Targeted SpO2 Range (%): 88-97---current FiO2 70%; f/.u with pulmonary team  -continue medical management (patient with multiple medical comorbidities) and overall very poor prognosis- DNR  -stat CXR with persistant bilateral congestive changes/effusions- relatively unchanged from prior cxr  -episodes of desaturation this morning- aggressive suction/ vent support/ monitor o2 sat/ Chest PT/Albuterol BID/Solumedrol BID/Scopolamine patch.   -11/4: Increased to IV Lasix 60 BID. I/O monitoring. daughter wants full medical management. remains DNR though.   Prognosis grim.     # Hypernatremia- improved with increasing PEG free water.     #h/o HTN, currently pt. is Hypotensive requiring Midodrine tx.  - lopressor to be continued with holding parameters  -maintain MAP >65  -on midodrine 10 mg via peg q8hr    #  BPH- pt. has chronic cardoso, due to hypotension d/dejon flomax  # multiple stage 4 pressure ulcers top sacrum and Unstageable pressure ulcer to b/l Hips/b/l  heels- infected   - daily wound care continue  -burn team to debride likely this week.  -podiatry to debride 11/3?   -continue frequent position change and local wound care  - family deferred foot ulcer dbx by podiatry for now.     DVT/GI ppx  overall very poor prognosis  Code status: DNR        11/2-spoke with daughter Judy ([phone]) at bedside. INformed of poor prognosis and poor QOL.  She said she will discuss with her sister and maybe reconsider [facility]. will f/u    #Progress Note Handoff: remains very poor prognosis for recovery- remains on vent support and continue management as per above .

## 2022-11-05 NOTE — PROGRESS NOTE ADULT - ASSESSMENT
IMPRESSION:    sepsis present on admission  UTI/ acinetobacter   Acute on chronic hypoxemic resp failure worsening on 100%  pul edema  Chronic hypoxic respiratory failure SP Trach  Anoxic brain injury  HO Cardiac Arrest  HO Severe COVID PNA  Sacral ulcer      SUGGEST:    CNS:  As needed morphine for comfort    HEENT: Oral care.  Trach care     PULMONARY: aspiration precaution.  Wean FIO2.  Keep Sao2 92 to 96%.  Vent dependent., solumedrol 60 q 12    CARDIOVASCULAR:   Avoid overload.  midodrine 10 q 8. lasix 40 iv q 12    GI: PPI q 12, feeding.  Bowel Regimen.    RENAL:  Follow up lytes.  Correct as needed.     INFECTIOUS DISEASE:  ABX per ID    HEMATOLOGICAL: DVT prophylaxis, MOnitor CBC and Coags     ENDOCRINE:  Follow up FS.  Insulin protocol if needed    MUSCULOSKELETAL: Wound care per burn     DNR  Palliative care fup  Very poor overall prognosis  poor MICU candidate

## 2022-11-05 NOTE — PROGRESS NOTE ADULT - ASSESSMENT
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    #Sepsis on admission  - Blood Cx 10/23 NG    #UTI  - CT abd/pelvis 10/23 with radiologic evidence of Left Pyelonephritis  - Urine Cx 10/23 MDR Acinetobacter baumanii      #Sacral Ulcer  #Right Heel Ulcer    #Bilateral Pleural Effusion - unable to rule out pneumonia, although suspect urinary source  #Cardiac Arrest with Anoxic brain injury s/p trach/PEG    Recommendations  - creatinine rising -- stop unasyn and amikacin   - switch to cefidericol 1.5 g q 8 hours (CrCl 59) -- non formulary   - trend creatine closely  - repeat procalcitonin   - will plan to stop antibiotics on 11/8   - continued C discussion   - poor prognosis with MDR infection -- further antibiotics unlikely to be helpful overall     Please call or message on [employer] if with any questions.  Spectra [phone].

## 2022-11-05 NOTE — PROGRESS NOTE ADULT - SUBJECTIVE AND OBJECTIVE BOX
CHADWICK VENCES  82y, Male  Allergy: Allergy Status Unknown      LOS  13d    CHIEF COMPLAINT: anemia/ sepsis (05 Nov 2022 07:05)      INTERVAL EVENTS/HPI  - No acute events overnight  - T(F): , Max: 98.6 (11-05-22 @ 04:59)  - on 70% FIO2, anasarca and diffuse edema    - WBC Count: 16.70 (11-05-22 @ 11:42)  WBC Count: 10.86 (11-04-22 @ 13:56)     - Creatinine, Serum: 1.2 (11-05-22 @ 11:42)  Creatinine, Serum: 1.0 (11-04-22 @ 13:56)       ROS  unable to obtain history secondary to patient's mental status and/or sedation      VITALS:  T(F): 98.6, Max: 98.6 (11-05-22 @ 04:59)  HR: 105  BP: 125/82  RR: 18Vital Signs Last 24 Hrs  T(C): 37 (05 Nov 2022 04:59), Max: 37 (05 Nov 2022 04:59)  T(F): 98.6 (05 Nov 2022 04:59), Max: 98.6 (05 Nov 2022 04:59)  HR: 105 (05 Nov 2022 04:59) (95 - 105)  BP: 125/82 (05 Nov 2022 04:59) (120/75 - 125/82)  BP(mean): --  RR: 18 (05 Nov 2022 04:59) (18 - 20)  SpO2: 92% (05 Nov 2022 04:59) (87% - 100%)    Parameters below as of 05 Nov 2022 04:59  Patient On (Oxygen Delivery Method): ventilator        PHYSICAL EXAM:  Gen: gene/trach   HEENT: Normocephalic, atraumatic  Neck: supple, no lymphadenopathy  CV: Regular rate & regular rhythm  Lungs: decreased BS at bases, no fremitus  Abdomen: Soft, BS present  Ext: Warm, well perfused  Neuro: non focal, awake  Skin:  edematous , sacral ulcers  Lines: no phlebitis    FH: Non-contributory  Social Hx: Non-contributory    TESTS & MEASUREMENTS:                        10.8   16.70 )-----------( 155      ( 05 Nov 2022 11:42 )             34.0     11-05    138  |  90<L>  |  54<H>  ----------------------------<  123<H>  4.4   |  31  |  1.2    Ca    7.8<L>      05 Nov 2022 11:42  Mg     2.4     11-05    TPro  6.5  /  Alb  2.8<L>  /  TBili  0.3  /  DBili  x   /  AST  12  /  ALT  7   /  AlkPhos  81  11-05      LIVER FUNCTIONS - ( 05 Nov 2022 11:42 )  Alb: 2.8 g/dL / Pro: 6.5 g/dL / ALK PHOS: 81 U/L / ALT: 7 U/L / AST: 12 U/L / GGT: x               Culture - Blood (collected 10-24-22 @ 00:00)  Source: .Blood Blood-Peripheral  Final Report (10-29-22 @ 09:00):    No Growth Final    Culture - Urine (collected 10-23-22 @ 05:45)  Source: Clean Catch Clean Catch (Midstream)  Final Report (10-28-22 @ 23:54):    >100,000 CFU/ml Acinetobacter baumannii/nosocomialis group    Cefiderocol interpretations based on FDA breakpoints.  Organism: Acinetobacter baumannii/nosocomialis group  Acinetobacter baumannii/nosocomialis group  Acinetobacter baumannii/nosocomialis group (10-28-22 @ 23:54)  Organism: Acinetobacter baumannii/nosocomialis group (10-28-22 @ 23:54)      -  Polymyxin B: R 4      Method Type: ETEST  Organism: Acinetobacter baumannii/nosocomialis group (10-28-22 @ 23:54)      -  Cefiderocol: S      -  Piperacillin/Tazobactam: R      Method Type: KB  Organism: Acinetobacter baumannii/nosocomialis group (10-28-22 @ 23:54)      -  Amikacin: S <=16      -  Ampicillin/Sulbactam: R >16/8      -  Cefepime: R >16      -  Ceftazidime: S 4      -  Ceftriaxone: I 32      -  Ciprofloxacin: R >2      -  Gentamicin: S 4      -  Imipenem: R >8      -  Levofloxacin: R >4      -  Meropenem: R >8      -  Tigecycline: <=2      -  Tobramycin: S <=2      -  Trimethoprim/Sulfamethoxazole: R >2/38      Method Type: ALEXSANDRA    Culture - Blood (collected 10-23-22 @ 05:05)  Source: .Blood Blood  Final Report (10-28-22 @ 13:01):    No Growth Final        Lactate, Blood: 1.7 mmol/L (11-04-22 @ 13:56)      INFECTIOUS DISEASES TESTING  COVID-19 PCR: NotDetec (11-04-22 @ 06:00)  COVID-19 PCR: NotDetec (10-29-22 @ 18:42)  MRSA PCR Result.: Negative (10-26-22 @ 09:14)  Legionella Antigen, Urine: Negative (10-24-22 @ 02:44)  Procalcitonin, Serum: 5.93 (10-24-22 @ 00:00)  COVID-19 PCR: NotDetec (10-23-22 @ 05:45)  COVID-19 PCR: NotDetec (03-02-22 @ 13:30)  Procalcitonin, Serum: 0.51 (02-27-22 @ 07:33)  COVID-19 PCR: Detected (02-20-22 @ 15:33)  COVID-19 PCR: NotDetec (02-15-22 @ 07:30)  Procalcitonin, Serum: 0.09 (02-12-22 @ 06:21)  COVID-19 PCR: Detected (02-10-22 @ 13:00)  Procalcitonin, Serum: 0.04 (02-09-22 @ 06:03)  Procalcitonin, Serum: 0.03 (02-01-22 @ 06:25)  Procalcitonin, Serum: 0.07 (01-31-22 @ 06:15)  Procalcitonin, Serum: 0.10 (01-30-22 @ 10:45)  Procalcitonin, Serum: 0.11 (01-29-22 @ 07:17)  Procalcitonin, Serum: 0.08 (01-26-22 @ 06:15)  Procalcitonin, Serum: 0.09 (01-25-22 @ 05:58)  Procalcitonin, Serum: 0.11 (01-23-22 @ 11:17)  MRSA PCR Result.: Negative (01-23-22 @ 11:00)  Procalcitonin, Serum: 0.44 (01-19-22 @ 06:30)  Procalcitonin, Serum: 2.32 (01-16-22 @ 06:56)  Rapid RVP Result: Detected (01-15-22 @ 01:25)  Procalcitonin, Serum: 0.10 (01-15-22 @ 01:25)      INFLAMMATORY MARKERS  C-Reactive Protein, Serum: 80.3 mg/L (10-24-22 @ 00:00)      RADIOLOGY & ADDITIONAL TESTS:  I have personally reviewed the last available Chest xray  CXR      CT      CARDIOLOGY TESTING  12 Lead ECG:   Ventricular Rate 93 BPM    Atrial Rate 93 BPM    P-R Interval 192 ms    QRS Duration 130 ms    Q-T Interval 386 ms    QTC Calculation(Bazett) 479 ms    P Axis 32 degrees    R Axis 20 degrees    T Axis 265 degrees    Diagnosis Line Sinus rhythm withPremature atrial complexes  Non-specific intra-ventricular conduction block  T wave abnormality, consider inferior ischemia  Abnormal ECG    Confirmed by Malachi Francois ([id number]) on 10/29/2022 12:34:39 PM (10-29-22 @ 09:00)  12 Lead ECG:   Ventricular Rate 94 BPM    Atrial Rate 94 BPM    P-R Interval 158 ms    QRS Duration 116 ms    Q-T Interval 392 ms    QTC Calculation(Bazett) 490 ms    P Axis -1 degrees    R Axis -18 degrees    T Axis 166 degrees    Diagnosis Line Normal sinus rhythm  Left ventricular hypertrophy with QRS widening and repolarization abnormality  Prolonged QT  Abnormal ECG    Confirmed by Lotus Braun MD ([id number]) on 10/23/2022 3:00:38 PM (10-23-22 @ 09:58)      MEDICATIONS  ALBUTerol    90 MICROgram(s) HFA Inhaler 2 Inhalation every 12 hours  amiKACIN  IVPB 1250 IV Intermittent <User Schedule>  ampicillin/sulbactam  IVPB 9 IV Intermittent every 8 hours  ascorbic acid 500 Oral daily  atorvastatin 10 Oral at bedtime  calcium carbonate   Suspension 750 Enteral Tube three times a day  chlorhexidine 0.12% Liquid 15 Oral Mucosa every 12 hours  chlorhexidine 2% Cloths 1 Topical daily  collagenase Ointment 1 Topical two times a day  Dakins Solution - 1/2 Strength 1 Topical two times a day  dextrose 5%. 1000 IV Continuous <Continuous>  dextrose 5%. 1000 IV Continuous <Continuous>  dextrose 50% Injectable 25 IV Push once  dextrose 50% Injectable 12.5 IV Push once  dextrose 50% Injectable 25 IV Push once  epoetin carline-epbx (RETACRIT) Injectable 41882 SubCutaneous <User Schedule>  furosemide   Injectable 60 IV Push two times a day  glucagon  Injectable 1 IntraMuscular once  heparin   Injectable 5000 SubCutaneous every 12 hours  insulin lispro (ADMELOG) corrective regimen sliding scale  SubCutaneous three times a day before meals  levothyroxine 125 Oral daily  methylPREDNISolone sodium succinate Injectable 60 IV Push two times a day  metoprolol tartrate 12.5 Oral every 12 hours  midodrine. 10 Oral every 8 hours  ofloxacin 0.3% Solution 1 Left EYE two times a day  pantoprazole   Suspension 40 Oral every 12 hours  scopolamine 1 mG/72 Hr(s) Patch 1 Transdermal every 72 hours  zinc sulfate 220 Oral daily      WEIGHT  Weight (kg): 90.8 (10-29-22 @ 02:30)  Creatinine, Serum: 1.2 mg/dL (11-05-22 @ 11:42)  Creatinine, Serum: 1.0 mg/dL (11-04-22 @ 13:56)      ANTIBIOTICS:  amiKACIN  IVPB 1250 milliGRAM(s) IV Intermittent <User Schedule>  ampicillin/sulbactam  IVPB 9 Gram(s) IV Intermittent every 8 hours      All available historical records have been reviewed

## 2022-11-05 NOTE — PROGRESS NOTE ADULT - SUBJECTIVE AND OBJECTIVE BOX
S: desaturates often      All other pertinent ROS negative.      11-04-22 @ 07:01  -  11-05-22 @ 07:00  --------------------------------------------------------  IN: 560 mL / OUT: 900 mL / NET: -340 mL      Vital Signs Last 24 Hrs  T(C): 36.4 (05 Nov 2022 12:00), Max: 37 (05 Nov 2022 04:59)  T(F): 97.6 (05 Nov 2022 12:00), Max: 98.6 (05 Nov 2022 04:59)  HR: 89 (05 Nov 2022 12:00) (89 - 105)  BP: 119/82 (05 Nov 2022 12:00) (119/82 - 125/82)  BP(mean): --  RR: 18 (05 Nov 2022 04:59) (18 - 20)  SpO2: 98% (05 Nov 2022 12:00) (87% - 100%)    Parameters below as of 05 Nov 2022 04:59  Patient On (Oxygen Delivery Method): ventilator      PHYSICAL EXAM:    Constitutional: critically ill, trach/peg on vent. moderately labored breathing. frothing at mouth.   HEENT: PERR, EOMI, Normal Hearing, MMM  Neck: Soft and supple, No LAD, No JVD  Respiratory: end inspi crackles  Cardiovascular: S1 and S2, regular rate and rhythm, no Murmurs, gallops or rubs  Gastrointestinal: Bowel Sounds present, soft, nontender, nondistended, no guarding, no rebound  Extremities: No peripheral edema      MEDICATIONS:  MEDICATIONS  (STANDING):  ALBUTerol    90 MICROgram(s) HFA Inhaler 2 Puff(s) Inhalation every 12 hours  amiKACIN  IVPB 1250 milliGRAM(s) IV Intermittent <User Schedule>  ampicillin/sulbactam  IVPB 9 Gram(s) IV Intermittent every 8 hours  ascorbic acid 500 milliGRAM(s) Oral daily  atorvastatin 10 milliGRAM(s) Oral at bedtime  calcium carbonate   Suspension 750 milliGRAM(s) Enteral Tube three times a day  chlorhexidine 0.12% Liquid 15 milliLiter(s) Oral Mucosa every 12 hours  chlorhexidine 2% Cloths 1 Application(s) Topical daily  collagenase Ointment 1 Application(s) Topical two times a day  Dakins Solution - 1/2 Strength 1 Application(s) Topical two times a day  dextrose 5%. 1000 milliLiter(s) (100 mL/Hr) IV Continuous <Continuous>  dextrose 5%. 1000 milliLiter(s) (50 mL/Hr) IV Continuous <Continuous>  dextrose 50% Injectable 25 Gram(s) IV Push once  dextrose 50% Injectable 12.5 Gram(s) IV Push once  dextrose 50% Injectable 25 Gram(s) IV Push once  epoetin carline-epbx (RETACRIT) Injectable 85014 Unit(s) SubCutaneous <User Schedule>  furosemide   Injectable 60 milliGRAM(s) IV Push two times a day  glucagon  Injectable 1 milliGRAM(s) IntraMuscular once  heparin   Injectable 5000 Unit(s) SubCutaneous every 12 hours  insulin lispro (ADMELOG) corrective regimen sliding scale   SubCutaneous three times a day before meals  levothyroxine 125 MICROGram(s) Oral daily  methylPREDNISolone sodium succinate Injectable 60 milliGRAM(s) IV Push two times a day  metoprolol tartrate 12.5 milliGRAM(s) Oral every 12 hours  midodrine. 10 milliGRAM(s) Oral every 8 hours  ofloxacin 0.3% Solution 1 Drop(s) Left EYE two times a day  pantoprazole   Suspension 40 milliGRAM(s) Oral every 12 hours  scopolamine 1 mG/72 Hr(s) Patch 1 Patch Transdermal every 72 hours  zinc sulfate 220 milliGRAM(s) Oral daily      LABS: All Labs Reviewed:                        10.8   16.70 )-----------( 155      ( 05 Nov 2022 11:42 )             34.0     11-05    138  |  90<L>  |  54<H>  ----------------------------<  123<H>  4.4   |  31  |  1.2    Ca    7.8<L>      05 Nov 2022 11:42  Mg     2.4     11-05    TPro  6.5  /  Alb  2.8<L>  /  TBili  0.3  /  DBili  x   /  AST  12  /  ALT  7   /  AlkPhos  81  11-05          Blood Culture:     Radiology: reviewed

## 2022-11-05 NOTE — PROGRESS NOTE ADULT - SUBJECTIVE AND OBJECTIVE BOX
Over Night Events: events noted, vent dependant, worsening status, on 100%. air leak, afebrile    PHYSICAL EXAM    ICU Vital Signs Last 24 Hrs  T(C): 37 (05 Nov 2022 04:59), Max: 37 (05 Nov 2022 04:59)  T(F): 98.6 (05 Nov 2022 04:59), Max: 98.6 (05 Nov 2022 04:59)  HR: 105 (05 Nov 2022 04:59) (95 - 105)  BP: 125/82 (05 Nov 2022 04:59) (120/75 - 125/82)  RR: 18 (05 Nov 2022 04:59) (18 - 20)  SpO2: 92% (05 Nov 2022 04:59) (87% - 100%)    O2 Parameters below as of 05 Nov 2022 04:59  Patient On (Oxygen Delivery Method): ventilator            General: ill looking, tachypneic  HEENT: trach   Lungs: Bl rhonchi  Cardiovascular: Regular   Abdomen: Soft, Positive BS  unresponsive  sacral ulcer    11-04-22 @ 07:01  -  11-05-22 @ 07:00  --------------------------------------------------------  IN:    Free Water: 200 mL    Glucerna: 360 mL  Total IN: 560 mL    OUT:    Indwelling Catheter - Urethral (mL): 900 mL  Total OUT: 900 mL    Total NET: -340 mL          LABS:                          6.7    10.86 )-----------( 152      ( 04 Nov 2022 13:56 )             21.8                                               11-04    133<L>  |  87<L>  |  49<H>  ----------------------------<  144<H>  4.6   |  34<H>  |  1.0    Ca    7.8<L>      04 Nov 2022 13:56  Mg     2.2     11-04                                                                                                                                                                                   Mode: AC/ CMV (Assist Control/ Continuous Mandatory Ventilation)  RR (machine): 20  TV (machine): 400  FiO2: 100  PEEP: 8  ITime: 1  MAP: 15  PIP: 35                                          MEDICATIONS  (STANDING):  ALBUTerol    90 MICROgram(s) HFA Inhaler 2 Puff(s) Inhalation every 12 hours  amiKACIN  IVPB 1250 milliGRAM(s) IV Intermittent <User Schedule>  ampicillin/sulbactam  IVPB 9 Gram(s) IV Intermittent every 8 hours  ascorbic acid 500 milliGRAM(s) Oral daily  atorvastatin 10 milliGRAM(s) Oral at bedtime  calcium carbonate   Suspension 750 milliGRAM(s) Enteral Tube three times a day  chlorhexidine 0.12% Liquid 15 milliLiter(s) Oral Mucosa every 12 hours  chlorhexidine 2% Cloths 1 Application(s) Topical daily  collagenase Ointment 1 Application(s) Topical two times a day  Dakins Solution - 1/2 Strength 1 Application(s) Topical two times a day  dextrose 5%. 1000 milliLiter(s) (50 mL/Hr) IV Continuous <Continuous>  dextrose 5%. 1000 milliLiter(s) (100 mL/Hr) IV Continuous <Continuous>  dextrose 50% Injectable 25 Gram(s) IV Push once  dextrose 50% Injectable 12.5 Gram(s) IV Push once  dextrose 50% Injectable 25 Gram(s) IV Push once  epoetin carline-epbx (RETACRIT) Injectable 42117 Unit(s) SubCutaneous <User Schedule>  furosemide   Injectable 60 milliGRAM(s) IV Push two times a day  glucagon  Injectable 1 milliGRAM(s) IntraMuscular once  heparin   Injectable 5000 Unit(s) SubCutaneous every 12 hours  insulin lispro (ADMELOG) corrective regimen sliding scale   SubCutaneous three times a day before meals  levothyroxine 125 MICROGram(s) Oral daily  methylPREDNISolone sodium succinate Injectable 60 milliGRAM(s) IV Push two times a day  metoprolol tartrate 12.5 milliGRAM(s) Oral every 12 hours  midodrine. 10 milliGRAM(s) Oral every 8 hours  ofloxacin 0.3% Solution 1 Drop(s) Left EYE two times a day  pantoprazole   Suspension 40 milliGRAM(s) Oral every 12 hours  scopolamine 1 mG/72 Hr(s) Patch 1 Patch Transdermal every 72 hours  zinc sulfate 220 milliGRAM(s) Oral daily    MEDICATIONS  (PRN):  acetaminophen     Tablet .. 650 milliGRAM(s) Oral every 6 hours PRN Temp greater or equal to 38C (100.4F), Mild Pain (1 - 3)  ALBUTerol    90 MICROgram(s) HFA Inhaler 4 Puff(s) Inhalation every 6 hours PRN Shortness of Breath and/or Wheezing  albuterol/ipratropium for Nebulization 3 milliLiter(s) Nebulizer every 6 hours PRN Shortness of Breath and/or Wheezing  dextrose Oral Gel 15 Gram(s) Oral once PRN Blood Glucose LESS THAN 70 milliGRAM(s)/deciliter  ketorolac   Injectable 15 milliGRAM(s) IV Push every 6 hours PRN Moderate Pain (4 - 6)  morphine  - Injectable 2 milliGRAM(s) IV Push every 6 hours PRN Severe Pain (7 - 10)      Xrays:                                                                                     ECHO

## 2022-11-05 NOTE — PROGRESS NOTE ADULT - ASSESSMENT
83 y/o Male  with PMH  of h/o Cardiac arrest leading to anoxic brain injury (s/p Trach vent dependent and PEG), H/o Severe Covid PNA, H/o Gram -ve PNA, HTN and BPH presented for Sepsis due to Acute left sided pyelonephritis due to Acinetobacter infection. Hypernatremia, dehydration, [place]. Large pleural effusions b/l.  New marked degenerative changes of left hip.  Patient also found to have multiple pressure decubiti stage 4 and unstagable. Patient was initially admitted to SDU than on 10/29 downgraded to vent unit for further management and tx.    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  #troponemia--likely type 2 demand ischemia   #Acute on chronic respiratory Failure s/p Trach (baseline FiO2 50-60%) and PEG  #h/o severe COVID-19 pneumonia  #h/o cardiac Arrest- MI- anoxic brain injury  -ID following--continue with iv abx (unasyn and amikacin)  -f/u bcx (10/23 neg to date) and urine cx (A baumannii: sens to Amikacin, resistant to Unasyn)  -monitor wbc and fever curve   -repeat ECHO 10/31: EF 20%, grade 2 diastolic dysfunction, mild pulm hypertension=---this is  worsening heart function----will f/u with Heart failure team for additional recommendations  -continue with diuresis- lasix iv 40 mg BID for now. Strictly monitor i/o and daily weights  -vent support : PRVC Settings: Routine Ventilator Mode:  AC/CMV  Targeted SpO2 Range (%): 88-97---current FiO2 70%; f/.u with pulmonary team  -continue medical management (patient with multiple medical comorbidities) and overall very poor prognosis- DNR  -stat CXR with persistant bilateral congestive changes/effusions- relatively unchanged from prior cxr  -episodes of desaturation this morning- aggressive suction/ vent support/ monitor o2 sat/ Chest PT/Albuterol BID/Solumedrol BID/Scopolamine patch.   -11/4: Increased to IV Lasix 60 BID. I/O monitoring. daughter wants full medical management. remains DNR though.   Prognosis grim.   11/5: output around 50cc/hr. c/w lasix 60 IV BID. Even with that edema worsens, now lungs also crackly. GFR dropping. ID changing Abx to CEfiderocol only (likely d/c Abx on 11/8). repeat CXR, procalc    # Hypernatremia- improved with increasing PEG free water.     #h/o HTN, currently pt. is Hypotensive requiring Midodrine tx.  - lopressor to be continued with holding parameters  -maintain MAP >65  -on midodrine 10 mg via peg q8hr    #  BPH- pt. has chronic cardoso, due to hypotension d/dejon flomax  # multiple stage 4 pressure ulcers top sacrum and Unstageable pressure ulcer to b/l Hips/b/l  heels- infected   - daily wound care continue  -burn team to debride likely this week.  -podiatry to debride 11/3?   -continue frequent position change and local wound care  - family deferred foot ulcer dbx by podiatry for now.     DVT/GI ppx  overall very poor prognosis  Code status: DNR        spoke with daughter Judy ([phone]) at bedside. INformed of poor prognosis and poor QOL.  She said she will discuss with her sister and maybe reconsider [facility]. will f/u    #Progress Note Handoff: remains very poor prognosis for recovery- remains on vent support and continue management as per above .

## 2022-11-06 NOTE — PROGRESS NOTE ADULT - SUBJECTIVE AND OBJECTIVE BOX
Over Night Events: events noted, vent dependant, on 90%, ID noted    PHYSICAL EXAM    ICU Vital Signs Last 24 Hrs  T(C): 37.6 (06 Nov 2022 05:01), Max: 37.6 (06 Nov 2022 05:01)  T(F): 99.7 (06 Nov 2022 05:01), Max: 99.7 (06 Nov 2022 05:01)  HR: 95 (06 Nov 2022 05:01) (86 - 102)  BP: 149/95 (06 Nov 2022 05:01) (119/82 - 149/95)  RR: 20 (06 Nov 2022 05:01) (18 - 20)  SpO2: 100% (06 Nov 2022 05:01) (94% - 100%)    O2 Parameters below as of 06 Nov 2022 05:01  Patient On (Oxygen Delivery Method): ventilator            General: ill looking  HEENT: trach      Lungs: Bilateral crackles  Cardiovascular: YOANA 2.6  Abdomen: Soft, Positive BS  Sacral ulcer  ansarca  unresponsive      11-04-22 @ 07:01  -  11-05-22 @ 07:00  --------------------------------------------------------  IN:    Free Water: 200 mL    Glucerna: 360 mL  Total IN: 560 mL    OUT:    Indwelling Catheter - Urethral (mL): 900 mL  Total OUT: 900 mL    Total NET: -340 mL      11-05-22 @ 08:01  -  11-06-22 @ 05:47  --------------------------------------------------------  IN:  Total IN: 0 mL    OUT:    Indwelling Catheter - Urethral (mL): 1000 mL    Rectal Tube (mL): 300 mL  Total OUT: 1300 mL    Total NET: -1300 mL          LABS:                          10.8   16.70 )-----------( 155      ( 05 Nov 2022 11:42 )             34.0                                               11-05    138  |  90<L>  |  54<H>  ----------------------------<  123<H>  4.4   |  31  |  1.2    Ca    7.8<L>      05 Nov 2022 11:42  Mg     2.4     11-05    TPro  6.5  /  Alb  2.8<L>  /  TBili  0.3  /  DBili  x   /  AST  12  /  ALT  7   /  AlkPhos  81  11-05                                                                                           LIVER FUNCTIONS - ( 05 Nov 2022 11:42 )  Alb: 2.8 g/dL / Pro: 6.5 g/dL / ALK PHOS: 81 U/L / ALT: 7 U/L / AST: 12 U/L / GGT: x                                                                                               Mode: AC/ CMV (Assist Control/ Continuous Mandatory Ventilation)  RR (machine): 20  TV (machine): 400  FiO2: 90  PEEP: 8  ITime: 1  MAP: 16  PIP: 35                                          MEDICATIONS  (STANDING):  ALBUTerol    90 MICROgram(s) HFA Inhaler 2 Puff(s) Inhalation every 12 hours  ascorbic acid 500 milliGRAM(s) Oral daily  atorvastatin 10 milliGRAM(s) Oral at bedtime  calcium carbonate   Suspension 750 milliGRAM(s) Enteral Tube three times a day  chlorhexidine 0.12% Liquid 15 milliLiter(s) Oral Mucosa every 12 hours  chlorhexidine 2% Cloths 1 Application(s) Topical daily  collagenase Ointment 1 Application(s) Topical two times a day  Dakins Solution - 1/2 Strength 1 Application(s) Topical two times a day  dextrose 5%. 1000 milliLiter(s) (100 mL/Hr) IV Continuous <Continuous>  dextrose 5%. 1000 milliLiter(s) (50 mL/Hr) IV Continuous <Continuous>  dextrose 50% Injectable 25 Gram(s) IV Push once  dextrose 50% Injectable 12.5 Gram(s) IV Push once  dextrose 50% Injectable 25 Gram(s) IV Push once  epoetin carline-epbx (RETACRIT) Injectable 63205 Unit(s) SubCutaneous <User Schedule>  furosemide   Injectable 60 milliGRAM(s) IV Push two times a day  glucagon  Injectable 1 milliGRAM(s) IntraMuscular once  heparin   Injectable 5000 Unit(s) SubCutaneous every 12 hours  insulin lispro (ADMELOG) corrective regimen sliding scale   SubCutaneous three times a day before meals  levothyroxine 125 MICROGram(s) Oral daily  methylPREDNISolone sodium succinate Injectable 60 milliGRAM(s) IV Push two times a day  metoprolol tartrate 12.5 milliGRAM(s) Oral every 12 hours  midodrine. 10 milliGRAM(s) Oral every 8 hours  ofloxacin 0.3% Solution 1 Drop(s) Left EYE two times a day  pantoprazole   Suspension 40 milliGRAM(s) Oral every 12 hours  scopolamine 1 mG/72 Hr(s) Patch 1 Patch Transdermal every 72 hours  zinc sulfate 220 milliGRAM(s) Oral daily    MEDICATIONS  (PRN):  acetaminophen     Tablet .. 650 milliGRAM(s) Oral every 6 hours PRN Temp greater or equal to 38C (100.4F), Mild Pain (1 - 3)  ALBUTerol    90 MICROgram(s) HFA Inhaler 4 Puff(s) Inhalation every 6 hours PRN Shortness of Breath and/or Wheezing  albuterol/ipratropium for Nebulization 3 milliLiter(s) Nebulizer every 6 hours PRN Shortness of Breath and/or Wheezing  dextrose Oral Gel 15 Gram(s) Oral once PRN Blood Glucose LESS THAN 70 milliGRAM(s)/deciliter  ketorolac   Injectable 15 milliGRAM(s) IV Push every 6 hours PRN Moderate Pain (4 - 6)  morphine  - Injectable 2 milliGRAM(s) IV Push every 6 hours PRN Severe Pain (7 - 10)      CXR reviwed

## 2022-11-06 NOTE — PROGRESS NOTE ADULT - ASSESSMENT
IMPRESSION:    sepsis present on admission  UTI/ acinetobacter   Acute on chronic hypoxemic resp failure worsening on 100%  pul edema  Chronic hypoxic respiratory failure SP Trach  Anoxic brain injury  HO Cardiac Arrest  HO Severe COVID PNA  Sacral ulcer      SUGGEST:    CNS:  As needed morphine for comfort    HEENT: Oral care.  Trach care     PULMONARY: aspiration precaution.  Wean FIO2.  Keep Sao2 92 to 96%.  Vent dependent., solumedrol 60 q 24    CARDIOVASCULAR:   Avoid overload.  midodrine 10 q 8. keep lasix 40 iv q 12    GI: PPI q 12, feeding.  Bowel Regimen.    RENAL:  Follow up lytes.  Correct as needed.     INFECTIOUS DISEASE:  ABX per ID    HEMATOLOGICAL: DVT prophylaxis,    ENDOCRINE:  Follow up FS.  Insulin protocol if needed    MUSCULOSKELETAL: Wound care per burn     DNR  Palliative care fup  Very poor overall prognosis  poor MICU candidate

## 2022-11-06 NOTE — PROGRESS NOTE ADULT - ASSESSMENT
83 y/o Male  with PMH  of h/o Cardiac arrest leading to anoxic brain injury (s/p Trach vent dependent and PEG), H/o Severe Covid PNA, H/o Gram -ve PNA, HTN and BPH presented for Sepsis due to Acute left sided pyelonephritis due to Acinetobacter infection. Hypernatremia, dehydration, [place]. Large pleural effusions b/l.  New marked degenerative changes of left hip.  Patient also found to have multiple pressure decubiti stage 4 and unstagable. Patient was initially admitted to SDU than on 10/29 downgraded to vent unit for further management and tx.    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  #troponemia--likely type 2 demand ischemia   #Acute on chronic respiratory Failure s/p Trach (baseline FiO2 50-60%) and PEG  #h/o severe COVID-19 pneumonia  #h/o cardiac Arrest- MI- anoxic brain injury  -ID following--continue with iv abx (unasyn and amikacin)  -f/u bcx (10/23 neg to date) and urine cx (A baumannii: sens to Amikacin, resistant to Unasyn)  -monitor wbc and fever curve   -repeat ECHO 10/31: EF 20%, grade 2 diastolic dysfunction, mild pulm hypertension=---this is  worsening heart function----will f/u with Heart failure team for additional recommendations  -continue with diuresis- lasix iv 40 mg BID for now. Strictly monitor i/o and daily weights  -vent support : PRVC Settings: Routine Ventilator Mode:  AC/CMV  Targeted SpO2 Range (%): 88-97---current FiO2 70%; f/.u with pulmonary team  -continue medical management (patient with multiple medical comorbidities) and overall very poor prognosis- DNR  -stat CXR with persistant bilateral congestive changes/effusions- relatively unchanged from prior cxr  -episodes of desaturation this morning- aggressive suction/ vent support/ monitor o2 sat/ Chest PT/Albuterol BID/Solumedrol BID/Scopolamine patch.   -11/4: Increased to IV Lasix 60 BID. I/O monitoring. daughter wants full medical management. remains DNR though.   Prognosis grim.   11/5: output around 50cc/hr. c/w lasix 60 IV BID. Even with that edema worsens, now lungs also crackly. GFR dropping. ID changing Abx to CEfiderocol only (likely d/c Abx on 11/8). repeat CXR, procalc  11/6: CST.    # Hypernatremia- improved with increasing PEG free water.     #h/o HTN, currently pt. is Hypotensive requiring Midodrine tx.  - lopressor to be continued with holding parameters  -maintain MAP >65  -on midodrine 10 mg via peg q8hr    #  BPH- pt. has chronic cardoso, due to hypotension d/dejon flomax  # multiple stage 4 pressure ulcers top sacrum and Unstageable pressure ulcer to b/l Hips/b/l  heels- infected   - daily wound care continue  -burn team to debride likely this week.  -podiatry to debride 11/3?   -continue frequent position change and local wound care  - family deferred foot ulcer dbx by podiatry for now.     DVT/GI ppx  overall very poor prognosis  Code status: DNR        spoke with daughter Judy ([phone]) at bedside. INformed of poor prognosis and poor QOL.  She said she will discuss with her sister and maybe reconsider [facility]. will f/u    #Progress Note Handoff: remains very poor prognosis for recovery- remains on vent support and continue management as per above .

## 2022-11-06 NOTE — PROGRESS NOTE ADULT - SUBJECTIVE AND OBJECTIVE BOX
S: remains critically ill.      All other pertinent ROS negative.      11-05-22 @ 08:01  -  11-06-22 @ 07:00  --------------------------------------------------------  IN: 1120 mL / OUT: 1300 mL / NET: -180 mL      Vital Signs Last 24 Hrs  T(C): 37.2 (06 Nov 2022 12:07), Max: 37.6 (06 Nov 2022 05:01)  T(F): 99 (06 Nov 2022 12:07), Max: 99.7 (06 Nov 2022 05:01)  HR: 92 (06 Nov 2022 12:07) (64 - 102)  BP: 126/94 (06 Nov 2022 12:07) (121/87 - 149/95)  BP(mean): 105 (06 Nov 2022 12:07) (105 - 105)  RR: 22 (06 Nov 2022 12:07) (18 - 22)  SpO2: 90% (06 Nov 2022 12:07) (90% - 100%)    Parameters below as of 06 Nov 2022 12:07  Patient On (Oxygen Delivery Method): ventilator      PHYSICAL EXAM:    Constitutional: critically ill. unchanged from prior  HEENT: PERR, EOMI, Normal Hearing, MMM  Neck: Soft and supple, No LAD, No JVD  Respiratory: end expi crackles   Cardiovascular: S1 and S2, regular rate and rhythm, no Murmurs, gallops or rubs  Gastrointestinal: Bowel Sounds present, soft, nontender, nondistended, no guarding, no rebound  Extremities: anasarca      MEDICATIONS:  MEDICATIONS  (STANDING):  ALBUTerol    90 MICROgram(s) HFA Inhaler 2 Puff(s) Inhalation every 12 hours  ascorbic acid 500 milliGRAM(s) Oral daily  atorvastatin 10 milliGRAM(s) Oral at bedtime  calcium carbonate   Suspension 750 milliGRAM(s) Enteral Tube three times a day  cefiderocol IVPB 1500 milliGRAM(s) IV Intermittent every 8 hours  chlorhexidine 0.12% Liquid 15 milliLiter(s) Oral Mucosa every 12 hours  chlorhexidine 2% Cloths 1 Application(s) Topical daily  collagenase Ointment 1 Application(s) Topical two times a day  Dakins Solution - 1/2 Strength 1 Application(s) Topical two times a day  dextrose 5%. 1000 milliLiter(s) (50 mL/Hr) IV Continuous <Continuous>  dextrose 5%. 1000 milliLiter(s) (100 mL/Hr) IV Continuous <Continuous>  dextrose 50% Injectable 25 Gram(s) IV Push once  dextrose 50% Injectable 12.5 Gram(s) IV Push once  dextrose 50% Injectable 25 Gram(s) IV Push once  epoetin carline-epbx (RETACRIT) Injectable 76316 Unit(s) SubCutaneous <User Schedule>  furosemide   Injectable 60 milliGRAM(s) IV Push two times a day  glucagon  Injectable 1 milliGRAM(s) IntraMuscular once  heparin   Injectable 5000 Unit(s) SubCutaneous every 12 hours  insulin lispro (ADMELOG) corrective regimen sliding scale   SubCutaneous three times a day before meals  levothyroxine 125 MICROGram(s) Oral daily  methylPREDNISolone sodium succinate Injectable 60 milliGRAM(s) IV Push two times a day  metoprolol tartrate 12.5 milliGRAM(s) Oral every 12 hours  midodrine. 10 milliGRAM(s) Oral every 8 hours  ofloxacin 0.3% Solution 1 Drop(s) Left EYE two times a day  pantoprazole   Suspension 40 milliGRAM(s) Oral every 12 hours  scopolamine 1 mG/72 Hr(s) Patch 1 Patch Transdermal every 72 hours  zinc sulfate 220 milliGRAM(s) Oral daily      LABS: All Labs Reviewed:                        10.5   14.35 )-----------( 166      ( 06 Nov 2022 06:42 )             34.5     11-06    134<L>  |  84<L>  |  63<HH>  ----------------------------<  167<H>  4.4   |  34<H>  |  1.3    Ca    7.7<L>      06 Nov 2022 06:42  Mg     2.5     11-06    TPro  6.5  /  Alb  2.8<L>  /  TBili  0.3  /  DBili  x   /  AST  12  /  ALT  7   /  AlkPhos  81  11-05          Blood Culture:     Radiology: reviewed

## 2022-11-07 NOTE — PROGRESS NOTE ADULT - ASSESSMENT
81 y/o male  w/Pmhx of Cardiac arrest leading to anoxic brain injury (s/p Trach and PEG), H/o Severe Covid PNA, H/o Gram -ve PNA, HTN and BPH presented for abnormal labs in the nursing home. At baseline, patient is unresponsive, makes random non-purposeful movement, had trach (Vent dependent) and PEG. Patient had several desaturation events since yesterday. Grossly overloaded. STAT x-ray and ABG ordered but could not be performed due to overloaded status. X-       #Sepsis at nursing home  - As per SNF documents, patient was being treated for sepsis of unknown origin with Meropenem 1g q8 started on 10/21 for 10 days  - Suspected Pyelonephritis. UA grossly positive with WBC 13k  - Urine cultures positive for Acinetobacter Baumannii. WBC 10k to 14k on 11/07  - Chest X-ray shows worsening bilateral opacities. Suspected PNA, unclear origin.   - Patient requiring constant suctioning. Occasional desaturation events. Family made aware, will discuss goals of care amongst themselves  - Pt DNR but family would like to continue medical treatment for now  - Chest x-ray unchanged  - Albuterol PRN and q8hr  - Goals of care conversation with daughter recently. Continue medical care for now  - Pulm recommended today to wean down FiO2 (currently on 100), and increase PEEP    #Decubitus ulcer & Heel ulcer  - H/o multiple pressure ulcers (Sacral, R hip, R heel, L heel, R glut)  - c/w Unasyn & amikacin (started on 10/25) per ID  - Started on Vit C and Zinc  - Burn evaluated sacral ulcer. Continue current wound care.  - Podiatry assess heel ulcer, no debridement at this time.   - wound care consulted    #Hypernatremia  - 100 mL free water per PEG tube q8hr   - Daily BMP's to monitor Na levels.  - IV lasix 40mg BID , monitor Na     #Anemia- likely 2/2 chronic ds  - Hg 6.7 today. 1 unit of PRBC's ordered  - 8PM Type and screen ordered    #CHFpEF exacerbation   - Extremely overloaded on exam  - increased to Lasix 60mg IV BID  - midodrine 10mg Q8hrs PRN due to hypotension  - HF team consulted, given patient's bad prognosis no intervention attempted.  - strict I's and O's    #NSTEMI type 2  -Trops elevated but lower from previous admission  - Repeat ECHO and EKG  - troponin started trending down    #Left eye conjunctivitis  -resolved    #Anoxic brain injury  - secondary to  Cardiac arrest  -Does not follow commands, very poor prognosis  -DNR only for now    #Misc  -DVT prophylaxis:  heparin BID subq  -GI prophylaxis: Protonix BID  -Diet: NPO with tube feed  -Code status: DNR  -Activity: Bed bound

## 2022-11-07 NOTE — PROGRESS NOTE ADULT - ASSESSMENT
IMPRESSION:    sepsis present on admission  UTI/ acinetobacter   Acute on chronic hypoxemic resp failure worsening on 100%  pul edema  Chronic hypoxic respiratory failure SP Trach  Anoxic brain injury  HO Cardiac Arrest  HO Severe COVID PNA  Sacral ulcer      SUGGEST:    CNS:  As needed morphine for comfort    HEENT: Oral care.  Trach care     PULMONARY: aspiration precaution.  Wean FIO2.  Keep Sao2 92 to 96%.  Vent dependent., solumedrol 60 q 24, increase PEEP to 10    CARDIOVASCULAR:   Avoid overload.  midodrine 10 q 8. keep lasix 40 iv q 12, add metolazone 5 q 24    GI: PPI q 12, feeding.  Bowel Regimen.    RENAL:  Follow up lytes.  Correct as needed.     INFECTIOUS DISEASE:  ABX per ID    HEMATOLOGICAL: DVT prophylaxis,    ENDOCRINE:  Follow up FS.  Insulin protocol if needed    MUSCULOSKELETAL: Wound care per burn     DNR  Palliative care fup  Very poor overall prognosis  poor MICU candidate

## 2022-11-07 NOTE — PROGRESS NOTE ADULT - ATTENDING COMMENTS
83 y/o Male  with PMH  of h/o Cardiac arrest leading to anoxic brain injury (s/p Trach vent dependent and PEG), H/o Severe Covid PNA, H/o Gram -ve PNA, HTN and BPH presented for Sepsis due to Acute left sided pyelonephritis due to Acinetobacter infection. Hypernatremia, dehydration, [place]. Large pleural effusions b/l.  New marked degenerative changes of left hip.  Patient also found to have multiple pressure decubiti stage 4 and unstagable. Patient was initially admitted to SDU than on 10/29 downgraded to vent unit for further management and tx.    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  #troponemia--likely type 2 demand ischemia   #Acute on chronic respiratory Failure s/p Trach (baseline FiO2 50-60%) and PEG  #h/o severe COVID-19 pneumonia  #h/o cardiac Arrest- MI- anoxic brain injury  -ID following--continue with iv abx (unasyn and amikacin)  -f/u bcx (10/23 neg to date) and urine cx (A baumannii: sens to Amikacin, resistant to Unasyn)  -monitor wbc and fever curve   -repeat ECHO 10/31: EF 20%, grade 2 diastolic dysfunction, mild pulm hypertension=---this is  worsening heart function----will f/u with Heart failure team for additional recommendations  -continue with diuresis- lasix iv 40 mg BID for now. Strictly monitor i/o and daily weights  -vent support : PRVC Settings: Routine Ventilator Mode:  AC/CMV  Targeted SpO2 Range (%): 88-97---current FiO2 70%; f/.u with pulmonary team  -continue medical management (patient with multiple medical comorbidities) and overall very poor prognosis- DNR  -stat CXR with persistant bilateral congestive changes/effusions- relatively unchanged from prior cxr  -episodes of desaturation this morning- aggressive suction/ vent support/ monitor o2 sat/ Chest PT/Albuterol BID/Solumedrol BID/Scopolamine patch.   -11/4: Increased to IV Lasix 60 BID. I/O monitoring. daughter wants full medical management. remains DNR though.   Prognosis grim.   11/5: output around 50cc/hr. c/w lasix 60 IV BID. Even with that edema worsens, now lungs also crackly. GFR dropping. ID changing Abx to CEfiderocol only (likely d/c Abx on 11/8). repeat CXR, procalc  11/6: CST.  11/7: Cr worsened to 1.6. Hold lasix evening dose. Monitor BUN/Cr. Urine output slowing down? Nephro consult.   Decreased solumedrol to QD. Increased PEEP to 10. FiO2 down to 70%. Daughter will tell us by wednesday decision on GOC.     # Hypernatremia- improved with increasing PEG free water.     #h/o HTN, currently pt. is Hypotensive requiring Midodrine tx.  - lopressor to be continued with holding parameters  -maintain MAP >65  -on midodrine 10 mg via peg q8hr    #  BPH- pt. has chronic cardoso, due to hypotension d/dejon flomax  # multiple stage 4 pressure ulcers top sacrum and Unstageable pressure ulcer to b/l Hips/b/l  heels- infected   - daily wound care continue  -burn team to debride likely this week.  -podiatry to debride 11/3?   -continue frequent position change and local wound care  - family deferred foot ulcer dbx by podiatry for now.     DVT/GI ppx  overall very poor prognosis  Code status: DNR        spoke with daughter Judy ([phone]) at bedside. INformed of poor prognosis and poor QOL.  She said she will discuss with her sister and maybe reconsider GOC. will f/u    #Progress Note Handoff: remains very poor prognosis for recovery- remains on vent support and continue management as per above .

## 2022-11-07 NOTE — PROGRESS NOTE ADULT - SUBJECTIVE AND OBJECTIVE BOX
CHADWICK VENCES 82y Male  MRN#: [id number]   Hospital Day: 15d    SUBJECTIVE  Patient is a 82y old Male who presents with a chief complaint of sob (07 Nov 2022 06:15)  Currently admitted to medicine with the primary diagnosis of Hypernatremia      INTERVAL HPI AND OVERNIGHT EVENTS:  Patient was examined and seen at bedside. This morning he is resting comfortably in bed. No issues or overnight events.    OBJECTIVE  PAST MEDICAL & SURGICAL HISTORY  BPH (benign prostatic hyperplasia)    Mild HTN    Cardiac arrest    Anoxic brain injury    2019 novel coronavirus disease (COVID-19)      ALLERGIES:  Allergy Status Unknown    MEDICATIONS:  STANDING MEDICATIONS  ALBUTerol    90 MICROgram(s) HFA Inhaler 2 Puff(s) Inhalation every 12 hours  ascorbic acid 500 milliGRAM(s) Oral daily  atorvastatin 10 milliGRAM(s) Oral at bedtime  calcium carbonate   Suspension 750 milliGRAM(s) Enteral Tube three times a day  cefiderocol IVPB 1500 milliGRAM(s) IV Intermittent every 8 hours  chlorhexidine 0.12% Liquid 15 milliLiter(s) Oral Mucosa every 12 hours  chlorhexidine 2% Cloths 1 Application(s) Topical daily  collagenase Ointment 1 Application(s) Topical two times a day  Dakins Solution - 1/2 Strength 1 Application(s) Topical two times a day  dextrose 5%. 1000 milliLiter(s) IV Continuous <Continuous>  dextrose 5%. 1000 milliLiter(s) IV Continuous <Continuous>  dextrose 50% Injectable 25 Gram(s) IV Push once  dextrose 50% Injectable 12.5 Gram(s) IV Push once  dextrose 50% Injectable 25 Gram(s) IV Push once  epoetin carline-epbx (RETACRIT) Injectable 21542 Unit(s) SubCutaneous <User Schedule>  furosemide   Injectable 60 milliGRAM(s) IV Push two times a day  glucagon  Injectable 1 milliGRAM(s) IntraMuscular once  heparin   Injectable 5000 Unit(s) SubCutaneous every 12 hours  insulin lispro (ADMELOG) corrective regimen sliding scale   SubCutaneous three times a day before meals  levothyroxine 125 MICROGram(s) Oral daily  metolazone 5 milliGRAM(s) Oral daily  metoprolol tartrate 12.5 milliGRAM(s) Oral every 12 hours  midodrine. 10 milliGRAM(s) Oral every 8 hours  ofloxacin 0.3% Solution 1 Drop(s) Left EYE two times a day  pantoprazole   Suspension 40 milliGRAM(s) Oral every 12 hours  scopolamine 1 mG/72 Hr(s) Patch 1 Patch Transdermal every 72 hours  zinc sulfate 220 milliGRAM(s) Oral daily    PRN MEDICATIONS  acetaminophen     Tablet .. 650 milliGRAM(s) Oral every 6 hours PRN  ALBUTerol    90 MICROgram(s) HFA Inhaler 4 Puff(s) Inhalation every 6 hours PRN  albuterol/ipratropium for Nebulization 3 milliLiter(s) Nebulizer every 6 hours PRN  dextrose Oral Gel 15 Gram(s) Oral once PRN  morphine  - Injectable 2 milliGRAM(s) IV Push every 6 hours PRN      VITAL SIGNS: Last 24 Hours  T(C): 36.7 (07 Nov 2022 05:02), Max: 37.2 (06 Nov 2022 12:07)  T(F): 98.1 (07 Nov 2022 05:02), Max: 99 (06 Nov 2022 12:07)  HR: 97 (07 Nov 2022 08:46) (64 - 101)  BP: 119/82 (07 Nov 2022 08:46) (119/82 - 126/94)  BP(mean): 105 (06 Nov 2022 12:07) (105 - 105)  RR: 18 (07 Nov 2022 08:46) (18 - 22)  SpO2: 92% (07 Nov 2022 08:46) (90% - 99%)    LABS:                        10.5   14.35 )-----------( 166      ( 06 Nov 2022 06:42 )             34.5     11-06    134<L>  |  84<L>  |  63<HH>  ----------------------------<  167<H>  4.4   |  34<H>  |  1.3    Ca    7.7<L>      06 Nov 2022 06:42  Mg     2.5     11-06    TPro  6.5  /  Alb  2.8<L>  /  TBili  0.3  /  DBili  x   /  AST  12  /  ALT  7   /  AlkPhos  81  11-05                  RADIOLOGY:  ACC: [id number] EXAM:  XR CHEST PORTABLE IMMED 1V                          PROCEDURE DATE:  11/05/2022          INTERPRETATION:  Clinical History / Reason for exam: CHF exacerbation    Comparison : Chest radiograph November 2, 2022.    Technique/Positioning: AP chest.    Findings:    Support devices: Stable tracheostomy.    Cardiac/mediastinum/hilum: No change    Lung parenchyma/Pleura: Extensive bilateral effusions and bilateral lung   opacities, unchanged    Skeleton/soft tissues: Multilevel degenerative changes.    Impression:    Extensive bilateral effusions and bilateral lung opacities, unchanged.        --- End of Report ---    ACC: [id number] EXAM:  XR KUB 1 VIEW                          PROCEDURE DATE:  10/30/2022          INTERPRETATION:  Clinical History / Reason for exam: Abdominal distention    Comparison made to film performed earlier the same day.    Findings/  impression:    Inferior abdomen not visualized. Previously noted PEG tube not   identified. No evidence of abnormal bowel dilatation. Degenerative   changes.    --- End of Report ---      PHYSICAL EXAM:  CONSTITUTIONAL: No acute distress, No mental status at baseline, not responding to questions  HEAD: Atraumatic, normocephalic  EYES: EOM intact, PERRLA, conjunctiva and sclera clear  PULMONARY: trach, vented, clear  on auscultation  CARDIOVASCULAR: Regular rate and rhythm  GASTROINTESTINAL: PEG tube placed, Soft, non-tender, non-distended; bowel sounds present  MUSCULOSKELETAL: Extremely fluid overloaded in upper and lower extremities.   NEUROLOGY: non-focal  SKIN: warm and dry

## 2022-11-07 NOTE — PROGRESS NOTE ADULT - SUBJECTIVE AND OBJECTIVE BOX
Over Night Events: events noted, vent dependant, back on 100%, afebrile    PHYSICAL EXAM    ICU Vital Signs Last 24 Hrs  T(C): 36.7 (07 Nov 2022 05:02), Max: 37.2 (06 Nov 2022 12:07)  T(F): 98.1 (07 Nov 2022 05:02), Max: 99 (06 Nov 2022 12:07)  HR: 98 (07 Nov 2022 05:02) (64 - 101)  BP: 126/93 (07 Nov 2022 05:02) (122/89 - 126/94)  BP(mean): 105 (06 Nov 2022 12:07) (105 - 105  RR: 18 (07 Nov 2022 05:02) (18 - 22)  SpO2: 99% (07 Nov 2022 05:02) (90% - 100%)    O2 Parameters below as of 06 Nov 2022 12:07  Patient On (Oxygen Delivery Method): ventilator            General: ill looking, tachypneic  HEENT: trach           Lungs: Bilateral rhonchi  Cardiovascular: Regular   Abdomen: Soft, Positive BS  sacral ulcer      11-05-22 @ 08:01  -  11-06-22 @ 07:00  --------------------------------------------------------  IN:    Free Water: 400 mL    Glucerna: 720 mL  Total IN: 1120 mL    OUT:    Indwelling Catheter - Urethral (mL): 1000 mL    Rectal Tube (mL): 300 mL  Total OUT: 1300 mL    Total NET: -180 mL      11-06-22 @ 07:01  -  11-07-22 @ 06:15  --------------------------------------------------------  IN:  Total IN: 0 mL    OUT:    Indwelling Catheter - Urethral (mL): 800 mL    Voided (mL): 500 mL  Total OUT: 1300 mL    Total NET: -1300 mL          LABS:                          10.5   14.35 )-----------( 166      ( 06 Nov 2022 06:42 )             34.5                                               11-06    134<L>  |  84<L>  |  63<HH>  ----------------------------<  167<H>  4.4   |  34<H>  |  1.3    Ca    7.7<L>      06 Nov 2022 06:42  Mg     2.5     11-06    TPro  6.5  /  Alb  2.8<L>  /  TBili  0.3  /  DBili  x   /  AST  12  /  ALT  7   /  AlkPhos  81  11-05                                                                                           LIVER FUNCTIONS - ( 05 Nov 2022 11:42 )  Alb: 2.8 g/dL / Pro: 6.5 g/dL / ALK PHOS: 81 U/L / ALT: 7 U/L / AST: 12 U/L / GGT: x                                                                                               Mode: AC/ CMV (Assist Control/ Continuous Mandatory Ventilation)  RR (machine): 20  TV (machine): 400  FiO2: 100  PEEP: 8  ITime: 1  MAP: 17  PIP: 47                                          MEDICATIONS  (STANDING):  ALBUTerol    90 MICROgram(s) HFA Inhaler 2 Puff(s) Inhalation every 12 hours  ascorbic acid 500 milliGRAM(s) Oral daily  atorvastatin 10 milliGRAM(s) Oral at bedtime  calcium carbonate   Suspension 750 milliGRAM(s) Enteral Tube three times a day  cefiderocol IVPB 1500 milliGRAM(s) IV Intermittent every 8 hours  chlorhexidine 0.12% Liquid 15 milliLiter(s) Oral Mucosa every 12 hours  chlorhexidine 2% Cloths 1 Application(s) Topical daily  collagenase Ointment 1 Application(s) Topical two times a day  Dakins Solution - 1/2 Strength 1 Application(s) Topical two times a day  dextrose 5%. 1000 milliLiter(s) (50 mL/Hr) IV Continuous <Continuous>  dextrose 5%. 1000 milliLiter(s) (100 mL/Hr) IV Continuous <Continuous>  dextrose 50% Injectable 25 Gram(s) IV Push once  dextrose 50% Injectable 12.5 Gram(s) IV Push once  dextrose 50% Injectable 25 Gram(s) IV Push once  epoetin carline-epbx (RETACRIT) Injectable 47317 Unit(s) SubCutaneous <User Schedule>  furosemide   Injectable 60 milliGRAM(s) IV Push two times a day  glucagon  Injectable 1 milliGRAM(s) IntraMuscular once  heparin   Injectable 5000 Unit(s) SubCutaneous every 12 hours  insulin lispro (ADMELOG) corrective regimen sliding scale   SubCutaneous three times a day before meals  levothyroxine 125 MICROGram(s) Oral daily  methylPREDNISolone sodium succinate Injectable 60 milliGRAM(s) IV Push two times a day  metoprolol tartrate 12.5 milliGRAM(s) Oral every 12 hours  midodrine. 10 milliGRAM(s) Oral every 8 hours  ofloxacin 0.3% Solution 1 Drop(s) Left EYE two times a day  pantoprazole   Suspension 40 milliGRAM(s) Oral every 12 hours  scopolamine 1 mG/72 Hr(s) Patch 1 Patch Transdermal every 72 hours  zinc sulfate 220 milliGRAM(s) Oral daily    MEDICATIONS  (PRN):  acetaminophen     Tablet .. 650 milliGRAM(s) Oral every 6 hours PRN Temp greater or equal to 38C (100.4F), Mild Pain (1 - 3)  ALBUTerol    90 MICROgram(s) HFA Inhaler 4 Puff(s) Inhalation every 6 hours PRN Shortness of Breath and/or Wheezing  albuterol/ipratropium for Nebulization 3 milliLiter(s) Nebulizer every 6 hours PRN Shortness of Breath and/or Wheezing  dextrose Oral Gel 15 Gram(s) Oral once PRN Blood Glucose LESS THAN 70 milliGRAM(s)/deciliter  morphine  - Injectable 2 milliGRAM(s) IV Push every 6 hours PRN Severe Pain (7 - 10)      Xrays:                                                                                     ECHO

## 2022-11-08 NOTE — PROGRESS NOTE ADULT - ATTENDING COMMENTS
83 y/o Male  with PMH  of h/o Cardiac arrest leading to anoxic brain injury (s/p Trach vent dependent and PEG), H/o Severe Covid PNA, H/o Gram -ve PNA, HTN and BPH presented for Sepsis due to Acute left sided pyelonephritis due to Acinetobacter infection. Hypernatremia, dehydration, [place]. Large pleural effusions b/l.  New marked degenerative changes of left hip.  Patient also found to have multiple pressure decubiti stage 4 and unstagable. Patient was initially admitted to SDU than on 10/29 downgraded to vent unit for further management and tx.    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  -f/u bcx (10/23 neg to date) and urine cx (A baumannii: sens to Amikacin, resistant to Unasyn)  -monitor wbc and fever curve   -repeat ECHO 10/31: EF 20%, grade 2 diastolic dysfunction, mild pulm hypertension=---this is  worsening heart function----will f/u with Heart failure team for additional recommendations  - diuresis- lasix iv 40 mg BID for now. Strictly monitor i/o and daily weights  -vent support : PRVC Settings: Routine Ventilator Mode:  AC/CMV  Targeted SpO2 Range (%): 88-97---current FiO2 70%; f/.u with pulmonary team  -continue medical management (patient with multiple medical comorbidities) and overall very poor prognosis- DNR  -stat CXR with persistant bilateral congestive changes/effusions- relatively unchanged from prior cxr  -episodes of desaturation often- aggressive suction/ vent support/ monitor o2 sat/ Chest PT/Albuterol BID/Solumedrol BID/Scopolamine patch.   -11/4: Increased to IV Lasix 60 BID. I/O monitoring. daughter wants full medical management. remains DNR though.   Prognosis grim.   11/5: output around 50cc/hr. c/w lasix 60 IV BID. Even with that edema worsens, now lungs also crackly. GFR dropping. ID changing Abx to CEfiderocol only (likely d/c Abx on 11/8). repeat CXR, procalc  11/6: CST.  11/7: Cr worsened to 1.6. Hold lasix evening dose. Monitor BUN/Cr. Urine output slowing down? Nephro consult.   Decreased solumedrol to QD. Increased PEEP to 10. FiO2 down to 70%. Daughter will tell us by wednesday decision on GOC.   11/8: Cr still 1.6. Lasix on hold since yesterday. Even then, current urine output around 50cc/hr. Monitor. renal fellow advised resuming lasix @ 40 IV BID. f/u Nephro attending recs. Cr may even improve with optimal diuresis?? cardiorenal?? generalized anasarca persists. Titrate PEEP/fiO2 to maintain Sats. Decreased to solumedrol 40 QD. CEfiderocol ends today. Shwetha Auris came back positive. f/u daughter tomorrow for GO.     # Hypernatremia- improved with increasing PEG free water.     #h/o HTN, currently pt. is Hypotensive requiring Midodrine tx.  - lopressor to be continued with holding parameters  -maintain MAP >65  -on midodrine 10 mg via peg q8hr    #  BPH- pt. has chronic cardoso, due to hypotension d/dejon flomax  # multiple stage 4 pressure ulcers top sacrum and Unstageable pressure ulcer to b/l Hips/b/l  heels- infected   - daily wound care continue  -burn team to debride likely this week.  -podiatry to debride 11/3?   -continue frequent position change and local wound care  - family deferred foot ulcer dbx by podiatry for now.     DVT/GI ppx  overall very poor prognosis  Code status: DNR    spoke with daughter Judy ([phone]) at bedside. INformed of poor prognosis and poor QOL.  She said she will discuss with her sister and maybe reconsider GOC. will f/u    #Progress Note Handoff: remains very poor prognosis for recovery- remains on vent support and continue management as per above .

## 2022-11-08 NOTE — CONSULT NOTE ADULT - CONSULT REASON
SDU level of care
sacral wound
enteral feeding
Preop risk stratification
Pyelonephritis
Anaemia, R/O GI bleeding
Right heel ulcer
[place]
Goals of care and Symptom management.

## 2022-11-08 NOTE — PROGRESS NOTE ADULT - REASON FOR ADMISSION
pneumonia/pyelonephritis
sepsis
sob
sob
sob. dec hb
anemia/ sepsis
resp. failure
sepsis
sepsis
sob
sob
anemia/ sepsis
sepsis/ anemia
pyelonephritis
abnormal labs

## 2022-11-08 NOTE — PROGRESS NOTE ADULT - TIME BILLING
I have personally seen and examined this patient.    I have reviewed all pertinent clinical information and reviewed all relevant imaging and diagnostic studies personally.   I counseled the patient about diagnostic testing and treatment plan. All questions were answered.   I discussed recommendations with the primary team.
multiple rounds
I have personally seen and examined this patient.    I have reviewed all pertinent clinical information and reviewed all relevant imaging and diagnostic studies personally.   I counseled the patient about diagnostic testing and treatment plan. All questions were answered.   I discussed recommendations with the primary team.
I have personally seen and examined this patient.    I have reviewed all pertinent clinical information and reviewed all relevant imaging and diagnostic studies personally.   I counseled the patient about diagnostic testing and treatment plan. All questions were answered.   I discussed recommendations with the primary team.
wound eval and treat
I have personally seen and examined this patient.    I have reviewed all pertinent clinical information and reviewed all relevant imaging and diagnostic studies personally.   I counseled the patient about diagnostic testing and treatment plan. All questions were answered.   I discussed recommendations with the primary team.
I have personally seen and examined this patient.    I have reviewed all pertinent clinical information and reviewed all relevant imaging and diagnostic studies personally.   I counseled the patient about diagnostic testing and treatment plan. All questions were answered.   I discussed recommendations with the primary team.

## 2022-11-08 NOTE — PROGRESS NOTE ADULT - ASSESSMENT
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    #Sepsis on admission  - Blood Cx 10/23 NG    #UTI  - CT abd/pelvis 10/23 with radiologic evidence of Left Pyelonephritis  - Urine Cx 10/23 MDR Acinetobacter baumanii      #Sacral Ulcer  #Right Heel Ulcer    #Bilateral Pleural Effusion - unable to rule out pneumonia, although suspect urinary source  #Cardiac Arrest with Anoxic brain injury s/p trach/PEG    Recommendations  - continue cefidericol 1.5 g q 8 hours (CrCl 44)  - trend creatine   - procalcitonin 11/6 0.27 -- stop antibiotics after today's dose  - trend WBC   - poor prognosis with MDR infection     Please call or message on [employer] Teams if with any questions.  Spectra [id number].

## 2022-11-08 NOTE — PROGRESS NOTE ADULT - SUBJECTIVE AND OBJECTIVE BOX
CHADWICK VENCES 82y Male  MRN#: [id number]   Hospital Day: 16d    SUBJECTIVE  Patient is a 82y old Male who presents with a chief complaint of sepsis/ anemia (08 Nov 2022 06:28)  Currently admitted to medicine with the primary diagnosis of Hypernatremia      INTERVAL HPI AND OVERNIGHT EVENTS:  Patient was examined and seen at bedside. This morning he is resting comfortably in bed. No issues or overnight events.    OBJECTIVE  PAST MEDICAL & SURGICAL HISTORY  BPH (benign prostatic hyperplasia)    Mild HTN    Cardiac arrest    Anoxic brain injury    2019 novel coronavirus disease (COVID-19)      ALLERGIES:  Allergy Status Unknown    MEDICATIONS:  STANDING MEDICATIONS  ALBUTerol    90 MICROgram(s) HFA Inhaler 2 Puff(s) Inhalation every 12 hours  ascorbic acid 500 milliGRAM(s) Oral daily  atorvastatin 10 milliGRAM(s) Oral at bedtime  calcium carbonate   Suspension 750 milliGRAM(s) Enteral Tube three times a day  cefiderocol IVPB 1500 milliGRAM(s) IV Intermittent every 8 hours  chlorhexidine 0.12% Liquid 15 milliLiter(s) Oral Mucosa every 12 hours  chlorhexidine 2% Cloths 1 Application(s) Topical daily  collagenase Ointment 1 Application(s) Topical two times a day  Dakins Solution - 1/2 Strength 1 Application(s) Topical two times a day  dextrose 5%. 1000 milliLiter(s) IV Continuous <Continuous>  dextrose 5%. 1000 milliLiter(s) IV Continuous <Continuous>  dextrose 50% Injectable 25 Gram(s) IV Push once  dextrose 50% Injectable 12.5 Gram(s) IV Push once  dextrose 50% Injectable 25 Gram(s) IV Push once  epoetin carline-epbx (RETACRIT) Injectable 46626 Unit(s) SubCutaneous <User Schedule>  glucagon  Injectable 1 milliGRAM(s) IntraMuscular once  heparin   Injectable 5000 Unit(s) SubCutaneous every 12 hours  insulin lispro (ADMELOG) corrective regimen sliding scale   SubCutaneous three times a day before meals  levothyroxine 125 MICROGram(s) Oral daily  methylPREDNISolone sodium succinate Injectable 40 milliGRAM(s) IV Push daily  metoprolol tartrate 12.5 milliGRAM(s) Oral every 12 hours  midodrine. 10 milliGRAM(s) Oral every 8 hours  ofloxacin 0.3% Solution 1 Drop(s) Left EYE two times a day  pantoprazole   Suspension 40 milliGRAM(s) Oral every 12 hours  scopolamine 1 mG/72 Hr(s) Patch 1 Patch Transdermal every 72 hours  zinc sulfate 220 milliGRAM(s) Oral daily    PRN MEDICATIONS  acetaminophen     Tablet .. 650 milliGRAM(s) Oral every 6 hours PRN  ALBUTerol    90 MICROgram(s) HFA Inhaler 4 Puff(s) Inhalation every 6 hours PRN  albuterol/ipratropium for Nebulization 3 milliLiter(s) Nebulizer every 6 hours PRN  dextrose Oral Gel 15 Gram(s) Oral once PRN  HYDROmorphone  Injectable 0.5 milliGRAM(s) IV Push every 6 hours PRN      VITAL SIGNS: Last 24 Hours  T(C): 35.6 (08 Nov 2022 05:17), Max: 35.8 (07 Nov 2022 12:31)  T(F): 96.1 (08 Nov 2022 05:17), Max: 96.5 (07 Nov 2022 12:31)  HR: 89 (08 Nov 2022 09:25) (89 - 102)  BP: 119/83 (08 Nov 2022 05:17) (119/83 - 127/97)  BP(mean): --  RR: 19 (08 Nov 2022 05:17) (18 - 19)  SpO2: 100% (08 Nov 2022 09:25) (93% - 100%)    LABS:                        11.0   15.04 )-----------( 156      ( 07 Nov 2022 11:11 )             36.2     11-08    135  |  86<L>  |  82<HH>  ----------------------------<  121<H>  5.0   |  30  |  1.6<H>    Ca    7.6<L>      08 Nov 2022 06:09  Mg     2.4     11-08    TPro  6.6  /  Alb  3.1<L>  /  TBili  0.3  /  DBili  x   /  AST  16  /  ALT  13  /  AlkPhos  107  11-07                  RADIOLOGY:    ACC: [id number] EXAM:  XR CHEST PORTABLE IMMED 1V                          PROCEDURE DATE:  11/05/2022          INTERPRETATION:  Clinical History / Reason for exam: CHF exacerbation    Comparison : Chest radiograph November 2, 2022.    Technique/Positioning: AP chest.    Findings:    Support devices: Stable tracheostomy.    Cardiac/mediastinum/hilum: No change    Lung parenchyma/Pleura: Extensive bilateral effusions and bilateral lung   opacities, unchanged    Skeleton/soft tissues: Multilevel degenerative changes.    Impression:    Extensive bilateral effusions and bilateral lung opacities, unchanged.        --- End of Report ---    ACC: [id number] EXAM:  XR KUB 1 VIEW                          PROCEDURE DATE:  10/30/2022          INTERPRETATION:  Clinical History / Reason for exam: Abdominal distention    Comparison made to film performed earlier the same day.    Findings/  impression:    Inferior abdomen not visualized. Previously noted PEG tube not   identified. No evidence of abnormal bowel dilatation. Degenerative   changes.    --- End of Report ---      PHYSICAL EXAM:  CONSTITUTIONAL: No acute distress, No mental status at baseline, not responding to questions  HEAD: Atraumatic, normocephalic  EYES: EOM intact, PERRLA, conjunctiva and sclera clear  PULMONARY: trach, vented, clear  on auscultation  CARDIOVASCULAR: Regular rate and rhythm  GASTROINTESTINAL: PEG tube placed, Soft, non-tender, non-distended; bowel sounds present  MUSCULOSKELETAL: Extremely fluid overloaded in upper and lower extremities.   NEUROLOGY: non-focal  SKIN: warm and dry

## 2022-11-08 NOTE — PROGRESS NOTE ADULT - SUBJECTIVE AND OBJECTIVE BOX
AM Rounds   BURN following up on sacrum and right hip wound     Vital Signs Last 24 Hrs  T(C): 35.9 (08 Nov 2022 12:25), Max: 35.9 (08 Nov 2022 12:25)  T(F): 96.6 (08 Nov 2022 12:25), Max: 96.6 (08 Nov 2022 12:25)  HR: 89 (08 Nov 2022 09:25) (89 - 101)  BP: 118/81 (08 Nov 2022 12:25) (118/81 - 127/92)  RR: 19 (08 Nov 2022 05:17) (18 - 19)  SpO2: 96% (08 Nov 2022 12:25) (96% - 100%)            I&O's Summary    07 Nov 2022 07:01  -  08 Nov 2022 07:00  --------------------------------------------------------  IN: 0 mL / OUT: 200 mL / NET: -200 mL                          11.0   15.04 )-----------( 156      ( 07 Nov 2022 11:11 )             36.2     CAPILLARY BLOOD GLUCOSE      POCT Blood Glucose.: 156 mg/dL (08 Nov 2022 11:35)  POCT Blood Glucose.: 151 mg/dL (08 Nov 2022 07:41)  POCT Blood Glucose.: 132 mg/dL (08 Nov 2022 05:25)  POCT Blood Glucose.: 177 mg/dL (07 Nov 2022 20:57)  POCT Blood Glucose.: 166 mg/dL (07 Nov 2022 17:48)      PHYSICAL EXAM:  GENERAL: NAD,   HEAD:  Atraumatic, Normocephalic  CHEST/LUNG: trach, vented   HEART: In no acute cardiopulmonary distress   SKIN: Sacrum: Full thickness wound: ~20cm x 18cm, pink and moist with scattered areas of yellow necrotic tissue noted. No purulent drainage, malodor or active bleeding evident.   Right hip: Full thickness wound: ~6cm x 4cm: with black adherent eschar. No purulent drainage, malodor or active bleeding evident.

## 2022-11-08 NOTE — CONSULT NOTE ADULT - SUBJECTIVE AND OBJECTIVE BOX
NEPHROLOGY CONSULTATION NOTE    81 y/o Male  with PMH  of h/o Cardiac arrest leading to anoxic brain injury (s/p Trach vent dependent and PEG), H/o Severe Covid PNA, H/o Gram -ve PNA, HTN and BPH presented for Sepsis due to Acute left sided pyelonephritis due to Acinetobacter infection. Hypernatremia, dehydration, [place]. Large pleural effusions b/l.  New marked degenerative changes of left hip.  Patient also found to have multiple pressure decubiti stage 4 and unstagable. Patient was initially admitted to SDU than on 10/29 downgraded to medical floor for further management and tx.  Patient seen at bedside, awake, not alert or oriented, does not follow commands.  VS within acceptable range.    PAST MEDICAL & SURGICAL HISTORY:  BPH (benign prostatic hyperplasia)      Mild HTN      Cardiac arrest      Anoxic brain injury      2019 novel coronavirus disease (COVID-19)        Allergies:  Allergy Status Unknown    Home Medications Reviewed  Hospital Medications:   MEDICATIONS  (STANDING):  ALBUTerol    90 MICROgram(s) HFA Inhaler 2 Puff(s) Inhalation every 12 hours  ascorbic acid 500 milliGRAM(s) Oral daily  atorvastatin 10 milliGRAM(s) Oral at bedtime  calcium carbonate   Suspension 750 milliGRAM(s) Enteral Tube three times a day  cefiderocol IVPB 1500 milliGRAM(s) IV Intermittent every 8 hours  chlorhexidine 0.12% Liquid 15 milliLiter(s) Oral Mucosa every 12 hours  chlorhexidine 2% Cloths 1 Application(s) Topical daily  collagenase Ointment 1 Application(s) Topical two times a day  Dakins Solution - 1/2 Strength 1 Application(s) Topical two times a day  dextrose 5%. 1000 milliLiter(s) (50 mL/Hr) IV Continuous <Continuous>  dextrose 5%. 1000 milliLiter(s) (100 mL/Hr) IV Continuous <Continuous>  dextrose 50% Injectable 25 Gram(s) IV Push once  dextrose 50% Injectable 12.5 Gram(s) IV Push once  dextrose 50% Injectable 25 Gram(s) IV Push once  epoetin carline-epbx (RETACRIT) Injectable 72616 Unit(s) SubCutaneous <User Schedule>  glucagon  Injectable 1 milliGRAM(s) IntraMuscular once  heparin   Injectable 5000 Unit(s) SubCutaneous every 12 hours  insulin lispro (ADMELOG) corrective regimen sliding scale   SubCutaneous three times a day before meals  levothyroxine 125 MICROGram(s) Oral daily  methylPREDNISolone sodium succinate Injectable 40 milliGRAM(s) IV Push daily  metoprolol tartrate 12.5 milliGRAM(s) Oral every 12 hours  midodrine. 10 milliGRAM(s) Oral every 8 hours  ofloxacin 0.3% Solution 1 Drop(s) Left EYE two times a day  pantoprazole   Suspension 40 milliGRAM(s) Oral every 12 hours  scopolamine 1 mG/72 Hr(s) Patch 1 Patch Transdermal every 72 hours  zinc sulfate 220 milliGRAM(s) Oral daily    SOCIAL HISTORY:  Denies ETOH,Smoking,   FAMILY HISTORY:    Yes      REVIEW OF SYSTEMS:  CONSTITUTIONAL: patient is non verbal and does not follow commands, unable to obtain comprehensive ROS.    VITALS:  Vital Signs Last 24 Hrs  T(C): 35.6 (08 Nov 2022 05:17), Max: 35.8 (07 Nov 2022 12:31)  T(F): 96.1 (08 Nov 2022 05:17), Max: 96.5 (07 Nov 2022 12:31)  HR: 89 (08 Nov 2022 09:25) (89 - 102)  BP: 119/83 (08 Nov 2022 05:17) (119/83 - 127/97)  BP(mean): --  RR: 19 (08 Nov 2022 05:17) (18 - 19)  SpO2: 100% (08 Nov 2022 09:25) (93% - 100%)        11-07 @ 07:01  -  11-08 @ 07:00  --------------------------------------------------------  IN: 0 mL / OUT: 200 mL / NET: -200 mL        PHYSICAL EXAM:  Constitutional: NAD, diffuse anasarca   HEENT: anicteric sclera, oropharynx clear, MMM  Neck: No JVD  Respiratory: CTAB, b/l rales   Cardiovascular: S1, S2, RRR  Gastrointestinal: BS+, soft, NT/ND  Extremities: b/l pitting edema   Neurological: A/O x 0, non verbal, does not follow commands  : No CVA tenderness. cardoso.   Skin: No rashes      LABS:  11-08    135  |  86<L>  |  82<HH>  ----------------------------<  121<H>  5.0   |  30  |  1.6<H>    Ca    7.6<L>      08 Nov 2022 06:09  Mg     2.4     11-08    TPro  6.6  /  Alb  3.1<L>  /  TBili  0.3  /  DBili      /  AST  16  /  ALT  13  /  AlkPhos  107  11-07    Creatinine Trend: 1.6 <--, 1.6 <--, 1.3 <--, 1.2 <--, 1.0 <--, 0.8 <--, 0.8 <--, 0.8 <--, 0.7 <--, 0.7 <--, 0.8 <--, 0.7 <--, 0.8 <--, 0.8 <--, 0.8 <--                        11.0   15.04 )-----------( 156      ( 07 Nov 2022 11:11 )             36.2

## 2022-11-08 NOTE — CONSULT NOTE ADULT - ASSESSMENT
81 y/o Male  with PMH  of h/o Cardiac arrest leading to anoxic brain injury (s/p Trach vent dependent and PEG), H/o Severe Covid PNA, H/o Gram -ve PNA, HTN and BPH presented for Sepsis due to Acute left sided pyelonephritis due to Acinetobacter infection. Hypernatremia, dehydration, [place]. Large pleural effusions b/l.  New marked degenerative changes of left hip.  Patient also found to have multiple pressure decubiti stage 4 and unstagable. Patient was initially admitted to SDU than on 10/29 downgraded to medical floor for further management and tx.    Assessment and plan  [place]/ sepsis/ pyelonephritis/ hx of anoxic brain injury s/p trach and peg/ HFrEF  [place] likely ATN secondary to aminoglycosides vs CRS  diffuse anasarca on PE with overt volume overload  resume lasix 40 mg iv bid  continue to monitor UO  US bladder and kidneys  adjust medications to eGFR  very poor prognosis  will follow

## 2022-11-08 NOTE — PROGRESS NOTE ADULT - SUBJECTIVE AND OBJECTIVE BOX
CHADWICK VENCES  82y, Male  Allergy: Allergy Status Unknown      LOS  16d    CHIEF COMPLAINT: sob (08 Nov 2022 10:29)      INTERVAL EVENTS/HPI  - No acute events overnight  - T(F): , Max: 96.5 (11-07-22 @ 12:31)  - WBC Count: 15.04 (11-07-22 @ 11:11)  WBC Count: 14.35 (11-06-22 @ 06:42)     - Creatinine, Serum: 1.6 (11-08-22 @ 06:09)  Creatinine, Serum: 1.6 (11-07-22 @ 11:11)       ROS  unable to obtain history secondary to patient's mental status and/or sedation    VITALS:  T(F): 96.1, Max: 96.5 (11-07-22 @ 12:31)  HR: 89  BP: 119/83  RR: 19Vital Signs Last 24 Hrs  T(C): 35.6 (08 Nov 2022 05:17), Max: 35.8 (07 Nov 2022 12:31)  T(F): 96.1 (08 Nov 2022 05:17), Max: 96.5 (07 Nov 2022 12:31)  HR: 89 (08 Nov 2022 09:25) (89 - 102)  BP: 119/83 (08 Nov 2022 05:17) (119/83 - 127/97)  BP(mean): --  RR: 19 (08 Nov 2022 05:17) (18 - 19)  SpO2: 100% (08 Nov 2022 09:25) (93% - 100%)        PHYSICAL EXAM:  Gen:vent/trach  HEENT: Normocephalic, atraumatic  Neck: supple, no lymphadenopathy  CV: Regular rate & regular rhythm  Lungs: decreased BS at bases, no fremitus  Abdomen: Soft, BS present  Ext: Warm, well perfused  Neuro: non focal, awake  Skin: anasarca  Lines: no phlebitis    FH: Non-contributory  Social Hx: Non-contributory    TESTS & MEASUREMENTS:                        11.0   15.04 )-----------( 156      ( 07 Nov 2022 11:11 )             36.2     11-08    135  |  86<L>  |  82<HH>  ----------------------------<  121<H>  5.0   |  30  |  1.6<H>    Ca    7.6<L>      08 Nov 2022 06:09  Mg     2.4     11-08    TPro  6.6  /  Alb  3.1<L>  /  TBili  0.3  /  DBili  x   /  AST  16  /  ALT  13  /  AlkPhos  107  11-07      LIVER FUNCTIONS - ( 07 Nov 2022 11:11 )  Alb: 3.1 g/dL / Pro: 6.6 g/dL / ALK PHOS: 107 U/L / ALT: 13 U/L / AST: 16 U/L / GGT: x               Culture - Blood (collected 10-24-22 @ 00:00)  Source: .Blood Blood-Peripheral  Final Report (10-29-22 @ 09:00):    No Growth Final    Culture - Urine (collected 10-23-22 @ 05:45)  Source: Clean Catch Clean Catch (Midstream)  Final Report (10-28-22 @ 23:54):    >100,000 CFU/ml Acinetobacter baumannii/nosocomialis group    Cefiderocol interpretations based on FDA breakpoints.  Organism: Acinetobacter baumannii/nosocomialis group  Acinetobacter baumannii/nosocomialis group  Acinetobacter baumannii/nosocomialis group (10-28-22 @ 23:54)  Organism: Acinetobacter baumannii/nosocomialis group (10-28-22 @ 23:54)      -  Polymyxin B: R 4      Method Type: ETEST  Organism: Acinetobacter baumannii/nosocomialis group (10-28-22 @ 23:54)      -  Cefiderocol: S      -  Piperacillin/Tazobactam: R      Method Type: KB  Organism: Acinetobacter baumannii/nosocomialis group (10-28-22 @ 23:54)      -  Amikacin: S <=16      -  Ampicillin/Sulbactam: R >16/8      -  Cefepime: R >16      -  Ceftazidime: S 4      -  Ceftriaxone: I 32      -  Ciprofloxacin: R >2      -  Gentamicin: S 4      -  Imipenem: R >8      -  Levofloxacin: R >4      -  Meropenem: R >8      -  Tigecycline: <=2      -  Tobramycin: S <=2      -  Trimethoprim/Sulfamethoxazole: R >2/38      Method Type: ALEXSANDRA    Culture - Blood (collected 10-23-22 @ 05:05)  Source: .Blood Blood  Final Report (10-28-22 @ 13:01):    No Growth Final        Lactate, Blood: 1.7 mmol/L (11-04-22 @ 13:56)      INFECTIOUS DISEASES TESTING  Procalcitonin, Serum: 0.27 (11-06-22 @ 06:42)  COVID-19 PCR: NotDetec (11-04-22 @ 06:00)  COVID-19 PCR: NotDetec (10-29-22 @ 18:42)  MRSA PCR Result.: Negative (10-26-22 @ 09:14)  Legionella Antigen, Urine: Negative (10-24-22 @ 02:44)  Procalcitonin, Serum: 5.93 (10-24-22 @ 00:00)  COVID-19 PCR: NotDetec (10-23-22 @ 05:45)  COVID-19 PCR: NotDetec (03-02-22 @ 13:30)  Procalcitonin, Serum: 0.51 (02-27-22 @ 07:33)  COVID-19 PCR: Detected (02-20-22 @ 15:33)  COVID-19 PCR: NotDetec (02-15-22 @ 07:30)  Procalcitonin, Serum: 0.09 (02-12-22 @ 06:21)  COVID-19 PCR: Detected (02-10-22 @ 13:00)  Procalcitonin, Serum: 0.04 (02-09-22 @ 06:03)  Procalcitonin, Serum: 0.03 (02-01-22 @ 06:25)  Procalcitonin, Serum: 0.07 (01-31-22 @ 06:15)  Procalcitonin, Serum: 0.10 (01-30-22 @ 10:45)  Procalcitonin, Serum: 0.11 (01-29-22 @ 07:17)  Procalcitonin, Serum: 0.08 (01-26-22 @ 06:15)  Procalcitonin, Serum: 0.09 (01-25-22 @ 05:58)  Procalcitonin, Serum: 0.11 (01-23-22 @ 11:17)  MRSA PCR Result.: Negative (01-23-22 @ 11:00)  Procalcitonin, Serum: 0.44 (01-19-22 @ 06:30)  Procalcitonin, Serum: 2.32 (01-16-22 @ 06:56)  Rapid RVP Result: Detected (01-15-22 @ 01:25)  Procalcitonin, Serum: 0.10 (01-15-22 @ 01:25)      INFLAMMATORY MARKERS  C-Reactive Protein, Serum: 80.3 mg/L (10-24-22 @ 00:00)      RADIOLOGY & ADDITIONAL TESTS:  I have personally reviewed the last available Chest xray  CXR      CT      CARDIOLOGY TESTING  12 Lead ECG:   Ventricular Rate 93 BPM    Atrial Rate 93 BPM    P-R Interval 192 ms    QRS Duration 130 ms    Q-T Interval 386 ms    QTC Calculation(Bazett) 479 ms    P Axis 32 degrees    R Axis 20 degrees    T Axis 265 degrees    Diagnosis Line Sinus rhythm withPremature atrial complexes  Non-specific intra-ventricular conduction block  T wave abnormality, consider inferior ischemia  Abnormal ECG    Confirmed by Malachi Francois ([id number]) on 10/29/2022 12:34:39 PM (10-29-22 @ 09:00)      MEDICATIONS  ALBUTerol    90 MICROgram(s) HFA Inhaler 2 Inhalation every 12 hours  ascorbic acid 500 Oral daily  atorvastatin 10 Oral at bedtime  calcium carbonate   Suspension 750 Enteral Tube three times a day  cefiderocol IVPB 1500 IV Intermittent every 8 hours  chlorhexidine 0.12% Liquid 15 Oral Mucosa every 12 hours  chlorhexidine 2% Cloths 1 Topical daily  collagenase Ointment 1 Topical two times a day  Dakins Solution - 1/2 Strength 1 Topical two times a day  dextrose 5%. 1000 IV Continuous <Continuous>  dextrose 5%. 1000 IV Continuous <Continuous>  dextrose 50% Injectable 25 IV Push once  dextrose 50% Injectable 12.5 IV Push once  dextrose 50% Injectable 25 IV Push once  epoetin carline-epbx (RETACRIT) Injectable 26245 SubCutaneous <User Schedule>  glucagon  Injectable 1 IntraMuscular once  heparin   Injectable 5000 SubCutaneous every 12 hours  insulin lispro (ADMELOG) corrective regimen sliding scale  SubCutaneous three times a day before meals  levothyroxine 125 Oral daily  methylPREDNISolone sodium succinate Injectable 40 IV Push daily  metoprolol tartrate 12.5 Oral every 12 hours  midodrine. 10 Oral every 8 hours  ofloxacin 0.3% Solution 1 Left EYE two times a day  pantoprazole   Suspension 40 Oral every 12 hours  scopolamine 1 mG/72 Hr(s) Patch 1 Transdermal every 72 hours  zinc sulfate 220 Oral daily      WEIGHT  Weight (kg): 90.8 (10-29-22 @ 02:30)  Creatinine, Serum: 1.6 mg/dL (11-08-22 @ 06:09)      ANTIBIOTICS:  cefiderocol IVPB 1500 milliGRAM(s) IV Intermittent every 8 hours      All available historical records have been reviewed

## 2022-11-08 NOTE — CONSULT NOTE ADULT - PROVIDER SPECIALTY LIST ADULT
Nutrition Support
Gastroenterology
Infectious Disease
Podiatry
Burn
Nephrology
Palliative Care
Cardiology
Critical Care

## 2022-11-08 NOTE — PROGRESS NOTE ADULT - ASSESSMENT
ASSESSMENT/ PLAN :   Full thickness wound to sacrum and right hip    Continue wound care/ dressing changes- Please wash wound with soap and water, apply santyl, cover with Dakins moist gauze then with ABD/tape twice a day.        Will plan for bedside debridement, once consent is obtained.      Continue pain mgmt, VTE prophylaxis  Continue IV ABx per ID  PT/OT        Remainder of care per primary team.

## 2022-11-08 NOTE — PHARMACOTHERAPY INTERVENTION NOTE - COMMENTS
Recommended to modify Fetroja order's end date to stop tonight after 2 additional doses. Per ID team, today is the last day of Fetroja therapy.

## 2022-11-08 NOTE — CONSULT NOTE ADULT - CONSULT REQUESTED DATE/TIME
08-Nov-2022 11:49
24-Oct-2022 09:04
26-Oct-2022 13:00
28-Oct-2022 00:43
22-Oct-2022 00:00
24-Oct-2022 11:59
24-Oct-2022 13:22
27-Oct-2022 13:02
24-Oct-2022 16:55

## 2022-11-08 NOTE — CONSULT NOTE ADULT - ATTENDING COMMENTS
right heel infected ulcer w/ necrotic malodorous tissue + surrounding erythema  pt scheduled for right heel debridement 10/28  NPO midnight  medical/cardiology clearance appreciated  case d/w with patients daughter. She's in agreement for surgery    patient can follow up as outpatient at [address] [phone]   Thank you for allowing me to participate in your patient care.    My notes supersedes the above resident note in case of discrepancy.     Yung Boateng DPM, FACFAS
hx of anoxic brain injury s/p trach and peg/ HFrEF  developed [place], liekly abx induced  Cr stable  plan as above
I edited the note.
History and physical as above.    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     #Sepsis on admission  - Blood Cx 10/23 NG    #UTI  - CT abd/pelvis 10/23 with radiologic evidence of Left Pyelonephritis  - Urine Cx 10/23 GNR     #Bilateral Pleural Effusion - unable to rule out pneumonia, although suspect urinary source  #Cardiac Arrest with Anoxic brain injury s/p trach/PEG    Recommendations  - continue meropenem 1g q 8 hours  - follow-up GNR in urine Cx   - trend H/H   - monitor BP   - if worsening clinical picture, give levofloxacin 750 mg x 1     Please call or message on [employer] Teams if with any questions.  Spectra [id number]
high risk for mace  proceed as planned  recall prn  mx as above

## 2022-11-08 NOTE — PROGRESS NOTE ADULT - PROVIDER SPECIALTY LIST ADULT
Hospitalist
Infectious Disease
Internal Medicine
Internal Medicine
Podiatry
Pulmonology
Burn
Hospitalist
Infectious Disease
Internal Medicine
Pulmonology
Burn
Hospitalist
Internal Medicine
Internal Medicine
Pulmonology
Hospitalist
Infectious Disease
Internal Medicine
Pulmonology
Infectious Disease
Infectious Disease
Hospitalist
Infectious Disease
Infectious Disease

## 2022-11-08 NOTE — PROGRESS NOTE ADULT - ASSESSMENT
IMPRESSION:    sepsis present on admission  UTI/ acinetobacter   Acute on chronic hypoxemic resp failure worsening on 100%  pul edema  Chronic hypoxic respiratory failure SP Trach  [place]  Anoxic brain injury  HO Cardiac Arrest  HO Severe COVID PNA  Sacral ulcer      SUGGEST:    CNS:  As needed morphine for comfort    HEENT: Oral care.  Trach care     PULMONARY: aspiration precaution.  Wean FIO2.  Keep Sao2 92 to 96%.  Vent dependent., solumedrol 40 q 24,  PEEP 10    CARDIOVASCULAR:   Avoid overload.  midodrine 10 q 8. lasix 40 iv q 12,     GI: PPI q 12, feeding.  Bowel Regimen.    RENAL:  Follow up lytes.  Correct as needed. bladder scan    INFECTIOUS DISEASE:  ABX per ID    HEMATOLOGICAL: DVT prophylaxis,    ENDOCRINE:  Follow up FS.  Insulin protocol if needed    MUSCULOSKELETAL: Wound care per burn     DNR  Palliative care fup  Very poor overall prognosis

## 2022-11-08 NOTE — PROGRESS NOTE ADULT - ASSESSMENT
81 y/o male  w/Pmhx of Cardiac arrest leading to anoxic brain injury (s/p Trach and PEG), H/o Severe Covid PNA, H/o Gram -ve PNA, HTN and BPH presented for abnormal labs in the nursing home. At baseline, patient is unresponsive, makes random non-purposeful movement, had trach (Vent dependent) and PEG. Patient had several desaturation events since yesterday. Grossly overloaded. STAT x-ray and ABG ordered but could not be performed due to overloaded status. X-       #Sepsis at nursing home  - As per SNF documents, patient was being treated for sepsis of unknown origin with Meropenem 1g q8 started on 10/21 for 10 days  - Suspected Pyelonephritis. UA grossly positive with WBC 13k  - Urine cultures positive for Acinetobacter Baumannii. WBC 10k to 14k on 11/07  - Chest X-ray shows worsening bilateral opacities. Suspected PNA, unclear origin.   - Patient requiring constant suctioning. Occasional desaturation events. Family made aware, will discuss goals of care amongst themselves  - Pt DNR but family would like to continue medical treatment for now  - Chest x-ray unchanged  - Albuterol PRN and q8hr  - Goals of care conversation with daughter recently. Continue medical care for now  - PEEP now 10, Fio2 70  - Pulm recommended today to reduce solumedrol from 60 to 40mg    #[place] since 11/06  - Cr from 1.3 to 1.6  - BUN elevated  - trend BUN cr    #Decubitus ulcer & Heel ulcer  - H/o multiple pressure ulcers (Sacral, R hip, R heel, L heel, R glut)  - c/w Unasyn & amikacin (started on 10/25) per ID  - Started on Vit C and Zinc  - Burn evaluated sacral ulcer. Continue current wound care.  - Podiatry assess heel ulcer, no debridement at this time.   - wound care consulted    #Hypernatremia  - 100 mL free water per PEG tube q8hr   - Daily BMP's to monitor Na levels.  - IV lasix 40mg BID , monitor Na     #Anemia- likely 2/2 chronic ds  - Hg 6.7 today. 1 unit of PRBC's ordered  - 8PM Type and screen ordered    #CHFpEF exacerbation   - Extremely overloaded on exam  - increased to Lasix 60mg IV BID  - midodrine 10mg Q8hrs PRN due to hypotension  - HF team consulted, given patient's bad prognosis no intervention attempted.  - strict I's and O's    #NSTEMI type 2  -Trops elevated but lower from previous admission  - Repeat ECHO and EKG  - troponin started trending down    #Left eye conjunctivitis  -resolved    #Anoxic brain injury  - secondary to  Cardiac arrest  -Does not follow commands, very poor prognosis  -DNR only for now    #Misc  -DVT prophylaxis:  heparin BID subq  -GI prophylaxis: Protonix BID  -Diet: NPO with tube feed  -Code status: DNR  -Activity: Bed bound

## 2022-11-08 NOTE — PROGRESS NOTE ADULT - SUBJECTIVE AND OBJECTIVE BOX
Over Night Events: events noted, vent dependant, tachypneic, afebrile    PHYSICAL EXAM    ICU Vital Signs Last 24 Hrs  T(C): 35.6 (08 Nov 2022 05:17), Max: 35.8 (07 Nov 2022 12:31)  T(F): 96.1 (08 Nov 2022 05:17), Max: 96.5 (07 Nov 2022 12:31)  HR: 101 (08 Nov 2022 05:17) (91 - 102)  BP: 119/83 (08 Nov 2022 05:17) (119/82 - 127/97)  ABP(mean): --  RR: 19 (08 Nov 2022 05:17) (18 - 19)  SpO2: 99% (08 Nov 2022 05:17) (92% - 100%)        General: ill looking  HEENT: trach         Lungs: dec bs both bases  Cardiovascular: Regular   Abdomen: Soft, Positive BS  sacral  ulcer  not following commands      11-06-22 @ 07:01  -  11-07-22 @ 07:00  --------------------------------------------------------  IN:    Free Water: 400 mL    Glucerna: 720 mL  Total IN: 1120 mL    OUT:    Indwelling Catheter - Urethral (mL): 800 mL    Voided (mL): 500 mL  Total OUT: 1300 mL    Total NET: -180 mL      11-07-22 @ 07:01  -  11-08-22 @ 06:29  --------------------------------------------------------  IN:  Total IN: 0 mL    OUT:    Indwelling Catheter - Urethral (mL): 200 mL  Total OUT: 200 mL    Total NET: -200 mL          LABS:                          11.0   15.04 )-----------( 156      ( 07 Nov 2022 11:11 )             36.2                                               11-07    134<L>  |  85<L>  |  76<HH>  ----------------------------<  153<H>  5.0   |  29  |  1.6<H>    Ca    7.9<L>      07 Nov 2022 11:11  Mg     2.5     11-07    TPro  6.6  /  Alb  3.1<L>  /  TBili  0.3  /  DBili  x   /  AST  16  /  ALT  13  /  AlkPhos  107  11-07                                                                                           LIVER FUNCTIONS - ( 07 Nov 2022 11:11 )  Alb: 3.1 g/dL / Pro: 6.6 g/dL / ALK PHOS: 107 U/L / ALT: 13 U/L / AST: 16 U/L / GGT: x                                                                                               Mode: AC/ CMV (Assist Control/ Continuous Mandatory Ventilation)  RR (machine): 20  TV (machine): 400  FiO2: 100  PEEP: 10  ITime: 0.8  MAP: 19  PIP: 42                                          MEDICATIONS  (STANDING):  ALBUTerol    90 MICROgram(s) HFA Inhaler 2 Puff(s) Inhalation every 12 hours  ascorbic acid 500 milliGRAM(s) Oral daily  atorvastatin 10 milliGRAM(s) Oral at bedtime  calcium carbonate   Suspension 750 milliGRAM(s) Enteral Tube three times a day  cefiderocol IVPB 1500 milliGRAM(s) IV Intermittent every 8 hours  chlorhexidine 0.12% Liquid 15 milliLiter(s) Oral Mucosa every 12 hours  chlorhexidine 2% Cloths 1 Application(s) Topical daily  collagenase Ointment 1 Application(s) Topical two times a day  Dakins Solution - 1/2 Strength 1 Application(s) Topical two times a day  dextrose 5%. 1000 milliLiter(s) (50 mL/Hr) IV Continuous <Continuous>  dextrose 5%. 1000 milliLiter(s) (100 mL/Hr) IV Continuous <Continuous>  dextrose 50% Injectable 25 Gram(s) IV Push once  dextrose 50% Injectable 12.5 Gram(s) IV Push once  dextrose 50% Injectable 25 Gram(s) IV Push once  epoetin carline-epbx (RETACRIT) Injectable 70861 Unit(s) SubCutaneous <User Schedule>  glucagon  Injectable 1 milliGRAM(s) IntraMuscular once  heparin   Injectable 5000 Unit(s) SubCutaneous every 12 hours  insulin lispro (ADMELOG) corrective regimen sliding scale   SubCutaneous three times a day before meals  levothyroxine 125 MICROGram(s) Oral daily  methylPREDNISolone sodium succinate Injectable 60 milliGRAM(s) IV Push daily  metoprolol tartrate 12.5 milliGRAM(s) Oral every 12 hours  midodrine. 10 milliGRAM(s) Oral every 8 hours  ofloxacin 0.3% Solution 1 Drop(s) Left EYE two times a day  pantoprazole   Suspension 40 milliGRAM(s) Oral every 12 hours  scopolamine 1 mG/72 Hr(s) Patch 1 Patch Transdermal every 72 hours  zinc sulfate 220 milliGRAM(s) Oral daily    MEDICATIONS  (PRN):  acetaminophen     Tablet .. 650 milliGRAM(s) Oral every 6 hours PRN Temp greater or equal to 38C (100.4F), Mild Pain (1 - 3)  ALBUTerol    90 MICROgram(s) HFA Inhaler 4 Puff(s) Inhalation every 6 hours PRN Shortness of Breath and/or Wheezing  albuterol/ipratropium for Nebulization 3 milliLiter(s) Nebulizer every 6 hours PRN Shortness of Breath and/or Wheezing  dextrose Oral Gel 15 Gram(s) Oral once PRN Blood Glucose LESS THAN 70 milliGRAM(s)/deciliter  morphine  - Injectable 2 milliGRAM(s) IV Push every 6 hours PRN Severe Pain (7 - 10)

## 2022-11-09 VITALS
TEMPERATURE: 96 F | HEART RATE: 67 BPM | DIASTOLIC BLOOD PRESSURE: 53 MMHG | RESPIRATION RATE: 19 BRPM | SYSTOLIC BLOOD PRESSURE: 106 MMHG

## 2022-11-09 NOTE — DISCHARGE NOTE FOR THE EXPIRED PATIENT - NS PRELIMINARY CAUSE OF DEATH_RESTRICTED
Cardiopulmonary Arrest/Chronic lung disease/Heart Failure/Multi-organ Dysfunction Syndrome/Respiratory Failure/Sepsis

## 2022-11-09 NOTE — DISCHARGE NOTE FOR THE EXPIRED PATIENT - HOSPITAL COURSE
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 At baseline, patient is unresponsive, makes random non-purposeful movement, had trach(Vent dependent) and PEG. On 10/22, labs in the SNF showed anemia and uremia, also patient was tachycardic to 101 and tachypneic to 22, was sent to ED for evaluation. In the MOLST form from the SNF, patient is DNR/DNI and CMO, but when I called the daughter Judy Jackson, she wanted patient to be DNR, but also wanted all other intervention to be done for now. In the ED,  patient had WBC 13K, Hb 7, Na 152, K 5.3, Bicarb 43, BUN 94, Trop 0.38, ABG showed metabolic alkalosis, UA was grossly positive(with pus in the urine), CXR showed increased B/L opacity. Patient also found to have multiple pressure decubiti stage 4 and unstageable. Patient was initially admitted to [facility] on 10/29 downgraded to vent unit for further management and tx of sepsis due to acute pyelonephritis, A Baumannii UTI, as well as acute on chronic combined systolic/diastolic HF exacerbation with diffuse anasarca and pulmonary congestion with effusions, acute on chronic respiratory Failure s/p Trach (baseline FiO2 50-60%) and PEG. ID following--iv abx (unasyn and amikacin) earlier on. Bcx NGTD, urine cx (A baumannii: sens to Amikacin, resistant to Unasyn). ECHO 10/31: EF 20%, grade 2 diastolic dysfunction, mild pulm hypertension=---this is  worsening heart function, diuresed with IV lasix 40mg BID.  Routine Ventilator Mode:  AC/CMV  Targeted SpO2 Range (%): 88-97---current FiO2 70%; pulm following. Daily CXR. Episodes of desaturation often- aggressive suction/ vent support/ monitor o2 sat/ Chest PT/Albuterol BID/Solumedrol BID/Scopolamine patch. GoC ongoing, remains DNR however continued full medical mangement; increased lasix to 60mg BID however edema continued to worsen. ID changed abx to CEfiderocol only (likely d/c Abx on 11/8). Urine output progressively declining, Nephrology consulted. Shwetha Auris came back positive. Local wound care for multiple stage 4 pressure ulcers top sacrum and Unstageable pressure ulcer to b/l Hips/b/l  heels- infected. On 11/8 around 11:30pm, patient was noted to be unresponsive and oxygen saturation unable to be obtained by nursing staff. Pt had no corneal reflex or pulses, confirmed via ultrasound, and was pronounced dead 11/8/2022 23:45.

## 2022-11-14 DIAGNOSIS — N10 ACUTE PYELONEPHRITIS: ICD-10-CM

## 2022-11-14 DIAGNOSIS — L89.629 PRESSURE ULCER OF LEFT HEEL, UNSPECIFIED STAGE: ICD-10-CM

## 2022-11-14 DIAGNOSIS — Z93.0 TRACHEOSTOMY STATUS: ICD-10-CM

## 2022-11-14 DIAGNOSIS — A41.9 SEPSIS, UNSPECIFIED ORGANISM: ICD-10-CM

## 2022-11-14 DIAGNOSIS — R65.20 SEVERE SEPSIS WITHOUT SEPTIC SHOCK: ICD-10-CM

## 2022-11-14 DIAGNOSIS — I21.A1 MYOCARDIAL INFARCTION TYPE 2: ICD-10-CM

## 2022-11-14 DIAGNOSIS — I27.20 PULMONARY HYPERTENSION, UNSPECIFIED: ICD-10-CM

## 2022-11-14 DIAGNOSIS — R53.2 FUNCTIONAL QUADRIPLEGIA: ICD-10-CM

## 2022-11-14 DIAGNOSIS — N17.9 ACUTE KIDNEY FAILURE, UNSPECIFIED: ICD-10-CM

## 2022-11-14 DIAGNOSIS — J96.21 ACUTE AND CHRONIC RESPIRATORY FAILURE WITH HYPOXIA: ICD-10-CM

## 2022-11-14 DIAGNOSIS — E87.6 HYPOKALEMIA: ICD-10-CM

## 2022-11-14 DIAGNOSIS — Z86.74 PERSONAL HISTORY OF SUDDEN CARDIAC ARREST: ICD-10-CM

## 2022-11-14 DIAGNOSIS — I11.0 HYPERTENSIVE HEART DISEASE WITH HEART FAILURE: ICD-10-CM

## 2022-11-14 DIAGNOSIS — L89.154 PRESSURE ULCER OF SACRAL REGION, STAGE 4: ICD-10-CM

## 2022-11-14 DIAGNOSIS — G93.1 ANOXIC BRAIN DAMAGE, NOT ELSEWHERE CLASSIFIED: ICD-10-CM

## 2022-11-14 DIAGNOSIS — E87.3 ALKALOSIS: ICD-10-CM

## 2022-11-14 DIAGNOSIS — D63.8 ANEMIA IN OTHER CHRONIC DISEASES CLASSIFIED ELSEWHERE: ICD-10-CM

## 2022-11-14 DIAGNOSIS — E86.0 DEHYDRATION: ICD-10-CM

## 2022-11-14 DIAGNOSIS — L89.210 PRESSURE ULCER OF RIGHT HIP, UNSTAGEABLE: ICD-10-CM

## 2022-11-14 DIAGNOSIS — L89.220 PRESSURE ULCER OF LEFT HIP, UNSTAGEABLE: ICD-10-CM

## 2022-11-14 DIAGNOSIS — E87.5 HYPERKALEMIA: ICD-10-CM

## 2022-11-14 DIAGNOSIS — H10.9 UNSPECIFIED CONJUNCTIVITIS: ICD-10-CM

## 2022-11-14 DIAGNOSIS — I50.43 ACUTE ON CHRONIC COMBINED SYSTOLIC (CONGESTIVE) AND DIASTOLIC (CONGESTIVE) HEART FAILURE: ICD-10-CM

## 2022-11-14 DIAGNOSIS — Z86.16 PERSONAL HISTORY OF COVID-19: ICD-10-CM

## 2022-11-14 DIAGNOSIS — F03.90 UNSPECIFIED DEMENTIA WITHOUT BEHAVIORAL DISTURBANCE: ICD-10-CM

## 2022-11-14 DIAGNOSIS — Z16.24 RESISTANCE TO MULTIPLE ANTIBIOTICS: ICD-10-CM

## 2022-11-14 DIAGNOSIS — Z93.1 GASTROSTOMY STATUS: ICD-10-CM

## 2022-11-14 DIAGNOSIS — L89.619 PRESSURE ULCER OF RIGHT HEEL, UNSPECIFIED STAGE: ICD-10-CM

## 2022-11-14 DIAGNOSIS — Z66 DO NOT RESUSCITATE: ICD-10-CM

## 2022-11-14 DIAGNOSIS — E87.0 HYPEROSMOLALITY AND HYPERNATREMIA: ICD-10-CM

## 2023-02-20 NOTE — PROGRESS NOTE ADULT - ASSESSMENT
IMPRESSION:    sepsis present on admission  UTI/ acinetobacter   Acute on chronic hypoxemic resp failure  Chronic hypoxic respiratory failure SP Trach  Anoxic brain injury  HO Cardiac Arrest  HO Severe COVID PNA  Sacral ulcer      SUGGEST:    CNS:  As needed morphine for comfort    HEENT: Oral care.  Trach care     PULMONARY: aspiration precaution.  Wean FIO2.  Keep Sao2 92 to 96%.  Vent dependent . DTA    CARDIOVASCULAR:   Avoid overload.  midodrine 10 q 8. diuresis as tolerated    GI: PPI q 12, feeding.  Bowel Regimen.    RENAL:  Follow up lytes.  Correct as needed.     INFECTIOUS DISEASE:  ABX per ID    HEMATOLOGICAL: DVT prophylaxis, MOnitor CBC and Coags     ENDOCRINE:  Follow up FS.  Insulin protocol if needed    MUSCULOSKELETAL: Wound care per burn     DNR  Palliative care eval  Very poor overall prognosis    Vent unit Island Pedicle Flap-Requiring Vessel Identification Text: The defect edges were debeveled with a #15 scalpel blade.  Given the location of the defect, shape of the defect and the proximity to free margins an island pedicle advancement flap was deemed most appropriate.  Using a sterile surgical marker, an appropriate advancement flap was drawn, based on the axial vessel mentioned above, incorporating the defect, outlining the appropriate donor tissue and placing the expected incisions within the relaxed skin tension lines where possible.    The area thus outlined was incised deep to adipose tissue with a #15 scalpel blade.  The skin margins were undermined to an appropriate distance in all directions around the primary defect and laterally outward around the island pedicle utilizing iris scissors.  There was minimal undermining beneath the pedicle flap.

## 2024-03-11 NOTE — CHART NOTE - NSCHARTNOTEFT_GEN_A_CORE
Patient was observed to be resting comfortably.  Staff reported that family are working toward having patient transferred to SNF. 138

## 2025-01-28 NOTE — ADVANCED PRACTICE NURSE CONSULT - RECOMMEDATIONS
Plan:  Continue current treatment   Clean buttock and sacrum wit soap and water , pat dry  then apply triad with moist guaze dressing - monitor DTPI for progression    Pressure  injury  preventive  measures  skin care   Assess wound and inform primary provider of any changes   Case discussed with primary Rn  Wound/ ostomy specialist  to f/u as needed     Offloading: [x ] Frequent position changes [ ] Devices/Equipment  Cleansing: [ ] Saline [x ] Soap/Water [ ] Other: ______  Topicals: [ x] Barrier Cream [ ] Antimicrobial [ ] autolytic  Wound Debridement  Dressings: [ ] Dry, sterile [ ] Foam [ ] Absorbant Pads [ ] Collagenase      
Plan:    Clean buttock and sacrum with saline - then apply santyl with moist saline dressing    Pressure  injury  preventive  measures  skin care   Assess wound and inform primary provider of any changes   Case discussed with primary Rn  Wound/ ostomy specialist  to f/u as needed     Offloading: [x ] Frequent position changes [ ] Devices/Equipment  Cleansing: [ ] Saline [x ] Soap/Water [ ] Other: ______  Topicals: [ x] Barrier Cream [ ] Antimicrobial [ ] autolytic  Wound Debridement  Dressings: [ ] Dry, sterile [ ] Foam [ ] Absorbant Pads [ ] Collagenase      
Plan: Clean buttock and sacrum wit soap and water , pat dry  then apply triad with moist guaze dressing - monitor DTPI for progression    Pressure  injury  preventive  measures  skin care   Assess wound and inform primary provider of any changes   Case discussed with primary Rn  Wound/ ostomy specialist  to f/u as needed     Offloading: [x ] Frequent position changes [ ] Devices/Equipment  Cleansing: [ ] Saline [x ] Soap/Water [ ] Other: ______  Topicals: [ x] Barrier Cream [ ] Antimicrobial [ ] autolytic  Wound Debridement  Dressings: [ ] Dry, sterile [ ] Foam [ ] Absorbant Pads [ ] Collagenase      
<-- Click to add NO pertinent Past Medical History

## 2025-04-28 NOTE — ED ADULT TRIAGE NOTE - NSWEIGHTCALCTOOLDRUG_GEN_A_CORE
administered.  Anesthetic plan and risks discussed with patient.    Use of blood products discussed with patient whom consented to blood products.    Plan discussed with CRNA.    Attending anesthesiologist reviewed and agrees with Preprocedure content            Miriam Matta DO   4/28/2025            
 used

## 2025-07-22 NOTE — ED PROVIDER NOTE - WR ORDER NAME 1
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 Note he was recently started on Oxybutynin, per the facility    Plan:  See plan under severe sepsis  Delirium precautions  Continue home Sinemet  Hold Oxybutynin for now; can consider Myrbetriq if needed   Xray Chest 1 View-PORTABLE IMMEDIATE